# Patient Record
Sex: FEMALE | Race: BLACK OR AFRICAN AMERICAN | Employment: OTHER | URBAN - METROPOLITAN AREA
[De-identification: names, ages, dates, MRNs, and addresses within clinical notes are randomized per-mention and may not be internally consistent; named-entity substitution may affect disease eponyms.]

---

## 2018-06-06 ENCOUNTER — OP HISTORICAL/CONVERTED ENCOUNTER (OUTPATIENT)
Dept: OTHER | Age: 68
End: 2018-06-06

## 2018-07-13 ENCOUNTER — IP HISTORICAL/CONVERTED ENCOUNTER (OUTPATIENT)
Dept: OTHER | Age: 68
End: 2018-07-13

## 2019-01-09 ENCOUNTER — OP HISTORICAL/CONVERTED ENCOUNTER (OUTPATIENT)
Dept: OTHER | Age: 69
End: 2019-01-09

## 2019-01-24 ENCOUNTER — ED HISTORICAL/CONVERTED ENCOUNTER (OUTPATIENT)
Dept: OTHER | Age: 69
End: 2019-01-24

## 2019-06-14 ENCOUNTER — IP HISTORICAL/CONVERTED ENCOUNTER (OUTPATIENT)
Dept: OTHER | Age: 69
End: 2019-06-14

## 2019-09-25 ENCOUNTER — ED HISTORICAL/CONVERTED ENCOUNTER (OUTPATIENT)
Dept: OTHER | Age: 69
End: 2019-09-25

## 2019-10-04 ENCOUNTER — ED HISTORICAL/CONVERTED ENCOUNTER (OUTPATIENT)
Dept: OTHER | Age: 69
End: 2019-10-04

## 2020-05-06 ENCOUNTER — IP HISTORICAL/CONVERTED ENCOUNTER (OUTPATIENT)
Dept: OTHER | Age: 70
End: 2020-05-06

## 2020-10-20 ENCOUNTER — TRANSCRIBE ORDER (OUTPATIENT)
Dept: SCHEDULING | Age: 70
End: 2020-10-20

## 2020-10-20 DIAGNOSIS — Z12.31 OTHER SCREENING MAMMOGRAM: Primary | ICD-10-CM

## 2020-11-02 ENCOUNTER — HOSPITAL ENCOUNTER (INPATIENT)
Age: 70
LOS: 3 days | Discharge: HOME HEALTH | DRG: 190 | End: 2020-11-05
Attending: FAMILY MEDICINE | Admitting: HOSPITALIST
Payer: MEDICARE

## 2020-11-02 ENCOUNTER — APPOINTMENT (OUTPATIENT)
Dept: GENERAL RADIOLOGY | Age: 70
DRG: 190 | End: 2020-11-02
Attending: FAMILY MEDICINE
Payer: MEDICARE

## 2020-11-02 ENCOUNTER — APPOINTMENT (OUTPATIENT)
Dept: CT IMAGING | Age: 70
DRG: 190 | End: 2020-11-02
Attending: FAMILY MEDICINE
Payer: MEDICARE

## 2020-11-02 DIAGNOSIS — J44.1 COPD EXACERBATION (HCC): Primary | ICD-10-CM

## 2020-11-02 DIAGNOSIS — R00.0 SINUS TACHYCARDIA: ICD-10-CM

## 2020-11-02 PROBLEM — J44.9 COPD (CHRONIC OBSTRUCTIVE PULMONARY DISEASE) (HCC): Status: ACTIVE | Noted: 2020-11-02

## 2020-11-02 LAB
ALBUMIN SERPL-MCNC: 3.1 G/DL (ref 3.5–5)
ALBUMIN/GLOB SERPL: 0.7 {RATIO} (ref 1.1–2.2)
ALP SERPL-CCNC: 135 U/L (ref 45–117)
ALT SERPL-CCNC: 16 U/L (ref 12–78)
ANION GAP SERPL CALC-SCNC: 3 MMOL/L (ref 5–15)
APTT PPP: 28.5 SEC (ref 23–35.7)
AST SERPL W P-5'-P-CCNC: 17 U/L (ref 15–37)
BASOPHILS # BLD: 0 K/UL (ref 0–0.1)
BASOPHILS NFR BLD: 0 % (ref 0–1)
BILIRUB SERPL-MCNC: 0.4 MG/DL (ref 0.2–1)
BNP SERPL-MCNC: 1437 PG/ML
BUN SERPL-MCNC: 17 MG/DL (ref 6–20)
BUN/CREAT SERPL: 13 (ref 12–20)
CA-I BLD-MCNC: 8.7 MG/DL (ref 8.5–10.1)
CHLORIDE SERPL-SCNC: 107 MMOL/L (ref 97–108)
CO2 SERPL-SCNC: 32 MMOL/L (ref 21–32)
CREAT SERPL-MCNC: 1.36 MG/DL (ref 0.55–1.02)
DIFFERENTIAL METHOD BLD: ABNORMAL
EOSINOPHIL # BLD: 0.1 K/UL (ref 0–0.4)
EOSINOPHIL NFR BLD: 1 % (ref 0–7)
ERYTHROCYTE [DISTWIDTH] IN BLOOD BY AUTOMATED COUNT: 15.3 % (ref 11.5–14.5)
ETHANOL SERPL-MCNC: <4 MG/DL
FIBRINOGEN PPP-MCNC: 540 MG/DL (ref 220–535)
GLOBULIN SER CALC-MCNC: 4.4 G/DL (ref 2–4)
GLUCOSE SERPL-MCNC: 181 MG/DL (ref 65–100)
HCT VFR BLD AUTO: 42.4 % (ref 35–47)
HGB BLD-MCNC: 12.6 G/DL (ref 11.5–16)
IMM GRANULOCYTES # BLD AUTO: 0.1 K/UL (ref 0–0.04)
IMM GRANULOCYTES NFR BLD AUTO: 1 % (ref 0–0.5)
INR PPP: 1.1 (ref 0.9–1.1)
LACTATE SERPL-SCNC: 1.9 MMOL/L (ref 0.4–2)
LDH SERPL L TO P-CCNC: 301 U/L (ref 81–246)
LYMPHOCYTES # BLD: 2 K/UL (ref 0.8–3.5)
LYMPHOCYTES NFR BLD: 21 % (ref 12–49)
MCH RBC QN AUTO: 27.3 PG (ref 26–34)
MCHC RBC AUTO-ENTMCNC: 29.7 G/DL (ref 30–36.5)
MCV RBC AUTO: 92 FL (ref 80–99)
MONOCYTES # BLD: 0.7 K/UL (ref 0–1)
MONOCYTES NFR BLD: 7 % (ref 5–13)
NEUTS SEG # BLD: 6.9 K/UL (ref 1.8–8)
NEUTS SEG NFR BLD: 70 % (ref 32–75)
PLATELET # BLD AUTO: 201 K/UL (ref 150–400)
PMV BLD AUTO: 13.2 FL (ref 8.9–12.9)
POTASSIUM SERPL-SCNC: 4.1 MMOL/L (ref 3.5–5.1)
PROCALCITONIN SERPL-MCNC: <0.05 NG/ML
PROT SERPL-MCNC: 7.5 G/DL (ref 6.4–8.2)
PROTHROMBIN TIME: 14.3 SEC (ref 11.9–14.7)
RBC # BLD AUTO: 4.61 M/UL (ref 3.8–5.2)
SODIUM SERPL-SCNC: 142 MMOL/L (ref 136–145)
THERAPEUTIC RANGE,PTTT: NORMAL SEC (ref 68–109)
TROPONIN I SERPL-MCNC: 0.1 NG/ML
WBC # BLD AUTO: 9.7 K/UL (ref 3.6–11)

## 2020-11-02 PROCEDURE — 93005 ELECTROCARDIOGRAM TRACING: CPT

## 2020-11-02 PROCEDURE — 80053 COMPREHEN METABOLIC PANEL: CPT

## 2020-11-02 PROCEDURE — 74011000636 HC RX REV CODE- 636: Performed by: FAMILY MEDICINE

## 2020-11-02 PROCEDURE — 65270000029 HC RM PRIVATE

## 2020-11-02 PROCEDURE — 74011000250 HC RX REV CODE- 250: Performed by: HOSPITALIST

## 2020-11-02 PROCEDURE — 82728 ASSAY OF FERRITIN: CPT

## 2020-11-02 PROCEDURE — 83880 ASSAY OF NATRIURETIC PEPTIDE: CPT

## 2020-11-02 PROCEDURE — 85730 THROMBOPLASTIN TIME PARTIAL: CPT

## 2020-11-02 PROCEDURE — 36415 COLL VENOUS BLD VENIPUNCTURE: CPT

## 2020-11-02 PROCEDURE — 71275 CT ANGIOGRAPHY CHEST: CPT

## 2020-11-02 PROCEDURE — 71045 X-RAY EXAM CHEST 1 VIEW: CPT

## 2020-11-02 PROCEDURE — 85025 COMPLETE CBC W/AUTO DIFF WBC: CPT

## 2020-11-02 PROCEDURE — 84484 ASSAY OF TROPONIN QUANT: CPT

## 2020-11-02 PROCEDURE — 74011250636 HC RX REV CODE- 250/636: Performed by: FAMILY MEDICINE

## 2020-11-02 PROCEDURE — 83615 LACTATE (LD) (LDH) ENZYME: CPT

## 2020-11-02 PROCEDURE — 96375 TX/PRO/DX INJ NEW DRUG ADDON: CPT

## 2020-11-02 PROCEDURE — 83605 ASSAY OF LACTIC ACID: CPT

## 2020-11-02 PROCEDURE — 74011000250 HC RX REV CODE- 250: Performed by: FAMILY MEDICINE

## 2020-11-02 PROCEDURE — 85384 FIBRINOGEN ACTIVITY: CPT

## 2020-11-02 PROCEDURE — 84145 PROCALCITONIN (PCT): CPT

## 2020-11-02 PROCEDURE — 99285 EMERGENCY DEPT VISIT HI MDM: CPT

## 2020-11-02 PROCEDURE — 96376 TX/PRO/DX INJ SAME DRUG ADON: CPT

## 2020-11-02 PROCEDURE — 96374 THER/PROPH/DIAG INJ IV PUSH: CPT

## 2020-11-02 PROCEDURE — 80307 DRUG TEST PRSMV CHEM ANLYZR: CPT

## 2020-11-02 PROCEDURE — 85610 PROTHROMBIN TIME: CPT

## 2020-11-02 RX ORDER — DOCUSATE SODIUM 100 MG/1
100 CAPSULE, LIQUID FILLED ORAL 2 TIMES DAILY
Status: DISCONTINUED | OUTPATIENT
Start: 2020-11-03 | End: 2020-11-05 | Stop reason: HOSPADM

## 2020-11-02 RX ORDER — BUPROPION HYDROCHLORIDE 150 MG/1
150 TABLET ORAL
COMMUNITY
End: 2021-03-14

## 2020-11-02 RX ORDER — DILTIAZEM HYDROCHLORIDE 5 MG/ML
15 INJECTION INTRAVENOUS ONCE
Status: COMPLETED | OUTPATIENT
Start: 2020-11-02 | End: 2020-11-02

## 2020-11-02 RX ORDER — FUROSEMIDE 10 MG/ML
40 INJECTION INTRAMUSCULAR; INTRAVENOUS
Status: COMPLETED | OUTPATIENT
Start: 2020-11-02 | End: 2020-11-02

## 2020-11-02 RX ORDER — BUDESONIDE 0.5 MG/2ML
500 INHALANT ORAL
Status: DISCONTINUED | OUTPATIENT
Start: 2020-11-03 | End: 2020-11-05 | Stop reason: HOSPADM

## 2020-11-02 RX ORDER — ALBUTEROL SULFATE 2.5 MG/.5ML
2.5 SOLUTION RESPIRATORY (INHALATION)
Status: DISCONTINUED | OUTPATIENT
Start: 2020-11-02 | End: 2020-11-05 | Stop reason: HOSPADM

## 2020-11-02 RX ORDER — SODIUM CHLORIDE 0.9 % (FLUSH) 0.9 %
5-40 SYRINGE (ML) INJECTION EVERY 8 HOURS
Status: DISCONTINUED | OUTPATIENT
Start: 2020-11-03 | End: 2020-11-05 | Stop reason: HOSPADM

## 2020-11-02 RX ORDER — IPRATROPIUM BROMIDE AND ALBUTEROL SULFATE 2.5; .5 MG/3ML; MG/3ML
3 SOLUTION RESPIRATORY (INHALATION)
Status: ACTIVE | OUTPATIENT
Start: 2020-11-02 | End: 2020-11-03

## 2020-11-02 RX ORDER — IPRATROPIUM BROMIDE 0.5 MG/2.5ML
0.5 SOLUTION RESPIRATORY (INHALATION)
Status: DISCONTINUED | OUTPATIENT
Start: 2020-11-03 | End: 2020-11-03

## 2020-11-02 RX ORDER — CLOPIDOGREL BISULFATE 75 MG/1
75 TABLET ORAL DAILY
Status: ON HOLD | COMMUNITY
End: 2020-11-03 | Stop reason: SDUPTHER

## 2020-11-02 RX ORDER — SODIUM CHLORIDE 0.9 % (FLUSH) 0.9 %
5-40 SYRINGE (ML) INJECTION AS NEEDED
Status: DISCONTINUED | OUTPATIENT
Start: 2020-11-02 | End: 2020-11-05 | Stop reason: HOSPADM

## 2020-11-02 RX ORDER — ONDANSETRON 4 MG/1
4 TABLET, ORALLY DISINTEGRATING ORAL
Status: DISCONTINUED | OUTPATIENT
Start: 2020-11-02 | End: 2020-11-05 | Stop reason: HOSPADM

## 2020-11-02 RX ORDER — DILTIAZEM HCL/D5W 125 MG/125
5 PLASTIC BAG, INJECTION (ML) INTRAVENOUS
Status: DISCONTINUED | OUTPATIENT
Start: 2020-11-02 | End: 2020-11-03

## 2020-11-02 RX ORDER — DILTIAZEM HYDROCHLORIDE 5 MG/ML
15 INJECTION INTRAVENOUS ONCE
Status: DISCONTINUED | OUTPATIENT
Start: 2020-11-02 | End: 2020-11-02

## 2020-11-02 RX ORDER — ENOXAPARIN SODIUM 100 MG/ML
40 INJECTION SUBCUTANEOUS EVERY 24 HOURS
Status: DISCONTINUED | OUTPATIENT
Start: 2020-11-03 | End: 2020-11-03

## 2020-11-02 RX ORDER — LORAZEPAM 2 MG/ML
1 INJECTION INTRAMUSCULAR
Status: COMPLETED | OUTPATIENT
Start: 2020-11-02 | End: 2020-11-02

## 2020-11-02 RX ORDER — DILTIAZEM HYDROCHLORIDE 5 MG/ML
5 INJECTION INTRAVENOUS ONCE
Status: COMPLETED | OUTPATIENT
Start: 2020-11-02 | End: 2020-11-02

## 2020-11-02 RX ADMIN — IOPAMIDOL 100 ML: 755 INJECTION, SOLUTION INTRAVENOUS at 22:42

## 2020-11-02 RX ADMIN — LORAZEPAM 1 MG: 2 INJECTION, SOLUTION INTRAMUSCULAR; INTRAVENOUS at 21:28

## 2020-11-02 RX ADMIN — FUROSEMIDE 40 MG: 10 INJECTION, SOLUTION INTRAMUSCULAR; INTRAVENOUS at 21:03

## 2020-11-02 RX ADMIN — Medication 5 MG/HR: at 23:51

## 2020-11-02 RX ADMIN — METHYLPREDNISOLONE SODIUM SUCCINATE 125 MG: 125 INJECTION, POWDER, FOR SOLUTION INTRAMUSCULAR; INTRAVENOUS at 17:57

## 2020-11-02 RX ADMIN — DILTIAZEM HYDROCHLORIDE 15 MG: 5 INJECTION INTRAVENOUS at 23:31

## 2020-11-02 RX ADMIN — DILTIAZEM HYDROCHLORIDE 5 MG: 5 INJECTION INTRAVENOUS at 21:28

## 2020-11-02 NOTE — ED TRIAGE NOTES
Pt complains of sob, was in acute respiratory distress, tightness, 90% 3L home o2 per ems, gave 1 duo neb with relief, clear lung sounds upon ems transport, stabilized on 3L, hx of COPD, open heart surgery

## 2020-11-03 ENCOUNTER — APPOINTMENT (OUTPATIENT)
Dept: NON INVASIVE DIAGNOSTICS | Age: 70
DRG: 190 | End: 2020-11-03
Attending: INTERNAL MEDICINE
Payer: MEDICARE

## 2020-11-03 ENCOUNTER — APPOINTMENT (OUTPATIENT)
Dept: NON INVASIVE DIAGNOSTICS | Age: 70
DRG: 190 | End: 2020-11-03
Attending: HOSPITALIST
Payer: MEDICARE

## 2020-11-03 LAB
ANION GAP SERPL CALC-SCNC: 10 MMOL/L (ref 5–15)
APTT PPP: 38 SEC (ref 23–35.7)
ATRIAL RATE: 278 BPM
BUN SERPL-MCNC: 20 MG/DL (ref 6–20)
BUN/CREAT SERPL: 13 (ref 12–20)
CA-I BLD-MCNC: 8.7 MG/DL (ref 8.5–10.1)
CALCULATED P AXIS, ECG09: -83 DEGREES
CALCULATED R AXIS, ECG10: 48 DEGREES
CALCULATED T AXIS, ECG11: 135 DEGREES
CHLORIDE SERPL-SCNC: 102 MMOL/L (ref 97–108)
CO2 SERPL-SCNC: 29 MMOL/L (ref 21–32)
CREAT SERPL-MCNC: 1.57 MG/DL (ref 0.55–1.02)
DIAGNOSIS, 93000: NORMAL
ECHO AO ROOT DIAM: 2.7 CM
ECHO AV PEAK GRADIENT: 10 MMHG
ECHO LA TO AORTIC ROOT RATIO: 1.81
ECHO LV EDV A4C: 119 CM3
ECHO LV EJECTION FRACTION A2C: 26 %
ECHO LV EJECTION FRACTION BIPLANE: 51.1 % (ref 55–100)
ECHO LV ESV A2C: 45.4 CM3
ECHO LV ESV A4C: 53 CM3
ECHO LV INTERNAL DIMENSION DIASTOLIC MMODE: 4.51 CM
ECHO LV INTERNAL DIMENSION SYSTOLIC: 3.34 CM
ECHO LV IVSD: 1.89 CM (ref 0.6–0.9)
ECHO LV POSTERIOR WALL DIASTOLIC: 1.65 CM (ref 0.6–0.9)
ECHO LVOT PEAK GRADIENT: 4 MMHG
ECHO LVOT SV: 54.4 CM3
ECHO PV PEAK INSTANTANEOUS GRADIENT SYSTOLIC: 12 MMHG
ECHO PV PEAK INSTANTANEOUS GRADIENT SYSTOLIC: 4 MMHG
ECHO PV REGURGITANT MAX VELOCITY: 104 CM/S
ECHO PV REGURGITANT MAX VELOCITY: 160 CM/S
ECHO PV REGURGITANT MAX VELOCITY: 173 CM/S
ECHO PV REGURGITANT MAX VELOCITY: 94.7 CM/S
ECHO RV INTERNAL DIMENSION: 3.13 CM
ECHO TV MAX VELOCITY: 229 CM/S
ECHO TV REGURGITANT PEAK GRADIENT: 21 MMHG
ERYTHROCYTE [DISTWIDTH] IN BLOOD BY AUTOMATED COUNT: 15.2 % (ref 11.5–14.5)
FERRITIN SERPL-MCNC: 69 NG/ML (ref 26–388)
FIBRINOGEN PPP-MCNC: 575 MG/DL (ref 220–535)
GLUCOSE BLD STRIP.AUTO-MCNC: 210 MG/DL (ref 65–100)
GLUCOSE BLD STRIP.AUTO-MCNC: 229 MG/DL (ref 65–100)
GLUCOSE BLD STRIP.AUTO-MCNC: 347 MG/DL (ref 65–100)
GLUCOSE SERPL-MCNC: 307 MG/DL (ref 65–100)
HCT VFR BLD AUTO: 43.4 % (ref 35–47)
HGB BLD-MCNC: 13.1 G/DL (ref 11.5–16)
INR PPP: 1.1 (ref 0.9–1.1)
MCH RBC QN AUTO: 27.8 PG (ref 26–34)
MCHC RBC AUTO-ENTMCNC: 30.2 G/DL (ref 30–36.5)
MCV RBC AUTO: 91.9 FL (ref 80–99)
PERFORMED BY, TECHID: ABNORMAL
PLATELET # BLD AUTO: 207 K/UL (ref 150–400)
PMV BLD AUTO: 13.3 FL (ref 8.9–12.9)
POTASSIUM SERPL-SCNC: 4.3 MMOL/L (ref 3.5–5.1)
PROTHROMBIN TIME: 14.5 SEC (ref 11.9–14.7)
Q-T INTERVAL, ECG07: 372 MS
QRS DURATION, ECG06: 98 MS
QTC CALCULATION (BEZET), ECG08: 426 MS
RBC # BLD AUTO: 4.72 M/UL (ref 3.8–5.2)
SARS-COV-2, COV2: NORMAL
SODIUM SERPL-SCNC: 141 MMOL/L (ref 136–145)
THERAPEUTIC RANGE,PTTT: ABNORMAL SEC (ref 68–109)
TROPONIN I SERPL-MCNC: 0.06 NG/ML
VENTRICULAR RATE, ECG03: 79 BPM
WBC # BLD AUTO: 13.5 K/UL (ref 3.6–11)

## 2020-11-03 PROCEDURE — 80048 BASIC METABOLIC PNL TOTAL CA: CPT

## 2020-11-03 PROCEDURE — 74011250636 HC RX REV CODE- 250/636: Performed by: HOSPITALIST

## 2020-11-03 PROCEDURE — 85730 THROMBOPLASTIN TIME PARTIAL: CPT

## 2020-11-03 PROCEDURE — 74011250637 HC RX REV CODE- 250/637: Performed by: HOSPITALIST

## 2020-11-03 PROCEDURE — 93005 ELECTROCARDIOGRAM TRACING: CPT

## 2020-11-03 PROCEDURE — 82962 GLUCOSE BLOOD TEST: CPT

## 2020-11-03 PROCEDURE — C8929 TTE W OR WO FOL WCON,DOPPLER: HCPCS

## 2020-11-03 PROCEDURE — 85384 FIBRINOGEN ACTIVITY: CPT

## 2020-11-03 PROCEDURE — 84484 ASSAY OF TROPONIN QUANT: CPT

## 2020-11-03 PROCEDURE — 94760 N-INVAS EAR/PLS OXIMETRY 1: CPT

## 2020-11-03 PROCEDURE — 74011636637 HC RX REV CODE- 636/637: Performed by: HOSPITALIST

## 2020-11-03 PROCEDURE — 85610 PROTHROMBIN TIME: CPT

## 2020-11-03 PROCEDURE — 74011000250 HC RX REV CODE- 250: Performed by: HOSPITALIST

## 2020-11-03 PROCEDURE — 85027 COMPLETE CBC AUTOMATED: CPT

## 2020-11-03 PROCEDURE — 36415 COLL VENOUS BLD VENIPUNCTURE: CPT

## 2020-11-03 PROCEDURE — 87635 SARS-COV-2 COVID-19 AMP PRB: CPT

## 2020-11-03 PROCEDURE — 65270000029 HC RM PRIVATE

## 2020-11-03 PROCEDURE — 74011250637 HC RX REV CODE- 250/637: Performed by: INTERNAL MEDICINE

## 2020-11-03 PROCEDURE — 93971 EXTREMITY STUDY: CPT

## 2020-11-03 RX ORDER — LOSARTAN POTASSIUM 25 MG/1
25 TABLET ORAL DAILY
COMMUNITY
End: 2021-03-10 | Stop reason: ALTCHOICE

## 2020-11-03 RX ORDER — FUROSEMIDE 40 MG/1
40 TABLET ORAL EVERY EVENING
COMMUNITY
End: 2021-03-10 | Stop reason: ALTCHOICE

## 2020-11-03 RX ORDER — AMLODIPINE BESYLATE 10 MG/1
10 TABLET ORAL DAILY
COMMUNITY
End: 2021-03-10 | Stop reason: ALTCHOICE

## 2020-11-03 RX ORDER — PRAVASTATIN SODIUM 40 MG/1
40 TABLET ORAL
COMMUNITY
End: 2021-06-21

## 2020-11-03 RX ORDER — IPRATROPIUM BROMIDE AND ALBUTEROL SULFATE 2.5; .5 MG/3ML; MG/3ML
3 SOLUTION RESPIRATORY (INHALATION)
Status: DISCONTINUED | OUTPATIENT
Start: 2020-11-03 | End: 2020-11-05 | Stop reason: HOSPADM

## 2020-11-03 RX ORDER — CARVEDILOL 12.5 MG/1
25 TABLET ORAL 2 TIMES DAILY
COMMUNITY
End: 2020-11-05

## 2020-11-03 RX ORDER — ISOSORBIDE DINITRATE 10 MG/1
10 TABLET ORAL 2 TIMES DAILY
COMMUNITY
End: 2021-03-10 | Stop reason: ALTCHOICE

## 2020-11-03 RX ORDER — METOLAZONE 5 MG/1
5 TABLET ORAL DAILY
COMMUNITY
End: 2021-03-14

## 2020-11-03 RX ORDER — GABAPENTIN 600 MG/1
600 TABLET ORAL 3 TIMES DAILY
COMMUNITY
End: 2021-03-14

## 2020-11-03 RX ORDER — MAGNESIUM SULFATE 100 %
4 CRYSTALS MISCELLANEOUS AS NEEDED
Status: DISCONTINUED | OUTPATIENT
Start: 2020-11-03 | End: 2020-11-05 | Stop reason: HOSPADM

## 2020-11-03 RX ORDER — DILTIAZEM HCL/D5W 125 MG/125
5 PLASTIC BAG, INJECTION (ML) INTRAVENOUS CONTINUOUS
Status: DISCONTINUED | OUTPATIENT
Start: 2020-11-03 | End: 2020-11-04

## 2020-11-03 RX ORDER — THEOPHYLLINE 300 MG/1
300 TABLET, EXTENDED RELEASE ORAL DAILY
COMMUNITY
End: 2021-03-10 | Stop reason: ALTCHOICE

## 2020-11-03 RX ORDER — NITROGLYCERIN 0.4 MG/1
0.4 TABLET SUBLINGUAL
COMMUNITY
End: 2021-03-10 | Stop reason: ALTCHOICE

## 2020-11-03 RX ORDER — CARVEDILOL 12.5 MG/1
25 TABLET ORAL 2 TIMES DAILY WITH MEALS
Status: DISCONTINUED | OUTPATIENT
Start: 2020-11-03 | End: 2020-11-05 | Stop reason: HOSPADM

## 2020-11-03 RX ORDER — GUAIFENESIN 100 MG/5ML
81 LIQUID (ML) ORAL DAILY
COMMUNITY
End: 2020-11-05

## 2020-11-03 RX ORDER — DEXTROSE 50 % IN WATER (D50W) INTRAVENOUS SYRINGE
25-50 AS NEEDED
Status: DISCONTINUED | OUTPATIENT
Start: 2020-11-03 | End: 2020-11-05 | Stop reason: HOSPADM

## 2020-11-03 RX ORDER — ALBUTEROL SULFATE 90 UG/1
2 AEROSOL, METERED RESPIRATORY (INHALATION)
COMMUNITY

## 2020-11-03 RX ORDER — METOPROLOL TARTRATE 100 MG/1
100 TABLET ORAL 2 TIMES DAILY
COMMUNITY
End: 2020-11-05

## 2020-11-03 RX ORDER — BUPROPION HYDROCHLORIDE 150 MG/1
150 TABLET ORAL
Status: DISCONTINUED | OUTPATIENT
Start: 2020-11-04 | End: 2020-11-04

## 2020-11-03 RX ORDER — INSULIN LISPRO 100 [IU]/ML
INJECTION, SOLUTION INTRAVENOUS; SUBCUTANEOUS
Status: DISCONTINUED | OUTPATIENT
Start: 2020-11-03 | End: 2020-11-05 | Stop reason: HOSPADM

## 2020-11-03 RX ORDER — SOLIFENACIN SUCCINATE 5 MG/1
5 TABLET, FILM COATED ORAL DAILY
COMMUNITY
End: 2021-02-22

## 2020-11-03 RX ORDER — OXYBUTYNIN CHLORIDE 5 MG/1
5 TABLET ORAL 3 TIMES DAILY
COMMUNITY
End: 2021-03-14

## 2020-11-03 RX ORDER — CLOPIDOGREL BISULFATE 75 MG/1
75 TABLET ORAL DAILY
Status: CANCELLED | OUTPATIENT
Start: 2020-11-03

## 2020-11-03 RX ADMIN — Medication 10 ML: at 05:37

## 2020-11-03 RX ADMIN — INSULIN LISPRO 7 UNITS: 100 INJECTION, SOLUTION INTRAVENOUS; SUBCUTANEOUS at 12:14

## 2020-11-03 RX ADMIN — APIXABAN 5 MG: 5 TABLET, FILM COATED ORAL at 12:15

## 2020-11-03 RX ADMIN — INSULIN LISPRO 3 UNITS: 100 INJECTION, SOLUTION INTRAVENOUS; SUBCUTANEOUS at 17:18

## 2020-11-03 RX ADMIN — CARVEDILOL 25 MG: 12.5 TABLET, FILM COATED ORAL at 17:18

## 2020-11-03 RX ADMIN — PERFLUTREN 2 ML: 6.52 INJECTION, SUSPENSION INTRAVENOUS at 11:30

## 2020-11-03 RX ADMIN — Medication 10 ML: at 02:35

## 2020-11-03 RX ADMIN — ENOXAPARIN SODIUM 40 MG: 40 INJECTION SUBCUTANEOUS at 02:30

## 2020-11-03 RX ADMIN — Medication 10 MG/HR: at 13:54

## 2020-11-03 RX ADMIN — DOCUSATE SODIUM 100 MG: 100 CAPSULE, LIQUID FILLED ORAL at 22:28

## 2020-11-03 RX ADMIN — DOCUSATE SODIUM 100 MG: 100 CAPSULE, LIQUID FILLED ORAL at 02:30

## 2020-11-03 RX ADMIN — INSULIN LISPRO 2 UNITS: 100 INJECTION, SOLUTION INTRAVENOUS; SUBCUTANEOUS at 22:29

## 2020-11-03 RX ADMIN — Medication 10 ML: at 22:31

## 2020-11-03 RX ADMIN — APIXABAN 5 MG: 5 TABLET, FILM COATED ORAL at 22:29

## 2020-11-03 NOTE — PROGRESS NOTES
2 nurse skin assessment performed by A NICOLA WOMACK RN and myself. PT skin is free of breakdown, tears and abrasions. Pt does present with moisture to the buttocks. Absorbent pad changed and external catheter replaced.

## 2020-11-03 NOTE — H&P
History and Physical              Subjective :   Chief Complaint : Shortness of breath    Source of information : Patient, emergency room provider    History of present illness:   72 y.o. female with history of COPD, CHF and diabetes presents to the emergency room complaining of shortness of breath that started morning. She is treated in the emergency room with nebulizer treatments with improvement. She also started having tightness in the chest but denied any palpitations, fever, body aches. She continued to have tachycardia with no improvement in heart rate, it was between 1 30-1 40. Was given bolus IV diltiazem with no improvement so started on infusion. On CTA chest she has no pulmonary embolism. Also did not show any evidence of pneumonia. Admitted for further management. Past Medical History:   Diagnosis Date    [facility] ([facility] [address] [facility]     Coronary artery disease     Diabetes ([facility] [address]     Hyperlipidemia     Hypertension     IFTIKHAR (obstructive sleep apnea)      Past Surgical History:   Procedure Laterality Date    HX CHOLECYSTECTOMY      HX CORONARY ARTERY BYPASS GRAFT      HX HERNIA REPAIR      HX HYSTERECTOMY       Family History   Problem Relation Age of Onset    Heart Disease Mother     Kidney Disease Mother       Social History     Tobacco Use    Smoking status: Former Smoker    Smokeless tobacco: Never Used   Substance Use Topics    Alcohol use: Not Currently     Frequency: Never       Prior to Admission medications    Medication Sig Start Date End Date Taking? Authorizing Provider   clopidogreL (Plavix) 75 mg tab Take 75 mg by mouth daily. Yes Provider, Historical   buPROPion XL (WELLBUTRIN XL) 150 mg tablet Take 150 mg by mouth every morning. Yes Provider, Historical     Allergies   Allergen Reactions    Tylox [Oxycodone-Acetaminophen] Hives             Review of Systems:  Constitutional: Appetite is fair, denies weight loss, no fever, no chills, no night sweats.   Eye: No recent visual disturbances, no discharge, no double vision. Ear/nose/mouth/throat : No hearing disturbance, no ear pain, no nasal congestion, no sore throat, no trouble swallowing. Respiratory : ++ trouble breathing, ++ nonproductive cough, ++ shortness of breath, no hemoptysis, ++ wheezing. Cardiovascular : ++ chest discomfort, no palpitation, no racing of heart, no orthopnea, no paroxysmal nocturnal dyspnea, no peripheral edema. Gastrointestinal : No nausea, no vomiting, no diarrhea, + constipation, No heartburn, No abdominal pain. Genitourinary : No dysuria, no hematuria, no increased frequency, . Lymphatics : No swollen glands -Neck, axillary, inguinal.  Endocrine : No excessive thirst, No polyuria No cold intolerance, No heat intolerance. Immunologic : No hives, urticaria, No seasonal allergies. Musculoskeletal : No joint swelling, No pain, No effusion. Integumentary : No rash, No pruritus, No ecchymosis. Hematology : No petechiae, No easy bruising,  No tendency to bleed easy. Neurology : Denies change in mental status, No abnormal balance, No headache, No confusion, No numbness or tingling. Psychiatric : No mood swings, No anxiety, No depression. Vitals:     Patient Vitals for the past 12 hrs:   Temp Pulse Resp BP SpO2   11/02/20 2331 -- -- -- (!) 155/89 --   11/02/20 2327 -- (!) 139 -- -- --   11/02/20 2155 -- -- -- -- 98 %   11/02/20 2103 -- (!) 134 -- (!) 158/117 --   11/02/20 1756 -- (!) 125 -- (!) 141/79 92 %   11/02/20 1744 98.3 °F (36.8 °C) (!) 136 16 (!) 188/105 92 %       Physical Exam:   General : Looks tired mild respiratory distress. Alesha Fajardo HEMICHEL : PERRLA, normal oral mucosa, atraumatic normocephalic, Normal ear and nose. Neck : Supple, no JVD, no masses noted, no carotid bruit. Lungs : Breath sounds with moderate air entry bilaterally, occasional expiratory wheezes, decreased breath sounds at bases. Mild accessory muscle use noted.   CVS : Rhythm rate regular, S1+, S2+, tachycardia noted.  Monitor showing irregular rhythm with a heart rate 130-140. Brenda Staggers Abdomen : Soft, nontender, obese,, bowel sounds active. Extremities : No edema noted,  pedal pulses not palpable. Musculoskeletal : Fair range of motion, no joint swelling or effusion, muscle tone and power appears normal.   Skin : Moist, warm, skin turgor, no pathological rash. Lymphatic : No cervical lymphadenopathy. Neurological : Awake, alert, oriented to time place person. Brenda Staggers Psychiatric : Mood and affect appears appropriate to the situation. Data Review:   Recent Results (from the past 24 hour(s))   CBC WITH AUTOMATED DIFF    Collection Time: 11/02/20  6:00 PM   Result Value Ref Range    WBC 9.7 3.6 - 11.0 K/uL    RBC 4.61 3.80 - 5.20 M/uL    HGB 12.6 11.5 - 16.0 g/dL    HCT 42.4 35.0 - 47.0 %    MCV 92.0 80.0 - 99.0 FL    MCH 27.3 26.0 - 34.0 PG    MCHC 29.7 (L) 30.0 - 36.5 g/dL    RDW 15.3 (H) 11.5 - 14.5 %    PLATELET 704 466 - 605 K/uL    MPV 13.2 (H) 8.9 - 12.9 FL    NEUTROPHILS 70 32 - 75 %    LYMPHOCYTES 21 12 - 49 %    MONOCYTES 7 5 - 13 %    EOSINOPHILS 1 0 - 7 %    BASOPHILS 0 0 - 1 %    IMMATURE GRANULOCYTES 1 (H) 0.0 - 0.5 %    ABS. NEUTROPHILS 6.9 1.8 - 8.0 K/UL    ABS. LYMPHOCYTES 2.0 0.8 - 3.5 K/UL    ABS. MONOCYTES 0.7 0.0 - 1.0 K/UL    ABS. EOSINOPHILS 0.1 0.0 - 0.4 K/UL    ABS. BASOPHILS 0.0 0.0 - 0.1 K/UL    ABS. IMM.  GRANS. 0.1 (H) 0.00 - 0.04 K/UL    DF AUTOMATED     METABOLIC PANEL, COMPREHENSIVE    Collection Time: 11/02/20  6:00 PM   Result Value Ref Range    Sodium 142 136 - 145 mmol/L    Potassium 4.1 3.5 - 5.1 mmol/L    Chloride 107 97 - 108 mmol/L    CO2 32 21 - 32 mmol/L    Anion gap 3 (L) 5 - 15 mmol/L    Glucose 181 (H) 65 - 100 mg/dL    BUN 17 6 - 20 mg/dL    Creatinine 1.36 (H) 0.55 - 1.02 mg/dL    BUN/Creatinine ratio 13 12 - 20      GFR est AA 47 (L) >60 ml/min/1.73m2    GFR est non-AA 39 (L) >60 ml/min/1.73m2    Calcium 8.7 8.5 - 10.1 mg/dL    Bilirubin, total 0.4 0.2 - 1.0 mg/dL    AST (SGOT) 17 15 - 37 U/L    ALT (SGPT) 16 12 - 78 U/L    Alk. phosphatase 135 (H) 45 - 117 U/L    Protein, total 7.5 6.4 - 8.2 g/dL    Albumin 3.1 (L) 3.5 - 5.0 g/dL    Globulin 4.4 (H) 2.0 - 4.0 g/dL    A-G Ratio 0.7 (L) 1.1 - 2.2     PROCALCITONIN    Collection Time: 11/02/20  8:50 PM   Result Value Ref Range    Procalcitonin <0.05 (H) 0 ng/mL   LD    Collection Time: 11/02/20  8:50 PM   Result Value Ref Range     (H) 81 - 246 U/L   TROPONIN I    Collection Time: 11/02/20  8:50 PM   Result Value Ref Range    Troponin-I, Qt. 0.10 (H) <0.05 ng/mL   LACTIC ACID    Collection Time: 11/02/20  8:50 PM   Result Value Ref Range    Lactic acid 1.9 0.4 - 2.0 mmol/L   FIBRINOGEN    Collection Time: 11/02/20  8:50 PM   Result Value Ref Range    Fibrinogen 540 (H) 220 - 535 mg/dL   PTT    Collection Time: 11/02/20  8:50 PM   Result Value Ref Range    aPTT 28.5 23.0 - 35.7 sec    aPTT, therapeutic range   68 - 109 sec   PROTHROMBIN TIME + INR    Collection Time: 11/02/20  8:50 PM   Result Value Ref Range    Prothrombin time 14.3 11.9 - 14.7 sec    INR 1.1 0.9 - 1.1     ETHYL ALCOHOL    Collection Time: 11/02/20  8:50 PM   Result Value Ref Range    ALCOHOL(ETHYL),SERUM <4 <10 mg/dL   BNP    Collection Time: 11/02/20  8:56 PM   Result Value Ref Range    NT pro-BNP 1,437 (H) <125 pg/mL       Radiologic Studies :   CT Results  (Last 48 hours)               11/02/20 2239  CTA CHEST W OR W WO CONT Final result    Impression:  Impression:   1. No evidence of central pulmonary embolism. Limited evaluation of the distal   subsegmental branch vessels due to respiratory motion artifact. 2.  No acute intrathoracic abnormality identified. 3.  Severe atherosclerosis. Status post CABG. Cardiomegaly. 4.  See full report for detailed findings. Narrative:  Study: CTA Chest.       History: Rule out PE. Comparison: Chest CT 5/6/2020. Chest x-ray 11 2020.        Technique: CT volume acquisition of the chest was performed during the pulmonary   arterial phase following the administration of 100 mL of Isovue-370 IV contrast.   Axial, sagittal, coronal, and MIP reconstructions were reviewed. Dose reduction:   All CT scans at this facility are performed using dose reduction optimization   techniques as appropriate to a performed exam including the following: Automated   exposure control, adjustments of the mA and/or kV according to patient size, or   use of iterative reconstruction technique. Findings:       Vessels: No evidence of pulmonary was within limitations. The distal   subsegmental branches in the lower lobes are not well evaluated due to   respiratory motion artifact. Lungs/Pleura: Mild dependent bibasilar atelectasis. No consolidative airspace   disease, pleural effusion or pneumothorax. Central airways are clear of debris. No discrete lung nodule. Mediastinum/Cardiac: Heart is enlarged. Severe coronary atherosclerosis. Status   post CABG. Moderate aortic atherosclerosis. Normal caliber main pulmonary artery   and thoracic aorta. No pericardial effusion. Under distended thoracic esophagus. Thyroid: Visualized thyroid is grossly unremarkable. Lymph nodes: No intrathoracic lymphadenopathy by CT size criteria. Upper Abdomen: Visualized upper abdominal visceral structures are grossly   unremarkable. Bones/Chest wall soft tissues: Mild degenerative changes of the visualized   spine. No acute osseous abnormality identified. CXR Results  (Last 48 hours)               11/02/20 1901  XR CHEST PORT Final result    Impression:  Impression:    No acute cardiopulmonary process. Narrative:  AP portable chest, 1859 hours. Comparison: 5/11/2020. Findings: Cardiac monitoring leads overlie the chest. The patient is status post   median sternotomy. There is moderate cardiomegaly. There is moderate calcified   atherosclerotic disease within the thoracic aorta.  The sameer and pulmonary   vasculature are unremarkable. The lungs are well expanded without focal   consolidation, pleural effusion or pneumothorax. There are senescent changes   within the spine. Assessment and Plan :   COPD with exacerbation: Treated in the emergency room with much improvement, will not give any more steroids, continue nebulizer treatments. On steroid inhaler which we will continue    Sinus tachycardia: Etiology unclear, not controlled. Started on IV diltiazem, she already received boluses. We will continue the IV diltiazem and monitor closely. Diabetes mellitus type 2: We will keep her on Accu-Cheks with sliding scale insulin, unable to get information what she is taking at home. Benign essential hypertension: Again do not know the medications what she is taking, need to verify and restart medications in the morning    History of coronary artery disease with stent placement    Admitted to cardiac telemetry, full CODE STATUS, home medications unable to verify, needed to be verified in the morning. Has no advance medical directives. CC : Muna Chaney MD  Signed By: Clement Sinclair MD     November 2, 2020      This dictation was done by dragon, computer voice recognition software. Often unanticipated grammatical, syntax, [place] phones and other interpretive errors are inadvertently transcribed. Please excuse errors that have escaped final proofreading.

## 2020-11-03 NOTE — CONSULTS
Cardiology Consult    NAME: Mitch Ibarra   :  1950   MRN:  [id number]     Date/Time:  11/3/2020 10:49 AM    Patient PCP: Esau Cardenas MD  ________________________________________________________________________     Assessment:   Atrial flutter with rapid rate  RLE edema, Unilateral  COPD  CAD s/p CABG  Sleep apnea  CHF secondary to diastolic dysfunction      Plan:   Echoardiogram to assess EF and filling pressures  Duplex of RLE for suspected acute DVT  Coreg for heart rate control  Systemic anticoagulation due to high stroke risk; Eliquis  Discontinue Plavix and remain on aspirin      []        High complexity decision making was performed        Subjective:   CHIEF COMPLAINT: SOB      REASON FOR CONSULT: Atrial Flutter      HISTORY OF PRESENT ILLNESS:     Mitch Ibarra is a 72 y.o. BLACK OR  female who has a history of CAD, hypertension, status post CABG x2 in . She had saphenous vein graft to the RCA and to the OM1. She is status post PCI of the LCx and LAD . She had a cardiac cath by Dr. Elston Nissen in  that showed patent vein graft to the RCA and LCx. The LAD was widely patent and normal EF. She is a former smoker (quit 3 years ago) and she has had multiple admissions for COPD exacerbation. Also has sleep apnea and uses CPAP at home. She follows up with me in the office intermittently and was last seen about 2 years ago. Was recently admitted May 2020 with a + Covid-19 pneumonitis/SOB. She returns now with SOB that started on the day of admission. Patient states she had intermittent chest pain, palpitations, shortness of breath, and right  leg swelling;tightness. CTA of the chest was negative for pulmonary embolism. Admission EKG shows atrial flutter with rapid ventricular response; heart rate 140-180 bpm.  Patient is currently on IV Cardizem drip with good rate control in the 80's. Denies any chest pain or current palpitations.   She feels better and shortness of breath when she moves around. She complains of discomfort in her right leg and tightness in disc swelling. Past Medical History:   Diagnosis Date    [facility] [address]     Coronary artery disease     Diabetes ([facility]     Hyperlipidemia     Hypertension     IFTIKHAR (obstructive sleep apnea)       Past Surgical History:   Procedure Laterality Date    HX CHOLECYSTECTOMY      HX CORONARY ARTERY BYPASS GRAFT      HX HERNIA REPAIR      HX HYSTERECTOMY       Allergies   Allergen Reactions    Tylox [Oxycodone-Acetaminophen] Hives      Meds:  See below  Social History     Tobacco Use    Smoking status: Former Smoker    Smokeless tobacco: Never Used   Substance Use Topics    Alcohol use: Not Currently     Frequency: Never      Family History   Problem Relation Age of Onset    Heart Disease Mother     Kidney Disease Mother        REVIEW OF SYSTEMS:     []         Unable to obtain  ROS due to ---   [x]         Total of 12 systems reviewed as follows:    Constitutional: negative fever, negative chills, negative weight loss  Eyes:   negative visual changes  ENT:   negative sore throat, tongue or lip swelling  Respiratory:  negative cough, negative dyspnea  Cards:  + for chest pain, palpitations  GI:   negative for nausea, vomiting, diarrhea, and abdominal pain  Genitourinary: negative for frequency, dysuria  Integument:  negative for rash   Hematologic:  negative for easy bruising and gum/nose bleeding  Musculoskel: negative for myalgias,  back pain  Neurological:  negative for headaches, vertigo, weakness  Behavl/Psych: negative for feelings of anxiety, depression     Pertinent Positives include :    Objective:      Physical Exam:    Last 24hrs VS reviewed since prior progress note.  Most recent are:    Visit Vitals  BP (!) 139/98 (BP 1 Location: Left arm, BP Patient Position: At rest)   Pulse 97   Temp 98.7 °F (37.1 °C)   Resp 22   Wt 158.8 kg (350 lb)   SpO2 94%     No intake or output data in the 24 hours ending 11/03/20 1049     General Appearance: Alert, no acute distress. Ears/Nose/Mouth/Throat: Pupils equal and round, Hearing grossly normal.  Neck: Supple. JVP within normal limits. Carotids good upstrokes, with no bruit. Chest: Lungs clear to auscultation bilaterally. Cardiovascular: Regular rate and rhythm, S1S2 normal, no murmur, rubs, gallops. Abdomen: Soft, non-tender, bowel sounds are active. No organomegaly. Extremities: The right leg is swollen all the way up to the thigh; the left leg is not swollen. Femoral pulses +2, Distal Pulses +1. Skin: Warm and dry. Neuro: Alert and oriented x3, normal speech; follows simple commands  Psychiatric: Cooperative, normal affect and judgment    []         Post-cath site without hematoma, bruit, tenderness, or thrill. Distal pulses intact. Data:      Telemetry: Atrial flutter with heart rate in the 80s    EKG: Atrial flutter with LVH by voltage criteria  []  No new EKG for review. Prior to Admission medications    Medication Sig Start Date End Date Taking? Authorizing Provider   albuterol (Ventolin HFA) 90 mcg/actuation inhaler Take 2 Puffs by inhalation two (2) times daily as needed for Wheezing. Yes Provider, Historical   aspirin 81 mg chewable tablet Take 81 mg by mouth daily. Yes Provider, Historical   carvediloL (Coreg) 12.5 mg tablet Take 25 mg by mouth two (2) times a day. Yes Provider, Historical   furosemide (LASIX) 40 mg tablet Take 40 mg by mouth daily. Yes Provider, Historical   isosorbide dinitrate (ISORDIL) 10 mg tablet Take 10 mg by mouth two (2) times a day. Yes Provider, Historical   losartan (COZAAR) 25 mg tablet Take 25 mg by mouth daily. Yes Provider, Historical   metOLazone (ZAROXOLYN) 5 mg tablet Take 5 mg by mouth daily. Yes Provider, Historical   pravastatin (PRAVACHOL) 40 mg tablet Take 40 mg by mouth nightly.    Yes Provider, Historical   solifenacin (VESICARE) 5 mg tablet Take 5 mg by mouth daily. Yes Provider, Historical   theophylline ER,12 hour, (THEOCHRON) 300 mg tablet Take 300 mg by mouth daily. Yes Provider, Historical   clopidogreL (Plavix) 75 mg tab Take 75 mg by mouth daily. Yes Provider, Historical   gabapentin (NEURONTIN) 600 mg tablet Take 600 mg by mouth three (3) times daily. Provider, Historical   nitroglycerin (NITROSTAT) 0.4 mg SL tablet 0.4 mg by SubLINGual route every five (5) minutes as needed for Chest Pain. Up to 3 doses. Provider, Historical   buPROPion XL (WELLBUTRIN XL) 150 mg tablet Take 150 mg by mouth every morning. Provider, Historical       Recent Results (from the past 24 hour(s))   CBC WITH AUTOMATED DIFF    Collection Time: 11/02/20  6:00 PM   Result Value Ref Range    WBC 9.7 3.6 - 11.0 K/uL    RBC 4.61 3.80 - 5.20 M/uL    HGB 12.6 11.5 - 16.0 g/dL    HCT 42.4 35.0 - 47.0 %    MCV 92.0 80.0 - 99.0 FL    MCH 27.3 26.0 - 34.0 PG    MCHC 29.7 (L) 30.0 - 36.5 g/dL    RDW 15.3 (H) 11.5 - 14.5 %    PLATELET 479 192 - 351 K/uL    MPV 13.2 (H) 8.9 - 12.9 FL    NEUTROPHILS 70 32 - 75 %    LYMPHOCYTES 21 12 - 49 %    MONOCYTES 7 5 - 13 %    EOSINOPHILS 1 0 - 7 %    BASOPHILS 0 0 - 1 %    IMMATURE GRANULOCYTES 1 (H) 0.0 - 0.5 %    ABS. NEUTROPHILS 6.9 1.8 - 8.0 K/UL    ABS. LYMPHOCYTES 2.0 0.8 - 3.5 K/UL    ABS. MONOCYTES 0.7 0.0 - 1.0 K/UL    ABS. EOSINOPHILS 0.1 0.0 - 0.4 K/UL    ABS. BASOPHILS 0.0 0.0 - 0.1 K/UL    ABS. IMM.  GRANS. 0.1 (H) 0.00 - 0.04 K/UL    DF AUTOMATED     METABOLIC PANEL, COMPREHENSIVE    Collection Time: 11/02/20  6:00 PM   Result Value Ref Range    Sodium 142 136 - 145 mmol/L    Potassium 4.1 3.5 - 5.1 mmol/L    Chloride 107 97 - 108 mmol/L    CO2 32 21 - 32 mmol/L    Anion gap 3 (L) 5 - 15 mmol/L    Glucose 181 (H) 65 - 100 mg/dL    BUN 17 6 - 20 mg/dL    Creatinine 1.36 (H) 0.55 - 1.02 mg/dL    BUN/Creatinine ratio 13 12 - 20      GFR est AA 47 (L) >60 ml/min/1.73m2    GFR est non-AA 39 (L) >60 ml/min/1.73m2    Calcium 8.7 8.5 - 10.1 mg/dL    Bilirubin, total 0.4 0.2 - 1.0 mg/dL    AST (SGOT) 17 15 - 37 U/L    ALT (SGPT) 16 12 - 78 U/L    Alk.  phosphatase 135 (H) 45 - 117 U/L    Protein, total 7.5 6.4 - 8.2 g/dL    Albumin 3.1 (L) 3.5 - 5.0 g/dL    Globulin 4.4 (H) 2.0 - 4.0 g/dL    A-G Ratio 0.7 (L) 1.1 - 2.2     PROCALCITONIN    Collection Time: 11/02/20  8:50 PM   Result Value Ref Range    Procalcitonin <0.05 (H) 0 ng/mL   LD    Collection Time: 11/02/20  8:50 PM   Result Value Ref Range     (H) 81 - 246 U/L   TROPONIN I    Collection Time: 11/02/20  8:50 PM   Result Value Ref Range    Troponin-I, Qt. 0.10 (H) <0.05 ng/mL   LACTIC ACID    Collection Time: 11/02/20  8:50 PM   Result Value Ref Range    Lactic acid 1.9 0.4 - 2.0 mmol/L   FIBRINOGEN    Collection Time: 11/02/20  8:50 PM   Result Value Ref Range    Fibrinogen 540 (H) 220 - 535 mg/dL   PTT    Collection Time: 11/02/20  8:50 PM   Result Value Ref Range    aPTT 28.5 23.0 - 35.7 sec    aPTT, therapeutic range   68 - 109 sec   PROTHROMBIN TIME + INR    Collection Time: 11/02/20  8:50 PM   Result Value Ref Range    Prothrombin time 14.3 11.9 - 14.7 sec    INR 1.1 0.9 - 1.1     ETHYL ALCOHOL    Collection Time: 11/02/20  8:50 PM   Result Value Ref Range    ALCOHOL(ETHYL),SERUM <4 <10 mg/dL   BNP    Collection Time: 11/02/20  8:56 PM   Result Value Ref Range    NT pro-BNP 1,437 (H) <125 pg/mL   SARS-COV-2    Collection Time: 11/03/20  2:00 AM   Result Value Ref Range    SARS-CoV-2 Please find results under separate order     CBC W/O DIFF    Collection Time: 11/03/20  8:50 AM   Result Value Ref Range    WBC 13.5 (H) 3.6 - 11.0 K/uL    RBC 4.72 3.80 - 5.20 M/uL    HGB 13.1 11.5 - 16.0 g/dL    HCT 43.4 35.0 - 47.0 %    MCV 91.9 80.0 - 99.0 FL    MCH 27.8 26.0 - 34.0 PG    MCHC 30.2 30.0 - 36.5 g/dL    RDW 15.2 (H) 11.5 - 14.5 %    PLATELET 720 139 - 658 K/uL    MPV 13.3 (H) 8.9 - 12.9 FL        Hawa Pascual MD

## 2020-11-03 NOTE — ROUTINE PROCESS
TRANSFER - OUT REPORT:    Verbal report given to Brendon(name) on Lashawn Rolle  being transferred to (unit) for routine progression of care       Report consisted of patients Situation, Background, Assessment and   Recommendations(SBAR). Information from the following report(s) SBAR and ED Summary was reviewed with the receiving nurse. Lines:   Peripheral IV 11/02/20 Anterior;Proximal;Right Forearm (Active)   Site Assessment Clean, dry, & intact 11/02/20 2130   Phlebitis Assessment 0 11/02/20 2130   Infiltration Assessment 0 11/02/20 2130   Dressing Status Clean, dry, & intact 11/02/20 2130   Dressing Type Transparent 11/02/20 2130   Hub Color/Line Status Blue 11/02/20 2130        Opportunity for questions and clarification was provided.       Patient transported with:   Monitor  O2 @ 3 liters  Registered Nurse

## 2020-11-03 NOTE — PROGRESS NOTES
CM attempted to meet with pt to complete discharge assessment, however pt was asleep. Cm will f/up at a later time.

## 2020-11-03 NOTE — ED PROVIDER NOTES
EMERGENCY DEPARTMENT HISTORY AND PHYSICAL EXAM      Date: 11/2/2020  Patient Name: Katelynn Gibbons      History of Presenting Illness     Chief Complaint   Patient presents with    Shortness of Breath       History Provided By: Patient    HPI: Katelynn Gibbons, 72 y.o. female with a past medical history significant COPD and CHF presents to the ED with cc of shortness of breath. It began this morning. She took her routine home meds including albuterol inhaler the symptoms are not resolved. She shortly started experiencing chest tightness but denies palpitation, fever, body aches, chills, nausea, vomiting, and all other symptoms are negative. She noticed increased swelling in the lower extremities that began a week ago but no tenderness or pain. She was hospitalized for Covid 5 months ago. There are no other complaints, changes, or physical findings at this time. PCP: Giovanny Norman MD    Current Facility-Administered Medications   Medication Dose Route Frequency Provider Last Rate Last Dose    albuterol-ipratropium (DUO-NEB) 2.5 MG-0.5 MG/3 ML  3 mL Nebulization NOW Hilda Iglesias, DO        iopamidoL (ISOVUE-370) 76 % injection 100 mL  100 mL IntraVENous RAD Hilda Perez, DO        dilTIAZem (CARDIZEM) injection 15 mg  15 mg IntraVENous Dustin Sargent DO           Past History     Past Medical History:  No past medical history on file. Past Surgical History:  No past surgical history on file. Family History:  No family history on file. Social History:  Social History     Tobacco Use    Smoking status: Not on file   Substance Use Topics    Alcohol use: Not on file    Drug use: Not on file       Allergies: Allergies   Allergen Reactions    Tylox [Oxycodone-Acetaminophen] Hives         Review of Systems     Review of Systems   Constitutional: Negative for diaphoresis, fatigue and fever. HENT: Negative for rhinorrhea and sore throat.     Respiratory: Positive for shortness of breath and wheezing. Negative for cough. Cardiovascular: Positive for chest pain (tightness). Negative for palpitations. Gastrointestinal: Negative for abdominal pain, diarrhea, nausea and vomiting. Genitourinary: Negative for dysuria, frequency and urgency. Skin: Negative for rash and wound. Neurological: Negative for light-headedness and headaches. All other systems reviewed and are negative. Physical Exam     Physical Exam  Vitals signs and nursing note reviewed. Constitutional:       Appearance: She is well-developed. She is obese. HENT:      Head: Normocephalic and atraumatic. Mouth/Throat:      Mouth: Mucous membranes are moist.      Pharynx: No pharyngeal swelling or oropharyngeal exudate. Eyes:      Pupils: Pupils are equal, round, and reactive to light. Neck:      Musculoskeletal: Normal range of motion and neck supple. Cardiovascular:      Rate and Rhythm: Tachycardia present. Pulmonary:      Effort: Tachypnea and accessory muscle usage present. Breath sounds: Examination of the right-middle field reveals decreased breath sounds. Examination of the left-middle field reveals decreased breath sounds. Examination of the right-lower field reveals decreased breath sounds. Examination of the left-lower field reveals decreased breath sounds. Decreased breath sounds present. No wheezing, rhonchi or rales. Chest:      Chest wall: No mass, tenderness or crepitus. Abdominal:      General: Bowel sounds are normal.      Palpations: Abdomen is soft. Musculoskeletal: Normal range of motion. Right lower leg: She exhibits no tenderness. Edema present. Left lower leg: She exhibits no tenderness. Edema present. Skin:     General: Skin is warm. Capillary Refill: Capillary refill takes less than 2 seconds. Neurological:      General: No focal deficit present. Mental Status: She is alert and oriented to person, place, and time.    Psychiatric:         Mood and Affect: Mood normal.         Behavior: Behavior normal.         Diagnostic Study Results     Labs -     Recent Results (from the past 12 hour(s))   CBC WITH AUTOMATED DIFF    Collection Time: 11/02/20  6:00 PM   Result Value Ref Range    WBC 9.7 3.6 - 11.0 K/uL    RBC 4.61 3.80 - 5.20 M/uL    HGB 12.6 11.5 - 16.0 g/dL    HCT 42.4 35.0 - 47.0 %    MCV 92.0 80.0 - 99.0 FL    MCH 27.3 26.0 - 34.0 PG    MCHC 29.7 (L) 30.0 - 36.5 g/dL    RDW 15.3 (H) 11.5 - 14.5 %    PLATELET 192 359 - 871 K/uL    MPV 13.2 (H) 8.9 - 12.9 FL    NEUTROPHILS 70 32 - 75 %    LYMPHOCYTES 21 12 - 49 %    MONOCYTES 7 5 - 13 %    EOSINOPHILS 1 0 - 7 %    BASOPHILS 0 0 - 1 %    IMMATURE GRANULOCYTES 1 (H) 0.0 - 0.5 %    ABS. NEUTROPHILS 6.9 1.8 - 8.0 K/UL    ABS. LYMPHOCYTES 2.0 0.8 - 3.5 K/UL    ABS. MONOCYTES 0.7 0.0 - 1.0 K/UL    ABS. EOSINOPHILS 0.1 0.0 - 0.4 K/UL    ABS. BASOPHILS 0.0 0.0 - 0.1 K/UL    ABS. IMM. GRANS. 0.1 (H) 0.00 - 0.04 K/UL    DF AUTOMATED     METABOLIC PANEL, COMPREHENSIVE    Collection Time: 11/02/20  6:00 PM   Result Value Ref Range    Sodium 142 136 - 145 mmol/L    Potassium 4.1 3.5 - 5.1 mmol/L    Chloride 107 97 - 108 mmol/L    CO2 32 21 - 32 mmol/L    Anion gap 3 (L) 5 - 15 mmol/L    Glucose 181 (H) 65 - 100 mg/dL    BUN 17 6 - 20 mg/dL    Creatinine 1.36 (H) 0.55 - 1.02 mg/dL    BUN/Creatinine ratio 13 12 - 20      GFR est AA 47 (L) >60 ml/min/1.73m2    GFR est non-AA 39 (L) >60 ml/min/1.73m2    Calcium 8.7 8.5 - 10.1 mg/dL    Bilirubin, total 0.4 0.2 - 1.0 mg/dL    AST (SGOT) 17 15 - 37 U/L    ALT (SGPT) 16 12 - 78 U/L    Alk.  phosphatase 135 (H) 45 - 117 U/L    Protein, total 7.5 6.4 - 8.2 g/dL    Albumin 3.1 (L) 3.5 - 5.0 g/dL    Globulin 4.4 (H) 2.0 - 4.0 g/dL    A-G Ratio 0.7 (L) 1.1 - 2.2     PROCALCITONIN    Collection Time: 11/02/20  8:50 PM   Result Value Ref Range    Procalcitonin <0.05 (H) 0 ng/mL   LD    Collection Time: 11/02/20  8:50 PM   Result Value Ref Range     (H) 81 - 246 U/L TROPONIN I    Collection Time: 11/02/20  8:50 PM   Result Value Ref Range    Troponin-I, Qt. 0.10 (H) <0.05 ng/mL   LACTIC ACID    Collection Time: 11/02/20  8:50 PM   Result Value Ref Range    Lactic acid 1.9 0.4 - 2.0 mmol/L   FIBRINOGEN    Collection Time: 11/02/20  8:50 PM   Result Value Ref Range    Fibrinogen 540 (H) 220 - 535 mg/dL   PTT    Collection Time: 11/02/20  8:50 PM   Result Value Ref Range    aPTT 28.5 23.0 - 35.7 sec    aPTT, therapeutic range   68 - 109 sec   PROTHROMBIN TIME + INR    Collection Time: 11/02/20  8:50 PM   Result Value Ref Range    Prothrombin time 14.3 11.9 - 14.7 sec    INR 1.1 0.9 - 1.1     ETHYL ALCOHOL    Collection Time: 11/02/20  8:50 PM   Result Value Ref Range    ALCOHOL(ETHYL),SERUM <4 <10 mg/dL       Radiologic Studies -   [unfilled]  CT Results  (Last 48 hours)               11/02/20 2239  CTA CHEST W OR W WO CONT Final result    Impression:  Impression:   1. No evidence of central pulmonary embolism. Limited evaluation of the distal   subsegmental branch vessels due to respiratory motion artifact. 2.  No acute intrathoracic abnormality identified. 3.  Severe atherosclerosis. Status post CABG. Cardiomegaly. 4.  See full report for detailed findings. Narrative:  Study: CTA Chest.       History: Rule out PE. Comparison: Chest CT 5/6/2020. Chest x-ray 11 2020. Technique: CT volume acquisition of the chest was performed during the pulmonary   arterial phase following the administration of 100 mL of Isovue-370 IV contrast.   Axial, sagittal, coronal, and MIP reconstructions were reviewed. Dose reduction:   All CT scans at this facility are performed using dose reduction optimization   techniques as appropriate to a performed exam including the following: Automated   exposure control, adjustments of the mA and/or kV according to patient size, or   use of iterative reconstruction technique.        Findings:       Vessels: No evidence of pulmonary was within limitations. The distal   subsegmental branches in the lower lobes are not well evaluated due to   respiratory motion artifact. Lungs/Pleura: Mild dependent bibasilar atelectasis. No consolidative airspace   disease, pleural effusion or pneumothorax. Central airways are clear of debris. No discrete lung nodule. Mediastinum/Cardiac: Heart is enlarged. Severe coronary atherosclerosis. Status   post CABG. Moderate aortic atherosclerosis. Normal caliber main pulmonary artery   and thoracic aorta. No pericardial effusion. Under distended thoracic esophagus. Thyroid: Visualized thyroid is grossly unremarkable. Lymph nodes: No intrathoracic lymphadenopathy by CT size criteria. Upper Abdomen: Visualized upper abdominal visceral structures are grossly   unremarkable. Bones/Chest wall soft tissues: Mild degenerative changes of the visualized   spine. No acute osseous abnormality identified. CXR Results  (Last 48 hours)               11/02/20 1901  XR CHEST PORT Final result    Impression:  Impression:    No acute cardiopulmonary process. Narrative:  AP portable chest, 1859 hours. Comparison: 5/11/2020. Findings: Cardiac monitoring leads overlie the chest. The patient is status post   median sternotomy. There is moderate cardiomegaly. There is moderate calcified   atherosclerotic disease within the thoracic aorta. The sameer and pulmonary   vasculature are unremarkable. The lungs are well expanded without focal   consolidation, pleural effusion or pneumothorax. There are senescent changes   within the spine. Medical Decision Making and ED Course     Vital Signs-Reviewed the patient's vital signs.   Patient Vitals for the past 12 hrs:   Temp Pulse Resp BP SpO2   11/02/20 2155 -- -- -- -- 98 %   11/02/20 2103 -- (!) 134 -- (!) 158/117 --   11/02/20 1756 -- (!) 125 -- (!) 141/79 92 %   11/02/20 1744 98.3 °F (36.8 °C) (!) 136 16 (!) 188/105 92 %       EKG interpretation: (Preliminary)  Rhythm: sinus tachycardia; and regular . Rate (approx.): 125; Axis: normal; UT interval: normal; QRS interval: normal ; ST/T wave: normal; Other findings: abnormal ekg. Records Reviewed: Nursing Notes and Old Medical Records    The patient presents with Dyspnea with a differential diagnosis of COPD exacerbation versus CHF exacerbation versus PE versus Covid infection      Provider Notes (Medical Decision Making):     MDM  Number of Diagnoses or Management Options  COPD exacerbation [facility]):   Sinus tachycardia:   Diagnosis management comments: She was given 5 mg of diltiazem but it did not change the HR. She still remained asymptomatic. PE was ruled out by CTA chest.  She is giving 15 mg of diltiazem. The patient is currently stable with no marked toxicity or distress. She states she is no longer feeling the chest tightness after receiving Lasix 40 mg and a second dose of albuterol nebulizer and 125 mg of Solu-Medrol. Therefore this is likely COPD exacerbation but etiology of sinus tachycardia is unknown. She reports she is on several beta-blockers but do not know the name of the meds and the daughter is not available to give us a list of those meds. 11:12 PM  Per H&P, the patient will be admitted to medicine for further evaluation and care. Pt is being admitted by Kofi Milton. The patient verbalizes agreement with the diagnosis and plan of care. The results of their tests and reason(s) for their admission have been discussed with pt and/or available family. All questions were answered and there are no apparent barriers to comprehension or communication. ED Course:     - I am the first and primary provider for this patient. - I reviewed the vital signs, available nursing notes, past medical history, past surgical history, family history and social history. Initial assessment performed.  The patients presenting problems have been discussed, and they are in agreement with the care plan formulated and outlined with them. I have encouraged them to ask questions as they arise throughout their visit. Disposition     Disposition: Condition stable  Admitted to Floor Medical Floor the case was discussed with the admitting physician Hans Villa. Diagnosis     Clinical Impression:   1. COPD exacerbation ([address]    2. Sinus tachycardia        Attestations:    Ceasar Sherman, DO    Please note that this dictation was completed with [employer], the computer voice recognition software. Quite often unanticipated grammatical, syntax, homophones, and other interpretive errors are inadvertently transcribed by the computer software. Please disregard these errors. Please excuse any errors that have escaped final proofreading. Thank you.

## 2020-11-03 NOTE — PROGRESS NOTES
Hospitalist Progress Note         MARIBELL Vila, FNP-C    Daily Progress Note: 11/3/2020      Subjective:   Subjective   Patient seen in follow-up laying in bed  Patient reports continued shortness of breath and weakness  Patient denies chest pain, nausea, vomiting  No acute distress noted at this time    Review of Systems:   Review of Systems   Constitutional: Negative. HENT: Negative. Eyes: Negative. Respiratory: Positive for shortness of breath. Cardiovascular: Negative. Gastrointestinal: Negative. Genitourinary: Negative. Musculoskeletal: Positive for myalgias. Weakness   Skin: Negative. Neurological: Negative. Endo/Heme/Allergies: Negative. Psychiatric/Behavioral: Negative. Objective:   Objective      Vitals:  Patient Vitals for the past 12 hrs:   Temp Pulse Resp BP SpO2   11/03/20 0400 98.7 °F (37.1 °C) 97 22 (!) 139/98 94 %   11/03/20 0159 98.5 °F (36.9 °C) (!) 102 20 (!) 134/99 99 %   11/03/20 0051 -- (!) 125 19 (!) 145/85 --   11/03/20 0000 -- (!) 102 -- -- --   11/02/20 2331 -- -- -- (!) 155/89 --   11/02/20 2327 -- (!) 139 -- -- --        Physical Exam:  Physical Exam  Vitals signs and nursing note reviewed. Constitutional:       Appearance: She is obese. HENT:      Head: Normocephalic. Nose: Nose normal.      Mouth/Throat:      Mouth: Mucous membranes are moist.   Eyes:      Extraocular Movements: Extraocular movements intact. Pupils: Pupils are equal, round, and reactive to light. Neck:      Musculoskeletal: Normal range of motion. Cardiovascular:      Rate and Rhythm: Regular rhythm. Tachycardia present. Pulses: Normal pulses. Pulmonary:      Comments: Diminished bases, 2L nasal cannula  Abdominal:      General: Bowel sounds are normal.      Palpations: Abdomen is soft. Musculoskeletal:      Right lower leg: Edema present. Left lower leg: Edema present.       Comments: Limited range of motion bilateral lower extremities. Skin:     General: Skin is warm and dry. Capillary Refill: Capillary refill takes less than 2 seconds. Neurological:      Mental Status: She is alert and oriented to person, place, and time. Psychiatric:         Mood and Affect: Mood normal.          Lab Results:  Recent Results (from the past 24 hour(s))   CBC WITH AUTOMATED DIFF    Collection Time: 11/02/20  6:00 PM   Result Value Ref Range    WBC 9.7 3.6 - 11.0 K/uL    RBC 4.61 3.80 - 5.20 M/uL    HGB 12.6 11.5 - 16.0 g/dL    HCT 42.4 35.0 - 47.0 %    MCV 92.0 80.0 - 99.0 FL    MCH 27.3 26.0 - 34.0 PG    MCHC 29.7 (L) 30.0 - 36.5 g/dL    RDW 15.3 (H) 11.5 - 14.5 %    PLATELET 454 276 - 754 K/uL    MPV 13.2 (H) 8.9 - 12.9 FL    NEUTROPHILS 70 32 - 75 %    LYMPHOCYTES 21 12 - 49 %    MONOCYTES 7 5 - 13 %    EOSINOPHILS 1 0 - 7 %    BASOPHILS 0 0 - 1 %    IMMATURE GRANULOCYTES 1 (H) 0.0 - 0.5 %    ABS. NEUTROPHILS 6.9 1.8 - 8.0 K/UL    ABS. LYMPHOCYTES 2.0 0.8 - 3.5 K/UL    ABS. MONOCYTES 0.7 0.0 - 1.0 K/UL    ABS. EOSINOPHILS 0.1 0.0 - 0.4 K/UL    ABS. BASOPHILS 0.0 0.0 - 0.1 K/UL    ABS. IMM. GRANS. 0.1 (H) 0.00 - 0.04 K/UL    DF AUTOMATED     METABOLIC PANEL, COMPREHENSIVE    Collection Time: 11/02/20  6:00 PM   Result Value Ref Range    Sodium 142 136 - 145 mmol/L    Potassium 4.1 3.5 - 5.1 mmol/L    Chloride 107 97 - 108 mmol/L    CO2 32 21 - 32 mmol/L    Anion gap 3 (L) 5 - 15 mmol/L    Glucose 181 (H) 65 - 100 mg/dL    BUN 17 6 - 20 mg/dL    Creatinine 1.36 (H) 0.55 - 1.02 mg/dL    BUN/Creatinine ratio 13 12 - 20      GFR est AA 47 (L) >60 ml/min/1.73m2    GFR est non-AA 39 (L) >60 ml/min/1.73m2    Calcium 8.7 8.5 - 10.1 mg/dL    Bilirubin, total 0.4 0.2 - 1.0 mg/dL    AST (SGOT) 17 15 - 37 U/L    ALT (SGPT) 16 12 - 78 U/L    Alk.  phosphatase 135 (H) 45 - 117 U/L    Protein, total 7.5 6.4 - 8.2 g/dL    Albumin 3.1 (L) 3.5 - 5.0 g/dL    Globulin 4.4 (H) 2.0 - 4.0 g/dL    A-G Ratio 0.7 (L) 1.1 - 2.2     PROCALCITONIN    Collection Time: 11/02/20  8:50 PM   Result Value Ref Range    Procalcitonin <0.05 (H) 0 ng/mL   LD    Collection Time: 11/02/20  8:50 PM   Result Value Ref Range     (H) 81 - 246 U/L   TROPONIN I    Collection Time: 11/02/20  8:50 PM   Result Value Ref Range    Troponin-I, Qt. 0.10 (H) <0.05 ng/mL   LACTIC ACID    Collection Time: 11/02/20  8:50 PM   Result Value Ref Range    Lactic acid 1.9 0.4 - 2.0 mmol/L   FIBRINOGEN    Collection Time: 11/02/20  8:50 PM   Result Value Ref Range    Fibrinogen 540 (H) 220 - 535 mg/dL   PTT    Collection Time: 11/02/20  8:50 PM   Result Value Ref Range    aPTT 28.5 23.0 - 35.7 sec    aPTT, therapeutic range   68 - 109 sec   PROTHROMBIN TIME + INR    Collection Time: 11/02/20  8:50 PM   Result Value Ref Range    Prothrombin time 14.3 11.9 - 14.7 sec    INR 1.1 0.9 - 1.1     ETHYL ALCOHOL    Collection Time: 11/02/20  8:50 PM   Result Value Ref Range    ALCOHOL(ETHYL),SERUM <4 <10 mg/dL   BNP    Collection Time: 11/02/20  8:56 PM   Result Value Ref Range    NT pro-BNP 1,437 (H) <125 pg/mL   SARS-COV-2    Collection Time: 11/03/20  2:00 AM   Result Value Ref Range    SARS-CoV-2 Please find results under separate order            Diagnostic Images:  CT Results  (Last 48 hours)               11/02/20 2239  CTA CHEST W OR W WO CONT Final result    Impression:  Impression:   1. No evidence of central pulmonary embolism. Limited evaluation of the distal   subsegmental branch vessels due to respiratory motion artifact. 2.  No acute intrathoracic abnormality identified. 3.  Severe atherosclerosis. Status post CABG. Cardiomegaly. 4.  See full report for detailed findings. Narrative:  Study: CTA Chest.       History: Rule out PE. Comparison: Chest CT 5/6/2020. Chest x-ray 11 2020.        Technique: CT volume acquisition of the chest was performed during the pulmonary   arterial phase following the administration of 100 mL of Isovue-370 IV contrast.   Axial, sagittal, coronal, and MIP reconstructions were reviewed. Dose reduction:   All CT scans at this facility are performed using dose reduction optimization   techniques as appropriate to a performed exam including the following: Automated   exposure control, adjustments of the mA and/or kV according to patient size, or   use of iterative reconstruction technique. Findings:       Vessels: No evidence of pulmonary was within limitations. The distal   subsegmental branches in the lower lobes are not well evaluated due to   respiratory motion artifact. Lungs/Pleura: Mild dependent bibasilar atelectasis. No consolidative airspace   disease, pleural effusion or pneumothorax. Central airways are clear of debris. No discrete lung nodule. Mediastinum/Cardiac: Heart is enlarged. Severe coronary atherosclerosis. Status   post CABG. Moderate aortic atherosclerosis. Normal caliber main pulmonary artery   and thoracic aorta. No pericardial effusion. Under distended thoracic esophagus. Thyroid: Visualized thyroid is grossly unremarkable. Lymph nodes: No intrathoracic lymphadenopathy by CT size criteria. Upper Abdomen: Visualized upper abdominal visceral structures are grossly   unremarkable. Bones/Chest wall soft tissues: Mild degenerative changes of the visualized   spine. No acute osseous abnormality identified.                      Current Medications:    Current Facility-Administered Medications:     dilTIAZem (CARDIZEM) 125 mg/125 mL (1 mg/mL) dextrose 5% infusion, 10 mg/hr, IntraVENous, CONTINUOUS, Pedro Rosales MD, Last Rate: 10 mL/hr at 11/03/20 0052, 10 mg/hr at 11/03/20 0052    albuterol-ipratropium (DUO-NEB) 2.5 MG-0.5 MG/3 ML, 3 mL, Nebulization, Q6H RT, Apurva Lan MD, Stopped at 11/03/20 0800    insulin lispro (HUMALOG) injection, , SubCUTAneous, AC&HS, Apurva Lan MD    glucose chewable tablet 16 g, 4 Tab, Oral, PRN, Apurva Lan MD    dextrose (D50W) injection syrg 12.5-25 g, 25-50 mL, IntraVENous, PRN, Aditi Romero MD    glucagon (GLUCAGEN) injection 1 mg, 1 mg, IntraMUSCular, PRN, Aditi Romero MD    perflutren lipid microspheres (DEFINITY) 1.1 mg/mL contrast injection, , , ,     sodium chloride (NS) flush 5-40 mL, 5-40 mL, IntraVENous, Q8H, Aditi Romero MD, 10 mL at 11/03/20 0537    sodium chloride (NS) flush 5-40 mL, 5-40 mL, IntraVENous, PRN, Aditi Romero MD    albuterol CONCENTRATE 2.5mg/0.5 mL neb soln, 2.5 mg, Nebulization, Q4H PRN, Aditi Romero MD    budesonide (PULMICORT) 500 mcg/2 ml nebulizer suspension, 500 mcg, Nebulization, BID RT, Aditi Romero MD, Stopped at 11/03/20 0000    ondansetron (ZOFRAN ODT) tablet 4 mg, 4 mg, Oral, Q4H PRN, Aditi Romero MD    docusate sodium (COLACE) capsule 100 mg, 100 mg, Oral, BID, Aditi Romero MD, 100 mg at 11/03/20 0230    enoxaparin (LOVENOX) injection 40 mg, 40 mg, SubCUTAneous, Q24H, Aditi Romero MD, 40 mg at 11/03/20 0230       ASSESSMENT:    A. fib/a flutter: She shows heart rate range . Heart rate currently 81 bpm on telemetry. Continue diltiazem drip, obtain cardiology eval.  Obtain echocardiogram.    COPD exacerbation: Continue DuoNeb, Pulmicort, as needed supplemental O2. CTA chest negative for PE and pneumonia. Benign essential hypertension: Patient overall is a poor historian of current medications she is taking. Currently awaiting for patient to obtain medication reconciliation from family or pharmacy.      Type 2 diabetes mellitus: Continue AC/HS Accu-Cheks, medium dose SSI    History of coronary artery disease status post CABG w/ stent placement:  Continue cardioprotective medications      PLAN:  Continue Cardizem drip  Obtain cardiology consult, obtain echocardiogram  Continue AC/hs Accu-Cheks, medium dose SSI  Complete home medication reconciliation once med list is available  CM for disposition planning to involve home w/ PT/OT    Full Code  Lovenox Dvt Prophylaxis  GI Prophylaxis  Home medications reviewed and reconciled      Above treatment plan reviewed and discussed with patient in detail at bedside, all questions answered. Care Plan discussed with: Interdisciplinary team      This dictation was done by dragon, computer voice recognition software. Often unanticipated grammatical, syntax, [place] phones and other interpretive errors are inadvertently transcribed. Please excuse errors that have escaped final proofreading.     Keyon Feliciano NP

## 2020-11-03 NOTE — PROGRESS NOTES
Physician Progress Note      PATIENT:               Brisa Perez  CSN #:                  [id number]  :                       1950  ADMIT DATE:       2020 6:03 PM  [address] [place] DATE:  RESPONDING  PROVIDER #:        Radha Yang MD          QUERY Marce Rivero, NP,    Pt admitted with SOB, CP, COPD exacerbation. Pt noted to have Dyspnea, Tachypnea, and assessory muscle usage. If possible, please document in the progress notes and discharge summary if you are evaluating and/or treating any of the following: The medical record reflects the following:  Risk Factors: COPD, Home O2, CHF, CAD, DM, HTN, IFTIKHAR, Obesity, Randa/Flutter  Clinical Indicators: PN - Lungs : Breath sounds with moderate air entry bilaterally, occasional expiratory wheezes, decreased breath sounds at bases. PN ED - Pulmonary: Effort: Tachypnea and accessory muscle usage present. O2 sats 92% on 2L NC. - H&P-Respiratory : ++ trouble breathing, ++ nonproductive cough, ++ shortness of breath, ++ wheezing. Looks tired. BNP- 1,437. Treatment: CXR, CTA, Lasix 40mg IV, duo nebs, albuterol/atrovent nebs, solu-Medrol IV, Oxygen 2L-3L, Pulmocort neb, Cardizem IV, Lab monitoring, Pulse Ox monitoring. Thank you,  EVANS GuardadoN, RN, CDI Specialist  [email or web address] or [phone]  Options provided:  -- Acute respiratory failure with hypoxia  -- Chronic respiratory failure with hypoxia  -- Acute on chronic respiratory failure with hypoxia  -- Other - I will add my own diagnosis  -- Disagree - Not applicable / Not valid  -- Disagree - Clinically unable to determine / Unknown  -- Refer to Clinical Documentation Reviewer    PROVIDER RESPONSE TEXT:    This patient is in acute respiratory failure with hypoxia. Query created by: Edouard Montgomery on 11/3/2020 11:48 AM      QUERY TEXT:    Joan Herrera, NP,    Pt admitted with SOB, CP, COPD exacerbation and has CHF documented.  If possible, please document in progress notes and discharge summary further specificity regarding the type and acuity of CHF:      The medical record reflects the following:  Risk Factors: CHF d/t diastolic dysfunction, CAD s/p CABG, Home O2, Home CPap, Aflutter w/Rapid Rate, IFTIKHAR  Clinical Indicators: BNP 1,437, +SOB, Diminished lung sounds, Use of accessory muscles, Tachypnea  Treatment: CXR, CTA, Lab monitoring, Lasix IV, Solu-Medrol IV, multiple nebs, O2 at 2-3L/min NC, Cardiac Monitoring, Cardizem IV. Thank you,  EVANS OrdoñezN, RN, CDI Specialist  [email or web address] or [phone]  Options provided:  -- Acute on Chronic Diastolic CHF/HFpEF  -- Acute Diastolic CHF/HFpEF  -- Chronic Diastolic CHF/HFpEF  -- Other - I will add my own diagnosis  -- Disagree - Not applicable / Not valid  -- Disagree - Clinically unable to determine / Unknown  -- Refer to Clinical Documentation Reviewer    PROVIDER RESPONSE TEXT:    This patient is in acute on chronic diastolic CHF/HFpEF.     Query created by: Janet Rangel on 11/3/2020 12:08 PM      Electronically signed by:  Ambar Fowler MD 11/3/2020 5:23 PM

## 2020-11-04 LAB
APTT PPP: 38 SEC (ref 23–35.7)
ATRIAL RATE: 283 BPM
CALCULATED R AXIS, ECG10: 47 DEGREES
CALCULATED T AXIS, ECG11: 79 DEGREES
DIAGNOSIS, 93000: NORMAL
FIBRINOGEN PPP-MCNC: 460 MG/DL (ref 220–535)
GLUCOSE BLD STRIP.AUTO-MCNC: 186 MG/DL (ref 65–100)
GLUCOSE BLD STRIP.AUTO-MCNC: 188 MG/DL (ref 65–100)
GLUCOSE BLD STRIP.AUTO-MCNC: 236 MG/DL (ref 65–100)
GLUCOSE BLD STRIP.AUTO-MCNC: 254 MG/DL (ref 65–100)
INR PPP: 1.3 (ref 0.9–1.1)
PERFORMED BY, TECHID: ABNORMAL
PROTHROMBIN TIME: 16.4 SEC (ref 11.9–14.7)
Q-T INTERVAL, ECG07: 388 MS
QRS DURATION, ECG06: 102 MS
QTC CALCULATION (BEZET), ECG08: 561 MS
THERAPEUTIC RANGE,PTTT: ABNORMAL SEC (ref 68–109)
VENTRICULAR RATE, ECG03: 126 BPM

## 2020-11-04 PROCEDURE — 85730 THROMBOPLASTIN TIME PARTIAL: CPT

## 2020-11-04 PROCEDURE — 85610 PROTHROMBIN TIME: CPT

## 2020-11-04 PROCEDURE — 77010033678 HC OXYGEN DAILY

## 2020-11-04 PROCEDURE — 74011636637 HC RX REV CODE- 636/637: Performed by: HOSPITALIST

## 2020-11-04 PROCEDURE — 94762 N-INVAS EAR/PLS OXIMTRY CONT: CPT

## 2020-11-04 PROCEDURE — 74011250637 HC RX REV CODE- 250/637: Performed by: INTERNAL MEDICINE

## 2020-11-04 PROCEDURE — 74011250636 HC RX REV CODE- 250/636: Performed by: NURSE PRACTITIONER

## 2020-11-04 PROCEDURE — 85384 FIBRINOGEN ACTIVITY: CPT

## 2020-11-04 PROCEDURE — 36415 COLL VENOUS BLD VENIPUNCTURE: CPT

## 2020-11-04 PROCEDURE — 65270000029 HC RM PRIVATE

## 2020-11-04 PROCEDURE — 82962 GLUCOSE BLOOD TEST: CPT

## 2020-11-04 PROCEDURE — 74011000250 HC RX REV CODE- 250: Performed by: HOSPITALIST

## 2020-11-04 PROCEDURE — 74011250637 HC RX REV CODE- 250/637: Performed by: HOSPITALIST

## 2020-11-04 PROCEDURE — 74011250637 HC RX REV CODE- 250/637: Performed by: NURSE PRACTITIONER

## 2020-11-04 RX ORDER — AMLODIPINE BESYLATE 5 MG/1
10 TABLET ORAL DAILY
Status: DISCONTINUED | OUTPATIENT
Start: 2020-11-05 | End: 2020-11-05 | Stop reason: HOSPADM

## 2020-11-04 RX ORDER — ALBUTEROL SULFATE 90 UG/1
2 AEROSOL, METERED RESPIRATORY (INHALATION)
Status: DISCONTINUED | OUTPATIENT
Start: 2020-11-04 | End: 2020-11-05 | Stop reason: HOSPADM

## 2020-11-04 RX ORDER — FUROSEMIDE 10 MG/ML
20 INJECTION INTRAMUSCULAR; INTRAVENOUS ONCE
Status: COMPLETED | OUTPATIENT
Start: 2020-11-04 | End: 2020-11-04

## 2020-11-04 RX ORDER — PRAVASTATIN SODIUM 40 MG/1
40 TABLET ORAL
Status: DISCONTINUED | OUTPATIENT
Start: 2020-11-04 | End: 2020-11-05 | Stop reason: HOSPADM

## 2020-11-04 RX ORDER — FUROSEMIDE 40 MG/1
40 TABLET ORAL DAILY
Status: DISCONTINUED | OUTPATIENT
Start: 2020-11-05 | End: 2020-11-05 | Stop reason: HOSPADM

## 2020-11-04 RX ORDER — GUAIFENESIN 100 MG/5ML
81 LIQUID (ML) ORAL DAILY
Status: DISCONTINUED | OUTPATIENT
Start: 2020-11-05 | End: 2020-11-05 | Stop reason: HOSPADM

## 2020-11-04 RX ORDER — ISOSORBIDE DINITRATE 10 MG/1
10 TABLET ORAL 2 TIMES DAILY
Status: DISCONTINUED | OUTPATIENT
Start: 2020-11-04 | End: 2020-11-05 | Stop reason: HOSPADM

## 2020-11-04 RX ORDER — LOSARTAN POTASSIUM 50 MG/1
25 TABLET ORAL DAILY
Status: DISCONTINUED | OUTPATIENT
Start: 2020-11-05 | End: 2020-11-05 | Stop reason: HOSPADM

## 2020-11-04 RX ORDER — GABAPENTIN 300 MG/1
600 CAPSULE ORAL 3 TIMES DAILY
Status: DISCONTINUED | OUTPATIENT
Start: 2020-11-04 | End: 2020-11-04

## 2020-11-04 RX ORDER — METOLAZONE 5 MG/1
5 TABLET ORAL DAILY
Status: DISCONTINUED | OUTPATIENT
Start: 2020-11-05 | End: 2020-11-05 | Stop reason: HOSPADM

## 2020-11-04 RX ADMIN — INSULIN LISPRO 5 UNITS: 100 INJECTION, SOLUTION INTRAVENOUS; SUBCUTANEOUS at 13:26

## 2020-11-04 RX ADMIN — INSULIN LISPRO 2 UNITS: 100 INJECTION, SOLUTION INTRAVENOUS; SUBCUTANEOUS at 09:54

## 2020-11-04 RX ADMIN — Medication 10 ML: at 23:21

## 2020-11-04 RX ADMIN — ISOSORBIDE DINITRATE 10 MG: 10 TABLET ORAL at 23:20

## 2020-11-04 RX ADMIN — CARVEDILOL 25 MG: 12.5 TABLET, FILM COATED ORAL at 09:54

## 2020-11-04 RX ADMIN — PRAVASTATIN SODIUM 40 MG: 40 TABLET ORAL at 23:20

## 2020-11-04 RX ADMIN — INSULIN LISPRO 3 UNITS: 100 INJECTION, SOLUTION INTRAVENOUS; SUBCUTANEOUS at 16:30

## 2020-11-04 RX ADMIN — CARVEDILOL 25 MG: 12.5 TABLET, FILM COATED ORAL at 18:36

## 2020-11-04 RX ADMIN — APIXABAN 5 MG: 5 TABLET, FILM COATED ORAL at 09:54

## 2020-11-04 RX ADMIN — Medication 10 MG/HR: at 04:51

## 2020-11-04 RX ADMIN — APIXABAN 5 MG: 5 TABLET, FILM COATED ORAL at 23:20

## 2020-11-04 RX ADMIN — Medication 10 ML: at 05:19

## 2020-11-04 RX ADMIN — FUROSEMIDE 20 MG: 10 INJECTION, SOLUTION INTRAMUSCULAR; INTRAVENOUS at 18:36

## 2020-11-04 RX ADMIN — DOCUSATE SODIUM 100 MG: 100 CAPSULE, LIQUID FILLED ORAL at 23:20

## 2020-11-04 NOTE — PROGRESS NOTES
Progress Note      11/4/2020 11:30 AM  NAME: Dav Lara   MRN:  [id number]   Admit Diagnosis: COPD (chronic obstructive pulmonary disease) ([facility] [J44.9]      Problem List:   Atrial flutter with rapid rate  RLE edema, Unilateral  COPD  CAD s/p CABG  Sleep apnea  CHF secondary to diastolic dysfunction     Assessment/Plan:   Duplex scan is negative for acute DVT of the right lower extremity  Echo shows ejection fraction about 50%,  unchanged from her baseline  Continue on systemic anticoagulation with Eliquis  Discontinue IV Cardizem  Continue on Coreg  Add Cardizem CD if heart rate remains elevated above 100bpm off IV drip         []       High complexity decision making was performed in this patient at high risk for decompensation with multiple organ involvement. Subjective:     Dav Lara denies chest pain. Still has exertional SOB but better today. Discussed with RN events overnight. Review of Systems:    Symptom Y/N Comments  Symptom Y/N Comments   Fever/Chills N   Chest Pain N    Poor Appetite N   Edema N    Cough N   Abdominal Pain N    Sputum N   Joint Pain N    SOB/BUTCHER N   Pruritis/Rash N    Nausea/vomit N   Tolerating PT/OT Y    Diarrhea N   Tolerating Diet Y    Constipation N   Other       Could NOT obtain due to:      Objective:      Physical Exam:    Last 24hrs VS reviewed since prior progress note. Most recent are:    Visit Vitals  /73   Pulse 70   Temp 98.3 °F (36.8 °C)   Resp 18   Ht 5' 2.99\" (1.6 m)   Wt (!) 158.8 kg (350 lb 1.5 oz)   SpO2 95%   BMI 62.03 kg/m²       Intake/Output Summary (Last 24 hours) at 11/4/2020 1130  Last data filed at 11/4/2020 0600  Gross per 24 hour   Intake 862.33 ml   Output 725 ml   Net 137.33 ml        General Appearance: Well developed, alert; no acute distress. Ears/Nose/Mouth/Throat: Hearing grossly normal; moist mucous membranes  Neck: Supple. Chest: Lungs clear to auscultation bilaterally.   Cardiovascular: Irregularly irregular, S1S2 normal, no murmur. Abdomen: Soft, non-tender, bowel sounds are active. Extremities: No edema bilaterally. Skin: Warm and dry. []         Post-cath site without hematoma, bruit, tenderness, or thrill. Distal pulses intact. PMH/SH reviewed - no change compared to H&P    · Complete echocardiogram done 11/3/2020 and personally reviewed:  · Contrast used: DEFINITY. · LV: Calculated LVEF is 50%. Normal cavity size. Mild concentric hypertrophy. Globally reduced systolic function. · LA: Mildly dilated left atrium. · RV: Mildly dilated right ventricle. · RA: Mildly dilated right atrium. · MV: Mitral valve thickening. Mild mitral annular calcification. Mild to moderate mitral valve regurgitation is present. .    Telemetry: Atrial flutter with controlled heart rate at 70    EKG: Atrial flutter with LVH  []  No new EKG for review    Lab Data Personally Reviewed:    Recent Labs     11/03/20  0850 11/02/20  1800   WBC 13.5* 9.7   HGB 13.1 12.6   HCT 43.4 42.4    201     Recent Labs     11/03/20  0950 11/02/20 2050   INR 1.1 1.1   PTP 14.5 14.3   APTT 38.0* 28.5      Recent Labs     11/03/20  0850 11/02/20  1800    142   K 4.3 4.1    107   CO2 29 32   BUN 20 17   CREA 1.57* 1.36*   * 181*   CA 8.7 8.7     Recent Labs     11/03/20  1315 11/02/20 2050   TROIQ 0.06* 0.10*     No results found for: CHOL, CHOLX, CHLST, CHOLV, HDL, HDLP, LDL, LDLC, DLDLP, TGLX, TRIGL, TRIGP, CHHD, CHHDX    Recent Labs     11/02/20  1800   *   TP 7.5   ALB 3.1*   GLOB 4.4*     No results for input(s): PH, PCO2, PO2 in the last 72 hours.     Medications Personally Reviewed:    Current Facility-Administered Medications   Medication Dose Route Frequency    dilTIAZem (CARDIZEM) 125 mg/125 mL (1 mg/mL) dextrose 5% infusion  10 mg/hr IntraVENous CONTINUOUS    albuterol-ipratropium (DUO-NEB) 2.5 MG-0.5 MG/3 ML  3 mL Nebulization Q6H RT    buPROPion XL (WELLBUTRIN XL) tablet 150 mg  150 mg Oral 7am    insulin lispro (HUMALOG) injection   SubCUTAneous AC&HS    glucose chewable tablet 16 g  4 Tab Oral PRN    dextrose (D50W) injection syrg 12.5-25 g  25-50 mL IntraVENous PRN    glucagon (GLUCAGEN) injection 1 mg  1 mg IntraMUSCular PRN    carvediloL (COREG) tablet 25 mg  25 mg Oral BID WITH MEALS    apixaban (ELIQUIS) tablet 5 mg  5 mg Oral BID    sodium chloride (NS) flush 5-40 mL  5-40 mL IntraVENous Q8H    sodium chloride (NS) flush 5-40 mL  5-40 mL IntraVENous PRN    albuterol CONCENTRATE 2.5mg/0.5 mL neb soln  2.5 mg Nebulization Q4H PRN    budesonide (PULMICORT) 500 mcg/2 ml nebulizer suspension  500 mcg Nebulization BID RT    ondansetron (ZOFRAN ODT) tablet 4 mg  4 mg Oral Q4H PRN    docusate sodium (COLACE) capsule 100 mg  100 mg Oral BID         Omar Bennett MD

## 2020-11-04 NOTE — PROGRESS NOTES
Problem: Falls - Risk of  Goal: *Absence of Falls  Description: Document Charlie Mohan Fall Risk and appropriate interventions in the flowsheet. Outcome: Progressing Towards Goal  Note: Fall Risk Interventions:   Bed is in the lowest position and wheels are locked, call bell is within reach, bathroom light is on during evening hours, gripper socks are on and patient has been instructed to call out for assistance if needed. As of now, patient is free from falls and will continue to be monitored. Problem: Pain  Goal: *Control of Pain  Outcome: Progressing Towards Goal  Note: Patient reports her pain is being controlled. Pain will continue to be assessed and treated       Bedside shift change report given to Kira Birmingham RN (oncoming nurse) by Susan Lowery RN (offgoing nurse). Report included the following information SBAR, Kardex, Intake/Output, MAR, Accordion and Cardiac Rhythm A-Flutter.

## 2020-11-04 NOTE — PROGRESS NOTES
Hospitalist Progress Note         MARIBELL Mendenhall FNP-C    Daily Progress Note: 11/4/2020      Subjective:   Subjective   Patient seen in follow-up sleeping, easily arousable  Patient reports continued shortness of breath w/ ambulation  No acute distress noted at this time    Review of Systems:   Review of Systems   Constitutional: Negative. HENT: Negative. Eyes: Negative. Respiratory: Positive for shortness of breath. Cardiovascular: Negative. Gastrointestinal: Negative. Genitourinary: Negative. Musculoskeletal: Positive for myalgias. Weakness   Skin: Negative. Neurological: Negative. Endo/Heme/Allergies: Negative. Psychiatric/Behavioral: Negative. Objective:   Objective      Vitals:  Patient Vitals for the past 12 hrs:   Temp Pulse Resp BP SpO2   11/04/20 0903 98.3 °F (36.8 °C) 70 18 131/73 95 %   11/04/20 0428 98.4 °F (36.9 °C) 70 16 132/74 96 %        Physical Exam:  Physical Exam  Vitals signs and nursing note reviewed. Constitutional:       Appearance: She is obese. HENT:      Head: Normocephalic. Nose: Nose normal.      Mouth/Throat:      Mouth: Mucous membranes are moist.   Eyes:      Extraocular Movements: Extraocular movements intact. Pupils: Pupils are equal, round, and reactive to light. Neck:      Musculoskeletal: Normal range of motion. Cardiovascular:      Rate and Rhythm: Normal rate. Rhythm irregular. Pulses: Normal pulses. Pulmonary:      Comments: Diminished bases, 2L nasal cannula  Abdominal:      General: Bowel sounds are normal.      Palpations: Abdomen is soft. Musculoskeletal:      Right lower leg: Edema present. Left lower leg: Edema present. Comments: Limited range of motion bilateral lower extremities. Skin:     General: Skin is warm and dry. Capillary Refill: Capillary refill takes less than 2 seconds. Neurological:      Mental Status: She is alert and oriented to person, place, and time. Psychiatric:         Mood and Affect: Mood normal.          Lab Results:  Recent Results (from the past 24 hour(s))   TROPONIN I    Collection Time: 11/03/20  1:15 PM   Result Value Ref Range    Troponin-I, Qt. 0.06 (H) <0.05 ng/mL   GLUCOSE, POC    Collection Time: 11/03/20  4:56 PM   Result Value Ref Range    Glucose (POC) 229 (H) 65 - 100 mg/dL    Performed by Enrrique Mancera    GLUCOSE, POC    Collection Time: 11/03/20  8:56 PM   Result Value Ref Range    Glucose (POC) 210 (H) 65 - 100 mg/dL    Performed by Enrrique Mancera    GLUCOSE, POC    Collection Time: 11/04/20  9:10 AM   Result Value Ref Range    Glucose (POC) 188 (H) 65 - 100 mg/dL    Performed by Zulma Matute           Diagnostic Images:  CT Results  (Last 48 hours)               11/02/20 2239  CTA CHEST W OR W WO CONT Final result    Impression:  Impression:   1. No evidence of central pulmonary embolism. Limited evaluation of the distal   subsegmental branch vessels due to respiratory motion artifact. 2.  No acute intrathoracic abnormality identified. 3.  Severe atherosclerosis. Status post CABG. Cardiomegaly. 4.  See full report for detailed findings. Narrative:  Study: CTA Chest.       History: Rule out PE. Comparison: Chest CT 5/6/2020. Chest x-ray 11 2020. Technique: CT volume acquisition of the chest was performed during the pulmonary   arterial phase following the administration of 100 mL of Isovue-370 IV contrast.   Axial, sagittal, coronal, and MIP reconstructions were reviewed. Dose reduction:   All CT scans at this facility are performed using dose reduction optimization   techniques as appropriate to a performed exam including the following: Automated   exposure control, adjustments of the mA and/or kV according to patient size, or   use of iterative reconstruction technique. Findings:       Vessels: No evidence of pulmonary was within limitations.  The distal   subsegmental branches in the lower lobes are not well evaluated due to   respiratory motion artifact. Lungs/Pleura: Mild dependent bibasilar atelectasis. No consolidative airspace   disease, pleural effusion or pneumothorax. Central airways are clear of debris. No discrete lung nodule. Mediastinum/Cardiac: Heart is enlarged. Severe coronary atherosclerosis. Status   post CABG. Moderate aortic atherosclerosis. Normal caliber main pulmonary artery   and thoracic aorta. No pericardial effusion. Under distended thoracic esophagus. Thyroid: Visualized thyroid is grossly unremarkable. Lymph nodes: No intrathoracic lymphadenopathy by CT size criteria. Upper Abdomen: Visualized upper abdominal visceral structures are grossly   unremarkable. Bones/Chest wall soft tissues: Mild degenerative changes of the visualized   spine. No acute osseous abnormality identified.                      Current Medications:    Current Facility-Administered Medications:     albuterol-ipratropium (DUO-NEB) 2.5 MG-0.5 MG/3 ML, 3 mL, Nebulization, Q6H RT, Bobby Sheffield MD, Stopped at 11/03/20 0800    buPROPion XL (WELLBUTRIN XL) tablet 150 mg, 150 mg, Oral, 7am, Bobby Sheffield MD, Stopped at 11/04/20 0700    insulin lispro (HUMALOG) injection, , SubCUTAneous, AC&HS, Bobby Sheffield MD, 2 Units at 11/04/20 0954    glucose chewable tablet 16 g, 4 Tab, Oral, PRN, Bobby Sheffield MD    dextrose (D50W) injection syrg 12.5-25 g, 25-50 mL, IntraVENous, PRN, Bobby Sheffield MD    glucagon (GLUCAGEN) injection 1 mg, 1 mg, IntraMUSCular, PRN, Bobby Sheffield MD    carvediloL (COREG) tablet 25 mg, 25 mg, Oral, BID WITH MEALS, Eden Hernadez MD, 25 mg at 11/04/20 0954    apixaban (ELIQUIS) tablet 5 mg, 5 mg, Oral, BID, Eden Hernadez MD, 5 mg at 11/04/20 0954    sodium chloride (NS) flush 5-40 mL, 5-40 mL, IntraVENous, Q8H, Bobby Sheffield MD, Stopped at 11/04/20 1400    sodium chloride (NS) flush 5-40 mL, 5-40 mL, IntraVENous, PRN, Mike Pierce MD    albuterol CONCENTRATE 2.5mg/0.5 mL neb soln, 2.5 mg, Nebulization, Q4H PRN, Mike Pierce MD    budesonide (PULMICORT) 500 mcg/2 ml nebulizer suspension, 500 mcg, Nebulization, BID RT, Mike Pierce MD, Stopped at 11/03/20 0000    ondansetron (ZOFRAN ODT) tablet 4 mg, 4 mg, Oral, Q4H PRN, Mike Pierce MD    docusate sodium (COLACE) capsule 100 mg, 100 mg, Oral, BID, Mike Pierce MD, 100 mg at 11/03/20 2228       ASSESSMENT:    A. fib/a flutter: She shows heart rate range . Heart rate currently 69 bpm irregular on telemetry. Discontinue diltiazem drip, continue coreg and eliquis. Cardiology following. Echocardiogram shows LVEF is 50%. COPD exacerbation: Continue DuoNeb, Pulmicort, as needed supplemental O2. CTA chest negative for PE and pneumonia. Venous duplex neg, obtain walk study. Benign essential hypertension: Patient overall is a poor historian of current medications she is taking. Currently awaiting for patient to obtain medication reconciliation from family or pharmacy. Type 2 diabetes mellitus: Continue AC/HS Accu-Cheks, medium dose SSI    History of coronary artery disease status post CABG w/ stent placement:  Continue cardioprotective medications    IFTIKHAR: consult respiratory for CPAP at night      PLAN:  Discontinue Cardizem drip  Echocardiogram results reviewed  Venous duplex results reviewed  Obtain walk study for home O2 eval  Continue AC/hs Accu-Cheks, medium dose SSI  Home medication reconciliation completed  CM for disposition planning to involve home w/ PT/OT    Full Code  Eliquis Dvt Prophylaxis  GI Prophylaxis  Home medications reviewed and reconciled      Above treatment plan reviewed and discussed with patient in detail at bedside, all questions answered. Care Plan discussed with:  Interdisciplinary team      This dictation was done by [employer] voice recognition software. Often unanticipated grammatical, syntax, [employer] phones and other interpretive errors are inadvertently transcribed. Please excuse errors that have escaped final proofreading.     Griselda Adas, NP

## 2020-11-05 VITALS
BODY MASS INDEX: 51.91 KG/M2 | OXYGEN SATURATION: 96 % | DIASTOLIC BLOOD PRESSURE: 84 MMHG | RESPIRATION RATE: 20 BRPM | HEART RATE: 98 BPM | SYSTOLIC BLOOD PRESSURE: 123 MMHG | HEIGHT: 63 IN | WEIGHT: 293 LBS | TEMPERATURE: 98 F

## 2020-11-05 LAB
APTT PPP: 35.8 SEC (ref 23–35.7)
FIBRINOGEN PPP-MCNC: 492 MG/DL (ref 220–535)
GLUCOSE BLD STRIP.AUTO-MCNC: 176 MG/DL (ref 65–100)
GLUCOSE BLD STRIP.AUTO-MCNC: 214 MG/DL (ref 65–100)
GLUCOSE BLD STRIP.AUTO-MCNC: 255 MG/DL (ref 65–100)
INR PPP: 1.2 (ref 0.9–1.1)
PERFORMED BY, TECHID: ABNORMAL
PROTHROMBIN TIME: 15.6 SEC (ref 11.9–14.7)
SARS-COV-2, COV2NT: NOT DETECTED
THERAPEUTIC RANGE,PTTT: ABNORMAL SEC (ref 68–109)

## 2020-11-05 PROCEDURE — 74011000250 HC RX REV CODE- 250: Performed by: HOSPITALIST

## 2020-11-05 PROCEDURE — 74011250637 HC RX REV CODE- 250/637: Performed by: NURSE PRACTITIONER

## 2020-11-05 PROCEDURE — 85730 THROMBOPLASTIN TIME PARTIAL: CPT

## 2020-11-05 PROCEDURE — 94640 AIRWAY INHALATION TREATMENT: CPT

## 2020-11-05 PROCEDURE — 36415 COLL VENOUS BLD VENIPUNCTURE: CPT

## 2020-11-05 PROCEDURE — 74011250637 HC RX REV CODE- 250/637: Performed by: HOSPITALIST

## 2020-11-05 PROCEDURE — 74011636637 HC RX REV CODE- 636/637: Performed by: HOSPITALIST

## 2020-11-05 PROCEDURE — 94762 N-INVAS EAR/PLS OXIMTRY CONT: CPT

## 2020-11-05 PROCEDURE — 82962 GLUCOSE BLOOD TEST: CPT

## 2020-11-05 PROCEDURE — 85610 PROTHROMBIN TIME: CPT

## 2020-11-05 PROCEDURE — 77010033678 HC OXYGEN DAILY

## 2020-11-05 PROCEDURE — 74011250637 HC RX REV CODE- 250/637: Performed by: INTERNAL MEDICINE

## 2020-11-05 PROCEDURE — 85384 FIBRINOGEN ACTIVITY: CPT

## 2020-11-05 RX ORDER — CARVEDILOL 25 MG/1
25 TABLET ORAL 2 TIMES DAILY WITH MEALS
Qty: 30 TAB | Refills: 0 | Status: SHIPPED | OUTPATIENT
Start: 2020-11-05 | End: 2021-03-10 | Stop reason: ALTCHOICE

## 2020-11-05 RX ADMIN — INSULIN LISPRO 2 UNITS: 100 INJECTION, SOLUTION INTRAVENOUS; SUBCUTANEOUS at 09:53

## 2020-11-05 RX ADMIN — INSULIN LISPRO 5 UNITS: 100 INJECTION, SOLUTION INTRAVENOUS; SUBCUTANEOUS at 12:35

## 2020-11-05 RX ADMIN — THEOPHYLLINE ANHYDROUS 300 MG: 300 CAPSULE, EXTENDED RELEASE ORAL at 10:33

## 2020-11-05 RX ADMIN — BUDESONIDE 500 MCG: 0.5 SUSPENSION RESPIRATORY (INHALATION) at 11:01

## 2020-11-05 RX ADMIN — Medication 10 ML: at 06:17

## 2020-11-05 RX ADMIN — IPRATROPIUM BROMIDE AND ALBUTEROL SULFATE 3 ML: .5; 3 SOLUTION RESPIRATORY (INHALATION) at 14:52

## 2020-11-05 RX ADMIN — LOSARTAN POTASSIUM 25 MG: 50 TABLET, FILM COATED ORAL at 09:45

## 2020-11-05 RX ADMIN — CARVEDILOL 25 MG: 12.5 TABLET, FILM COATED ORAL at 10:33

## 2020-11-05 RX ADMIN — AMLODIPINE BESYLATE 10 MG: 5 TABLET ORAL at 09:45

## 2020-11-05 RX ADMIN — ASPIRIN 81 MG 81 MG: 81 TABLET ORAL at 09:46

## 2020-11-05 RX ADMIN — IPRATROPIUM BROMIDE AND ALBUTEROL SULFATE 3 ML: .5; 3 SOLUTION RESPIRATORY (INHALATION) at 11:01

## 2020-11-05 RX ADMIN — METOLAZONE 5 MG: 5 TABLET ORAL at 09:47

## 2020-11-05 RX ADMIN — ISOSORBIDE DINITRATE 10 MG: 10 TABLET ORAL at 09:47

## 2020-11-05 RX ADMIN — FUROSEMIDE 40 MG: 40 TABLET ORAL at 09:46

## 2020-11-05 RX ADMIN — APIXABAN 5 MG: 5 TABLET, FILM COATED ORAL at 09:47

## 2020-11-05 NOTE — ROUTINE PROCESS
Bedside and Verbal shift change report given to Fabian Garcia RN(oncoming nurse) by Inga Boxer (offgoing nurse). Report included the following information SBAR, Intake/Output and Cardiac Rhythm A Flutter.

## 2020-11-05 NOTE — PROGRESS NOTES
CM met with patient at bedside. Reason for Admission:   SOB, COPD exacerbation                  RUR Score:     13%             PCP: First and Last name:  Dr. Sheryl Maynard     Name of Practice: Unknown     Are you a current patient: Yes/No: Yes     Approximate date of last visit: September  . Can you participate in a virtual visit if needed: Yes    Do you (patient/family) have any concerns for transition/discharge? Patient states that she cannot bend her knee very good and she has steps to climb at home to get into the house. There are 4 steps. Plan for utilizing home health:   Patient will need HH at discharge for PT/OT. She has no preference. Choice letter signed. Current Advanced Directive/Advance Care Plan:              Transition of Care Plan:        Patient states that she has a walker and oxygen at home for 3L. She states that she has a CPAP machine but her mask is broken. Cell phone number given to VENECIA, [phone], so I can get dahiana to call the patient. Patient lives with her oldest daughter Linda. Her other daughter Ana Lay. She states that her daughters work full time. She has continuous oxygen at home and she is able to get around her house with the cords. She states that she had taken her oxygen off to go to the bathroom and then couldn't catch her breath. So her grandson called EMS. Patient was SOB and oxygen levels 84-88% on 3L. Patient was on oxygen the whole time speaking with VENECIA. Oxygen level farzaneh to 92% when encouraged to take deep breaths. Oxygen level dropped once again, when the patient started speaking. Patient also complained about her knee. She can't bend it to walk up the steps at home. Venecia spoke with MARIA ESTHER Flores. She states that the patient is medically stable for discharge, but that she will come and see her again. We can arrange transportation home. Will continue to follow for discharge needs.

## 2020-11-05 NOTE — PROGRESS NOTES
Notified provider patient continuing to complain of right leg swelling and heavyness and did not receive any diuretic today.      Provider ordered 20 IV lasix

## 2020-11-05 NOTE — PROGRESS NOTES
CM met with patient at bedside. Patient signed the IMM. Patient states that her daughter will be back to pick her up later. [facility] Insurance is the barrier at this time.

## 2020-11-05 NOTE — PROGRESS NOTES
Progress Note      11/5/2020 11:30 AM  NAME: Leonor Quiroz   MRN:  [id number]   Admit Diagnosis: COPD [facility]chronic obstructive pulmonary disease) ([facility] [J44.9]      Problem List:   Atrial flutter with controlled rate  COPD exacerbation  CAD s/p CABG  Sleep apnea  CHF secondary to diastolic dysfunction     Assessment/Plan:   Duplex scan is negative for acute DVT  Echo shows EF 50%,  unchanged from her baseline  Continue on systemic anticoagulation with Eliquis  Continue on Coreg   Further cardiac studies are planned, outpatient follow-up with me in 4 to 6 weeks         []       High complexity decision making was performed in this patient at high risk for decompensation with multiple organ involvement. Subjective:     Leonor Quiroz denies chest pain. Still has exertional SOB but better today. Complains that her bed has been wet. Discussed with RN and NP. Fidel Riojas Review of Systems:    Symptom Y/N Comments  Symptom Y/N Comments   Fever/Chills N   Chest Pain N    Poor Appetite N   Edema N    Cough N   Abdominal Pain N    Sputum N   Joint Pain N    SOB/BUTCHER N   Pruritis/Rash N    Nausea/vomit N   Tolerating PT/OT Y    Diarrhea N   Tolerating Diet Y    Constipation N   Other       Could NOT obtain due to:      Objective:      Physical Exam:    Last 24hrs VS reviewed since prior progress note. Most recent are:    Visit Vitals  BP (!) 141/75   Pulse 70   Temp 97.6 °F (36.4 °C)   Resp 21   Ht 5' 2.99\" (1.6 m)   Wt 158.7 kg (349 lb 13.9 oz)   SpO2 95%   BMI 61.99 kg/m²       Intake/Output Summary (Last 24 hours) at 11/5/2020 1123  Last data filed at 11/4/2020 2300  Gross per 24 hour   Intake 1200 ml   Output 600 ml   Net 600 ml        General Appearance: Well developed, alert; no acute distress. Ears/Nose/Mouth/Throat: Hearing grossly normal; moist mucous membranes  Neck: Supple. Chest: Lungs clear to auscultation bilaterally. Cardiovascular: Irregularly irregular, S1S2 normal, no murmur.   Abdomen: Soft, non-tender, bowel sounds are active. Extremities: No edema bilaterally. Skin: Warm and dry. []         Post-cath site without hematoma, bruit, tenderness, or thrill. Distal pulses intact. PMH/SH reviewed - no change compared to H&P    · Complete echocardiogram done 11/3/2020 and personally reviewed:  · Contrast used: DEFINITY. · LV: Calculated LVEF is 50%. Normal cavity size. Mild concentric hypertrophy. Globally reduced systolic function. · LA: Mildly dilated left atrium. · RV: Mildly dilated right ventricle. · RA: Mildly dilated right atrium. · MV: Mitral valve thickening. Mild mitral annular calcification. Mild to moderate mitral valve regurgitation is present. .    Telemetry: Atrial flutter with controlled heart rate at 70    EKG: Atrial flutter with LVH  []  No new EKG for review    Lab Data Personally Reviewed:    Recent Labs     11/03/20  0850 11/02/20  1800   WBC 13.5* 9.7   HGB 13.1 12.6   HCT 43.4 42.4    201     Recent Labs     11/04/20  1055 11/03/20  0950 11/02/20 2050   INR 1.3* 1.1 1.1   PTP 16.4* 14.5 14.3   APTT 38.0* 38.0* 28.5      Recent Labs     11/03/20  0850 11/02/20  1800    142   K 4.3 4.1    107   CO2 29 32   BUN 20 17   CREA 1.57* 1.36*   * 181*   CA 8.7 8.7     Recent Labs     11/03/20  1315 11/02/20 2050   TROIQ 0.06* 0.10*     No results found for: CHOL, CHOLX, CHLST, CHOLV, HDL, HDLP, LDL, LDLC, DLDLP, TGLX, TRIGL, TRIGP, CHHD, CHHDX    Recent Labs     11/02/20  1800   *   TP 7.5   ALB 3.1*   GLOB 4.4*     No results for input(s): PH, PCO2, PO2 in the last 72 hours.     Medications Personally Reviewed:    Current Facility-Administered Medications   Medication Dose Route Frequency    albuterol (PROVENTIL HFA, VENTOLIN HFA, PROAIR HFA) inhaler 2 Puff  2 Puff Inhalation BID PRN    aspirin chewable tablet 81 mg  81 mg Oral DAILY    furosemide (LASIX) tablet 40 mg  40 mg Oral DAILY    isosorbide dinitrate (ISORDIL) tablet 10 mg  10 mg Oral BID    losartan (COZAAR) tablet 25 mg  25 mg Oral DAILY    metOLazone (ZAROXOLYN) tablet 5 mg  5 mg Oral DAILY    pravastatin (PRAVACHOL) tablet 40 mg  40 mg Oral QHS    theophylline ER (BAM-24) capsule 300 mg  300 mg Oral DAILY    amLODIPine (NORVASC) tablet 10 mg  10 mg Oral DAILY    albuterol-ipratropium (DUO-NEB) 2.5 MG-0.5 MG/3 ML  3 mL Nebulization Q6H RT    insulin lispro (HUMALOG) injection   SubCUTAneous AC&HS    glucose chewable tablet 16 g  4 Tab Oral PRN    dextrose (D50W) injection syrg 12.5-25 g  25-50 mL IntraVENous PRN    glucagon (GLUCAGEN) injection 1 mg  1 mg IntraMUSCular PRN    carvediloL (COREG) tablet 25 mg  25 mg Oral BID WITH MEALS    apixaban (ELIQUIS) tablet 5 mg  5 mg Oral BID    sodium chloride (NS) flush 5-40 mL  5-40 mL IntraVENous Q8H    sodium chloride (NS) flush 5-40 mL  5-40 mL IntraVENous PRN    albuterol CONCENTRATE 2.5mg/0.5 mL neb soln  2.5 mg Nebulization Q4H PRN    budesonide (PULMICORT) 500 mcg/2 ml nebulizer suspension  500 mcg Nebulization BID RT    ondansetron (ZOFRAN ODT) tablet 4 mg  4 mg Oral Q4H PRN    docusate sodium (COLACE) capsule 100 mg  100 mg Oral BID         Dimple Baez MD

## 2020-11-05 NOTE — PROGRESS NOTES
[facility]. NP Sandra aware. Patient also aware. She will need to get with her primary care doctor and arrange outpatient PT/OT. She states that she is aware of that. She is laying in bed waiting for her daughter to arrive. Will continue to follow for discharge needs.

## 2020-11-05 NOTE — DISCHARGE SUMMARY
Admit date: 11/2/2020   Admitting Provider: Clement Sinclair MD    Discharge date: 11/5/2020  Discharging Provider: Robb Severin, NP      * Admission Diagnoses: COPD (chronic obstructive pulmonary disease) [facility]) [J44.9]    * Discharge Diagnoses:    Hospital Problems as of 11/5/2020 Date Reviewed: 11/2/2020          Codes Class Noted - Resolved POA    * (Principal) COPD exacerbation ([facility] ICD-10-CM: J44.1  ICD-9-CM: 491.21  11/2/2020 - Present Yes        COPD (chronic obstructive pulmonary disease) ([facility] ICD-10-CM: J44.9  ICD-9-CM: 442  11/2/2020 - Present Unknown              * Hospital Course: 72 y.o. female with history of COPD, CHF and diabetes presents to the emergency room complaining of shortness of breath that started morning. She is treated in the emergency room with nebulizer treatments with improvement. She also started having tightness in the chest but denied any palpitations, fever, body aches. She continued to have tachycardia with no improvement in heart rate, it was between 1 30-1 40. Was given bolus IV diltiazem with no improvement so started on infusion. On CTA chest she has no pulmonary embolism. Also did not show any evidence of pneumonia. Admitted for further management. A. fib/a flutter irregular on telemetry, s/p diltiazem drip. continue coreg, lasix and eliquis. Cardiology following. Echocardiogram shows LVEF is 50%. COPD exacerbation continue DuoNeb, Pulmicort, as needed supplemental O2. CTA chest negative for PE and pneumonia. Venous duplex neg.   Benign essential hypertension patient overall is a poor historian of current medications she is taking. Currently awaiting for patient to obtain medication reconciliation from family or pharmacy. Type 2 diabetes mellitus continue AC/HS Accu-Cheks, medium dose SSI. History of coronary artery disease status post CABG w/ stent placement continue cardioprotective medications.  Echo shows ejection fraction about 50%,  unchanged from her baseline    * Procedures:     Consults: Cardiology    Significant Diagnostic Studies:   Echo shows ejection fraction about 50%,  unchanged from her baseline    Discharge Exam:  Physical Exam  Vitals signs and nursing note reviewed. Constitutional:       Appearance: She is obese. HENT:      Head: Normocephalic. Nose: Nose normal.      Mouth/Throat:      Mouth: Mucous membranes are moist.   Eyes:      Extraocular Movements: Extraocular movements intact. Pupils: Pupils are equal, round, and reactive to light. Neck:      Musculoskeletal: Normal range of motion. Cardiovascular:      Rate and Rhythm: Normal rate. Rhythm irregular. Pulses: Normal pulses. Heart sounds: Normal heart sounds. Pulmonary:      Effort: Pulmonary effort is normal.      Breath sounds: Normal breath sounds. Comments: 3L NC  Abdominal:      General: Bowel sounds are normal.      Palpations: Abdomen is soft. Musculoskeletal: Normal range of motion. Skin:     General: Skin is warm and dry. Capillary Refill: Capillary refill takes less than 2 seconds. Neurological:      Mental Status: She is alert and oriented to person, place, and time. Motor: Weakness present. Psychiatric:         Mood and Affect: Mood normal.         Behavior: Behavior normal.         Thought Content: Thought content normal.         Judgment: Judgment normal.         * Discharge Condition: stable  * Disposition: home w/hh/pt/ot    Discharge Medications:  Current Discharge Medication List      START taking these medications    Details   apixaban (ELIQUIS) 5 mg tablet Take 1 Tab by mouth two (2) times a day for 30 days. Indications: treatment to prevent blood clots in chronic atrial fibrillation  Qty: 60 Tab, Refills: 0      Bedside Commode XX R53.1  Qty: 1 Each, Refills: 0         CONTINUE these medications which have CHANGED    Details   carvediloL (COREG) 25 mg tablet Take 1 Tab by mouth two (2) times daily (with meals).   Qty: 30 Tab, Refills: 0         CONTINUE these medications which have NOT CHANGED    Details   albuterol (Ventolin HFA) 90 mcg/actuation inhaler Take 2 Puffs by inhalation two (2) times daily as needed for Wheezing. furosemide (LASIX) 40 mg tablet Take 40 mg by mouth daily. isosorbide dinitrate (ISORDIL) 10 mg tablet Take 10 mg by mouth two (2) times a day. losartan (COZAAR) 25 mg tablet Take 25 mg by mouth daily. metOLazone (ZAROXOLYN) 5 mg tablet Take 5 mg by mouth daily. pravastatin (PRAVACHOL) 40 mg tablet Take 40 mg by mouth nightly. solifenacin (VESICARE) 5 mg tablet Take 5 mg by mouth daily. theophylline ER,12 hour, (THEOCHRON) 300 mg tablet Take 300 mg by mouth daily. amLODIPine (NORVASC) 10 mg tablet Take 10 mg by mouth daily. oxybutynin (DITROPAN) 5 mg tablet Take 5 mg by mouth three (3) times daily. gabapentin (NEURONTIN) 600 mg tablet Take 600 mg by mouth three (3) times daily. nitroglycerin (NITROSTAT) 0.4 mg SL tablet 0.4 mg by SubLINGual route every five (5) minutes as needed for Chest Pain. Up to 3 doses. buPROPion XL (WELLBUTRIN XL) 150 mg tablet Take 150 mg by mouth every morning. STOP taking these medications       aspirin 81 mg chewable tablet Comments:   Reason for Stopping:         metoprolol tartrate (LOPRESSOR) 100 mg IR tablet Comments:   Reason for Stopping:         clopidogreL (Plavix) 75 mg tab Comments:   Reason for Stopping:               * Follow-up Care/Patient Instructions:   Activity: as tolerated  Diet: ADA, heart healthy    Patient to continue all medications as prescribed  Patient to f/u with PCP, Cardiology, Pulmonary as prescribed  Patient counseled on dietary/lifestyle modifications  Patient reports having home O2 already at home  Patient medically stable for discharge    Follow-up Information     Follow up With Specialties Details Why Kendra Dean MD Internal Medicine In 3 days  [address] [id number]  Malia Fernando MD Cardiology In 1 month  [facility] [id number]  [phone]      Pulmonary  In 1 week            Signed:  Elizabeth Dozier NP  11/5/2020  11:22 AM

## 2020-11-05 NOTE — DISCHARGE INSTRUCTIONS
Patient Education        Using a Metered-Dose Inhaler: Care Instructions  Your Care Instructions     A metered-dose inhaler lets you breathe medicine into your lungs quickly. Inhaled medicine works faster than the same medicine in a pill. An inhaler allows you to take less medicine than you would need if you took it as a pill. \"Metered-dose\" means that the inhaler gives a measured amount of medicine each time you use it. A metered-dose inhaler gives medicine in the form of a liquid mist.  Your doctor may want you to use a spacer with your inhaler. A spacer is a chamber that you attach to the inhaler. The chamber holds the medicine before you inhale it. That way, you can inhale the medicine in as many breaths as you need. Doctors recommend using a spacer with most metered-dose inhalers, especially those with corticosteroid medicines. Follow-up care is a key part of your treatment and safety. Be sure to make and go to all appointments, and call your doctor if you are having problems. It's also a good idea to know your test results and keep a list of the medicines you take. How can you care for yourself at home? To get started  · Talk with your health care provider to be sure you are using your inhaler the right way. It might help if you practice using it in front of a mirror. Use the inhaler exactly as prescribed. · Check that you have the correct medicine. If you use more than one inhaler, put a label on each one. This will let you know which one to use at the right time. · Keep track of how much medicine is in the inhaler. Check the label to see how many doses are in the container. If you know how many puffs you can take, you can replace the inhaler before you run out. Ask your health care provider how you can keep track of how much medicine is left. · Talk to your health care provider about using a spacer with your inhaler. Spacers make it easier to get the medicine into your lungs.  You may need a spacer if you are using corticosteroid medicines. A spacer can also help if you have problems pressing the inhaler and breathing in at the same time. · If you are using a corticosteroid inhaler, gargle and rinse out your mouth with water after use. Do not swallow the water. Swallowing the water will increase the chance that the medicine will get into your bloodstream. This may make it more likely that you will have side effects. To use a spacer with an inhaler  1. Shake the inhaler. Remove the inhaler cap, and place the mouthpiece of the inhaler into the spacer. Check the inhaler instructions to see if you need to prime your inhaler before you use it. If it needs priming, follow the instructions on how to prime your inhaler. 2. Remove the cap from the spacer. 3. Hold the inhaler upright with the mouthpiece at the bottom. 4. Tilt your head back a little, and breathe out slowly and completely. 5. Place the spacer's mouthpiece in your mouth. 6. Press down on the inhaler to spray one puff of medicine into the spacer, and then start breathing in slowly. Wait to inhale until after you have pressed down on the inhaler. Some spacers have a whistle. If you hear it, you should breathe in more slowly. 7. Hold your breath for 10 seconds. This will let the medicine settle in your lungs. 8. If you need to take a second dose, wait 30 to 60 seconds to allow the inhaler valve to refill. To use an inhaler without a spacer  1. Shake the inhaler as directed. Remove the cap. Check the instructions to see if you need to prime your inhaler before you use it. If it needs priming, follow the instructions on how to prime your inhaler. 2. Hold the inhaler upright with the mouthpiece at the bottom. 3. Tilt your head back a little, and breathe out slowly and completely. 4. Position the inhaler in one of two ways:  ? You can place the inhaler in your mouth. This is easier for most people.  And it lowers the risk that any of the medicine will get into your eyes. ? Or you can place the inhaler 1 to 2 inches in front of your open mouth, without closing your lips over it. Try to open your mouth as wide as you can. Placing the inhaler in front of your open mouth may be better for getting the medicine into your lungs. But some people may find this too hard to do. 5. Start taking slow, even breaths through your mouth. Press down on the inhaler once, then inhale fully. 6. Hold your breath for 10 seconds. This will let the medicine settle in your lungs. 7. If you need to take a second dose, wait 30 to 60 seconds to allow the inhaler valve to refill. Where can you learn more? Go to http://www.brito.com/  Enter K111 in the search box to learn more about \"Using a Metered-Dose Inhaler: Care Instructions. \"  Current as of: February 24, 2020               Content Version: 12.6  © 6199-9130 [employer]. Care instructions adapted under license by [employer] (which disclaims liability or warranty for this information). If you have questions about a medical condition or this instruction, always ask your healthcare professional. [place] [address] any warranty or liability for your use of this information. Patient Education        Chronic Obstructive Pulmonary Disease (COPD) Flare-Ups: Care Instructions  Your Care Instructions     Chronic obstructive pulmonary disease (COPD) is a lung disease that makes it hard to breathe. It is caused by damage to the lungs over many years, usually from smoking. COPD is often a mix of two diseases:  · Chronic bronchitis: The airways that carry air to the lungs (bronchial tubes) get inflamed and make a lot of mucus. This can narrow or block the airways. · Emphysema: In a healthy person, the tiny air sacs in the lungs are like balloons. As you breathe in and out, they get bigger and smaller to move air through your lungs.  But with emphysema, these air sacs are damaged and lose their stretch. Less air gets in and out of the lungs. Many people with COPD have attacks called flare-ups or exacerbations. This is when your usual symptoms quickly get worse and stay worse. The doctor has checked you carefully. But problems can develop later. If you notice any problems or new symptoms, get medical treatment right away. Follow-up care is a key part of your treatment and safety. Be sure to make and go to all appointments, and call your doctor if you are having problems. It's also a good idea to know your test results and keep a list of the medicines you take. How can you care for yourself at home? · Be safe with medicines. Take your medicines exactly as prescribed. Call your doctor if you think you are having a problem with your medicine. You may be taking medicines such as:  ? Bronchodilators. These help open your airways and make breathing easier. ? Corticosteroids. These reduce airway inflammation. They may be given as pills, in a vein, or in an inhaled form. You may go home with pills in addition to an inhaler that you already use. · A spacer may help you get more inhaled medicine to your lungs. Ask your doctor or pharmacist if a spacer is right for you. If it is, ask how to use it properly. · If your doctor prescribed antibiotics, take them as directed. Do not stop taking them just because you feel better. You need to take the full course of antibiotics. · If your doctor prescribed oxygen, use the flow rate your doctor has recommended. Do not change it without talking to your doctor first.  · Do not smoke. Smoking makes COPD worse. If you need help quitting, talk to your doctor about stop-smoking programs and medicines. These can increase your chances of quitting for good. When should you call for help? [id number] anytime you think you may need emergency care. For example, call if:  · You have severe trouble breathing.   Call your doctor now or seek immediate medical care if:  · You have new or worse trouble breathing. · Your coughing or wheezing gets worse. · You cough up dark brown or bloody mucus (sputum). · You have a new or higher fever. Watch closely for changes in your health, and be sure to contact your doctor if:  · You notice more mucus or a change in the color of your mucus. · You need to use your antibiotic or steroid pills. · You do not get better as expected. Where can you learn more? Go to http://www.gray.com/  Enter D989 in the search box to learn more about \"Chronic Obstructive Pulmonary Disease (COPD) Flare-Ups: Care Instructions. \"  Current as of: February 24, 2020               Content Version: 12.5  © 2006-2020 [employer] Care instructions adapted under license by [employer] (which disclaims liability or warranty for this information). If you have questions about a medical condition or this instruction, always ask your healthcare professional. [place] [address] any warranty or liability for your use of this information. Patient Education        Learning About Rhythm-Control Medicines  Introduction    Rhythm-control medicines return your heart to a normal rhythm. And they keep it at a normal rhythm. They are also called antiarrhythmics. Doctors may use these medicines for many reasons. These reasons include:  · You have symptoms from a heart rhythm problem. · You had electric cardioversion. Or you will have it. · You had catheter ablation. · You tried medicine to control your heart rate. But it did not help. · You are at risk of having a dangerous heart rhythm. These medicines can have serious side effects. Examples  · Amiodarone (Pacerone)  · Dofetilide (Tikosyn)  · Propafenone (Rythmol)  · Sotalol (Betapace AF)  Possible side effects  Side effects depend on which medicine you take. One serious one is a very irregular heart rate.   Read the information that comes with your medicine. What to know about taking this medicine  · You may be in the hospital when you start this medicine. Your doctor will watch what it does to your heart. · Be sure to ask your doctor any questions. You may want to know more about the pros and cons of the medicine. · Your doctor will check you often. He or she will make sure you are not having problems. Be sure to go to all of your visits. · You may need regular blood tests. These make sure you are taking the right amount of medicine. · Take your medicines exactly as prescribed. Call your doctor if you think you are having a problem with your medicine. · Check with your doctor or pharmacist before you use any other medicines. This includes over-the-counter medicines. Make sure your doctor knows all of the medicines, vitamins, herbal products, and supplements you take. Taking some medicines together can cause problems. Where can you learn more? Go to http://www.gray.com/  Enter K157 in the search box to learn more about \"Learning About Rhythm-Control Medicines. \"  Current as of: December 16, 2019               Content Version: 12.6  © 2548-9157 [employer]. Care instructions adapted under license by [employer] (which disclaims liability or warranty for this information). If you have questions about a medical condition or this instruction, always ask your healthcare professional. [address] any warranty or liability for your use of this information.

## 2020-11-06 NOTE — PROGRESS NOTES
CM received a call from [employer]. Patient was accepted by [facility] for [place]. CM spoke with representative from [facility]. They do not usually accept blue cross insurance unless they know that its a Care more plan. She states that she will change to acceptance once she gets auth from the Care More. Cm attempted to call patient. No answer. Unable to leave a voicemail due to mailbox being full. CM attempted a second time and patient answered. She is aware of the [address] [place] [address] [place].

## 2020-11-06 NOTE — ROUTINE PROCESS
Pt stated she was not going home;  C/o her legs still being swollen;  Explained to her that home health had been ordered with her care once she was dc and that a BSC would be ordered;  Stated she did not need a bsc and requested that mD be notified and to come amd see her;  Jerlene Bence NP did come  to see patient and answered her questions but pt still was not satisfied  With her answers.

## 2020-11-13 ENCOUNTER — HOSPITAL ENCOUNTER (OUTPATIENT)
Dept: MAMMOGRAPHY | Age: 70
Discharge: HOME | End: 2020-11-13
Attending: INTERNAL MEDICINE
Payer: MEDICARE

## 2020-11-13 DIAGNOSIS — Z12.31 OTHER SCREENING MAMMOGRAM: ICD-10-CM

## 2020-11-13 PROCEDURE — 77063 BREAST TOMOSYNTHESIS BI: CPT

## 2021-02-22 ENCOUNTER — HOSPITAL ENCOUNTER (INPATIENT)
Age: 71
LOS: 4 days | Discharge: HOME HEALTH | DRG: 280 | End: 2021-02-26
Attending: EMERGENCY MEDICINE | Admitting: FAMILY MEDICINE
Payer: MEDICARE

## 2021-02-22 ENCOUNTER — APPOINTMENT (OUTPATIENT)
Dept: CT IMAGING | Age: 71
DRG: 280 | End: 2021-02-22
Attending: EMERGENCY MEDICINE
Payer: MEDICARE

## 2021-02-22 ENCOUNTER — APPOINTMENT (OUTPATIENT)
Dept: GENERAL RADIOLOGY | Age: 71
DRG: 280 | End: 2021-02-22
Attending: EMERGENCY MEDICINE
Payer: MEDICARE

## 2021-02-22 DIAGNOSIS — I48.92 ATRIAL FLUTTER, UNSPECIFIED TYPE (HCC): ICD-10-CM

## 2021-02-22 DIAGNOSIS — R77.8 ELEVATED TROPONIN: Primary | ICD-10-CM

## 2021-02-22 DIAGNOSIS — I21.4 ACUTE NON Q WAVE MI (MYOCARDIAL INFARCTION), INITIAL EPISODE OF CARE (HCC): ICD-10-CM

## 2021-02-22 PROBLEM — I50.9 CHF (CONGESTIVE HEART FAILURE) (HCC): Status: ACTIVE | Noted: 2021-02-22

## 2021-02-22 LAB
ALBUMIN SERPL-MCNC: 3.3 G/DL (ref 3.5–5)
ALBUMIN/GLOB SERPL: 0.8 {RATIO} (ref 1.1–2.2)
ALP SERPL-CCNC: 154 U/L (ref 45–117)
ALT SERPL-CCNC: 24 U/L (ref 12–78)
ANION GAP SERPL CALC-SCNC: 4 MMOL/L (ref 5–15)
AST SERPL W P-5'-P-CCNC: 17 U/L (ref 15–37)
ATRIAL RATE: 250 BPM
ATRIAL RATE: 256 BPM
ATRIAL RATE: 256 BPM
BASOPHILS # BLD: 0 K/UL (ref 0–0.1)
BASOPHILS NFR BLD: 0 % (ref 0–1)
BILIRUB SERPL-MCNC: 0.8 MG/DL (ref 0.2–1)
BNP SERPL-MCNC: 1622 PG/ML
BUN SERPL-MCNC: 20 MG/DL (ref 6–20)
BUN/CREAT SERPL: 15 (ref 12–20)
CA-I BLD-MCNC: 9.1 MG/DL (ref 8.5–10.1)
CALCULATED P AXIS, ECG09: 44 DEGREES
CALCULATED R AXIS, ECG10: 64 DEGREES
CALCULATED R AXIS, ECG10: 66 DEGREES
CALCULATED R AXIS, ECG10: 87 DEGREES
CALCULATED T AXIS, ECG11: 104 DEGREES
CALCULATED T AXIS, ECG11: 85 DEGREES
CALCULATED T AXIS, ECG11: 94 DEGREES
CHLORIDE SERPL-SCNC: 107 MMOL/L (ref 97–108)
CO2 SERPL-SCNC: 32 MMOL/L (ref 21–32)
CREAT SERPL-MCNC: 1.33 MG/DL (ref 0.55–1.02)
DIAGNOSIS, 93000: NORMAL
DIFFERENTIAL METHOD BLD: ABNORMAL
EOSINOPHIL # BLD: 0.1 K/UL (ref 0–0.4)
EOSINOPHIL NFR BLD: 2 % (ref 0–7)
ERYTHROCYTE [DISTWIDTH] IN BLOOD BY AUTOMATED COUNT: 16 % (ref 11.5–14.5)
GLOBULIN SER CALC-MCNC: 4 G/DL (ref 2–4)
GLUCOSE SERPL-MCNC: 170 MG/DL (ref 65–100)
HCT VFR BLD AUTO: 43 % (ref 35–47)
HGB BLD-MCNC: 12.6 G/DL (ref 11.5–16)
IMM GRANULOCYTES # BLD AUTO: 0 K/UL (ref 0–0.04)
IMM GRANULOCYTES NFR BLD AUTO: 0 % (ref 0–0.5)
LYMPHOCYTES # BLD: 1.9 K/UL (ref 0.8–3.5)
LYMPHOCYTES NFR BLD: 21 % (ref 12–49)
MCH RBC QN AUTO: 27.2 PG (ref 26–34)
MCHC RBC AUTO-ENTMCNC: 29.3 G/DL (ref 30–36.5)
MCV RBC AUTO: 92.7 FL (ref 80–99)
MONOCYTES # BLD: 0.7 K/UL (ref 0–1)
MONOCYTES NFR BLD: 8 % (ref 5–13)
NEUTS SEG # BLD: 6 K/UL (ref 1.8–8)
NEUTS SEG NFR BLD: 69 % (ref 32–75)
NRBC # BLD: 0 K/UL (ref 0–0.01)
NRBC BLD-RTO: 0 PER 100 WBC
PLATELET # BLD AUTO: 192 K/UL (ref 150–400)
PMV BLD AUTO: 13 FL (ref 8.9–12.9)
POTASSIUM SERPL-SCNC: 4 MMOL/L (ref 3.5–5.1)
PROT SERPL-MCNC: 7.3 G/DL (ref 6.4–8.2)
Q-T INTERVAL, ECG07: 184 MS
Q-T INTERVAL, ECG07: 346 MS
Q-T INTERVAL, ECG07: 410 MS
QRS DURATION, ECG06: 106 MS
QRS DURATION, ECG06: 94 MS
QRS DURATION, ECG06: 98 MS
QTC CALCULATION (BEZET), ECG08: 234 MS
QTC CALCULATION (BEZET), ECG08: 489 MS
QTC CALCULATION (BEZET), ECG08: 534 MS
RBC # BLD AUTO: 4.64 M/UL (ref 3.8–5.2)
SODIUM SERPL-SCNC: 143 MMOL/L (ref 136–145)
TROPONIN I SERPL-MCNC: 0.12 NG/ML
TROPONIN I SERPL-MCNC: 0.14 NG/ML
TROPONIN I SERPL-MCNC: 0.14 NG/ML
VENTRICULAR RATE, ECG03: 102 BPM
VENTRICULAR RATE, ECG03: 120 BPM
VENTRICULAR RATE, ECG03: 98 BPM
WBC # BLD AUTO: 8.7 K/UL (ref 3.6–11)

## 2021-02-22 PROCEDURE — 74011250637 HC RX REV CODE- 250/637: Performed by: EMERGENCY MEDICINE

## 2021-02-22 PROCEDURE — 74011000250 HC RX REV CODE- 250: Performed by: EMERGENCY MEDICINE

## 2021-02-22 PROCEDURE — 85025 COMPLETE CBC W/AUTO DIFF WBC: CPT

## 2021-02-22 PROCEDURE — 36415 COLL VENOUS BLD VENIPUNCTURE: CPT

## 2021-02-22 PROCEDURE — 74011250637 HC RX REV CODE- 250/637

## 2021-02-22 PROCEDURE — 93005 ELECTROCARDIOGRAM TRACING: CPT

## 2021-02-22 PROCEDURE — 74011000250 HC RX REV CODE- 250: Performed by: FAMILY MEDICINE

## 2021-02-22 PROCEDURE — 74011250637 HC RX REV CODE- 250/637: Performed by: FAMILY MEDICINE

## 2021-02-22 PROCEDURE — 80053 COMPREHEN METABOLIC PANEL: CPT

## 2021-02-22 PROCEDURE — 74011250636 HC RX REV CODE- 250/636: Performed by: FAMILY MEDICINE

## 2021-02-22 PROCEDURE — 96375 TX/PRO/DX INJ NEW DRUG ADDON: CPT

## 2021-02-22 PROCEDURE — 99285 EMERGENCY DEPT VISIT HI MDM: CPT

## 2021-02-22 PROCEDURE — 80198 ASSAY OF THEOPHYLLINE: CPT

## 2021-02-22 PROCEDURE — 84484 ASSAY OF TROPONIN QUANT: CPT

## 2021-02-22 PROCEDURE — 83880 ASSAY OF NATRIURETIC PEPTIDE: CPT

## 2021-02-22 PROCEDURE — 74011250636 HC RX REV CODE- 250/636

## 2021-02-22 PROCEDURE — 74011000250 HC RX REV CODE- 250

## 2021-02-22 PROCEDURE — 71045 X-RAY EXAM CHEST 1 VIEW: CPT

## 2021-02-22 PROCEDURE — 65270000029 HC RM PRIVATE

## 2021-02-22 PROCEDURE — 74011250636 HC RX REV CODE- 250/636: Performed by: EMERGENCY MEDICINE

## 2021-02-22 PROCEDURE — 96374 THER/PROPH/DIAG INJ IV PUSH: CPT

## 2021-02-22 RX ORDER — MORPHINE SULFATE 4 MG/ML
4 INJECTION INTRAVENOUS ONCE
Status: COMPLETED | OUTPATIENT
Start: 2021-02-22 | End: 2021-02-22

## 2021-02-22 RX ORDER — GABAPENTIN 300 MG/1
CAPSULE ORAL 3 TIMES DAILY
Status: DISCONTINUED | OUTPATIENT
Start: 2021-02-22 | End: 2021-02-26 | Stop reason: HOSPADM

## 2021-02-22 RX ORDER — PRAVASTATIN SODIUM 40 MG/1
40 TABLET ORAL
Status: DISCONTINUED | OUTPATIENT
Start: 2021-02-22 | End: 2021-02-26 | Stop reason: HOSPADM

## 2021-02-22 RX ORDER — DILTIAZEM HCL/D5W 125 MG/125
0-15 PLASTIC BAG, INJECTION (ML) INTRAVENOUS CONTINUOUS
Status: DISCONTINUED | OUTPATIENT
Start: 2021-02-22 | End: 2021-02-23

## 2021-02-22 RX ORDER — CARVEDILOL 12.5 MG/1
25 TABLET ORAL 2 TIMES DAILY WITH MEALS
Status: DISCONTINUED | OUTPATIENT
Start: 2021-02-22 | End: 2021-02-26 | Stop reason: HOSPADM

## 2021-02-22 RX ORDER — DILTIAZEM HYDROCHLORIDE 5 MG/ML
INJECTION INTRAVENOUS
Status: COMPLETED
Start: 2021-02-22 | End: 2021-02-22

## 2021-02-22 RX ORDER — ONDANSETRON 2 MG/ML
4 INJECTION INTRAMUSCULAR; INTRAVENOUS
Status: COMPLETED | OUTPATIENT
Start: 2021-02-22 | End: 2021-02-22

## 2021-02-22 RX ORDER — FUROSEMIDE 10 MG/ML
INJECTION INTRAMUSCULAR; INTRAVENOUS
Status: COMPLETED
Start: 2021-02-22 | End: 2021-02-22

## 2021-02-22 RX ORDER — POLYETHYLENE GLYCOL 3350 17 G/17G
17 POWDER, FOR SOLUTION ORAL DAILY PRN
Status: DISCONTINUED | OUTPATIENT
Start: 2021-02-22 | End: 2021-02-26 | Stop reason: HOSPADM

## 2021-02-22 RX ORDER — ASPIRIN 325 MG
325 TABLET ORAL
Status: COMPLETED | OUTPATIENT
Start: 2021-02-22 | End: 2021-02-22

## 2021-02-22 RX ORDER — FUROSEMIDE 10 MG/ML
40 INJECTION INTRAMUSCULAR; INTRAVENOUS ONCE
Status: DISPENSED | OUTPATIENT
Start: 2021-02-22 | End: 2021-02-22

## 2021-02-22 RX ORDER — DILTIAZEM HYDROCHLORIDE 5 MG/ML
10 INJECTION INTRAVENOUS
Status: COMPLETED | OUTPATIENT
Start: 2021-02-22 | End: 2021-02-22

## 2021-02-22 RX ORDER — CLOPIDOGREL BISULFATE 75 MG/1
75 TABLET ORAL DAILY
Status: DISCONTINUED | OUTPATIENT
Start: 2021-02-22 | End: 2021-02-26 | Stop reason: HOSPADM

## 2021-02-22 RX ORDER — FUROSEMIDE 10 MG/ML
40 INJECTION INTRAMUSCULAR; INTRAVENOUS 2 TIMES DAILY
Status: DISCONTINUED | OUTPATIENT
Start: 2021-02-22 | End: 2021-02-23

## 2021-02-22 RX ORDER — ONDANSETRON 2 MG/ML
4 INJECTION INTRAMUSCULAR; INTRAVENOUS
Status: DISCONTINUED | OUTPATIENT
Start: 2021-02-22 | End: 2021-02-26 | Stop reason: HOSPADM

## 2021-02-22 RX ORDER — ASPIRIN 325 MG
TABLET ORAL
Status: COMPLETED
Start: 2021-02-22 | End: 2021-02-22

## 2021-02-22 RX ORDER — OXYBUTYNIN CHLORIDE 5 MG/1
5 TABLET ORAL 3 TIMES DAILY
Status: DISCONTINUED | OUTPATIENT
Start: 2021-02-22 | End: 2021-02-26 | Stop reason: HOSPADM

## 2021-02-22 RX ORDER — ONDANSETRON 4 MG/1
4 TABLET, ORALLY DISINTEGRATING ORAL
Status: DISCONTINUED | OUTPATIENT
Start: 2021-02-22 | End: 2021-02-26 | Stop reason: HOSPADM

## 2021-02-22 RX ORDER — ISOSORBIDE DINITRATE 10 MG/1
10 TABLET ORAL 3 TIMES DAILY
Status: DISCONTINUED | OUTPATIENT
Start: 2021-02-22 | End: 2021-02-23

## 2021-02-22 RX ORDER — MORPHINE SULFATE 4 MG/ML
4 INJECTION INTRAVENOUS ONCE
Status: DISPENSED | OUTPATIENT
Start: 2021-02-22 | End: 2021-02-23

## 2021-02-22 RX ORDER — BUPROPION HYDROCHLORIDE 150 MG/1
150 TABLET ORAL
Status: DISCONTINUED | OUTPATIENT
Start: 2021-02-23 | End: 2021-02-26 | Stop reason: HOSPADM

## 2021-02-22 RX ADMIN — METHYLPREDNISOLONE SODIUM SUCCINATE 40 MG: 40 INJECTION, POWDER, FOR SOLUTION INTRAMUSCULAR; INTRAVENOUS at 17:07

## 2021-02-22 RX ADMIN — FUROSEMIDE 40 MG: 10 INJECTION, SOLUTION INTRAMUSCULAR; INTRAVENOUS at 09:37

## 2021-02-22 RX ADMIN — ONDANSETRON 4 MG: 2 INJECTION INTRAMUSCULAR; INTRAVENOUS at 12:45

## 2021-02-22 RX ADMIN — PRAVASTATIN SODIUM 40 MG: 40 TABLET ORAL at 21:50

## 2021-02-22 RX ADMIN — NITROGLYCERIN 1 INCH: 20 OINTMENT TOPICAL at 11:32

## 2021-02-22 RX ADMIN — GABAPENTIN 600 MG: 300 CAPSULE ORAL at 21:50

## 2021-02-22 RX ADMIN — MORPHINE SULFATE 4 MG: 4 INJECTION INTRAVENOUS at 12:45

## 2021-02-22 RX ADMIN — TICAGRELOR 180 MG: 90 TABLET ORAL at 09:29

## 2021-02-22 RX ADMIN — ASPIRIN 325 MG ORAL TABLET 325 MG: 325 PILL ORAL at 09:29

## 2021-02-22 RX ADMIN — Medication 5 MG/HR: at 23:08

## 2021-02-22 RX ADMIN — METHYLPREDNISOLONE SODIUM SUCCINATE 40 MG: 40 INJECTION, POWDER, FOR SOLUTION INTRAMUSCULAR; INTRAVENOUS at 23:06

## 2021-02-22 RX ADMIN — DILTIAZEM HYDROCHLORIDE 10 MG: 5 INJECTION INTRAVENOUS at 09:29

## 2021-02-22 RX ADMIN — DILTIAZEM HYDROCHLORIDE 10 MG: 5 INJECTION, SOLUTION INTRAVENOUS at 09:29

## 2021-02-22 RX ADMIN — Medication 325 MG: at 09:29

## 2021-02-22 RX ADMIN — OXYBUTYNIN CHLORIDE 5 MG: 5 TABLET ORAL at 21:50

## 2021-02-22 RX ADMIN — CARVEDILOL 25 MG: 12.5 TABLET, FILM COATED ORAL at 17:06

## 2021-02-22 RX ADMIN — Medication 5 MG/HR: at 10:36

## 2021-02-22 RX ADMIN — ISOSORBIDE DINITRATE 10 MG: 10 TABLET ORAL at 15:59

## 2021-02-22 RX ADMIN — OXYBUTYNIN CHLORIDE 5 MG: 5 TABLET ORAL at 17:06

## 2021-02-22 RX ADMIN — FUROSEMIDE 40 MG: 10 INJECTION, SOLUTION INTRAMUSCULAR; INTRAVENOUS at 21:51

## 2021-02-22 RX ADMIN — ISOSORBIDE DINITRATE 10 MG: 10 TABLET ORAL at 21:50

## 2021-02-22 RX ADMIN — APIXABAN 5 MG: 5 TABLET, FILM COATED ORAL at 21:49

## 2021-02-22 NOTE — PROGRESS NOTES
Reason for Admission:    AFIB/CHF                   RUR Score: N/A                     Plan for utilizing home health:   Requesting home health. PCP: First and Last name:  Chantelle Venegas   Name of Practice:    Are you a current patient: Yes/No: Yes   Approximate date of last visit: Had phone visit last month. Can you participate in a virtual visit with your PCP: Yes , call. Current Advanced Directive/Advance Care Plan: Yes ACP. Healthcare Decision Maker:   Jorge Cortez @ [phone])                          Transition of Care Plan:                    Discharge plan is home  with daughter & home health. Daughter to transport upon discharge. Ambulatory with walker . Choice Letter signed for home health. Referral sent via [email or web address] [employer]

## 2021-02-22 NOTE — Clinical Note
TRANSFER - OUT REPORT:     Verbal report given to: alena. Report consisted of patient's Situation, Background, Assessment and   Recommendations(SBAR). Opportunity for questions and clarification was provided. Patient transported with a Registered Nurse. Patient transported to: [facility].

## 2021-02-22 NOTE — CONSULTS
Consult    NAME: Christine Maynard   :  1950   MRN:  [id number]     Date/Time:  2021 10:23 AM    Patient PCP: Soila Pacheco MD  ________________________________________________________________________    This is an inpatient cardiology consultation requested by Dr. Evan Ramirez  for Dyspnea and Aflutter with RVR. Assessment: This is a 56-year-old -American female with a history of CABG 2001, status post PCI of LAD, 2011, atrial flutter, congestive heart failure due to diastolic dysfunction, baseline LV ejection fraction 50% and COPD who now is admitted with COPD exacerbation and atrial flutter with rapid ventricle response. Patient's EKG and symptoms are not suggestive of acute coronary syndrome. Plan:     1. Agree with MAYA Cardizem for rate control of atrial flutter. According to Dr. Isaura Nicole last note, patient is supposed to be on Eliquis chronically for her atrial flutter. 2. Continue combination of Plavix, beta-blockers and nitrates for empiric antianginal coverage. 3. For patient's congestive heart failure due to diastolic dysfunction, I will continue patient's home regimen of diuretics which includes Lasix and Zaroxolyn with watch on electrolyte. 4. Management of COPD exacerbation as per medical team/attending physician. 5. Patient at home takes theophylline, I will check patient's theophylline level to rule out theophylline toxicity as cause of patient's atrial flutter with RVR. 6. Patient's case discussed with ER physician Dr. Evan Ramirez.       []        High complexity decision making was performed        Subjective:   CHIEF COMPLAINT:     HISTORY OF PRESENT ILLNESS:     This is a 56-year-old -American female with a history of coronary artery disease, status post two-vessel CABG in  (SVG to RCA/OM1), status post PTCA/DESIRE of LAD, 2011, atrial flutter, congestive heart failure secondary to diastolic dysfunction with baseline LV ejection fraction of 50% as per echocardiogram of , obesity with obstructive sleep apnea and COPD who presented to emergency room with complaints of increasing shortness of breath and heart racing of 1 day duration. Patient's EKG on presentation showed atrial flutter with ventricular response rate of 130+ range which was also suspicious for STEMI. However later STEMI  was called off and patient was started on IV Cardizem with better rate control of atrial flutter. Patient at the time of my evaluation was anxious and short of breath but denied any chest pain. She reports mild bilateral leg edema but denies any cough or any significant change in her 2 pillow orthopnea.     Past Medical History:   Diagnosis Date    [facility] [address] [facility]     Coronary artery disease     Diabetes ([facility] [address]     Hyperlipidemia     Hypertension     IFTIKHAR (obstructive sleep apnea)       Past Surgical History:   Procedure Laterality Date    HX BREAST BIOPSY      HX CHOLECYSTECTOMY      HX CORONARY ARTERY BYPASS GRAFT      HX HERNIA REPAIR      HX HYSTERECTOMY       Allergies   Allergen Reactions    Tylox [Oxycodone-Acetaminophen] Hives      Meds:  See below  Social History     Tobacco Use    Smoking status: Former Smoker    Smokeless tobacco: Never Used   Substance Use Topics    Alcohol use: Not Currently     Frequency: Never      Family History   Problem Relation Age of Onset    Heart Disease Mother     Kidney Disease Mother     Cancer Maternal Grandmother        REVIEW OF SYSTEMS:     []         Unable to obtain  ROS due to ---   [x]         Total of 12 systems reviewed as follows:    Constitutional: negative fever, negative chills, negative weight loss  Eyes:   negative visual changes  ENT:   negative sore throat, tongue or lip swelling  Respiratory:  As per history of present illness  Cards:  negative for chest pain, palpitations, lower extremity edema  GI:   negative for nausea, vomiting, diarrhea, and abdominal pain  Genitourinary: negative for frequency, dysuria  Integument:  negative for rash   Hematologic:  negative for easy bruising and gum/nose bleeding  Musculoskel: negative for myalgias,  back pain  Neurological:  negative for headaches, dizziness, vertigo, weakness  Behavl/Psych: negative for feelings of anxiety, depression     Pertinent Positives include :    Objective:      Physical Exam:    Last 24hrs VS reviewed since prior progress note. Most recent are:    Visit Vitals  /67 (BP 1 Location: Right upper arm, BP Patient Position: At rest)   Pulse (!) 114   Temp 97.7 °F (36.5 °C)   Resp 16   Ht 5' 3\" (1.6 m)   Wt 106.1 kg (234 lb)   SpO2 95%   BMI 41.45 kg/m²     No intake or output data in the 24 hours ending 02/22/21 1023     General Appearance: An obese, well developed, well nourished, alert & oriented x 3, patient was short of breath. Ears/Nose/Mouth/Throat: Pupils equal and round, Hearing grossly normal.  Neck: Supple. JVP within normal limits. Carotids good upstrokes, with no bruit. Chest: Lungs clear to auscultation bilaterally. Cardiovascular: Irregular rhythm, S1S2 normal, no murmur, rubs, gallops. Abdomen: Soft, non-tender, bowel sounds are active. No organomegaly. Extremities: Mild edema bilaterally. Femoral pulses +2, Distal Pulses +1. Skin: Warm and dry. Neuro: CN II-XII grossly intact, Strength and sensation grossly intact. []         Post-cath site without hematoma, bruit, tenderness, or thrill. Distal pulses intact. Data:      Telemetry:    EKG:  []  No new EKG for review. XR CHEST PORT   Final Result   FINDINGS: IMPRESSION: Frontal single view chest.      Median sternotomy status post CABG. Calcified aorta. Unchanged cardiomegaly   and/or pericardial effusion. The lungs are similarly inflated. Unchanged mild elevation of the right   hemidiaphragm. Minimal hydrostatic edema. No consolidation, effusion, or   pneumothorax. No free air under the diaphragm.       No acute bony findings. Prior to Admission medications    Medication Sig Start Date End Date Taking? Authorizing Provider   carvediloL (COREG) 25 mg tablet Take 1 Tab by mouth two (2) times daily (with meals). 11/5/20   Babatunde Khan NP   Bedside Commode XX R53.1 11/5/20   Babatunde Khan NP   albuterol (Ventolin HFA) 90 mcg/actuation inhaler Take 2 Puffs by inhalation two (2) times daily as needed for Wheezing. Provider, Historical   furosemide (LASIX) 40 mg tablet Take 40 mg by mouth daily. Provider, Historical   gabapentin (NEURONTIN) 600 mg tablet Take 600 mg by mouth three (3) times daily. Provider, Historical   isosorbide dinitrate (ISORDIL) 10 mg tablet Take 10 mg by mouth two (2) times a day. Provider, Historical   losartan (COZAAR) 25 mg tablet Take 25 mg by mouth daily. Provider, Historical   metOLazone (ZAROXOLYN) 5 mg tablet Take 5 mg by mouth daily. Provider, Historical   nitroglycerin (NITROSTAT) 0.4 mg SL tablet 0.4 mg by SubLINGual route every five (5) minutes as needed for Chest Pain. Up to 3 doses. Provider, Historical   pravastatin (PRAVACHOL) 40 mg tablet Take 40 mg by mouth nightly. Provider, Historical   solifenacin (VESICARE) 5 mg tablet Take 5 mg by mouth daily. Provider, Historical   theophylline ER,12 hour, (THEOCHRON) 300 mg tablet Take 300 mg by mouth daily. Provider, Historical   amLODIPine (NORVASC) 10 mg tablet Take 10 mg by mouth daily. Provider, Historical   oxybutynin (DITROPAN) 5 mg tablet Take 5 mg by mouth three (3) times daily. Provider, Historical   buPROPion XL (WELLBUTRIN XL) 150 mg tablet Take 150 mg by mouth every morning. Provider, Historical       No results found for this or any previous visit (from the past 24 hour(s)). Echo Results  (Last 48 hours)    None          Patient's  EKG and laboratory data were individually reviewed by me. Please send a copy of this dictation to above referring physician.     Critical care time spent 50 minutes. Lacretia Denver, MD    This dictation was done by Dragon computer voice recognition software. Occasionally grammatical, syntax and other interpretive errors escape final proof reading. Please feel free to call me for any clarification.

## 2021-02-22 NOTE — ACP (ADVANCE CARE PLANNING)
Advance Care Planning   Healthcare Decision Maker:   Daughter Lonnie Cash @ [phone]) is pts primary HCDM.

## 2021-02-22 NOTE — H&P
History and Physical    NAME: Whit Castaneda   :  1950   MRN:  [id number]     Date/Time:  2021 3:03 PM    Patient PCP: Tana Bryan MD  ______________________________________________________________________             Subjective:     CHIEF COMPLAINT:   Shortness of breath and rapid heart rate.      HISTORY OF PRESENT ILLNESS:       Patient is a 70 y.o. year old female with PMH significant for COPD, CAD, CABG x 2, atrial flutter, IFTIKHAR, and HFpEF, admitted for acute exacerbation of COPD and acute on chronic HFpEF. She reports developing progressively worsening shortness of breath unresponsive to her home nebulizer treatments, with a rapid heart rate over the last two days. She presented to the ED via EMS initially as a STEMI alert which was subsequently cancelled shortly after arrival. She was found to be in atrial flutter with -120's which responded to IV Cardizem.     ED labs revealed an elevated pBNP of 1,622 and an initial troponin of 0.14. Her other labs were unremarkable. CXR significant for unchanged cardiomegaly and mild hydrostatic edema. She was unable to tolerate or complete the CTA ordered in the ED. Of note, she did have a CTA 2020 which was negative for PE.    Cardiology was consulted in the ED and has seen the patient. She  is being admitted to telemetry.           Past Medical History:   Diagnosis Date   • Congestive heart disease (HCC)    • Coronary artery disease    • Diabetes (HCC)    • Hyperlipidemia    • Hypertension    • IFTIKHAR (obstructive sleep apnea)         Past Surgical History:   Procedure Laterality Date   • HX BREAST BIOPSY     • HX CHOLECYSTECTOMY     • HX CORONARY ARTERY BYPASS GRAFT     • HX HERNIA REPAIR     • HX HYSTERECTOMY         Social History     Tobacco Use   • Smoking status: Former Smoker   • Smokeless tobacco: Never Used   Substance Use Topics   • Alcohol use: Not Currently     Frequency: Never        Family History   Problem Relation Age of  Onset    Heart Disease Mother     Kidney Disease Mother     Cancer Maternal Grandmother        Allergies   Allergen Reactions    Tylox [Oxycodone-Acetaminophen] Hives        Prior to Admission medications    Medication Sig Start Date End Date Taking? Authorizing Provider   carvediloL (COREG) 25 mg tablet Take 1 Tab by mouth two (2) times daily (with meals). 11/5/20  Yes Lila Cousins, NP   albuterol (Ventolin HFA) 90 mcg/actuation inhaler Take 2 Puffs by inhalation two (2) times daily as needed for Wheezing. Yes Provider, Historical   furosemide (LASIX) 40 mg tablet Take 40 mg by mouth every evening. Yes Provider, Historical   gabapentin (NEURONTIN) 600 mg tablet Take 600 mg by mouth three (3) times daily. Yes Provider, Historical   isosorbide dinitrate (ISORDIL) 10 mg tablet Take 10 mg by mouth two (2) times a day. Yes Provider, Historical   losartan (COZAAR) 25 mg tablet Take 25 mg by mouth daily. Yes Provider, Historical   metOLazone (ZAROXOLYN) 5 mg tablet Take 5 mg by mouth daily. Yes Provider, Historical   nitroglycerin (NITROSTAT) 0.4 mg SL tablet 0.4 mg by SubLINGual route every five (5) minutes as needed for Chest Pain. Up to 3 doses. Yes Provider, Historical   pravastatin (PRAVACHOL) 40 mg tablet Take 40 mg by mouth nightly. Yes Provider, Historical   theophylline ER,12 hour, (THEOCHRON) 300 mg tablet Take 300 mg by mouth daily. Yes Provider, Historical   amLODIPine (NORVASC) 10 mg tablet Take 10 mg by mouth daily. Yes Provider, Historical   oxybutynin (DITROPAN) 5 mg tablet Take 5 mg by mouth three (3) times daily. Yes Provider, Historical   buPROPion XL (WELLBUTRIN XL) 150 mg tablet Take 150 mg by mouth every morning.    Yes Provider, Historical         Current Facility-Administered Medications:     furosemide (LASIX) injection 40 mg, 40 mg, IntraVENous, ONCE, Gopal Alvarado MD, Stopped at 02/22/21 0937    dilTIAZem (CARDIZEM) 125 mg/125 mL (1 mg/mL) dextrose 5% infusion, 0-15 mg/hr, IntraVENous, CONTINUOUS, Gopal Alvarado MD, Last Rate: 10 mL/hr at 02/22/21 1100, 10 mg/hr at 02/22/21 1100    carvediloL (COREG) tablet 25 mg, 25 mg, Oral, BID WITH MEALS, Sanna Alvarado MD    isosorbide dinitrate (ISORDIL) tablet 10 mg, 10 mg, Oral, TID, Gopal Alvarado MD, 10 mg at 02/22/21 1559    clopidogreL (PLAVIX) tablet 75 mg, 75 mg, Oral, DAILY, Sanna Alvarado MD, Stopped at 02/22/21 1142    apixaban (ELIQUIS) tablet 5 mg, 5 mg, Oral, Q12H, Gopal Alvarado MD, Stopped at 02/22/21 1044    morphine injection 4 mg, 4 mg, IntraVENous, ONCE, Gopal Alvraado MD, Stopped at 02/22/21 1241    [START ON 2/23/2021] buPROPion XL (WELLBUTRIN XL) tablet 150 mg, 150 mg, Oral, 7am, Bailey Arciniega MD  [facility]  . PHARMACY TO SUBSTITUTE PER PROTOCOL (Reordered from: gabapentin (NEURONTIN) 600 mg tablet), , , Per Protocol, Sanna Alvarado MD    oxybutynin [facility]) tablet 5 mg, 5 mg, Oral, TID, Sanna Alvarado MD    pravastatin (PRAVACHOL) tablet 40 mg, 40 mg, Oral, QHS, Sanna Alvarado MD    [START ON 2/23/2021] theophylline ER(12 hour) (THEOCHRON) tablet 300 mg, 300 mg, Oral, DAILY, Sanna Alvarado MD    polyethylene glycol (MIRALAX) packet 17 g, 17 g, Oral, DAILY PRN, Sanna Alvarado MD    ondansetron (ZOFRAN ODT) tablet 4 mg, 4 mg, Oral, Q8H PRN **OR** ondansetron (ZOFRAN) injection 4 mg, 4 mg, IntraVENous, Q6H PRN, Gopal Alvarado MD    furosemide (LASIX) injection 40 mg, 40 mg, IntraVENous, BID, Sanna Alvarado MD    methylPREDNISolone (PF) (SOLU-MEDROL) injection 40 mg, 40 mg, IntraVENous, Q6H, Gopal Alvarado MD    Current Outpatient Medications:     carvediloL (COREG) 25 mg tablet, Take 1 Tab by mouth two (2) times daily (with meals). , Disp: 30 Tab, Rfl: 0    albuterol (Ventolin HFA) 90 mcg/actuation inhaler, Take 2 Puffs by inhalation two (2) times daily as needed for Wheezing., Disp: , Rfl:     furosemide (LASIX) 40 mg tablet, Take 40 mg by mouth every evening., Disp: , Rfl:     gabapentin (NEURONTIN) 600 mg tablet, Take 600 mg by mouth three (3) times daily. , Disp: , Rfl:     isosorbide dinitrate (ISORDIL) 10 mg tablet, Take 10 mg by mouth two (2) times a day., Disp: , Rfl:     losartan (COZAAR) 25 mg tablet, Take 25 mg by mouth daily. , Disp: , Rfl:     metOLazone (ZAROXOLYN) 5 mg tablet, Take 5 mg by mouth daily. , Disp: , Rfl:     nitroglycerin (NITROSTAT) 0.4 mg SL tablet, 0.4 mg by SubLINGual route every five (5) minutes as needed for Chest Pain. Up to 3 doses. , Disp: , Rfl:     pravastatin (PRAVACHOL) 40 mg tablet, Take 40 mg by mouth nightly., Disp: , Rfl:     theophylline ER,12 hour, (THEOCHRON) 300 mg tablet, Take 300 mg by mouth daily. , Disp: , Rfl:     amLODIPine (NORVASC) 10 mg tablet, Take 10 mg by mouth daily. , Disp: , Rfl:     oxybutynin (DITROPAN) 5 mg tablet, Take 5 mg by mouth three (3) times daily. , Disp: , Rfl:     buPROPion XL (WELLBUTRIN XL) 150 mg tablet, Take 150 mg by mouth every morning., Disp: , Rfl:     LAB DATA REVIEWED:    Recent Results (from the past 24 hour(s))   EKG, 12 LEAD, INITIAL    Collection Time: 02/22/21  9:23 AM   Result Value Ref Range    Ventricular Rate 120 BPM    Atrial Rate 250 BPM    QRS Duration 106 ms    Q-T Interval 346 ms    QTC Calculation (Bezet) 489 ms    Calculated R Axis 87 degrees    Calculated T Axis 85 degrees    Diagnosis       Atrial flutter with variable A-V block  Confirmed by CELENA Gonzalez ([id number]) on 2/22/2021 10:43:07 AM     CBC WITH AUTOMATED DIFF    Collection Time: 02/22/21  9:30 AM   Result Value Ref Range    WBC 8.7 3.6 - 11.0 K/uL    RBC 4.64 3.80 - 5.20 M/uL    HGB 12.6 11.5 - 16.0 g/dL    HCT 43.0 35.0 - 47.0 %    MCV 92.7 80.0 - 99.0 FL    MCH 27.2 26.0 - 34.0 PG    MCHC 29.3 (L) 30.0 - 36.5 g/dL    RDW 16.0 (H) 11.5 - 14.5 %    PLATELET 627 104 - 628 K/uL    MPV 13.0 (H) 8.9 - 12.9 FL    NRBC 0.0 0  WBC    ABSOLUTE NRBC 0.00 0.00 - 0.01 K/uL    NEUTROPHILS 69 32 - 75 % LYMPHOCYTES 21 12 - 49 %    MONOCYTES 8 5 - 13 %    EOSINOPHILS 2 0 - 7 %    BASOPHILS 0 0 - 1 %    IMMATURE GRANULOCYTES 0 0.0 - 0.5 %    ABS. NEUTROPHILS 6.0 1.8 - 8.0 K/UL    ABS. LYMPHOCYTES 1.9 0.8 - 3.5 K/UL    ABS. MONOCYTES 0.7 0.0 - 1.0 K/UL    ABS. EOSINOPHILS 0.1 0.0 - 0.4 K/UL    ABS. BASOPHILS 0.0 0.0 - 0.1 K/UL    ABS. IMM. GRANS. 0.0 0.00 - 0.04 K/UL    DF AUTOMATED     METABOLIC PANEL, COMPREHENSIVE    Collection Time: 02/22/21  9:30 AM   Result Value Ref Range    Sodium 143 136 - 145 mmol/L    Potassium 4.0 3.5 - 5.1 mmol/L    Chloride 107 97 - 108 mmol/L    CO2 32 21 - 32 mmol/L    Anion gap 4 (L) 5 - 15 mmol/L    Glucose 170 (H) 65 - 100 mg/dL    BUN 20 6 - 20 mg/dL    Creatinine 1.33 (H) 0.55 - 1.02 mg/dL    BUN/Creatinine ratio 15 12 - 20      GFR est AA 48 (L) >60 ml/min/1.73m2    GFR est non-AA 40 (L) >60 ml/min/1.73m2    Calcium 9.1 8.5 - 10.1 mg/dL    Bilirubin, total 0.8 0.2 - 1.0 mg/dL    AST (SGOT) 17 15 - 37 U/L    ALT (SGPT) 24 12 - 78 U/L    Alk.  phosphatase 154 (H) 45 - 117 U/L    Protein, total 7.3 6.4 - 8.2 g/dL    Albumin 3.3 (L) 3.5 - 5.0 g/dL    Globulin 4.0 2.0 - 4.0 g/dL    A-G Ratio 0.8 (L) 1.1 - 2.2     TROPONIN I    Collection Time: 02/22/21  9:30 AM   Result Value Ref Range    Troponin-I, Qt. 0.14 (H) <0.05 ng/mL   EKG, 12 LEAD, SUBSEQUENT    Collection Time: 02/22/21  9:41 AM   Result Value Ref Range    Ventricular Rate 98 BPM    Atrial Rate 256 BPM    QRS Duration 94 ms    Q-T Interval 184 ms    QTC Calculation (Bezet) 234 ms    Calculated P Axis 44 degrees    Calculated R Axis 64 degrees    Calculated T Axis 104 degrees    Diagnosis       Atrial flutter with variable A-V block  Confirmed by nAn Britton ([id number]) on 2/22/2021 10:43:15 AM     BNP    Collection Time: 02/22/21 10:30 AM   Result Value Ref Range    NT pro-BNP 1,622 (H) <125 pg/mL       XR Results (most recent):  Results from Hospital Encounter encounter on 02/22/21   XR CHEST PORT    Narrative STEMI alert.    Comparison chest x-ray 11/2/2020. Impression FINDINGS: IMPRESSION: Frontal single view chest.    Median sternotomy status post CABG. Calcified aorta. Unchanged cardiomegaly  and/or pericardial effusion. The lungs are similarly inflated. Unchanged mild elevation of the right  hemidiaphragm. Minimal hydrostatic edema. No consolidation, effusion, or  pneumothorax. No free air under the diaphragm. No acute bony findings. XR CHEST PORT   Final Result   FINDINGS: IMPRESSION: Frontal single view chest.      Median sternotomy status post CABG. Calcified aorta. Unchanged cardiomegaly   and/or pericardial effusion. The lungs are similarly inflated. Unchanged mild elevation of the right   hemidiaphragm. Minimal hydrostatic edema. No consolidation, effusion, or   pneumothorax. No free air under the diaphragm. No acute bony findings. CTA CHEST W OR W WO CONT    (Results Pending)        Review of Systems:  Constitutional: Negative for chills and fever. HENT: Negative for nasal congestion. Negative for sore throat. Eyes: Negative. Respiratory: SHORTNESS OF BREATH, NONPRODUCTIVE COUGH, ORTHOPNEA (Chronic and unchanged)  Cardiovascular: RAPID HEART RATE, BILATERAL LOWER LEG EDEMA, negative for chest pain  Gastrointestinal: Negative for abdominal pain and nausea. Negative for constipation. Genitourinary: OVERACTIVE BLADDER  Skin: Negative. Neurological: Negative. Objective:   VITALS:    Visit Vitals  /88   Pulse (!) 104   Temp 97.7 °F (36.5 °C)   Resp 18   Ht 5' 3\" (1.6 m)   Wt 106.1 kg (234 lb)   SpO2 97%   BMI 41.45 kg/m²       Physical Exam:   Constitutional: Alert. Oriented to person, place, and time. Obese. HENT: Bilateral patent nares without congestion. Head: Normocephalic and atraumatic. Eyes: Pupils are equal, round, and reactive to light. EOM are normal. No scleral icterus. Conjunctiva without redness or drainage.   Cardiovascular: IRREGULAR RATE, Normal S1, S2. No extra heart sounds, BILATERAL 2+ PITTING EDEMA BELOW THE KNEE BILATERALLY. No murmur. Pulmonary/Chest: SHORTNESS OF BREATH WHILE TALKING. No use of accessory muscles. SCANT WHEEZES IN UPPER FIELDS ANTERIORLY. No cyanosis. Nontender to palpation  Abdominal: Soft. Obese. Bowel sounds are normoactive x 4 quadrants. No tenderness to palpation. No masses. Musculoskeletal: Normal range of motion. Skin: Well-healed old sternotomy scar to chest. No rashes. No discoloration. Warm and dry. Neurological: Alert and oriented x 4. Normal strength in all extremities. No cranial nerve deficit or sensory deficit. ASSESSMENT & PLAN:    Acute Exacerbation COPD     No evidence of hypoxic respiratory failure - 98% on 3L O2 NC     On Theophylline and nebulizers at home  Start on nebulizer treatment and IV Solu-Medrol and pulmonary consult  Acute on chronic HFpEF      Last Echo November 2020 EF 50%, mild concentric hypertrophy        Global reduced systolic function, mild to moderate MV regurg. BNP 1,622 this admission - previously 1,437 November 2020     Received one dose IV Lasix in ED, normally on PO Lasix and         Metolazone at home. Atrial flutter with RVR     Responded to IV Cardizem in ED     Cardiology following     Theophylline level suggested per cardiology as potential cause of          Atrial flutter     On Eliquis at home  History of CAD      CABG      S/P PCI of LAD May 2011      Initial Troponin 0.14 in ED.  Will need repeat      Home medications: Coreg, Plavix, Eliquis, Isordil, Cozaar,            Norvasc  IFTIKHAR - chronic      Uses CPAP at home      Uses 2 pillows  Overactive bladder - chronic      On Ditropan,   Hypercholesterolemia - chronic      On Statin therapy - Pravachol  Morbid Obesity      BMI 41.45  History of TIA t        PLAN  Admit to telemetry  Check Theophylline level prior to restarting home med  Continue Cardizem and start home cardiac and diuretic medications per cardiology recommendation  Nebulizer treatments  Repeat troponin  Repeat CXR prn  Respiratory for IFTIKHAR management with CPAP  PT/OT consultation - patient has ambulation aids at home  Patient is on Eliquis and Plavix no lovenox or heparin for DVT prophylaxis needed. SCD's prn    PT is full code this admission     Daughter is primary decision maker - Nonah Cam      Current Facility-Administered Medications:     furosemide (LASIX) injection 40 mg, 40 mg, IntraVENous, ONCE, Gopal Alvaraod MD, Stopped at 02/22/21 1870    dilTIAZem (CARDIZEM) 125 mg/125 mL (1 mg/mL) dextrose 5% infusion, 0-15 mg/hr, IntraVENous, CONTINUOUS, Gopal Alvarado MD, Last Rate: 10 mL/hr at 02/22/21 1100, 10 mg/hr at 02/22/21 1100    carvediloL (COREG) tablet 25 mg, 25 mg, Oral, BID WITH MEALS, José Alvarado MD    isosorbide dinitrate (ISORDIL) tablet 10 mg, 10 mg, Oral, TID, Gopal Alvarado MD, 10 mg at 02/22/21 1559    clopidogreL (PLAVIX) tablet 75 mg, 75 mg, Oral, DAILY, José Alvarado MD, Stopped at 02/22/21 1142    apixaban (ELIQUIS) tablet 5 mg, 5 mg, Oral, Q12H, Gopal Alvarado MD, Stopped at 02/22/21 1044    morphine injection 4 mg, 4 mg, IntraVENous, ONCE, Gopal Alvarado MD, Stopped at 02/22/21 1241    [START ON 2/23/2021] buPROPion XL (WELLBUTRIN XL) tablet 150 mg, 150 mg, Oral, 7am, José Alvarado MD  [facility]  . PHARMACY TO SUBSTITUTE PER PROTOCOL (Reordered from: gabapentin (NEURONTIN) 600 mg tablet), , , Per Protocol, José Alvarado MD    oxybutynin [facility]) tablet 5 mg, 5 mg, Oral, TID, José Alvarado MD    pravastatin (PRAVACHOL) tablet 40 mg, 40 mg, Oral, QHS, Gopal Alvarado MD    [START ON 2/23/2021] theophylline ER(12 hour) (THEOCHRON) tablet 300 mg, 300 mg, Oral, DAILY, José Alvarado MD    polyethylene glycol (MIRALAX) packet 17 g, 17 g, Oral, DAILY PRN, Gopal Alvarado MD    ondansetron (ZOFRAN ODT) tablet 4 mg, 4 mg, Oral, Q8H PRN **OR** ondansetron (ZOFRAN) injection 4 mg, 4 mg, IntraVENous, Q6H PRN, Afua Alvarado MD    furosemide (LASIX) injection 40 mg, 40 mg, IntraVENous, BID, Afua Alvarado MD    methylPREDNISolone (PF) (SOLU-MEDROL) injection 40 mg, 40 mg, IntraVENous, Q6H, Gopal Alvarado MD    Current Outpatient Medications:     carvediloL (COREG) 25 mg tablet, Take 1 Tab by mouth two (2) times daily (with meals). , Disp: 30 Tab, Rfl: 0    albuterol (Ventolin HFA) 90 mcg/actuation inhaler, Take 2 Puffs by inhalation two (2) times daily as needed for Wheezing., Disp: , Rfl:     furosemide (LASIX) 40 mg tablet, Take 40 mg by mouth every evening., Disp: , Rfl:     gabapentin (NEURONTIN) 600 mg tablet, Take 600 mg by mouth three (3) times daily. , Disp: , Rfl:     isosorbide dinitrate (ISORDIL) 10 mg tablet, Take 10 mg by mouth two (2) times a day., Disp: , Rfl:     losartan (COZAAR) 25 mg tablet, Take 25 mg by mouth daily. , Disp: , Rfl:     metOLazone (ZAROXOLYN) 5 mg tablet, Take 5 mg by mouth daily. , Disp: , Rfl:     nitroglycerin (NITROSTAT) 0.4 mg SL tablet, 0.4 mg by SubLINGual route every five (5) minutes as needed for Chest Pain. Up to 3 doses. , Disp: , Rfl:     pravastatin (PRAVACHOL) 40 mg tablet, Take 40 mg by mouth nightly., Disp: , Rfl:     theophylline ER,12 hour, (THEOCHRON) 300 mg tablet, Take 300 mg by mouth daily. , Disp: , Rfl:     amLODIPine (NORVASC) 10 mg tablet, Take 10 mg by mouth daily. , Disp: , Rfl:     oxybutynin (DITROPAN) 5 mg tablet, Take 5 mg by mouth three (3) times daily. , Disp: , Rfl:     buPROPion XL (WELLBUTRIN XL) 150 mg tablet, Take 150 mg by mouth every morning., Disp: , Rfl:     ________________________________________________________________________    Signed: Ginny Catalan, MD

## 2021-02-22 NOTE — H&P
History and Physical    NAME: Ayla Slaughter   :  1950   MRN:  [id number]     Date/Time:  2021 3:03 PM    Patient PCP: Christiana Pack MD  ______________________________________________________________________             Subjective:     CHIEF COMPLAINT:   Shortness of breath and rapid heart rate. HISTORY OF PRESENT ILLNESS:       Patient is a 72y.o. year old female with PMH significant for COPD, CAD, CABG x 2, atrial flutter, IFTIKHAR, and HFpEF, admitted for acute exacerbation of COPD and acute on chronic HFpEF. She reports developing progressively worsening shortness of breath unresponsive to her home nebulizer treatments, with a rapid heart rate over the last two days. She presented to the ED via EMS initially as a STEMI alert which was subsequently cancelled shortly after arrival. She was found to be in atrial flutter with -120's which responded to IV Cardizem. ED labs revealed an elevated pBNP of 1,622 and an initial troponin of 0.14. Her other labs were unremarkable. CXR significant for unchanged cardiomegaly and mild hydrostatic edema. She was unable to tolerate or complete the CTA ordered in the ED. Of note, she did have a CTA 2020 which was negative for PE. Cardiology was consulted in the ED and has seen the patient. She  is being admitted to telemetry.            Past Medical History:   Diagnosis Date    Congestive heart disease ([facility] [address]     Coronary artery disease     Diabetes ([facility] [address]     Hyperlipidemia     Hypertension     IFTIKHAR (obstructive sleep apnea)         Past Surgical History:   Procedure Laterality Date    HX BREAST BIOPSY      HX CHOLECYSTECTOMY      HX CORONARY ARTERY BYPASS GRAFT      HX HERNIA REPAIR      HX HYSTERECTOMY         Social History     Tobacco Use    Smoking status: Former Smoker    Smokeless tobacco: Never Used   Substance Use Topics    Alcohol use: Not Currently     Frequency: Never        Family History   Problem Relation Age of Onset    Heart Disease Mother     Kidney Disease Mother     Cancer Maternal Grandmother        Allergies   Allergen Reactions    Tylox [Oxycodone-Acetaminophen] Hives        Prior to Admission medications    Medication Sig Start Date End Date Taking? Authorizing Provider   carvediloL (COREG) 25 mg tablet Take 1 Tab by mouth two (2) times daily (with meals). 11/5/20  Yes Katina Eddy NP   albuterol (Ventolin HFA) 90 mcg/actuation inhaler Take 2 Puffs by inhalation two (2) times daily as needed for Wheezing. Yes Provider, Historical   furosemide (LASIX) 40 mg tablet Take 40 mg by mouth every evening. Yes Provider, Historical   gabapentin (NEURONTIN) 600 mg tablet Take 600 mg by mouth three (3) times daily. Yes Provider, Historical   isosorbide dinitrate (ISORDIL) 10 mg tablet Take 10 mg by mouth two (2) times a day. Yes Provider, Historical   losartan (COZAAR) 25 mg tablet Take 25 mg by mouth daily. Yes Provider, Historical   metOLazone (ZAROXOLYN) 5 mg tablet Take 5 mg by mouth daily. Yes Provider, Historical   nitroglycerin (NITROSTAT) 0.4 mg SL tablet 0.4 mg by SubLINGual route every five (5) minutes as needed for Chest Pain. Up to 3 doses. Yes Provider, Historical   pravastatin (PRAVACHOL) 40 mg tablet Take 40 mg by mouth nightly. Yes Provider, Historical   theophylline ER,12 hour, (THEOCHRON) 300 mg tablet Take 300 mg by mouth daily. Yes Provider, Historical   amLODIPine (NORVASC) 10 mg tablet Take 10 mg by mouth daily. Yes Provider, Historical   oxybutynin (DITROPAN) 5 mg tablet Take 5 mg by mouth three (3) times daily. Yes Provider, Historical   buPROPion XL (WELLBUTRIN XL) 150 mg tablet Take 150 mg by mouth every morning.    Yes Provider, Historical         Current Facility-Administered Medications:     furosemide (LASIX) injection 40 mg, 40 mg, IntraVENous, ONCE, Rito Montes MD, Stopped at 02/22/21 0937    dilTIAZem (CARDIZEM) 125 mg/125 mL (1 mg/mL) dextrose 5% infusion, 0-15 mg/hr, IntraVENous, CONTINUOUS, Palomo Montes MD, Last Rate: 10 mL/hr at 02/22/21 1100, 10 mg/hr at 02/22/21 1100    carvediloL (COREG) tablet 25 mg, 25 mg, Oral, BID WITH MEALS, Vipin Aguilera MD    isosorbide dinitrate (ISORDIL) tablet 10 mg, 10 mg, Oral, TID, Vipin Aguilera MD    clopidogreL (PLAVIX) tablet 75 mg, 75 mg, Oral, DAILY, Vipin Aguilera MD, Stopped at 02/22/21 1142    apixaban (ELIQUIS) tablet 5 mg, 5 mg, Oral, Q12H, Vipin Aguilera MD, Stopped at 02/22/21 1044    morphine injection 4 mg, 4 mg, IntraVENous, ONCE, Palomo Montes MD    Current Outpatient Medications:     carvediloL (COREG) 25 mg tablet, Take 1 Tab by mouth two (2) times daily (with meals). , Disp: 30 Tab, Rfl: 0    albuterol (Ventolin HFA) 90 mcg/actuation inhaler, Take 2 Puffs by inhalation two (2) times daily as needed for Wheezing., Disp: , Rfl:     furosemide (LASIX) 40 mg tablet, Take 40 mg by mouth every evening., Disp: , Rfl:     gabapentin (NEURONTIN) 600 mg tablet, Take 600 mg by mouth three (3) times daily. , Disp: , Rfl:     isosorbide dinitrate (ISORDIL) 10 mg tablet, Take 10 mg by mouth two (2) times a day., Disp: , Rfl:     losartan (COZAAR) 25 mg tablet, Take 25 mg by mouth daily. , Disp: , Rfl:     metOLazone (ZAROXOLYN) 5 mg tablet, Take 5 mg by mouth daily. , Disp: , Rfl:     nitroglycerin (NITROSTAT) 0.4 mg SL tablet, 0.4 mg by SubLINGual route every five (5) minutes as needed for Chest Pain. Up to 3 doses. , Disp: , Rfl:     pravastatin (PRAVACHOL) 40 mg tablet, Take 40 mg by mouth nightly., Disp: , Rfl:     theophylline ER,12 hour, (THEOCHRON) 300 mg tablet, Take 300 mg by mouth daily. , Disp: , Rfl:     amLODIPine (NORVASC) 10 mg tablet, Take 10 mg by mouth daily. , Disp: , Rfl:     oxybutynin (DITROPAN) 5 mg tablet, Take 5 mg by mouth three (3) times daily. , Disp: , Rfl:     buPROPion XL (WELLBUTRIN XL) 150 mg tablet, Take 150 mg by mouth every morning., Disp: , Rfl:     LAB DATA REVIEWED:    Recent Results (from the past 24 hour(s))   EKG, 12 LEAD, INITIAL    Collection Time: 02/22/21  9:23 AM   Result Value Ref Range    Ventricular Rate 120 BPM    Atrial Rate 250 BPM    QRS Duration 106 ms    Q-T Interval 346 ms    QTC Calculation (Bezet) 489 ms    Calculated R Axis 87 degrees    Calculated T Axis 85 degrees    Diagnosis       Atrial flutter with variable A-V block  Confirmed by Javed Barnes ([id number]) on 2/22/2021 10:43:07 AM     CBC WITH AUTOMATED DIFF    Collection Time: 02/22/21  9:30 AM   Result Value Ref Range    WBC 8.7 3.6 - 11.0 K/uL    RBC 4.64 3.80 - 5.20 M/uL    HGB 12.6 11.5 - 16.0 g/dL    HCT 43.0 35.0 - 47.0 %    MCV 92.7 80.0 - 99.0 FL    MCH 27.2 26.0 - 34.0 PG    MCHC 29.3 (L) 30.0 - 36.5 g/dL    RDW 16.0 (H) 11.5 - 14.5 %    PLATELET 427 794 - 955 K/uL    MPV 13.0 (H) 8.9 - 12.9 FL    NRBC 0.0 0  WBC    ABSOLUTE NRBC 0.00 0.00 - 0.01 K/uL    NEUTROPHILS 69 32 - 75 %    LYMPHOCYTES 21 12 - 49 %    MONOCYTES 8 5 - 13 %    EOSINOPHILS 2 0 - 7 %    BASOPHILS 0 0 - 1 %    IMMATURE GRANULOCYTES 0 0.0 - 0.5 %    ABS. NEUTROPHILS 6.0 1.8 - 8.0 K/UL    ABS. LYMPHOCYTES 1.9 0.8 - 3.5 K/UL    ABS. MONOCYTES 0.7 0.0 - 1.0 K/UL    ABS. EOSINOPHILS 0.1 0.0 - 0.4 K/UL    ABS. BASOPHILS 0.0 0.0 - 0.1 K/UL    ABS. IMM. GRANS. 0.0 0.00 - 0.04 K/UL    DF AUTOMATED     METABOLIC PANEL, COMPREHENSIVE    Collection Time: 02/22/21  9:30 AM   Result Value Ref Range    Sodium 143 136 - 145 mmol/L    Potassium 4.0 3.5 - 5.1 mmol/L    Chloride 107 97 - 108 mmol/L    CO2 32 21 - 32 mmol/L    Anion gap 4 (L) 5 - 15 mmol/L    Glucose 170 (H) 65 - 100 mg/dL    BUN 20 6 - 20 mg/dL    Creatinine 1.33 (H) 0.55 - 1.02 mg/dL    BUN/Creatinine ratio 15 12 - 20      GFR est AA 48 (L) >60 ml/min/1.73m2    GFR est non-AA 40 (L) >60 ml/min/1.73m2    Calcium 9.1 8.5 - 10.1 mg/dL    Bilirubin, total 0.8 0.2 - 1.0 mg/dL    AST (SGOT) 17 15 - 37 U/L    ALT (SGPT) 24 12 - 78 U/L    Alk.  phosphatase 154 (H) 45 - 117 U/L    Protein, total 7.3 6.4 - 8.2 g/dL    Albumin 3.3 (L) 3.5 - 5.0 g/dL    Globulin 4.0 2.0 - 4.0 g/dL    A-G Ratio 0.8 (L) 1.1 - 2.2     TROPONIN I    Collection Time: 02/22/21  9:30 AM   Result Value Ref Range    Troponin-I, Qt. 0.14 (H) <0.05 ng/mL   EKG, 12 LEAD, SUBSEQUENT    Collection Time: 02/22/21  9:41 AM   Result Value Ref Range    Ventricular Rate 98 BPM    Atrial Rate 256 BPM    QRS Duration 94 ms    Q-T Interval 184 ms    QTC Calculation (Bezet) 234 ms    Calculated P Axis 44 degrees    Calculated R Axis 64 degrees    Calculated T Axis 104 degrees    Diagnosis       Atrial flutter with variable A-V block  Confirmed by Pinky Alexander [id number]) on 2/22/2021 10:43:15 AM     BNP    Collection Time: 02/22/21 10:30 AM   Result Value Ref Range    NT pro-BNP 1,622 (H) <125 pg/mL       XR Results (most recent):  Results from Hospital Encounter encounter on 02/22/21   XR CHEST PORT    Narrative STEMI alert. Comparison chest x-ray 11/2/2020. Impression FINDINGS: IMPRESSION: Frontal single view chest.    Median sternotomy status post CABG. Calcified aorta. Unchanged cardiomegaly  and/or pericardial effusion. The lungs are similarly inflated. Unchanged mild elevation of the right  hemidiaphragm. Minimal hydrostatic edema. No consolidation, effusion, or  pneumothorax. No free air under the diaphragm. No acute bony findings. XR CHEST PORT   Final Result   FINDINGS: IMPRESSION: Frontal single view chest.      Median sternotomy status post CABG. Calcified aorta. Unchanged cardiomegaly   and/or pericardial effusion. The lungs are similarly inflated. Unchanged mild elevation of the right   hemidiaphragm. Minimal hydrostatic edema. No consolidation, effusion, or   pneumothorax. No free air under the diaphragm. No acute bony findings. CTA CHEST W OR W WO CONT    (Results Pending)        Review of Systems:  Constitutional: Negative for chills and fever.    HENT: Negative for nasal congestion. Negative for sore throat. Eyes: Negative. Respiratory: SHORTNESS OF BREATH, NONPRODUCTIVE COUGH, ORTHOPNEA (Chronic and unchanged)  Cardiovascular: RAPID HEART RATE, BILATERAL LOWER LEG EDEMA, negative for chest pain  Gastrointestinal: Negative for abdominal pain and nausea. Negative for constipation. Genitourinary: OVERACTIVE BLADDER  Skin: Negative. Neurological: Negative. Objective:   VITALS:    Visit Vitals  BP (!) 159/91   Pulse 99   Temp 97.7 °F (36.5 °C)   Resp 17   Ht 5' 3\" (1.6 m)   Wt 106.1 kg (234 lb)   SpO2 98%   BMI 41.45 kg/m²       Physical Exam:   Constitutional: Alert. Oriented to person, place, and time. Obese. HENT: Bilateral patent nares without congestion. Head: Normocephalic and atraumatic. Eyes: Pupils are equal, round, and reactive to light. EOM are normal. No scleral icterus. Conjunctiva without redness or drainage. Cardiovascular: IRREGULAR RATE, Normal S1, S2. No extra heart sounds, BILATERAL 2+ PITTING EDEMA BELOW THE KNEE BILATERALLY. No murmur. Pulmonary/Chest: SHORTNESS OF BREATH WHILE TALKING. No use of accessory muscles. SCANT WHEEZES IN UPPER FIELDS ANTERIORLY. No cyanosis. Nontender to palpation  Abdominal: Soft. Obese. Bowel sounds are normoactive x 4 quadrants. No tenderness to palpation. No masses. Musculoskeletal: Normal range of motion. Skin: Well-healed old sternotomy scar to chest. No rashes. No discoloration. Warm and dry. Neurological: Alert and oriented x 4. Normal strength in all extremities. No cranial nerve deficit or sensory deficit. ASSESSMENT & PLAN:    Acute Exacerbation COPD     No evidence of hypoxic respiratory failure - 98% on 3L O2 NC     On Theophylline and nebulizers at home  Start on nebulizer treatment and IV Solu-Medrol and pulmonary consult  Acute on chronic HFpEF      Last Echo November 2020 EF 50%, mild concentric hypertrophy        Global reduced systolic function, mild to moderate MV regurg.      BNP 1,622 this admission - previously 1,437 November 2020     Received one dose IV Lasix in ED, normally on PO Lasix and         Metolazone at home. Atrial flutter with RVR     Responded to IV Cardizem in ED     Cardiology following     Theophylline level suggested per cardiology as potential cause of          Atrial flutter     On Eliquis at home  History of CAD      CABG      S/P PCI of LAD May 2011      Initial Troponin 0.14 in ED. Will need repeat      Home medications: Coreg, Plavix, Eliquis, Isordil, Cozaar,            Norvasc  IFTIKHAR - chronic      Uses CPAP at home      Uses 2 pillows  Overactive bladder - chronic      On Ditropan,   Hypercholesterolemia - chronic      On Statin therapy - Pravachol  Morbid Obesity      BMI 41.45  History of TIA t        PLAN  Admit to telemetry  Check Theophylline level prior to restarting home med  Continue Cardizem and start home cardiac and diuretic medications per cardiology recommendation  Nebulizer treatments  Repeat troponin  Repeat CXR prn  Respiratory for IFTIKHAR management with CPAP  PT/OT consultation - patient has ambulation aids at home  Patient is on Eliquis and Plavix no lovenox or heparin for DVT prophylaxis needed.  SCD's prn    PT is full code this admission     Daughter is primary decision maker - Kami Parsons      Current Facility-Administered Medications:     furosemide (LASIX) injection 40 mg, 40 mg, IntraVENous, ONCE, Gopal Alvarado MD, Stopped at 02/22/21 9692    dilTIAZem (CARDIZEM) 125 mg/125 mL (1 mg/mL) dextrose 5% infusion, 0-15 mg/hr, IntraVENous, CONTINUOUS, Gopal Alvarado MD, Last Rate: 10 mL/hr at 02/22/21 1100, 10 mg/hr at 02/22/21 1100    carvediloL (COREG) tablet 25 mg, 25 mg, Oral, BID WITH MEALS, Soleadd Alvarado MD    isosorbide dinitrate (ISORDIL) tablet 10 mg, 10 mg, Oral, TID, Gopal Alvarado MD, 10 mg at 02/22/21 1558    clopidogreL (PLAVIX) tablet 75 mg, 75 mg, Oral, DAILY, Soledad Alvarado MD, Stopped at 02/22/21 1142   apixaban (ELIQUIS) tablet 5 mg, 5 mg, Oral, Q12H, Gopal Alvarado MD, Stopped at 02/22/21 1044    morphine injection 4 mg, 4 mg, IntraVENous, ONCE, Gopal Alvarado MD, Stopped at 02/22/21 1241    [START ON 2/23/2021] buPROPion XL (WELLBUTRIN XL) tablet 150 mg, 150 mg, Oral, 7am, Abraham Alvarado MD  [facility]  . PHARMACY TO SUBSTITUTE PER PROTOCOL (Reordered from: gabapentin (NEURONTIN) 600 mg tablet), , , Per Protocol, Abraham Alvarado MD    oxybutynin [facility]) tablet 5 mg, 5 mg, Oral, TID, Abraham Alvarado MD    pravastatin (PRAVACHOL) tablet 40 mg, 40 mg, Oral, QHS, Abraham Alvarado MD    [START ON 2/23/2021] theophylline ER(12 hour) (THEOCHRON) tablet 300 mg, 300 mg, Oral, DAILY, Abraham Alvarado MD    polyethylene glycol (MIRALAX) packet 17 g, 17 g, Oral, DAILY PRN, Abraham Alvarado MD    ondansetron (ZOFRAN ODT) tablet 4 mg, 4 mg, Oral, Q8H PRN **OR** ondansetron (ZOFRAN) injection 4 mg, 4 mg, IntraVENous, Q6H PRN, Gopal Alvarado MD    furosemide (LASIX) injection 40 mg, 40 mg, IntraVENous, BID, Gopal Alvarado MD    Current Outpatient Medications:     carvediloL (COREG) 25 mg tablet, Take 1 Tab by mouth two (2) times daily (with meals). , Disp: 30 Tab, Rfl: 0    albuterol (Ventolin HFA) 90 mcg/actuation inhaler, Take 2 Puffs by inhalation two (2) times daily as needed for Wheezing., Disp: , Rfl:     furosemide (LASIX) 40 mg tablet, Take 40 mg by mouth every evening., Disp: , Rfl:     gabapentin (NEURONTIN) 600 mg tablet, Take 600 mg by mouth three (3) times daily. , Disp: , Rfl:     isosorbide dinitrate (ISORDIL) 10 mg tablet, Take 10 mg by mouth two (2) times a day., Disp: , Rfl:     losartan (COZAAR) 25 mg tablet, Take 25 mg by mouth daily. , Disp: , Rfl:     metOLazone (ZAROXOLYN) 5 mg tablet, Take 5 mg by mouth daily. , Disp: , Rfl:     nitroglycerin (NITROSTAT) 0.4 mg SL tablet, 0.4 mg by SubLINGual route every five (5) minutes as needed for Chest Pain. Up to 3 doses. , Disp: , Rfl:   pravastatin (PRAVACHOL) 40 mg tablet, Take 40 mg by mouth nightly., Disp: , Rfl:     theophylline ER,12 hour, (THEOCHRON) 300 mg tablet, Take 300 mg by mouth daily. , Disp: , Rfl:     amLODIPine (NORVASC) 10 mg tablet, Take 10 mg by mouth daily. , Disp: , Rfl:     oxybutynin (DITROPAN) 5 mg tablet, Take 5 mg by mouth three (3) times daily. , Disp: , Rfl:     buPROPion XL (WELLBUTRIN XL) 150 mg tablet, Take 150 mg by mouth every morning., Disp: , Rfl:     ________________________________________________________________________    Signed: Marina Cheatham NP

## 2021-02-22 NOTE — ED TRIAGE NOTES
Pt reports SOB and chest pain that started yesterday, pt has hx of COPD and is on 3L )2 at home at all times, pt took a neb without relief

## 2021-02-22 NOTE — ED PROVIDER NOTES
EMERGENCY DEPARTMENT HISTORY AND PHYSICAL EXAM      Date: 2/22/2021  Patient Name: Boogie Aguilar    History of Presenting Illness     Chief Complaint   Patient presents with    Shortness of Breath       History Provided By: Patient    HPI: Boogie Aguilar, 72 y.o. female with a past medical history significant hypertension presents to the ED with chief complaint of Shortness of Breath  . 52-year-old female complaint of chest pain shortness of breath difficulty breathing. Unclear if she has a history of flutter. Does follow with cardiology. Actively having worsening discomfort shortness of breath feels like she cannot catch her breath. No coughing. No fevers. No leg pain or swelling. There are no other complaints, changes, or physical findings at this time. PCP: Hugh Sales MD    Current Facility-Administered Medications   Medication Dose Route Frequency Provider Last Rate Last Admin    furosemide (LASIX) injection 40 mg  40 mg IntraVENous Melony Gonzalez MD   Stopped at 02/22/21 7902    dilTIAZem (CARDIZEM) 125 mg/125 mL (1 mg/mL) dextrose 5% infusion  0-15 mg/hr IntraVENous CONTINUOUS Raul Steinberg MD 5 mL/hr at 02/22/21 1036 5 mg/hr at 02/22/21 1036    carvediloL (COREG) tablet 25 mg  25 mg Oral BID WITH MEALS Kermit Coburn MD        isosorbide dinitrate (ISORDIL) tablet 10 mg  10 mg Oral TID Kermit Coburn MD        clopidogreL (PLAVIX) tablet 75 mg  75 mg Oral DAILY Kermit Coburn MD        apixaban [place] Sprain) tablet 5 mg  5 mg Oral Q12H Kermit Coburn MD         Current Outpatient Medications   Medication Sig Dispense Refill    carvediloL (COREG) 25 mg tablet Take 1 Tab by mouth two (2) times daily (with meals). 30 Tab 0    Bedside Commode XX R53.1 1 Each 0    albuterol (Ventolin HFA) 90 mcg/actuation inhaler Take 2 Puffs by inhalation two (2) times daily as needed for Wheezing.  furosemide (LASIX) 40 mg tablet Take 40 mg by mouth daily.       gabapentin (NEURONTIN) 600 mg tablet Take 600 mg by mouth three (3) times daily.  isosorbide dinitrate (ISORDIL) 10 mg tablet Take 10 mg by mouth two (2) times a day.  losartan (COZAAR) 25 mg tablet Take 25 mg by mouth daily.  metOLazone (ZAROXOLYN) 5 mg tablet Take 5 mg by mouth daily.  nitroglycerin (NITROSTAT) 0.4 mg SL tablet 0.4 mg by SubLINGual route every five (5) minutes as needed for Chest Pain. Up to 3 doses.  pravastatin (PRAVACHOL) 40 mg tablet Take 40 mg by mouth nightly.  solifenacin (VESICARE) 5 mg tablet Take 5 mg by mouth daily.  theophylline ER,12 hour, (THEOCHRON) 300 mg tablet Take 300 mg by mouth daily.  amLODIPine (NORVASC) 10 mg tablet Take 10 mg by mouth daily.  oxybutynin (DITROPAN) 5 mg tablet Take 5 mg by mouth three (3) times daily.  buPROPion XL (WELLBUTRIN XL) 150 mg tablet Take 150 mg by mouth every morning. Past History     Past Medical History:  Past Medical History:   Diagnosis Date    Congest[facility] heart disease ([facility] [address]     Coronary artery disease     Diabetes ([facility] [address]     Hyperlipidemia     Hypertension     IFTIKHAR (obstructive sleep apnea)        Past Surgical History:  Past Surgical History:   Procedure Laterality Date    HX BREAST BIOPSY      HX CHOLECYSTECTOMY      HX CORONARY ARTERY BYPASS GRAFT      HX HERNIA REPAIR      HX HYSTERECTOMY         Family History:  Family History   Problem Relation Age of Onset    Heart Disease Mother     Kidney Disease Mother     Cancer Maternal Grandmother        Social History:  Social History     Tobacco Use    Smoking status: Former Smoker    Smokeless tobacco: Never Used   Substance Use Topics    Alcohol use: Not Currently     Frequency: Never    Drug use: Never       Allergies: Allergies   Allergen Reactions    Tylox [Oxycodone-Acetaminophen] Hives         Review of Systems   Review of Systems   Constitutional: Negative. Negative for chills, fatigue and fever. HENT: Negative. Negative for congestion, nosebleeds and sore throat. Eyes: Negative. Negative for pain, discharge and visual disturbance. Respiratory: Positive for chest tightness and shortness of breath. Negative for cough. Cardiovascular: Negative for chest pain, palpitations and leg swelling. Gastrointestinal: Negative for abdominal pain, blood in stool, constipation, diarrhea, nausea and vomiting. Endocrine: Negative. Genitourinary: Negative. Negative for difficulty urinating, dysuria, pelvic pain and vaginal bleeding. Musculoskeletal: Negative. Negative for arthralgias, back pain and myalgias. Skin: Negative. Negative for rash and wound. Allergic/Immunologic: Negative. Neurological: Negative. Negative for dizziness, syncope, weakness, numbness and headaches. Hematological: Negative. Psychiatric/Behavioral: Negative. Negative for agitation, confusion and suicidal ideas. All other systems reviewed and are negative. Physical Exam   Physical Exam  Vitals signs and nursing note reviewed. Exam conducted with a chaperone present. Constitutional:       Appearance: Normal appearance. She is normal weight. HENT:      Head: Normocephalic and atraumatic. Nose: Nose normal.      Mouth/Throat:      Mouth: Mucous membranes are moist.      Pharynx: Oropharynx is clear. Eyes:      Extraocular Movements: Extraocular movements intact. Conjunctiva/sclera: Conjunctivae normal.      Pupils: Pupils are equal, round, and reactive to light. Neck:      Musculoskeletal: Normal range of motion and neck supple. Cardiovascular:      Rate and Rhythm: Tachycardia present. Rhythm irregular. Pulses: Normal pulses. Heart sounds: Normal heart sounds. Pulmonary:      Effort: Pulmonary effort is normal. Tachypnea present. No respiratory distress. Breath sounds: Normal breath sounds. Abdominal:      General: Abdomen is flat. Bowel sounds are normal. There is no distension. Palpations: Abdomen is soft. Tenderness: There is no abdominal tenderness. There is no guarding. Musculoskeletal: Normal range of motion. General: No swelling, tenderness, deformity or signs of injury. Right lower leg: No edema. Left lower leg: No edema. Skin:     General: Skin is warm and dry. Capillary Refill: Capillary refill takes less than 2 seconds. Findings: No lesion or rash. Neurological:      General: No focal deficit present. Mental Status: She is alert and oriented to person, place, and time. Mental status is at baseline. Cranial Nerves: No cranial nerve deficit. Psychiatric:         Mood and Affect: Mood normal.         Behavior: Behavior normal.         Thought Content: Thought content normal.         Judgment: Judgment normal.         Diagnostic Study Results     Labs -     Recent Results (from the past 12 hour(s))   EKG, 12 LEAD, INITIAL    Collection Time: 02/22/21  9:23 AM   Result Value Ref Range    Ventricular Rate 120 BPM    Atrial Rate 250 BPM    QRS Duration 106 ms    Q-T Interval 346 ms    QTC Calculation (Bezet) 489 ms    Calculated R Axis 87 degrees    Calculated T Axis 85 degrees    Diagnosis       Atrial flutter with variable A-V block  Confirmed by CELENA Day ([id number]) on 2/22/2021 10:43:07 AM     CBC WITH AUTOMATED DIFF    Collection Time: 02/22/21  9:30 AM   Result Value Ref Range    WBC 8.7 3.6 - 11.0 K/uL    RBC 4.64 3.80 - 5.20 M/uL    HGB 12.6 11.5 - 16.0 g/dL    HCT 43.0 35.0 - 47.0 %    MCV 92.7 80.0 - 99.0 FL    MCH 27.2 26.0 - 34.0 PG    MCHC 29.3 (L) 30.0 - 36.5 g/dL    RDW 16.0 (H) 11.5 - 14.5 %    PLATELET 194 293 - 957 K/uL    MPV 13.0 (H) 8.9 - 12.9 FL    NRBC 0.0 0  WBC    ABSOLUTE NRBC 0.00 0.00 - 0.01 K/uL    NEUTROPHILS 69 32 - 75 %    LYMPHOCYTES 21 12 - 49 %    MONOCYTES 8 5 - 13 %    EOSINOPHILS 2 0 - 7 %    BASOPHILS 0 0 - 1 %    IMMATURE GRANULOCYTES 0 0.0 - 0.5 %    ABS.  NEUTROPHILS 6.0 1.8 - 8.0 K/UL ABS. LYMPHOCYTES 1.9 0.8 - 3.5 K/UL    ABS. MONOCYTES 0.7 0.0 - 1.0 K/UL    ABS. EOSINOPHILS 0.1 0.0 - 0.4 K/UL    ABS. BASOPHILS 0.0 0.0 - 0.1 K/UL    ABS. IMM. GRANS. 0.0 0.00 - 0.04 K/UL    DF AUTOMATED     METABOLIC PANEL, COMPREHENSIVE    Collection Time: 02/22/21  9:30 AM   Result Value Ref Range    Sodium 143 136 - 145 mmol/L    Potassium 4.0 3.5 - 5.1 mmol/L    Chloride 107 97 - 108 mmol/L    CO2 32 21 - 32 mmol/L    Anion gap 4 (L) 5 - 15 mmol/L    Glucose 170 (H) 65 - 100 mg/dL    BUN 20 6 - 20 mg/dL    Creatinine 1.33 (H) 0.55 - 1.02 mg/dL    BUN/Creatinine ratio 15 12 - 20      GFR est AA 48 (L) >60 ml/min/1.73m2    GFR est non-AA 40 (L) >60 ml/min/1.73m2    Calcium 9.1 8.5 - 10.1 mg/dL    Bilirubin, total 0.8 0.2 - 1.0 mg/dL    AST (SGOT) 17 15 - 37 U/L    ALT (SGPT) 24 12 - 78 U/L    Alk. phosphatase 154 (H) 45 - 117 U/L    Protein, total 7.3 6.4 - 8.2 g/dL    Albumin 3.3 (L) 3.5 - 5.0 g/dL    Globulin 4.0 2.0 - 4.0 g/dL    A-G Ratio 0.8 (L) 1.1 - 2.2     TROPONIN I    Collection Time: 02/22/21  9:30 AM   Result Value Ref Range    Troponin-I, Qt. 0.14 (H) <0.05 ng/mL   EKG, 12 LEAD, SUBSEQUENT    Collection Time: 02/22/21  9:41 AM   Result Value Ref Range    Ventricular Rate 98 BPM    Atrial Rate 256 BPM    QRS Duration 94 ms    Q-T Interval 184 ms    QTC Calculation (Bezet) 234 ms    Calculated P Axis 44 degrees    Calculated R Axis 64 degrees    Calculated T Axis 104 degrees    Diagnosis       Atrial flutter with variable A-V block  Confirmed by Naveen Montanez ([id number]) on 2/22/2021 10:43:15 AM         Radiologic Studies -   XR CHEST PORT   Final Result   FINDINGS: IMPRESSION: Frontal single view chest.      Median sternotomy status post CABG. Calcified aorta. Unchanged cardiomegaly   and/or pericardial effusion. The lungs are similarly inflated. Unchanged mild elevation of the right   hemidiaphragm. Minimal hydrostatic edema. No consolidation, effusion, or   pneumothorax.       No free air under the diaphragm. No acute bony findings. CT Results  (Last 48 hours)    None        CXR Results  (Last 48 hours)               02/22/21 1003  XR CHEST PORT Final result    Impression:  FINDINGS: IMPRESSION: Frontal single view chest.       Median sternotomy status post CABG. Calcified aorta. Unchanged cardiomegaly   and/or pericardial effusion. The lungs are similarly inflated. Unchanged mild elevation of the right   hemidiaphragm. Minimal hydrostatic edema. No consolidation, effusion, or   pneumothorax. No free air under the diaphragm. No acute bony findings. Narrative:  STEMI alert. Comparison chest x-ray 11/2/2020. Medical Decision Making and ED Course   I am the first provider for this patient. I reviewed the vital signs, available nursing notes, past medical history, past surgical history, family history and social history. Vital Signs-Reviewed the patient's vital signs. Patient Vitals for the past 12 hrs:   Temp Pulse Resp BP SpO2   02/22/21 1021 -- (!) 114 16 123/67 95 %   02/22/21 0909 97.7 °F (36.5 °C) (!) 117 17 (!) 149/86 96 %       EKG interpretation:   EKG at 923. Atrial flutter with variable block. Rate of 120. ST elevation 2 3 aVF. No reciprocal depression. No T wave noted. Reason rule out dysrhythmia. Interpreted by ER physician. EKG at 941 status post 10 mg diltiazem. Atrial flutter with variable block. Rate of 98. ST elevation 2 3 aVF with no reciprocal depression. Reason rule out dysrhythmia. Interpreted by ER physician. Records Reviewed: Previous Hospital chart. EMS run report      ED Course:   Initial assessment performed. The patients presenting problems have been discussed, and they are in agreement with the care plan formulated and outlined with them. I have encouraged them to ask questions as they arise throughout their visit.     Orders Placed This Encounter    XR CHEST PORT     Standing Status:   Standing Number of Occurrences:   1     Order Specific Question:   Reason for Exam     Answer:   stemi    CBC WITH AUTOMATED DIFF     Standing Status:   Standing     Number of Occurrences:   1    METABOLIC PANEL, COMPREHENSIVE     Standing Status:   Standing     Number of Occurrences:   1    TROPONIN I     Standing Status:   Standing     Number of Occurrences:   1    BNP     Standing Status:   Standing     Number of Occurrences:   1    THEOPHYLLINE     Standing Status:   Standing     Number of Occurrences:   1    EKG, 12 LEAD, INITIAL     Standing Status:   Standing     Number of Occurrences:   1     Order Specific Question:   Reason for Exam:     Answer:   cbc    EKG, 12 LEAD, SUBSEQUENT     Standing Status:   Standing     Number of Occurrences:   1     Order Specific Question:   Reason for Exam:     Answer:   stemi    ticagrelor (BRILINTA) 90 mg tablet     [place]: cabinet override    aspirin 325 mg tablet     [place]: cabinet override    dilTIAZem (CARDIZEM) 5 mg/mL injection     [place]: cabinet override    ticagrelor (BRILINTA) tablet 180 mg    aspirin tablet 325 mg    dilTIAZem (CARDIZEM) injection 10 mg    furosemide (LASIX) injection 40 mg    furosemide (LASIX) 10 mg/mL injection     Molly Sheldon: cabinet override    dilTIAZem (CARDIZEM) 125 mg/125 mL (1 mg/mL) dextrose 5% infusion     Order Specific Question:   Titrate Infusion? Answer:   Yes     Order Specific Question:   Initial Infusion Rate: Answer:   2.5 mg/hr     Order Specific Question:   Titrate Every 30 Minutes in Increments of     Answer:   5 mg/hr     Order Specific Question:   Goal of Therapy is:      Answer:   HR less than 100 bpm     Order Specific Question:   Contact Physician for     Answer:   SBP less than 90 mmHg     Order Specific Question:   HOLD for     Answer:   HR less than 60 bpm    carvediloL (COREG) tablet 25 mg    isosorbide dinitrate (ISORDIL) tablet 10 mg    clopidogreL (PLAVIX) tablet 75 mg    apixaban (ELIQUIS) tablet 5 mg              CONSULTANTS:  Consults  Dr Cruzito Smith admit    Provider Notes (Medical Decision Making):   71-year-old female initially a STEMI alert canceled by cardiology at the bedside concern for flutter. Plan to slow the rate alone. Chemical cardioversion. Admission. Patient remains improved yet still symptomatic. Unable to tolerate a CTA scan. Hospitalist admitting doctor is aware and will defer. Procedures                 CRITICAL CARE NOTE :  10:54 AM  Amount of Critical Care Time: 35 minutes    IMPENDING DETERIORATION -Airway, Respiratory and Cardiovascular  ASSOCIATED RISK FACTORS - Hypotension  MANAGEMENT- Bedside Assessment  INTERPRETATION -  Xrays  INTERVENTIONS - hemodynamic mngmt  CASE REVIEW - Hospitalist/Intensivist  TREATMENT RESPONSE -Improved  PERFORMED BY - Self    NOTES   :  I have spent critical care time involved in lab review, consultations with specialist, family decision- making, bedside attention and documentation. This time excludes time spent in any separate billed procedures. During this entire length of time I was immediately available to the patient . Dontrell Otto MD          Disposition       Emergency Department Disposition:  Admitted      Diagnosis     Clinical Impression:   1. Elevated troponin    2. Atrial flutter, unspecified type [facility])        Attestations:    Dontrell Otto MD    Please note that this dictation was completed with [employer], the computer voice recognition software. Quite often unanticipated grammatical, syntax, homophones, and other interpretive errors are inadvertently transcribed by the computer software. Please disregard these errors. Please excuse any errors that have escaped final proofreading. Thank you.

## 2021-02-22 NOTE — PROGRESS NOTES
2/22/21. CM spoke with pt. PCP is Dr. Aury Whitfield. Pt lives @ home with daughter Julia Dietrich @ [phone]). Daughter will transport home upon discharge. Ambulatory with walker. Choice Letter signed for home health. Referral sent via [employer].

## 2021-02-22 NOTE — Clinical Note
Status[de-identified] INPATIENT [101]   Type of Bed: Telemetry [19]   Inpatient Hospitalization Certified Necessary for the Following Reasons: 3. Patient receiving treatment that can only be provided in an inpatient setting (further clarification in H&P documentation)   Admitting Diagnosis: Atrial flutter ([facility] [address] [427.32. ICD-9-CM]   Admitting Diagnosis: CHF (congestive heart failure) [facility]) [055407]   Admitting Physician: Meli Sullivan [[id number]]   Attending Physician: Meli Sullivan [[id number]]   Estimated Length of Stay: 3-4 Midnights   Discharge Plan[de-identified] Home with Office Follow-up

## 2021-02-22 NOTE — PROGRESS NOTES
Visited patient in ED unit for code STEMI.  Staff were providing care for the patient. I provided silents support and prayer along with staff support.  No family members were present during visit.  Chaplains will follow up if further referrals are requested.    Chaplain Eris Diop M.Div.   can be reached by calling the  at [facility]  [phone]

## 2021-02-22 NOTE — CONSULTS
PULMONARY CONSULT  [facility]    Name: Boogie Aguilar MRN: [id number]   : 1950 Hospital: [address] [place] [address]   Date: 2021  Admission date: 2021 Hospital Day: 1       HPI:     Hospital Problems  Date Reviewed: 2020          Codes Class Noted POA    Atrial flutter ([facility] ICD-10-CM: H74.03  ICD-9-CM: 427.32  2021 Unknown        CHF (congestive heart failure) ([facility] ICD-10-CM: I50.9  ICD-9-CM: 428.0  2021 Unknown                   [x] High complexity decision making was performed  [x] See my orders for details      Subjective/Initial History:     I was asked by Elise Dumont MD to see Boogie Aguilar  a 72 y.o.  female in consultation     Excerpts from admission 2021 or consult notes as follows:   71-year-old lady came in because of shortness of breath and dyspnea and heart rate was around 110 220.   Significant past medical history of COPD obstructive sleep apnea coronary disease CABG x2 atrial flutter HF PEF she continues to have shortness of breath for the past couple of days and then she was having palpitation with heart rate was around 120 came to emergency room as an STEMI alert which was canceled her BNP is elevated so right now she is on oxygen nasal cannula she wears oxygen at home and CPAP at night so admitted and pulmonary consult was called for further evaluation      Allergies   Allergen Reactions    Tylox [Oxycodone-Acetaminophen] Hives        MAR reviewed and pertinent medications noted or modified as needed     Current Facility-Administered Medications   Medication    furosemide (LASIX) injection 40 mg    dilTIAZem (CARDIZEM) 125 mg/125 mL (1 mg/mL) dextrose 5% infusion    carvediloL (COREG) tablet 25 mg    isosorbide dinitrate (ISORDIL) tablet 10 mg    clopidogreL (PLAVIX) tablet 75 mg    apixaban (ELIQUIS) tablet 5 mg    morphine injection 4 mg    [START ON 2021] buPROPion XL (WELLBUTRIN XL) tablet 150 mg    [facility] TO SUBSTITUTE PER PROTOCOL (Reordered from: gabapentin (NEURONTIN) 600 mg tablet)    oxybutynin (DITROPAN) tablet 5 mg    pravastatin (PRAVACHOL) tablet 40 mg    [START ON 2/23/2021] theophylline ER(12 hour) (THEOCHRON) tablet 300 mg    polyethylene glycol (MIRALAX) packet 17 g    ondansetron (ZOFRAN ODT) tablet 4 mg    Or    ondansetron (ZOFRAN) injection 4 mg    furosemide (LASIX) injection 40 mg    methylPREDNISolone (PF) (SOLU-MEDROL) injection 40 mg     Current Outpatient Medications   Medication Sig    carvediloL (COREG) 25 mg tablet Take 1 Tab by mouth two (2) times daily (with meals).  albuterol (Ventolin HFA) 90 mcg/actuation inhaler Take 2 Puffs by inhalation two (2) times daily as needed for Wheezing.  furosemide (LASIX) 40 mg tablet Take 40 mg by mouth every evening.  gabapentin (NEURONTIN) 600 mg tablet Take 600 mg by mouth three (3) times daily.  isosorbide dinitrate (ISORDIL) 10 mg tablet Take 10 mg by mouth two (2) times a day.  losartan (COZAAR) 25 mg tablet Take 25 mg by mouth daily.  metOLazone (ZAROXOLYN) 5 mg tablet Take 5 mg by mouth daily.  nitroglycerin (NITROSTAT) 0.4 mg SL tablet 0.4 mg by SubLINGual route every five (5) minutes as needed for Chest Pain. Up to 3 doses.  pravastatin (PRAVACHOL) 40 mg tablet Take 40 mg by mouth nightly.  theophylline ER,12 hour, (THEOCHRON) 300 mg tablet Take 300 mg by mouth daily.  amLODIPine (NORVASC) 10 mg tablet Take 10 mg by mouth daily.  oxybutynin (DITROPAN) 5 mg tablet Take 5 mg by mouth three (3) times daily.  buPROPion XL (WELLBUTRIN XL) 150 mg tablet Take 150 mg by mouth every morning. Patient PCP: Perez Atkinson MD  PMH:  has a past medical history of Congestive heart disease ([address] Coronary artery disease, Diabetes ([address] Hyperlipidemia, Hypertension, and IFTIKHAR (obstructive sleep apnea).   PSH:   has a past surgical history that includes hx cholecystectomy; hx hysterectomy; hx hernia repair; hx coronary artery bypass graft; and hx breast biopsy. FHX: family history includes Cancer in her maternal grandmother; Heart Disease in her mother; Kidney Disease in her mother. SHX:  reports that she has quit smoking. She has never used smokeless tobacco. She reports previous alcohol use. She reports that she does not use drugs. ROS:    Review of Systems   Constitutional: Positive for malaise/fatigue. HENT: Negative. Eyes: Negative. Respiratory: Positive for shortness of breath. Cardiovascular: Positive for palpitations. Gastrointestinal: Negative. Genitourinary: Negative. Musculoskeletal: Negative. Skin: Negative. Endo/Heme/Allergies: Negative. Psychiatric/Behavioral: Negative. Objective:     Vital Signs: Telemetry:    AFIB Intake/Output:   Visit Vitals  /71   Pulse (!) 118   Temp 97.7 °F (36.5 °C)   Resp 18   Ht 5' 3\" (1.6 m)   Wt 106.1 kg (234 lb)   SpO2 97%   BMI 41.45 kg/m²       Temp (24hrs), Av.7 °F (36.5 °C), Min:97.7 °F (36.5 °C), Max:97.7 °F (36.5 °C)        O2 Device: Nasal cannula O2 Flow Rate (L/min): 3 l/min       Wt Readings from Last 4 Encounters:   21 106.1 kg (234 lb)   20 158.7 kg (349 lb 13.9 oz)          Intake/Output Summary (Last 24 hours) at 2021 1713  Last data filed at 2021 1448  Gross per 24 hour   Intake --   Output 2200 ml   Net -2200 ml       Last shift:       0701 -  1900  In: -   Out: 2200 [Urine:2200]  Last 3 shifts: No intake/output data recorded. Physical Exam:     Physical Exam   Constitutional: She appears distressed. HENT:   Head: Normocephalic and atraumatic. Eyes: Pupils are equal, round, and reactive to light. Conjunctivae and EOM are normal.   Neck: Normal range of motion. Neck supple. Cardiovascular:   Irregular heart rate   Pulmonary/Chest: She is in respiratory distress. She has rales. Abdominal: Soft.  Bowel sounds are normal.   Musculoskeletal: Normal range of motion. Neurological: She is alert. Skin: Skin is warm. Psychiatric: She has a normal mood and affect. Labs:    Recent Labs     02/22/21  0930   WBC 8.7   HGB 12.6        Recent Labs     02/22/21  0930      K 4.0      CO2 32   *   BUN 20   CREA 1.33*   CA 9.1   ALB 3.3*   ALT 24     No results for input(s): PH, PCO2, PO2, HCO3, FIO2 in the last 72 hours. Recent Labs     02/22/21  1245 02/22/21  0930   TROIQ 0.14* 0.14*     No results found for: BNPP, BNP   No results found for: CULTNo results found for: TSH, TSHEXT    Imaging:    CXR Results  (Last 48 hours)               02/22/21 1003  XR CHEST PORT Final result    Impression:  FINDINGS: IMPRESSION: Frontal single view chest.       Median sternotomy status post CABG. Calcified aorta. Unchanged cardiomegaly   and/or pericardial effusion. The lungs are similarly inflated. Unchanged mild elevation of the right   hemidiaphragm. Minimal hydrostatic edema. No consolidation, effusion, or   pneumothorax. No free air under the diaphragm. No acute bony findings. Narrative:  STEMI alert. Comparison chest x-ray 11/2/2020. Results from [facility] encounter on 02/22/21   XR CHEST PORT    Narrative STEMI alert. Comparison chest x-ray 11/2/2020. Impression FINDINGS: IMPRESSION: Frontal single view chest.    Median sternotomy status post CABG. Calcified aorta. Unchanged cardiomegaly  and/or pericardial effusion. The lungs are similarly inflated. Unchanged mild elevation of the right  hemidiaphragm. Minimal hydrostatic edema. No consolidation, effusion, or  pneumothorax. No free air under the diaphragm. No acute bony findings. Results from [facility] encounter on 11/02/20   XR CHEST PORT    Narrative AP portable chest, 1859 hours. Comparison: 5/11/2020. Findings: Cardiac monitoring leads overlie the chest. The patient is status post  median sternotomy.  There is moderate cardiomegaly. There is moderate calcified  atherosclerotic disease within the thoracic aorta. The sameer and pulmonary  vasculature are unremarkable. The lungs are well expanded without focal  consolidation, pleural effusion or pneumothorax. There are senescent changes  within the spine. Impression Impression:   No acute cardiopulmonary process. Results from [facility] encounter on 11/02/20   CTA CHEST W OR W WO CONT    Narrative Study: CTA Chest.    History: Rule out PE. Comparison: Chest CT 5/6/2020. Chest x-ray 11 2020. Technique: CT volume acquisition of the chest was performed during the pulmonary  arterial phase following the administration of 100 mL of Isovue-370 IV contrast.  Axial, sagittal, coronal, and MIP reconstructions were reviewed. Dose reduction:  All CT scans at this facility are performed using dose reduction optimization  techniques as appropriate to a performed exam including the following: Automated  exposure control, adjustments of the mA and/or kV according to patient size, or  use of iterative reconstruction technique. Findings:    Vessels: No evidence of pulmonary was within limitations. The distal  subsegmental branches in the lower lobes are not well evaluated due to  respiratory motion artifact. Lungs/Pleura: Mild dependent bibasilar atelectasis. No consolidative airspace  disease, pleural effusion or pneumothorax. Central airways are clear of debris. No discrete lung nodule. Mediastinum/Cardiac: Heart is enlarged. Severe coronary atherosclerosis. Status  post CABG. Moderate aortic atherosclerosis. Normal caliber main pulmonary artery  and thoracic aorta. No pericardial effusion. Under distended thoracic esophagus. Thyroid: Visualized thyroid is grossly unremarkable. Lymph nodes: No intrathoracic lymphadenopathy by CT size criteria. Upper Abdomen: Visualized upper abdominal visceral structures are grossly  unremarkable.     Bones/Chest wall soft tissues: Mild degenerative changes of the visualized  spine. No acute osseous abnormality identified. Impression Impression:  1. No evidence of central pulmonary embolism. Limited evaluation of the distal  subsegmental branch vessels due to respiratory motion artifact. 2.  No acute intrathoracic abnormality identified. 3.  Severe atherosclerosis. Status post CABG. Cardiomegaly. 4.  See full report for detailed findings. IMPRESSION:   1. Acute on chronic hypoxic respiratory failure  2. Chronic Obstructive Pulmonary Disease with Severe Acute Exacerbation requiring inpatient hospitalization and management; has very poor airway clearance. Increased work of breathing  3. Body mass index is 41.45 kg/m². 4. Acute on chronic HFpEF  5. Atrial fibrillation flutter RVR  6. Struct of sleep apnea syndrome  7. History of coronary artery disease history of CABG in the past  8. Pt is requiring Drug therapy requiring intensive monitoring for toxicity  9. Pt is unstable, unpredictable needing inpatient monitoring; is acutely ill and at high risk of sudden decline and decompensation with severe consequenses and continued end organ dysfunction and failure  10. Prognosis guarded       RECOMMENDATIONS/PLAN:     1. She is on nasal Cannula oxygen as salvage oxygen delivery device to provide high concentration of oxygen to overcome refractory hypoxia;  2. Start patient on IV Solu-Medrol nebulizer treatment she is already on Eliquis  3. She is on CPAP at home at night and during the daytime she is on nasal cannula 2 L  4. Her ejection fraction is about 50% patient was started on IV Cardizem for atrial flutter continue with beta-blockers  5. Intubate and place on vent if NIV fails  6. Agree with Empiric IV antibiotics pending culture results   7. Follow culture results  8. Supplemental O2 to keep sats > 93%  9. Aspiration precautions  10.  Labs to follow electrolytes, renal function and and blood counts  11. Glucose monitoring and SSI  12. Bronchial hygiene with respiratory therapy techniques, bronchodilators  13. DVT, SUP prophylaxis       This care involved high complexity medical decision making: I personally:  · Reviewed the flowsheet and previous days notes  · Reviewed and summarized records or history from previous days note or discussions with staff, family  · High Risk Drug therapy requiring intensive monitoring for toxicity: eg steroids, pressors, antibiotics  · Reviewed and/or ordered Clinical lab tests  · Reviewed images and/or ordered Radiology tests  · Reviewed the patients ECG / Telemetry  · Reviewed and/or adjusted NiPPV settings  · Called and arranged for Radiologic procedures or interventions  · performed or ordered Diagnostic endoscopies with identified risk factors.   · discussed my assessment/management with : Nursing, Hospitalist and Family for coordination of care          Joan Angela MD

## 2021-02-23 ENCOUNTER — APPOINTMENT (OUTPATIENT)
Dept: ULTRASOUND IMAGING | Age: 71
DRG: 280 | End: 2021-02-23
Attending: INTERNAL MEDICINE
Payer: MEDICARE

## 2021-02-23 ENCOUNTER — APPOINTMENT (OUTPATIENT)
Dept: GENERAL RADIOLOGY | Age: 71
DRG: 280 | End: 2021-02-23
Attending: INTERNAL MEDICINE
Payer: MEDICARE

## 2021-02-23 LAB
ALBUMIN SERPL-MCNC: 3.2 G/DL (ref 3.5–5)
ALBUMIN/GLOB SERPL: 0.8 {RATIO} (ref 1.1–2.2)
ALP SERPL-CCNC: 151 U/L (ref 45–117)
ALT SERPL-CCNC: 22 U/L (ref 12–78)
AMYLASE SERPL-CCNC: 34 U/L (ref 25–115)
ANION GAP SERPL CALC-SCNC: 6 MMOL/L (ref 5–15)
AST SERPL W P-5'-P-CCNC: 14 U/L (ref 15–37)
BASOPHILS # BLD: 0 K/UL (ref 0–0.1)
BASOPHILS NFR BLD: 0 % (ref 0–1)
BILIRUB SERPL-MCNC: 1.2 MG/DL (ref 0.2–1)
BNP SERPL-MCNC: 1264 PG/ML
BUN SERPL-MCNC: 25 MG/DL (ref 6–20)
BUN/CREAT SERPL: 15 (ref 12–20)
CA-I BLD-MCNC: 9.4 MG/DL (ref 8.5–10.1)
CHLORIDE SERPL-SCNC: 102 MMOL/L (ref 97–108)
CO2 SERPL-SCNC: 31 MMOL/L (ref 21–32)
CREAT SERPL-MCNC: 1.64 MG/DL (ref 0.55–1.02)
DATE LAST DOSE: ABNORMAL
DIFFERENTIAL METHOD BLD: ABNORMAL
EOSINOPHIL # BLD: 0 K/UL (ref 0–0.4)
EOSINOPHIL NFR BLD: 0 % (ref 0–7)
ERYTHROCYTE [DISTWIDTH] IN BLOOD BY AUTOMATED COUNT: 16 % (ref 11.5–14.5)
GLOBULIN SER CALC-MCNC: 4.1 G/DL (ref 2–4)
GLUCOSE SERPL-MCNC: 237 MG/DL (ref 65–100)
HCT VFR BLD AUTO: 41.9 % (ref 35–47)
HGB BLD-MCNC: 12.6 G/DL (ref 11.5–16)
IMM GRANULOCYTES # BLD AUTO: 0 K/UL (ref 0–0.04)
IMM GRANULOCYTES NFR BLD AUTO: 0 % (ref 0–0.5)
LIPASE SERPL-CCNC: 84 U/L (ref 73–393)
LYMPHOCYTES # BLD: 0.6 K/UL (ref 0.8–3.5)
LYMPHOCYTES NFR BLD: 5 % (ref 12–49)
MCH RBC QN AUTO: 27.5 PG (ref 26–34)
MCHC RBC AUTO-ENTMCNC: 30.1 G/DL (ref 30–36.5)
MCV RBC AUTO: 91.3 FL (ref 80–99)
MONOCYTES # BLD: 0 K/UL (ref 0–1)
MONOCYTES NFR BLD: 0 % (ref 5–13)
NEUTS SEG # BLD: 10.7 K/UL (ref 1.8–8)
NEUTS SEG NFR BLD: 95 % (ref 32–75)
PLATELET # BLD AUTO: 230 K/UL (ref 150–400)
PMV BLD AUTO: 13.3 FL (ref 8.9–12.9)
POTASSIUM SERPL-SCNC: 4.5 MMOL/L (ref 3.5–5.1)
PROT SERPL-MCNC: 7.3 G/DL (ref 6.4–8.2)
RBC # BLD AUTO: 4.59 M/UL (ref 3.8–5.2)
REPORTED DOSE,DOSE: ABNORMAL UNITS
SODIUM SERPL-SCNC: 139 MMOL/L (ref 136–145)
THEOPHYLLINE SERPL-MCNC: 8.6 UG/ML (ref 10–20)
WBC # BLD AUTO: 11.4 K/UL (ref 3.6–11)

## 2021-02-23 PROCEDURE — 82150 ASSAY OF AMYLASE: CPT

## 2021-02-23 PROCEDURE — 74011250636 HC RX REV CODE- 250/636: Performed by: INTERNAL MEDICINE

## 2021-02-23 PROCEDURE — 36415 COLL VENOUS BLD VENIPUNCTURE: CPT

## 2021-02-23 PROCEDURE — 76700 US EXAM ABDOM COMPLETE: CPT

## 2021-02-23 PROCEDURE — 74011250637 HC RX REV CODE- 250/637: Performed by: FAMILY MEDICINE

## 2021-02-23 PROCEDURE — 83880 ASSAY OF NATRIURETIC PEPTIDE: CPT

## 2021-02-23 PROCEDURE — 85025 COMPLETE CBC W/AUTO DIFF WBC: CPT

## 2021-02-23 PROCEDURE — 97530 THERAPEUTIC ACTIVITIES: CPT

## 2021-02-23 PROCEDURE — 65270000029 HC RM PRIVATE

## 2021-02-23 PROCEDURE — 97165 OT EVAL LOW COMPLEX 30 MIN: CPT

## 2021-02-23 PROCEDURE — 83690 ASSAY OF LIPASE: CPT

## 2021-02-23 PROCEDURE — 74011250636 HC RX REV CODE- 250/636: Performed by: FAMILY MEDICINE

## 2021-02-23 PROCEDURE — 74018 RADEX ABDOMEN 1 VIEW: CPT

## 2021-02-23 PROCEDURE — 97161 PT EVAL LOW COMPLEX 20 MIN: CPT

## 2021-02-23 PROCEDURE — 80053 COMPREHEN METABOLIC PANEL: CPT

## 2021-02-23 RX ORDER — SORBITOL SOLUTION 70 %
30 SOLUTION, ORAL MISCELLANEOUS
Status: COMPLETED | OUTPATIENT
Start: 2021-02-23 | End: 2021-02-23

## 2021-02-23 RX ORDER — FENTANYL CITRATE 50 UG/ML
25 INJECTION, SOLUTION INTRAMUSCULAR; INTRAVENOUS ONCE
Status: COMPLETED | OUTPATIENT
Start: 2021-02-23 | End: 2021-02-23

## 2021-02-23 RX ORDER — TRAMADOL HYDROCHLORIDE 50 MG/1
50 TABLET ORAL ONCE
Status: COMPLETED | OUTPATIENT
Start: 2021-02-23 | End: 2021-02-24

## 2021-02-23 RX ORDER — DILTIAZEM HYDROCHLORIDE 120 MG/1
120 CAPSULE, COATED, EXTENDED RELEASE ORAL DAILY
Status: DISCONTINUED | OUTPATIENT
Start: 2021-02-24 | End: 2021-02-25

## 2021-02-23 RX ORDER — SORBITOL SOLUTION 70 %
30 SOLUTION, ORAL MISCELLANEOUS DAILY PRN
Status: DISCONTINUED | OUTPATIENT
Start: 2021-02-23 | End: 2021-02-26 | Stop reason: HOSPADM

## 2021-02-23 RX ADMIN — ISOSORBIDE DINITRATE 10 MG: 10 TABLET ORAL at 09:47

## 2021-02-23 RX ADMIN — CLOPIDOGREL BISULFATE 75 MG: 75 TABLET ORAL at 09:47

## 2021-02-23 RX ADMIN — METHYLPREDNISOLONE SODIUM SUCCINATE 40 MG: 40 INJECTION, POWDER, FOR SOLUTION INTRAMUSCULAR; INTRAVENOUS at 17:44

## 2021-02-23 RX ADMIN — FUROSEMIDE 40 MG: 10 INJECTION, SOLUTION INTRAMUSCULAR; INTRAVENOUS at 09:47

## 2021-02-23 RX ADMIN — CARVEDILOL 25 MG: 12.5 TABLET, FILM COATED ORAL at 17:44

## 2021-02-23 RX ADMIN — GABAPENTIN 600 MG: 300 CAPSULE ORAL at 20:43

## 2021-02-23 RX ADMIN — PRAVASTATIN SODIUM 40 MG: 40 TABLET ORAL at 20:43

## 2021-02-23 RX ADMIN — GABAPENTIN 600 MG: 300 CAPSULE ORAL at 09:47

## 2021-02-23 RX ADMIN — THEOPHYLLINE ANHYDROUS 600 MG: 400 CAPSULE, EXTENDED RELEASE ORAL at 09:47

## 2021-02-23 RX ADMIN — OXYBUTYNIN CHLORIDE 5 MG: 5 TABLET ORAL at 20:43

## 2021-02-23 RX ADMIN — METHYLPREDNISOLONE SODIUM SUCCINATE 40 MG: 40 INJECTION, POWDER, FOR SOLUTION INTRAMUSCULAR; INTRAVENOUS at 12:49

## 2021-02-23 RX ADMIN — FENTANYL CITRATE 25 MCG: 50 INJECTION INTRAMUSCULAR; INTRAVENOUS at 12:49

## 2021-02-23 RX ADMIN — OXYBUTYNIN CHLORIDE 5 MG: 5 TABLET ORAL at 17:44

## 2021-02-23 RX ADMIN — BUPROPION HYDROCHLORIDE 150 MG: 150 TABLET, FILM COATED, EXTENDED RELEASE ORAL at 06:24

## 2021-02-23 RX ADMIN — CARVEDILOL 25 MG: 12.5 TABLET, FILM COATED ORAL at 09:47

## 2021-02-23 RX ADMIN — GABAPENTIN 600 MG: 300 CAPSULE ORAL at 17:44

## 2021-02-23 RX ADMIN — APIXABAN 5 MG: 5 TABLET, FILM COATED ORAL at 09:47

## 2021-02-23 RX ADMIN — METHYLPREDNISOLONE SODIUM SUCCINATE 40 MG: 40 INJECTION, POWDER, FOR SOLUTION INTRAMUSCULAR; INTRAVENOUS at 05:37

## 2021-02-23 RX ADMIN — OXYBUTYNIN CHLORIDE 5 MG: 5 TABLET ORAL at 09:47

## 2021-02-23 RX ADMIN — SORBITOL SOLUTION (BULK) 30 ML: 70 SOLUTION at 10:53

## 2021-02-23 NOTE — PROGRESS NOTES
Dr. Cheyanne Gustafson called, nursing reported patient's Lab results and present condition. Ordered U/S of abdomen, will see patient tomorrow.

## 2021-02-23 NOTE — PROGRESS NOTES
Problem: Mobility Impaired (Adult and Pediatric)  Goal: *Acute Goals and Plan of Care (Insert Text)  Description: I with LE HEP x7 days  Supine to sit EOB with CGAx1 x7 days  Sit to stand with CGAx1 x7 days  Amb 15-25ft with RW and min Ax1 x7 days  Outcome: Not Met    PHYSICAL THERAPY EVALUATION  Patient: Viktoriya Emanuel (79 y.o. female)  Date: 2/23/2021  Primary Diagnosis: Atrial flutter ([facility] [I48.92]  CHF (congestive heart failure) ([facility] [address] [I50.9]        Precautions:       ASSESSMENT  70yo F admitted to hospital with chest pain, acute COPD exac presents to PT with decreased bed mob, transfers, LE strength, activity tolerance, gt, and overall functional mobility. PMH listed below. Pt lives with dtr in 1 story home with 4-5 steps to enter home with rails. PTA pt req A with ADLs and pt amb with RW. Pt currently, min/mod A with supine to sit EOB, mod A with sit to stand x 2 trials. Pt's LEs trembling upon standing during both trials, pt able to take a few steps for pivot transfer but declined to sit in chair and wanted to get back in bed. Pt able to take 3 side steps up to [facility] before needing to sit back down. Pt req mod A to return to supine in bed. Pt on 3 L O2 and slightly SOB during tx today, pt states she has been having abdominal pain. PT notified nursing. Pt may benefit from skilled PT to address her functional deficits. Recommend HHPT at this time as pt states dtr is able to help her some, but dtr also works so is not home all of the time. PLAN :  Recommendations and Planned Interventions: bed mobility training, transfer training, gait training, therapeutic exercises, patient and family training/education, and therapeutic activities      Frequency/Duration: Patient will be followed by physical therapy:  4 times a week to address goals.     Recommendation for discharge: (in order for the patient to meet his/her long term goals)  HHPT             SUBJECTIVE:   Patient supine in bed upon PT arrival, agreeable to work with PT.      OBJECTIVE DATA SUMMARY:   HISTORY:    Past Medical History:   Diagnosis Date    Congestive heart disease ([address]     Coronary artery disease     Diabetes (HCC)     Hyperlipidemia     Hypertension     IFTIKHAR (obstructive sleep apnea)      Past Surgical History:   Procedure Laterality Date    HX BREAST BIOPSY      HX CHOLECYSTECTOMY      HX CORONARY ARTERY BYPASS GRAFT      HX HERNIA REPAIR      HX HYSTERECTOMY         Personal factors and/or comorbidities impacting plan of care:     Home Situation  Home Environment: Private residence  # Steps to Enter: 4  Rails to Enter: Yes  Hand Rails : Bilateral  One/Two Story Residence: One story  Living Alone: No  Patient Expects to be Discharged to[de-identified] Private residence  Current DME Used/Available at Home: Walker, rolling        EXAMINATION/PRESENTATION/DECISION MAKING:   Critical Behavior:  Neurologic State: Alert  Orientation Level: Oriented X4  Cognition: Appropriate decision making       Range Of Motion:  AROM: Within functional limits B LE       Strength:    Strength: Generally decreased, functional  B LE grossly 3+/5    Tone & Sensation:     Sensation: Intact         Functional Mobility:  Bed Mobility:     Supine to Sit: Minimum assistance;Assist x1  Sit to Supine: Moderate assistance;Assist x1     Transfers:  Sit to Stand: Moderate assistance;Assist x1  Stand to Sit: Minimum assistance;Assist x1  Stand Pivot Transfers: Moderate assistance;Assist x1                Ambulation/Gait Training:     Pt able to take 3 side steps up to [facility], LEs trembling at bedside during both standing trials, deferred amb away from bed at this time, may do better with chair follow from nsg/PCT next visit if pt is able to ambulate further         Functional Measure:    [facility] Form  How much difficulty does the patient currently have. .. Unable A Lot A Little None   1.   Turning over in bed (including adjusting bedclothes, sheets and blankets)? [] 1   [] 2   [x] 3   [] 4   2. Sitting down on and standing up from a chair with arms ( e.g., wheelchair, bedside commode, etc.)   [] 1   [] 2   [x] 3   [] 4   3. Moving from lying on back to sitting on the side of the bed? [] 1   [] 2   [x] 3   [] 4          How much help from another person does the patient currently need. .. Total A Lot A Little None   4. Moving to and from a bed to a chair (including a wheelchair)? [] 1   [] 2   [x] 3   [] 4   5. Need to walk in hospital room? [] 1   [] 2   [x] 3   [] 4   6. Climbing 3-5 steps with a railing? [] 1   [x] 2   [] 3   [] 4   © 2007, Trustees of [address] [place] [address], under license to [employer]. All rights reserved     Score:  Initial: 17 Most Recent: X (Date: -- )   Interpretation of Tool:  Represents activities that are increasingly more difficult (i.e. Bed mobility, Transfers, Gait). Score 24 23 22-20 19-15 14-10 9-7 6   Modifier CH CI CJ CK CL CM CN          Physical Therapy Evaluation Charge Determination   History Examination Presentation Decision-Making   MEDIUM  Complexity : 1-2 comorbidities / personal factors will impact the outcome/ POC  MEDIUM Complexity : 3 Standardized tests and measures addressing body structure, function, activity limitation and / or participation in recreation  LOW Complexity : Stable, uncomplicated  Other Functional Measure [facility] 6 MED      Based on the above components, the patient evaluation is determined to be of the following complexity level: LOW     Pain Rating:  Pt c/o abdominal pain, stated she has tried having BM but was unsuccessful    Activity Tolerance:   Fair  Please refer to the flowsheet for vital signs taken during this treatment. After treatment patient left in no apparent distress:   Supine in bed and Call bell within reach    COMMUNICATION/EDUCATION:   The patients plan of care was discussed with: Registered nurse.      Patient/family have participated as able in goal setting and plan of care.     Thank you for this referral.  Olivia Kimbrough   Time Calculation: 18 mins

## 2021-02-23 NOTE — PROGRESS NOTES
Progress Note      2/23/2021 12:24 PM  NAME: Jodie Garrison   MRN:  [id number]   Admit Diagnosis: Atrial flutter ([facility] [I48.92]  CHF (congestive heart failure) [facility] [I50.9]      Problem List:   Abdominal pain  Acute non-Q wave MI  Atrial flutter with controlled heart rate  Shortness of breath related to severe COPD exacerbation  CHF related to diastolic dysfunction  CAD status post CABG  COPD     Assessment/Plan:   Wean off IV Cardizem drip  Start on p.o. Cardizem  Hold IV lasix due to azotemia  Hold Eliquis in preparation for cardiac catheterization Friday  GI evaluation for abdominal pain         []       High complexity decision making was performed in this patient at high risk for decompensation with multiple organ involvement. Subjective:     Jodie Garrison denies chest pain, mild dyspnea. Reports right lower quadrant abdominal discomfort constipation. Discussed with RN events overnight. Review of Systems:   Negative except for as noted above. Objective:      Physical Exam:    Last 24hrs VS reviewed since prior progress note. Most recent are:    Visit Vitals  BP (!) 171/126   Pulse 90   Temp 98.2 °F (36.8 °C)   Resp 18   Ht 5' 3\" (1.6 m)   Wt 109 kg (240 lb 4.8 oz)   SpO2 91%   BMI 42.57 kg/m²       Intake/Output Summary (Last 24 hours) at 2/23/2021 1224  Last data filed at 2/23/2021 0900  Gross per 24 hour   Intake 603.09 ml   Output 3700 ml   Net -3096.91 ml        General Appearance: Alert; no acute distress. Ears/Nose/Mouth/Throat: moist mucous membranes  Neck: Supple. Chest: Lungs clear to auscultation bilaterally. Cardiovascular: Regular rate and rhythm, S1S2 normal, no murmur. Abdomen: Soft, + RLQ tenderness, bowel sounds are active. Extremities: No edema bilaterally. Skin: Warm and dry. []         Post-cath site without hematoma, bruit, tenderness, or thrill. Distal pulses intact.     PMH/SH reviewed - no change compared to H&P        Telemetry: Atrial flutter    EKG: Atrial flutter with heart rate in the 80s and 90s    []  No new EKG for review    Lab Data Personally Reviewed:    Recent Labs     02/23/21  0600 02/22/21  0930   WBC 11.4* 8.7   HGB 12.6 12.6   HCT 41.9 43.0    192     No results for input(s): INR, PTP, APTT, INREXT in the last 72 hours. Recent Labs     02/23/21  0600 02/22/21  0930    143   K 4.5 4.0    107   CO2 31 32   BUN 25* 20   CREA 1.64* 1.33*   * 170*   CA 9.4 9.1     Recent Labs     02/22/21  1700 02/22/21  1245 02/22/21  0930   TROIQ 0.12* 0.14* 0.14*     No results found for: CHOL, CHOLX, CHLST, CHOLV, HDL, HDLP, LDL, LDLC, DLDLP, TGLX, TRIGL, TRIGP, CHHD, CHHDX    Recent Labs     02/23/21  0600 02/22/21  0930   * 154*   TP 7.3 7.3   ALB 3.2* 3.3*   GLOB 4.1* 4.0     No results for input(s): PH, PCO2, PO2 in the last 72 hours.     Medications Personally Reviewed:    Current Facility-Administered Medications   Medication Dose Route Frequency    theophylline ER (BAM-24) capsule 600 mg  600 mg Oral DAILY    dilTIAZem (CARDIZEM) 125 mg/125 mL (1 mg/mL) dextrose 5% infusion  0-15 mg/hr IntraVENous CONTINUOUS    carvediloL (COREG) tablet 25 mg  25 mg Oral BID WITH MEALS    isosorbide dinitrate (ISORDIL) tablet 10 mg  10 mg Oral TID    clopidogreL (PLAVIX) tablet 75 mg  75 mg Oral DAILY    apixaban (ELIQUIS) tablet 5 mg  5 mg Oral Q12H    buPROPion XL (WELLBUTRIN XL) tablet 150 mg  150 mg Oral 7am    gabapentin (NEURONTIN) capsule   Oral TID    oxybutynin (DITROPAN) tablet 5 mg  5 mg Oral TID    pravastatin (PRAVACHOL) tablet 40 mg  40 mg Oral QHS    polyethylene glycol (MIRALAX) packet 17 g  17 g Oral DAILY PRN    ondansetron (ZOFRAN ODT) tablet 4 mg  4 mg Oral Q8H PRN    Or    ondansetron (ZOFRAN) injection 4 mg  4 mg IntraVENous Q6H PRN    furosemide (LASIX) injection 40 mg  40 mg IntraVENous BID    methylPREDNISolone (PF) (SOLU-MEDROL) injection 40 mg  40 mg IntraVENous Regan Armas MD

## 2021-02-23 NOTE — PROGRESS NOTES
Pt c/c \"my stomach is killing me!\", patient reported that pain feels like she wants to go to the bathroom, offered commode but had no success. Attending MD notified, sorbitol  given (pls see MAR).

## 2021-02-23 NOTE — ED NOTES
.. TRANSFER - OUT REPORT:    Verbal report given to Tamir Rubio RN(name) on [facility] Jared  being transferred to [facility](unit) for routine progression of care       Report consisted of patients Situation, Background, Assessment and   Recommendations(SBAR). Information from the following report(s) SBAR was reviewed with the receiving nurse. Lines:   Peripheral IV 02/22/21 Posterior;Right Forearm (Active)        Opportunity for questions and clarification was provided.       Patient transported with:   O2 at 3 L   RN

## 2021-02-23 NOTE — PROGRESS NOTES
Monitor tech reported patient had 8 beat Wide complex. Dr. Aury Verma (Cardiologist) perfect served. Will monitor.

## 2021-02-23 NOTE — CONSULTS
PULMONARY NOTE  [facility]    Name: Rafael Rock MRN: [id number]   : 1950 Hospital: [facility]   Date: 2021  Admission date: 2021 Hospital Day: 2       HPI:     Hospital Problems  Date Reviewed: 2020          Codes Class Noted POA    Atrial flutter ([facility] ICD-10-CM: L70.36  ICD-9-CM: 427.32  2021 Unknown        CHF (congestive heart failure) ([facility] ICD-10-CM: I50.9  ICD-9-CM: 428.0  2021 Unknown                   [x] High complexity decision making was performed  [x] See my orders for details      Subjective/Initial History:     I was asked by Jaleesa Jane MD to see Rafael Rock  a 72 y.o.  female in consultation     Excerpts from admission 2021 or consult notes as follows:   71-year-old lady came in because of shortness of breath and dyspnea and heart rate was around 110 220.   Significant past medical history of COPD obstructive sleep apnea coronary disease CABG x2 atrial flutter HF PEF she continues to have shortness of breath for the past couple of days and then she was having palpitation with heart rate was around 120 came to emergency room as an STEMI alert which was canceled her BNP is elevated so right now she is on oxygen nasal cannula she wears oxygen at home and CPAP at night so admitted and pulmonary consult was called for further evaluation      Allergies   Allergen Reactions    Tylox [Oxycodone-Acetaminophen] Hives        MAR reviewed and pertinent medications noted or modified as needed     Current Facility-Administered Medications   Medication    theophylline ER (BAM-24) capsule 600 mg    dilTIAZem (CARDIZEM) 125 mg/125 mL (1 mg/mL) dextrose 5% infusion    carvediloL (COREG) tablet 25 mg    isosorbide dinitrate (ISORDIL) tablet 10 mg    clopidogreL (PLAVIX) tablet 75 mg    apixaban (ELIQUIS) tablet 5 mg    buPROPion XL (WELLBUTRIN XL) tablet 150 mg    gabapentin (NEURONTIN) capsule    oxybutynin (DITROPAN) tablet 5 mg    pravastatin (PRAVACHOL) tablet 40 mg    polyethylene glycol (MIRALAX) packet 17 g    ondansetron (ZOFRAN ODT) tablet 4 mg    Or    ondansetron (ZOFRAN) injection 4 mg    furosemide (LASIX) injection 40 mg    methylPREDNISolone (PF) (SOLU-MEDROL) injection 40 mg      Patient PCP: Walker Fothergill, MD  PMH:  has a past medical history of Congestive heart disease ([facility] Coronary artery disease, Diabetes ([address] Hyperlipidemia, Hypertension, and IFTIKHAR (obstructive sleep apnea). PSH:   has a past surgical history that includes hx cholecystectomy; hx hysterectomy; hx hernia repair; hx coronary artery bypass graft; and hx breast biopsy. FHX: family history includes Cancer in her maternal grandmother; Heart Disease in her mother; Kidney Disease in her mother. SHX:  reports that she has quit smoking. She has never used smokeless tobacco. She reports previous alcohol use. She reports that she does not use drugs. ROS:    Review of Systems   Constitutional: Positive for malaise/fatigue. HENT: Negative. Eyes: Negative. Respiratory: Positive for shortness of breath. Cardiovascular: Positive for palpitations. Gastrointestinal: Negative. Genitourinary: Negative. Musculoskeletal: Negative. Skin: Negative. Endo/Heme/Allergies: Negative. Psychiatric/Behavioral: Negative.          Objective:     Vital Signs: Telemetry:    AFIB Intake/Output:   Visit Vitals  BP (!) 171/126   Pulse 90   Temp 98.2 °F (36.8 °C)   Resp 18   Ht 5' 3\" (1.6 m)   Wt 109 kg (240 lb 4.8 oz)   SpO2 91%   BMI 42.57 kg/m²       Temp (24hrs), Av.9 °F (36.6 °C), Min:97.3 °F (36.3 °C), Max:98.2 °F (36.8 °C)        O2 Device: Nasal cannula O2 Flow Rate (L/min): 4 l/min       Wt Readings from Last 4 Encounters:   21 109 kg (240 lb 4.8 oz)   20 158.7 kg (349 lb 13.9 oz)          Intake/Output Summary (Last 24 hours) at 2021 1105  Last data filed at 2021 0538  Gross per 24 hour Intake 363.09 ml   Output 3700 ml   Net -3336.91 ml       Last shift:      No intake/output data recorded. Last 3 shifts: 02/21 1901 - 02/23 0700  In: 363.1 [P.O.:240; I.V.:123.1]  Out: 3700 [Urine:3700]       Physical Exam:     Physical Exam   Constitutional: She appears distressed. HENT:   Head: Normocephalic and atraumatic. Eyes: Pupils are equal, round, and reactive to light. Conjunctivae and EOM are normal.   Neck: Normal range of motion. Neck supple. Cardiovascular:   Irregular heart rate   Pulmonary/Chest: She is in respiratory distress. She has rales. Abdominal: Soft. Bowel sounds are normal.   Musculoskeletal: Normal range of motion. Neurological: She is alert. Skin: Skin is warm. Psychiatric: She has a normal mood and affect. Labs:    Recent Labs     02/23/21  0600 02/22/21  0930   WBC 11.4* 8.7   HGB 12.6 12.6    192     Recent Labs     02/23/21  0600 02/22/21  0930    143   K 4.5 4.0    107   CO2 31 32   * 170*   BUN 25* 20   CREA 1.64* 1.33*   CA 9.4 9.1   ALB 3.2* 3.3*   ALT 22 24     No results for input(s): PH, PCO2, PO2, HCO3, FIO2 in the last 72 hours. Recent Labs     02/22/21  1700 02/22/21  1245 02/22/21  0930   TROIQ 0.12* 0.14* 0.14*     No results found for: BNPP, BNP   No results found for: CULTNo results found for: TSH, TSHEXT, TSHEXT    Imaging:    CXR Results  (Last 48 hours)               02/22/21 1003  XR CHEST PORT Final result    Impression:  FINDINGS: IMPRESSION: Frontal single view chest.       Median sternotomy status post CABG. Calcified aorta. Unchanged cardiomegaly   and/or pericardial effusion. The lungs are similarly inflated. Unchanged mild elevation of the right   hemidiaphragm. Minimal hydrostatic edema. No consolidation, effusion, or   pneumothorax. No free air under the diaphragm. No acute bony findings. Narrative:  STEMI alert. Comparison chest x-ray 11/2/2020.                Results from Hospital Encounter encounter on 02/22/21   XR CHEST PORT    Narrative STEMI alert. Comparison chest x-ray 11/2/2020. Impression FINDINGS: IMPRESSION: Frontal single view chest.    Median sternotomy status post CABG. Calcified aorta. Unchanged cardiomegaly  and/or pericardial effusion. The lungs are similarly inflated. Unchanged mild elevation of the right  hemidiaphragm. Minimal hydrostatic edema. No consolidation, effusion, or  pneumothorax. No free air under the diaphragm. No acute bony findings. Results from [place] encounter on 11/02/20   XR CHEST PORT    Narrative AP portable chest, 1859 hours. Comparison: 5/11/2020. Findings: Cardiac monitoring leads overlie the chest. The patient is status post  median sternotomy. There is moderate cardiomegaly. There is moderate calcified  atherosclerotic disease within the thoracic aorta. The sameer and pulmonary  vasculature are unremarkable. The lungs are well expanded without focal  consolidation, pleural effusion or pneumothorax. There are senescent changes  within the spine. Impression Impression:   No acute cardiopulmonary process. Results from [place] encounter on 11/02/20   CTA CHEST W OR W WO CONT    Narrative Study: CTA Chest.    History: Rule out PE. Comparison: Chest CT 5/6/2020. Chest x-ray 11 2020. Technique: CT volume acquisition of the chest was performed during the pulmonary  arterial phase following the administration of 100 mL of Isovue-370 IV contrast.  Axial, sagittal, coronal, and MIP reconstructions were reviewed. Dose reduction:  All CT scans at this facility are performed using dose reduction optimization  techniques as appropriate to a performed exam including the following: Automated  exposure control, adjustments of the mA and/or kV according to patient size, or  use of iterative reconstruction technique. Findings:    Vessels: No evidence of pulmonary was within limitations.  The distal  subsegmental branches in the lower lobes are not well evaluated due to  respiratory motion artifact. Lungs/Pleura: Mild dependent bibasilar atelectasis. No consolidative airspace  disease, pleural effusion or pneumothorax. Central airways are clear of debris. No discrete lung nodule. Mediastinum/Cardiac: Heart is enlarged. Severe coronary atherosclerosis. Status  post CABG. Moderate aortic atherosclerosis. Normal caliber main pulmonary artery  and thoracic aorta. No pericardial effusion. Under distended thoracic esophagus. Thyroid: Visualized thyroid is grossly unremarkable. Lymph nodes: No intrathoracic lymphadenopathy by CT size criteria. Upper Abdomen: Visualized upper abdominal visceral structures are grossly  unremarkable. Bones/Chest wall soft tissues: Mild degenerative changes of the visualized  spine. No acute osseous abnormality identified. Impression Impression:  1. No evidence of central pulmonary embolism. Limited evaluation of the distal  subsegmental branch vessels due to respiratory motion artifact. 2.  No acute intrathoracic abnormality identified. 3.  Severe atherosclerosis. Status post CABG. Cardiomegaly. 4.  See full report for detailed findings. IMPRESSION:   1. Acute on chronic hypoxic respiratory failure  Chronic Obstructive Pulmonary Disease   Body mass index is 42.57 kg/m². 2. Acute on chronic HFpEF  3. Atrial fibrillation flutter RVR  4. Struct of sleep apnea syndrome  5. History of coronary artery disease history of CABG in the past  6. Pt is requiring Drug therapy requiring intensive monitoring for toxicity  7. Pt is unstable, unpredictable needing inpatient monitoring; is acutely ill and at high risk of sudden decline and decompensation with severe consequenses and continued end organ dysfunction and failure  8. Prognosis guarded       RECOMMENDATIONS/PLAN:     1.  She is on nasal Cannula oxygen as salvage oxygen delivery device to provide high concentration of oxygen to overcome refractory hypoxia; now she is on 4 L nasal cannula  2. Start patient on IV Solu-Medrol nebulizer treatment she is already on Eliquis  3. She is on CPAP at home at night and during the daytime she is on nasal cannula 4 L  4. Her ejection fraction is about 50% patient was started on IV Cardizem for atrial flutter continue with beta-blockers  5. Intubate and place on vent if NIV fails  6. Agree with Empiric IV antibiotics pending culture results   7. Follow culture results  8. Supplemental O2 to keep sats > 93%  9. Aspiration precautions  10. Labs to follow electrolytes, renal function and and blood counts  11. Glucose monitoring and SSI  12. Bronchial hygiene with respiratory therapy techniques, bronchodilators  13.  DVT, SUP prophylaxis             Perez Gutierrez MD

## 2021-02-23 NOTE — PROGRESS NOTES
TRANSFER - IN REPORT:    Verbal report received from Nereyda(name) on Miguel A Isaac  being received from ED(unit) for routine progression of care      Report consisted of patients Situation, Background, Assessment and   Recommendations(SBAR). Information from the following report(s) SBAR, ED Summary, [facility] and Cardiac Rhythm a flutter was reviewed with the receiving nurse. Opportunity for questions and clarification was provided. Assessment completed upon patients arrival to unit and care assumed. Bedside and Verbal shift change report given to Inez Garza (oncoming nurse) by Marleen Castrejon (offgoing nurse). Report included the following information SBAR, ED Summary, Intake/Output, MAR, Recent Results and Cardiac Rhythm a flutter.      Primary Nurse Dagoberto Osuna RN and Inez Garza RN performed a dual skin assessment on this patient No impairment noted

## 2021-02-23 NOTE — PROGRESS NOTES
OCCUPATIONAL THERAPY EVALUATION  Patient: Stanislav Mars (10 y.o. female)  Date: 2/23/2021  Primary Diagnosis: Atrial flutter (HCC) [I48.92]  [facility]) [facility] [I50.9]        Precautions: Fall risk       ASSESSMENT  Pt is a 73 y/o F with PMH significant for COPD, CAD, CABG x 2, atrial flutter, IFTIKHAR, and HFpEF, admitted for acute exacerbation of COPD and acute on chronic HFpEF admitted to [facility] 2/22/21 with chest pain, acute COPD exacerbation, being treated for atrial flutter, CHF. Pt received semi-supine in bed upon arrival, on 3.5 L O2 via NC, AXO x4, and agreeable to OT evaluation at this time. Per pt, she lives with her daughter (per pt works during the day) in a one-story home with 4-5 FABIAN and B HR, required assist from daughter for ADLs, was sponge bathing, and was ambulating without AD at [facility]. Pt states she wears 3L O2 via NC at home and ambulated with her oxygen tank. Also owns a shower chair and a RW. Based on current observations, pt presents with deficits in generalized strength/AROM, dynamic sitting balance, static/dynamic standing balance, functional activity tolerance, sensation, coordination, and reported stomach pain impacting overall performance of ADLs and functional transfers/mobility. Vitals taken at start of session (BP: 111/60, HR 80's, O2: 97%). Pt currently requires min A supine>sit EOB and simulated total A to don/doff socks to feet. Pt completes basic grooming (washing face) s/p setup EOB, however noted difficulty using L UE 2' to shakiness, decreased GMC and  to L hand. Pt transfers sit><stand to/from EOB with mod A with RW, able to side step x2 to [facility] with continued mod A for balance/safety and guide back onto bedside with min A and cues for hand placement. O2 sats remained 93%> throughout evaluation, -116 with EOB ADLs.  Pt would benefit from continued skilled OT services during hospital stay and at next level of care to address current impairments and improve overall IND and safety with self cares and functional mobility upon d/c. OT currently recommending HHOT with family assist vs. SNF at d/c once medically appropriate as pt states her daughter oftentimes works during the day and she is home alone. Other factors to consider for discharge: Family support, DME, time since onset, severity of deficits     Patient will benefit from skilled therapy intervention to address the above noted impairments. PLAN :  Recommendations and Planned Interventions: self care training, functional mobility training, therapeutic exercise, balance training, therapeutic activities, endurance activities, neuromuscular re-education, patient education, home safety training, and family training/education    Frequency/Duration: Patient will be followed by occupational therapy 4 times a week to address goals. Recommendation for discharge: (in order for the patient to meet his/her long term goals)  HHOT with family assist vs SNF    This discharge recommendation:  Has been made in collaboration with the attending provider and/or case management    IF patient discharges home will need the following DME: TBD, may benefit from [facility],        SUBJECTIVE:   Patient stated I don't have a lot of feeling in my left hand.  My daughter usually has to help manage my money because I can't always feel it in my hand    OBJECTIVE DATA SUMMARY:   HISTORY:   Past Medical History:   Diagnosis Date    Congestive heart disease ([facility] [address]     Coronary artery disease     Diabetes ([facility]     Hyperlipidemia     Hypertension     IFTIKHAR (obstructive sleep apnea)      Past Surgical History:   Procedure Laterality Date    HX BREAST BIOPSY      HX CHOLECYSTECTOMY      HX CORONARY ARTERY BYPASS GRAFT      HX HERNIA REPAIR      HX HYSTERECTOMY         Expanded or extensive additional review of patient history:     Home Situation  Home Environment: Private residence  # Steps to Enter: 4  Rails to Enter: Yes  [employer] : Bilateral  One/Two Story Residence: One story  Living Alone: No  Patient Expects to be Discharged to[de-identified] Private residence  Current DME Used/Available at Home: hayes Guerrero, Shower chair    Hand dominance: Right    EXAMINATION OF PERFORMANCE DEFICITS:  Cognitive/Behavioral Status:  Neurologic State: Alert  Orientation Level: Oriented X4  Cognition: Follows commands    Hearing: Auditory  Auditory Impairment: None    Vision/Perceptual:     WFL      Range of Motion:  AROM: Generally decreased, functional    Strength:  Strength: Generally decreased, functional    Coordination:     Fine Motor Skills-Upper: Left Impaired;Right Intact    Gross Motor Skills-Upper: Left Impaired;Right Intact    Tone & Sensation:  Sensation: Impaired(Per pt, L hand/fingers chronic numbess)    Balance:  Sitting: Intact; With support  Standing: Impaired; With support  Standing - Static: Fair;Constant support  Standing - Dynamic : Fair;Constant support    Functional Mobility and Transfers for ADLs:  Bed Mobility:  Supine to Sit: Minimum assistance;Assist x1  Sit to Supine: Moderate assistance;Assist x1  Scooting: Contact guard assistance    Transfers:  Sit to Stand: Moderate assistance;Assist x1  Stand to Sit: Minimum assistance;Assist x1    ADL Intervention and task modifications:  Grooming  Grooming Assistance: Set-up; Stand-by assistance  Position Performed: Seated edge of bed  Washing Face: Set-up; Stand-by assistance    Lower Body Dressing Assistance  Socks: Total assistance (dependent)(Simulated)  Position Performed: Seated edge of bed    Therapeutic Exercise:  Pt would benefit from UE HEP to improve overall UE AROM/strength and can be further educated in next treatment session. Functional Measure:    [facility] AM-PACTM \"6 Clicks\"                                                       Daily Activity Inpatient Short Form  How much help from another person does the patient currently need. .. Total; A Lot A Little None   1.   Putting on and taking off regular lower body clothing? []  1 [x]  2 []  3 []  4   2. Bathing (including washing, rinsing, drying)? []  1 [x]  2 []  3 []  4   3. Toileting, which includes using toilet, bedpan or urinal? [] 1 [x]  2 []  3 []  4   4. Putting on and taking off regular upper body clothing? []  1 []  2 [x]  3 []  4   5. Taking care of personal grooming such as brushing teeth? []  1 []  2 [x]  3 []  4   6. Eating meals? []  1 []  2 [x]  3 []  4   © , Trustees of [address] [place] [address] [address] [address], under license to [employer]. All rights reserved     Score: 15/24     Interpretation of Tool:  Represents clinically-significant functional categories (i.e. Activities of daily living). Percentage of Impairment CH    0%   CI    1-19% CJ    20-39% CK    40-59% CL    60-79% CM    80-99% CN     100%   [facility]  Score 6-24 24 23 20-22 15-19 10-14 7-9 6        Occupational Therapy Evaluation Charge Determination   History Examination Decision-Making   LOW Complexity : Brief history review  LOW Complexity : 1-3 performance deficits relating to physical, cognitive , or psychosocial skils that result in activity limitations and / or participation restrictions  MEDIUM Complexity : Patient may present with comorbidities that affect occupational performnce. Miniml to moderate modification of tasks or assistance (eg, physical or verbal ) with assesment(s) is necessary to enable patient to complete evaluation       Based on the above components, the patient evaluation is determined to be of the following complexity level: LOW   Pain Ratin/10 stomach pain    Activity Tolerance:   Good  Please refer to the flowsheet for vital signs taken during this treatment. After treatment patient left in no apparent distress:    Supine in bed, Call bell within reach, and Side rails x 3    COMMUNICATION/EDUCATION:   The patients plan of care was discussed with: Occupational therapy assistant and Registered nurse.      Patient/family have participated as able in goal setting and plan of care. and Patient/family agree to work toward stated goals and plan of care. This patients plan of care is appropriate for delegation to [facility].     Thank you for this referral.  Divine Martinez  Time Calculation: 39 mins   Problem: Self Care Deficits Care Plan (Adult)  Goal: *Acute Goals and Plan of Care (Insert Text)  Description: Pt will be SBA sup <> sit in prep for EOB ADLs  Pt will be Mod I grooming sitting EOB  Pt will be min A LE dressing sitting EOB/long sit  Pt will be Mod I sit <>  prep for toileting LRAD  Pt will be Mod I toileting/toilet transfer/cloth mgmt LRAD  Pt will be IND following UE HEP in prep for self care tasks  Outcome: Not Met

## 2021-02-23 NOTE — PROGRESS NOTES
General Daily Progress Note          Patient Name:   Mike Horton       YOB: 1950       Age:  72 y.o. Admit Date: 2/22/2021      Subjective:     Patient was admitted yesterday for acute exacerbation of COPD and acute on chronic HFpEF. She is sitting up alert and oriented. States her breathing is better than yesterday. She has no shortness of breath at rest but still experiencing dyspnea on exertion. She also complains of abdominal pain and constipation. GFR is 38 and pro-BNP is improved today at 1,264  Chest xray showed calcified aorta and unchanged cardiomegaly. Minimal hydrostatic edema, no pleural effusion.          Objective:     Visit Vitals  BP (!) 171/126   Pulse 90   Temp 98.2 °F (36.8 °C)   Resp 18   Ht 5' 3\" (1.6 m)   Wt 109 kg (240 lb 4.8 oz)   SpO2 91%   BMI 42.57 kg/m²        Recent Results (from the past 24 hour(s))   BNP    Collection Time: 02/22/21 10:30 AM   Result Value Ref Range    NT pro-BNP 1,622 (H) <125 pg/mL   EKG, 12 LEAD, SUBSEQUENT    Collection Time: 02/22/21 12:29 PM   Result Value Ref Range    Ventricular Rate 102 BPM    Atrial Rate 256 BPM    QRS Duration 98 ms    Q-T Interval 410 ms    QTC Calculation (Bezet) 534 ms    Calculated R Axis 66 degrees    Calculated T Axis 94 degrees    Diagnosis       Atrial flutter with variable A-V block  Abnormal ECG    [id number] on 2/22/2021 4:30:19 PM     TROPONIN I    Collection Time: 02/22/21 12:45 PM   Result Value Ref Range    Troponin-I, Qt. 0.14 (H) <0.05 ng/mL   TROPONIN I    Collection Time: 02/22/21  5:00 PM   Result Value Ref Range    Troponin-I, Qt. 0.12 (H) <5.42 ng/mL   METABOLIC PANEL, COMPREHENSIVE    Collection Time: 02/23/21  6:00 AM   Result Value Ref Range    Sodium 139 136 - 145 mmol/L    Potassium 4.5 3.5 - 5.1 mmol/L    Chloride 102 97 - 108 mmol/L    CO2 31 21 - 32 mmol/L    Anion gap 6 5 - 15 mmol/L    Glucose 237 (H) 65 - 100 mg/dL    BUN 25 (H) 6 - 20 mg/dL    Creatinine 1.64 (H) 0.55 - 1.02 mg/dL    BUN/Creatinine ratio 15 12 - 20      GFR est AA 38 (L) >60 ml/min/1.73m2    GFR est non-AA 31 (L) >60 ml/min/1.73m2    Calcium 9.4 8.5 - 10.1 mg/dL    Bilirubin, total 1.2 (H) 0.2 - 1.0 mg/dL    AST (SGOT) 14 (L) 15 - 37 U/L    ALT (SGPT) 22 12 - 78 U/L    Alk. phosphatase 151 (H) 45 - 117 U/L    Protein, total 7.3 6.4 - 8.2 g/dL    Albumin 3.2 (L) 3.5 - 5.0 g/dL    Globulin 4.1 (H) 2.0 - 4.0 g/dL    A-G Ratio 0.8 (L) 1.1 - 2.2     CBC WITH AUTOMATED DIFF    Collection Time: 02/23/21  6:00 AM   Result Value Ref Range    WBC 11.4 (H) 3.6 - 11.0 K/uL    RBC 4.59 3.80 - 5.20 M/uL    HGB 12.6 11.5 - 16.0 g/dL    HCT 41.9 35.0 - 47.0 %    MCV 91.3 80.0 - 99.0 FL    MCH 27.5 26.0 - 34.0 PG    MCHC 30.1 30.0 - 36.5 g/dL    RDW 16.0 (H) 11.5 - 14.5 %    PLATELET 915 387 - 174 K/uL    MPV 13.3 (H) 8.9 - 12.9 FL    NEUTROPHILS 95 (H) 32 - 75 %    LYMPHOCYTES 5 (L) 12 - 49 %    MONOCYTES 0 (L) 5 - 13 %    EOSINOPHILS 0 0 - 7 %    BASOPHILS 0 0 - 1 %    IMMATURE GRANULOCYTES 0 0.0 - 0.5 %    ABS. NEUTROPHILS 10.7 (H) 1.8 - 8.0 K/UL    ABS. LYMPHOCYTES 0.6 (L) 0.8 - 3.5 K/UL    ABS. MONOCYTES 0.0 0.0 - 1.0 K/UL    ABS. EOSINOPHILS 0.0 0.0 - 0.4 K/UL    ABS. BASOPHILS 0.0 0.0 - 0.1 K/UL    ABS. IMM. GRANS. 0.0 0.00 - 0.04 K/UL    DF AUTOMATED     BNP    Collection Time: 02/23/21  6:00 AM   Result Value Ref Range    NT pro-BNP 1,264 (H) <125 pg/mL     [unfilled]      Review of Systems    Constitutional: Negative for chills and fever. HENT: Negative. Eyes: Negative. Respiratory: Negative. Cardiovascular: Negative. Gastrointestinal: Negative for abdominal pain and nausea. Skin: Negative. Neurological: Negative. Physical Exam:      Constitutional: pt is oriented to person, place, and time. HENT:   Head: Normocephalic and atraumatic. Eyes: Pupils are equal, round, and reactive to light. EOM are normal.   Cardiovascular: Normal rate, regular rhythm and normal heart sounds. Pulmonary/Chest: Breath sounds normal. No wheezes. No rales. Exhibits no tenderness. Abdominal: Soft. Bowel sounds are normal. There is no abdominal tenderness. There is no rebound and no guarding. Musculoskeletal: Normal range of motion. Neurological: pt is alert and oriented to person, place, and time. XR CHEST PORT   Final Result   FINDINGS: IMPRESSION: Frontal single view chest.      Median sternotomy status post CABG. Calcified aorta. Unchanged cardiomegaly   and/or pericardial effusion. The lungs are similarly inflated. Unchanged mild elevation of the right   hemidiaphragm. Minimal hydrostatic edema. No consolidation, effusion, or   pneumothorax. No free air under the diaphragm. No acute bony findings.            Recent Results (from the past 24 hour(s))   BNP    Collection Time: 02/22/21 10:30 AM   Result Value Ref Range    NT pro-BNP 1,622 (H) <125 pg/mL   EKG, 12 LEAD, SUBSEQUENT    Collection Time: 02/22/21 12:29 PM   Result Value Ref Range    Ventricular Rate 102 BPM    Atrial Rate 256 BPM    QRS Duration 98 ms    Q-T Interval 410 ms    QTC Calculation (Bezet) 534 ms    Calculated R Axis 66 degrees    Calculated T Axis 94 degrees    Diagnosis       Atrial flutter with variable A-V block  Abnormal ECG    [id number][facility] on 2/22/2021 4:30:19 PM     TROPONIN I    Collection Time: 02/22/21 12:45 PM   Result Value Ref Range    Troponin-I, Qt. 0.14 (H) <0.05 ng/mL   TROPONIN I    Collection Time: 02/22/21  5:00 PM   Result Value Ref Range    Troponin-I, Qt. 0.12 (H) <4.88 ng/mL   METABOLIC PANEL, COMPREHENSIVE    Collection Time: 02/23/21  6:00 AM   Result Value Ref Range    Sodium 139 136 - 145 mmol/L    Potassium 4.5 3.5 - 5.1 mmol/L    Chloride 102 97 - 108 mmol/L    CO2 31 21 - 32 mmol/L    Anion gap 6 5 - 15 mmol/L    Glucose 237 (H) 65 - 100 mg/dL    BUN 25 (H) 6 - 20 mg/dL    Creatinine 1.64 (H) 0.55 - 1.02 mg/dL    BUN/Creatinine ratio 15 12 - 20      GFR est AA 38 (L) >60 ml/min/1.73m2    GFR est non-AA 31 (L) >60 ml/min/1.73m2    Calcium 9.4 8.5 - 10.1 mg/dL    Bilirubin, total 1.2 (H) 0.2 - 1.0 mg/dL    AST (SGOT) 14 (L) 15 - 37 U/L    ALT (SGPT) 22 12 - 78 U/L    Alk. phosphatase 151 (H) 45 - 117 U/L    Protein, total 7.3 6.4 - 8.2 g/dL    Albumin 3.2 (L) 3.5 - 5.0 g/dL    Globulin 4.1 (H) 2.0 - 4.0 g/dL    A-G Ratio 0.8 (L) 1.1 - 2.2     CBC WITH AUTOMATED DIFF    Collection Time: 02/23/21  6:00 AM   Result Value Ref Range    WBC 11.4 (H) 3.6 - 11.0 K/uL    RBC 4.59 3.80 - 5.20 M/uL    HGB 12.6 11.5 - 16.0 g/dL    HCT 41.9 35.0 - 47.0 %    MCV 91.3 80.0 - 99.0 FL    MCH 27.5 26.0 - 34.0 PG    MCHC 30.1 30.0 - 36.5 g/dL    RDW 16.0 (H) 11.5 - 14.5 %    PLATELET 076 747 - 952 K/uL    MPV 13.3 (H) 8.9 - 12.9 FL    NEUTROPHILS 95 (H) 32 - 75 %    LYMPHOCYTES 5 (L) 12 - 49 %    MONOCYTES 0 (L) 5 - 13 %    EOSINOPHILS 0 0 - 7 %    BASOPHILS 0 0 - 1 %    IMMATURE GRANULOCYTES 0 0.0 - 0.5 %    ABS. NEUTROPHILS 10.7 (H) 1.8 - 8.0 K/UL    ABS. LYMPHOCYTES 0.6 (L) 0.8 - 3.5 K/UL    ABS. MONOCYTES 0.0 0.0 - 1.0 K/UL    ABS. EOSINOPHILS 0.0 0.0 - 0.4 K/UL    ABS. BASOPHILS 0.0 0.0 - 0.1 K/UL    ABS. IMM. GRANS. 0.0 0.00 - 0.04 K/UL    DF AUTOMATED     BNP    Collection Time: 02/23/21  6:00 AM   Result Value Ref Range    NT pro-BNP 1,264 (H) <125 pg/mL       Results     ** No results found for the last 336 hours. **           Labs:     Recent Labs     02/23/21  0600 02/22/21  0930   WBC 11.4* 8.7   HGB 12.6 12.6   HCT 41.9 43.0    192     Recent Labs     02/23/21  0600 02/22/21  0930    143   K 4.5 4.0    107   CO2 31 32   BUN 25* 20   CREA 1.64* 1.33*   * 170*   CA 9.4 9.1     Recent Labs     02/23/21  0600 02/22/21  0930   ALT 22 24   * 154*   TBILI 1.2* 0.8   TP 7.3 7.3   ALB 3.2* 3.3*   GLOB 4.1* 4.0     No results for input(s): INR, PTP, APTT, INREXT in the last 72 hours.    No results for input(s): FE, TIBC, PSAT, FERR in the last 72 hours. No results found for: FOL, RBCF   No results for input(s): PH, PCO2, PO2 in the last 72 hours. Recent Labs     02/22/21  1700 02/22/21  1245 02/22/21  0930   TROIQ 0.12* 0.14* 0.14*     No results found for: CHOL, CHOLX, CHLST, CHOLV, HDL, HDLP, LDL, LDLC, DLDLP, TGLX, TRIGL, TRIGP, CHHD, CHHDX  Lab Results   Component Value Date/Time    Glucose (POC) 214 (H) 11/05/2020 03:53 PM    Glucose (POC) 255 (H) 11/05/2020 11:30 AM    Glucose (POC) 176 (H) 11/05/2020 07:42 AM    Glucose (POC) 186 (H) 11/04/2020 10:57 PM    Glucose (POC) 236 (H) 11/04/2020 04:06 PM     No results found for: COLOR, APPRN, SPGRU, REFSG, NERIS, PROTU, GLUCU, KETU, BILU, UROU, ELOISA, LEUKU, GLUKE, EPSU, BACTU, WBCU, RBCU, CASTS, UCRY      Assessment:     Acute exacerbation of COPD  Acute on chronic hypoxic respiratory failure  Acute on chronic HFpEF  Atrial flutter with RVR  History of CAD and CABG  IFTIKHAR  Overactive bladder  Hypercholesterolemia  Morbid obesity  History of TIA      Plan:     Patient on Eliquis, wellbutrin, coreg, plavix, IV lasix, neurontin, isordil, ditropan, pravastatin, and theophylline  Seen by pulmonology.  Started patient on IV Solu-Medrol nebulizer treatment  Patient on 2L O2 by nasal cannula          Current Facility-Administered Medications:     theophylline ER (BAM-24) capsule 600 mg, 600 mg, Oral, DAILY, oGpal Alvarado MD, 600 mg at 02/23/21 0947    dilTIAZem (CARDIZEM) 125 mg/125 mL (1 mg/mL) dextrose 5% infusion, 0-15 mg/hr, IntraVENous, CONTINUOUS, Gopal Alvarado MD, Stopped at 02/23/21 0105    carvediloL (COREG) tablet 25 mg, 25 mg, Oral, BID WITH MEALS, Gopal Alvarado MD, 25 mg at 02/23/21 4612    isosorbide dinitrate (ISORDIL) tablet 10 mg, 10 mg, Oral, TID, Gopal Alvarado MD, 10 mg at 02/23/21 9953    clopidogreL (PLAVIX) tablet 75 mg, 75 mg, Oral, DAILY, Gopal Alvarado MD, 75 mg at 02/23/21 0947    apixaban (ELIQUIS) tablet 5 mg, 5 mg, Oral, Q12H, Gopal Alvarado MD, 5 mg at 02/23/21 0947    buPROPion XL (WELLBUTRIN XL) tablet 150 mg, 150 mg, Oral, 7am, Gopal Alvarado MD, 150 mg at 02/23/21 9284    gabapentin (NEURONTIN) capsule, , Oral, TID, Mohiuddin, Gretel Lundborg, MD, 600 mg at 02/23/21 0947    oxybutynin (DITROPAN) tablet 5 mg, 5 mg, Oral, TID, Mohiuddin, Gretel Lundborg, MD, 5 mg at 02/23/21 2718    pravastatin (PRAVACHOL) tablet 40 mg, 40 mg, Oral, QHS, Gopal Alvarado MD, 40 mg at 02/22/21 2150    polyethylene glycol (MIRALAX) packet 17 g, 17 g, Oral, DAILY PRN, Mohiuddin, Gretel Lundborg, MD    ondansetron (ZOFRAN ODT) tablet 4 mg, 4 mg, Oral, Q8H PRN **OR** ondansetron (ZOFRAN) injection 4 mg, 4 mg, IntraVENous, Q6H PRN, Gopal Alvarado MD    furosemide (LASIX) injection 40 mg, 40 mg, IntraVENous, BID, Gopal Alvarado MD, 40 mg at 02/23/21 0947    methylPREDNISolone (PF) (SOLU-MEDROL) injection 40 mg, 40 mg, IntraVENous, Q6H, Gopal Alvarado MD, 40 mg at 02/23/21 5226

## 2021-02-23 NOTE — PROGRESS NOTES
General Daily Progress Note          Patient Name:   Roxanna Brasher       YOB: 1950       Age:  72 y.o. Admit Date: 2/22/2021      Subjective:     Patient was admitted yesterday for acute exacerbation of COPD and acute on chronic HFpEF. She is sitting up alert and oriented. States her breathing is better than yesterday. She has no shortness of breath at rest but still experiencing dyspnea on exertion. She also complains of abdominal pain and constipation. GFR is 38 and pro-BNP is improved today at 1,264  Chest xray showed calcified aorta and unchanged cardiomegaly. Minimal hydrostatic edema, no pleural effusion. Objective:     Visit Vitals  BP (!) 171/126   Pulse 90   Temp 98.2 °F (36.8 °C)   Resp 18   Ht 5' 3\" (1.6 m)   Wt 109 kg (240 lb 4.8 oz)   SpO2 91%   BMI 42.57 kg/m²        Recent Results (from the past 24 hour(s))   TROPONIN I    Collection Time: 02/22/21  5:00 PM   Result Value Ref Range    Troponin-I, Qt. 0.12 (H) <2.44 ng/mL   METABOLIC PANEL, COMPREHENSIVE    Collection Time: 02/23/21  6:00 AM   Result Value Ref Range    Sodium 139 136 - 145 mmol/L    Potassium 4.5 3.5 - 5.1 mmol/L    Chloride 102 97 - 108 mmol/L    CO2 31 21 - 32 mmol/L    Anion gap 6 5 - 15 mmol/L    Glucose 237 (H) 65 - 100 mg/dL    BUN 25 (H) 6 - 20 mg/dL    Creatinine 1.64 (H) 0.55 - 1.02 mg/dL    BUN/Creatinine ratio 15 12 - 20      GFR est AA 38 (L) >60 ml/min/1.73m2    GFR est non-AA 31 (L) >60 ml/min/1.73m2    Calcium 9.4 8.5 - 10.1 mg/dL    Bilirubin, total 1.2 (H) 0.2 - 1.0 mg/dL    AST (SGOT) 14 (L) 15 - 37 U/L    ALT (SGPT) 22 12 - 78 U/L    Alk.  phosphatase 151 (H) 45 - 117 U/L    Protein, total 7.3 6.4 - 8.2 g/dL    Albumin 3.2 (L) 3.5 - 5.0 g/dL    Globulin 4.1 (H) 2.0 - 4.0 g/dL    A-G Ratio 0.8 (L) 1.1 - 2.2     CBC WITH AUTOMATED DIFF    Collection Time: 02/23/21  6:00 AM   Result Value Ref Range    WBC 11.4 (H) 3.6 - 11.0 K/uL    RBC 4.59 3.80 - 5.20 M/uL    HGB 12.6 11.5 - 16.0 g/dL    HCT 41.9 35.0 - 47.0 %    MCV 91.3 80.0 - 99.0 FL    MCH 27.5 26.0 - 34.0 PG    MCHC 30.1 30.0 - 36.5 g/dL    RDW 16.0 (H) 11.5 - 14.5 %    PLATELET 899 737 - 267 K/uL    MPV 13.3 (H) 8.9 - 12.9 FL    NEUTROPHILS 95 (H) 32 - 75 %    LYMPHOCYTES 5 (L) 12 - 49 %    MONOCYTES 0 (L) 5 - 13 %    EOSINOPHILS 0 0 - 7 %    BASOPHILS 0 0 - 1 %    IMMATURE GRANULOCYTES 0 0.0 - 0.5 %    ABS. NEUTROPHILS 10.7 (H) 1.8 - 8.0 K/UL    ABS. LYMPHOCYTES 0.6 (L) 0.8 - 3.5 K/UL    ABS. MONOCYTES 0.0 0.0 - 1.0 K/UL    ABS. EOSINOPHILS 0.0 0.0 - 0.4 K/UL    ABS. BASOPHILS 0.0 0.0 - 0.1 K/UL    ABS. IMM. GRANS. 0.0 0.00 - 0.04 K/UL    DF AUTOMATED     BNP    Collection Time: 02/23/21  6:00 AM   Result Value Ref Range    NT pro-BNP 1,264 (H) <125 pg/mL   AMYLASE    Collection Time: 02/23/21  6:00 AM   Result Value Ref Range    Amylase 34 25 - 115 U/L   LIPASE    Collection Time: 02/23/21  6:00 AM   Result Value Ref Range    Lipase 84 73 - 393 U/L     [unfilled]      Review of Systems    Constitutional: Negative for chills and fever. HENT: Negative. Eyes: Negative. Respiratory: Negative. Cardiovascular: Negative. Gastrointestinal: Negative for abdominal pain and nausea. Skin: Negative. Neurological: Negative. Physical Exam:      Constitutional: pt is oriented to person, place, and time. HENT:   Head: Normocephalic and atraumatic. Eyes: Pupils are equal, round, and reactive to light. EOM are normal.   Cardiovascular: Normal rate, regular rhythm and normal heart sounds. Pulmonary/Chest: Breath sounds normal. No wheezes. No rales. Exhibits no tenderness. Abdominal: Soft. Bowel sounds are normal. There is no abdominal tenderness. There is no rebound and no guarding. Musculoskeletal: Normal range of motion. Neurological: pt is alert and oriented to person, place, and time.      XR ABD (KUB)   Final Result      XR CHEST PORT   Final Result   FINDINGS: IMPRESSION: Frontal single view chest. Median sternotomy status post CABG. Calcified aorta. Unchanged cardiomegaly   and/or pericardial effusion. The lungs are similarly inflated. Unchanged mild elevation of the right   hemidiaphragm. Minimal hydrostatic edema. No consolidation, effusion, or   pneumothorax. No free air under the diaphragm. No acute bony findings. Recent Results (from the past 24 hour(s))   TROPONIN I    Collection Time: 02/22/21  5:00 PM   Result Value Ref Range    Troponin-I, Qt. 0.12 (H) <1.71 ng/mL   METABOLIC PANEL, COMPREHENSIVE    Collection Time: 02/23/21  6:00 AM   Result Value Ref Range    Sodium 139 136 - 145 mmol/L    Potassium 4.5 3.5 - 5.1 mmol/L    Chloride 102 97 - 108 mmol/L    CO2 31 21 - 32 mmol/L    Anion gap 6 5 - 15 mmol/L    Glucose 237 (H) 65 - 100 mg/dL    BUN 25 (H) 6 - 20 mg/dL    Creatinine 1.64 (H) 0.55 - 1.02 mg/dL    BUN/Creatinine ratio 15 12 - 20      GFR est AA 38 (L) >60 ml/min/1.73m2    GFR est non-AA 31 (L) >60 ml/min/1.73m2    Calcium 9.4 8.5 - 10.1 mg/dL    Bilirubin, total 1.2 (H) 0.2 - 1.0 mg/dL    AST (SGOT) 14 (L) 15 - 37 U/L    ALT (SGPT) 22 12 - 78 U/L    Alk. phosphatase 151 (H) 45 - 117 U/L    Protein, total 7.3 6.4 - 8.2 g/dL    Albumin 3.2 (L) 3.5 - 5.0 g/dL    Globulin 4.1 (H) 2.0 - 4.0 g/dL    A-G Ratio 0.8 (L) 1.1 - 2.2     CBC WITH AUTOMATED DIFF    Collection Time: 02/23/21  6:00 AM   Result Value Ref Range    WBC 11.4 (H) 3.6 - 11.0 K/uL    RBC 4.59 3.80 - 5.20 M/uL    HGB 12.6 11.5 - 16.0 g/dL    HCT 41.9 35.0 - 47.0 %    MCV 91.3 80.0 - 99.0 FL    MCH 27.5 26.0 - 34.0 PG    MCHC 30.1 30.0 - 36.5 g/dL    RDW 16.0 (H) 11.5 - 14.5 %    PLATELET 566 064 - 134 K/uL    MPV 13.3 (H) 8.9 - 12.9 FL    NEUTROPHILS 95 (H) 32 - 75 %    LYMPHOCYTES 5 (L) 12 - 49 %    MONOCYTES 0 (L) 5 - 13 %    EOSINOPHILS 0 0 - 7 %    BASOPHILS 0 0 - 1 %    IMMATURE GRANULOCYTES 0 0.0 - 0.5 %    ABS. NEUTROPHILS 10.7 (H) 1.8 - 8.0 K/UL    ABS.  LYMPHOCYTES 0.6 (L) 0.8 - 3.5 K/UL ABS. MONOCYTES 0.0 0.0 - 1.0 K/UL    ABS. EOSINOPHILS 0.0 0.0 - 0.4 K/UL    ABS. BASOPHILS 0.0 0.0 - 0.1 K/UL    ABS. IMM. GRANS. 0.0 0.00 - 0.04 K/UL    DF AUTOMATED     BNP    Collection Time: 02/23/21  6:00 AM   Result Value Ref Range    NT pro-BNP 1,264 (H) <125 pg/mL   AMYLASE    Collection Time: 02/23/21  6:00 AM   Result Value Ref Range    Amylase 34 25 - 115 U/L   LIPASE    Collection Time: 02/23/21  6:00 AM   Result Value Ref Range    Lipase 84 73 - 393 U/L       Results     ** No results found for the last 336 hours. **           Labs:     Recent Labs     02/23/21  0600 02/22/21  0930   WBC 11.4* 8.7   HGB 12.6 12.6   HCT 41.9 43.0    192     Recent Labs     02/23/21  0600 02/22/21  0930    143   K 4.5 4.0    107   CO2 31 32   BUN 25* 20   CREA 1.64* 1.33*   * 170*   CA 9.4 9.1     Recent Labs     02/23/21  0600 02/22/21  0930   ALT 22 24   * 154*   TBILI 1.2* 0.8   TP 7.3 7.3   ALB 3.2* 3.3*   GLOB 4.1* 4.0   AML 34  --    LPSE 84  --      No results for input(s): INR, PTP, APTT, INREXT, INREXT in the last 72 hours.   No results for input(s): FE, TIBC, PSAT, FERR in the last 72 hours.   No results found for: FOL, RBCF   No results for input(s): PH, PCO2, PO2 in the last 72 hours.  Recent Labs     02/22/21  1700 02/22/21  1245 02/22/21  0930   TROIQ 0.12* 0.14* 0.14*     No results found for: CHOL, CHOLX, CHLST, CHOLV, HDL, HDLP, LDL, LDLC, DLDLP, TGLX, TRIGL, TRIGP, CHHD, CHHDX  Lab Results   Component Value Date/Time    Glucose (POC) 214 (H) 11/05/2020 03:53 PM    Glucose (POC) 255 (H) 11/05/2020 11:30 AM    Glucose (POC) 176 (H) 11/05/2020 07:42 AM    Glucose (POC) 186 (H) 11/04/2020 10:57 PM    Glucose (POC) 236 (H) 11/04/2020 04:06 PM     No results found for: COLOR, APPRN, SPGRU, REFSG, NERIS, PROTU, GLUCU, KETU, BILU, UROU, ELOISA, LEUKU, GLUKE, EPSU, BACTU, WBCU, RBCU, CASTS, UCRY      Assessment:     Acute exacerbation of COPD  Acute on chronic hypoxic respiratory  failure  Acute on chronic HFpEF  Atrial flutter with RVR  History of CAD and CABG  IFTIKHAR  Overactive bladder  Hypercholesterolemia  Morbid obesity  History of TIA  Constipation      Plan:     Patient on Eliquis, wellbutrin, coreg, plavix, IV lasix, neurontin, isordil, ditropan, pravastatin, and theophylline  Seen by pulmonology.  Started patient on IV Solu-Medrol nebulizer treatment  Patient on 2L O2 by nasal cannula  Sorbitol 30 cc  PT OT consult            Current Facility-Administered Medications:     theophylline ER (BAM-24) capsule 600 mg, 600 mg, Oral, DAILY, Gopal Alvarado MD, 600 mg at 02/23/21 0947    [START ON 2/24/2021] dilTIAZem ER (CARDIZEM CD) capsule 120 mg, 120 mg, Oral, DAILY, Nory Pollock MD    carvediloL (COREG) tablet 25 mg, 25 mg, Oral, BID WITH MEALS, Gopal Alvarado MD, 25 mg at 02/23/21 8294    clopidogreL (PLAVIX) tablet 75 mg, 75 mg, Oral, DAILY, Gopal Alvarado MD, 75 mg at 02/23/21 0947    [Held by provider] apixaban (ELIQUIS) tablet 5 mg, 5 mg, Oral, Q12H, Gopal Alvarado MD, 5 mg at 02/23/21 0947    buPROPion XL (WELLBUTRIN XL) tablet 150 mg, 150 mg, Oral, 7am, Gopal Alvarado MD, 150 mg at 02/23/21 9729    gabapentin (NEURONTIN) capsule, , Oral, TID, Deloris Alvarado MD, 600 mg at 02/23/21 0947    oxybutynin (DITROPAN) tablet 5 mg, 5 mg, Oral, TID, Deloris Alvarado MD, 5 mg at 02/23/21 0046    pravastatin (PRAVACHOL) tablet 40 mg, 40 mg, Oral, QHS, Gopal Alvarado MD, 40 mg at 02/22/21 2150    polyethylene glycol (MIRALAX) packet 17 g, 17 g, Oral, DAILY PRN, Deloris Alvarado MD    ondansetron (ZOFRAN ODT) tablet 4 mg, 4 mg, Oral, Q8H PRN **OR** ondansetron (ZOFRAN) injection 4 mg, 4 mg, IntraVENous, Q6H PRN, Gopal Alvarado MD    methylPREDNISolone (PF) (SOLU-MEDROL) injection 40 mg, 40 mg, IntraVENous, Q6H, Gopal Alvarado MD, 40 mg at 02/23/21 6952

## 2021-02-24 ENCOUNTER — APPOINTMENT (OUTPATIENT)
Dept: GENERAL RADIOLOGY | Age: 71
DRG: 280 | End: 2021-02-24
Attending: INTERNAL MEDICINE
Payer: MEDICARE

## 2021-02-24 LAB
ALBUMIN SERPL-MCNC: 3.3 G/DL (ref 3.5–5)
ALBUMIN/GLOB SERPL: 0.8 {RATIO} (ref 1.1–2.2)
ALP SERPL-CCNC: 147 U/L (ref 45–117)
ALT SERPL-CCNC: 20 U/L (ref 12–78)
ANION GAP SERPL CALC-SCNC: 8 MMOL/L (ref 5–15)
AST SERPL W P-5'-P-CCNC: 4 U/L (ref 15–37)
ATRIAL RATE: 255 BPM
BILIRUB SERPL-MCNC: 0.6 MG/DL (ref 0.2–1)
BUN SERPL-MCNC: 47 MG/DL (ref 6–20)
BUN/CREAT SERPL: 22 (ref 12–20)
CA-I BLD-MCNC: 9 MG/DL (ref 8.5–10.1)
CALCULATED R AXIS, ECG10: 50 DEGREES
CALCULATED T AXIS, ECG11: 103 DEGREES
CHLORIDE SERPL-SCNC: 100 MMOL/L (ref 97–108)
CO2 SERPL-SCNC: 30 MMOL/L (ref 21–32)
CREAT SERPL-MCNC: 2.12 MG/DL (ref 0.55–1.02)
DIAGNOSIS, 93000: NORMAL
ERYTHROCYTE [DISTWIDTH] IN BLOOD BY AUTOMATED COUNT: 15.9 % (ref 11.5–14.5)
GLOBULIN SER CALC-MCNC: 4.2 G/DL (ref 2–4)
GLUCOSE SERPL-MCNC: 221 MG/DL (ref 65–100)
HCT VFR BLD AUTO: 43.4 % (ref 35–47)
HGB BLD-MCNC: 12.7 G/DL (ref 11.5–16)
MCH RBC QN AUTO: 26.8 PG (ref 26–34)
MCHC RBC AUTO-ENTMCNC: 29.3 G/DL (ref 30–36.5)
MCV RBC AUTO: 91.6 FL (ref 80–99)
PLATELET # BLD AUTO: 240 K/UL (ref 150–400)
PMV BLD AUTO: 13.2 FL (ref 8.9–12.9)
POTASSIUM SERPL-SCNC: 4.5 MMOL/L (ref 3.5–5.1)
PROT SERPL-MCNC: 7.5 G/DL (ref 6.4–8.2)
Q-T INTERVAL, ECG07: 408 MS
QRS DURATION, ECG06: 100 MS
QTC CALCULATION (BEZET), ECG08: 485 MS
RBC # BLD AUTO: 4.74 M/UL (ref 3.8–5.2)
SODIUM SERPL-SCNC: 138 MMOL/L (ref 136–145)
TROPONIN I SERPL-MCNC: 0.12 NG/ML
VENTRICULAR RATE, ECG03: 85 BPM
WBC # BLD AUTO: 20.8 K/UL (ref 3.6–11)

## 2021-02-24 PROCEDURE — 77030004558 HC CATH ANGI DX SUPR TORQ CARD -A: Performed by: INTERNAL MEDICINE

## 2021-02-24 PROCEDURE — 85027 COMPLETE CBC AUTOMATED: CPT

## 2021-02-24 PROCEDURE — 74011000250 HC RX REV CODE- 250: Performed by: INTERNAL MEDICINE

## 2021-02-24 PROCEDURE — 76210000006 HC OR PH I REC 0.5 TO 1 HR: Performed by: INTERNAL MEDICINE

## 2021-02-24 PROCEDURE — 93459 L HRT ART/GRFT ANGIO: CPT | Performed by: INTERNAL MEDICINE

## 2021-02-24 PROCEDURE — C1894 INTRO/SHEATH, NON-LASER: HCPCS | Performed by: INTERNAL MEDICINE

## 2021-02-24 PROCEDURE — 36415 COLL VENOUS BLD VENIPUNCTURE: CPT

## 2021-02-24 PROCEDURE — 74011250636 HC RX REV CODE- 250/636

## 2021-02-24 PROCEDURE — 71045 X-RAY EXAM CHEST 1 VIEW: CPT

## 2021-02-24 PROCEDURE — 74011000636 HC RX REV CODE- 636: Performed by: INTERNAL MEDICINE

## 2021-02-24 PROCEDURE — 74011250636 HC RX REV CODE- 250/636: Performed by: FAMILY MEDICINE

## 2021-02-24 PROCEDURE — 93005 ELECTROCARDIOGRAM TRACING: CPT

## 2021-02-24 PROCEDURE — C1769 GUIDE WIRE: HCPCS | Performed by: INTERNAL MEDICINE

## 2021-02-24 PROCEDURE — B2131ZZ FLUOROSCOPY OF MULTIPLE CORONARY ARTERY BYPASS GRAFTS USING LOW OSMOLAR CONTRAST: ICD-10-PCS | Performed by: INTERNAL MEDICINE

## 2021-02-24 PROCEDURE — 80053 COMPREHEN METABOLIC PANEL: CPT

## 2021-02-24 PROCEDURE — 84484 ASSAY OF TROPONIN QUANT: CPT

## 2021-02-24 PROCEDURE — 74011250637 HC RX REV CODE- 250/637: Performed by: FAMILY MEDICINE

## 2021-02-24 PROCEDURE — 74011250637 HC RX REV CODE- 250/637: Performed by: INTERNAL MEDICINE

## 2021-02-24 PROCEDURE — 77030029065 HC DRSG HEMO QCLOT ZMED -B: Performed by: INTERNAL MEDICINE

## 2021-02-24 PROCEDURE — 65270000029 HC RM PRIVATE

## 2021-02-24 PROCEDURE — 99153 MOD SED SAME PHYS/QHP EA: CPT | Performed by: INTERNAL MEDICINE

## 2021-02-24 PROCEDURE — 97530 THERAPEUTIC ACTIVITIES: CPT

## 2021-02-24 PROCEDURE — 74011250636 HC RX REV CODE- 250/636: Performed by: INTERNAL MEDICINE

## 2021-02-24 PROCEDURE — B2111ZZ FLUOROSCOPY OF MULTIPLE CORONARY ARTERIES USING LOW OSMOLAR CONTRAST: ICD-10-PCS | Performed by: INTERNAL MEDICINE

## 2021-02-24 PROCEDURE — 99152 MOD SED SAME PHYS/QHP 5/>YRS: CPT | Performed by: INTERNAL MEDICINE

## 2021-02-24 PROCEDURE — B2151ZZ FLUOROSCOPY OF LEFT HEART USING LOW OSMOLAR CONTRAST: ICD-10-PCS | Performed by: INTERNAL MEDICINE

## 2021-02-24 PROCEDURE — 4A023N7 MEASUREMENT OF CARDIAC SAMPLING AND PRESSURE, LEFT HEART, PERCUTANEOUS APPROACH: ICD-10-PCS | Performed by: INTERNAL MEDICINE

## 2021-02-24 RX ORDER — HEPARIN SODIUM 200 [USP'U]/100ML
INJECTION, SOLUTION INTRAVENOUS
Status: COMPLETED | OUTPATIENT
Start: 2021-02-24 | End: 2021-02-24

## 2021-02-24 RX ORDER — PREDNISONE 20 MG/1
20 TABLET ORAL
Status: DISCONTINUED | OUTPATIENT
Start: 2021-02-25 | End: 2021-02-26 | Stop reason: HOSPADM

## 2021-02-24 RX ORDER — SODIUM CHLORIDE 450 MG/100ML
50 INJECTION, SOLUTION INTRAVENOUS CONTINUOUS
Status: CANCELLED | OUTPATIENT
Start: 2021-02-24 | End: 2021-02-25

## 2021-02-24 RX ORDER — LIDOCAINE HYDROCHLORIDE 10 MG/ML
INJECTION INFILTRATION; PERINEURAL AS NEEDED
Status: DISCONTINUED | OUTPATIENT
Start: 2021-02-24 | End: 2021-02-24 | Stop reason: HOSPADM

## 2021-02-24 RX ORDER — SODIUM CHLORIDE 0.9 % (FLUSH) 0.9 %
5-40 SYRINGE (ML) INJECTION EVERY 8 HOURS
Status: DISCONTINUED | OUTPATIENT
Start: 2021-02-24 | End: 2021-02-26 | Stop reason: HOSPADM

## 2021-02-24 RX ORDER — MIDAZOLAM HYDROCHLORIDE 1 MG/ML
INJECTION, SOLUTION INTRAMUSCULAR; INTRAVENOUS AS NEEDED
Status: DISCONTINUED | OUTPATIENT
Start: 2021-02-24 | End: 2021-02-24 | Stop reason: HOSPADM

## 2021-02-24 RX ORDER — SODIUM CHLORIDE 0.9 % (FLUSH) 0.9 %
5-40 SYRINGE (ML) INJECTION AS NEEDED
Status: DISCONTINUED | OUTPATIENT
Start: 2021-02-24 | End: 2021-02-26 | Stop reason: HOSPADM

## 2021-02-24 RX ORDER — ACETAMINOPHEN 325 MG/1
650 TABLET ORAL
Status: DISCONTINUED | OUTPATIENT
Start: 2021-02-24 | End: 2021-02-26 | Stop reason: HOSPADM

## 2021-02-24 RX ORDER — SODIUM CHLORIDE 9 MG/ML
INJECTION, SOLUTION INTRAVENOUS
Status: COMPLETED | OUTPATIENT
Start: 2021-02-24 | End: 2021-02-24

## 2021-02-24 RX ORDER — ASPIRIN 81 MG/1
81 TABLET ORAL DAILY
Status: DISCONTINUED | OUTPATIENT
Start: 2021-02-24 | End: 2021-02-25

## 2021-02-24 RX ORDER — FENTANYL CITRATE 50 UG/ML
INJECTION, SOLUTION INTRAMUSCULAR; INTRAVENOUS AS NEEDED
Status: DISCONTINUED | OUTPATIENT
Start: 2021-02-24 | End: 2021-02-24 | Stop reason: HOSPADM

## 2021-02-24 RX ADMIN — METHYLPREDNISOLONE SODIUM SUCCINATE 40 MG: 40 INJECTION, POWDER, FOR SOLUTION INTRAMUSCULAR; INTRAVENOUS at 08:47

## 2021-02-24 RX ADMIN — GABAPENTIN 300 MG: 300 CAPSULE ORAL at 08:47

## 2021-02-24 RX ADMIN — METHYLPREDNISOLONE SODIUM SUCCINATE 40 MG: 40 INJECTION, POWDER, FOR SOLUTION INTRAMUSCULAR; INTRAVENOUS at 00:39

## 2021-02-24 RX ADMIN — BUPROPION HYDROCHLORIDE 150 MG: 150 TABLET, FILM COATED, EXTENDED RELEASE ORAL at 08:47

## 2021-02-24 RX ADMIN — POLYETHYLENE GLYCOL 3350 17 G: 17 POWDER, FOR SOLUTION ORAL at 00:40

## 2021-02-24 RX ADMIN — CARVEDILOL 25 MG: 12.5 TABLET, FILM COATED ORAL at 08:47

## 2021-02-24 RX ADMIN — ONDANSETRON 4 MG: 2 INJECTION INTRAMUSCULAR; INTRAVENOUS at 08:47

## 2021-02-24 RX ADMIN — Medication 10 ML: at 22:16

## 2021-02-24 RX ADMIN — OXYBUTYNIN CHLORIDE 5 MG: 5 TABLET ORAL at 08:47

## 2021-02-24 RX ADMIN — PRAVASTATIN SODIUM 40 MG: 40 TABLET ORAL at 22:15

## 2021-02-24 RX ADMIN — SORBITOL SOLUTION (BULK) 30 ML: 70 SOLUTION at 08:48

## 2021-02-24 RX ADMIN — CLOPIDOGREL BISULFATE 75 MG: 75 TABLET ORAL at 08:47

## 2021-02-24 RX ADMIN — THEOPHYLLINE ANHYDROUS 600 MG: 400 CAPSULE, EXTENDED RELEASE ORAL at 08:47

## 2021-02-24 RX ADMIN — TRAMADOL HYDROCHLORIDE 50 MG: 50 TABLET, FILM COATED ORAL at 00:39

## 2021-02-24 RX ADMIN — OXYBUTYNIN CHLORIDE 5 MG: 5 TABLET ORAL at 22:15

## 2021-02-24 RX ADMIN — NITROGLYCERIN 0.5 INCH: 20 OINTMENT TOPICAL at 10:54

## 2021-02-24 RX ADMIN — GABAPENTIN 300 MG: 300 CAPSULE ORAL at 22:15

## 2021-02-24 RX ADMIN — DILTIAZEM HYDROCHLORIDE 120 MG: 120 CAPSULE, COATED, EXTENDED RELEASE ORAL at 08:48

## 2021-02-24 NOTE — CONSULTS
Gastroenterology Consult     Referring Physician: Marilynn Riojas MD     Consult Date: 2/24/2021     Subjective:     Chief Complaint: chest pain and shortness of breath    History of Present Illness: Scott Farrar is a 72 y.o. female who is seen in consultation for RUQ pain. Patient was admitted to the hospital with complains of chest pain and shortness of breath. Per H&P note, patient reported progressively worsening shortness of breath with rapid heart rate over 2 days. She was found to have Aflutter in the ED With -120's. Cardizem was given and she responded well. She has a history of CHF, CAD, DM, HLD, HTN, and IFTIKHAR. She had a CTA in Nov with negative PE. Patient seen in room, family at bedside. She's complaining of right sided abdominal pain that started yesterday. She also complained of sharp pain from the right side of her neck. Staff called a STEMI alert this morning. She is to get heart cath this afternoon. .   2/25 - Abd xray showed stool through colon. No bowel obstruction, no free air.  2/25 - US of abdomen with no acute findings. ALT normal at 20, AST low at 4, alkaphos elevated at 147. Normal lipase and amylase yesterday.      Past Medical History:   Diagnosis Date    Congestive heart disease ([address]     Coronary artery disease     Diabetes ([address]     Hyperlipidemia     Hypertension     IFTIKHAR (obstructive sleep apnea)      Past Surgical History:   Procedure Laterality Date    HX BREAST BIOPSY      HX CHOLECYSTECTOMY      HX CORONARY ARTERY BYPASS GRAFT      HX HERNIA REPAIR      HX HYSTERECTOMY        Family History   Problem Relation Age of Onset    Heart Disease Mother     Kidney Disease Mother     Cancer Maternal Grandmother      Social History     Tobacco Use    Smoking status: Former Smoker    Smokeless tobacco: Never Used   Substance Use Topics    Alcohol use: Not Currently     Frequency: Never      Allergies   Allergen Reactions    Tylox [Oxycodone-Acetaminophen] Hives     Current Facility-Administered Medications   Medication Dose Route Frequency    aspirin delayed-release tablet 81 mg  81 mg Oral DAILY    [START ON 2/25/2021] predniSONE (DELTASONE) tablet 20 mg  20 mg Oral DAILY WITH BREAKFAST    heparinized saline 2 units/mL infusion    CONTINUOUS    heparinized saline 2 units/mL infusion    CONTINUOUS    heparinized saline 2 units/mL infusion    CONTINUOUS    fentaNYL citrate (PF) injection    PRN    midazolam (PF) (VERSED) injection    PRN    lidocaine (XYLOCAINE) 10 mg/mL (1 %) injection    PRN    0.9% sodium chloride infusion    CONTINUOUS    iopamidoL (ISOVUE-370) 76 % injection    PRN    theophylline ER (BAM-24) capsule 600 mg  600 mg Oral DAILY    dilTIAZem ER (CARDIZEM CD) capsule 120 mg  120 mg Oral DAILY    sorbitoL 70 % solution 30 mL  30 mL Oral DAILY PRN    carvediloL (COREG) tablet 25 mg  25 mg Oral BID WITH MEALS    clopidogreL (PLAVIX) tablet 75 mg  75 mg Oral DAILY    [Held by provider] apixaban (ELIQUIS) tablet 5 mg  5 mg Oral Q12H    buPROPion XL (WELLBUTRIN XL) tablet 150 mg  150 mg Oral 7am    gabapentin (NEURONTIN) capsule   Oral TID    oxybutynin (DITROPAN) tablet 5 mg  5 mg Oral TID    pravastatin (PRAVACHOL) tablet 40 mg  40 mg Oral QHS    polyethylene glycol (MIRALAX) packet 17 g  17 g Oral DAILY PRN    ondansetron (ZOFRAN ODT) tablet 4 mg  4 mg Oral Q8H PRN    Or    ondansetron (ZOFRAN) injection 4 mg  4 mg IntraVENous Q6H PRN        Review of Systems:  A detailed 10 organ review of systems is obtained with pertinent positives as listed in the History of Present Illness and Past Medical History. All others are negative. Objective:     Physical Exam:  Visit Vitals  /60   Pulse (!) 45   Temp 98 °F (36.7 °C)   Resp 18   Ht 5' 3\" (1.6 m)   Wt 109 kg (240 lb 4.8 oz)   SpO2 94%   BMI 42.57 kg/m²        Skin:  Extremities and face reveal no rashes. No del valle erythema. No telangiectasias on the chest wall.   HEENT: Sclerae anicteric. Extra-occular muscles are intact. No oral ulcers.  No abnormal pigmentation of the lips. The neck is supple.  Cardiovascular: Regular rate and rhythm. No murmurs, gallops, or rubs. PMI nondisplaced. Carotids without bruits.  Respiratory:  Comfortable breathing with no accessory muscle use. Clear breath sounds with no wheezes, rales, or rhonchi.  GI:  Abdomen nondistended, soft, and nontender.  Normal active bowel sounds. No enlargement of the liver or spleen. No masses palpable.  Rectal:  Deferred  Musculoskeletal:  No pitting edema of the lower legs. Extremities have good range of motion.  No costovertebral tenderness.  Neurological:  Gross memory appears intact.  Patient is alert and oriented.  Psychiatric:  Mood appears appropriate with judgement intact.  Lymphatic:  No cervical or supraclavicular adenopathy.    Lab/Data Review:  Recent Results (from the past 24 hour(s))   CBC W/O DIFF    Collection Time: 02/24/21  9:56 AM   Result Value Ref Range    WBC 20.8 (H) 3.6 - 11.0 K/uL    RBC 4.74 3.80 - 5.20 M/uL    HGB 12.7 11.5 - 16.0 g/dL    HCT 43.4 35.0 - 47.0 %    MCV 91.6 80.0 - 99.0 FL    MCH 26.8 26.0 - 34.0 PG    MCHC 29.3 (L) 30.0 - 36.5 g/dL    RDW 15.9 (H) 11.5 - 14.5 %    PLATELET 240 150 - 400 K/uL    MPV 13.2 (H) 8.9 - 12.9 FL   METABOLIC PANEL, COMPREHENSIVE    Collection Time: 02/24/21  9:56 AM   Result Value Ref Range    Sodium 138 136 - 145 mmol/L    Potassium 4.5 3.5 - 5.1 mmol/L    Chloride 100 97 - 108 mmol/L    CO2 30 21 - 32 mmol/L    Anion gap 8 5 - 15 mmol/L    Glucose 221 (H) 65 - 100 mg/dL    BUN 47 (H) 6 - 20 mg/dL    Creatinine 2.12 (H) 0.55 - 1.02 mg/dL    BUN/Creatinine ratio 22 (H) 12 - 20      GFR est AA 28 (L) >60 ml/min/1.73m2    GFR est non-AA 23 (L) >60 ml/min/1.73m2    Calcium 9.0 8.5 - 10.1 mg/dL    Bilirubin, total 0.6 0.2 - 1.0 mg/dL    AST (SGOT) 4 (L) 15 - 37 U/L    ALT (SGPT) 20 12 - 78 U/L    Alk. phosphatase 147 (H) 45 - 117 U/L    Protein, total 7.5  6.4 - 8.2 g/dL    Albumin 3.3 (L) 3.5 - 5.0 g/dL    Globulin 4.2 (H) 2.0 - 4.0 g/dL    A-G Ratio 0.8 (L) 1.1 - 2.2     EKG, 12 LEAD, SUBSEQUENT    Collection Time: 02/24/21 10:44 AM   Result Value Ref Range    Ventricular Rate 85 BPM    Atrial Rate 255 BPM    QRS Duration 100 ms    Q-T Interval 408 ms    QTC Calculation (Bezet) 485 ms    Calculated R Axis 50 degrees    Calculated T Axis 103 degrees    Diagnosis       Atrial flutter with variable A-V block  Abnormal ECG  When compared with ECG of 22-FEB-2021 12:29,  No significant change was found  Confirmed by Bennie Cabrera MD, Sterling French [id number]) on 2/24/2021 1:53:00 PM     TROPONIN I    Collection Time: 02/24/21 11:02 AM   Result Value Ref Range    Troponin-I, Qt. 0.12 (H) <0.05 ng/mL        XR CHEST PORT   Final Result   Possible hydrostatic edema. No significant interval change. US ABD COMP   Final Result   Limited. 1. No acute findings. XR ABD (KUB)   Final Result      XR CHEST PORT   Final Result   FINDINGS: IMPRESSION: Frontal single view chest.      Median sternotomy status post CABG. Calcified aorta. Unchanged cardiomegaly   and/or pericardial effusion. The lungs are similarly inflated. Unchanged mild elevation of the right   hemidiaphragm. Minimal hydrostatic edema. No consolidation, effusion, or   pneumothorax. No free air under the diaphragm. No acute bony findings. Assessment/Plan:   Right sided abdominal pain, possibly a non-chest symptom of MI. US of abdomen shows no acute finding. Abd xray with stools in the colon, no bowel obstruction.  -Repeat CMP in the morning to monitor liver enzymes. We will follow along.        Active Problems:    Atrial flutter ([address] (2/22/2021)      CHF (congestive heart failure) ([address] (2/22/2021)         IP CONSULT TO CARDIOLOGY  IP CONSULT TO CARDIOLOGY  IP CONSULT TO PULMONOLOGY  IP CONSULT TO GASTROENTEROLOGY  IP CONSULT TO NEPHROLOGY      Patient discussed with Dr Chanell Winter and agrees to above impression and plan.   Thank you for allowing me to participate in this patients care    Seretha Staff FNP-C  Cc Referring Physician   Murali Andrade MD

## 2021-02-24 NOTE — PROGRESS NOTES
General Daily Progress Note          Patient Name:   Darren Padilla       YOB: 1950       Age:  72 y.o. Admit Date: 2/22/2021      Subjective:     Patient was admitted for acute exacerbation of COPD and acute on chronic HFpEF. She is sitting up alert and oriented. States her breathing is still labored on exertion. She also complains of recurrent abdominal pain. She has been getting sorbitol but states she has not had a bowel movement. Sent for transabdominal US last night due to abdominal pain that showed no acute findings. KUB showed stool through the colon  Blood pressure this morning 111/55  O2 sat 95% on 3 L O2  Pending labs today      Objective:     Visit Vitals  /60   Pulse (!) 45   Temp 98 °F (36.7 °C)   Resp 18   Ht 5' 3\" (1.6 m)   Wt 109 kg (240 lb 4.8 oz)   SpO2 96%   BMI 42.57 kg/m²        Recent Results (from the past 24 hour(s))   CBC W/O DIFF    Collection Time: 02/24/21  9:56 AM   Result Value Ref Range    WBC 20.8 (H) 3.6 - 11.0 K/uL    RBC 4.74 3.80 - 5.20 M/uL    HGB 12.7 11.5 - 16.0 g/dL    HCT 43.4 35.0 - 47.0 %    MCV 91.6 80.0 - 99.0 FL    MCH 26.8 26.0 - 34.0 PG    MCHC 29.3 (L) 30.0 - 36.5 g/dL    RDW 15.9 (H) 11.5 - 14.5 %    PLATELET 860 880 - 935 K/uL    MPV 13.2 (H) 8.9 - 40.2 FL   METABOLIC PANEL, COMPREHENSIVE    Collection Time: 02/24/21  9:56 AM   Result Value Ref Range    Sodium 138 136 - 145 mmol/L    Potassium 4.5 3.5 - 5.1 mmol/L    Chloride 100 97 - 108 mmol/L    CO2 30 21 - 32 mmol/L    Anion gap 8 5 - 15 mmol/L    Glucose 221 (H) 65 - 100 mg/dL    BUN 47 (H) 6 - 20 mg/dL    Creatinine 2.12 (H) 0.55 - 1.02 mg/dL    BUN/Creatinine ratio 22 (H) 12 - 20      GFR est AA 28 (L) >60 ml/min/1.73m2    GFR est non-AA 23 (L) >60 ml/min/1.73m2    Calcium 9.0 8.5 - 10.1 mg/dL    Bilirubin, total 0.6 0.2 - 1.0 mg/dL    AST (SGOT) 4 (L) 15 - 37 U/L    ALT (SGPT) 20 12 - 78 U/L    Alk.  phosphatase 147 (H) 45 - 117 U/L    Protein, total 7.5 6.4 - 8.2 g/dL Albumin 3.3 (L) 3.5 - 5.0 g/dL    Globulin 4.2 (H) 2.0 - 4.0 g/dL    A-G Ratio 0.8 (L) 1.1 - 2.2     TROPONIN I    Collection Time: 02/24/21 11:02 AM   Result Value Ref Range    Troponin-I, Qt. 0.12 (H) <0.05 ng/mL     [unfilled]      Review of Systems    Constitutional: Negative for chills and fever. HENT: Negative. Eyes: Negative. Respiratory: Positive for dyspnea on exertion    Cardiovascular: Negative. Gastrointestinal: Positive for abdominal pain  Skin: Negative. Neurological: Weakness/ heaviness of right leg        Physical Exam:      Constitutional: pt is oriented to person, place, and time. HENT:   Head: Normocephalic and atraumatic. Eyes: Pupils are equal, round, and reactive to light. EOM are normal.   Cardiovascular: Normal rate, regular rhythm and normal heart sounds. Pulmonary/Chest: Breath sounds normal. No wheezes. No rales. Exhibits no tenderness. Abdominal: Soft. Bowel sounds are normal. Abdominal tenderness in R and L UQ. There is no rebound and no guarding. Musculoskeletal: Normal range of motion. Neurological: pt is alert and oriented to person, place, and time. XR CHEST PORT   Final Result   Possible hydrostatic edema. No significant interval change. US ABD COMP   Final Result   Limited. 1. No acute findings. XR ABD (KUB)   Final Result      XR CHEST PORT   Final Result   FINDINGS: IMPRESSION: Frontal single view chest.      Median sternotomy status post CABG. Calcified aorta. Unchanged cardiomegaly   and/or pericardial effusion. The lungs are similarly inflated. Unchanged mild elevation of the right   hemidiaphragm. Minimal hydrostatic edema. No consolidation, effusion, or   pneumothorax. No free air under the diaphragm. No acute bony findings.            Recent Results (from the past 24 hour(s))   CBC W/O DIFF    Collection Time: 02/24/21  9:56 AM   Result Value Ref Range    WBC 20.8 (H) 3.6 - 11.0 K/uL    RBC 4.74 3.80 - 5.20 M/uL HGB 12.7 11.5 - 16.0 g/dL    HCT 43.4 35.0 - 47.0 %    MCV 91.6 80.0 - 99.0 FL    MCH 26.8 26.0 - 34.0 PG    MCHC 29.3 (L) 30.0 - 36.5 g/dL    RDW 15.9 (H) 11.5 - 14.5 %    PLATELET 212 427 - 731 K/uL    MPV 13.2 (H) 8.9 - 73.9 FL   METABOLIC PANEL, COMPREHENSIVE    Collection Time: 02/24/21  9:56 AM   Result Value Ref Range    Sodium 138 136 - 145 mmol/L    Potassium 4.5 3.5 - 5.1 mmol/L    Chloride 100 97 - 108 mmol/L    CO2 30 21 - 32 mmol/L    Anion gap 8 5 - 15 mmol/L    Glucose 221 (H) 65 - 100 mg/dL    BUN 47 (H) 6 - 20 mg/dL    Creatinine 2.12 (H) 0.55 - 1.02 mg/dL    BUN/Creatinine ratio 22 (H) 12 - 20      GFR est AA 28 (L) >60 ml/min/1.73m2    GFR est non-AA 23 (L) >60 ml/min/1.73m2    Calcium 9.0 8.5 - 10.1 mg/dL    Bilirubin, total 0.6 0.2 - 1.0 mg/dL    AST (SGOT) 4 (L) 15 - 37 U/L    ALT (SGPT) 20 12 - 78 U/L    Alk. phosphatase 147 (H) 45 - 117 U/L    Protein, total 7.5 6.4 - 8.2 g/dL    Albumin 3.3 (L) 3.5 - 5.0 g/dL    Globulin 4.2 (H) 2.0 - 4.0 g/dL    A-G Ratio 0.8 (L) 1.1 - 2.2     TROPONIN I    Collection Time: 02/24/21 11:02 AM   Result Value Ref Range    Troponin-I, Qt. 0.12 (H) <0.05 ng/mL       Results     ** No results found for the last 336 hours. **           Labs:     Recent Labs     02/24/21  0956 02/23/21  0600   WBC 20.8* 11.4*   HGB 12.7 12.6   HCT 43.4 41.9    230     Recent Labs     02/24/21  0956 02/23/21  0600 02/22/21  0930    139 143   K 4.5 4.5 4.0    102 107   CO2 30 31 32   BUN 47* 25* 20   CREA 2.12* 1.64* 1.33*   * 237* 170*   CA 9.0 9.4 9.1     Recent Labs     02/24/21  0956 02/23/21  0600 02/22/21  0930   ALT 20 22 24   * 151* 154*   TBILI 0.6 1.2* 0.8   TP 7.5 7.3 7.3   ALB 3.3* 3.2* 3.3*   GLOB 4.2* 4.1* 4.0   AML  --  34  --    LPSE  --  84  --      No results for input(s): INR, PTP, APTT, INREXT, INREXT in the last 72 hours. No results for input(s): FE, TIBC, PSAT, FERR in the last 72 hours.    No results found for: FOL, RBCF   No results for input(s): PH, PCO2, PO2 in the last 72 hours.   Recent Labs     02/24/21  1102 02/22/21  1700 02/22/21  1245   TROIQ 0.12* 0.12* 0.14*     No results found for: CHOL, CHOLX, CHLST, CHOLV, HDL, HDLP, LDL, LDLC, DLDLP, TGLX, TRIGL, TRIGP, CHHD, CHHDX  Lab Results   Component Value Date/Time    Glucose (POC) 214 (H) 11/05/2020 03:53 PM    Glucose (POC) 255 (H) 11/05/2020 11:30 AM    Glucose (POC) 176 (H) 11/05/2020 07:42 AM    Glucose (POC) 186 (H) 11/04/2020 10:57 PM    Glucose (POC) 236 (H) 11/04/2020 04:06 PM     No results found for: COLOR, APPRN, SPGRU, REFSG, NERIS, PROTU, GLUCU, KETU, BILU, UROU, ELOISA, LEUKU, GLUKE, EPSU, BACTU, WBCU, RBCU, CASTS, UCRY      Assessment:   Acute non-Q wave MI  Acute exacerbation of COPD  Acute on chronic hypoxic respiratory failure  Acute on chronic HFpEF  Atrial flutter with RVR  History of CAD and CABG  Acute on chronic kidney disease  IFTIKHAR  Overactive bladder  Hypercholesterolemia  Morbid obesity  History of TIA  Constipation      Plan:     Eliquis held for cardiac catheterization on Friday  Patient on wellbutrin, coreg, plavix, neurontin, ditropan, pravastatin, and theophylline  Wean off Cardizem drip to start on PO Cardiazem  Lasix held due to azotemia  Started on IV Solu-Medrol  Diet NPO  Nephrology consult    Seen by OT  GI will follow  Pending labs today        Current Facility-Administered Medications:     aspirin delayed-release tablet 81 mg, 81 mg, Oral, DAILY, Spencer, Nicholos Crumble, MD [facility]] predniSONE (DELTASONE) tablet 20 mg, 20 mg, Oral, DAILY WITH BREAKFAST, Damon Savage MD    theophylline ER (BAM-24) capsule 600 mg, 600 mg, Oral, DAILY, Gopal Alvarado MD, 600 mg at 02/24/21 0847    dilTIAZem ER (CARDIZEM CD) capsule 120 mg, 120 mg, Oral, DAILY, Nury Momin MD, 120 mg at 02/24/21 0848    sorbitoL 70 % solution 30 mL, 30 mL, Oral, DAILY PRN, Gopal Alvarado MD, 30 mL at 02/24/21 0848    carvediloL (COREG) tablet 25 mg, 25 mg, Oral, BID WITH MEALS, Gopal Alvarado MD, 25 mg at 02/24/21 0847    clopidogreL (PLAVIX) tablet 75 mg, 75 mg, Oral, DAILY, Gopal Alvarado MD, 75 mg at 02/24/21 0847    [Held by provider] apixaban (ELIQUIS) tablet 5 mg, 5 mg, Oral, Q12H, Gopal Alvarado MD, 5 mg at 02/23/21 0947    buPROPion XL (WELLBUTRIN XL) tablet 150 mg, 150 mg, Oral, 7am, Gopal Alvarado MD, 150 mg at 02/24/21 8206    gabapentin (NEURONTIN) capsule, , Oral, TID, Mohiuddin, Gretel Lundborg, MD, 300 mg at 02/24/21 0847    oxybutynin (DITROPAN) tablet 5 mg, 5 mg, Oral, TID, Mohiuddin, Gretel Lundborg, MD, 5 mg at 02/24/21 0847    pravastatin (PRAVACHOL) tablet 40 mg, 40 mg, Oral, QHS, Gopal Alvarado MD, Stopped at 02/23/21 2200    polyethylene glycol (MIRALAX) packet 17 g, 17 g, Oral, DAILY PRN, Gopal Alvarado MD, 17 g at 02/24/21 0040    ondansetron (ZOFRAN ODT) tablet 4 mg, 4 mg, Oral, Q8H PRN **OR** ondansetron (ZOFRAN) injection 4 mg, 4 mg, IntraVENous, Q6H PRN, Gopal Alvarado MD, 4 mg at 02/24/21 2360

## 2021-02-24 NOTE — CONSULTS
PULMONARY NOTE  [facility]    Name: Pam Whitaker MRN: [id number]   : 1950 Hospital: [address] [place] [address]   Date: 2021  Admission date: 2021 Hospital Day: 3       HPI:     Hospital Problems  Date Reviewed: 2020          Codes Class Noted POA    Atrial flutter ([facility] ICD-10-CM: B91.31  ICD-9-CM: 427.32  2021 Unknown        CHF (congestive heart failure) ([facility] ICD-10-CM: I50.9  ICD-9-CM: 428.0  2021 Unknown                   [x] High complexity decision making was performed  [x] See my orders for details      Subjective/Initial History:     I was asked by Sal Oakley MD to see Pam Whitaker  a 72 y.o.  female in consultation     Excerpts from admission 2021 or consult notes as follows:   23-year-old lady came in because of shortness of breath and dyspnea and heart rate was around 110 220.   Significant past medical history of COPD obstructive sleep apnea coronary disease CABG x2 atrial flutter HF PEF she continues to have shortness of breath for the past couple of days and then she was having palpitation with heart rate was around 120 came to emergency room as an STEMI alert which was canceled her BNP is elevated so right now she is on oxygen nasal cannula she wears oxygen at home and CPAP at night so admitted and pulmonary consult was called for further evaluation      Allergies   Allergen Reactions    Tylox [Oxycodone-Acetaminophen] Hives        MAR reviewed and pertinent medications noted or modified as needed     Current Facility-Administered Medications   Medication    aspirin delayed-release tablet 81 mg    theophylline ER (BAM-24) capsule 600 mg    dilTIAZem ER (CARDIZEM CD) capsule 120 mg    sorbitoL 70 % solution 30 mL    carvediloL (COREG) tablet 25 mg    clopidogreL (PLAVIX) tablet 75 mg    [Held by provider] apixaban (ELIQUIS) tablet 5 mg    buPROPion XL (WELLBUTRIN XL) tablet 150 mg    gabapentin (NEURONTIN) capsule    oxybutynin (DITROPAN) tablet 5 mg    pravastatin (PRAVACHOL) tablet 40 mg    polyethylene glycol (MIRALAX) packet 17 g    ondansetron (ZOFRAN ODT) tablet 4 mg    Or    ondansetron (ZOFRAN) injection 4 mg    methylPREDNISolone (PF) (SOLU-MEDROL) injection 40 mg      Patient PCP: Radha Robert MD  PMH:  has a past medical history of Congestive heart disease ([facility] Coronary artery disease, Diabetes ([facility] Hyperlipidemia, Hypertension, and IFTIKHAR (obstructive sleep apnea). PSH:   has a past surgical history that includes hx cholecystectomy; hx hysterectomy; hx hernia repair; hx coronary artery bypass graft; and hx breast biopsy. FHX: family history includes Cancer in her maternal grandmother; Heart Disease in her mother; Kidney Disease in her mother. SHX:  reports that she has quit smoking. She has never used smokeless tobacco. She reports previous alcohol use. She reports that she does not use drugs. ROS:    Review of Systems   Constitutional: Positive for malaise/fatigue. HENT: Negative. Eyes: Negative. Respiratory: Positive for shortness of breath. Cardiovascular: Positive for palpitations. Gastrointestinal: Negative. Genitourinary: Negative. Musculoskeletal: Negative. Skin: Negative. Endo/Heme/Allergies: Negative. Psychiatric/Behavioral: Negative.          Objective:     Vital Signs: Telemetry:    AFIB Intake/Output:   Visit Vitals  BP (!) 111/55   Pulse 95   Temp 98.5 °F (36.9 °C)   Resp 18   Ht 5' 3\" (1.6 m)   Wt 109 kg (240 lb 4.8 oz)   SpO2 96%   BMI 42.57 kg/m²       Temp (24hrs), Av.4 °F (36.9 °C), Min:98.3 °F (36.8 °C), Max:98.5 °F (36.9 °C)        O2 Device: Nasal cannula O2 Flow Rate (L/min): 2 l/min(weaned to 2 lpm nc)       Wt Readings from Last 4 Encounters:   21 109 kg (240 lb 4.8 oz)   20 158.7 kg (349 lb 13.9 oz)          Intake/Output Summary (Last 24 hours) at 2021 1057  Last data filed at 2021 1809  Gross per 24 hour Intake --   Output 600 ml   Net -600 ml       Last shift:      No intake/output data recorded. Last 3 shifts: 02/22 1901 - 02/24 0700  In: 603.1 [P.O.:480; I.V.:123.1]  Out: 2300 [Urine:2300]       Physical Exam:     Physical Exam   Constitutional: She appears distressed. HENT:   Head: Normocephalic and atraumatic. Eyes: Pupils are equal, round, and reactive to light. Conjunctivae and EOM are normal.   Neck: Normal range of motion. Neck supple. Cardiovascular:   Irregular heart rate   Pulmonary/Chest: She is in respiratory distress. She has rales. Abdominal: Soft. Bowel sounds are normal.   Musculoskeletal: Normal range of motion. Neurological: She is alert. Skin: Skin is warm. Psychiatric: She has a normal mood and affect. Labs:    Recent Labs     02/23/21  0600 02/22/21  0930   WBC 11.4* 8.7   HGB 12.6 12.6    192     Recent Labs     02/23/21  0600 02/22/21  0930    143   K 4.5 4.0    107   CO2 31 32   * 170*   BUN 25* 20   CREA 1.64* 1.33*   CA 9.4 9.1   ALB 3.2* 3.3*   ALT 22 24   AML 34  --    LPSE 84  --      No results for input(s): PH, PCO2, PO2, HCO3, FIO2 in the last 72 hours. Recent Labs     02/22/21  1700 02/22/21  1245 02/22/21  0930   TROIQ 0.12* 0.14* 0.14*     No results found for: BNPP, BNP   No results found for: CULTNo results found for: TSH, TSHEXT, TSHEXT    Imaging:    CXR Results  (Last 48 hours)    None        Results from Hospital Encounter encounter on 02/22/21   XR ABD (KUB)    Narrative Abdomen, 2 views    Stool through colon. No plain film evidence for bowel obstruction. On submitted  supine images, no free air is evident. XR CHEST PORT    Narrative STEMI alert. Comparison chest x-ray 11/2/2020. Impression FINDINGS: IMPRESSION: Frontal single view chest.    Median sternotomy status post CABG. Calcified aorta. Unchanged cardiomegaly  and/or pericardial effusion. The lungs are similarly inflated.  Unchanged mild elevation of the right  hemidiaphragm. Minimal hydrostatic edema. No consolidation, effusion, or  pneumothorax. No free air under the diaphragm. No acute bony findings. Results from [place] encounter on 11/02/20   XR CHEST PORT    Narrative AP portable chest, 1859 hours. Comparison: 5/11/2020. Findings: Cardiac monitoring leads overlie the chest. The patient is status post  median sternotomy. There is moderate cardiomegaly. There is moderate calcified  atherosclerotic disease within the thoracic aorta. The sameer and pulmonary  vasculature are unremarkable. The lungs are well expanded without focal  consolidation, pleural effusion or pneumothorax. There are senescent changes  within the spine. Impression Impression:   No acute cardiopulmonary process. Results from [place] encounter on 11/02/20   CTA CHEST W OR W WO CONT    Narrative Study: CTA Chest.    History: Rule out PE. Comparison: Chest CT 5/6/2020. Chest x-ray 11 2020. Technique: CT volume acquisition of the chest was performed during the pulmonary  arterial phase following the administration of 100 mL of Isovue-370 IV contrast.  Axial, sagittal, coronal, and MIP reconstructions were reviewed. Dose reduction:  All CT scans at this facility are performed using dose reduction optimization  techniques as appropriate to a performed exam including the following: Automated  exposure control, adjustments of the mA and/or kV according to patient size, or  use of iterative reconstruction technique. Findings:    Vessels: No evidence of pulmonary was within limitations. The distal  subsegmental branches in the lower lobes are not well evaluated due to  respiratory motion artifact. Lungs/Pleura: Mild dependent bibasilar atelectasis. No consolidative airspace  disease, pleural effusion or pneumothorax. Central airways are clear of debris. No discrete lung nodule. Mediastinum/Cardiac: Heart is enlarged.  Severe coronary atherosclerosis. Status  post CABG. Moderate aortic atherosclerosis. Normal caliber main pulmonary artery  and thoracic aorta. No pericardial effusion. Under distended thoracic esophagus. Thyroid: Visualized thyroid is grossly unremarkable. Lymph nodes: No intrathoracic lymphadenopathy by CT size criteria. Upper Abdomen: Visualized upper abdominal visceral structures are grossly  unremarkable. Bones/Chest wall soft tissues: Mild degenerative changes of the visualized  spine. No acute osseous abnormality identified. Impression Impression:  1. No evidence of central pulmonary embolism. Limited evaluation of the distal  subsegmental branch vessels due to respiratory motion artifact. 2.  No acute intrathoracic abnormality identified. 3.  Severe atherosclerosis. Status post CABG. Cardiomegaly. 4.  See full report for detailed findings. IMPRESSION:   1. Acute on chronic hypoxic respiratory failure  Chronic Obstructive Pulmonary Disease   Body mass index is 42.57 kg/m². 2. Acute on chronic HFpEF  3. Atrial fibrillation flutter RVR  4. NSTEMI  5. Obstructive sleep apnea sleep apnea syndrome  6. History of coronary artery disease history of CABG in the past  7. Pt is requiring Drug therapy requiring intensive monitoring for toxicity  8. Pt is unstable, unpredictable needing inpatient monitoring; is acutely ill and at high risk of sudden decline and decompensation with severe consequenses and continued end organ dysfunction and failure  9. Prognosis guarded       RECOMMENDATIONS/PLAN:     1. She is chronically on home oxygen 3 L nasal cannula day and night  2. Patient on IV Solu-Medrol nebulizer treatment she is already on Eliquis will discontinue Solu-Medrol start patient on prednisone  3. She is on CPAP at home at night and during the daytime she is on nasal cannula 4 L  4.  Her ejection fraction is about 50% patient was started on IV Cardizem for atrial flutter continue with beta-blockers CT chest negative for pulmonary embolism  5. For cardiac catheterization   6. Supplemental O2 to keep sats > 93%  7. Aspiration precautions  8. Labs to follow electrolytes, renal function and and blood counts  9. Glucose monitoring and SSI  10. Bronchial hygiene with respiratory therapy techniques, bronchodilators  11.  DVT, SUP prophylaxis             Marine Saavedra MD

## 2021-02-24 NOTE — PROGRESS NOTES
OCCUPATIONAL THERAPY TREATMENT  Patient: Stanislav Mars (63 y.o. female)  Date: 2/24/2021  Diagnosis: Atrial flutter ([facility] [I48.92]  CHF (congestive heart failure) ([facility] [I50.9] <principal problem not specified>       Precautions: Fall risk   Chart, occupational therapy assessment, plan of care, and goals were reviewed. ASSESSMENT  Patient continues with skilled OT services and is progressing towards goals. Pt continues to present with overall deficits in generalized strength, functional activity tolerance, dynamic sitting/standing balance and pain impacting ADLs/functional transfers/mobility. Pt received semi-supine in bed upon arrival, on 3L O2 via NC. Pt agreeable to working with OT at this time requesting assistance to get to [facility]. Pt transfers supine>sit with CGA to EOB and sit><stand with min A using RW and gait belt. Pt denies any dizziness or SOB, cues for safe hand placement provided following descent onto commode. Pt requires min A for toileting task/bowel hygiene, able to complete bladder hygiene s/p setup with CGA in standing. Pt returns to chair at bedside with CGA. Pt reports intermittent sharp pain in neck once seated in chair, decreasing with time, however RN immediately made aware at end of session. Overall, pt tolerates session well today. She would benefit from continue skilled OT services to address impairments and progress towards goals. Pt states she will also have assist from her granddaughter and grandson at home. OT recommending d/c home with [facility] and family assist once medically appropriate. Other factors to consider for discharge: Family support, DME, time since onset, severity of deficits         PLAN :  Patient continues to benefit from skilled intervention to address the above impairments. Continue treatment per established plan of care. to address goals.     Recommend with staff: Up to chair for meals    Recommend next OT session: LB dressing    Recommendation for discharge: (in order for the patient to meet his/her long term goals)  HHOT with family assist    This discharge recommendation:  Has been made in collaboration with the attending provider and/or case management    IF patient discharges home will need the following DME: TBD       SUBJECTIVE:   Patient stated I would really like to try to use the bathroom.     OBJECTIVE DATA SUMMARY:   Cognitive/Behavioral Status:  Neurologic State: Alert     Cognition: Follows commands     Functional Mobility and Transfers for ADLs:  Bed Mobility:  Rolling: Contact guard assistance  Supine to Sit: Contact guard assistance  Scooting: Contact guard assistance    Transfers:  Sit to Stand: Minimum assistance  Functional Transfers  Bathroom Mobility: Contact guard assistance  Toilet Transfer : Contact guard assistance  Bed to Chair: Contact guard assistance    Balance:  Sitting: Intact  Standing: Impaired; With support  Standing - Static: Good  Standing - Dynamic : Fair;Occasional    ADL Intervention:  Feeding  Drink to Mouth: Independent    Toileting  Toileting Assistance: Minimum assistance  Bladder Hygiene: Contact guard assistance  Bowel Hygiene: Minimum assistance  Adaptive Equipment: Walker    Pain:  8-9/10 abdomen    Activity Tolerance:   Good  Please refer to the flowsheet for vital signs taken during this treatment. After treatment patient left in no apparent distress:   Sitting in chair and Call bell within reach, RN updated    COMMUNICATION/COLLABORATION:   The patients plan of care was discussed with: Registered nurse and Certified nursing assistant/patient care technician.      Theresa Lopez  Time Calculation: 26 mins   Problem: Self Care Deficits Care Plan (Adult)  Goal: *Acute Goals and Plan of Care (Insert Text)  Description: Pt will be SBA sup <> sit in prep for EOB ADLs  Pt will be Mod I grooming sitting EOB  Pt will be min A LE dressing sitting EOB/long sit  Pt will be Mod I sit <>  prep for toileting LRAD  Pt will be Mod I toileting/toilet transfer/cloth mgmt LRAD  Pt will be IND following UE HEP in prep for self care tasks  Outcome: Progressing Towards Goal

## 2021-02-24 NOTE — PROGRESS NOTES
Bedside and Verbal shift change report given to Sherry Velásquez RN (oncoming nurse) by Sue Hernandez RN (offgoing nurse).  Report included the following information SBAR, Kardex, Intake/Output, MAR and Recent Results.

## 2021-02-24 NOTE — PROGRESS NOTES
General Daily Progress Note          Patient Name:   Salvatore Mcneil       YOB: 1950       Age:  72 y.o. Admit Date: 2/22/2021      Subjective:     Patient was admitted for acute exacerbation of COPD and acute on chronic HFpEF. She is sitting up alert and oriented. States her breathing is still labored on exertion. She also complains of recurrent abdominal pain. She has been getting sorbitol but states she has not had a bowel movement. Sent for transabdominal US last night due to abdominal pain that showed no acute findings. KUB showed stool through the colon  Blood pressure this morning 111/55  O2 sat 95% on 3 L O2  Pending labs today      Objective:     Visit Vitals  BP (!) 111/55   Pulse 95   Temp 98.5 °F (36.9 °C)   Resp 18   Ht 5' 3\" (1.6 m)   Wt 109 kg (240 lb 4.8 oz)   SpO2 95%   BMI 42.57 kg/m²        No results found for this or any previous visit (from the past 24 hour(s)). [unfilled]      Review of Systems    Constitutional: Negative for chills and fever. HENT: Negative. Eyes: Negative. Respiratory: Positive for dyspnea on exertion    Cardiovascular: Negative. Gastrointestinal: Positive for abdominal pain  Skin: Negative. Neurological: Weakness/ heaviness of right leg        Physical Exam:      Constitutional: pt is oriented to person, place, and time. HENT:   Head: Normocephalic and atraumatic. Eyes: Pupils are equal, round, and reactive to light. EOM are normal.   Cardiovascular: Normal rate, regular rhythm and normal heart sounds. Pulmonary/Chest: Breath sounds normal. No wheezes. No rales. Exhibits no tenderness. Abdominal: Soft. Bowel sounds are normal. Abdominal tenderness in R and L UQ. There is no rebound and no guarding. Musculoskeletal: Normal range of motion. Neurological: pt is alert and oriented to person, place, and time. US ABD COMP   Final Result   Limited. 1. No acute findings.       XR ABD (KUB)   Final Result      XR CHEST PORT   Final Result   FINDINGS: IMPRESSION: Frontal single view chest.      Median sternotomy status post CABG. Calcified aorta. Unchanged cardiomegaly   and/or pericardial effusion.      The lungs are similarly inflated. Unchanged mild elevation of the right   hemidiaphragm. Minimal hydrostatic edema. No consolidation, effusion, or   pneumothorax.      No free air under the diaphragm.      No acute bony findings.           No results found for this or any previous visit (from the past 24 hour(s)).    Results     ** No results found for the last 336 hours. **           Labs:     Recent Labs     02/23/21  0600 02/22/21  0930   WBC 11.4* 8.7   HGB 12.6 12.6   HCT 41.9 43.0    192     Recent Labs     02/23/21  0600 02/22/21  0930    143   K 4.5 4.0    107   CO2 31 32   BUN 25* 20   CREA 1.64* 1.33*   * 170*   CA 9.4 9.1     Recent Labs     02/23/21  0600 02/22/21  0930   ALT 22 24   * 154*   TBILI 1.2* 0.8   TP 7.3 7.3   ALB 3.2* 3.3*   GLOB 4.1* 4.0   AML 34  --    LPSE 84  --      No results for input(s): INR, PTP, APTT, INREXT, INREXT in the last 72 hours.   No results for input(s): FE, TIBC, PSAT, FERR in the last 72 hours.   No results found for: FOL, RBCF   No results for input(s): PH, PCO2, PO2 in the last 72 hours.  Recent Labs     02/22/21  1700 02/22/21  1245 02/22/21  0930   TROIQ 0.12* 0.14* 0.14*     No results found for: CHOL, CHOLX, CHLST, CHOLV, HDL, HDLP, LDL, LDLC, DLDLP, TGLX, TRIGL, TRIGP, CHHD, CHHDX  Lab Results   Component Value Date/Time    Glucose (POC) 214 (H) 11/05/2020 03:53 PM    Glucose (POC) 255 (H) 11/05/2020 11:30 AM    Glucose (POC) 176 (H) 11/05/2020 07:42 AM    Glucose (POC) 186 (H) 11/04/2020 10:57 PM    Glucose (POC) 236 (H) 11/04/2020 04:06 PM     No results found for: COLOR, APPRN, SPGRU, REFSG, NERIS, PROTU, GLUCU, KETU, BILU, UROU, ELOISA, LEUKU, GLUKE, EPSU, BACTU, WBCU, RBCU, CASTS, UCRY      Assessment:     Acute exacerbation of COPD  Acute on  chronic hypoxic respiratory failure  Acute on chronic HFpEF  Atrial flutter with RVR  History of CAD and CABG  IFTIKHAR  Overactive bladder  Hypercholesterolemia  Morbid obesity  History of TIA  Constipation      Plan:     Eliquis held for cardiac catheterization on Friday  Patient on wellbutrin, coreg, plavix, neurontin, ditropan, pravastatin, and theophylline  Wean off Cardizem drip to start on PO Cardiazem  Lasix held due to azotemia  Started on IV Solu-Medrol  Diet NPO    Seen by OT  GI will follow  Pending labs today        Current Facility-Administered Medications:     theophylline ER (BAM-24) capsule 600 mg, 600 mg, Oral, DAILY, Gopal Alvarado MD, 600 mg at 02/24/21 0847    dilTIAZem ER (CARDIZEM CD) capsule 120 mg, 120 mg, Oral, DAILY, Pranav Sanders MD, 120 mg at 02/24/21 0848    sorbitoL 70 % solution 30 mL, 30 mL, Oral, DAILY PRN, Gopal Alvarado MD, 30 mL at 02/24/21 0848    carvediloL (COREG) tablet 25 mg, 25 mg, Oral, BID WITH MEALS, Zakia Álvarez MD, 25 mg at 02/24/21 0847    clopidogreL (PLAVIX) tablet 75 mg, 75 mg, Oral, DAILY, Zakia Álvarez MD, 75 mg at 02/24/21 0847    [Held by provider] apixaban (ELIQUIS) tablet 5 mg, 5 mg, Oral, Q12H, Gopal Alvarado MD, 5 mg at 02/23/21 0947    buPROPion XL (WELLBUTRIN XL) tablet 150 mg, 150 mg, Oral, 7am, Zakia Álvarez MD, 150 mg at 02/24/21 7775    gabapentin (NEURONTIN) capsule, , Oral, TID, Zakia Álvarez MD, 300 mg at 02/24/21 0847    oxybutynin (DITROPAN) tablet 5 mg, 5 mg, Oral, TID, Zakia Álvarez MD, 5 mg at 02/24/21 0847    pravastatin (PRAVACHOL) tablet 40 mg, 40 mg, Oral, QHS, Gopal Alvarado MD, Stopped at 02/23/21 2200    polyethylene glycol (MIRALAX) packet 17 g, 17 g, Oral, DAILY PRN, Zakia Álvarez MD, 17 g at 02/24/21 0040    ondansetron (ZOFRAN ODT) tablet 4 mg, 4 mg, Oral, Q8H PRN **OR** ondansetron (ZOFRAN) injection 4 mg, 4 mg, IntraVENous, Q6H PRN, Zakia Álvarez MD, 4 mg at 02/24/21 0847   methylPREDNISolone (PF) (SOLU-MEDROL) injection 40 mg, 40 mg, IntraVENous, Q6H, Gopal Alvarado MD, 40 mg at 02/24/21 1870

## 2021-02-24 NOTE — PROGRESS NOTES
Progress Note      2/24/2021 12:24 PM  NAME: Kirit Cotto   MRN:  [id number]   Admit Diagnosis: Atrial flutter ([facility] [address] [I48.92]  CHF (congestive heart failure) ([facility] [address] [I50.9]      Problem List:   Acute non-Q wave MI  Atrial flutter with controlled HR  COPD exacerbation  CHF related to diastolic dysfunction  CAD status post CABG  COPD     Assessment/Plan:   Cardiac catheterization is planned for Friday morning 0800  Hold Eliquis in preparation for cardiac catheterization  Hold IV lasix due to azotemia  Continue on Plavix and ASA  Spoke with daughter and reviewed the plan of care/current diagnoses; explained that cardiac catheterization has been delayed because of Eliquis         [x]       High complexity decision making was performed in this patient at high risk for decompensation with multiple organ involvement. Subjective:     Kirit Cotto reports intermittent throat pain, mild dyspnea. Mild right lower quadrant abdominal discomfort and constipation. Discussed with RN events overnight. Review of Systems:   Negative except for as noted above. Objective:      Physical Exam:    Last 24hrs VS reviewed since prior progress note. Most recent are:    Visit Vitals  BP (!) 111/55   Pulse 95   Temp 98.5 °F (36.9 °C)   Resp 18   Ht 5' 3\" (1.6 m)   Wt 109 kg (240 lb 4.8 oz)   SpO2 95%   BMI 42.57 kg/m²       Intake/Output Summary (Last 24 hours) at 2/24/2021 1010  Last data filed at 2/23/2021 1809  Gross per 24 hour   Intake --   Output 600 ml   Net -600 ml        General Appearance: Alert; no acute distress. Ears/Nose/Mouth/Throat: moist mucous membranes  Neck: Supple. Chest: Lungs clear to auscultation bilaterally. Cardiovascular: Regular rate and rhythm, S1S2 normal, no murmur. Abdomen: Soft, + RLQ tenderness, bowel sounds are active. Extremities: No edema bilaterally. Skin: Warm and dry. []         Post-cath site without hematoma, bruit, tenderness, or thrill. Distal pulses intact.     PMH/SH reviewed - no change compared to H&P    Abdominal Ultrasound 2/23/2021:  FINDINGS: Transabdominal ultrasound.     Proximal IVC normal grayscale. Proximal and mid abdominal aorta normal caliber;  distal aorta obscured by bowel gas.     Visualized portions of pancreas unremarkable but the pancreas is mostly  obscured.     The liver is not well seen, shadowed by bowel gas. Main portal vein normal flow  directionality.     Gallbladder absent. Common bile duct nondilated 0.5 cm.     Right kidney 9.3 cm long axis. Left kidney 10.3 cm long axis. No hydronephrosis,  evident renal stone or solid mass.     The spleen is not well seen, no evident splenomegaly.     No ascites.     IMPRESSION  Limited. 1. No acute findings. Telemetry: Atrial flutter    EKG: Atrial flutter with heart rate in the 80s     []  No new EKG for review    Lab Data Personally Reviewed:    Recent Labs     02/23/21  0600 02/22/21  0930   WBC 11.4* 8.7   HGB 12.6 12.6   HCT 41.9 43.0    192     No results for input(s): INR, PTP, APTT, INREXT, INREXT in the last 72 hours. Recent Labs     02/23/21  0600 02/22/21  0930    143   K 4.5 4.0    107   CO2 31 32   BUN 25* 20   CREA 1.64* 1.33*   * 170*   CA 9.4 9.1     Recent Labs     02/22/21  1700 02/22/21  1245 02/22/21  0930   TROIQ 0.12* 0.14* 0.14*     No results found for: CHOL, CHOLX, CHLST, CHOLV, HDL, HDLP, LDL, LDLC, DLDLP, TGLX, TRIGL, TRIGP, CHHD, CHHDX    Recent Labs     02/23/21  0600 02/22/21  0930   * 154*   TP 7.3 7.3   ALB 3.2* 3.3*   GLOB 4.1* 4.0   AML 34  --    LPSE 84  --      No results for input(s): PH, PCO2, PO2 in the last 72 hours.     Medications Personally Reviewed:    Current Facility-Administered Medications   Medication Dose Route Frequency    theophylline ER (BAM-24) capsule 600 mg  600 mg Oral DAILY    dilTIAZem ER (CARDIZEM CD) capsule 120 mg  120 mg Oral DAILY    sorbitoL 70 % solution 30 mL  30 mL Oral DAILY PRN    carvediloL (COREG) tablet 25 mg  25 mg Oral BID WITH MEALS    clopidogreL (PLAVIX) tablet 75 mg  75 mg Oral DAILY    [Held by provider] apixaban (ELIQUIS) tablet 5 mg  5 mg Oral Q12H    buPROPion XL (WELLBUTRIN XL) tablet 150 mg  150 mg Oral 7am    gabapentin (NEURONTIN) capsule   Oral TID    oxybutynin (DITROPAN) tablet 5 mg  5 mg Oral TID    pravastatin (PRAVACHOL) tablet 40 mg  40 mg Oral QHS    polyethylene glycol (MIRALAX) packet 17 g  17 g Oral DAILY PRN    ondansetron (ZOFRAN ODT) tablet 4 mg  4 mg Oral Q8H PRN    Or    ondansetron (ZOFRAN) injection 4 mg  4 mg IntraVENous Q6H PRN    methylPREDNISolone (PF) (SOLU-MEDROL) injection 40 mg  40 mg IntraVENous Kelsi Romero MD

## 2021-02-24 NOTE — PROGRESS NOTES
Spiritual Care Assessment/Progress Note  [facility]      NAME: Salvatore Mcneil      MRN: [id number]  AGE: 72 y.o. SEX: female  Catholic Affiliation: Buddhism   Language: English     2/24/2021     Total Time (in minutes): 7     Spiritual Assessment begun in [address] through conversation with:         []Patient        [] Family    [] Friend(s)        Reason for Consult: Crisis, Stemi     Spiritual beliefs: (Please include comment if needed)     [] Identifies with a luis tradition:         [] Supported by a luis community:            [] Claims no spiritual orientation:           [] Seeking spiritual identity:                [] Adheres to an individual form of spirituality:           [] Not able to assess:                           Identified resources for coping:      [] Prayer                               [] Music                  [] Guided Imagery     [] Family/friends                 [] Pet visits     [] Devotional reading                         [] Unknown     [] Other:                                              Interventions offered during this visit: (See comments for more details)    Patient Interventions: Other (comment)(Attempted)           Plan of Care:     [] Support spiritual and/or cultural needs    [] Support AMD and/or advance care planning process      [] Support grieving process   [] Coordinate Rites and/or Rituals    [] Coordination with community clergy   [] No spiritual needs identified at this time   [] Detailed Plan of Care below (See Comments)  [] Make referral to Music Therapy  [] Make referral to Pet Therapy     [] Make referral to Addiction services  [] Make referral to [facility]  [] Make referral to Spiritual Care Partner  [] No future visits requested        [] Follow up upon further referrals     Comments:  responded to [facility] on [address] was being attended to be staff, no family present.  Stermi was cancelled whiles  was still on the unit. Spiritual care is available for support upon further staff referrals. Visited by: Neil Sherwood.    can be reached by calling the  at [facility]  [phone]

## 2021-02-24 NOTE — PROGRESS NOTES
Stemi alert called at approximately 10:54. VSS. Patient complaining of sharp pain in the neck. Nitro paste, EKG, and troponin levels ordered shortly before alert per Dr. Zahra Pappas. Stat chest x-ray ordered during alert. Patient states that neck pain is intermittent and decreasing in strength. Cardiologist Dr. Lynda Summers will speak to cardiologist on call Dr. Dianne Coronel.

## 2021-02-24 NOTE — PROGRESS NOTES
Progress Note      2/24/2021 1:59 PM  NAME: Yaneli Moore   MRN:  [id number]   Admit Diagnosis: Atrial flutter ([facility] [address] [I48.92]  CHF (congestive heart failure) ([facility] [I50.9]      Problem List:     1. Acute STEMI call     Assessment/Plan:     1. Responded to acute STEMI call. . Patient developed severe neck pain with the shortness of breath. EKG shows atrial flutter without significant ST elevation. EKG is electronically interpreted as STEMI but I canceled the STEMI as there was no EKG evidence of acute STEMI. Patient's neck pain gradually eased off but reappeared after 10 minutes. Discussed with the Dr. Myrna Fiore who is the primary cardiologist.  She wanted to get the cardiac catheterization early as patient continued to have atypical neck pain. Physical examination was unremarkable except for mild tenderness in the abdomen. Oxygen saturation is 95%. White count is elevated probably secondary to prednisone. Creatinine has increased to 2.1 probably secondary to steroids. Patient is a previous coronary angiogram is reviewed. Patient had a saphenous vein graft to right coronary artery and saphenous vein graft to first marginal.  No LIMA is noted LAD is patent and is a large vessel. Left ventricular function is depressed. Echocardiogram showed ejection fraction 51% few months ago. Patient understand the risk and benefits of the procedure and agreed. Last dose of Eliquis was more than 24 hours ago. We will proceed with a cardiac catheterization this afternoon. Critical care 35 minutes         []       High complexity decision making was performed in this patient at high risk for decompensation with multiple organ involvement. Subjective:     Yaneli Moore has severe neck pain lasting more than 30 minutes. Also has associated shortness of breath. Discussed with RN events overnight.      Review of Systems:    Symptom Y/N Comments  Symptom Y/N Comments   Fever/Chills N   Chest Pain y    Poor Appetite N   Edema N    Cough N   Abdominal Pain N    Sputum N   Joint Pain N    SOB/BUTCHER N   Pruritis/Rash N    Nausea/vomit N   Tolerating PT/OT Y    Diarrhea N   Tolerating Diet Y    Constipation N   Other       Could NOT obtain due to:      Objective:      Physical Exam:    Last 24hrs VS reviewed since prior progress note. Most recent are:    Visit Vitals  /60   Pulse (!) 45   Temp 98 °F (36.7 °C)   Resp 18   Ht 5' 3\" (1.6 m)   Wt 109 kg (240 lb 4.8 oz)   SpO2 94%   BMI 42.57 kg/m²       Intake/Output Summary (Last 24 hours) at 2/24/2021 1359  Last data filed at 2/23/2021 1809  Gross per 24 hour   Intake --   Output 600 ml   Net -600 ml        General Appearance: Well developed, well nourished, alert & oriented x 3,    no acute distress. Ears/Nose/Mouth/Throat: Hearing grossly normal.  Neck: Supple. Chest: Lungs clear to auscultation bilaterally. Cardiovascular: Regular rate and rhythm, S1S2 normal, no murmur. Abdomen: Soft, mild diffuse tenderness. No rebound tenderness noted. , bowel sounds are active. Extremities: No edema bilaterally. Skin: Warm and dry. []         Post-cath site without hematoma, bruit, tenderness, or thrill. Distal pulses intact. PMH/SH reviewed - no change compared to H&P    Data Review    Telemetry: normal sinus rhythm     EKG:   []  No new EKG for review    Lab Data Personally Reviewed:    Recent Labs     02/24/21  0956 02/23/21  0600   WBC 20.8* 11.4*   HGB 12.7 12.6   HCT 43.4 41.9    230     No results for input(s): INR, PTP, APTT, INREXT in the last 72 hours.    Recent Labs     02/24/21  0956 02/23/21  0600 02/22/21  0930    139 143   K 4.5 4.5 4.0    102 107   CO2 30 31 32   BUN 47* 25* 20   CREA 2.12* 1.64* 1.33*   * 237* 170*   CA 9.0 9.4 9.1     Recent Labs     02/24/21  1102 02/22/21  1700 02/22/21  1245   TROIQ 0.12* 0.12* 0.14*     No results found for: CHOL, CHOLX, CHLST, CHOLV, HDL, HDLP, LDL, LDLC, DLDLP, TGLX, TRIGL, TRIGP, CHHD, [address]  [facility]    Recent Labs     02/24/21  0956 02/23/21  0600 02/22/21  0930   * 151* 154*   TP 7.5 7.3 7.3   ALB 3.3* 3.2* 3.3*   GLOB 4.2* 4.1* 4.0   AML  --  34  --    LPSE  --  84  --      No results for input(s): PH, PCO2, PO2 in the last 72 hours.     Medications Personally Reviewed:    Current Facility-Administered Medications   Medication Dose Route Frequency    aspirin delayed-release tablet 81 mg  81 mg Oral DAILY    [START ON 2/25/2021] predniSONE (DELTASONE) tablet 20 mg  20 mg Oral DAILY WITH BREAKFAST    theophylline ER (BAM-24) capsule 600 mg  600 mg Oral DAILY    dilTIAZem ER (CARDIZEM CD) capsule 120 mg  120 mg Oral DAILY    sorbitoL 70 % solution 30 mL  30 mL Oral DAILY PRN    carvediloL (COREG) tablet 25 mg  25 mg Oral BID WITH MEALS    clopidogreL (PLAVIX) tablet 75 mg  75 mg Oral DAILY    [Held by provider] apixaban (ELIQUIS) tablet 5 mg  5 mg Oral Q12H    buPROPion XL (WELLBUTRIN XL) tablet 150 mg  150 mg Oral 7am    gabapentin (NEURONTIN) capsule   Oral TID    oxybutynin (DITROPAN) tablet 5 mg  5 mg Oral TID    pravastatin (PRAVACHOL) tablet 40 mg  40 mg Oral QHS    polyethylene glycol (MIRALAX) packet 17 g  17 g Oral DAILY PRN    ondansetron (ZOFRAN ODT) tablet 4 mg  4 mg Oral Q8H PRN    Or    ondansetron (ZOFRAN) injection 4 mg  4 mg IntraVENous Q6H PRN       Discussed with Dr. Melissa Armstrong, also with the nursing staff and also with STEMI team and  family  Carey Hays MD

## 2021-02-24 NOTE — PROGRESS NOTES
Patient was sitting in the recliner and complaining of pain that she describes like \"indigestion\" in her left neck/upper left anterior chest area. Vital signs obtained and then the patient was moved to the bed with minimal effort, only needing aid to initially get up out of the chair and then to swing her legs around in the bed. Cardiology came bedside and reviewed the EKG. Nitropaste had been applied. Oxygen was already in place at [facility]. Primary nurse updated cardiology and responding team. STEMI alert was cancelled after EKG was reviewed by cardiology at bedside. Pending cardiac enzymes.  Cardiology to speak with primary cardiologist.

## 2021-02-25 LAB
ALBUMIN SERPL-MCNC: 3.1 G/DL (ref 3.5–5)
ALBUMIN/GLOB SERPL: 0.8 {RATIO} (ref 1.1–2.2)
ALP SERPL-CCNC: 194 U/L (ref 45–117)
ALT SERPL-CCNC: 17 U/L (ref 12–78)
ANION GAP SERPL CALC-SCNC: 5 MMOL/L (ref 5–15)
AST SERPL W P-5'-P-CCNC: 3 U/L (ref 15–37)
BASOPHILS # BLD: 0 K/UL (ref 0–0.1)
BASOPHILS NFR BLD: 0 % (ref 0–1)
BILIRUB SERPL-MCNC: 0.4 MG/DL (ref 0.2–1)
BUN SERPL-MCNC: 49 MG/DL (ref 6–20)
BUN/CREAT SERPL: 27 (ref 12–20)
CA-I BLD-MCNC: 8.6 MG/DL (ref 8.5–10.1)
CHLORIDE SERPL-SCNC: 102 MMOL/L (ref 97–108)
CO2 SERPL-SCNC: 31 MMOL/L (ref 21–32)
CREAT SERPL-MCNC: 1.82 MG/DL (ref 0.55–1.02)
DIFFERENTIAL METHOD BLD: ABNORMAL
EOSINOPHIL # BLD: 0 K/UL (ref 0–0.4)
EOSINOPHIL NFR BLD: 0 % (ref 0–7)
ERYTHROCYTE [DISTWIDTH] IN BLOOD BY AUTOMATED COUNT: 16 % (ref 11.5–14.5)
GLOBULIN SER CALC-MCNC: 3.7 G/DL (ref 2–4)
GLUCOSE SERPL-MCNC: 276 MG/DL (ref 65–100)
HCT VFR BLD AUTO: 41.2 % (ref 35–47)
HGB BLD-MCNC: 12.3 G/DL (ref 11.5–16)
IMM GRANULOCYTES # BLD AUTO: 0 K/UL (ref 0–0.04)
IMM GRANULOCYTES NFR BLD AUTO: 0 % (ref 0–0.5)
LYMPHOCYTES # BLD: 0.7 K/UL (ref 0.8–3.5)
LYMPHOCYTES NFR BLD: 4 % (ref 12–49)
MCH RBC QN AUTO: 27.3 PG (ref 26–34)
MCHC RBC AUTO-ENTMCNC: 29.9 G/DL (ref 30–36.5)
MCV RBC AUTO: 91.6 FL (ref 80–99)
MONOCYTES # BLD: 1 K/UL (ref 0–1)
MONOCYTES NFR BLD: 6 % (ref 5–13)
NEUTS SEG # BLD: 14.9 K/UL (ref 1.8–8)
NEUTS SEG NFR BLD: 90 % (ref 32–75)
PLATELET # BLD AUTO: 215 K/UL (ref 150–400)
PMV BLD AUTO: 12.9 FL (ref 8.9–12.9)
POTASSIUM SERPL-SCNC: 4.3 MMOL/L (ref 3.5–5.1)
PROT SERPL-MCNC: 6.8 G/DL (ref 6.4–8.2)
RBC # BLD AUTO: 4.5 M/UL (ref 3.8–5.2)
SODIUM SERPL-SCNC: 138 MMOL/L (ref 136–145)
WBC # BLD AUTO: 16.6 K/UL (ref 3.6–11)

## 2021-02-25 PROCEDURE — 97116 GAIT TRAINING THERAPY: CPT

## 2021-02-25 PROCEDURE — 74011250637 HC RX REV CODE- 250/637: Performed by: INTERNAL MEDICINE

## 2021-02-25 PROCEDURE — 74011250637 HC RX REV CODE- 250/637: Performed by: FAMILY MEDICINE

## 2021-02-25 PROCEDURE — 74011636637 HC RX REV CODE- 636/637: Performed by: INTERNAL MEDICINE

## 2021-02-25 PROCEDURE — 65270000029 HC RM PRIVATE

## 2021-02-25 PROCEDURE — 36415 COLL VENOUS BLD VENIPUNCTURE: CPT

## 2021-02-25 PROCEDURE — 80053 COMPREHEN METABOLIC PANEL: CPT

## 2021-02-25 PROCEDURE — 85025 COMPLETE CBC W/AUTO DIFF WBC: CPT

## 2021-02-25 RX ORDER — DILTIAZEM HYDROCHLORIDE 120 MG/1
120 CAPSULE, COATED, EXTENDED RELEASE ORAL DAILY
Status: DISCONTINUED | OUTPATIENT
Start: 2021-02-26 | End: 2021-02-26 | Stop reason: HOSPADM

## 2021-02-25 RX ORDER — BENZONATATE 100 MG/1
100 CAPSULE ORAL
Status: DISCONTINUED | OUTPATIENT
Start: 2021-02-25 | End: 2021-02-26 | Stop reason: HOSPADM

## 2021-02-25 RX ORDER — DILTIAZEM HYDROCHLORIDE 120 MG/1
180 CAPSULE, COATED, EXTENDED RELEASE ORAL DAILY
Status: DISCONTINUED | OUTPATIENT
Start: 2021-02-26 | End: 2021-02-25

## 2021-02-25 RX ADMIN — THEOPHYLLINE ANHYDROUS 600 MG: 400 CAPSULE, EXTENDED RELEASE ORAL at 09:13

## 2021-02-25 RX ADMIN — ASPIRIN 81 MG: 81 TABLET, COATED ORAL at 09:12

## 2021-02-25 RX ADMIN — OXYBUTYNIN CHLORIDE 5 MG: 5 TABLET ORAL at 16:15

## 2021-02-25 RX ADMIN — DILTIAZEM HYDROCHLORIDE 120 MG: 120 CAPSULE, COATED, EXTENDED RELEASE ORAL at 09:12

## 2021-02-25 RX ADMIN — PRAVASTATIN SODIUM 40 MG: 40 TABLET ORAL at 22:50

## 2021-02-25 RX ADMIN — ACETAMINOPHEN 650 MG: 325 TABLET ORAL at 09:12

## 2021-02-25 RX ADMIN — GABAPENTIN 300 MG: 300 CAPSULE ORAL at 09:13

## 2021-02-25 RX ADMIN — BENZONATATE 100 MG: 100 CAPSULE ORAL at 23:58

## 2021-02-25 RX ADMIN — Medication 10 ML: at 04:34

## 2021-02-25 RX ADMIN — OXYBUTYNIN CHLORIDE 5 MG: 5 TABLET ORAL at 22:50

## 2021-02-25 RX ADMIN — GABAPENTIN 300 MG: 300 CAPSULE ORAL at 16:15

## 2021-02-25 RX ADMIN — APIXABAN 5 MG: 5 TABLET, FILM COATED ORAL at 22:50

## 2021-02-25 RX ADMIN — CARVEDILOL 25 MG: 12.5 TABLET, FILM COATED ORAL at 16:15

## 2021-02-25 RX ADMIN — Medication 10 ML: at 22:52

## 2021-02-25 RX ADMIN — PREDNISONE 20 MG: 20 TABLET ORAL at 09:22

## 2021-02-25 RX ADMIN — Medication 10 ML: at 14:00

## 2021-02-25 RX ADMIN — CLOPIDOGREL BISULFATE 75 MG: 75 TABLET ORAL at 09:13

## 2021-02-25 RX ADMIN — CARVEDILOL 25 MG: 12.5 TABLET, FILM COATED ORAL at 09:21

## 2021-02-25 RX ADMIN — BUPROPION HYDROCHLORIDE 150 MG: 150 TABLET, FILM COATED, EXTENDED RELEASE ORAL at 09:21

## 2021-02-25 RX ADMIN — GABAPENTIN 600 MG: 300 CAPSULE ORAL at 22:50

## 2021-02-25 RX ADMIN — OXYBUTYNIN CHLORIDE 5 MG: 5 TABLET ORAL at 09:13

## 2021-02-25 NOTE — PROGRESS NOTES
Progress Note      2/25/2021 12:24 PM  NAME: Boogie Aguilar   MRN:  [id number]   Admit Diagnosis: Atrial flutter ([facility] [address] [I48.92]  CHF (congestive heart failure) ([facility] [I50.9]      Problem List:   Acute non-Q wave MI   Non-critical diffuse CAD s/p [facility] 2/24/2021  Atrial flutter with controlled HR  COPD exacerbation  CHF related to diastolic dysfunction  CAD status post CABG  COPD     Assessment/Plan:   Cardiac catheterization yesterday shows no progression of CAD  Resume Eliquis  Hold diuretics  Continue on Plavix; stop ASA  May discharge home from CV standpoint         [x]       High complexity decision making was performed in this patient at high risk for decompensation with multiple organ involvement. Subjective:     Boogie Aguilar reports mild dyspnea. Had a BM and feeling much better today. Discussed with RN and Dr. Faiza Nieto events overnight. Review of Systems:   Negative except for as noted above. Objective:      Physical Exam:    Last 24hrs VS reviewed since prior progress note. Most recent are:    Visit Vitals  BP (!) 108/54 (BP Patient Position: At rest)   Pulse 61   Temp 97.6 °F (36.4 °C)   Resp 20   Ht 5' 3\" (1.6 m)   Wt 109 kg (240 lb 4.8 oz)   SpO2 95% Comment: 3L/min   BMI 42.57 kg/m²       Intake/Output Summary (Last 24 hours) at 2/25/2021 1130  Last data filed at 2/25/2021 0912  Gross per 24 hour   Intake --   Output 325 ml   Net -325 ml        General Appearance: Alert; no acute distress. Ears/Nose/Mouth/Throat: moist mucous membranes  Neck: Supple. Chest: Lungs clear to auscultation bilaterally. Cardiovascular: Regular rate and rhythm, S1S2 normal, no murmur. Abdomen: Soft, + RLQ tenderness, bowel sounds are active. Extremities: No edema bilaterally. Skin: Warm and dry. []         Post-cath site without hematoma, bruit, tenderness, or thrill. Distal pulses intact.     PMH/SH reviewed - no change compared to H&P    LHC done 2/24/21:   Indication troponin leak, atrial flutter, coronary artery disease, recurrent bouts of chest pain. Hemodynamics with a left ventricular end-diastolic pressure of 12. No gradient across the aortic valve. Ao 107/83.     Left main with proximal irregularities, patent stent in mid LAD. Nondominant circumflex with ostial total occlusion  Dominant right coronary artery with total proximal occlusion after the right ventricular branch. Patent saphenous vein graft to the distal right coronary artery. Patent saphenous vein graft to the second marginal branch of the circumflex.     Patent left femoral artery with diffuse disease and plan manual hemostasis.     Continue medical management and rate control for atrial flutter and anticoagulation, hydration post-cath for kidney disease. Telemetry: Atrial flutter    EKG: Atrial flutter with heart rate in the 80s     []  No new EKG for review    Lab Data Personally Reviewed:    Recent Labs     02/25/21  0550 02/24/21  0956   WBC 16.6* 20.8*   HGB 12.3 12.7   HCT 41.2 43.4    240     No results for input(s): INR, PTP, APTT, INREXT, INREXT in the last 72 hours. Recent Labs     02/25/21  0550 02/24/21  0956 02/23/21  0600    138 139   K 4.3 4.5 4.5    100 102   CO2 31 30 31   BUN 49* 47* 25*   CREA 1.82* 2.12* 1.64*   * 221* 237*   CA 8.6 9.0 9.4     Recent Labs     02/24/21  1102 02/22/21  1700 02/22/21  1245   TROIQ 0.12* 0.12* 0.14*     No results found for: CHOL, CHOLX, CHLST, CHOLV, HDL, HDLP, LDL, LDLC, DLDLP, TGLX, TRIGL, TRIGP, CHHD, CHHDX    Recent Labs     02/25/21  0550 02/24/21  0956 02/23/21  0600   * 147* 151*   TP 6.8 7.5 7.3   ALB 3.1* 3.3* 3.2*   GLOB 3.7 4.2* 4.1*   AML  --   --  34   LPSE  --   --  84     No results for input(s): PH, PCO2, PO2 in the last 72 hours.     Medications Personally Reviewed:    Current Facility-Administered Medications   Medication Dose Route Frequency    aspirin delayed-release tablet 81 mg  81 mg Oral DAILY    predniSONE (DELTASONE) tablet 20 mg  20 mg Oral DAILY WITH BREAKFAST    sodium chloride (NS) flush 5-40 mL  5-40 mL IntraVENous Q8H    sodium chloride (NS) flush 5-40 mL  5-40 mL IntraVENous PRN    acetaminophen (TYLENOL) tablet 650 mg  650 mg Oral Q4H PRN    theophylline ER (BAM-24) capsule 600 mg  600 mg Oral DAILY    dilTIAZem ER (CARDIZEM CD) capsule 120 mg  120 mg Oral DAILY    sorbitoL 70 % solution 30 mL  30 mL Oral DAILY PRN    carvediloL (COREG) tablet 25 mg  25 mg Oral BID WITH MEALS    clopidogreL (PLAVIX) tablet 75 mg  75 mg Oral DAILY    [Held by provider] apixaban (ELIQUIS) tablet 5 mg  5 mg Oral Q12H    buPROPion XL (WELLBUTRIN XL) tablet 150 mg  150 mg Oral 7am    gabapentin (NEURONTIN) capsule   Oral TID    oxybutynin (DITROPAN) tablet 5 mg  5 mg Oral TID    pravastatin (PRAVACHOL) tablet 40 mg  40 mg Oral QHS    polyethylene glycol (MIRALAX) packet 17 g  17 g Oral DAILY PRN    ondansetron (ZOFRAN ODT) tablet 4 mg  4 mg Oral Q8H PRN    Or    ondansetron (ZOFRAN) injection 4 mg  4 mg IntraVENous Q6H PRN         Bay Coles MD

## 2021-02-25 NOTE — PROGRESS NOTES
PHYSICAL THERAPY TREATMENT  Patient: Yaneli Moore (77 y.o. female)  Date: 2/25/2021  Diagnosis: Atrial flutter (HCC) [I48.92]  CHF (congestive heart failure) (HCC) [I50.9] <principal problem not specified>  Procedure(s) (LRB):  Left Heart Cath / Coronary Angiography W Grafts (N/A) 1 Day Post-Op  Precautions:    Chart, physical therapy assessment, plan of care and goals were reviewed. ASSESSMENT  Patient continues with skilled PT services and is progressing towards goals. Pt semi supine in bed upon arrival and agreeable to session. Performed bed mobility with CGA. STS required CGA. Patient ambulated with RW and CGAx1 with therapist assisting with O2. Pt ambulated ~175' with slow gait speed but not LOB or knee buckling noted. Patient declining participation with therex 2/2 fatigue. Other factors to consider for discharge: decreased activity tolerance, PLOF, time since onset. PLAN :  Patient continues to benefit from skilled intervention to address the above impairments. Continue treatment per established plan of care. to address goals. Recommendation for discharge: (in order for the patient to meet his/her long term goals)  Physical therapy at least 2 days/week in the home     This discharge recommendation:  Has been made in collaboration with the attending provider and/or case management    IF patient discharges home will need the following DME: to be determined (TBD)       SUBJECTIVE:   Patient stated I can walk today.     OBJECTIVE DATA SUMMARY:   Critical Behavior:  Neurologic State: Alert  Orientation Level: Oriented X4  Cognition: Follows commands     Functional Mobility Training:  Bed Mobility:  Rolling: Contact guard assistance  Supine to Sit: Contact guard assistance  Sit to Supine: Contact guard assistance  Scooting: Contact guard assistance    Transfers:  Sit to Stand: Contact guard assistance  Stand to Sit: Contact guard assistance  Bed to Chair: Contact guard assistance    Balance:  Sitting: Intact  Standing: Impaired; With support  Standing - Static: Constant support;Good  Standing - Dynamic : Constant support;Good    Ambulation/Gait Training:  Distance (ft): 175 Feet (ft)  Assistive Device: Gait belt;Walker, rolling  Ambulation - Level of Assistance: Contact guard assistance  Speed/Ella: Slow  Step Length: Left shortened;Right shortened      Therapeutic Exercises:   Declined participation 2/2 fatigue      Pain Rating:  Reports having minor pain at incision site. Activity Tolerance:   Fair and requires rest breaks  Please refer to the flowsheet for vital signs taken during this treatment. After treatment patient left in no apparent distress:   Supine in bed, Call bell within reach, Bed / chair alarm activated, and Side rails x 3    COMMUNICATION/COLLABORATION:   The patients plan of care was discussed with: Registered nurse.        Problem: Mobility Impaired (Adult and Pediatric)  Goal: *Acute Goals and Plan of Care (Insert Text)  Description: I with LE HEP x7 days  Supine to sit EOB with CGAx1 x7 days  Sit to stand with CGAx1 x7 days  Amb 15-25ft with RW and min Ax1 x7 days  Outcome: Progressing Towards Goal       Payton Hinson PTA   Time Calculation: 24 mins

## 2021-02-25 NOTE — CONSULTS
[facility] Consult Note      NAME:  Lovely Ordoñez   :   1950   MRN:  [id number]     Requesting Physician Jamari Alfredo MD   Reason for Consult:  ENEIDA     PCP:  Jv Rock MD     Date/Time:  2021 10:59 PM          Subjective:     CHIEF COMPLAINT: dyspnea     HISTORY OF PRESENT ILLNESS:     Ms. Cindi Arnold is a 72 y.o.  female who is admitted to the [place] [address] with dyspnea. We are asked to evaluate for acute increase in serum creatinine. .      Patient presented to the hospital on  with dyspnea. She also had atrial flutter with a rapid ventricular rate. She was started on Cardizem drip. Initially thought to have an acute MI. Serum creatinine on admission was 1.64. Review of records shows that in 2020 creatinine was 1.6. She  had significant dyspnea related to exacerbation of COPD. Her chest pain has persisted and she underwent cardiac catheterization this afternoon. Per her daughter, no significant lesions were found. She is status post CABG x2 in  and later a PCI to the LAD with deployment of a drug-eluting stent in May of 2011. Over the last 24 hours her serum creatinine has increased to 2. 12. We are asked to see her. She is nonoliguric. Her major complaint now is persistent right neck pain. She also has right lower quadrant abdominal discomfort. She complains of constipation. She status post cholecystectomy. She currently denies chest pain. She has mild normal discomfort in the right lower quadrant. She has persistent lower kidney edema. She complains of constipation but no blood corrected. No nausea, vomiting, hematemesis or hemoptysis. Jocelyn Dye No change in mental status.     Past Medical History:   Diagnosis Date    Congestive heart disease ([address]     Coronary artery disease     [facility] [address]     Hyperlipidemia     Hypertension     IFTIKHAR (obstructive sleep apnea)         Past Surgical History:   Procedure Laterality Date    HX BREAST BIOPSY      HX CHOLECYSTECTOMY      HX CORONARY ARTERY BYPASS GRAFT      HX HERNIA REPAIR      HX HYSTERECTOMY         Social History     Tobacco Use    Smoking status: Former Smoker    Smokeless tobacco: Never Used   Substance Use Topics    Alcohol use: Not Currently     Frequency: Never        Family History   Problem Relation Age of Onset    Heart Disease Mother     Kidney Disease Mother     Cancer Maternal Grandmother         Allergies   Allergen Reactions    Tylox [Oxycodone-Acetaminophen] Hives        Prior to Admission medications    Medication Sig Start Date End Date Taking? Authorizing Provider   carvediloL (COREG) 25 mg tablet Take 1 Tab by mouth two (2) times daily (with meals). 11/5/20  Yes Lincoln Collins NP   albuterol (Ventolin HFA) 90 mcg/actuation inhaler Take 2 Puffs by inhalation two (2) times daily as needed for Wheezing. Yes Provider, Historical   furosemide (LASIX) 40 mg tablet Take 40 mg by mouth every evening. Yes Provider, Historical   gabapentin (NEURONTIN) 600 mg tablet Take 600 mg by mouth three (3) times daily. Yes Provider, Historical   isosorbide dinitrate (ISORDIL) 10 mg tablet Take 10 mg by mouth two (2) times a day. Yes Provider, Historical   losartan (COZAAR) 25 mg tablet Take 25 mg by mouth daily. Yes Provider, Historical   metOLazone (ZAROXOLYN) 5 mg tablet Take 5 mg by mouth daily. Yes Provider, Historical   nitroglycerin (NITROSTAT) 0.4 mg SL tablet 0.4 mg by SubLINGual route every five (5) minutes as needed for Chest Pain. Up to 3 doses. Yes Provider, Historical   pravastatin (PRAVACHOL) 40 mg tablet Take 40 mg by mouth nightly. Yes Provider, Historical   theophylline ER,12 hour, (THEOCHRON) 300 mg tablet Take 300 mg by mouth daily. Yes Provider, Historical   amLODIPine (NORVASC) 10 mg tablet Take 10 mg by mouth daily. Yes Provider, Historical   oxybutynin (DITROPAN) 5 mg tablet Take 5 mg by mouth three (3) times daily.    Yes Provider, Historical   buPROPion XL (WELLBUTRIN XL) 150 mg tablet Take 150 mg by mouth every morning.    Yes Provider, Historical         Current Facility-Administered Medications:     aspirin delayed-release tablet 81 mg, 81 mg, Oral, DAILY, Jameson Burkitt, Polly Shaver, MD  Curt Stains ON 2/25/2021] predniSONE (DELTASONE) tablet 20 mg, 20 mg, Oral, DAILY WITH BREAKFAST, Damon Savage MD    sodium chloride (NS) flush 5-40 mL, 5-40 mL, IntraVENous, Q8H, Zayra Yates MD, 10 mL at 02/24/21 2216    sodium chloride (NS) flush 5-40 mL, 5-40 mL, IntraVENous, PRN, Zayra Yates MD    acetaminophen (TYLENOL) tablet 650 mg, 650 mg, Oral, Q4H PRN, Zayra Yates MD    theophylline ER (BAM-24) capsule 600 mg, 600 mg, Oral, DAILY, Gopal Alvarado MD, 600 mg at 02/24/21 0847    dilTIAZem ER (CARDIZEM CD) capsule 120 mg, 120 mg, Oral, DAILY, Ethel Ferrera MD, 120 mg at 02/24/21 0848    sorbitoL 70 % solution 30 mL, 30 mL, Oral, DAILY PRN, Gopal Alvarado MD, 30 mL at 02/24/21 0848    carvediloL (COREG) tablet 25 mg, 25 mg, Oral, BID WITH MEALS, Gopal Alvarado MD, 25 mg at 02/24/21 0847    clopidogreL (PLAVIX) tablet 75 mg, 75 mg, Oral, DAILY, Gopal Alvarado MD, 75 mg at 02/24/21 0847    [Held by provider] apixaban (ELIQUIS) tablet 5 mg, 5 mg, Oral, Q12H, Gopal Alvarado MD, 5 mg at 02/23/21 0947    buPROPion XL (WELLBUTRIN XL) tablet 150 mg, 150 mg, Oral, 7am, Gopal Alvarado MD, 150 mg at 02/24/21 8486    gabapentin (NEURONTIN) capsule, , Oral, TID, Gopal Alvarado MD, 300 mg at 02/24/21 2215    oxybutynin (DITROPAN) tablet 5 mg, 5 mg, Oral, TID, Gopal Alvarado MD, 5 mg at 02/24/21 2215    pravastatin (PRAVACHOL) tablet 40 mg, 40 mg, Oral, QHS, Gopal Alvarado MD, 40 mg at 02/24/21 2215    polyethylene glycol (MIRALAX) packet 17 g, 17 g, Oral, DAILY PRN, Gopal Alvarado MD, 17 g at 02/24/21 0040    ondansetron (ZOFRAN ODT) tablet 4 mg, 4 mg, Oral, Q8H PRN **OR** ondansetron (ZOFRAN) injection 4 mg, 4 mg, IntraVENous, Q6H PRN, Gopal Alvarado MD, 4 mg at 02/24/21 8620      Review of Systems:  A comprehensive review of systems was negative except for that written in the HPI. Objective:      VITALS:    Vital signs reviewed; most recent are:    Visit Vitals  /68   Pulse 72   Temp 97.6 °F (36.4 °C)   Resp 16   Ht 5' 3\" (1.6 m)   Wt 109 kg (240 lb 4.8 oz)   SpO2 97%   BMI 42.57 kg/m²     SpO2 Readings from Last 6 Encounters:   02/24/21 97%   11/05/20 96%    O2 Flow Rate (L/min): 3 l/min   No intake or output data in the 24 hours ending 02/24/21 1508         Exam:   Physical Exam:General appearance: alert, cooperative, no distress, appears stated age  Head: Normocephalic, without obvious abnormality, atraumatic  Eyes: negative findings: anicteric sclera, conjunctivae and sclerae normal and corneas clear  Neck: supple, symmetrical, trachea midline, no adenopathy, thyroid: not enlarged, symmetric, no tenderness/mass/nodules, no carotid bruit and no JVD  Lungs: clear to auscultation bilaterally  Heart: regular rate and rhythm, no S3 or S4, no rub  Abdomen: soft, no distention, no guarding even on palpation right lower quadrant, bowel sounds positive  Extremities: edema trace pretibial bilateral  Neurologic: Grossly normal      LABS:  Recent Labs     02/24/21  0956 02/23/21  0600 02/22/21  0930    139 143   K 4.5 4.5 4.0    102 107   CO2 30 31 32   BUN 47* 25* 20   CREA 2.12* 1.64* 1.33*   CA 9.0 9.4 9.1   ALB 3.3* 3.2* 3.3*     Recent Labs     02/24/21  0956 02/23/21  0600 02/22/21  0930   WBC 20.8* 11.4* 8.7   HGB 12.7 12.6 12.6   HCT 43.4 41.9 43.0    230 192     Lab Results   Component Value Date/Time    Glucose (POC) 214 (H) 11/05/2020 03:53 PM    Glucose (POC) 255 (H) 11/05/2020 11:30 AM     No results for input(s): PH, PCO2, PO2, HCO3, FIO2 in the last 72 hours. No results for input(s): INR, INREXT in the last 72 hours.   No results for input(s): NURA, KU, Karla Esparza in the last 72 hours.     No lab exists for component: PROU    Assessment:   Renal Specific Problems  Acute kidney injury precatheterization, now has had contrast exposure  Hypertension  CKD 3 a        Plan:     Obtain/ Order: labs/cultures/radiology/procedures:  urine lytes and urine creatinine and spot urine protein and creatinine ratio and renal panel and CBC      Renal ultrasound for size and echo pattern  Therapeutic:    Continue medications per cardiology  No indication for IV fluids close cardiac catheter, she did receive saline pre-dye exposure  Avoid nonsteroidal anti-inflammatory agents  No ACE inhibitor or ARB during the acute phase of renal insufficiency however, once her azotemia resolved she should be on ACE inhibitor or ARB    Risk of deterioration: low                       Discussed:  patient and her daughter was present at the bedside           ___________________________________________________    Attending Physician: Ankur Lozano MD

## 2021-02-25 NOTE — PROGRESS NOTES
Chart reviewed, DCP remains for patient to return home with [place], accepted with [facility]. CM continues to follow.

## 2021-02-25 NOTE — PROGRESS NOTES
Spoke with patient about phase 2 outpatient cardiac rehab. Patient was given a choice of facilities to be enrolled and chose [facility]. Patient was scheduled for and made aware of, verbally and in writing, an initial evaluation appointment to begin cardiac rehab on Tuesday, March 16 at 8:00. Patient was made aware of this verbally and in writing. Patients information was forwarded to this facility. Patient verbalized understanding and was given printed teaching materials, my contact information in case she needs further assistance, and the address and phone number for the cardiac rehab facility to which she has been referred.

## 2021-02-25 NOTE — PROGRESS NOTES
Progress Note    Patient: Sai Perez MRN: [id number]  SSN: [id number]    YOB: 1950  Age: 72 y.o. Sex: female      Admit Date: 2/22/2021    LOS: 3 days     Subjective:     Patient seen in  room, not in distress. She stated feeling better today. Denies chest pain, or abdominal pain. -GI is following in consultation for RUQ pain, now resolved. 2/24 cardiac cath shows no progression of CAD. Objective:     Vitals:    02/25/21 1119 02/25/21 1138 02/25/21 1459 02/25/21 1536   BP: (!) 108/54 115/68  116/61   Pulse: 61 63  78   Resp: 20 18  20   Temp: 97.6 °F (36.4 °C) 97.4 °F (36.3 °C)  97.7 °F (36.5 °C)   SpO2:  98% 97% 98%   Weight:       Height:            Intake and Output:  Current Shift: 02/25 0701 - 02/25 1900  In: -   Out: 575 [Urine:575]  Last three shifts: No intake/output data recorded. Physical Exam:   Skin:  Extremities and face reveal no rashes. No del valle erythema. HEENT: Sclerae anicteric. Extra-occular muscles are intact. No abnormal pigmentation of the lips. The neck is supple. Cardiovascular: Regular rate and rhythm. Respiratory:  Comfortable breathing with no accessory muscle use. GI:  Abdomen nondistended, soft, and nontender. No enlargement of the liver or spleen. No masses palpable. Rectal:  Deferred  Musculoskeletal: Extremities have good range of motion. Neurological:  Gross memory appears intact. Patient is alert and oriented. Psychiatric:  Mood appears appropriate with judgement intact.   Lymphatic:  No visible adenopathy      Lab/Data Review:  Recent Results (from the past 24 hour(s))   CBC WITH AUTOMATED DIFF    Collection Time: 02/25/21  5:50 AM   Result Value Ref Range    WBC 16.6 (H) 3.6 - 11.0 K/uL    RBC 4.50 3.80 - 5.20 M/uL    HGB 12.3 11.5 - 16.0 g/dL    HCT 41.2 35.0 - 47.0 %    MCV 91.6 80.0 - 99.0 FL    MCH 27.3 26.0 - 34.0 PG    MCHC 29.9 (L) 30.0 - 36.5 g/dL    RDW 16.0 (H) 11.5 - 14.5 %    PLATELET 158 945 - 988 K/uL    MPV 12.9 8.9 - 12.9 FL    NEUTROPHILS 90 (H) 32 - 75 %    LYMPHOCYTES 4 (L) 12 - 49 %    MONOCYTES 6 5 - 13 %    EOSINOPHILS 0 0 - 7 %    BASOPHILS 0 0 - 1 %    IMMATURE GRANULOCYTES 0 0.0 - 0.5 %    ABS. NEUTROPHILS 14.9 (H) 1.8 - 8.0 K/UL    ABS. LYMPHOCYTES 0.7 (L) 0.8 - 3.5 K/UL    ABS. MONOCYTES 1.0 0.0 - 1.0 K/UL    ABS. EOSINOPHILS 0.0 0.0 - 0.4 K/UL    ABS. BASOPHILS 0.0 0.0 - 0.1 K/UL    ABS. IMM. GRANS. 0.0 0.00 - 0.04 K/UL    DF AUTOMATED     METABOLIC PANEL, COMPREHENSIVE    Collection Time: 02/25/21  5:50 AM   Result Value Ref Range    Sodium 138 136 - 145 mmol/L    Potassium 4.3 3.5 - 5.1 mmol/L    Chloride 102 97 - 108 mmol/L    CO2 31 21 - 32 mmol/L    Anion gap 5 5 - 15 mmol/L    Glucose 276 (H) 65 - 100 mg/dL    BUN 49 (H) 6 - 20 mg/dL    Creatinine 1.82 (H) 0.55 - 1.02 mg/dL    BUN/Creatinine ratio 27 (H) 12 - 20      GFR est AA 33 (L) >60 ml/min/1.73m2    GFR est non-AA 28 (L) >60 ml/min/1.73m2    Calcium 8.6 8.5 - 10.1 mg/dL    Bilirubin, total 0.4 0.2 - 1.0 mg/dL    AST (SGOT) 3 (L) 15 - 37 U/L    ALT (SGPT) 17 12 - 78 U/L    Alk. phosphatase 194 (H) 45 - 117 U/L    Protein, total 6.8 6.4 - 8.2 g/dL    Albumin 3.1 (L) 3.5 - 5.0 g/dL    Globulin 3.7 2.0 - 4.0 g/dL    A-G Ratio 0.8 (L) 1.1 - 2.2                XR CHEST PORT   Final Result   Possible hydrostatic edema. No significant interval change. US ABD COMP   Final Result   Limited. 1. No acute findings. XR ABD (KUB)   Final Result      XR CHEST PORT   Final Result   FINDINGS: IMPRESSION: Frontal single view chest.      Median sternotomy status post CABG. Calcified aorta. Unchanged cardiomegaly   and/or pericardial effusion. The lungs are similarly inflated. Unchanged mild elevation of the right   hemidiaphragm. Minimal hydrostatic edema. No consolidation, effusion, or   pneumothorax. No free air under the diaphragm. No acute bony findings.           Assessment:     Active Problems:    Atrial flutter ([address] (2/22/2021)      CHF (congestive heart failure) ([facility] [address] (2/22/2021)        Plan:     Denies abdominal pain. Follow up as outpatient. GI will now sign off. Please re-consult as needed. Patient discussed with Dr Feli Tovar and agrees to above impression and plan. Thank you for allowing me to participate in this patients care    Signed By: Adrien Pride.  MARIA ESTHER Jones     February 25, 2021

## 2021-02-25 NOTE — PROGRESS NOTES
General Daily Progress Note          Patient Name:   Yaneli Moore       YOB: 1950       Age:  72 y.o. Admit Date: 2/22/2021      Subjective:     Patient was admitted for acute exacerbation of COPD and acute on chronic HFpEF. She is sitting up alert and oriented. States her breathing is still labored on exertion. Yesterday patient had an acute STEMI call after experiencing pain in her chest and neck. Proceeded with a cardiac catheterization in the afternoon. Found diseased left femoral artery,Dominant right coronary artery with total proximal occlusion after the right ventricular branch. ECG showed atrial flutter with variable AV block. WBC elevated but trending down  Kidney function only slightly improved    Chest X ray shows possible hydrostatic edema    On 3L O2 day and night. 98% this morning. Objective:     Visit Vitals  BP (!) 108/54 (BP Patient Position: At rest)   Pulse 61   Temp 97.6 °F (36.4 °C)   Resp 20   Ht 5' 3\" (1.6 m)   Wt 109 kg (240 lb 4.8 oz)   SpO2 95%   BMI 42.57 kg/m²        Recent Results (from the past 24 hour(s))   CBC WITH AUTOMATED DIFF    Collection Time: 02/25/21  5:50 AM   Result Value Ref Range    WBC 16.6 (H) 3.6 - 11.0 K/uL    RBC 4.50 3.80 - 5.20 M/uL    HGB 12.3 11.5 - 16.0 g/dL    HCT 41.2 35.0 - 47.0 %    MCV 91.6 80.0 - 99.0 FL    MCH 27.3 26.0 - 34.0 PG    MCHC 29.9 (L) 30.0 - 36.5 g/dL    RDW 16.0 (H) 11.5 - 14.5 %    PLATELET 345 710 - 427 K/uL    MPV 12.9 8.9 - 12.9 FL    NEUTROPHILS 90 (H) 32 - 75 %    LYMPHOCYTES 4 (L) 12 - 49 %    MONOCYTES 6 5 - 13 %    EOSINOPHILS 0 0 - 7 %    BASOPHILS 0 0 - 1 %    IMMATURE GRANULOCYTES 0 0.0 - 0.5 %    ABS. NEUTROPHILS 14.9 (H) 1.8 - 8.0 K/UL    ABS. LYMPHOCYTES 0.7 (L) 0.8 - 3.5 K/UL    ABS. MONOCYTES 1.0 0.0 - 1.0 K/UL    ABS. EOSINOPHILS 0.0 0.0 - 0.4 K/UL    ABS. BASOPHILS 0.0 0.0 - 0.1 K/UL    ABS. IMM.  GRANS. 0.0 0.00 - 0.04 K/UL    DF AUTOMATED     METABOLIC PANEL, COMPREHENSIVE Collection Time: 02/25/21  5:50 AM   Result Value Ref Range    Sodium 138 136 - 145 mmol/L    Potassium 4.3 3.5 - 5.1 mmol/L    Chloride 102 97 - 108 mmol/L    CO2 31 21 - 32 mmol/L    Anion gap 5 5 - 15 mmol/L    Glucose 276 (H) 65 - 100 mg/dL    BUN 49 (H) 6 - 20 mg/dL    Creatinine 1.82 (H) 0.55 - 1.02 mg/dL    BUN/Creatinine ratio 27 (H) 12 - 20      GFR est AA 33 (L) >60 ml/min/1.73m2    GFR est non-AA 28 (L) >60 ml/min/1.73m2    Calcium 8.6 8.5 - 10.1 mg/dL    Bilirubin, total 0.4 0.2 - 1.0 mg/dL    AST (SGOT) 3 (L) 15 - 37 U/L    ALT (SGPT) 17 12 - 78 U/L    Alk. phosphatase 194 (H) 45 - 117 U/L    Protein, total 6.8 6.4 - 8.2 g/dL    Albumin 3.1 (L) 3.5 - 5.0 g/dL    Globulin 3.7 2.0 - 4.0 g/dL    A-G Ratio 0.8 (L) 1.1 - 2.2       [unfilled]      Review of Systems    Constitutional: Negative for chills and fever. HENT: Negative. Eyes: Negative. Respiratory: Positive for dyspnea on exertion    Cardiovascular: Negative. Gastrointestinal: Positive for abdominal pain  Skin: Negative. Neurological: Weakness/ heaviness of right leg        Physical Exam:      Constitutional: pt is oriented to person, place, and time. HENT:   Head: Normocephalic and atraumatic. Eyes: Pupils are equal, round, and reactive to light. EOM are normal.   Cardiovascular: Normal rate, regular rhythm and normal heart sounds. Pulmonary/Chest: Breath sounds normal. No wheezes. No rales. Exhibits no tenderness. Abdominal: Soft. Bowel sounds are normal. Abdominal tenderness in R and L UQ. There is no rebound and no guarding. Musculoskeletal: Normal range of motion. Neurological: pt is alert and oriented to person, place, and time. XR CHEST PORT   Final Result   Possible hydrostatic edema. No significant interval change. US ABD COMP   Final Result   Limited. 1. No acute findings.       XR ABD (KUB)   Final Result      XR CHEST PORT   Final Result   FINDINGS: IMPRESSION: Frontal single view chest. Median sternotomy status post CABG. Calcified aorta. Unchanged cardiomegaly   and/or pericardial effusion. The lungs are similarly inflated. Unchanged mild elevation of the right   hemidiaphragm. Minimal hydrostatic edema. No consolidation, effusion, or   pneumothorax. No free air under the diaphragm. No acute bony findings. Recent Results (from the past 24 hour(s))   CBC WITH AUTOMATED DIFF    Collection Time: 02/25/21  5:50 AM   Result Value Ref Range    WBC 16.6 (H) 3.6 - 11.0 K/uL    RBC 4.50 3.80 - 5.20 M/uL    HGB 12.3 11.5 - 16.0 g/dL    HCT 41.2 35.0 - 47.0 %    MCV 91.6 80.0 - 99.0 FL    MCH 27.3 26.0 - 34.0 PG    MCHC 29.9 (L) 30.0 - 36.5 g/dL    RDW 16.0 (H) 11.5 - 14.5 %    PLATELET 841 944 - 781 K/uL    MPV 12.9 8.9 - 12.9 FL    NEUTROPHILS 90 (H) 32 - 75 %    LYMPHOCYTES 4 (L) 12 - 49 %    MONOCYTES 6 5 - 13 %    EOSINOPHILS 0 0 - 7 %    BASOPHILS 0 0 - 1 %    IMMATURE GRANULOCYTES 0 0.0 - 0.5 %    ABS. NEUTROPHILS 14.9 (H) 1.8 - 8.0 K/UL    ABS. LYMPHOCYTES 0.7 (L) 0.8 - 3.5 K/UL    ABS. MONOCYTES 1.0 0.0 - 1.0 K/UL    ABS. EOSINOPHILS 0.0 0.0 - 0.4 K/UL    ABS. BASOPHILS 0.0 0.0 - 0.1 K/UL    ABS. IMM. GRANS. 0.0 0.00 - 0.04 K/UL    DF AUTOMATED     METABOLIC PANEL, COMPREHENSIVE    Collection Time: 02/25/21  5:50 AM   Result Value Ref Range    Sodium 138 136 - 145 mmol/L    Potassium 4.3 3.5 - 5.1 mmol/L    Chloride 102 97 - 108 mmol/L    CO2 31 21 - 32 mmol/L    Anion gap 5 5 - 15 mmol/L    Glucose 276 (H) 65 - 100 mg/dL    BUN 49 (H) 6 - 20 mg/dL    Creatinine 1.82 (H) 0.55 - 1.02 mg/dL    BUN/Creatinine ratio 27 (H) 12 - 20      GFR est AA 33 (L) >60 ml/min/1.73m2    GFR est non-AA 28 (L) >60 ml/min/1.73m2    Calcium 8.6 8.5 - 10.1 mg/dL    Bilirubin, total 0.4 0.2 - 1.0 mg/dL    AST (SGOT) 3 (L) 15 - 37 U/L    ALT (SGPT) 17 12 - 78 U/L    Alk.  phosphatase 194 (H) 45 - 117 U/L    Protein, total 6.8 6.4 - 8.2 g/dL    Albumin 3.1 (L) 3.5 - 5.0 g/dL    Globulin 3.7 2.0 - 4.0 g/dL    A-G Ratio 0.8 (L) 1.1 - 2.2         Results     ** No results found for the last 336 hours. **           Labs:     Recent Labs     02/25/21  0550 02/24/21  0956   WBC 16.6* 20.8*   HGB 12.3 12.7   HCT 41.2 43.4    240     Recent Labs     02/25/21  0550 02/24/21  0956 02/23/21  0600    138 139   K 4.3 4.5 4.5    100 102   CO2 31 30 31   BUN 49* 47* 25*   CREA 1.82* 2.12* 1.64*   * 221* 237*   CA 8.6 9.0 9.4     Recent Labs     02/25/21  0550 02/24/21  0956 02/23/21  0600   ALT 17 20 22   * 147* 151*   TBILI 0.4 0.6 1.2*   TP 6.8 7.5 7.3   ALB 3.1* 3.3* 3.2*   GLOB 3.7 4.2* 4.1*   AML  --   --  34   LPSE  --   --  84     No results for input(s): INR, PTP, APTT, INREXT, INREXT in the last 72 hours. No results for input(s): FE, TIBC, PSAT, FERR in the last 72 hours. No results found for: FOL, RBCF   No results for input(s): PH, PCO2, PO2 in the last 72 hours.   Recent Labs     02/24/21  1102 02/22/21  1700 02/22/21  1245   TROIQ 0.12* 0.12* 0.14*     No results found for: CHOL, CHOLX, CHLST, CHOLV, HDL, HDLP, LDL, LDLC, DLDLP, TGLX, TRIGL, TRIGP, CHHD, CHHDX  Lab Results   Component Value Date/Time    Glucose (POC) 214 (H) 11/05/2020 03:53 PM    Glucose (POC) 255 (H) 11/05/2020 11:30 AM    Glucose (POC) 176 (H) 11/05/2020 07:42 AM    Glucose (POC) 186 (H) 11/04/2020 10:57 PM    Glucose (POC) 236 (H) 11/04/2020 04:06 PM     No results found for: COLOR, APPRN, SPGRU, REFSG, NERIS, PROTU, GLUCU, KETU, BILU, UROU, ELOISA, LEUKU, GLUKE, EPSU, BACTU, WBCU, RBCU, CASTS, UCRY      Assessment:     Acute non-Q wave MI  Acute exacerbation of COPD  Acute on chronic hypoxic respiratory failure  Acute on chronic HFpEF  Atrial flutter with RVR  History of CAD and CABG  Acute on chronic kidney disease  IFTIKHAR  Overactive bladder  Hypercholesterolemia  Morbid obesity  History of TIA  Constipation      Plan:     Patient back on Eliquis   Wellbutrin, coreg, plavix, neurontin, ditropan, pravastatin, and theophylline  Cardizem 120 mg PO  Lasix held due to azotemia  D/c Solu-medrol and start on prednisone per pulmonology    Seen by nephrology. Recommend avoid NSAIDs, ACE/ARB until azotemia resolved. Then should be on ACE or ARB. Seen by pulmonology this morning. Plan to d/c solu-medrol and start on prednisone    Seen by cardiology. Plan to resume Eliquis, and plavix. Will hold diuretics and ASA. Ok to discharge home from cardiac standpoint.         Current Facility-Administered Medications:     [START ON 2/26/2021] dilTIAZem ER (CARDIZEM CD) capsule 120 mg, 120 mg, Oral, DAILY, Dion Garnica MD    predniSONE (DELTASONE) tablet 20 mg, 20 mg, Oral, DAILY WITH BREAKFAST, Damon Savage MD, 20 mg at 02/25/21 4341    sodium chloride (NS) flush 5-40 mL, 5-40 mL, IntraVENous, Q8H, Jorge Henry MD, 10 mL at 02/25/21 0434    sodium chloride (NS) flush 5-40 mL, 5-40 mL, IntraVENous, PRN, Jorge Henry MD    acetaminophen (TYLENOL) tablet 650 mg, 650 mg, Oral, Q4H PRN, Jorge Henry MD, 650 mg at 02/25/21 0912    theophylline ER (BAM-24) capsule 600 mg, 600 mg, Oral, DAILY, Gopal Alvarado MD, 600 mg at 02/25/21 0913    sorbitoL 70 % solution 30 mL, 30 mL, Oral, DAILY PRN, Gopal Alvarado MD, 30 mL at 02/24/21 0848    carvediloL (COREG) tablet 25 mg, 25 mg, Oral, BID WITH MEALS, Gopal Alvarado MD, 25 mg at 02/25/21 8028    clopidogreL (PLAVIX) tablet 75 mg, 75 mg, Oral, DAILY, Gopal Alvarado MD, 75 mg at 02/25/21 0913    apixaban (ELIQUIS) tablet 5 mg, 5 mg, Oral, Q12H, Gopal Alvarado MD, Stopped at 02/25/21 0900    buPROPion XL (WELLBUTRIN XL) tablet 150 mg, 150 mg, Oral, 7am, Gopal Alvarado MD, 150 mg at 02/25/21 4045    gabapentin (NEURONTIN) capsule, , Oral, TID, Gopal Alvarado MD, 300 mg at 02/25/21 0913    oxybutynin (DITROPAN) tablet 5 mg, 5 mg, Oral, TID, Gopal Alvarado MD, 5 mg at 02/25/21 0913    pravastatin (PRAVACHOL) tablet 40 mg, 40 mg, Oral, QHS, Lia Mccormick MD, 40 mg at 02/24/21 4930    polyethylene glycol (MIRALAX) packet 17 g, 17 g, Oral, DAILY PRN, Gopal Alvarado MD, 17 g at 02/24/21 0040    ondansetron (ZOFRAN ODT) tablet 4 mg, 4 mg, Oral, Q8H PRN **OR** ondansetron (ZOFRAN) injection 4 mg, 4 mg, IntraVENous, Q6H PRN, Gopal Alvarado MD, 4 mg at 02/24/21 5337

## 2021-02-25 NOTE — PROGRESS NOTES
Verbal bedside shift change report given to Paulette Cortés RN (oncoming nurse) by Ofelia Valenzuela RN (offgoing nurse). Report included the following information SBAR.

## 2021-02-25 NOTE — PROGRESS NOTES
This RN answered call bell for pt's room. This RN asked pt if this RN could do anything for pt. Pt angrily shouted at this RN the following: \"Absolutely not! I don't want nothing from yall I want out of this damn place. I know I'm not the only patient but I've been waiting for something to drink now for 15 minutes! This is ridiculous! I aint worried about it though you'll be hearing from me! \"

## 2021-02-25 NOTE — PROGRESS NOTES
[facility]     Renal Daily Progress Note:     Admission Date: 2021     Subjective: feels better, has some BUTCHER but not SOB at rest. No chest pain. No further abdominal pain, no N/V but she relates leg discomfort. Cardiac cath revealed no progression of her underlying CAD. Serum creatinine down 1 day post IV contrast exposure. Has mireles. BP stable  Daughter thinks her mother may benefit from inpatient rehab    Review of Systems  Pertinent items are noted in HPI.     Objective:     Visit Vitals  /68   Pulse 63   Temp 97.4 °F (36.3 °C)   Resp 18   Ht 5' 3\" (1.6 m)   Wt 109 kg (240 lb 4.8 oz)   SpO2 97%   BMI 42.57 kg/m²     Temp (24hrs), Av.7 °F (36.5 °C), Min:97.4 °F (36.3 °C), Max:98 °F (36.7 °C)        Intake/Output Summary (Last 24 hours) at 2021 1536  Last data filed at 2021 1440  Gross per 24 hour   Intake --   Output 575 ml   Net -575 ml     Current Facility-Administered Medications   Medication Dose Route Frequency    [START ON 2021] dilTIAZem ER (CARDIZEM CD) capsule 120 mg  120 mg Oral DAILY    predniSONE (DELTASONE) tablet 20 mg  20 mg Oral DAILY WITH BREAKFAST    sodium chloride (NS) flush 5-40 mL  5-40 mL IntraVENous Q8H    sodium chloride (NS) flush 5-40 mL  5-40 mL IntraVENous PRN    acetaminophen (TYLENOL) tablet 650 mg  650 mg Oral Q4H PRN    theophylline ER (BAM-24) capsule 600 mg  600 mg Oral DAILY    sorbitoL 70 % solution 30 mL  30 mL Oral DAILY PRN    carvediloL (COREG) tablet 25 mg  25 mg Oral BID WITH MEALS    clopidogreL (PLAVIX) tablet 75 mg  75 mg Oral DAILY    apixaban (ELIQUIS) tablet 5 mg  5 mg Oral Q12H    buPROPion XL (WELLBUTRIN XL) tablet 150 mg  150 mg Oral 7am    gabapentin (NEURONTIN) capsule   Oral TID    oxybutynin (DITROPAN) tablet 5 mg  5 mg Oral TID    pravastatin (PRAVACHOL) tablet 40 mg  40 mg Oral QHS    polyethylene glycol (MIRALAX) packet 17 g  17 g Oral DAILY PRN    ondansetron (ZOFRAN ODT) tablet 4 mg  4 mg Oral Q8H PRN    Or    ondansetron (ZOFRAN) injection 4 mg  4 mg IntraVENous Q6H PRN       Physical Exam:General appearance: alert, cooperative, no distress, appears stated age  Eyes: negative findings: anicteric sclera and conjunctivae and sclerae normal  Neck: supple, symmetrical, trachea midline, no adenopathy, no JVD and  Thick neck  Lungs: clear to auscultation bilaterally  Heart: irregularly irregular rhythm, no S3 or S4  Abdomen: soft, non-tender. Bowel sounds normal. No masses,  no organomegaly, obese  Extremities: edema trace bilateral lower ext  Neurologic: Grossly normal    Data Review:     LABS:  Recent Labs     02/25/21  0550 02/24/21  0956 02/23/21  0600    138 139   K 4.3 4.5 4.5    100 102   CO2 31 30 31   BUN 49* 47* 25*   CREA 1.82* 2.12* 1.64*   CA 8.6 9.0 9.4   ALB 3.1* 3.3* 3.2*     Recent Labs     02/25/21  0550 02/24/21  0956 02/23/21  0600   WBC 16.6* 20.8* 11.4*   HGB 12.3 12.7 12.6   HCT 41.2 43.4 41.9    240 230     No results for input(s): NURA, KU, CLU, CREAU in the last 72 hours. No lab exists for component: PROU    Abd US 2-23-21  Comparison abdomen ultrasound 8/28/2014.     FINDINGS: Transabdominal ultrasound.     Proximal IVC normal grayscale. Proximal and mid abdominal aorta normal caliber;  distal aorta obscured by bowel gas.     Visualized portions of pancreas unremarkable but the pancreas is mostly  obscured.     The liver is not well seen, shadowed by bowel gas. Main portal vein normal flow  directionality.     Gallbladder absent. Common bile duct nondilated 0.5 cm.     Right kidney 9.3 cm long axis. Left kidney 10.3 cm long axis. No hydronephrosis,  evident renal stone or solid mass.     The spleen is not well seen, no evident splenomegaly.   No ascites.     IMPRESSION  Limited. 1. No acute findings.     Assessment:   Renal Specific Problems  CKD 3 A at baseline   ENEIDA resolved   CAD advanced but stable  Obesity  Hypertension with renal failure        Plan: Obtain/ Order: labs/cultures/radiology/procedures:  Urine lytes, protein/ creatinine ordered but not done. We can get these labs as outpatient.         Therapeutic:    Can restart losartan in AM if serum creatinine stable or decreased        Jake Del Valle MD    [phone]

## 2021-02-25 NOTE — CONSULTS
PULMONARY NOTE  [facility]    Name: Pam Whitaker MRN: [id number]   : 1950 Hospital: [address] [place] [address]   Date: 2021  Admission date: 2021 Hospital Day: 4       HPI:     Hospital Problems  Date Reviewed: 2020          Codes Class Noted POA    Atrial flutter ([facility] ICD-10-CM: Z42.11  ICD-9-CM: 427.32  2021 Unknown        CHF (congestive heart failure) ([facility] ICD-10-CM: I50.9  ICD-9-CM: 428.0  2021 Unknown                   [x] High complexity decision making was performed  [x] See my orders for details      Subjective/Initial History:     I was asked by Sal Oakley MD to see Pam Whitaker  a 72 y.o.  female in consultation     Excerpts from admission 2021 or consult notes as follows:   60-year-old lady came in because of shortness of breath and dyspnea and heart rate was around 110 220.   Significant past medical history of COPD obstructive sleep apnea coronary disease CABG x2 atrial flutter HF PEF she continues to have shortness of breath for the past couple of days and then she was having palpitation with heart rate was around 120 came to emergency room as an STEMI alert which was canceled her BNP is elevated so right now she is on oxygen nasal cannula she wears oxygen at home and CPAP at night so admitted and pulmonary consult was called for further evaluation      Allergies   Allergen Reactions    Tylox [Oxycodone-Acetaminophen] Hives        MAR reviewed and pertinent medications noted or modified as needed     Current Facility-Administered Medications   Medication    aspirin delayed-release tablet 81 mg    predniSONE (DELTASONE) tablet 20 mg    sodium chloride (NS) flush 5-40 mL    sodium chloride (NS) flush 5-40 mL    acetaminophen (TYLENOL) tablet 650 mg    theophylline ER (BAM-24) capsule 600 mg    dilTIAZem ER (CARDIZEM CD) capsule 120 mg    sorbitoL 70 % solution 30 mL    carvediloL (COREG) tablet 25 mg    clopidogreL (PLAVIX) tablet 75 mg    [Held by provider] apixaban (ELIQUIS) tablet 5 mg    buPROPion XL (WELLBUTRIN XL) tablet 150 mg    gabapentin (NEURONTIN) capsule    oxybutynin (DITROPAN) tablet 5 mg    pravastatin (PRAVACHOL) tablet 40 mg    polyethylene glycol (MIRALAX) packet 17 g    ondansetron (ZOFRAN ODT) tablet 4 mg    Or    ondansetron (ZOFRAN) injection 4 mg      Patient PCP: Hellen Bates MD  PMH:  has a past medical history of Congestive heart disease ([facility] Coronary artery disease, Diabetes ([facility] Hyperlipidemia, Hypertension, and IFTIKHAR (obstructive sleep apnea). PSH:   has a past surgical history that includes hx cholecystectomy; hx hysterectomy; hx hernia repair; hx coronary artery bypass graft; and hx breast biopsy. FHX: family history includes Cancer in her maternal grandmother; Heart Disease in her mother; Kidney Disease in her mother. SHX:  reports that she has quit smoking. She has never used smokeless tobacco. She reports previous alcohol use. She reports that she does not use drugs. ROS:    Review of Systems   Constitutional: Positive for malaise/fatigue. HENT: Negative. Eyes: Negative. Respiratory: Positive for shortness of breath. Cardiovascular: Positive for palpitations. Gastrointestinal: Negative. Genitourinary: Negative. Musculoskeletal: Negative. Skin: Negative. Endo/Heme/Allergies: Negative. Psychiatric/Behavioral: Negative.          Objective:     Vital Signs: Telemetry:    AFIB Intake/Output:   Visit Vitals  /61 (BP 1 Location: Left upper arm, BP Patient Position: At rest)   Pulse 74 Comment: Per Telemetry    Temp 98 °F (36.7 °C)   Resp 20   Ht 5' 3\" (1.6 m)   Wt 109 kg (240 lb 4.8 oz)   SpO2 95% Comment: 3L/min   BMI 42.57 kg/m²       Temp (24hrs), Av °F (36.7 °C), Min:97.6 °F (36.4 °C), Max:98.3 °F (36.8 °C)        O2 Device: Nasal cannula O2 Flow Rate (L/min): 3 l/min       Wt Readings from Last 4 Encounters:   21 109 kg (240 lb 4.8 oz)   11/05/20 158.7 kg (349 lb 13.9 oz)          Intake/Output Summary (Last 24 hours) at 2/25/2021 1050  Last data filed at 2/25/2021 0912  Gross per 24 hour   Intake --   Output 325 ml   Net -325 ml       Last shift:      02/25 0701 - 02/25 1900  In: -   Out: 325 [Urine:325]  Last 3 shifts: No intake/output data recorded. Physical Exam:     Physical Exam   Constitutional: She appears distressed. HENT:   Head: Normocephalic and atraumatic. Eyes: Pupils are equal, round, and reactive to light. Conjunctivae and EOM are normal.   Neck: Normal range of motion. Neck supple. Cardiovascular:   Irregular heart rate   Pulmonary/Chest: She is in respiratory distress. She has rales. Abdominal: Soft. Bowel sounds are normal.   Musculoskeletal: Normal range of motion. Neurological: She is alert. Skin: Skin is warm. Psychiatric: She has a normal mood and affect. Labs:    Recent Labs     02/25/21  0550 02/24/21  0956 02/23/21  0600   WBC 16.6* 20.8* 11.4*   HGB 12.3 12.7 12.6    240 230     Recent Labs     02/25/21  0550 02/24/21  0956 02/23/21  0600    138 139   K 4.3 4.5 4.5    100 102   CO2 31 30 31   * 221* 237*   BUN 49* 47* 25*   CREA 1.82* 2.12* 1.64*   CA 8.6 9.0 9.4   ALB 3.1* 3.3* 3.2*   ALT 17 20 22   AML  --   --  34   LPSE  --   --  84     No results for input(s): PH, PCO2, PO2, HCO3, FIO2 in the last 72 hours. Recent Labs     02/24/21  1102 02/22/21  1700 02/22/21  1245   TROIQ 0.12* 0.12* 0.14*     No results found for: BNPP, BNP   No results found for: CULTNo results found for: TSH, TSHEXT, TSHEXT    Imaging:    CXR Results  (Last 48 hours)               02/24/21 1120  XR CHEST PORT Final result    Impression:  Possible hydrostatic edema. No significant interval change. Narrative:  Chest, frontal view, 2/24/2021       History: Sharp pain in throat. Comparison: Including chest 2/22/2021.        Findings: The patient is status post median sternotomy. The cardiac silhouette   remains enlarged. The lungs are adequately expanded. There is presence of the   pulmonary vasculature and mild central hydrostatic edema is possible. No focal   consolidation, pleural effusion or pneumothorax is identified. The osseous   structures are stable. Results from [place] encounter on 02/22/21   XR CHEST PORT    Narrative Chest, frontal view, 2/24/2021    History: Sharp pain in throat. Comparison: Including chest 2/22/2021. Findings: The patient is status post median sternotomy. The cardiac silhouette  remains enlarged. The lungs are adequately expanded. There is presence of the  pulmonary vasculature and mild central hydrostatic edema is possible. No focal  consolidation, pleural effusion or pneumothorax is identified. The osseous  structures are stable. Impression Possible hydrostatic edema. No significant interval change. XR ABD (KUB)    Narrative Abdomen, 2 views    Stool through colon. No plain film evidence for bowel obstruction. On submitted  supine images, no free air is evident. XR CHEST PORT    Narrative STEMI alert. Comparison chest x-ray 11/2/2020. Impression FINDINGS: IMPRESSION: Frontal single view chest.    Median sternotomy status post CABG. Calcified aorta. Unchanged cardiomegaly  and/or pericardial effusion. The lungs are similarly inflated. Unchanged mild elevation of the right  hemidiaphragm. Minimal hydrostatic edema. No consolidation, effusion, or  pneumothorax. No free air under the diaphragm. No acute bony findings. Results from [place] encounter on 11/02/20   CTA CHEST W OR W WO CONT    Narrative Study: CTA Chest.    History: Rule out PE. Comparison: Chest CT 5/6/2020. Chest x-ray 11 2020.     Technique: CT volume acquisition of the chest was performed during the pulmonary  arterial phase following the administration of 100 mL of Isovue-370 IV contrast.  Axial, sagittal, coronal, and MIP reconstructions were reviewed. Dose reduction:  All CT scans at this facility are performed using dose reduction optimization  techniques as appropriate to a performed exam including the following: Automated  exposure control, adjustments of the mA and/or kV according to patient size, or  use of iterative reconstruction technique. Findings:    Vessels: No evidence of pulmonary was within limitations. The distal  subsegmental branches in the lower lobes are not well evaluated due to  respiratory motion artifact. Lungs/Pleura: Mild dependent bibasilar atelectasis. No consolidative airspace  disease, pleural effusion or pneumothorax. Central airways are clear of debris. No discrete lung nodule. Mediastinum/Cardiac: Heart is enlarged. Severe coronary atherosclerosis. Status  post CABG. Moderate aortic atherosclerosis. Normal caliber main pulmonary artery  and thoracic aorta. No pericardial effusion. Under distended thoracic esophagus. Thyroid: Visualized thyroid is grossly unremarkable. Lymph nodes: No intrathoracic lymphadenopathy by CT size criteria. Upper Abdomen: Visualized upper abdominal visceral structures are grossly  unremarkable. Bones/Chest wall soft tissues: Mild degenerative changes of the visualized  spine. No acute osseous abnormality identified. Impression Impression:  1. No evidence of central pulmonary embolism. Limited evaluation of the distal  subsegmental branch vessels due to respiratory motion artifact. 2.  No acute intrathoracic abnormality identified. 3.  Severe atherosclerosis. Status post CABG. Cardiomegaly. 4.  See full report for detailed findings. IMPRESSION:   1. Acute on chronic hypoxic respiratory failure  Chronic Obstructive Pulmonary Disease   Body mass index is 42.57 kg/m². 2. Acute on chronic HFpEF  3. Atrial fibrillation flutter RVR  4. NSTEMI  5.  Obstructive sleep apnea sleep apnea syndrome  6. History of coronary artery disease history of CABG in the past  7. Pt is requiring Drug therapy requiring intensive monitoring for toxicity  8. Pt is unstable, unpredictable needing inpatient monitoring; is acutely ill and at high risk of sudden decline and decompensation with severe consequenses and continued end organ dysfunction and failure  9. Prognosis guarded       RECOMMENDATIONS/PLAN:     1. She is chronically on home oxygen 3 L nasal cannula day and night  2. Patient on IV Solu-Medrol nebulizer treatment she is already on Eliquis will discontinue Solu-Medrol start patient on prednisone  3. She is on CPAP at home at night and during the daytime she is on nasal cannula 4 L  4. Her ejection fraction is about 50% patient was started on IV Cardizem for atrial flutter continue with beta-blockers CT chest negative for pulmonary embolism  5. For cardiac catheterization   6. Supplemental O2 to keep sats > 93%  7. Aspiration precautions  8. Labs to follow electrolytes, renal function and and blood counts  9. Glucose monitoring and SSI  10. Bronchial hygiene with respiratory therapy techniques, bronchodilators  11.  DVT, SUP prophylaxis             Evan Lugo MD

## 2021-02-25 NOTE — PROGRESS NOTES
General Daily Progress Note          Patient Name:   Roxanna Brasher       YOB: 1950       Age:  72 y.o. Admit Date: 2/22/2021      Subjective:     Patient was admitted for acute exacerbation of COPD and acute on chronic HFpEF. She is sitting up alert and oriented. States her breathing is still labored on exertion. Yesterday patient had an acute STEMI call after experiencing pain in her chest and neck. Proceeded with a cardiac catheterization in the afternoon. Found diseased left femoral artery,Dominant right coronary artery with total proximal occlusion after the right ventricular branch. ECG showed atrial flutter with variable AV block. WBC elevated but trending down  Kidney function only slightly improved    Chest X ray shows possible hydrostatic edema    On 3L O2 day and night. 98% this morning. Objective:     Visit Vitals  /68   Pulse 63   Temp 97.4 °F (36.3 °C)   Resp 18   Ht 5' 3\" (1.6 m)   Wt 109 kg (240 lb 4.8 oz)   SpO2 98%   BMI 42.57 kg/m²        Recent Results (from the past 24 hour(s))   CBC WITH AUTOMATED DIFF    Collection Time: 02/25/21  5:50 AM   Result Value Ref Range    WBC 16.6 (H) 3.6 - 11.0 K/uL    RBC 4.50 3.80 - 5.20 M/uL    HGB 12.3 11.5 - 16.0 g/dL    HCT 41.2 35.0 - 47.0 %    MCV 91.6 80.0 - 99.0 FL    MCH 27.3 26.0 - 34.0 PG    MCHC 29.9 (L) 30.0 - 36.5 g/dL    RDW 16.0 (H) 11.5 - 14.5 %    PLATELET 540 911 - 974 K/uL    MPV 12.9 8.9 - 12.9 FL    NEUTROPHILS 90 (H) 32 - 75 %    LYMPHOCYTES 4 (L) 12 - 49 %    MONOCYTES 6 5 - 13 %    EOSINOPHILS 0 0 - 7 %    BASOPHILS 0 0 - 1 %    IMMATURE GRANULOCYTES 0 0.0 - 0.5 %    ABS. NEUTROPHILS 14.9 (H) 1.8 - 8.0 K/UL    ABS. LYMPHOCYTES 0.7 (L) 0.8 - 3.5 K/UL    ABS. MONOCYTES 1.0 0.0 - 1.0 K/UL    ABS. EOSINOPHILS 0.0 0.0 - 0.4 K/UL    ABS. BASOPHILS 0.0 0.0 - 0.1 K/UL    ABS. IMM.  GRANS. 0.0 0.00 - 0.04 K/UL    DF AUTOMATED     METABOLIC PANEL, COMPREHENSIVE    Collection Time: 02/25/21  5:50 AM Result Value Ref Range    Sodium 138 136 - 145 mmol/L    Potassium 4.3 3.5 - 5.1 mmol/L    Chloride 102 97 - 108 mmol/L    CO2 31 21 - 32 mmol/L    Anion gap 5 5 - 15 mmol/L    Glucose 276 (H) 65 - 100 mg/dL    BUN 49 (H) 6 - 20 mg/dL    Creatinine 1.82 (H) 0.55 - 1.02 mg/dL    BUN/Creatinine ratio 27 (H) 12 - 20      GFR est AA 33 (L) >60 ml/min/1.73m2    GFR est non-AA 28 (L) >60 ml/min/1.73m2    Calcium 8.6 8.5 - 10.1 mg/dL    Bilirubin, total 0.4 0.2 - 1.0 mg/dL    AST (SGOT) 3 (L) 15 - 37 U/L    ALT (SGPT) 17 12 - 78 U/L    Alk. phosphatase 194 (H) 45 - 117 U/L    Protein, total 6.8 6.4 - 8.2 g/dL    Albumin 3.1 (L) 3.5 - 5.0 g/dL    Globulin 3.7 2.0 - 4.0 g/dL    A-G Ratio 0.8 (L) 1.1 - 2.2       [unfilled]      Review of Systems    Constitutional: Negative for chills and fever. HENT: Negative. Eyes: Negative. Respiratory: Positive for dyspnea on exertion    Cardiovascular: Negative. Gastrointestinal: Positive for abdominal pain  Skin: Negative. Neurological: Weakness/ heaviness of right leg        Physical Exam:      Constitutional: pt is oriented to person, place, and time. HENT:   Head: Normocephalic and atraumatic. Eyes: Pupils are equal, round, and reactive to light. EOM are normal.   Cardiovascular: Normal rate, regular rhythm and normal heart sounds. Pulmonary/Chest: Breath sounds normal. No wheezes. No rales. Exhibits no tenderness. Abdominal: Soft. Bowel sounds are normal. Abdominal tenderness in R and L UQ. There is no rebound and no guarding. Musculoskeletal: Normal range of motion. Neurological: pt is alert and oriented to person, place, and time. XR CHEST PORT   Final Result   Possible hydrostatic edema. No significant interval change. US ABD COMP   Final Result   Limited. 1. No acute findings. XR ABD (KUB)   Final Result      XR CHEST PORT   Final Result   FINDINGS: IMPRESSION: Frontal single view chest.      Median sternotomy status post CABG. Calcified aorta. Unchanged cardiomegaly   and/or pericardial effusion. The lungs are similarly inflated. Unchanged mild elevation of the right   hemidiaphragm. Minimal hydrostatic edema. No consolidation, effusion, or   pneumothorax. No free air under the diaphragm. No acute bony findings. Recent Results (from the past 24 hour(s))   CBC WITH AUTOMATED DIFF    Collection Time: 02/25/21  5:50 AM   Result Value Ref Range    WBC 16.6 (H) 3.6 - 11.0 K/uL    RBC 4.50 3.80 - 5.20 M/uL    HGB 12.3 11.5 - 16.0 g/dL    HCT 41.2 35.0 - 47.0 %    MCV 91.6 80.0 - 99.0 FL    MCH 27.3 26.0 - 34.0 PG    MCHC 29.9 (L) 30.0 - 36.5 g/dL    RDW 16.0 (H) 11.5 - 14.5 %    PLATELET 482 305 - 883 K/uL    MPV 12.9 8.9 - 12.9 FL    NEUTROPHILS 90 (H) 32 - 75 %    LYMPHOCYTES 4 (L) 12 - 49 %    MONOCYTES 6 5 - 13 %    EOSINOPHILS 0 0 - 7 %    BASOPHILS 0 0 - 1 %    IMMATURE GRANULOCYTES 0 0.0 - 0.5 %    ABS. NEUTROPHILS 14.9 (H) 1.8 - 8.0 K/UL    ABS. LYMPHOCYTES 0.7 (L) 0.8 - 3.5 K/UL    ABS. MONOCYTES 1.0 0.0 - 1.0 K/UL    ABS. EOSINOPHILS 0.0 0.0 - 0.4 K/UL    ABS. BASOPHILS 0.0 0.0 - 0.1 K/UL    ABS. IMM. GRANS. 0.0 0.00 - 0.04 K/UL    DF AUTOMATED     METABOLIC PANEL, COMPREHENSIVE    Collection Time: 02/25/21  5:50 AM   Result Value Ref Range    Sodium 138 136 - 145 mmol/L    Potassium 4.3 3.5 - 5.1 mmol/L    Chloride 102 97 - 108 mmol/L    CO2 31 21 - 32 mmol/L    Anion gap 5 5 - 15 mmol/L    Glucose 276 (H) 65 - 100 mg/dL    BUN 49 (H) 6 - 20 mg/dL    Creatinine 1.82 (H) 0.55 - 1.02 mg/dL    BUN/Creatinine ratio 27 (H) 12 - 20      GFR est AA 33 (L) >60 ml/min/1.73m2    GFR est non-AA 28 (L) >60 ml/min/1.73m2    Calcium 8.6 8.5 - 10.1 mg/dL    Bilirubin, total 0.4 0.2 - 1.0 mg/dL    AST (SGOT) 3 (L) 15 - 37 U/L    ALT (SGPT) 17 12 - 78 U/L    Alk.  phosphatase 194 (H) 45 - 117 U/L    Protein, total 6.8 6.4 - 8.2 g/dL    Albumin 3.1 (L) 3.5 - 5.0 g/dL    Globulin 3.7 2.0 - 4.0 g/dL    A-G Ratio 0.8 (L) 1.1 - 2.2 Results     ** No results found for the last 336 hours. **           Labs:     Recent Labs     02/25/21  0550 02/24/21  0956   WBC 16.6* 20.8*   HGB 12.3 12.7   HCT 41.2 43.4    240     Recent Labs     02/25/21  0550 02/24/21  0956 02/23/21  0600    138 139   K 4.3 4.5 4.5    100 102   CO2 31 30 31   BUN 49* 47* 25*   CREA 1.82* 2.12* 1.64*   * 221* 237*   CA 8.6 9.0 9.4     Recent Labs     02/25/21  0550 02/24/21  0956 02/23/21  0600   ALT 17 20 22   * 147* 151*   TBILI 0.4 0.6 1.2*   TP 6.8 7.5 7.3   ALB 3.1* 3.3* 3.2*   GLOB 3.7 4.2* 4.1*   AML  --   --  34   LPSE  --   --  84     No results for input(s): INR, PTP, APTT, INREXT, INREXT in the last 72 hours. No results for input(s): FE, TIBC, PSAT, FERR in the last 72 hours. No results found for: FOL, RBCF   No results for input(s): PH, PCO2, PO2 in the last 72 hours.   Recent Labs     02/24/21  1102 02/22/21  1700   TROIQ 0.12* 0.12*     No results found for: CHOL, CHOLX, CHLST, CHOLV, HDL, HDLP, LDL, LDLC, DLDLP, TGLX, TRIGL, TRIGP, CHHD, CHHDX  Lab Results   Component Value Date/Time    Glucose (POC) 214 (H) 11/05/2020 03:53 PM    Glucose (POC) 255 (H) 11/05/2020 11:30 AM    Glucose (POC) 176 (H) 11/05/2020 07:42 AM    Glucose (POC) 186 (H) 11/04/2020 10:57 PM    Glucose (POC) 236 (H) 11/04/2020 04:06 PM     No results found for: COLOR, APPRN, SPGRU, REFSG, NERIS, PROTU, GLUCU, KETU, BILU, UROU, ELOISA, LEUKU, GLUKE, EPSU, BACTU, WBCU, RBCU, CASTS, UCRY      Assessment:     Acute non-Q wave MI  Acute exacerbation of COPD  Acute on chronic hypoxic respiratory failure  Acute on chronic HFpEF  Atrial flutter with RVR  History of CAD and CABG  Acute on chronic kidney disease  IFTIKHAR  Overactive bladder  Hypercholesterolemia  Morbid obesity  History of TIA  Constipation      Plan:     Patient back on Eliquis   Wellbutrin, coreg, plavix, neurontin, ditropan, pravastatin, and theophylline  Cardizem 120 mg PO  Lasix held due to azotemia  D/c Solu-medrol and start on prednisone per pulmonology    Seen by nephrology. Recommend avoid NSAIDs, ACE/ARB until azotemia resolved. Then should be on ACE or ARB. Seen by pulmonology this morning. Plan to d/c solu-medrol and start on prednisone    Seen by cardiology. Plan to resume Eliquis, and plavix. Will hold diuretics and ASA. Ok to discharge home from cardiac standpoint.     Need PT OT and possible discharge in the morning        Current Facility-Administered Medications:     [START ON 2/26/2021] dilTIAZem ER (CARDIZEM CD) capsule 120 mg, 120 mg, Oral, DAILY, Nadia Garnica MD    predniSONE (DELTASONE) tablet 20 mg, 20 mg, Oral, DAILY WITH BREAKFAST, Damon Savage MD, 20 mg at 02/25/21 7693    sodium chloride (NS) flush 5-40 mL, 5-40 mL, IntraVENous, Q8H, Janett Damian MD, 10 mL at 02/25/21 0434    sodium chloride (NS) flush 5-40 mL, 5-40 mL, IntraVENous, PRN, Janett Damian MD    acetaminophen (TYLENOL) tablet 650 mg, 650 mg, Oral, Q4H PRN, Janett Damian MD, 650 mg at 02/25/21 0912    theophylline ER (BAM-24) capsule 600 mg, 600 mg, Oral, DAILY, Sandra Mancera MD, 600 mg at 02/25/21 0913    sorbitoL 70 % solution 30 mL, 30 mL, Oral, DAILY PRN, Gopal Alvarado MD, 30 mL at 02/24/21 0848    carvediloL (COREG) tablet 25 mg, 25 mg, Oral, BID WITH MEALS, Gopal Alvarado MD, 25 mg at 02/25/21 1512    clopidogreL (PLAVIX) tablet 75 mg, 75 mg, Oral, DAILY, Gopal Alvarado MD, 75 mg at 02/25/21 0913    apixaban (ELIQUIS) tablet 5 mg, 5 mg, Oral, Q12H, Gopal Alvarado MD, Stopped at 02/25/21 0900    buPROPion XL (WELLBUTRIN XL) tablet 150 mg, 150 mg, Oral, 7am, Gopal Alvarado MD, 150 mg at 02/25/21 4520    gabapentin (NEURONTIN) capsule, , Oral, TID, Gopal Alvarado MD, 300 mg at 02/25/21 0913    oxybutynin (DITROPAN) tablet 5 mg, 5 mg, Oral, TID, Gopal Alvarado MD, 5 mg at 02/25/21 0913    pravastatin (PRAVACHOL) tablet 40 mg, 40 mg, Oral, QHS, Christiano, Afua Callejas MD, 40 mg at 02/24/21 4319    polyethylene glycol (MIRALAX) packet 17 g, 17 g, Oral, DAILY PRN, Gopal Alvarado MD, 17 g at 02/24/21 0040    ondansetron (ZOFRAN ODT) tablet 4 mg, 4 mg, Oral, Q8H PRN **OR** ondansetron (ZOFRAN) injection 4 mg, 4 mg, IntraVENous, Q6H PRN, Gopal Alvarado MD, 4 mg at 02/24/21 0881

## 2021-02-26 VITALS
WEIGHT: 245.37 LBS | HEART RATE: 67 BPM | DIASTOLIC BLOOD PRESSURE: 67 MMHG | BODY MASS INDEX: 43.48 KG/M2 | HEIGHT: 63 IN | SYSTOLIC BLOOD PRESSURE: 132 MMHG | TEMPERATURE: 98.1 F | OXYGEN SATURATION: 96 % | RESPIRATION RATE: 20 BRPM

## 2021-02-26 LAB
ALBUMIN SERPL-MCNC: 3 G/DL (ref 3.5–5)
ALBUMIN/GLOB SERPL: 0.8 {RATIO} (ref 1.1–2.2)
ALP SERPL-CCNC: 137 U/L (ref 45–117)
ALT SERPL-CCNC: 16 U/L (ref 12–78)
ANION GAP SERPL CALC-SCNC: 2 MMOL/L (ref 5–15)
AST SERPL W P-5'-P-CCNC: 3 U/L (ref 15–37)
BASOPHILS # BLD: 0 K/UL (ref 0–0.1)
BASOPHILS NFR BLD: 0 % (ref 0–1)
BILIRUB SERPL-MCNC: 0.4 MG/DL (ref 0.2–1)
BUN SERPL-MCNC: 42 MG/DL (ref 6–20)
BUN/CREAT SERPL: 24 (ref 12–20)
CA-I BLD-MCNC: 8.8 MG/DL (ref 8.5–10.1)
CHLORIDE SERPL-SCNC: 103 MMOL/L (ref 97–108)
CO2 SERPL-SCNC: 35 MMOL/L (ref 21–32)
CREAT SERPL-MCNC: 1.74 MG/DL (ref 0.55–1.02)
DIFFERENTIAL METHOD BLD: ABNORMAL
EOSINOPHIL # BLD: 0 K/UL (ref 0–0.4)
EOSINOPHIL NFR BLD: 0 % (ref 0–7)
ERYTHROCYTE [DISTWIDTH] IN BLOOD BY AUTOMATED COUNT: 15.9 % (ref 11.5–14.5)
GLOBULIN SER CALC-MCNC: 3.6 G/DL (ref 2–4)
GLUCOSE SERPL-MCNC: 168 MG/DL (ref 65–100)
HCT VFR BLD AUTO: 40.7 % (ref 35–47)
HGB BLD-MCNC: 12 G/DL (ref 11.5–16)
IMM GRANULOCYTES # BLD AUTO: 0 K/UL (ref 0–0.04)
IMM GRANULOCYTES NFR BLD AUTO: 0 % (ref 0–0.5)
LYMPHOCYTES # BLD: 2.2 K/UL (ref 0.8–3.5)
LYMPHOCYTES NFR BLD: 18 % (ref 12–49)
MCH RBC QN AUTO: 27 PG (ref 26–34)
MCHC RBC AUTO-ENTMCNC: 29.5 G/DL (ref 30–36.5)
MCV RBC AUTO: 91.7 FL (ref 80–99)
MONOCYTES # BLD: 1 K/UL (ref 0–1)
MONOCYTES NFR BLD: 8 % (ref 5–13)
NEUTS SEG # BLD: 9 K/UL (ref 1.8–8)
NEUTS SEG NFR BLD: 74 % (ref 32–75)
PLATELET # BLD AUTO: 207 K/UL (ref 150–400)
PMV BLD AUTO: 13 FL (ref 8.9–12.9)
POTASSIUM SERPL-SCNC: 4.1 MMOL/L (ref 3.5–5.1)
PROT SERPL-MCNC: 6.6 G/DL (ref 6.4–8.2)
RBC # BLD AUTO: 4.44 M/UL (ref 3.8–5.2)
SODIUM SERPL-SCNC: 140 MMOL/L (ref 136–145)
WBC # BLD AUTO: 12.2 K/UL (ref 3.6–11)

## 2021-02-26 PROCEDURE — 85025 COMPLETE CBC W/AUTO DIFF WBC: CPT

## 2021-02-26 PROCEDURE — 74011250637 HC RX REV CODE- 250/637: Performed by: FAMILY MEDICINE

## 2021-02-26 PROCEDURE — 36415 COLL VENOUS BLD VENIPUNCTURE: CPT

## 2021-02-26 PROCEDURE — 97530 THERAPEUTIC ACTIVITIES: CPT

## 2021-02-26 PROCEDURE — 74011636637 HC RX REV CODE- 636/637: Performed by: INTERNAL MEDICINE

## 2021-02-26 PROCEDURE — 80053 COMPREHEN METABOLIC PANEL: CPT

## 2021-02-26 PROCEDURE — 74011250637 HC RX REV CODE- 250/637: Performed by: INTERNAL MEDICINE

## 2021-02-26 RX ORDER — BENZONATATE 100 MG/1
100 CAPSULE ORAL
Qty: 30 CAP | Refills: 0 | Status: SHIPPED | OUTPATIENT
Start: 2021-02-26 | End: 2021-03-05

## 2021-02-26 RX ORDER — PREDNISONE 20 MG/1
20 TABLET ORAL
Qty: 10 TAB | Refills: 0 | Status: SHIPPED | OUTPATIENT
Start: 2021-02-27 | End: 2021-03-14

## 2021-02-26 RX ORDER — CLOPIDOGREL BISULFATE 75 MG/1
75 TABLET ORAL DAILY
Qty: 30 TAB | Refills: 0 | Status: SHIPPED | OUTPATIENT
Start: 2021-02-27 | End: 2021-11-21

## 2021-02-26 RX ORDER — ONDANSETRON 4 MG/1
4 TABLET, ORALLY DISINTEGRATING ORAL
Qty: 30 TAB | Refills: 0 | Status: SHIPPED | OUTPATIENT
Start: 2021-02-26 | End: 2021-03-10 | Stop reason: ALTCHOICE

## 2021-02-26 RX ORDER — DILTIAZEM HYDROCHLORIDE 120 MG/1
120 CAPSULE, COATED, EXTENDED RELEASE ORAL DAILY
Qty: 30 CAP | Refills: 0 | Status: SHIPPED | OUTPATIENT
Start: 2021-02-27 | End: 2021-03-14

## 2021-02-26 RX ADMIN — APIXABAN 5 MG: 5 TABLET, FILM COATED ORAL at 08:54

## 2021-02-26 RX ADMIN — GABAPENTIN 300 MG: 300 CAPSULE ORAL at 08:51

## 2021-02-26 RX ADMIN — BUPROPION HYDROCHLORIDE 150 MG: 150 TABLET, FILM COATED, EXTENDED RELEASE ORAL at 07:53

## 2021-02-26 RX ADMIN — Medication 10 ML: at 06:06

## 2021-02-26 RX ADMIN — PREDNISONE 20 MG: 20 TABLET ORAL at 08:54

## 2021-02-26 RX ADMIN — CLOPIDOGREL BISULFATE 75 MG: 75 TABLET ORAL at 08:52

## 2021-02-26 RX ADMIN — OXYBUTYNIN CHLORIDE 5 MG: 5 TABLET ORAL at 08:53

## 2021-02-26 RX ADMIN — CARVEDILOL 25 MG: 12.5 TABLET, FILM COATED ORAL at 08:52

## 2021-02-26 RX ADMIN — DILTIAZEM HYDROCHLORIDE 120 MG: 120 CAPSULE, COATED, EXTENDED RELEASE ORAL at 08:54

## 2021-02-26 RX ADMIN — THEOPHYLLINE ANHYDROUS 600 MG: 400 CAPSULE, EXTENDED RELEASE ORAL at 08:55

## 2021-02-26 NOTE — PROGRESS NOTES
CM spoke with patient, patient reports that her walker was broken by her grandchild and will need a replacement in order to return home. Patient had no preference for DME company. Referral sent to [facility], attending notified of need for DME order. Current DCP is for patient to d/c to home with [facility], accepted with [facility].

## 2021-02-26 NOTE — PROGRESS NOTES
General Daily Progress Note          Patient Name:   Renita Mcelroy       YOB: 1950       Age:  72 y.o. Admit Date: 2/22/2021      Subjective:     Patient was admitted for acute exacerbation of COPD and acute on chronic HFpEF. She is sitting up alert and oriented. States her breathing is still labored on exertion but improved today. Also complains of right leg heaviness and weakness. Denies chest pain, abdominal pain, nausea, vomiting    WBC elevated but trending down again today. Elevated due to prednisone. Kidney function improving    On 2L O2 day and night. 97% this morning      Objective:     Visit Vitals  /69   Pulse 91   Temp 98 °F (36.7 °C)   Resp 18   Ht 5' 3\" (1.6 m)   Wt 111.3 kg (245 lb 6 oz)   SpO2 97%   BMI 43.47 kg/m²        Recent Results (from the past 24 hour(s))   CBC WITH AUTOMATED DIFF    Collection Time: 02/26/21  6:41 AM   Result Value Ref Range    WBC 12.2 (H) 3.6 - 11.0 K/uL    RBC 4.44 3.80 - 5.20 M/uL    HGB 12.0 11.5 - 16.0 g/dL    HCT 40.7 35.0 - 47.0 %    MCV 91.7 80.0 - 99.0 FL    MCH 27.0 26.0 - 34.0 PG    MCHC 29.5 (L) 30.0 - 36.5 g/dL    RDW 15.9 (H) 11.5 - 14.5 %    PLATELET 694 426 - 474 K/uL    MPV 13.0 (H) 8.9 - 12.9 FL    NEUTROPHILS 74 32 - 75 %    LYMPHOCYTES 18 12 - 49 %    MONOCYTES 8 5 - 13 %    EOSINOPHILS 0 0 - 7 %    BASOPHILS 0 0 - 1 %    IMMATURE GRANULOCYTES 0 0.0 - 0.5 %    ABS. NEUTROPHILS 9.0 (H) 1.8 - 8.0 K/UL    ABS. LYMPHOCYTES 2.2 0.8 - 3.5 K/UL    ABS. MONOCYTES 1.0 0.0 - 1.0 K/UL    ABS. EOSINOPHILS 0.0 0.0 - 0.4 K/UL    ABS. BASOPHILS 0.0 0.0 - 0.1 K/UL    ABS. IMM.  GRANS. 0.0 0.00 - 0.04 K/UL    DF AUTOMATED     METABOLIC PANEL, COMPREHENSIVE    Collection Time: 02/26/21  6:41 AM   Result Value Ref Range    Sodium 140 136 - 145 mmol/L    Potassium 4.1 3.5 - 5.1 mmol/L    Chloride 103 97 - 108 mmol/L    CO2 35 (H) 21 - 32 mmol/L    Anion gap 2 (L) 5 - 15 mmol/L    Glucose 168 (H) 65 - 100 mg/dL    BUN 42 (H) 6 - 20 mg/dL Creatinine 1.74 (H) 0.55 - 1.02 mg/dL    BUN/Creatinine ratio 24 (H) 12 - 20      GFR est AA 35 (L) >60 ml/min/1.73m2    GFR est non-AA 29 (L) >60 ml/min/1.73m2    Calcium 8.8 8.5 - 10.1 mg/dL    Bilirubin, total 0.4 0.2 - 1.0 mg/dL    AST (SGOT) 3 (L) 15 - 37 U/L    ALT (SGPT) 16 12 - 78 U/L    Alk. phosphatase 137 (H) 45 - 117 U/L    Protein, total 6.6 6.4 - 8.2 g/dL    Albumin 3.0 (L) 3.5 - 5.0 g/dL    Globulin 3.6 2.0 - 4.0 g/dL    A-G Ratio 0.8 (L) 1.1 - 2.2       [unfilled]      Review of Systems    Constitutional: Negative for chills and fever. HENT: Negative. Eyes: Negative. Respiratory: Positive for dyspnea on exertion    Cardiovascular: Negative. Gastrointestinal: Positive for abdominal pain  Skin: Negative. Neurological: Weakness/ heaviness of right leg        Physical Exam:      Constitutional: pt is oriented to person, place, and time. HENT:   Head: Normocephalic and atraumatic. Eyes: Pupils are equal, round, and reactive to light. EOM are normal.   Cardiovascular: Normal rate, regular rhythm and normal heart sounds. Pulmonary/Chest: Breath sounds normal. No wheezes. No rales. Exhibits no tenderness. Abdominal: Soft. Bowel sounds are normal. Abdominal tenderness in R and L UQ. There is no rebound and no guarding. Musculoskeletal: Edema in bilateral lower extremities  Neurological: pt is alert and oriented to person, place, and time. XR CHEST PORT   Final Result   Possible hydrostatic edema. No significant interval change. US ABD COMP   Final Result   Limited. 1. No acute findings. XR ABD (KUB)   Final Result      XR CHEST PORT   Final Result   FINDINGS: IMPRESSION: Frontal single view chest.      Median sternotomy status post CABG. Calcified aorta. Unchanged cardiomegaly   and/or pericardial effusion. The lungs are similarly inflated. Unchanged mild elevation of the right   hemidiaphragm. Minimal hydrostatic edema.  No consolidation, effusion, or pneumothorax. No free air under the diaphragm. No acute bony findings. Recent Results (from the past 24 hour(s))   CBC WITH AUTOMATED DIFF    Collection Time: 02/26/21  6:41 AM   Result Value Ref Range    WBC 12.2 (H) 3.6 - 11.0 K/uL    RBC 4.44 3.80 - 5.20 M/uL    HGB 12.0 11.5 - 16.0 g/dL    HCT 40.7 35.0 - 47.0 %    MCV 91.7 80.0 - 99.0 FL    MCH 27.0 26.0 - 34.0 PG    MCHC 29.5 (L) 30.0 - 36.5 g/dL    RDW 15.9 (H) 11.5 - 14.5 %    PLATELET 890 299 - 569 K/uL    MPV 13.0 (H) 8.9 - 12.9 FL    NEUTROPHILS 74 32 - 75 %    LYMPHOCYTES 18 12 - 49 %    MONOCYTES 8 5 - 13 %    EOSINOPHILS 0 0 - 7 %    BASOPHILS 0 0 - 1 %    IMMATURE GRANULOCYTES 0 0.0 - 0.5 %    ABS. NEUTROPHILS 9.0 (H) 1.8 - 8.0 K/UL    ABS. LYMPHOCYTES 2.2 0.8 - 3.5 K/UL    ABS. MONOCYTES 1.0 0.0 - 1.0 K/UL    ABS. EOSINOPHILS 0.0 0.0 - 0.4 K/UL    ABS. BASOPHILS 0.0 0.0 - 0.1 K/UL    ABS. IMM. GRANS. 0.0 0.00 - 0.04 K/UL    DF AUTOMATED     METABOLIC PANEL, COMPREHENSIVE    Collection Time: 02/26/21  6:41 AM   Result Value Ref Range    Sodium 140 136 - 145 mmol/L    Potassium 4.1 3.5 - 5.1 mmol/L    Chloride 103 97 - 108 mmol/L    CO2 35 (H) 21 - 32 mmol/L    Anion gap 2 (L) 5 - 15 mmol/L    Glucose 168 (H) 65 - 100 mg/dL    BUN 42 (H) 6 - 20 mg/dL    Creatinine 1.74 (H) 0.55 - 1.02 mg/dL    BUN/Creatinine ratio 24 (H) 12 - 20      GFR est AA 35 (L) >60 ml/min/1.73m2    GFR est non-AA 29 (L) >60 ml/min/1.73m2    Calcium 8.8 8.5 - 10.1 mg/dL    Bilirubin, total 0.4 0.2 - 1.0 mg/dL    AST (SGOT) 3 (L) 15 - 37 U/L    ALT (SGPT) 16 12 - 78 U/L    Alk. phosphatase 137 (H) 45 - 117 U/L    Protein, total 6.6 6.4 - 8.2 g/dL    Albumin 3.0 (L) 3.5 - 5.0 g/dL    Globulin 3.6 2.0 - 4.0 g/dL    A-G Ratio 0.8 (L) 1.1 - 2.2         Results     ** No results found for the last 336 hours.  **           Labs:     Recent Labs     02/26/21  0641 02/25/21  0550   WBC 12.2* 16.6*   HGB 12.0 12.3   HCT 40.7 41.2    215     Recent Labs 02/26/21  0641 02/25/21  0550 02/24/21  0956    138 138   K 4.1 4.3 4.5    102 100   CO2 35* 31 30   BUN 42* 49* 47*   CREA 1.74* 1.82* 2.12*   * 276* 221*   CA 8.8 8.6 9.0     Recent Labs     02/26/21  0641 02/25/21  0550 02/24/21  0956   ALT 16 17 20   * 194* 147*   TBILI 0.4 0.4 0.6   TP 6.6 6.8 7.5   ALB 3.0* 3.1* 3.3*   GLOB 3.6 3.7 4.2*     No results for input(s): INR, PTP, APTT, INREXT, INREXT in the last 72 hours. No results for input(s): FE, TIBC, PSAT, FERR in the last 72 hours. No results found for: FOL, RBCF   No results for input(s): PH, PCO2, PO2 in the last 72 hours. Recent Labs     02/24/21  1102   TROIQ 0.12*     No results found for: CHOL, CHOLX, CHLST, CHOLV, HDL, HDLP, LDL, LDLC, DLDLP, TGLX, TRIGL, TRIGP, CHHD, CHHDX  Lab Results   Component Value Date/Time    Glucose (POC) 214 (H) 11/05/2020 03:53 PM    Glucose (POC) 255 (H) 11/05/2020 11:30 AM    Glucose (POC) 176 (H) 11/05/2020 07:42 AM    Glucose (POC) 186 (H) 11/04/2020 10:57 PM    Glucose (POC) 236 (H) 11/04/2020 04:06 PM     No results found for: COLOR, APPRN, SPGRU, REFSG, NERIS, PROTU, GLUCU, KETU, BILU, UROU, ELOISA, LEUKU, GLUKE, EPSU, BACTU, WBCU, RBCU, CASTS, UCRY      Assessment:     Acute non-Q wave MI  Acute exacerbation of COPD  Acute on chronic hypoxic respiratory failure  Acute on chronic HFpEF  Atrial flutter with RVR  History of CAD and CABG  Acute on chronic kidney disease  IFTIKHAR  Overactive bladder  Hypercholesterolemia  Morbid obesity  History of TIA  Constipation      Plan:     Patient on Eliquis 5 mg  Cardizem 120 mg PO  Wellbutrin, coreg, plavix, neurontin, ditropan, pravastatin, and theophylline  Lasix held due to azotemia  D/c Solu-medrol and start on prednisone per pulmonology    Seen by nephrology. State labs can be ordered outpatient. Restart Losartan today if serum creatinine stable or decreased.      Discharged from cardiology  Discharged from GI    Need PT OT and possible discharge today        Current Facility-Administered Medications:     dilTIAZem ER (CARDIZEM CD) capsule 120 mg, 120 mg, Oral, DAILY, Ramsey Blackwell MD, 120 mg at 02/26/21 7316    benzonatate (TESSALON) capsule 100 mg, 100 mg, Oral, TID PRN, Gopal Alvarado MD, 100 mg at 02/25/21 2358    predniSONE (DELTASONE) tablet 20 mg, 20 mg, Oral, DAILY WITH BREAKFAST, Damon Savage MD, 20 mg at 02/26/21 0854    sodium chloride (NS) flush 5-40 mL, 5-40 mL, IntraVENous, Q8H, Jordon Bro MD, 10 mL at 02/26/21 0606    sodium chloride (NS) flush 5-40 mL, 5-40 mL, IntraVENous, PRN, Jordon Bro MD    acetaminophen (TYLENOL) tablet 650 mg, 650 mg, Oral, Q4H PRN, Jordon Bro MD, 650 mg at 02/25/21 0912    theophylline ER (BAM-24) capsule 600 mg, 600 mg, Oral, DAILY, Dilcia Dumont MD, 600 mg at 02/26/21 0855    sorbitoL 70 % solution 30 mL, 30 mL, Oral, DAILY PRN, Gopal Alvarado MD, 30 mL at 02/24/21 0848    carvediloL (COREG) tablet 25 mg, 25 mg, Oral, BID WITH MEALS, Gopal Alvarado MD, 25 mg at 02/26/21 5620    clopidogreL (PLAVIX) tablet 75 mg, 75 mg, Oral, DAILY, Gopal Alvarado MD, 75 mg at 02/26/21 0519    apixaban (ELIQUIS) tablet 5 mg, 5 mg, Oral, Q12H, Gopal Alvarado MD, 5 mg at 02/26/21 0854    buPROPion XL (WELLBUTRIN XL) tablet 150 mg, 150 mg, Oral, 7am, Dilcia Dumont MD, 150 mg at 02/26/21 4821    gabapentin (NEURONTIN) capsule, , Oral, TID, Neville Alvarado MD, 300 mg at 02/26/21 0851    oxybutynin (DITROPAN) tablet 5 mg, 5 mg, Oral, TID, Neville Alvarado MD, 5 mg at 02/26/21 0853    pravastatin (PRAVACHOL) tablet 40 mg, 40 mg, Oral, QHS, Gopal Alvarado MD, 40 mg at 02/25/21 2250    polyethylene glycol (MIRALAX) packet 17 g, 17 g, Oral, DAILY PRN, Gopal Alvarado MD, 17 g at 02/24/21 0040    ondansetron (ZOFRAN ODT) tablet 4 mg, 4 mg, Oral, Q8H PRN **OR** ondansetron (ZOFRAN) injection 4 mg, 4 mg, IntraVENous, Q6H PRN, Gopal Alvarado MD, 4 mg at 02/24/21 7131

## 2021-02-26 NOTE — CONSULTS
PULMONARY NOTE  [facility]    Name: Millie Purvis MRN: [id number]   : 1950 Hospital: [address] [place] [address]   Date: 2021  Admission date: 2021 Hospital Day: 5       HPI:     Hospital Problems  Date Reviewed: 2020          Codes Class Noted POA    Atrial flutter ([facility] ICD-10-CM: I49.93  ICD-9-CM: 427.32  2021 Unknown        CHF (congestive heart failure) ([facility] ICD-10-CM: I50.9  ICD-9-CM: 428.0  2021 Unknown                   [x] High complexity decision making was performed  [x] See my orders for details      Subjective/Initial History:     I was asked by Delmi Cardona MD to see Millie Purvis  a 72 y.o.  female in consultation     Excerpts from admission 2021 or consult notes as follows:   20-year-old lady came in because of shortness of breath and dyspnea and heart rate was around 110 220.   Significant past medical history of COPD obstructive sleep apnea coronary disease CABG x2 atrial flutter HF PEF she continues to have shortness of breath for the past couple of days and then she was having palpitation with heart rate was around 120 came to emergency room as an STEMI alert which was canceled her BNP is elevated so right now she is on oxygen nasal cannula she wears oxygen at home and CPAP at night so admitted and pulmonary consult was called for further evaluation      Allergies   Allergen Reactions    Tylox [Oxycodone-Acetaminophen] Hives        MAR reviewed and pertinent medications noted or modified as needed     Current Facility-Administered Medications   Medication    dilTIAZem ER (CARDIZEM CD) capsule 120 mg    benzonatate (TESSALON) capsule 100 mg    predniSONE (DELTASONE) tablet 20 mg    sodium chloride (NS) flush 5-40 mL    sodium chloride (NS) flush 5-40 mL    acetaminophen (TYLENOL) tablet 650 mg    theophylline ER (BAM-24) capsule 600 mg    sorbitoL 70 % solution 30 mL    carvediloL (COREG) tablet 25 mg    clopidogreL (PLAVIX) tablet 75 mg    apixaban (ELIQUIS) tablet 5 mg    buPROPion XL (WELLBUTRIN XL) tablet 150 mg    gabapentin (NEURONTIN) capsule    oxybutynin (DITROPAN) tablet 5 mg    pravastatin (PRAVACHOL) tablet 40 mg    polyethylene glycol (MIRALAX) packet 17 g    ondansetron (ZOFRAN ODT) tablet 4 mg    Or    ondansetron (ZOFRAN) injection 4 mg      Patient PCP: Linsey Paris MD  PMH:  has a past medical history of Congestive heart disease ([facility] Coronary artery disease, Diabetes ([facility] Hyperlipidemia, Hypertension, and IFTIKHAR (obstructive sleep apnea). PSH:   has a past surgical history that includes hx cholecystectomy; hx hysterectomy; hx hernia repair; hx coronary artery bypass graft; and hx breast biopsy. FHX: family history includes Cancer in her maternal grandmother; Heart Disease in her mother; Kidney Disease in her mother. SHX:  reports that she has quit smoking. She has never used smokeless tobacco. She reports previous alcohol use. She reports that she does not use drugs. ROS:    Review of Systems   Constitutional: Positive for malaise/fatigue. HENT: Negative. Eyes: Negative. Respiratory: Positive for shortness of breath. Cardiovascular: Positive for palpitations. Gastrointestinal: Negative. Genitourinary: Negative. Musculoskeletal: Negative. Skin: Negative. Endo/Heme/Allergies: Negative. Psychiatric/Behavioral: Negative.          Objective:     Vital Signs: Telemetry:    AFIB Intake/Output:   Visit Vitals  /63 (BP 1 Location: Left upper arm, BP Patient Position: At rest)   Pulse 61   Temp 98 °F (36.7 °C)   Resp 18   Ht 5' 3\" (1.6 m)   Wt 111.3 kg (245 lb 6 oz)   SpO2 97%   BMI 43.47 kg/m²       Temp (24hrs), Av.1 °F (36.7 °C), Min:97.4 °F (36.3 °C), Max:98.9 °F (37.2 °C)        O2 Device: Nasal cannula O2 Flow Rate (L/min): 2 l/min       Wt Readings from Last 4 Encounters:   21 111.3 kg (245 lb 6 oz)   20 158.7 kg (349 lb 13.9 oz) Intake/Output Summary (Last 24 hours) at 2/26/2021 1120  Last data filed at 2/26/2021 1052  Gross per 24 hour   Intake 920 ml   Output 1450 ml   Net -530 ml       Last shift:      02/26 0701 - 02/26 1900  In: 920 [P.O.:920]  Out: 1200 [Urine:1200]  Last 3 shifts: 02/24 1901 - 02/26 0700  In: -   Out: 857 [Urine:575]       Physical Exam:     Physical Exam   Constitutional: She appears distressed. HENT:   Head: Normocephalic and atraumatic. Eyes: Pupils are equal, round, and reactive to light. Conjunctivae and EOM are normal.   Neck: Normal range of motion. Neck supple. Cardiovascular:   Irregular heart rate   Pulmonary/Chest: She is in respiratory distress. She has rales. Abdominal: Soft. Bowel sounds are normal.   Musculoskeletal: Normal range of motion. Neurological: She is alert. Skin: Skin is warm. Psychiatric: She has a normal mood and affect. Labs:    Recent Labs     02/26/21  0641 02/25/21  0550 02/24/21  0956   WBC 12.2* 16.6* 20.8*   HGB 12.0 12.3 12.7    215 240     Recent Labs     02/26/21  0641 02/25/21  0550 02/24/21  0956    138 138   K 4.1 4.3 4.5    102 100   CO2 35* 31 30   * 276* 221*   BUN 42* 49* 47*   CREA 1.74* 1.82* 2.12*   CA 8.8 8.6 9.0   ALB 3.0* 3.1* 3.3*   ALT 16 17 20     No results for input(s): PH, PCO2, PO2, HCO3, FIO2 in the last 72 hours. Recent Labs     02/24/21  1102   TROIQ 0.12*     No results found for: BNPP, BNP   No results found for: CULTNo results found for: TSH, TSHEXT, TSHEXT    Imaging:    CXR Results  (Last 48 hours)    None        Results from Hospital Encounter encounter on 02/22/21   XR CHEST PORT    Narrative Chest, frontal view, 2/24/2021    History: Sharp pain in throat. Comparison: Including chest 2/22/2021. Findings: The patient is status post median sternotomy. The cardiac silhouette  remains enlarged. The lungs are adequately expanded.   There is presence of the  pulmonary vasculature and mild central hydrostatic edema is possible. No focal  consolidation, pleural effusion or pneumothorax is identified. The osseous  structures are stable. Impression Possible hydrostatic edema. No significant interval change. XR ABD (KUB)    Narrative Abdomen, 2 views    Stool through colon. No plain film evidence for bowel obstruction. On submitted  supine images, no free air is evident. XR CHEST PORT    Narrative STEMI alert. Comparison chest x-ray 11/2/2020. Impression FINDINGS: IMPRESSION: Frontal single view chest.    Median sternotomy status post CABG. Calcified aorta. Unchanged cardiomegaly  and/or pericardial effusion. The lungs are similarly inflated. Unchanged mild elevation of the right  hemidiaphragm. Minimal hydrostatic edema. No consolidation, effusion, or  pneumothorax. No free air under the diaphragm. No acute bony findings. Results from [place] encounter on 11/02/20   CTA CHEST W OR W WO CONT    Narrative Study: CTA Chest.    History: Rule out PE. Comparison: Chest CT 5/6/2020. Chest x-ray 11 2020. Technique: CT volume acquisition of the chest was performed during the pulmonary  arterial phase following the administration of 100 mL of Isovue-370 IV contrast.  Axial, sagittal, coronal, and MIP reconstructions were reviewed. Dose reduction:  All CT scans at this facility are performed using dose reduction optimization  techniques as appropriate to a performed exam including the following: Automated  exposure control, adjustments of the mA and/or kV according to patient size, or  use of iterative reconstruction technique. Findings:    Vessels: No evidence of pulmonary was within limitations. The distal  subsegmental branches in the lower lobes are not well evaluated due to  respiratory motion artifact. Lungs/Pleura: Mild dependent bibasilar atelectasis. No consolidative airspace  disease, pleural effusion or pneumothorax.  Central airways are clear of debris. No discrete lung nodule. Mediastinum/Cardiac: Heart is enlarged. Severe coronary atherosclerosis. Status  post CABG. Moderate aortic atherosclerosis. Normal caliber main pulmonary artery  and thoracic aorta. No pericardial effusion. Under distended thoracic esophagus. Thyroid: Visualized thyroid is grossly unremarkable. Lymph nodes: No intrathoracic lymphadenopathy by CT size criteria. Upper Abdomen: Visualized upper abdominal visceral structures are grossly  unremarkable. Bones/Chest wall soft tissues: Mild degenerative changes of the visualized  spine. No acute osseous abnormality identified. Impression Impression:  1. No evidence of central pulmonary embolism. Limited evaluation of the distal  subsegmental branch vessels due to respiratory motion artifact. 2.  No acute intrathoracic abnormality identified. 3.  Severe atherosclerosis. Status post CABG. Cardiomegaly. 4.  See full report for detailed findings. IMPRESSION:   1. Acute on chronic hypoxic respiratory failure  2. Congestive heart failure non-Q wave MI coronary artery disease  Chronic Obstructive Pulmonary Disease   Body mass index is 43.47 kg/m². 3. Acute on chronic HFpEF  4. Atrial fibrillation flutter   5. Obstructive sleep apnea sleep apnea syndrome  6. History of coronary artery disease history of CABG in the past  7. Pt is requiring Drug therapy requiring intensive monitoring for toxicity  8. Prognosis guarded       RECOMMENDATIONS/PLAN:     1. She is chronically on home oxygen 3 L nasal cannula day and night  2. Patient on prednisone  3. She is on CPAP at home at night and during the daytime she is on nasal cannula 3 L  4. Congestive heart failure  5. Status post cardiac catheterization  6. Supplemental O2 to keep sats > 93%  7.  Aspiration precautions             Valente Mayorga MD

## 2021-02-26 NOTE — DISCHARGE SUMMARY
General Daily Progress Note          Patient Name:   Linnea Sierra       YOB: 1950       Age:  72 y.o. Admit Date: 2/22/2021      Subjective:     Patient was admitted for acute exacerbation of COPD and acute on chronic HFpEF. She is sitting up alert and oriented. States her breathing is still labored on exertion but improved today. Also complains of right leg heaviness and weakness. Denies chest pain, abdominal pain, nausea, vomiting    WBC elevated but trending down again today. Elevated due to prednisone. Kidney function improving    On 2L O2 day and night. 97% this morning      Objective:     Visit Vitals  /63 (BP 1 Location: Left upper arm, BP Patient Position: At rest)   Pulse 61   Temp 98 °F (36.7 °C)   Resp 18   Ht 5' 3\" (1.6 m)   Wt 111.3 kg (245 lb 6 oz)   SpO2 97%   BMI 43.47 kg/m²        Recent Results (from the past 24 hour(s))   CBC WITH AUTOMATED DIFF    Collection Time: 02/26/21  6:41 AM   Result Value Ref Range    WBC 12.2 (H) 3.6 - 11.0 K/uL    RBC 4.44 3.80 - 5.20 M/uL    HGB 12.0 11.5 - 16.0 g/dL    HCT 40.7 35.0 - 47.0 %    MCV 91.7 80.0 - 99.0 FL    MCH 27.0 26.0 - 34.0 PG    MCHC 29.5 (L) 30.0 - 36.5 g/dL    RDW 15.9 (H) 11.5 - 14.5 %    PLATELET 028 560 - 798 K/uL    MPV 13.0 (H) 8.9 - 12.9 FL    NEUTROPHILS 74 32 - 75 %    LYMPHOCYTES 18 12 - 49 %    MONOCYTES 8 5 - 13 %    EOSINOPHILS 0 0 - 7 %    BASOPHILS 0 0 - 1 %    IMMATURE GRANULOCYTES 0 0.0 - 0.5 %    ABS. NEUTROPHILS 9.0 (H) 1.8 - 8.0 K/UL    ABS. LYMPHOCYTES 2.2 0.8 - 3.5 K/UL    ABS. MONOCYTES 1.0 0.0 - 1.0 K/UL    ABS. EOSINOPHILS 0.0 0.0 - 0.4 K/UL    ABS. BASOPHILS 0.0 0.0 - 0.1 K/UL    ABS. IMM.  GRANS. 0.0 0.00 - 0.04 K/UL    DF AUTOMATED     METABOLIC PANEL, COMPREHENSIVE    Collection Time: 02/26/21  6:41 AM   Result Value Ref Range    Sodium 140 136 - 145 mmol/L    Potassium 4.1 3.5 - 5.1 mmol/L    Chloride 103 97 - 108 mmol/L    CO2 35 (H) 21 - 32 mmol/L    Anion gap 2 (L) 5 - 15 mmol/L Glucose 168 (H) 65 - 100 mg/dL    BUN 42 (H) 6 - 20 mg/dL    Creatinine 1.74 (H) 0.55 - 1.02 mg/dL    BUN/Creatinine ratio 24 (H) 12 - 20      GFR est AA 35 (L) >60 ml/min/1.73m2    GFR est non-AA 29 (L) >60 ml/min/1.73m2    Calcium 8.8 8.5 - 10.1 mg/dL    Bilirubin, total 0.4 0.2 - 1.0 mg/dL    AST (SGOT) 3 (L) 15 - 37 U/L    ALT (SGPT) 16 12 - 78 U/L    Alk. phosphatase 137 (H) 45 - 117 U/L    Protein, total 6.6 6.4 - 8.2 g/dL    Albumin 3.0 (L) 3.5 - 5.0 g/dL    Globulin 3.6 2.0 - 4.0 g/dL    A-G Ratio 0.8 (L) 1.1 - 2.2       [unfilled]      Review of Systems    Constitutional: Negative for chills and fever. HENT: Negative. Eyes: Negative. Respiratory: Positive for dyspnea on exertion    Cardiovascular: Negative. Gastrointestinal: Positive for abdominal pain  Skin: Negative. Neurological: Weakness/ heaviness of right leg        Physical Exam:      Constitutional: pt is oriented to person, place, and time. HENT:   Head: Normocephalic and atraumatic. Eyes: Pupils are equal, round, and reactive to light. EOM are normal.   Cardiovascular: Normal rate, regular rhythm and normal heart sounds. Pulmonary/Chest: Breath sounds normal. No wheezes. No rales. Exhibits no tenderness. Abdominal: Soft. Bowel sounds are normal. Abdominal tenderness in R and L UQ. There is no rebound and no guarding. Musculoskeletal: Edema in bilateral lower extremities  Neurological: pt is alert and oriented to person, place, and time. XR CHEST PORT   Final Result   Possible hydrostatic edema. No significant interval change. US ABD COMP   Final Result   Limited. 1. No acute findings. XR ABD (KUB)   Final Result      XR CHEST PORT   Final Result   FINDINGS: IMPRESSION: Frontal single view chest.      Median sternotomy status post CABG. Calcified aorta. Unchanged cardiomegaly   and/or pericardial effusion. The lungs are similarly inflated. Unchanged mild elevation of the right   hemidiaphragm. Minimal hydrostatic edema. No consolidation, effusion, or   pneumothorax. No free air under the diaphragm. No acute bony findings. Recent Results (from the past 24 hour(s))   CBC WITH AUTOMATED DIFF    Collection Time: 02/26/21  6:41 AM   Result Value Ref Range    WBC 12.2 (H) 3.6 - 11.0 K/uL    RBC 4.44 3.80 - 5.20 M/uL    HGB 12.0 11.5 - 16.0 g/dL    HCT 40.7 35.0 - 47.0 %    MCV 91.7 80.0 - 99.0 FL    MCH 27.0 26.0 - 34.0 PG    MCHC 29.5 (L) 30.0 - 36.5 g/dL    RDW 15.9 (H) 11.5 - 14.5 %    PLATELET 987 928 - 270 K/uL    MPV 13.0 (H) 8.9 - 12.9 FL    NEUTROPHILS 74 32 - 75 %    LYMPHOCYTES 18 12 - 49 %    MONOCYTES 8 5 - 13 %    EOSINOPHILS 0 0 - 7 %    BASOPHILS 0 0 - 1 %    IMMATURE GRANULOCYTES 0 0.0 - 0.5 %    ABS. NEUTROPHILS 9.0 (H) 1.8 - 8.0 K/UL    ABS. LYMPHOCYTES 2.2 0.8 - 3.5 K/UL    ABS. MONOCYTES 1.0 0.0 - 1.0 K/UL    ABS. EOSINOPHILS 0.0 0.0 - 0.4 K/UL    ABS. BASOPHILS 0.0 0.0 - 0.1 K/UL    ABS. IMM. GRANS. 0.0 0.00 - 0.04 K/UL    DF AUTOMATED     METABOLIC PANEL, COMPREHENSIVE    Collection Time: 02/26/21  6:41 AM   Result Value Ref Range    Sodium 140 136 - 145 mmol/L    Potassium 4.1 3.5 - 5.1 mmol/L    Chloride 103 97 - 108 mmol/L    CO2 35 (H) 21 - 32 mmol/L    Anion gap 2 (L) 5 - 15 mmol/L    Glucose 168 (H) 65 - 100 mg/dL    BUN 42 (H) 6 - 20 mg/dL    Creatinine 1.74 (H) 0.55 - 1.02 mg/dL    BUN/Creatinine ratio 24 (H) 12 - 20      GFR est AA 35 (L) >60 ml/min/1.73m2    GFR est non-AA 29 (L) >60 ml/min/1.73m2    Calcium 8.8 8.5 - 10.1 mg/dL    Bilirubin, total 0.4 0.2 - 1.0 mg/dL    AST (SGOT) 3 (L) 15 - 37 U/L    ALT (SGPT) 16 12 - 78 U/L    Alk. phosphatase 137 (H) 45 - 117 U/L    Protein, total 6.6 6.4 - 8.2 g/dL    Albumin 3.0 (L) 3.5 - 5.0 g/dL    Globulin 3.6 2.0 - 4.0 g/dL    A-G Ratio 0.8 (L) 1.1 - 2.2         Results     ** No results found for the last 336 hours.  **           Labs:     Recent Labs     02/26/21  0641 02/25/21  0550   WBC 12.2* 16.6*   HGB 12.0 12.3   HCT 40.7 41.2    215     Recent Labs     02/26/21  0641 02/25/21  0550 02/24/21  0956    138 138   K 4.1 4.3 4.5    102 100   CO2 35* 31 30   BUN 42* 49* 47*   CREA 1.74* 1.82* 2.12*   * 276* 221*   CA 8.8 8.6 9.0     Recent Labs     02/26/21  0641 02/25/21  0550 02/24/21  0956   ALT 16 17 20   * 194* 147*   TBILI 0.4 0.4 0.6   TP 6.6 6.8 7.5   ALB 3.0* 3.1* 3.3*   GLOB 3.6 3.7 4.2*     No results for input(s): INR, PTP, APTT, INREXT, INREXT in the last 72 hours. No results for input(s): FE, TIBC, PSAT, FERR in the last 72 hours. No results found for: FOL, RBCF   No results for input(s): PH, PCO2, PO2 in the last 72 hours. Recent Labs     02/24/21  1102   TROIQ 0.12*     No results found for: CHOL, CHOLX, CHLST, CHOLV, HDL, HDLP, LDL, LDLC, DLDLP, TGLX, TRIGL, TRIGP, CHHD, CHHDX  Lab Results   Component Value Date/Time    Glucose (POC) 214 (H) 11/05/2020 03:53 PM    Glucose (POC) 255 (H) 11/05/2020 11:30 AM    Glucose (POC) 176 (H) 11/05/2020 07:42 AM    Glucose (POC) 186 (H) 11/04/2020 10:57 PM    Glucose (POC) 236 (H) 11/04/2020 04:06 PM     No results found for: COLOR, APPRN, SPGRU, REFSG, NERIS, PROTU, GLUCU, KETU, BILU, UROU, ELOISA, LEUKU, GLUKE, EPSU, BACTU, WBCU, RBCU, CASTS, UCRY      Assessment:     Acute non-Q wave MI  Acute exacerbation of COPD  Acute on chronic hypoxic respiratory failure  Acute on chronic HFpEF  Atrial flutter with RVR  History of CAD and CABG  Acute on chronic kidney disease  IFTIKHAR  Overactive bladder  Hypercholesterolemia  Morbid obesity  History of TIA  Constipation      Plan:     Patient on Eliquis 5 mg  Cardizem 120 mg PO  Wellbutrin, coreg, plavix, neurontin, ditropan, pravastatin, and theophylline  Lasix held due to azotemia  D/c Solu-medrol and start on prednisone per pulmonology    Seen by nephrology. State labs can be ordered outpatient. Restart Losartan today if serum creatinine stable or decreased.      Discharged from cardiology  Discharged from GI    Need PT OT and possible discharge today with rolling walker        Current Facility-Administered Medications:     dilTIAZem ER (CARDIZEM CD) capsule 120 mg, 120 mg, Oral, DAILY, Josi Pablo MD, 120 mg at 02/26/21 4858    benzonatate (TESSALON) capsule 100 mg, 100 mg, Oral, TID PRN, Gopal Alvarado MD, 100 mg at 02/25/21 2358    predniSONE (DELTASONE) tablet 20 mg, 20 mg, Oral, DAILY WITH BREAKFAST, Hermelinda Savage MD, 20 mg at 02/26/21 0854    sodium chloride (NS) flush 5-40 mL, 5-40 mL, IntraVENous, Q8H, Lissette Gee MD, 10 mL at 02/26/21 0606    sodium chloride (NS) flush 5-40 mL, 5-40 mL, IntraVENous, PRN, Lissette Gee MD    acetaminophen (TYLENOL) tablet 650 mg, 650 mg, Oral, Q4H PRN, Lissette Gee MD, 650 mg at 02/25/21 0912    theophylline ER (BAM-24) capsule 600 mg, 600 mg, Oral, DAILY, Harrison Moura MD, 600 mg at 02/26/21 0855    sorbitoL 70 % solution 30 mL, 30 mL, Oral, DAILY PRN, Gopal Alvarado MD, 30 mL at 02/24/21 0848    carvediloL (COREG) tablet 25 mg, 25 mg, Oral, BID WITH MEALS, Harrison Moura MD, 25 mg at 02/26/21 9693    clopidogreL (PLAVIX) tablet 75 mg, 75 mg, Oral, DAILY, Gopal Alvarado MD, 75 mg at 02/26/21 0667    apixaban (ELIQUIS) tablet 5 mg, 5 mg, Oral, Q12H, Gopal Alvarado MD, 5 mg at 02/26/21 0854    buPROPion XL (WELLBUTRIN XL) tablet 150 mg, 150 mg, Oral, 7am, Harrison Moura MD, 150 mg at 02/26/21 8165    gabapentin (NEURONTIN) capsule, , Oral, TID, Gopal Alvarado MD, 300 mg at 02/26/21 0851    oxybutynin (DITROPAN) tablet 5 mg, 5 mg, Oral, TID, Gopal Alvarado MD, 5 mg at 02/26/21 0853    pravastatin (PRAVACHOL) tablet 40 mg, 40 mg, Oral, QHS, Gopal Alvarado MD, 40 mg at 02/25/21 2250    polyethylene glycol (MIRALAX) packet 17 g, 17 g, Oral, DAILY PRN, Gopal Alvarado MD, 17 g at 02/24/21 0040    ondansetron (ZOFRAN ODT) tablet 4 mg, 4 mg, Oral, Q8H PRN **OR** ondansetron (ZOFRAN) injection 4 mg, 4 mg, IntraVENous, Q6H PRN, Gopal Alvarado MD, 4 mg at 02/24/21 2403

## 2021-02-26 NOTE — PROGRESS NOTES
PHYSICAL THERAPY TREATMENT  Patient: Linnea Sierra (24 y.o. female)  Date: 2/26/2021  Diagnosis: Atrial flutter ([facility]) [I48.92]  CHF (congestive heart failure) ([facility]) [I50.9] <principal problem not specified>  Procedure(s) (LRB):  Left Heart Cath / Coronary Angiography W [place] (N/A) 2 Days Post-Op  Precautions:    Chart, physical therapy assessment, plan of care and goals were reviewed. ASSESSMENT  Patient continues with skilled PT services and is progressing towards goals. Pt semi supine in bed upon arrival and agreeable to session. Initiated treatment with bed mobility which patient performed with CGA. Patient performed toileting independently. Patient ambulated ~200' this session with RW and CGA and therapist assist with O2 tank; patient on 2L via NC. Noted some SOB post ambulation however patient able to recover in semi supine position. Patient left resting comfortably with call bell in reach and needs met. .         Other factors to consider for discharge: decreased activity tolerance. Time since onset. PLOF, family support. PLAN :  Patient continues to benefit from skilled intervention to address the above impairments. Continue treatment per established plan of care. to address goals.     Recommendation for discharge: (in order for the patient to meet his/her long term goals)  Physical therapy at least 2 days/week in the home     This discharge recommendation:  Has been made in collaboration with the attending provider and/or case management    IF patient discharges home will need the following DME: to be determined (TBD)       SUBJECTIVE:   Patient stated i need to use the restroom first.    OBJECTIVE DATA SUMMARY:   Critical Behavior:  Neurologic State: Alert  Orientation Level: Oriented X4  Cognition: Appropriate decision making     Functional Mobility Training:  Bed Mobility:  Rolling: Contact guard assistance  Supine to Sit: Contact guard assistance  Sit to Supine: Contact guard assistance  Scooting: Contact guard assistance        Transfers:  Sit to Stand: Contact guard assistance  Stand to Sit: Contact guard assistance        Bed to Chair: Contact guard assistance                    Balance:  Sitting: Intact  Standing: Impaired  Standing - Static: Constant support;Good  Standing - Dynamic : Constant support;Good  Ambulation/Gait Training:  Distance (ft): 200 Feet (ft)  Assistive Device: Gait belt;Walker, rolling  Ambulation - Level of Assistance: Contact guard assistance                       Speed/Ella: Slow  Step Length: Left shortened;Right shortened      Therapeutic Exercises:   Educated to perform throughout day, pt verbalized understanding. Pain Ratin/10    Activity Tolerance:   Poor  Please refer to the flowsheet for vital signs taken during this treatment.     After treatment patient left in no apparent distress:   Supine in bed and Call bell within reach        Problem: Mobility Impaired (Adult and Pediatric)  Goal: *Acute Goals and Plan of Care (Insert Text)  Description: I with LE HEP x7 days  Supine to sit EOB with CGAx1 x7 days  Sit to stand with CGAx1 x7 days  Amb 15-25ft with RW and min Ax1 x7 days  Outcome: Progressing Towards Goal       Oswald Rios PTA   Time Calculation: 30 mins

## 2021-02-26 NOTE — PROGRESS NOTES
Patient IV, telemetry, and mireles catheter discontinued.  Patient discharged to home.     -Edgar BROWN

## 2021-02-26 NOTE — PROGRESS NOTES
Patient unable to receive a walker due to billing issues with the DME agency and needing authorization from her insurance plan prior to d/c.  CM notified patient. Patient is clear to d/c to home with [facility], accepted with [facility]. Medicare pt has received, reviewed, and signed 2nd IM letter informing them of their right to appeal the discharge. Signed copied has been placed on pt bedside chart. Discharge checklist completed with nurse, Warden Litten.

## 2021-02-26 NOTE — PROGRESS NOTES
General Daily Progress Note          Patient Name:   Scott Farrar       YOB: 1950       Age:  72 y.o. Admit Date: 2/22/2021      Subjective:     Patient was admitted for acute exacerbation of COPD and acute on chronic HFpEF. She is sitting up alert and oriented. States her breathing is still labored on exertion but improved today. Also complains of right leg heaviness and weakness. Denies chest pain, abdominal pain, nausea, vomiting    WBC elevated but trending down again today. Elevated due to prednisone. Kidney function improving    On 2L O2 day and night. 97% this morning      Objective:     Visit Vitals  /63 (BP 1 Location: Left upper arm, BP Patient Position: At rest)   Pulse 61   Temp 98 °F (36.7 °C)   Resp 18   Ht 5' 3\" (1.6 m)   Wt 111.3 kg (245 lb 6 oz)   SpO2 97%   BMI 43.47 kg/m²        Recent Results (from the past 24 hour(s))   CBC WITH AUTOMATED DIFF    Collection Time: 02/26/21  6:41 AM   Result Value Ref Range    WBC 12.2 (H) 3.6 - 11.0 K/uL    RBC 4.44 3.80 - 5.20 M/uL    HGB 12.0 11.5 - 16.0 g/dL    HCT 40.7 35.0 - 47.0 %    MCV 91.7 80.0 - 99.0 FL    MCH 27.0 26.0 - 34.0 PG    MCHC 29.5 (L) 30.0 - 36.5 g/dL    RDW 15.9 (H) 11.5 - 14.5 %    PLATELET 713 835 - 011 K/uL    MPV 13.0 (H) 8.9 - 12.9 FL    NEUTROPHILS 74 32 - 75 %    LYMPHOCYTES 18 12 - 49 %    MONOCYTES 8 5 - 13 %    EOSINOPHILS 0 0 - 7 %    BASOPHILS 0 0 - 1 %    IMMATURE GRANULOCYTES 0 0.0 - 0.5 %    ABS. NEUTROPHILS 9.0 (H) 1.8 - 8.0 K/UL    ABS. LYMPHOCYTES 2.2 0.8 - 3.5 K/UL    ABS. MONOCYTES 1.0 0.0 - 1.0 K/UL    ABS. EOSINOPHILS 0.0 0.0 - 0.4 K/UL    ABS. BASOPHILS 0.0 0.0 - 0.1 K/UL    ABS. IMM.  GRANS. 0.0 0.00 - 0.04 K/UL    DF AUTOMATED     METABOLIC PANEL, COMPREHENSIVE    Collection Time: 02/26/21  6:41 AM   Result Value Ref Range    Sodium 140 136 - 145 mmol/L    Potassium 4.1 3.5 - 5.1 mmol/L    Chloride 103 97 - 108 mmol/L    CO2 35 (H) 21 - 32 mmol/L    Anion gap 2 (L) 5 - 15 mmol/L Glucose 168 (H) 65 - 100 mg/dL    BUN 42 (H) 6 - 20 mg/dL    Creatinine 1.74 (H) 0.55 - 1.02 mg/dL    BUN/Creatinine ratio 24 (H) 12 - 20      GFR est AA 35 (L) >60 ml/min/1.73m2    GFR est non-AA 29 (L) >60 ml/min/1.73m2    Calcium 8.8 8.5 - 10.1 mg/dL    Bilirubin, total 0.4 0.2 - 1.0 mg/dL    AST (SGOT) 3 (L) 15 - 37 U/L    ALT (SGPT) 16 12 - 78 U/L    Alk. phosphatase 137 (H) 45 - 117 U/L    Protein, total 6.6 6.4 - 8.2 g/dL    Albumin 3.0 (L) 3.5 - 5.0 g/dL    Globulin 3.6 2.0 - 4.0 g/dL    A-G Ratio 0.8 (L) 1.1 - 2.2       [unfilled]      Review of Systems    Constitutional: Negative for chills and fever. HENT: Negative. Eyes: Negative. Respiratory: Positive for dyspnea on exertion    Cardiovascular: Negative. Gastrointestinal: Positive for abdominal pain  Skin: Negative. Neurological: Weakness/ heaviness of right leg        Physical Exam:      Constitutional: pt is oriented to person, place, and time. HENT:   Head: Normocephalic and atraumatic. Eyes: Pupils are equal, round, and reactive to light. EOM are normal.   Cardiovascular: Normal rate, regular rhythm and normal heart sounds. Pulmonary/Chest: Breath sounds normal. No wheezes. No rales. Exhibits no tenderness. Abdominal: Soft. Bowel sounds are normal. Abdominal tenderness in R and L UQ. There is no rebound and no guarding. Musculoskeletal: Edema in bilateral lower extremities  Neurological: pt is alert and oriented to person, place, and time. XR CHEST PORT   Final Result   Possible hydrostatic edema. No significant interval change. US ABD COMP   Final Result   Limited. 1. No acute findings. XR ABD (KUB)   Final Result      XR CHEST PORT   Final Result   FINDINGS: IMPRESSION: Frontal single view chest.      Median sternotomy status post CABG. Calcified aorta. Unchanged cardiomegaly   and/or pericardial effusion. The lungs are similarly inflated. Unchanged mild elevation of the right   hemidiaphragm. Minimal hydrostatic edema. No consolidation, effusion, or   pneumothorax. No free air under the diaphragm. No acute bony findings. Recent Results (from the past 24 hour(s))   CBC WITH AUTOMATED DIFF    Collection Time: 02/26/21  6:41 AM   Result Value Ref Range    WBC 12.2 (H) 3.6 - 11.0 K/uL    RBC 4.44 3.80 - 5.20 M/uL    HGB 12.0 11.5 - 16.0 g/dL    HCT 40.7 35.0 - 47.0 %    MCV 91.7 80.0 - 99.0 FL    MCH 27.0 26.0 - 34.0 PG    MCHC 29.5 (L) 30.0 - 36.5 g/dL    RDW 15.9 (H) 11.5 - 14.5 %    PLATELET 884 810 - 460 K/uL    MPV 13.0 (H) 8.9 - 12.9 FL    NEUTROPHILS 74 32 - 75 %    LYMPHOCYTES 18 12 - 49 %    MONOCYTES 8 5 - 13 %    EOSINOPHILS 0 0 - 7 %    BASOPHILS 0 0 - 1 %    IMMATURE GRANULOCYTES 0 0.0 - 0.5 %    ABS. NEUTROPHILS 9.0 (H) 1.8 - 8.0 K/UL    ABS. LYMPHOCYTES 2.2 0.8 - 3.5 K/UL    ABS. MONOCYTES 1.0 0.0 - 1.0 K/UL    ABS. EOSINOPHILS 0.0 0.0 - 0.4 K/UL    ABS. BASOPHILS 0.0 0.0 - 0.1 K/UL    ABS. IMM. GRANS. 0.0 0.00 - 0.04 K/UL    DF AUTOMATED     METABOLIC PANEL, COMPREHENSIVE    Collection Time: 02/26/21  6:41 AM   Result Value Ref Range    Sodium 140 136 - 145 mmol/L    Potassium 4.1 3.5 - 5.1 mmol/L    Chloride 103 97 - 108 mmol/L    CO2 35 (H) 21 - 32 mmol/L    Anion gap 2 (L) 5 - 15 mmol/L    Glucose 168 (H) 65 - 100 mg/dL    BUN 42 (H) 6 - 20 mg/dL    Creatinine 1.74 (H) 0.55 - 1.02 mg/dL    BUN/Creatinine ratio 24 (H) 12 - 20      GFR est AA 35 (L) >60 ml/min/1.73m2    GFR est non-AA 29 (L) >60 ml/min/1.73m2    Calcium 8.8 8.5 - 10.1 mg/dL    Bilirubin, total 0.4 0.2 - 1.0 mg/dL    AST (SGOT) 3 (L) 15 - 37 U/L    ALT (SGPT) 16 12 - 78 U/L    Alk. phosphatase 137 (H) 45 - 117 U/L    Protein, total 6.6 6.4 - 8.2 g/dL    Albumin 3.0 (L) 3.5 - 5.0 g/dL    Globulin 3.6 2.0 - 4.0 g/dL    A-G Ratio 0.8 (L) 1.1 - 2.2         Results     ** No results found for the last 336 hours.  **           Labs:     Recent Labs     02/26/21  0641 02/25/21  0550   WBC 12.2* 16.6*   HGB 12.0 12.3   HCT 40.7 41.2    215     Recent Labs     02/26/21  0641 02/25/21  0550 02/24/21  0956    138 138   K 4.1 4.3 4.5    102 100   CO2 35* 31 30   BUN 42* 49* 47*   CREA 1.74* 1.82* 2.12*   * 276* 221*   CA 8.8 8.6 9.0     Recent Labs     02/26/21  0641 02/25/21  0550 02/24/21  0956   ALT 16 17 20   * 194* 147*   TBILI 0.4 0.4 0.6   TP 6.6 6.8 7.5   ALB 3.0* 3.1* 3.3*   GLOB 3.6 3.7 4.2*     No results for input(s): INR, PTP, APTT, INREXT, INREXT in the last 72 hours. No results for input(s): FE, TIBC, PSAT, FERR in the last 72 hours. No results found for: FOL, RBCF   No results for input(s): PH, PCO2, PO2 in the last 72 hours. Recent Labs     02/24/21  1102   TROIQ 0.12*     No results found for: CHOL, CHOLX, CHLST, CHOLV, HDL, HDLP, LDL, LDLC, DLDLP, TGLX, TRIGL, TRIGP, CHHD, CHHDX  Lab Results   Component Value Date/Time    Glucose (POC) 214 (H) 11/05/2020 03:53 PM    Glucose (POC) 255 (H) 11/05/2020 11:30 AM    Glucose (POC) 176 (H) 11/05/2020 07:42 AM    Glucose (POC) 186 (H) 11/04/2020 10:57 PM    Glucose (POC) 236 (H) 11/04/2020 04:06 PM     No results found for: COLOR, APPRN, SPGRU, REFSG, NERIS, PROTU, GLUCU, KETU, BILU, UROU, ELOISA, LEUKU, GLUKE, EPSU, BACTU, WBCU, RBCU, CASTS, UCRY      Assessment:     Acute non-Q wave MI  Acute exacerbation of COPD  Acute on chronic hypoxic respiratory failure  Acute on chronic HFpEF  Atrial flutter with RVR  History of CAD and CABG  Acute on chronic kidney disease  IFTIKHAR  Overactive bladder  Hypercholesterolemia  Morbid obesity  History of TIA  Constipation      Plan:     Patient on Eliquis 5 mg  Cardizem 120 mg PO  Wellbutrin, coreg, plavix, neurontin, ditropan, pravastatin, and theophylline  Lasix held due to azotemia  D/c Solu-medrol and start on prednisone per pulmonology    Seen by nephrology. State labs can be ordered outpatient. Restart Losartan today if serum creatinine stable or decreased.      Discharged from cardiology  Discharged from GI    Need PT OT and possible discharge today with rolling walker        Current Facility-Administered Medications:     dilTIAZem ER (CARDIZEM CD) capsule 120 mg, 120 mg, Oral, DAILY, Pricilla Lee MD, 120 mg at 02/26/21 3966    benzonatate (TESSALON) capsule 100 mg, 100 mg, Oral, TID PRN, Gopal Alvarado MD, 100 mg at 02/25/21 2358    predniSONE (DELTASONE) tablet 20 mg, 20 mg, Oral, DAILY WITH BREAKFAST, Rikki Savage MD, 20 mg at 02/26/21 0854    sodium chloride (NS) flush 5-40 mL, 5-40 mL, IntraVENous, Q8H, Shauna Crane MD, 10 mL at 02/26/21 0606    sodium chloride (NS) flush 5-40 mL, 5-40 mL, IntraVENous, PRN, Shauna Crane MD    acetaminophen (TYLENOL) tablet 650 mg, 650 mg, Oral, Q4H PRN, Shauna Crane MD, 650 mg at 02/25/21 0912    theophylline ER (BAM-24) capsule 600 mg, 600 mg, Oral, DAILY, Winston Hutchinson MD, 600 mg at 02/26/21 0855    sorbitoL 70 % solution 30 mL, 30 mL, Oral, DAILY PRN, Gopal Alvarado MD, 30 mL at 02/24/21 0848    carvediloL (COREG) tablet 25 mg, 25 mg, Oral, BID WITH MEALS, Winston Hutchinson MD, 25 mg at 02/26/21 9955    clopidogreL (PLAVIX) tablet 75 mg, 75 mg, Oral, DAILY, Gopal Alvarado MD, 75 mg at 02/26/21 5525    apixaban (ELIQUIS) tablet 5 mg, 5 mg, Oral, Q12H, Gopal Alvarado MD, 5 mg at 02/26/21 0854    buPROPion XL (WELLBUTRIN XL) tablet 150 mg, 150 mg, Oral, 7am, Winston Hutchinson MD, 150 mg at 02/26/21 7695    gabapentin (NEURONTIN) capsule, , Oral, TID, Gopal Alvarado MD, 300 mg at 02/26/21 0851    oxybutynin (DITROPAN) tablet 5 mg, 5 mg, Oral, TID, Gopal Alvarado MD, 5 mg at 02/26/21 0853    pravastatin (PRAVACHOL) tablet 40 mg, 40 mg, Oral, QHS, Gopal Alvarado MD, 40 mg at 02/25/21 2250    polyethylene glycol (MIRALAX) packet 17 g, 17 g, Oral, DAILY PRN, Gopal Alvarado MD, 17 g at 02/24/21 0040    ondansetron (ZOFRAN ODT) tablet 4 mg, 4 mg, Oral, Q8H PRN **OR** ondansetron (ZOFRAN) injection 4 mg, 4 mg, IntraVENous, Q6H PRN, Gopal Alvarado MD, 4 mg at 02/24/21 2049

## 2021-03-09 ENCOUNTER — APPOINTMENT (OUTPATIENT)
Dept: GENERAL RADIOLOGY | Age: 71
DRG: 308 | End: 2021-03-09
Attending: EMERGENCY MEDICINE
Payer: MEDICARE

## 2021-03-09 ENCOUNTER — HOSPITAL ENCOUNTER (INPATIENT)
Age: 71
LOS: 5 days | Discharge: HOME HEALTH | DRG: 308 | End: 2021-03-14
Attending: EMERGENCY MEDICINE | Admitting: INTERNAL MEDICINE
Payer: MEDICARE

## 2021-03-09 DIAGNOSIS — I50.9 CONGESTIVE HEART FAILURE, UNSPECIFIED HF CHRONICITY, UNSPECIFIED HEART FAILURE TYPE (HCC): ICD-10-CM

## 2021-03-09 DIAGNOSIS — G62.9 NEUROPATHY: ICD-10-CM

## 2021-03-09 DIAGNOSIS — I48.92 ATRIAL FLUTTER WITH RAPID VENTRICULAR RESPONSE (HCC): Primary | ICD-10-CM

## 2021-03-09 DIAGNOSIS — I21.3 ST ELEVATION MYOCARDIAL INFARCTION (STEMI), UNSPECIFIED ARTERY (HCC): ICD-10-CM

## 2021-03-09 LAB
ALBUMIN SERPL-MCNC: 3.2 G/DL (ref 3.5–5)
ALBUMIN/GLOB SERPL: 0.8 {RATIO} (ref 1.1–2.2)
ALP SERPL-CCNC: 127 U/L (ref 45–117)
ALT SERPL-CCNC: 24 U/L (ref 12–78)
ANION GAP SERPL CALC-SCNC: 6 MMOL/L (ref 5–15)
APTT PPP: 33.1 SEC (ref 23–35.7)
AST SERPL W P-5'-P-CCNC: 11 U/L (ref 15–37)
BASOPHILS # BLD: 0 K/UL (ref 0–0.1)
BASOPHILS NFR BLD: 0 % (ref 0–1)
BILIRUB SERPL-MCNC: 1 MG/DL (ref 0.2–1)
BNP SERPL-MCNC: 1693 PG/ML
BUN SERPL-MCNC: 17 MG/DL (ref 6–20)
BUN/CREAT SERPL: 10 (ref 12–20)
CA-I BLD-MCNC: 9.2 MG/DL (ref 8.5–10.1)
CHLORIDE SERPL-SCNC: 104 MMOL/L (ref 97–108)
CO2 SERPL-SCNC: 34 MMOL/L (ref 21–32)
CREAT SERPL-MCNC: 1.72 MG/DL (ref 0.55–1.02)
DIFFERENTIAL METHOD BLD: ABNORMAL
EOSINOPHIL # BLD: 0 K/UL (ref 0–0.4)
EOSINOPHIL NFR BLD: 0 % (ref 0–7)
ERYTHROCYTE [DISTWIDTH] IN BLOOD BY AUTOMATED COUNT: 16.3 % (ref 11.5–14.5)
GLOBULIN SER CALC-MCNC: 3.9 G/DL (ref 2–4)
GLUCOSE SERPL-MCNC: 191 MG/DL (ref 65–100)
HCT VFR BLD AUTO: 43.7 % (ref 35–47)
HGB BLD-MCNC: 13.1 G/DL (ref 11.5–16)
IMM GRANULOCYTES # BLD AUTO: 0.1 K/UL (ref 0–0.04)
IMM GRANULOCYTES NFR BLD AUTO: 0 % (ref 0–0.5)
INR PPP: 1.2 (ref 0.9–1.1)
LYMPHOCYTES # BLD: 0.9 K/UL (ref 0.8–3.5)
LYMPHOCYTES NFR BLD: 5 % (ref 12–49)
MCH RBC QN AUTO: 27.3 PG (ref 26–34)
MCHC RBC AUTO-ENTMCNC: 30 G/DL (ref 30–36.5)
MCV RBC AUTO: 91.2 FL (ref 80–99)
MONOCYTES # BLD: 0.2 K/UL (ref 0–1)
MONOCYTES NFR BLD: 1 % (ref 5–13)
NEUTS SEG # BLD: 14.6 K/UL (ref 1.8–8)
NEUTS SEG NFR BLD: 94 % (ref 32–75)
PLATELET # BLD AUTO: 200 K/UL (ref 150–400)
PMV BLD AUTO: 12.8 FL (ref 8.9–12.9)
POTASSIUM SERPL-SCNC: 4.4 MMOL/L (ref 3.5–5.1)
PROT SERPL-MCNC: 7.1 G/DL (ref 6.4–8.2)
PROTHROMBIN TIME: 15.4 SEC (ref 11.9–14.7)
RBC # BLD AUTO: 4.79 M/UL (ref 3.8–5.2)
SODIUM SERPL-SCNC: 144 MMOL/L (ref 136–145)
THERAPEUTIC RANGE,PTTT: NORMAL SEC (ref 68–109)
TROPONIN I SERPL-MCNC: 0.09 NG/ML
WBC # BLD AUTO: 15.7 K/UL (ref 3.6–11)

## 2021-03-09 PROCEDURE — 93005 ELECTROCARDIOGRAM TRACING: CPT

## 2021-03-09 PROCEDURE — 96374 THER/PROPH/DIAG INJ IV PUSH: CPT

## 2021-03-09 PROCEDURE — 83880 ASSAY OF NATRIURETIC PEPTIDE: CPT

## 2021-03-09 PROCEDURE — 85025 COMPLETE CBC W/AUTO DIFF WBC: CPT

## 2021-03-09 PROCEDURE — 99285 EMERGENCY DEPT VISIT HI MDM: CPT

## 2021-03-09 PROCEDURE — 36415 COLL VENOUS BLD VENIPUNCTURE: CPT

## 2021-03-09 PROCEDURE — 85610 PROTHROMBIN TIME: CPT

## 2021-03-09 PROCEDURE — 80053 COMPREHEN METABOLIC PANEL: CPT

## 2021-03-09 PROCEDURE — 74011250636 HC RX REV CODE- 250/636: Performed by: EMERGENCY MEDICINE

## 2021-03-09 PROCEDURE — 96375 TX/PRO/DX INJ NEW DRUG ADDON: CPT

## 2021-03-09 PROCEDURE — 74011250637 HC RX REV CODE- 250/637: Performed by: EMERGENCY MEDICINE

## 2021-03-09 PROCEDURE — 80198 ASSAY OF THEOPHYLLINE: CPT

## 2021-03-09 PROCEDURE — 74011000250 HC RX REV CODE- 250: Performed by: INTERNAL MEDICINE

## 2021-03-09 PROCEDURE — 71045 X-RAY EXAM CHEST 1 VIEW: CPT

## 2021-03-09 PROCEDURE — 84484 ASSAY OF TROPONIN QUANT: CPT

## 2021-03-09 PROCEDURE — 65270000029 HC RM PRIVATE

## 2021-03-09 PROCEDURE — 85730 THROMBOPLASTIN TIME PARTIAL: CPT

## 2021-03-09 RX ORDER — FUROSEMIDE 10 MG/ML
80 INJECTION INTRAMUSCULAR; INTRAVENOUS ONCE
Status: COMPLETED | OUTPATIENT
Start: 2021-03-09 | End: 2021-03-09

## 2021-03-09 RX ORDER — HEPARIN SODIUM 10000 [USP'U]/100ML
12-25 INJECTION, SOLUTION INTRAVENOUS
Status: DISCONTINUED | OUTPATIENT
Start: 2021-03-09 | End: 2021-03-13

## 2021-03-09 RX ORDER — GUAIFENESIN 100 MG/5ML
162 LIQUID (ML) ORAL
Status: COMPLETED | OUTPATIENT
Start: 2021-03-09 | End: 2021-03-09

## 2021-03-09 RX ORDER — HEPARIN SODIUM 1000 [USP'U]/ML
4000 INJECTION, SOLUTION INTRAVENOUS; SUBCUTANEOUS AS NEEDED
Status: DISCONTINUED | OUTPATIENT
Start: 2021-03-09 | End: 2021-03-13

## 2021-03-09 RX ORDER — DILTIAZEM HYDROCHLORIDE 5 MG/ML
20 INJECTION INTRAVENOUS ONCE
Status: COMPLETED | OUTPATIENT
Start: 2021-03-09 | End: 2021-03-09

## 2021-03-09 RX ORDER — DILTIAZEM HCL/D5W 125 MG/125
0-15 PLASTIC BAG, INJECTION (ML) INTRAVENOUS
Status: DISCONTINUED | OUTPATIENT
Start: 2021-03-09 | End: 2021-03-10

## 2021-03-09 RX ORDER — HEPARIN SODIUM 1000 [USP'U]/ML
2000 INJECTION, SOLUTION INTRAVENOUS; SUBCUTANEOUS AS NEEDED
Status: DISCONTINUED | OUTPATIENT
Start: 2021-03-09 | End: 2021-03-13

## 2021-03-09 RX ORDER — GUAIFENESIN 100 MG/5ML
162 LIQUID (ML) ORAL DAILY
Status: DISCONTINUED | OUTPATIENT
Start: 2021-03-10 | End: 2021-03-09

## 2021-03-09 RX ADMIN — DILTIAZEM HYDROCHLORIDE 20 MG: 5 INJECTION INTRAVENOUS at 16:48

## 2021-03-09 RX ADMIN — ASPIRIN 162 MG: 81 TABLET, CHEWABLE ORAL at 16:36

## 2021-03-09 RX ADMIN — FUROSEMIDE 80 MG: 10 INJECTION, SOLUTION INTRAMUSCULAR; INTRAVENOUS at 17:07

## 2021-03-09 RX ADMIN — HEPARIN SODIUM AND DEXTROSE 12 UNITS/KG/HR: 10000; 5 INJECTION INTRAVENOUS at 17:09

## 2021-03-09 RX ADMIN — Medication 5 MG/HR: at 16:49

## 2021-03-09 NOTE — ED PROVIDER NOTES
EMERGENCY DEPARTMENT HISTORY AND PHYSICAL EXAM      Date: 3/9/2021  Patient Name: Renita Mcelroy    History of Presenting Illness     Chief Complaint   Patient presents with    Shortness of Breath       History Provided By: Patient    HPI: Renita Mcelroy, 72 y.o. female with PMHx as noted below presents the emergency department with complaints of dyspnea and palpitations. Patient states that she had sudden onset of feeling short of breath earlier today. Notes symptoms been constant and worse with exertion. Patient otherwise complaining of no chest pain, fevers, chills, cough. Does note a history of recent diagnosed Covid. Was called a STEMI alert by EMS prehospital.  Patient notes she has been compliant with her home antiplatelet and anticoagulation last dose this morning. PCP: Keily Marcelino MD    Current Facility-Administered Medications   Medication Dose Route Frequency Provider Last Rate Last Admin    dilTIAZem (CARDIZEM) 125 mg/125 mL (1 mg/mL) dextrose 5% infusion  0-15 mg/hr IntraVENous TITRATE Kathy Williamson MD 5 mL/hr at 03/09/21 1649 5 mg/hr at 03/09/21 1649    heparin 25,000 units in D5W 250 ml infusion  12-25 Units/kg/hr (Adjusted) IntraVENous TITRATE Mary Fischer MD 8.9 mL/hr at 03/09/21 1709 12 Units/kg/hr at 03/09/21 1709    heparin (porcine) 1,000 unit/mL injection 4,000 Units  4,000 Units IntraVENous PRN Mary Fischer MD        Or    heparin (porcine) 1,000 unit/mL injection 2,000 Units  2,000 Units IntraVENous PRN Mary Fischer MD         Current Outpatient Medications   Medication Sig Dispense Refill    apixaban (ELIQUIS) 5 mg tablet Take 1 Tab by mouth every twelve (12) hours. 30 Tab 0    clopidogreL (PLAVIX) 75 mg tab Take 1 Tab by mouth daily. 30 Tab 0    dilTIAZem ER (CARDIZEM CD) 120 mg capsule Take 1 Cap by mouth daily.  30 Cap 0    ondansetron (ZOFRAN ODT) 4 mg disintegrating tablet Take 1 Tab by mouth every eight (8) hours as needed for Nausea or Vomiting. 30 Tab 0    predniSONE (DELTASONE) 20 mg tablet Take 20 mg by mouth daily (with breakfast). 10 Tab 0    carvediloL (COREG) 25 mg tablet Take 1 Tab by mouth two (2) times daily (with meals). 30 Tab 0    albuterol (Ventolin HFA) 90 mcg/actuation inhaler Take 2 Puffs by inhalation two (2) times daily as needed for Wheezing.  furosemide (LASIX) 40 mg tablet Take 40 mg by mouth every evening.  gabapentin (NEURONTIN) 600 mg tablet Take 600 mg by mouth three (3) times daily.  isosorbide dinitrate (ISORDIL) 10 mg tablet Take 10 mg by mouth two (2) times a day.  losartan (COZAAR) 25 mg tablet Take 25 mg by mouth daily.  metOLazone (ZAROXOLYN) 5 mg tablet Take 5 mg by mouth daily.  nitroglycerin (NITROSTAT) 0.4 mg SL tablet 0.4 mg by SubLINGual route every five (5) minutes as needed for Chest Pain. Up to 3 doses.  pravastatin (PRAVACHOL) 40 mg tablet Take 40 mg by mouth nightly.  theophylline ER,12 hour, (THEOCHRON) 300 mg tablet Take 300 mg by mouth daily.  amLODIPine (NORVASC) 10 mg tablet Take 10 mg by mouth daily.  oxybutynin (DITROPAN) 5 mg tablet Take 5 mg by mouth three (3) times daily.  buPROPion XL (WELLBUTRIN XL) 150 mg tablet Take 150 mg by mouth every morning.          Past History     Past Medical History:  Past Medical History:   Diagnosis Date    [facility] [address]     Coronary artery disease     Diabetes ([facility] [address]     Hyperlipidemia     Hypertension     IFTIKHAR (obstructive sleep apnea)        Past Surgical History:  Past Surgical History:   Procedure Laterality Date    HX BREAST BIOPSY      HX CHOLECYSTECTOMY      HX CORONARY ARTERY BYPASS GRAFT      HX HERNIA REPAIR      HX HYSTERECTOMY         Family History:  Family History   Problem Relation Age of Onset    Heart Disease Mother     Kidney Disease Mother     Cancer Maternal Grandmother        Social History:  Social History     Tobacco Use    Smoking status: Former Smoker  Smokeless tobacco: Never Used   Substance Use Topics    Alcohol use: Not Currently     Frequency: Never    Drug use: Never       Allergies: Allergies   Allergen Reactions    Tylox [Oxycodone-Acetaminophen] Hives         Review of Systems   Review of Systems  Constitutional: Negative for fever, chills, and fatigue. HENT: Negative for congestion, sore throat, rhinorrhea, sneezing and neck stiffness   Eyes: Negative for discharge and redness. Respiratory: Positive for  shortness of breath, wheezing   Cardiovascular: Negative for chest pain. Positive palpitations   Gastrointestinal: Negative for nausea, vomiting, abdominal pain, constipation, diarrhea and blood in stool. Genitourinary: Negative for dysuria, urgency, frequency, hematuria, flank pain, decreased urine volume, discharge,   Musculoskeletal: Negative for myalgias or joint pain . Skin: Negative for rash or lesions . Neurological: Negative weakness, light-headedness, numbness and headaches. Physical Exam   Physical Exam    GENERAL: alert and oriented, no acute distress  EYES: PEERL, No injection, discharge or icterus. ENT: Mucous membranes pink and moist.  NECK: Supple  LUNGS: Airway patent. Non-labored respirations. Breath sounds clear with good air entry bilaterally. HEART: Regular rate and rhythm. No peripheral edema  ABDOMEN: Non-distended and non-tender, without guarding or rebound.   SKIN:  warm, dry  EXTREMITIES: Without swelling, tenderness or deformity, symmetric with normal ROM  NEUROLOGICAL: Alert, oriented      Diagnostic Study Results     Labs -     Recent Results (from the past 12 hour(s))   METABOLIC PANEL, COMPREHENSIVE    Collection Time: 03/09/21  4:30 PM   Result Value Ref Range    Sodium 144 136 - 145 mmol/L    Potassium 4.4 3.5 - 5.1 mmol/L    Chloride 104 97 - 108 mmol/L    CO2 34 (H) 21 - 32 mmol/L    Anion gap 6 5 - 15 mmol/L    Glucose 191 (H) 65 - 100 mg/dL    BUN 17 6 - 20 mg/dL    Creatinine 1.72 (H) 0.55 - 1.02 mg/dL    BUN/Creatinine ratio 10 (L) 12 - 20      GFR est AA 36 (L) >60 ml/min/1.73m2    GFR est non-AA 29 (L) >60 ml/min/1.73m2    Calcium 9.2 8.5 - 10.1 mg/dL    Bilirubin, total 1.0 0.2 - 1.0 mg/dL    AST (SGOT) 11 (L) 15 - 37 U/L    ALT (SGPT) 24 12 - 78 U/L    Alk. phosphatase 127 (H) 45 - 117 U/L    Protein, total 7.1 6.4 - 8.2 g/dL    Albumin 3.2 (L) 3.5 - 5.0 g/dL    Globulin 3.9 2.0 - 4.0 g/dL    A-G Ratio 0.8 (L) 1.1 - 2.2     CBC WITH AUTOMATED DIFF    Collection Time: 03/09/21  4:30 PM   Result Value Ref Range    WBC 15.7 (H) 3.6 - 11.0 K/uL    RBC 4.79 3.80 - 5.20 M/uL    HGB 13.1 11.5 - 16.0 g/dL    HCT 43.7 35.0 - 47.0 %    MCV 91.2 80.0 - 99.0 FL    MCH 27.3 26.0 - 34.0 PG    MCHC 30.0 30.0 - 36.5 g/dL    RDW 16.3 (H) 11.5 - 14.5 %    PLATELET 710 695 - 767 K/uL    MPV 12.8 8.9 - 12.9 FL    NEUTROPHILS 94 (H) 32 - 75 %    LYMPHOCYTES 5 (L) 12 - 49 %    MONOCYTES 1 (L) 5 - 13 %    EOSINOPHILS 0 0 - 7 %    BASOPHILS 0 0 - 1 %    IMMATURE GRANULOCYTES 0 0.0 - 0.5 %    ABS. NEUTROPHILS 14.6 (H) 1.8 - 8.0 K/UL    ABS. LYMPHOCYTES 0.9 0.8 - 3.5 K/UL    ABS. MONOCYTES 0.2 0.0 - 1.0 K/UL    ABS. EOSINOPHILS 0.0 0.0 - 0.4 K/UL    ABS. BASOPHILS 0.0 0.0 - 0.1 K/UL    ABS. IMM. GRANS. 0.1 (H) 0.00 - 0.04 K/UL    DF AUTOMATED     TROPONIN I    Collection Time: 03/09/21  4:30 PM   Result Value Ref Range    Troponin-I, Qt. 0.09 (H) <0.05 ng/mL   BNP    Collection Time: 03/09/21  4:30 PM   Result Value Ref Range    NT pro-BNP 1,693 (H) <125 pg/mL   PROTHROMBIN TIME + INR    Collection Time: 03/09/21  4:45 PM   Result Value Ref Range    Prothrombin time 15.4 (H) 11.9 - 14.7 sec    INR 1.2 (H) 0.9 - 1.1     PTT    Collection Time: 03/09/21  4:45 PM   Result Value Ref Range    aPTT 33.1 23.0 - 35.7 sec    aPTT, therapeutic range   68 - 109 sec       Radiologic Studies -   XR CHEST PORT   Final Result   There is an enlargement of the cardiac silhouette.  There also is vascular   distention and a mild indistinctness of the pulmonary vessels suspect for mild   interstitial edema. CT Results  (Last 48 hours)    None        CXR Results  (Last 48 hours)               03/09/21 1649  XR CHEST PORT Final result    Impression:  There is an enlargement of the cardiac silhouette. There also is vascular   distention and a mild indistinctness of the pulmonary vessels suspect for mild   interstitial edema. Narrative: This study is a portable radiograph of the chest dated 10/9/2021. HISTORY: Shortness of breath. COMPARISON: 2/24/2021. FINDINGS: There is a moderate enlargement of the cardiac silhouette. There is   distention of the pulmonary vessels and a mild indistinctness along the   pulmonary vascular margins similar to that demonstrated on prior imaging. These   findings are suspect with mild hydrostatic edema. This examination is negative for airspace consolidation. This examination is   negative for larger pleural effusions or a pneumothorax. The patient is status post a previous median sternotomy for a remote coronary   artery bypass procedure. Medical Decision Making     I, Susana Fong MD am the first provider for this patient and am the attending of record for this patient encounter. I reviewed the vital signs, available nursing notes, past medical history, past surgical history, family history and social history. Vital Signs-Reviewed the patient's vital signs. Patient Vitals for the past 12 hrs:   Temp Pulse Resp BP SpO2   03/09/21 1803 -- 89 -- 136/76 99 %   03/09/21 1721 -- 83 -- 119/74 99 %   03/09/21 1707 -- 91 -- (!) 153/103 99 %   03/09/21 1629 98.1 °F (36.7 °C) (!) 125 16 (!) 148/100 99 %         EKG interpretation: (Preliminary)  Rhythm aflutter with RVR . Rate (approx.): 133; Axis: normal;  QRS interval: normal ; ST/T wave: Nonspecific changes;  was interpreted by Ulisses Cantu MD,ED Provider.       Records Reviewed: Nursing Notes and Old Medical Records    Provider Notes (Medical Decision Making):   Six 6year-old female presenting as a STEMI alert by EMS with onset of shortness of breath palpitations. On EKG review appears to be more consistent with atrial flutter with rapid ventricular response. Cardiology, Dr. Mindy Lynch also evaluated and agreed no STEMI alert. Patient with history of atrial flutter. Per Dr. Mindy Lynch will recommend admission on Cardizem drip for likely ablation. We will also start patient on heparin at this time. Remainder of work-up here notable for some vascular congestion noted on x-ray so was given Lasix. Otherwise noted to have a leukocytosis but no other infectious symptoms at this time. ED Course:   Initial assessment performed. The patients presenting problems have been discussed, and they are in agreement with the care plan formulated and outlined with them. I have encouraged them to ask questions as they arise throughout their visit. PROGRESS:  The patient has been re-evaluated and sx have improved. Reviewed available results with patient and have counseled them on diagnosis and care plan. They have expressed understanding, and all their questions have been answered. They agree with plan for admission. CONSULT:  Daly Winter MD spoke with Dr. Dwayne Mendez. Discussed HPI and PE, available diagnostic tests and clinical findings. sHe is in agreement with care plans as outlined    Admit Note  Patient is being admitted to Dr. Lopez Found  The results of their tests and reasons for their admission have been discussed with them and/or available family. They convey agreement and understanding for the need to be admitted and for their admission diagnosis. Consultation has been made with the inpatient physician specialist for hospitalization.     Disposition:  admission    Critical Care Note    IMPENDING DETERIORATION -Cardiovascular  ASSOCIATED RISK FACTORS - Dysrhythmia  MANAGEMENT- Bedside Assessment and Supervision of Care  INTERPRETATION -  Xrays, ECG and Blood Pressure  INTERVENTIONS -Cardizem infusion, heparin drip  CASE REVIEW - Hospitalist/Intensivist and Medical Sub-Specialist  TREATMENT RESPONSE -Improved  PERFORMED BY - Self    NOTES   :  I have provided a total of 37 minutes of critical time not including time spent on separately documented procedures. The reason for providing this level of medical care for this critically ill patient was due to a critical illness that impaired one or more vital organ systems such that there was a high probability of imminent or life threatening deterioration in the patients condition. This care involved high complexity decision making to assess, manipulate, and support vital system functions,  lab review, consultations with specialist, family decision- making, bedside attention and documentation. During this entire length of time I was immediately available to the patient    PLAN:  1. Admit     Diagnosis     Clinical Impression:   1. Atrial flutter with rapid ventricular response ([address]    2. ST elevation myocardial infarction (STEMI), unspecified artery ([address]    3. Congestive heart failure, unspecified HF chronicity, unspecified heart failure type [facility])        Please note that this dictation was completed with Dragon, computer voice recognition software. Quite often unanticipated grammatical, syntax, homophones, and other interpretive errors are inadvertently transcribed by the computer software. Please disregard these errors. Additionally, please excuse any errors that have escaped final proofreading.

## 2021-03-09 NOTE — CONSULTS
Cardiology Consult    NAME: Roxanna Brasher   :  1950   MRN:  [id number]     Date/Time:  3/9/2021 5:01 PM    Patient PCP: Andra Jauregui MD      Subjective:   CHIEF COMPLAINT: SOB      REASON FOR CONSULT: Aflutter      HISTORY OF PRESENT ILLNESS:     Roxanna Brasher is a 72 y.o. BLACK female who CAD status post two-vessel CABG in  (SVG to RCA/OM1), status post PTCA/DESIRE of LAD, 2011, atrial flutter, congestive heart failure secondary to diastolic dysfunction with baseline LV ejection fraction of 50% as per echocardiogram of , obesity with obstructive sleep apnea and COPD who presented to emergency room with complaints of increasing shortness of breath and heart racing since yesterday; worse today. Patient's EKG on presentation showed atrial flutter with ventricular response rate of 120-130 which was interpreted a STEMI and then cancelled. .  She was started on IV Cardizem with better rate control of atrial flutter. She reports mild bilateral leg edema but denies any cough or any significant change in her 2 pillow orthopnea. Was admitted 21 with same presentation and had 5 [facility] 21 that showed no progression of her CAD; no intervention was done. No chest pain, fever, cough.         Past Medical History:   Diagnosis Date    Congestive heart disease ([facility]     Coronary artery disease     Diabetes ([facility] [facility]     Hyperlipidemia     Hypertension     IFTIKHAR (obstructive sleep apnea)       Past Surgical History:   Procedure Laterality Date    HX BREAST BIOPSY      HX CHOLECYSTECTOMY      HX CORONARY ARTERY BYPASS GRAFT      HX HERNIA REPAIR      HX HYSTERECTOMY       Allergies   Allergen Reactions    Tylox [Oxycodone-Acetaminophen] Hives      Meds:  See below  Social History     Tobacco Use    Smoking status: Former Smoker    Smokeless tobacco: Never Used   Substance Use Topics    Alcohol use: Not Currently     Frequency: Never      Family History   Problem Relation Age of Onset  Heart Disease Mother     Kidney Disease Mother     Cancer Maternal Grandmother        REVIEW OF SYSTEMS:     []         Unable to obtain  ROS due to ---   [x]         Total of 12 systems reviewed as follows:    Constitutional: negative fever, negative chills,  Eyes:   negative visual changes  ENT:   negative sore throat, tongue or lip swelling  Respiratory:  negative cough, negative dyspnea  Cards:  +, palpitations  GI:   negative for nausea, vomiting  Genitourinary: negative for frequency, dysuria  Integument:  negative for rash   Hematologic:  negative for easy bruising and gum/nose bleeding  Musculoskel: negative for myalgias, back pain  Neurological:  negative for headaches, dizziness, weakness      Objective:      Physical Exam:    Last 24hrs VS reviewed since prior progress note. Most recent are:    Visit Vitals  BP (!) 148/100   Pulse (!) 125   Temp 98.1 °F (36.7 °C)   Resp 16   Ht 5' 3\" (1.6 m)   Wt 106.1 kg (234 lb)   SpO2 99%   BMI 41.45 kg/m²     No intake or output data in the 24 hours ending 03/09/21 1701     General Appearance: Aert, mild respiratory distress. Ears/Nose/Mouth/Throat: Pupils equal and round, anicteric  Neck: Supple. No JVP. Good carotids upstrokes, no bruit. Chest: Lungs clear with dullness at bases  Cardiovascular: irreg irreg, S1S2 normal, soft systolic murmur  Abdomen: Soft, non-tender, bowel sounds are active. Extremities:2+ edema bilaterally. Femoral pulses +2, Distal Pulses +1. Skin: Warm and dry. Neuro: Alert and oriented, normal speech; follows simple commands  Psychiatric: Cooperative, normal affect and judgment      Data:      LHC done 2/24/21:  · Patient but diffusely diseased left femoral artery, puncture into side branch, planned manual hemostasis     Indication troponin leak, atrial flutter, coronary artery disease, recurrent bouts of chest pain.     Hemodynamics with a left ventricular end-diastolic pressure of 12. No gradient across the aortic valve.   Ao 107/83.     Left main with proximal irregularities, patent stent in mid LAD. Nondominant circumflex with ostial total occlusion  Dominant right coronary artery with total proximal occlusion after the right ventricular branch. Patent saphenous vein graft to the distal right coronary artery. Patent saphenous vein graft to the second marginal branch of the circumflex.     Patent left femoral artery with diffuse disease and plan manual hemostasis.     Continue medical management and rate control for atrial flutter and anticoagulation, hydration post-cath for kidney disease. EKG: Aflutter with 's    []  No new EKG for review. Prior to Admission medications    Medication Sig Start Date End Date Taking? Authorizing Provider   apixaban (ELIQUIS) 5 mg tablet Take 1 Tab by mouth every twelve (12) hours. 2/26/21   Urban Alvarado MD   clopidogreL (PLAVIX) 75 mg tab Take 1 Tab by mouth daily. 2/27/21   Urban Alvarado MD   dilTIAZem ER (CARDIZEM CD) 120 mg capsule Take 1 Cap by mouth daily. 2/27/21   Urban Alvarado MD   ondansetron (ZOFRAN ODT) 4 mg disintegrating tablet Take 1 Tab by mouth every eight (8) hours as needed for Nausea or Vomiting. 2/26/21   Urban Alvarado MD   predniSONE (DELTASONE) 20 mg tablet Take 20 mg by mouth daily (with breakfast). 2/27/21   Urban Alvarado MD   carvediloL (COREG) 25 mg tablet Take 1 Tab by mouth two (2) times daily (with meals). 11/5/20   Molly Martinez NP   albuterol (Ventolin HFA) 90 mcg/actuation inhaler Take 2 Puffs by inhalation two (2) times daily as needed for Wheezing. Provider, Historical   furosemide (LASIX) 40 mg tablet Take 40 mg by mouth every evening. Provider, Historical   gabapentin (NEURONTIN) 600 mg tablet Take 600 mg by mouth three (3) times daily. Provider, Historical   isosorbide dinitrate (ISORDIL) 10 mg tablet Take 10 mg by mouth two (2) times a day.     Provider, Historical   losartan (COZAAR) 25 mg tablet Take 25 mg by mouth daily.    Provider, Historical   metOLazone (ZAROXOLYN) 5 mg tablet Take 5 mg by mouth daily. Provider, Historical   nitroglycerin (NITROSTAT) 0.4 mg SL tablet 0.4 mg by SubLINGual route every five (5) minutes as needed for Chest Pain. Up to 3 doses. Provider, Historical   pravastatin (PRAVACHOL) 40 mg tablet Take 40 mg by mouth nightly. Provider, Historical   theophylline ER,12 hour, (THEOCHRON) 300 mg tablet Take 300 mg by mouth daily. Provider, Historical   amLODIPine (NORVASC) 10 mg tablet Take 10 mg by mouth daily. Provider, Historical   oxybutynin (DITROPAN) 5 mg tablet Take 5 mg by mouth three (3) times daily. Provider, Historical   buPROPion XL (WELLBUTRIN XL) 150 mg tablet Take 150 mg by mouth every morning. Provider, Historical       No results found for this or any previous visit (from the past 24 hour(s)). _______________________________________________________________________     Assessment:   Aflutter with recurrent admissions due to Tachycardia  Stable CAD with recent [facility]  COPD  CHF due to mixed sytolic and diastolic HF  IFTIKHAR  Edema      Plan:      1. Agree with IV Cardizem for rate control of atrial flutter. 2. Hold Eliquis and use IV Heparin in anticipation of aflutter ablation. 3. For patient's congestive heart failure; give IV diuretics  4. Management of COPD exacerbation as per medical team/attending physician. 5. Patient at home takes theophylline, check level  6.  Patient's case discussed with EP physician (Letha Tariq) at [facility], who will arrange for ablation        []        High complexity decision making was performed    Sabrina Coronado MD

## 2021-03-10 LAB
ANION GAP SERPL CALC-SCNC: 5 MMOL/L (ref 5–15)
APPEARANCE UR: CLEAR
APTT PPP: 41.1 SEC (ref 23–35.7)
APTT PPP: 66.5 SEC (ref 23–35.7)
APTT PPP: 88.9 SEC (ref 23–35.7)
ATRIAL RATE: 129 BPM
BACTERIA URNS QL MICRO: NEGATIVE /HPF
BILIRUB UR QL: NEGATIVE
BUN SERPL-MCNC: 22 MG/DL (ref 6–20)
BUN/CREAT SERPL: 14 (ref 12–20)
CA-I BLD-MCNC: 9 MG/DL (ref 8.5–10.1)
CALCULATED R AXIS, ECG10: 42 DEGREES
CALCULATED T AXIS, ECG11: 117 DEGREES
CHLORIDE SERPL-SCNC: 99 MMOL/L (ref 97–108)
CO2 SERPL-SCNC: 35 MMOL/L (ref 21–32)
COLOR UR: ABNORMAL
COVID-19 RAPID TEST, COVR: NOT DETECTED
CREAT SERPL-MCNC: 1.58 MG/DL (ref 0.55–1.02)
DIAGNOSIS, 93000: NORMAL
GLUCOSE BLD STRIP.AUTO-MCNC: 148 MG/DL (ref 65–100)
GLUCOSE BLD STRIP.AUTO-MCNC: 160 MG/DL (ref 65–100)
GLUCOSE BLD STRIP.AUTO-MCNC: 186 MG/DL (ref 65–100)
GLUCOSE BLD STRIP.AUTO-MCNC: 234 MG/DL (ref 65–100)
GLUCOSE SERPL-MCNC: 223 MG/DL (ref 65–100)
GLUCOSE UR STRIP.AUTO-MCNC: NEGATIVE MG/DL
HGB UR QL STRIP: ABNORMAL
KETONES UR QL STRIP.AUTO: NEGATIVE MG/DL
LEUKOCYTE ESTERASE UR QL STRIP.AUTO: NEGATIVE
MAGNESIUM SERPL-MCNC: 1.7 MG/DL (ref 1.6–2.4)
MRSA DNA SPEC QL NAA+PROBE: NOT DETECTED
NITRITE UR QL STRIP.AUTO: NEGATIVE
PERFORMED BY, TECHID: ABNORMAL
PH UR STRIP: 6 [PH] (ref 5–8)
POTASSIUM SERPL-SCNC: 4.2 MMOL/L (ref 3.5–5.1)
PROT UR STRIP-MCNC: NEGATIVE MG/DL
Q-T INTERVAL, ECG07: 374 MS
QRS DURATION, ECG06: 114 MS
QTC CALCULATION (BEZET), ECG08: 556 MS
RBC #/AREA URNS HPF: ABNORMAL /HPF (ref 0–5)
SARS-COV-2, COV2: NORMAL
SODIUM SERPL-SCNC: 139 MMOL/L (ref 136–145)
SP GR UR REFRACTOMETRY: 1.01 (ref 1–1.03)
SPECIMEN SOURCE: NORMAL
THERAPEUTIC RANGE,PTTT: ABNORMAL SEC (ref 68–109)
UA: UC IF INDICATED,UAUC: ABNORMAL
UROBILINOGEN UR QL STRIP.AUTO: 0.1 EU/DL (ref 0.1–1)
VENTRICULAR RATE, ECG03: 133 BPM
WBC URNS QL MICRO: ABNORMAL /HPF (ref 0–4)

## 2021-03-10 PROCEDURE — 74011250636 HC RX REV CODE- 250/636: Performed by: INTERNAL MEDICINE

## 2021-03-10 PROCEDURE — 85730 THROMBOPLASTIN TIME PARTIAL: CPT

## 2021-03-10 PROCEDURE — 65610000006 HC RM INTENSIVE CARE

## 2021-03-10 PROCEDURE — 87186 SC STD MICRODIL/AGAR DIL: CPT

## 2021-03-10 PROCEDURE — 80048 BASIC METABOLIC PNL TOTAL CA: CPT

## 2021-03-10 PROCEDURE — 74011250637 HC RX REV CODE- 250/637: Performed by: INTERNAL MEDICINE

## 2021-03-10 PROCEDURE — 74011250636 HC RX REV CODE- 250/636

## 2021-03-10 PROCEDURE — 82962 GLUCOSE BLOOD TEST: CPT

## 2021-03-10 PROCEDURE — 74011250636 HC RX REV CODE- 250/636: Performed by: EMERGENCY MEDICINE

## 2021-03-10 PROCEDURE — 74011636637 HC RX REV CODE- 636/637: Performed by: INTERNAL MEDICINE

## 2021-03-10 PROCEDURE — 87077 CULTURE AEROBIC IDENTIFY: CPT

## 2021-03-10 PROCEDURE — 74011000250 HC RX REV CODE- 250: Performed by: INTERNAL MEDICINE

## 2021-03-10 PROCEDURE — 87086 URINE CULTURE/COLONY COUNT: CPT

## 2021-03-10 PROCEDURE — 93005 ELECTROCARDIOGRAM TRACING: CPT

## 2021-03-10 PROCEDURE — 83735 ASSAY OF MAGNESIUM: CPT

## 2021-03-10 PROCEDURE — 81001 URINALYSIS AUTO W/SCOPE: CPT

## 2021-03-10 PROCEDURE — 87635 SARS-COV-2 COVID-19 AMP PRB: CPT

## 2021-03-10 PROCEDURE — 87641 MR-STAPH DNA AMP PROBE: CPT

## 2021-03-10 RX ORDER — PRAVASTATIN SODIUM 40 MG/1
40 TABLET ORAL
Status: DISCONTINUED | OUTPATIENT
Start: 2021-03-10 | End: 2021-03-10

## 2021-03-10 RX ORDER — MOMETASONE FUROATE AND FORMOTEROL FUMARATE DIHYDRATE 200; 5 UG/1; UG/1
2 AEROSOL RESPIRATORY (INHALATION) 2 TIMES DAILY
COMMUNITY
End: 2022-03-25

## 2021-03-10 RX ORDER — DEXTROSE 50 % IN WATER (D50W) INTRAVENOUS SYRINGE
25-50 AS NEEDED
Status: DISCONTINUED | OUTPATIENT
Start: 2021-03-10 | End: 2021-03-14 | Stop reason: HOSPADM

## 2021-03-10 RX ORDER — LOSARTAN POTASSIUM 25 MG/1
25 TABLET ORAL DAILY
COMMUNITY
End: 2021-03-14

## 2021-03-10 RX ORDER — QUETIAPINE FUMARATE 25 MG/1
25 TABLET, FILM COATED ORAL
COMMUNITY
End: 2022-03-25

## 2021-03-10 RX ORDER — DIGOXIN 0.25 MG/ML
250 INJECTION INTRAMUSCULAR; INTRAVENOUS
Status: COMPLETED | OUTPATIENT
Start: 2021-03-10 | End: 2021-03-10

## 2021-03-10 RX ORDER — OXYBUTYNIN CHLORIDE 5 MG/1
5 TABLET ORAL 3 TIMES DAILY
Status: DISCONTINUED | OUTPATIENT
Start: 2021-03-10 | End: 2021-03-14 | Stop reason: HOSPADM

## 2021-03-10 RX ORDER — IPRATROPIUM BROMIDE AND ALBUTEROL SULFATE 2.5; .5 MG/3ML; MG/3ML
3 SOLUTION RESPIRATORY (INHALATION)
COMMUNITY
End: 2022-03-25

## 2021-03-10 RX ORDER — QUETIAPINE FUMARATE 25 MG/1
25 TABLET, FILM COATED ORAL
Status: DISCONTINUED | OUTPATIENT
Start: 2021-03-10 | End: 2021-03-14 | Stop reason: HOSPADM

## 2021-03-10 RX ORDER — LOSARTAN POTASSIUM 50 MG/1
25 TABLET ORAL DAILY
Status: DISCONTINUED | OUTPATIENT
Start: 2021-03-10 | End: 2021-03-10

## 2021-03-10 RX ORDER — DIGOXIN 0.25 MG/ML
INJECTION INTRAMUSCULAR; INTRAVENOUS
Status: COMPLETED
Start: 2021-03-10 | End: 2021-03-10

## 2021-03-10 RX ORDER — AMLODIPINE BESYLATE 10 MG/1
10 TABLET ORAL DAILY
COMMUNITY
End: 2021-03-14

## 2021-03-10 RX ORDER — CARVEDILOL 25 MG/1
25 TABLET ORAL 2 TIMES DAILY WITH MEALS
COMMUNITY
End: 2021-03-14

## 2021-03-10 RX ORDER — BUPROPION HYDROCHLORIDE 150 MG/1
150 TABLET ORAL
Status: DISCONTINUED | OUTPATIENT
Start: 2021-03-10 | End: 2021-03-14 | Stop reason: HOSPADM

## 2021-03-10 RX ORDER — LOSARTAN POTASSIUM 50 MG/1
100 TABLET ORAL DAILY
Status: DISCONTINUED | OUTPATIENT
Start: 2021-03-11 | End: 2021-03-14 | Stop reason: HOSPADM

## 2021-03-10 RX ORDER — DILTIAZEM HYDROCHLORIDE 120 MG/1
120 CAPSULE, COATED, EXTENDED RELEASE ORAL DAILY
Status: DISCONTINUED | OUTPATIENT
Start: 2021-03-11 | End: 2021-03-10

## 2021-03-10 RX ORDER — ALBUTEROL SULFATE 90 UG/1
2 AEROSOL, METERED RESPIRATORY (INHALATION)
Status: DISCONTINUED | OUTPATIENT
Start: 2021-03-10 | End: 2021-03-14 | Stop reason: HOSPADM

## 2021-03-10 RX ORDER — GABAPENTIN 300 MG/1
300 CAPSULE ORAL 3 TIMES DAILY
Status: DISCONTINUED | OUTPATIENT
Start: 2021-03-10 | End: 2021-03-14 | Stop reason: HOSPADM

## 2021-03-10 RX ORDER — HEPARIN SODIUM 5000 [USP'U]/ML
INJECTION, SOLUTION INTRAVENOUS; SUBCUTANEOUS
Status: DISPENSED
Start: 2021-03-10 | End: 2021-03-10

## 2021-03-10 RX ORDER — ATORVASTATIN CALCIUM 10 MG/1
10 TABLET, FILM COATED ORAL DAILY
Status: DISCONTINUED | OUTPATIENT
Start: 2021-03-10 | End: 2021-03-14 | Stop reason: HOSPADM

## 2021-03-10 RX ORDER — INSULIN LISPRO 100 [IU]/ML
INJECTION, SOLUTION INTRAVENOUS; SUBCUTANEOUS
Status: DISCONTINUED | OUTPATIENT
Start: 2021-03-10 | End: 2021-03-14 | Stop reason: HOSPADM

## 2021-03-10 RX ORDER — MAGNESIUM SULFATE 100 %
4 CRYSTALS MISCELLANEOUS AS NEEDED
Status: DISCONTINUED | OUTPATIENT
Start: 2021-03-10 | End: 2021-03-14 | Stop reason: HOSPADM

## 2021-03-10 RX ORDER — METOLAZONE 5 MG/1
5 TABLET ORAL DAILY
Status: DISCONTINUED | OUTPATIENT
Start: 2021-03-10 | End: 2021-03-14 | Stop reason: HOSPADM

## 2021-03-10 RX ORDER — DILTIAZEM HYDROCHLORIDE 120 MG/1
120 CAPSULE, COATED, EXTENDED RELEASE ORAL DAILY
Status: DISCONTINUED | OUTPATIENT
Start: 2021-03-10 | End: 2021-03-11

## 2021-03-10 RX ORDER — FUROSEMIDE 80 MG/1
80 TABLET ORAL DAILY
COMMUNITY
End: 2021-03-14

## 2021-03-10 RX ORDER — FUROSEMIDE 10 MG/ML
60 INJECTION INTRAMUSCULAR; INTRAVENOUS 2 TIMES DAILY
Status: DISCONTINUED | OUTPATIENT
Start: 2021-03-10 | End: 2021-03-13

## 2021-03-10 RX ORDER — INSULIN GLARGINE 100 [IU]/ML
10 INJECTION, SOLUTION SUBCUTANEOUS
Status: DISCONTINUED | OUTPATIENT
Start: 2021-03-10 | End: 2021-03-14 | Stop reason: HOSPADM

## 2021-03-10 RX ORDER — FUROSEMIDE 10 MG/ML
40 INJECTION INTRAMUSCULAR; INTRAVENOUS DAILY
Status: DISCONTINUED | OUTPATIENT
Start: 2021-03-10 | End: 2021-03-10

## 2021-03-10 RX ORDER — CLOPIDOGREL BISULFATE 75 MG/1
75 TABLET ORAL DAILY
Status: DISCONTINUED | OUTPATIENT
Start: 2021-03-10 | End: 2021-03-12

## 2021-03-10 RX ORDER — POTASSIUM CHLORIDE 750 MG/1
10 TABLET, FILM COATED, EXTENDED RELEASE ORAL 2 TIMES DAILY
COMMUNITY
End: 2021-11-19 | Stop reason: DRUGHIGH

## 2021-03-10 RX ORDER — PREDNISONE 20 MG/1
20 TABLET ORAL
Status: DISCONTINUED | OUTPATIENT
Start: 2021-03-10 | End: 2021-03-14 | Stop reason: HOSPADM

## 2021-03-10 RX ADMIN — HEPARIN SODIUM 2000 UNITS: 1000 INJECTION, SOLUTION INTRAVENOUS; SUBCUTANEOUS at 13:39

## 2021-03-10 RX ADMIN — DIGOXIN 250 MCG: 250 INJECTION, SOLUTION INTRAMUSCULAR; INTRAVENOUS; PARENTERAL at 08:36

## 2021-03-10 RX ADMIN — OXYBUTYNIN CHLORIDE 5 MG: 5 TABLET ORAL at 08:48

## 2021-03-10 RX ADMIN — GABAPENTIN 300 MG: 300 CAPSULE ORAL at 08:47

## 2021-03-10 RX ADMIN — Medication 15 MG/HR: at 06:43

## 2021-03-10 RX ADMIN — HEPARIN SODIUM AND DEXTROSE 16 UNITS/KG/HR: 10000; 5 INJECTION INTRAVENOUS at 19:28

## 2021-03-10 RX ADMIN — METOLAZONE 5 MG: 5 TABLET ORAL at 08:47

## 2021-03-10 RX ADMIN — GABAPENTIN 300 MG: 300 CAPSULE ORAL at 17:41

## 2021-03-10 RX ADMIN — ATORVASTATIN CALCIUM 10 MG: 10 TABLET, FILM COATED ORAL at 08:47

## 2021-03-10 RX ADMIN — QUETIAPINE FUMARATE 25 MG: 25 TABLET ORAL at 21:00

## 2021-03-10 RX ADMIN — CLOPIDOGREL BISULFATE 75 MG: 75 TABLET ORAL at 08:47

## 2021-03-10 RX ADMIN — PREDNISONE 20 MG: 20 TABLET ORAL at 08:47

## 2021-03-10 RX ADMIN — GABAPENTIN 300 MG: 300 CAPSULE ORAL at 22:00

## 2021-03-10 RX ADMIN — OXYBUTYNIN CHLORIDE 5 MG: 5 TABLET ORAL at 22:00

## 2021-03-10 RX ADMIN — DILTIAZEM HYDROCHLORIDE 120 MG: 120 CAPSULE, COATED, EXTENDED RELEASE ORAL at 19:20

## 2021-03-10 RX ADMIN — FUROSEMIDE 60 MG: 10 INJECTION, SOLUTION INTRAMUSCULAR; INTRAVENOUS at 21:00

## 2021-03-10 RX ADMIN — FUROSEMIDE 40 MG: 10 INJECTION, SOLUTION INTRAMUSCULAR; INTRAVENOUS at 08:47

## 2021-03-10 RX ADMIN — LOSARTAN POTASSIUM 25 MG: 50 TABLET, FILM COATED ORAL at 08:47

## 2021-03-10 RX ADMIN — BUPROPION HYDROCHLORIDE 150 MG: 150 TABLET, EXTENDED RELEASE ORAL at 09:26

## 2021-03-10 RX ADMIN — HEPARIN SODIUM AND DEXTROSE 16 UNITS/KG/HR: 10000; 5 INJECTION INTRAVENOUS at 13:10

## 2021-03-10 RX ADMIN — DIGOXIN 250 MCG: 0.25 INJECTION INTRAMUSCULAR; INTRAVENOUS at 08:36

## 2021-03-10 NOTE — PROGRESS NOTES
Spiritual Care Assessment/Progress Note  [address]      NAME: Madi Angel      MRN: [id number]  AGE: 72 y.o.  SEX: female  Roman Catholic Affiliation: Church   Language: English     3/10/2021     Total Time (in minutes): 8     Spiritual Assessment begun in [address] [facility] through conversation with:         [x]Patient        [x] Family    [] Friend(s)        Reason for Consult: Advance medical directive consult     Spiritual beliefs: (Please include comment if needed)     [] Identifies with a luis tradition:         [] Supported by a luis community:            [] Claims no spiritual orientation:           [] Seeking spiritual identity:                [] Adheres to an individual form of spirituality:           [x] Not able to assess:                           Identified resources for coping:      [] Prayer                               [] Music                  [] Guided Imagery     [] Family/friends                 [] Pet visits     [] Devotional reading                         [x] Unknown     [] Other:                                              Interventions offered during this visit: (See comments for more details)    Patient Interventions: Advance medical directive consult, Initial visit     Family/Friend(s): Initial Assessment     Plan of Care:     [] Support spiritual and/or cultural needs    [] Support AMD and/or advance care planning process      [] Support grieving process   [] Coordinate Rites and/or Rituals    [] Coordination with community clergy   [] No spiritual needs identified at this time   [] Detailed Plan of Care below (See Comments)  [] Make referral to Music Therapy  [] Make referral to Pet Therapy     [] Make referral to Addiction services  [] Make referral to [facility]  [] Make referral to Spiritual Care Partner  [] No future visits requested        [x] Follow up upon further referrals     Comments:  responded to in basket request for Advance Medical Directive (AMD) consult in the ICU. Pt, Miss Lillian Augustin was about to have her lunch, her daughter was present, fixing her lunch.  left a copy of AMD document and advised them to call  through nurse when ready for AMD consult. Pt and daughter thanked  for the visit. Visited by: Blake Rosas.    can be reached by calling the  at [facility]  [phone]

## 2021-03-10 NOTE — PROGRESS NOTES
Progress Note      3/10/2021 3:57 PM  NAME: Miguel A Isaac   MRN:  [id number]   Admit Diagnosis: Atrial flutter with rapid ventricular response ([facility] [address] [I48.92]          Assessment/Plan:   Atrial flutter, rapid ventricular response, now controlled, off Cardizem, usually anticoagulated with apixaban, currently on heparin. Shortness of breath, edema, fluid overload, picture of heart failure with mild systolic dysfunction, with significant edema, continue vigorous diuresis, will increase dose of furosemide    Hypertension, elevated blood pressure, continue antihypertensives. Coronary artery disease, status post CABG, recent catheterization with patent stent in LAD and without progression of coronary artery disease. []       High complexity decision making was performed in this patient at high risk for decompensation with multiple organ involvement. Subjective:     Miguel A Isaac denies chest pain, dyspnea. Discussed with RN events overnight. Review of Systems:    Symptom Y/N Comments  Symptom Y/N Comments   Fever/Chills N   Chest Pain N    Poor Appetite N   Edema Y    Cough N   Abdominal Pain N    Sputum N   Joint Pain N    SOB/BUTCHER Y   Pruritis/Rash N    Nausea/vomit N   Tolerating PT/OT Y    Diarrhea N   Tolerating Diet Y    Constipation N   Other       Could NOT obtain due to:      Objective:      Physical Exam:    Last 24hrs VS reviewed since prior progress note.  Most recent are:    Visit Vitals  /63 (BP 1 Location: Left lower arm, BP Patient Position: At rest)   Pulse (!) 52 Comment: pt alert asymptomatic   Temp 98.2 °F (36.8 °C)   Resp 26   Ht 5' 3\" (1.6 m)   Wt 106.6 kg (235 lb 0.2 oz)   SpO2 97%   Breastfeeding No   BMI 41.63 kg/m²       Intake/Output Summary (Last 24 hours) at 3/10/2021 1557  Last data filed at 3/10/2021 1526  Gross per 24 hour   Intake 650 ml   Output 1550 ml   Net -900 ml        General Appearance: Well developed, well nourished, alert; no acute distress. Ears/Nose/Mouth/Throat: Hearing grossly normal; moist mucous membranes  Neck: Supple. Chest: Lungs clear to auscultation bilaterally. Cardiovascular: [id number] rate and rhythm, S1S2 normal, no murmur. Abdomen: Soft, non-tender, bowel sounds are active. Extremities: 2-3+ edema bilaterally. Skin: Warm and dry. []         Post-cath site without hematoma, bruit, tenderness, or thrill. Distal pulses intact. PMH/ reviewed - no change compared to H&P    Data Review    Telemetry:     EKG:   []  No new EKG for review    Lab Data Personally Reviewed:    Recent Labs     03/09/21  1630   WBC 15.7*   HGB 13.1   HCT 43.7        Recent Labs     03/10/21  1103 03/10/21  0000 03/09/21  1645   INR  --   --  1.2*   PTP  --   --  15.4*   APTT 66.5* 41.1* 33.1      Recent Labs     03/10/21  1505 03/09/21  1630    144   K 4.2 4.4   CL 99 104   CO2 35* 34*   BUN 22* 17   CREA 1.58* 1.72*   * 191*   CA 9.0 9.2   MG 1.7  --      Recent Labs     03/09/21  1630   TROIQ 0.09*     No results found for: CHOL, CHOLX, CHLST, CHOLV, HDL, HDLP, LDL, LDLC, DLDLP, TGLX, TRIGL, TRIGP, CHHD, CHHDX    Recent Labs     03/09/21  1630   *   TP 7.1   ALB 3.2*   GLOB 3.9     No results for input(s): PH, PCO2, PO2 in the last 72 hours.     Medications Personally Reviewed:    Current Facility-Administered Medications   Medication Dose Route Frequency    albuterol (PROVENTIL HFA, VENTOLIN HFA, PROAIR HFA) inhaler 2 Puff  2 Puff Inhalation BID PRN    buPROPion XL (WELLBUTRIN XL) tablet 150 mg  150 mg Oral 7am    clopidogreL (PLAVIX) tablet 75 mg  75 mg Oral DAILY    gabapentin (NEURONTIN) capsule 300 mg  300 mg Oral TID    metOLazone (ZAROXOLYN) tablet 5 mg  5 mg Oral DAILY    oxybutynin (DITROPAN) tablet 5 mg  5 mg Oral TID    predniSONE (DELTASONE) tablet 20 mg  20 mg Oral DAILY WITH BREAKFAST    atorvastatin (LIPITOR) tablet 10 mg  10 mg Oral DAILY    furosemide (LASIX) injection 40 mg  40 mg IntraVENous DAILY    losartan (COZAAR) tablet 25 mg  25 mg Oral DAILY    heparin (porcine) 5,000 unit/mL injection        dilTIAZem (CARDIZEM) 125 mg/125 mL (1 mg/mL) dextrose 5% infusion  0-15 mg/hr IntraVENous TITRATE    heparin 25,000 units in D5W 250 ml infusion  12-25 Units/kg/hr (Adjusted) IntraVENous TITRATE    heparin (porcine) 1,000 unit/mL injection 4,000 Units  4,000 Units IntraVENous PRN    Or    heparin (porcine) 1,000 unit/mL injection 2,000 Units  2,000 Units IntraVENous PRN         Ga Hyde MD

## 2021-03-10 NOTE — PROGRESS NOTES
Reason for Readmission:     AFlutter         RUR Score/Risk Level:     17%    PCP: First and Last name:  Marciano Ellsworth -[phone]   Name of Practice:    Are you a current patient: Yes/No:    Approximate date of last visit:    Can you participate in a virtual visit with your PCP:     Is a Care Conference indicated:   No indication. Did you attend your follow up appointment (s): If not, why not:  Yes       Resources/supports as identified by patient/family:     Patient lives with daughter, has home health, requesting personal care aide. Top Challenges facing patient (as identified by patient/family and CM): Finances/Medication cost?     None  Transportation      None  Support system or lack thereof? None  Living arrangements? None  Self-care/ADLs/Cognition? Requesting more help at home. Current Advanced Directive/Advance Care Plan:    Requesting to speak with  to fill out AMD.    Advance Care Planning   Healthcare Decision Maker:       Primary Decision Maker: Jorge Saavedra - Daughter - [phone]             Plan for utilizing home health:   Patient is open with [facility]. Transition of Care Plan:    Based on readmission, the patient's previous Plan of Care has been evaluated and/or modified. The current Transition of Care Plan is:        Patient was discharged from [facility] 2/26 with home health. Writer met with patient and her daughter Nanette An at the bedside for DCP assessment. Patient lives with her daughter in a 1 floor home. Address on face sheet verified. Patient uses a walker at home, has home oxygen 3Liters baseline. Patient is open with [facility] health. Requesting personal care aide. List of personal care aides given to daughter.  consulted for AMD. CM will continue to follow. Discharge plan: home with home health.

## 2021-03-10 NOTE — PROGRESS NOTES
PT eval order received and acknowledged. PT eval attempted at 0845 however per nsg pt sustained -140s at rest. Will continue to follow patient and attempt PT eval at a later time. Thank you.

## 2021-03-10 NOTE — H&P
History and Physical    Patient: Edwar Acosta MRN: [id number]  SSN: [id number]    YOB: 1950  Age: 72 y.o. Sex: female      Subjective:      Edwar Acosta is a 72 y.o. female who has a past medical history of congestive heart failure, coronary artery disease, diabetes, hyperlipidemia, hypertension obstructive sleep apnea presented with a complaint of pain with exertion. Patient was recently diagnosed with Covid recently found to be in atrial fibrillation rapid ventricular rate and admitted to the hospital for control and treatment    Past Medical History:   Diagnosis Date    Congestive heart disease ([facility] [address] [facility]     Coronary artery disease     Diabetes ([facility] [address] [facility]     Hyperlipidemia     Hypertension     IFTIKHAR (obstructive sleep apnea)      Past Surgical History:   Procedure Laterality Date    HX BREAST BIOPSY      HX CHOLECYSTECTOMY      HX CORONARY ARTERY BYPASS GRAFT      HX HERNIA REPAIR      HX HYSTERECTOMY        Family History   Problem Relation Age of Onset    Heart Disease Mother     Kidney Disease Mother     Cancer Maternal Grandmother      Social History     Tobacco Use    Smoking status: Former Smoker    Smokeless tobacco: Never Used   Substance Use Topics    Alcohol use: Not Currently     Frequency: Never      Prior to Admission medications    Medication Sig Start Date End Date Taking? Authorizing Provider   clopidogreL (PLAVIX) 75 mg tab Take 1 Tab by mouth daily. 2/27/21   Sanna Alvarado MD   dilTIAZem ER (CARDIZEM CD) 120 mg capsule Take 1 Cap by mouth daily. 2/27/21   Sanna Alvarado MD   predniSONE (DELTASONE) 20 mg tablet Take 20 mg by mouth daily (with breakfast). 2/27/21   Sanna Alvarado MD   albuterol (Ventolin HFA) 90 mcg/actuation inhaler Take 2 Puffs by inhalation two (2) times daily as needed for Wheezing. Provider, Historical   gabapentin (NEURONTIN) 600 mg tablet Take 600 mg by mouth three (3) times daily.     Provider, Historical   metOLazone (ZAROXOLYN) 5 mg tablet Take 5 mg by mouth daily. Provider, Historical   pravastatin (PRAVACHOL) 40 mg tablet Take 40 mg by mouth nightly. Provider, Historical   oxybutynin (DITROPAN) 5 mg tablet Take 5 mg by mouth three (3) times daily. Provider, Historical   buPROPion XL (WELLBUTRIN XL) 150 mg tablet Take 150 mg by mouth every morning. Provider, Historical        Allergies   Allergen Reactions    Tylox [Oxycodone-Acetaminophen] Hives       Review of Systems:  A comprehensive review of systems was negative except for that written in the History of Present Illness. Objective:     Vitals:    03/10/21 1500 03/10/21 1600 03/10/21 1700 03/10/21 1800   BP: 138/63 (!) 147/76 107/87 132/83   Pulse: (!) 52 (!) 110 (!) 107 (!) 109   Resp: 26 22 22 28   Temp: 98.2 °F (36.8 °C)      SpO2: 97% 97% 98% 96%   Weight:       Height:            Physical Exam:  General:  Alert, cooperative, no distress, appears stated age. Eyes:  Conjunctivae/corneas clear. PERRL, EOMs intact. Fundi benign   Ears:  Normal TMs and external ear canals both ears. Nose: Nares normal. Septum midline. Mucosa normal. No drainage or sinus tenderness. Mouth/Throat: Lips, mucosa, and tongue normal. Teeth and gums normal.   Neck: Supple, symmetrical, trachea midline, no adenopathy, thyroid: no enlargment/tenderness/nodules, no carotid bruit and no JVD. Back:   Symmetric, no curvature. ROM normal. No CVA tenderness. Lungs:   Clear to auscultation bilaterally. Heart:  Regular rate and rhythm, S1, S2 normal, no murmur, click, rub or gallop. Abdomen:   Soft, non-tender. Bowel sounds normal. No masses,  No organomegaly. Extremities: Extremities normal, atraumatic, no cyanosis or edema. Pulses: 2+ and symmetric all extremities. Skin: Skin color, texture, turgor normal. No rashes or lesions   Lymph nodes: Cervical, supraclavicular, and axillary nodes normal.   Neurologic: CNII-XII intact.  Normal strength, sensation and reflexes throughout. Assessment:     Hospital Problems  Date Reviewed: 11/2/2020          Codes Class Noted POA    Atrial flutter with rapid ventricular response [facility]) ICD-10-CM: P06.50  ICD-9-CM: 427.32  3/9/2021 Unknown          Acute congestive heart failure continue with home medications  Acute hypoxic respiratory failure continue with oxygen supplementation      Plan:     Continue with treatment for A. fib with IV Cardizem and transition to p.o. Cardizem. Cardiology also evaluated the patient.   This was discussed with the patient extensively    Signed By: Odilon Pandya MD     March 10, 2021

## 2021-03-10 NOTE — PROGRESS NOTES
OT eval order received and acknowledged. OT eval attempted at 8:50 am however pt with HR in 130s-140s at rest. Nursing advised to hold therapy evaluations until pt medically stable. Will continue to follow patient and attempt OT eval at a later time. Thank you.

## 2021-03-11 LAB
APTT PPP: 106.2 SEC (ref 23–35.7)
ATRIAL RATE: 249 BPM
CALCULATED P AXIS, ECG09: -108 DEGREES
CALCULATED R AXIS, ECG10: 39 DEGREES
CALCULATED T AXIS, ECG11: 114 DEGREES
DATE LAST DOSE: NORMAL
DIAGNOSIS, 93000: NORMAL
GLUCOSE BLD STRIP.AUTO-MCNC: 145 MG/DL (ref 65–100)
GLUCOSE BLD STRIP.AUTO-MCNC: 160 MG/DL (ref 65–100)
GLUCOSE BLD STRIP.AUTO-MCNC: 198 MG/DL (ref 65–100)
GLUCOSE BLD STRIP.AUTO-MCNC: 202 MG/DL (ref 65–100)
PERFORMED BY, TECHID: ABNORMAL
Q-T INTERVAL, ECG07: 432 MS
QRS DURATION, ECG06: 106 MS
QTC CALCULATION (BEZET), ECG08: 492 MS
REPORTED DOSE,DOSE: NORMAL UNITS
THEOPHYLLINE SERPL-MCNC: 13.3 UG/ML (ref 10–20)
THERAPEUTIC RANGE,PTTT: ABNORMAL SEC (ref 68–109)
VENTRICULAR RATE, ECG03: 78 BPM

## 2021-03-11 PROCEDURE — 97530 THERAPEUTIC ACTIVITIES: CPT

## 2021-03-11 PROCEDURE — 93005 ELECTROCARDIOGRAM TRACING: CPT

## 2021-03-11 PROCEDURE — 74011250637 HC RX REV CODE- 250/637: Performed by: INTERNAL MEDICINE

## 2021-03-11 PROCEDURE — 74011000258 HC RX REV CODE- 258: Performed by: INTERNAL MEDICINE

## 2021-03-11 PROCEDURE — 74011250636 HC RX REV CODE- 250/636: Performed by: EMERGENCY MEDICINE

## 2021-03-11 PROCEDURE — 97161 PT EVAL LOW COMPLEX 20 MIN: CPT

## 2021-03-11 PROCEDURE — 97166 OT EVAL MOD COMPLEX 45 MIN: CPT

## 2021-03-11 PROCEDURE — 36415 COLL VENOUS BLD VENIPUNCTURE: CPT

## 2021-03-11 PROCEDURE — 65610000006 HC RM INTENSIVE CARE

## 2021-03-11 PROCEDURE — 74011636637 HC RX REV CODE- 636/637: Performed by: INTERNAL MEDICINE

## 2021-03-11 PROCEDURE — 82962 GLUCOSE BLOOD TEST: CPT

## 2021-03-11 PROCEDURE — 74011250636 HC RX REV CODE- 250/636: Performed by: INTERNAL MEDICINE

## 2021-03-11 PROCEDURE — 85730 THROMBOPLASTIN TIME PARTIAL: CPT

## 2021-03-11 RX ORDER — AMIODARONE HYDROCHLORIDE 200 MG/1
400 TABLET ORAL DAILY
Status: DISCONTINUED | OUTPATIENT
Start: 2021-03-12 | End: 2021-03-14 | Stop reason: HOSPADM

## 2021-03-11 RX ORDER — ACETAMINOPHEN 325 MG/1
650 TABLET ORAL
Status: DISCONTINUED | OUTPATIENT
Start: 2021-03-11 | End: 2021-03-14 | Stop reason: HOSPADM

## 2021-03-11 RX ORDER — CARVEDILOL 12.5 MG/1
25 TABLET ORAL 2 TIMES DAILY WITH MEALS
Status: DISCONTINUED | OUTPATIENT
Start: 2021-03-11 | End: 2021-03-11

## 2021-03-11 RX ORDER — CARVEDILOL 12.5 MG/1
25 TABLET ORAL 2 TIMES DAILY WITH MEALS
Status: DISCONTINUED | OUTPATIENT
Start: 2021-03-11 | End: 2021-03-14 | Stop reason: HOSPADM

## 2021-03-11 RX ADMIN — FUROSEMIDE 60 MG: 10 INJECTION, SOLUTION INTRAMUSCULAR; INTRAVENOUS at 08:45

## 2021-03-11 RX ADMIN — OXYBUTYNIN CHLORIDE 5 MG: 5 TABLET ORAL at 17:25

## 2021-03-11 RX ADMIN — CLOPIDOGREL BISULFATE 75 MG: 75 TABLET ORAL at 08:45

## 2021-03-11 RX ADMIN — ACETAMINOPHEN 650 MG: 325 TABLET, FILM COATED ORAL at 17:24

## 2021-03-11 RX ADMIN — FUROSEMIDE 60 MG: 10 INJECTION, SOLUTION INTRAMUSCULAR; INTRAVENOUS at 21:20

## 2021-03-11 RX ADMIN — GABAPENTIN 300 MG: 300 CAPSULE ORAL at 08:45

## 2021-03-11 RX ADMIN — INSULIN LISPRO 2 UNITS: 100 INJECTION, SOLUTION INTRAVENOUS; SUBCUTANEOUS at 16:30

## 2021-03-11 RX ADMIN — ATORVASTATIN CALCIUM 10 MG: 10 TABLET, FILM COATED ORAL at 08:45

## 2021-03-11 RX ADMIN — QUETIAPINE FUMARATE 25 MG: 25 TABLET ORAL at 21:20

## 2021-03-11 RX ADMIN — HEPARIN SODIUM AND DEXTROSE 16 UNITS/KG/HR: 10000; 5 INJECTION INTRAVENOUS at 17:21

## 2021-03-11 RX ADMIN — AMIODARONE HYDROCHLORIDE 150 MG: 50 INJECTION, SOLUTION INTRAVENOUS at 12:34

## 2021-03-11 RX ADMIN — GABAPENTIN 300 MG: 300 CAPSULE ORAL at 17:24

## 2021-03-11 RX ADMIN — DILTIAZEM HYDROCHLORIDE 120 MG: 120 CAPSULE, COATED, EXTENDED RELEASE ORAL at 11:12

## 2021-03-11 RX ADMIN — METOLAZONE 5 MG: 5 TABLET ORAL at 08:45

## 2021-03-11 RX ADMIN — INSULIN LISPRO 4 UNITS: 100 INJECTION, SOLUTION INTRAVENOUS; SUBCUTANEOUS at 21:58

## 2021-03-11 RX ADMIN — PREDNISONE 20 MG: 20 TABLET ORAL at 08:45

## 2021-03-11 RX ADMIN — BUPROPION HYDROCHLORIDE 150 MG: 150 TABLET, EXTENDED RELEASE ORAL at 07:00

## 2021-03-11 RX ADMIN — OXYBUTYNIN CHLORIDE 5 MG: 5 TABLET ORAL at 21:20

## 2021-03-11 RX ADMIN — CARVEDILOL 25 MG: 12.5 TABLET, FILM COATED ORAL at 17:24

## 2021-03-11 RX ADMIN — CARVEDILOL 25 MG: 12.5 TABLET, FILM COATED ORAL at 11:12

## 2021-03-11 RX ADMIN — LOSARTAN POTASSIUM 100 MG: 50 TABLET, FILM COATED ORAL at 08:45

## 2021-03-11 RX ADMIN — INSULIN LISPRO 2 UNITS: 100 INJECTION, SOLUTION INTRAVENOUS; SUBCUTANEOUS at 07:00

## 2021-03-11 RX ADMIN — OXYBUTYNIN CHLORIDE 5 MG: 5 TABLET ORAL at 08:45

## 2021-03-11 RX ADMIN — INSULIN GLARGINE 10 UNITS: 100 INJECTION, SOLUTION SUBCUTANEOUS at 21:21

## 2021-03-11 RX ADMIN — GABAPENTIN 300 MG: 300 CAPSULE ORAL at 21:20

## 2021-03-11 NOTE — ROUTINE PROCESS
Onset:   Location/Description: Contractions 9 minutes apart. No vaginal bleeding or leakage of fluid. Fetal movement at baseline. Located 20 minutes from [facility].     [facility] PREGNANCY - LABOR-A-       PTT was therapeutic so heparin will continue at the 16 units/kg/hr. Nurse will check a another PTT in 6 hours.

## 2021-03-11 NOTE — PROGRESS NOTES
Progress Note      3/11/2021 10:25 AM  NAME: Josemanuel Ortiz   MRN:  [id number]   Admit Diagnosis: Atrial flutter with rapid ventricular response ([facility] [address] [I48.92]      Problem List:   Atrial flutter with variable heart rate control  COPD exacerbation on chronic oxygen therapy  Chronic hypoxemia  CHF due to mixed picture of systolic and diastolic dysfunction  Sleep apnea  CAD status post CABG, no active angina     Assessment/Plan:   Continue on IV heparin and resume Eliquis tomorrow after cardioversion  Unable to arrange for atrial flutter ablation prior to hospital discharge, therefore, will plan for electrical cardioversion tomorrow since she has been on systemic anticoagulation now for at least 3 months uninterrupted. Load with IV amiodarone today and use short-term po amiodarone until ablation  Patient will need aflutter ablation in the near future; Will arrange as outpatient         []       High complexity decision making was performed in this patient at high risk for decompensation with multiple organ involvement. Subjective:     Josemanuel Ortiz denies chest pain. SOB is better. -118 bpm.  Discussed with RN events overnight. Review of Systems:   Negative except for as noted above. Objective:      Physical Exam:    Last 24hrs VS reviewed since prior progress note. Most recent are:    Visit Vitals  /77 (BP 1 Location: Left lower arm, BP Patient Position: At rest)   Pulse 98   Temp 97.6 °F (36.4 °C)   Resp 16   Ht 5' 3\" (1.6 m)   Wt 106.6 kg (235 lb 0.2 oz)   SpO2 96%   Breastfeeding No   BMI 41.63 kg/m²       Intake/Output Summary (Last 24 hours) at 3/11/2021 1025  Last data filed at 3/11/2021 0530  Gross per 24 hour   Intake 1150 ml   Output 5200 ml   Net -4050 ml        General Appearance: Alert; no acute distress. Ears/Nose/Mouth/Throat: Hearing grossly normal; moist mucous membranes  Neck: Supple. Chest: Lungs clear to auscultation bilaterally.   Cardiovascular: Irregular rhythm, S1S2 normal, soft systolic murmur  Abdomen: Soft, non-tender, bowel sounds are active. Extremities: 1-2+edema bilaterally right more than left  Skin: Warm and dry. []         Post-cath site without hematoma, bruit, tenderness, or thrill. Distal pulses intact. PMH/SH reviewed - no change compared to H&P      Telemetry: Atrial flutter with heart rate in the 110-120 range    EKG: Atrial flutter without ischemia    []  No new EKG for review    Lab Data Personally Reviewed:    Recent Labs     03/09/21  1630   WBC 15.7*   HGB 13.1   HCT 43.7        Recent Labs     03/11/21  0235 03/10/21  1930 03/10/21  1103 03/09/21  1645 03/09/21  1645   INR  --   --   --   --  1.2*   PTP  --   --   --   --  15.4*   APTT 106.2* 88.9* 66.5*   < > 33.1    < > = values in this interval not displayed. Recent Labs     03/10/21  1505 03/09/21  1630    144   K 4.2 4.4   CL 99 104   CO2 35* 34*   BUN 22* 17   CREA 1.58* 1.72*   * 191*   CA 9.0 9.2   MG 1.7  --      Recent Labs     03/09/21  1630   TROIQ 0.09*     No results found for: CHOL, CHOLX, CHLST, CHOLV, HDL, HDLP, LDL, LDLC, DLDLP, TGLX, TRIGL, TRIGP, CHHD, CHHDX    Recent Labs     03/09/21  1630   *   TP 7.1   ALB 3.2*   GLOB 3.9     No results for input(s): PH, PCO2, PO2 in the last 72 hours.     Medications Personally Reviewed:    Current Facility-Administered Medications   Medication Dose Route Frequency    carvediloL (COREG) tablet 25 mg  25 mg Oral BID WITH MEALS    albuterol (PROVENTIL HFA, VENTOLIN HFA, PROAIR HFA) inhaler 2 Puff  2 Puff Inhalation BID PRN    buPROPion XL (WELLBUTRIN XL) tablet 150 mg  150 mg Oral 7am    clopidogreL (PLAVIX) tablet 75 mg  75 mg Oral DAILY    gabapentin (NEURONTIN) capsule 300 mg  300 mg Oral TID    metOLazone (ZAROXOLYN) tablet 5 mg  5 mg Oral DAILY    oxybutynin (DITROPAN) tablet 5 mg  5 mg Oral TID    predniSONE (DELTASONE) tablet 20 mg  20 mg Oral DAILY WITH BREAKFAST    atorvastatin (LIPITOR) tablet 10 mg  10 mg Oral DAILY    furosemide (LASIX) injection 60 mg  60 mg IntraVENous BID    dilTIAZem ER (CARDIZEM CD) capsule 120 mg  120 mg Oral DAILY    insulin lispro (HUMALOG) injection   SubCUTAneous AC&HS    glucose chewable tablet 16 g  4 Tab Oral PRN    glucagon (GLUCAGEN) injection 1 mg  1 mg IntraMUSCular PRN    dextrose (D50W) injection syrg 12.5-25 g  25-50 mL IntraVENous PRN    insulin glargine (LANTUS) injection 10 Units  10 Units SubCUTAneous QHS    QUEtiapine (SEROquel) tablet 25 mg  25 mg Oral QHS    losartan (COZAAR) tablet 100 mg  100 mg Oral DAILY    heparin 25,000 units in D5W 250 ml infusion  12-25 Units/kg/hr (Adjusted) IntraVENous TITRATE    heparin (porcine) 1,000 unit/mL injection 4,000 Units  4,000 Units IntraVENous PRN    Or    heparin (porcine) 1,000 unit/mL injection 2,000 Units  2,000 Units IntraVENous PRN         Carlton Ellis MD

## 2021-03-11 NOTE — PROGRESS NOTES
OCCUPATIONAL THERAPY EVALUATION  Patient: Linnea Sierra (89 y.o. female)  Date: 3/11/2021  Primary Diagnosis: Atrial flutter with rapid ventricular response (HCC) [I48.92]  Procedure(s) (LRB):  LEFT HEART CATH / CORONARY ANGIOGRAPHY (N/A)     Precautions: Fall risk      ASSESSMENT  Pt is a 71 y/o F with PMH of congestive heart failure, coronary artery disease, diabetes, hyperlipidemia, hypertension, obstructive sleep apnea, at baseline 3L O2 presented to [facility] with cc of chest pain with exertion. Per medical chart, pt was recently diagnosed with COVID-19 recently found to be in atrial fibrillation rapid ventricular rate, admitted on 3//9/21 for atrial flutter with rapid ventricular response. Pt received semi-supine in bed upon arrival, on 3L O2 via NC, AXO x4, and agreeable to OT/PT evaluations at this time. Per pt, she lives with her daughter in a one-story home with 3 FABIAN and B HR, required assist with ADLs, was sponge bathing and supervision using rollator for mobility at [facility]. Pt states that she does not own any other DME and that her rollator is currently broken. Based on current observations, pt presents with deficits in generalized strength/AROM, static/dynamic sitting balance, static/dynamic standing balance, functional activity tolerance, coordination, and reported back pain impacting overall performance of ADLs and functional transfers/mobility. Pt currently requires min A for rolling, min A x2 for supine>sit to EOB, min A x2 for sit><stand to/from EOB with RW. Pt noted to have wet gown and was doffed/donned clean gown with mod A to manage around lines and leads. Total A required to don socks 2' to limited anterior reach. Pt able to perform basic grooming s/p setup (washing face) at EOB.  Pt denies any dizziness/SOB, remains asymptomatic throughout session however significant variation in HR noted (101-102 at rest in supine, 102-150s sitting at rest, 107-115 with functional ambulation, 162-188 after activity with rest breaks in between. RN made aware.) Pt returned to supine with min A x2 and left resting comfortably and in no apparent distress at end of evaluation. Overall, pt tolerates session fair today, she would benefit from continued skilled OT services during hospital stay and at next level of care to address current impairments and improve overall IND and safety with self cares and functional mobility upon d/c. OT currently recommending SNF vs. [facility] with family assist at d/c, pending progress and once medically appropriate. Other factors to consider for discharge: Family support, DME, time since onset, severity of deficits      Patient will benefit from skilled therapy intervention to address the above noted impairments. PLAN :  Recommendations and Planned Interventions: self care training, functional mobility training, therapeutic exercise, balance training, visual/perceptual training, therapeutic activities, endurance activities, patient education, and family training/education    Frequency/Duration: Patient will be followed by occupational therapy 5 times a week to address goals.     Recommendation for discharge: (in order for the patient to meet his/her long term goals)  SNF vs. [facility] with family assist pending progress    This discharge recommendation:  Has been made in collaboration with the attending provider and/or case management    IF patient discharges home will need the following DME: TBD; will likely need a RW, possibly BSC and shower chair pending d/c location       SUBJECTIVE:   Patient stated I feel fine    OBJECTIVE DATA SUMMARY:   HISTORY:   Past Medical History:   Diagnosis Date    Congestive heart disease ([facility] [address]     Coronary artery disease     [facility]     Hyperlipidemia     Hypertension     IFTIKHAR (obstructive sleep apnea)      Past Surgical History:   Procedure Laterality Date    HX BREAST BIOPSY      HX CHOLECYSTECTOMY      HX CORONARY ARTERY BYPASS GRAFT  HX HERNIA REPAIR      HX HYSTERECTOMY         Expanded or extensive additional review of patient history:     Home Situation  Home Environment: Private residence  # Steps to Enter: 4  Rails to Enter: Yes  Hand Rails : Bilateral  One/Two Story Residence: One story  Living Alone: No  Support Systems: Child(carrillo)(with daughter)  Patient Expects to be Discharged to[de-identified] Private residence  Current DME Used/Available at Home: Lashawn Farrell, rollator    Hand dominance: Right    EXAMINATION OF PERFORMANCE DEFICITS:  Cognitive/Behavioral Status:  Neurologic State: Alert  Orientation Level: Oriented X4  Cognition: Follows commands    Hearing: Auditory  Auditory Impairment: None    Vision/Perceptual:     WFL      Range of Motion:  AROM: Generally decreased, functional    Strength:  Strength: Generally decreased, functional(3/5 grossly)    Coordination:  Fine Motor Skills-Upper: Comment(Grossly decreased, functional)      Tone & Sensation:  Tone: Normal  Sensation: Intact    Balance:  Sitting: Intact  Standing: Impaired; With support  Standing - Static: Fair;Constant support  Standing - Dynamic : Fair;Constant support    Functional Mobility and Transfers for ADLs:  Bed Mobility:  Rolling: Minimum assistance  Supine to Sit: Minimum assistance;Assist x2  Sit to Supine: Minimum assistance;Assist x2  Scooting: Minimum assistance;Assist x2    Transfers:  Sit to Stand: Minimum assistance;Assist x2  Stand to Sit: Minimum assistance;Assist x2    ADL Intervention and task modifications:  Feeding  Feeding Assistance: Set-up  Drink to Mouth: Independent    Grooming  Grooming Assistance: Set-up; Stand-by assistance  Position Performed: Seated edge of bed  Washing Face: Set-up; Stand-by assistance    Upper Body [address]: Moderate assistance    Lower Body Dressing Assistance  Socks:  Total assistance (dependent)  Position Performed: Long sitting on bed    Therapeutic Exercise:  Pt would benefit from UE HEP to improve overall UE AROM/strength and can be further educated in next treatment session. Functional Measure:    [employer] AM-PACTM \"6 Clicks\"                                                       Daily Activity Inpatient Short Form  How much help from another person does the patient currently need. .. Total; A Lot A Little None   1. Putting on and taking off regular lower body clothing? [x]  1 []  2 []  3 []  4   2. Bathing (including washing, rinsing, drying)? []  1 [x]  2 []  3 []  4   3. Toileting, which includes using toilet, bedpan or urinal? [x] 1 []  2 []  3 []  4   4. Putting on and taking off regular upper body clothing? []  1 [x]  2 []  3 []  4   5. Taking care of personal grooming such as brushing teeth? []  1 []  2 [x]  3 []  4   6. Eating meals? []  1 []  2 [x]  3 []  4   © 2007, Trustees of [facility] [employer], under license to [employer]. All rights reserved     Score: 12/24     Interpretation of Tool:  Represents clinically-significant functional categories (i.e. Activities of daily living). Percentage of Impairment CH    0%   CI    1-19% CJ    20-39% CK    40-59% CL    60-79% CM    80-99% CN     100%   [facility]  Score 6-24 24 23 20-22 15-19 10-14 7-9 6        Occupational Therapy Evaluation Charge Determination   History Examination Decision-Making   MEDIUM Complexity : Expanded review of history including physical, cognitive and psychosocial  history  MEDIUM Complexity : 3-5 performance deficits relating to physical, cognitive , or psychosocial skils that result in activity limitations and / or participation restrictions MEDIUM Complexity : Patient may present with comorbidities that affect occupational performnce.  Miniml to moderate modification of tasks or assistance (eg, physical or verbal ) with assesment(s) is necessary to enable patient to complete evaluation       Based on the above components, the patient evaluation is determined to be of the following complexity level: MEDIUM  Pain Ratin/10 in back     Activity Tolerance:   Fair and requires rest breaks  Please refer to the flowsheet for vital signs taken during this treatment. After treatment patient left in no apparent distress:    Supine in bed, Heels elevated for pressure relief, Call bell within reach, and Side rails x 3    COMMUNICATION/EDUCATION:   The patients plan of care was discussed with: Physical therapist and Registered nurse. Patient/family have participated as able in goal setting and plan of care. and Patient/family agree to work toward stated goals and plan of care. This patients plan of care is appropriate for delegation to [facility].     OT/PT sessions occurred together for increased patient and clinician safety as pt requires A of 2 for mobility at this time    Thank you for this referral.  Neal Bejarano  Time Calculation: 38 mins   Problem: Self Care Deficits Care Plan (Adult)  Goal: *Acute Goals and Plan of Care (Insert Text)  Description: Pt will be SBA sup <> sit in prep for EOB ADLs  Pt will be SBA grooming standing sink side LRAD  Pt will be min A LE dressing sitting EOB/long sit  Pt will be SBA sit <>  prep for toileting LRAD  Pt will be SBA toileting/toilet transfer/cloth mgmt LRAD  Pt will be IND following UE HEP in prep for self care tasks  Outcome: Not Met

## 2021-03-11 NOTE — ROUTINE PROCESS
Patients blood pressure continuously staying in the 160s/90s-100s. Dr. Amando Hyde was called and notified. Order to change Losartan from 50mg to 100mg daily. No other orders at this time.

## 2021-03-11 NOTE — PROGRESS NOTES
Progress Note    Patient: Rafael Rock MRN: [id number]  SSN: [id number]    YOB: 1950  Age: 72 y.o. Sex: female      Admit Date: 3/9/2021    LOS: 2 days     Subjective:     Patient was admitted for atrial fibrillation rapid ventricular rate scheduled for electrocardioversion in a.m. I denies any chest pain no shortness of breath    Objective:     Vitals:    03/11/21 1000 03/11/21 1100 03/11/21 1200 03/11/21 1500   BP: 137/77 (!) 149/73 (!) 128/101    Pulse: 98 (!) 102 (!) 104    Resp: 16 23 18    Temp:  97.9 °F (36.6 °C)  98.8 °F (37.1 °C)   SpO2: 96% 95% 95%    Weight:       Height:            Intake and Output:  Current Shift: No intake/output data recorded. Last three shifts: 03/09 1901 - 03/11 0700  In: 1150 [P.O.:1150]  Out: 5200 [Urine:5200]    Physical Exam:   General:  Alert, cooperative, no distress, appears stated age. Eyes:  Conjunctivae/corneas clear. PERRL, EOMs intact. Fundi benign   Ears:  Normal TMs and external ear canals both ears. Nose: Nares normal. Septum midline. Mucosa normal. No drainage or sinus tenderness. Mouth/Throat: Lips, mucosa, and tongue normal. Teeth and gums normal.   Neck: Supple, symmetrical, trachea midline, no adenopathy, thyroid: no enlargment/tenderness/nodules, no carotid bruit and no JVD. Back:   Symmetric, no curvature. ROM normal. No CVA tenderness. Lungs:   Clear to auscultation bilaterally. Heart:  Regular rate and rhythm, S1, S2 normal, no murmur, click, rub or gallop. Abdomen:   Soft, non-tender. Bowel sounds normal. No masses,  No organomegaly. Extremities: Extremities normal, atraumatic, no cyanosis or edema. Pulses: 2+ and symmetric all extremities. Skin: Skin color, texture, turgor normal. No rashes or lesions   Lymph nodes: Cervical, supraclavicular, and axillary nodes normal.   Neurologic: CNII-XII intact. Normal strength, sensation and reflexes throughout. Lab/Data Review:   All lab results for the last 24 hours reviewed. Recent Results (from the past 24 hour(s))   PTT    Collection Time: 03/10/21  7:30 PM   Result Value Ref Range    aPTT 88.9 (H) 23.0 - 35.7 sec    aPTT, therapeutic range   68 - 109 sec   GLUCOSE, POC    Collection Time: 03/10/21  9:18 PM   Result Value Ref Range    Glucose (POC) 148 (H) 65 - 100 mg/dL    Performed by Maria Elena Brand    PTT    Collection Time: 03/11/21  2:35 AM   Result Value Ref Range    aPTT 106.2 (H) 23.0 - 35.7 sec    aPTT, therapeutic range   68 - 109 sec   GLUCOSE, POC    Collection Time: 03/11/21  6:02 AM   Result Value Ref Range    Glucose (POC) 198 (H) 65 - 100 mg/dL    Performed by Maria Elena Brand    GLUCOSE, POC    Collection Time: 03/11/21  7:34 AM   Result Value Ref Range    Glucose (POC) 145 (H) 65 - 100 mg/dL    Performed by Larry Donovan    EKG, 12 LEAD, SUBSEQUENT    Collection Time: 03/11/21  1:20 PM   Result Value Ref Range    Ventricular Rate 78 BPM    Atrial Rate 249 BPM    QRS Duration 106 ms    Q-T Interval 432 ms    QTC Calculation (Bezet) 492 ms    Calculated P Axis -108 degrees    Calculated R Axis 39 degrees    Calculated T Axis 114 degrees    Diagnosis       Atrial flutter with variable A-V block  Abnormal ECG  When compared with ECG of 11-MAR-2021 13:19, (Unconfirmed)  No significant change was found  Confirmed by Preet Cisneros MD, Kim Anderson ([id number]) on 3/11/2021 4:24:11 PM     GLUCOSE, POC    Collection Time: 03/11/21  3:23 PM   Result Value Ref Range    Glucose (POC) 160 (H) 65 - 100 mg/dL    Performed by Larry Donovan      Last echocardiogram showed EF of 50%    Assessment:     Active Problems:    Atrial flutter with rapid ventricular response ([address] (3/9/2021)        Plan:   Electrocardioversion in a.m.     Signed By: Shaylee Patterson MD     March 11, 2021

## 2021-03-11 NOTE — ROUTINE PROCESS
PTT was again therapeutic so heparin will remain at 16 units/kg/hr. Next ptt is due on 3/12/2021 at 0245.

## 2021-03-11 NOTE — PROGRESS NOTES
PHYSICAL THERAPY EVALUATION  Patient: Rafael Rock (40 y.o. female)  Date: 3/11/2021  Primary Diagnosis: Atrial flutter with rapid ventricular response (HCC) [I48.92]  Procedure(s) (LRB):  LEFT HEART CATH / CORONARY ANGIOGRAPHY (N/A)     Precautions: falls      ASSESSMENT  This is a 73 y/o female  with a past medical history of congestive heart failure, coronary artery disease, diabetes, hyperlipidemia, hypertension ,obstructive sleep apnea , at baseline 3L O2 came to [facility] with complaint of chest pain with exertion. Patient was recently diagnosed with Covid recently found to be in atrial fibrillation rapid ventricular rate , admitted  on 3//9/21 for atrial flutter with rapid ventricular response   . Pt. Received in semi-supine , agreeable for PT/ OT evals. Pt is A& O x 4 , as per pt. Report ,she lives with daughter in a single story home with 4 steps to enter , HR on b/l sides ,needed assist with  ADL's , SUP for functional transfers/mobility with rollator ,  prior to admission . Based on the objective data described below, the patient presents with c/o pain at back - 7/10 ,  decreased strength , 3+/5  grossly in  b/l LE ,  balance deficits , generalized weakness , decreased safety awareness , decreased activity tolerance and need for increased assist with functional mobility ( needs Abebe for rolling  to left  side  , Abebe x 2  for supine <>sit transfers , intact static sitting balance ,  Abebe x2  for sit <>stand , x 2 trials , found with wet gown and chux , changed with clean ones  ,  fair  static  standing balance , is able to take few side steps towards the HOB , bAebe x 1-2 persons   with RW  , HR at rest 101 , fluctuated between 101 - 168 with activity  ,rest breaks taken between transitional movements as needed , pt remained asymptomatic  throughout PT/OT session . RN made aware . Pt left resting comfortably and in no apparent distress at end of evaluation   ) .  Recommend d/c to SNF vs HHPT pending progress  when medically appropriate . Current Level of Function Impacting Discharge (mobility/balance): Requires Abebe  x 1 -2 persons for functional mobility . Functional Outcome Measure: The patient scored 17 on the AM PAC basic mobility  outcome measure which is indicative of medium complexity. Other factors to consider for discharge: medium complexity      Patient will benefit from skilled therapy intervention to address the above noted impairments. PLAN :  Recommendations and Planned Interventions: bed mobility training, transfer training, gait training, therapeutic exercises, neuromuscular re-education, patient and family training/education, and therapeutic activities      Frequency/Duration: Patient will be followed by physical therapy:  5 times a week to address goals. Recommendation for discharge: (in order for the patient to meet his/her long term goals)  SNF vs HHPT pending progress     This discharge recommendation:  Has been made in collaboration with the attending provider and/or case management    IF patient discharges home will need the following DME: rolling walker         SUBJECTIVE:   Patient stated My back hurts.     OBJECTIVE DATA SUMMARY:   HISTORY:    Past Medical History:   Diagnosis Date    [facility] [address] [facility]     Coronary artery disease     [facility] [address] [facility]     Hyperlipidemia     Hypertension     IFTIKHAR (obstructive sleep apnea)      Past Surgical History:   Procedure Laterality Date    HX BREAST BIOPSY      HX CHOLECYSTECTOMY      HX CORONARY ARTERY BYPASS GRAFT      HX HERNIA REPAIR      HX HYSTERECTOMY         Personal factors and/or comorbidities impacting plan of care:    Home Situation  Home Environment: Private residence  # Steps to Enter: 4  Rails to Enter: Yes  Hand Rails : Bilateral  One/Two Story Residence: One story  Living Alone: No  Support Systems: Child(carrillo)(with daughter)  Patient Expects to be Discharged to[de-identified] Private residence  Current DME Used/Available at Home: Tomi Ramirez, rollator    PLOF: Pt needed assist  for ADLS/IADLS, SUP with mobility with rollator prior to admission. EXAMINATION/PRESENTATION/DECISION MAKING:   Critical Behavior:  Neurologic State: Alert  Orientation Level: Oriented X4        Hearing: Auditory  Auditory Impairment: None  Skin:  intact where exposed     Range Of Motion:  AROM: Within functional limits    Strength:    Strength: Generally decreased, functional(3/5 grossly)    Tone & Sensation:   Tone: Normal     Sensation: Intact    Functional Mobility:  Bed Mobility:  Rolling: Minimum assistance  Supine to Sit: Minimum assistance;Assist x2  Sit to Supine: Minimum assistance;Assist x2  Scooting: Minimum assistance;Assist x2  Transfers:  Sit to Stand: Minimum assistance;Assist x2  Stand to Sit: Minimum assistance;Assist x2    Balance:   Sitting: Intact  Standing: Impaired; With support  Standing - Static: Fair;Constant support  Standing - Dynamic : Fair;Constant support  Ambulation/Gait Training:  Distance (ft): 4 Feet (ft)  Assistive Device: Walker, rolling  Ambulation - Level of Assistance: Minimal assistance(1-2 person assist)    Functional Measure:    [facility] [address] [address] AM-PAC 6 Clicks         Basic Mobility Inpatient Short Form  How much difficulty does the patient currently have. .. Unable A Lot A Little None   1. Turning over in bed (including adjusting bedclothes, sheets and blankets)? [] 1   [] 2   [x] 3   [] 4   2. Sitting down on and standing up from a chair with arms ( e.g., wheelchair, bedside commode, etc.)   [] 1   [] 2   [x] 3   [] 4   3. Moving from lying on back to sitting on the side of the bed? [] 1   [] 2   [x] 3   [] 4          How much help from another person does the patient currently need. .. Total A Lot A Little None   4. Moving to and from a bed to a chair (including a wheelchair)? [] 1   [] 2   [x] 3   [] 4   5. Need to walk in hospital room?    [] 1   [] 2   [x] 3 [] 4   6. Climbing 3-5 steps with a railing? [] 1   [x] 2   [] 3   [] 4   © , Trustees of [address] [place] [address] [address] [address], under license to [employer]. All rights reserved     Score:  Initial: 17 Most Recent: X (Date: 3/11/21-- )   Interpretation of Tool:  Represents activities that are increasingly more difficult (i.e. Bed mobility, Transfers, Gait). Score 24 23 22-20 19-15 14-10 9-7 6   Modifier CH CI CJ CK CL CM CN          Physical Therapy Evaluation Charge Determination   History Examination Presentation Decision-Making   MEDIUM  Complexity : 1-2 comorbidities / personal factors will impact the outcome/ POC  LOW Complexity : 1-2 Standardized tests and measures addressing body structure, function, activity limitation and / or participation in recreation  MEDIUM Complexity : Evolving with changing characteristics  Other Functional Measure [facility] 6 medium complexity       Based on the above components, the patient evaluation is determined to be of the following complexity level: LOW     Pain Ratin/10 , pain at back     Activity Tolerance:   Fair and requires rest breaks  Please refer to the flowsheet for vital signs taken during this treatment. After treatment patient left in no apparent distress:   Supine in bed and Call bell within reach    COMMUNICATION/EDUCATION:   The patients plan of care was discussed with: Occupational therapy assistant and Registered nurse. Fall prevention education was provided and the patient/caregiver indicated understanding. and Patient/family agree to work toward stated goals and plan of care. Problem: Mobility Impaired (Adult and Pediatric)  Goal: *Acute Goals and Plan of Care (Insert Text)  Description: Pt will be I with LE HEP in 7 days. Pt will perform bed mobility independently in 7 days. Pt will perform transfers with mod I  in 7 days. Pt will amb -100  feet with LRAD safely with mod I in 7 days.    Outcome: Not Met       Thank you for this referral.  Rosetta Bolivar Nicholas   Time Calculation: 38 mins

## 2021-03-11 NOTE — PROGRESS NOTES
Patient is accepted for resumption of care with [facility] once medically stable for discharge.

## 2021-03-12 ENCOUNTER — ANESTHESIA (OUTPATIENT)
Dept: NON INVASIVE DIAGNOSTICS | Age: 71
DRG: 308 | End: 2021-03-12
Payer: MEDICARE

## 2021-03-12 ENCOUNTER — APPOINTMENT (OUTPATIENT)
Dept: NON INVASIVE DIAGNOSTICS | Age: 71
DRG: 308 | End: 2021-03-12
Attending: INTERNAL MEDICINE
Payer: MEDICARE

## 2021-03-12 ENCOUNTER — ANESTHESIA EVENT (OUTPATIENT)
Dept: NON INVASIVE DIAGNOSTICS | Age: 71
DRG: 308 | End: 2021-03-12
Payer: MEDICARE

## 2021-03-12 LAB
APTT PPP: 120.1 SEC (ref 23–35.7)
APTT PPP: 69.5 SEC (ref 23–35.7)
ATRIAL RATE: 256 BPM
ATRIAL RATE: 258 BPM
ATRIAL RATE: 70 BPM
CALCULATED P AXIS, ECG09: -89 DEGREES
CALCULATED P AXIS, ECG09: 70 DEGREES
CALCULATED R AXIS, ECG10: 122 DEGREES
CALCULATED R AXIS, ECG10: 30 DEGREES
CALCULATED R AXIS, ECG10: 31 DEGREES
CALCULATED T AXIS, ECG11: 0 DEGREES
CALCULATED T AXIS, ECG11: 109 DEGREES
CALCULATED T AXIS, ECG11: 113 DEGREES
DIAGNOSIS, 93000: NORMAL
GLUCOSE BLD STRIP.AUTO-MCNC: 137 MG/DL (ref 65–100)
GLUCOSE BLD STRIP.AUTO-MCNC: 171 MG/DL (ref 65–100)
GLUCOSE BLD STRIP.AUTO-MCNC: 188 MG/DL (ref 65–100)
GLUCOSE BLD STRIP.AUTO-MCNC: 225 MG/DL (ref 65–100)
P-R INTERVAL, ECG05: 206 MS
PERFORMED BY, TECHID: ABNORMAL
Q-T INTERVAL, ECG07: 188 MS
Q-T INTERVAL, ECG07: 452 MS
Q-T INTERVAL, ECG07: 454 MS
QRS DURATION, ECG06: 104 MS
QRS DURATION, ECG06: 108 MS
QRS DURATION, ECG06: 114 MS
QTC CALCULATION (BEZET), ECG08: 224 MS
QTC CALCULATION (BEZET), ECG08: 468 MS
QTC CALCULATION (BEZET), ECG08: 488 MS
THERAPEUTIC RANGE,PTTT: ABNORMAL SEC (ref 68–109)
THERAPEUTIC RANGE,PTTT: ABNORMAL SEC (ref 68–109)
VENTRICULAR RATE, ECG03: 64 BPM
VENTRICULAR RATE, ECG03: 70 BPM
VENTRICULAR RATE, ECG03: 86 BPM

## 2021-03-12 PROCEDURE — 74011250637 HC RX REV CODE- 250/637: Performed by: INTERNAL MEDICINE

## 2021-03-12 PROCEDURE — 74011250636 HC RX REV CODE- 250/636: Performed by: EMERGENCY MEDICINE

## 2021-03-12 PROCEDURE — 5A2204Z RESTORATION OF CARDIAC RHYTHM, SINGLE: ICD-10-PCS | Performed by: INTERNAL MEDICINE

## 2021-03-12 PROCEDURE — 74011250636 HC RX REV CODE- 250/636: Performed by: NURSE ANESTHETIST, CERTIFIED REGISTERED

## 2021-03-12 PROCEDURE — 82962 GLUCOSE BLOOD TEST: CPT

## 2021-03-12 PROCEDURE — 65270000029 HC RM PRIVATE

## 2021-03-12 PROCEDURE — 85730 THROMBOPLASTIN TIME PARTIAL: CPT

## 2021-03-12 PROCEDURE — 36415 COLL VENOUS BLD VENIPUNCTURE: CPT

## 2021-03-12 PROCEDURE — 74011636637 HC RX REV CODE- 636/637: Performed by: INTERNAL MEDICINE

## 2021-03-12 PROCEDURE — 93005 ELECTROCARDIOGRAM TRACING: CPT

## 2021-03-12 PROCEDURE — 92960 CARDIOVERSION ELECTRIC EXT: CPT

## 2021-03-12 RX ORDER — SODIUM CHLORIDE 9 MG/ML
INJECTION, SOLUTION INTRAVENOUS
Status: DISCONTINUED | OUTPATIENT
Start: 2021-03-12 | End: 2021-03-12 | Stop reason: HOSPADM

## 2021-03-12 RX ORDER — PROPOFOL 10 MG/ML
INJECTION, EMULSION INTRAVENOUS AS NEEDED
Status: DISCONTINUED | OUTPATIENT
Start: 2021-03-12 | End: 2021-03-12 | Stop reason: HOSPADM

## 2021-03-12 RX ORDER — PROPOFOL 10 MG/ML
INJECTION, EMULSION INTRAVENOUS
Status: COMPLETED
Start: 2021-03-12 | End: 2021-03-12

## 2021-03-12 RX ADMIN — ATORVASTATIN CALCIUM 10 MG: 10 TABLET, FILM COATED ORAL at 10:33

## 2021-03-12 RX ADMIN — CLOPIDOGREL BISULFATE 75 MG: 75 TABLET ORAL at 10:33

## 2021-03-12 RX ADMIN — PROPOFOL 30 MG: 10 INJECTION, EMULSION INTRAVENOUS at 12:43

## 2021-03-12 RX ADMIN — INSULIN LISPRO 4 UNITS: 100 INJECTION, SOLUTION INTRAVENOUS; SUBCUTANEOUS at 20:29

## 2021-03-12 RX ADMIN — OXYBUTYNIN CHLORIDE 5 MG: 5 TABLET ORAL at 10:33

## 2021-03-12 RX ADMIN — PREDNISONE 20 MG: 20 TABLET ORAL at 10:33

## 2021-03-12 RX ADMIN — HEPARIN SODIUM AND DEXTROSE 18 UNITS/KG/HR: 10000; 5 INJECTION INTRAVENOUS at 16:46

## 2021-03-12 RX ADMIN — SODIUM CHLORIDE: 9 INJECTION, SOLUTION INTRAVENOUS at 12:30

## 2021-03-12 RX ADMIN — GABAPENTIN 300 MG: 300 CAPSULE ORAL at 10:33

## 2021-03-12 RX ADMIN — PROPOFOL 50 MG: 10 INJECTION, EMULSION INTRAVENOUS at 12:42

## 2021-03-12 RX ADMIN — HEPARIN SODIUM 2000 UNITS: 1000 INJECTION, SOLUTION INTRAVENOUS; SUBCUTANEOUS at 06:31

## 2021-03-12 RX ADMIN — INSULIN LISPRO 2 UNITS: 100 INJECTION, SOLUTION INTRAVENOUS; SUBCUTANEOUS at 16:46

## 2021-03-12 RX ADMIN — OXYBUTYNIN CHLORIDE 5 MG: 5 TABLET ORAL at 20:25

## 2021-03-12 RX ADMIN — BUPROPION HYDROCHLORIDE 150 MG: 150 TABLET, EXTENDED RELEASE ORAL at 10:33

## 2021-03-12 RX ADMIN — OXYBUTYNIN CHLORIDE 5 MG: 5 TABLET ORAL at 16:45

## 2021-03-12 RX ADMIN — APIXABAN 5 MG: 5 TABLET, FILM COATED ORAL at 20:25

## 2021-03-12 RX ADMIN — GABAPENTIN 300 MG: 300 CAPSULE ORAL at 16:45

## 2021-03-12 RX ADMIN — AMIODARONE HYDROCHLORIDE 400 MG: 200 TABLET ORAL at 10:33

## 2021-03-12 RX ADMIN — HEPARIN SODIUM AND DEXTROSE 18 UNITS/KG/HR: 10000; 5 INJECTION INTRAVENOUS at 06:33

## 2021-03-12 RX ADMIN — INSULIN GLARGINE 10 UNITS: 100 INJECTION, SOLUTION SUBCUTANEOUS at 20:26

## 2021-03-12 RX ADMIN — CARVEDILOL 25 MG: 12.5 TABLET, FILM COATED ORAL at 16:45

## 2021-03-12 NOTE — PROGRESS NOTES
Orders received from Dr. Leanna Voss to stop heparin gtt at 2100 after giving first dose of eliquis.

## 2021-03-12 NOTE — PROCEDURES
Patient was successfully cardioverted from atrial flutter to sinus rhythm under IV propofol sedation provided by anesthesia to sinus rhythm. Tolerated the procedure well. Awake, verbal, moving all 4 limbs after sedation wore off.

## 2021-03-12 NOTE — ANESTHESIA PREPROCEDURE EVALUATION
Relevant Problems   RESPIRATORY SYSTEM   (+) COPD (chronic obstructive pulmonary disease) ([facility])   (+) COPD exacerbation (HCC)      CARDIOVASCULAR   (+) Atrial flutter ([facility])   (+) Atrial flutter with rapid ventricular response ([facility])   (+) CHF (congestive heart failure) ([facility])       Anesthetic History   No history of anesthetic complications            Review of Systems / Medical History  Patient summary reviewed, nursing notes reviewed and pertinent labs reviewed    Pulmonary    COPD    Sleep apnea: CPAP           Neuro/Psych       CVA (2019 with residual right side weakness)       Cardiovascular    Hypertension        Dysrhythmias : atrial flutter  Past MI, CAD, cardiac stents and hyperlipidemia         GI/Hepatic/Renal         Renal disease: CRI       Endo/Other    Diabetes: type 2    Obesity     Other Findings              Physical Exam    Airway  Mallampati: II  TM Distance: 4 - 6 cm  Neck ROM: normal range of motion   Mouth opening: Normal     Cardiovascular    Rhythm: regular  Rate: normal         Dental  No notable dental hx       Pulmonary  Breath sounds clear to auscultation               Abdominal  GI exam deferred       Other Findings   Comments: Results for Monica Hernandez (MRN [id number]) as of 3/12/2021 12:36    3/10/2021 15:05  Sodium: 139  Potassium: 4.2  Chloride: 99  CO2: 35 (H)  Anion gap: 5  Glucose: 223 (H)  BUN: 22 (H)  Creatinine: 1.58 (H)  BUN/Creatinine ratio: 14  Calcium: 9.0  Magnesium: 1.7  GFR est non-AA: 32 (L)  GFR est AA: 39 (L)    Results for Monica Hernandez (MRN [id number]) as of 3/12/2021 12:36    3/9/2021 16:30  WBC: 15.7 (H)  RBC: 4.79  HGB: 13.1  HCT: 43.7  MCV: 91.2  MCH: 27.3  MCHC: 30.0  RDW: 16.3 (H)  PLATELET: 512  MPV: 72.6  NEUTROPHILS: 94 (H)  LYMPHOCYTES: 5 (L)  MONOCYTES: 1 (L)  EOSINOPHILS: 0  BASOPHILS: 0  IMMATURE GRANULOCYTES: 0  DF: AUTOMATED  ABS. NEUTROPHILS: 14.6 (H)  ABS. IMM. GRANS.: 0.1 (H)  ABS. LYMPHOCYTES: 0.9  ABS. MONOCYTES: 0.2  ABS. EOSINOPHILS: 0.0  ABS. BASOPHILS: 0.0      Results for Thad Mendosa (MRN [id number]) as of 3/12/2021 12:36    3/9/2021 16:30  Troponin-I, Qt.: 0.09 (H)  NT pro-BNP: 1,693 (H)         Anesthetic Plan    ASA: 4  Anesthesia type: total IV anesthesia, MAC and general - backup          Induction: Intravenous  Anesthetic plan and risks discussed with: Patient

## 2021-03-12 NOTE — ROUTINE PROCESS
Patient taken from [facility] back to room 489 ,s/p cardioversion. Report given to BENOIT Elias RN. Patient alert ,oriented , vital signs  WNL. Telemetry box37 in progress.

## 2021-03-12 NOTE — PROGRESS NOTES
Chart reviewed. Patient was accepted to [facility] when patient is ready for discharge.

## 2021-03-12 NOTE — PROGRESS NOTES
Progress Note    Patient: Millie Purvis MRN: [id number]  SSN: [id number]    YOB: 1950  Age: 72 y.o. Sex: female      Admit Date: 3/9/2021    LOS: 3 days     Subjective:     Patient had cardioversion today. She denies any chest pain no shortness of breath no dizziness says \"I am fine\"  Objective:     Vitals:    03/12/21 1323 03/12/21 1325 03/12/21 1330 03/12/21 1519   BP: 100/64 116/64 122/62 123/73   Pulse:  70 73 77   Resp:  19 19 20   Temp:    97.7 °F (36.5 °C)   SpO2:  96% 96% 100%   Weight: 106.6 kg (235 lb)      Height: 5' 3\" (1.6 m)           Intake and Output:  Current Shift: 03/12 0701 - 03/12 1900  In: 250 [I.V.:250]  Out: 900 [Urine:900]  Last three shifts: 03/10 1901 - 03/12 0700  In: 1066.8 [P.O.:937; I.V.:129.8]  Out: 5850 [Urine:5850]    Physical Exam:   General:  Alert, cooperative, no distress, appears stated age. Eyes:  Conjunctivae/corneas clear. PERRL, EOMs intact. Fundi benign   Ears:  Normal TMs and external ear canals both ears. Nose: Nares normal. Septum midline. Mucosa normal. No drainage or sinus tenderness. Mouth/Throat: Lips, mucosa, and tongue normal. Teeth and gums normal.   Neck: Supple, symmetrical, trachea midline, no adenopathy, thyroid: no enlargment/tenderness/nodules, no carotid bruit and no JVD. Back:   Symmetric, no curvature. ROM normal. No CVA tenderness. Lungs:   Clear to auscultation bilaterally. Heart:  Regular rate and rhythm, S1, S2 normal, no murmur, click, rub or gallop. Abdomen:   Soft, non-tender. Bowel sounds normal. No masses,  No organomegaly. Extremities: Extremities normal, atraumatic, no cyanosis or edema. Pulses: 2+ and symmetric all extremities. Skin: Skin color, texture, turgor normal. No rashes or lesions   Lymph nodes: Cervical, supraclavicular, and axillary nodes normal.   Neurologic: CNII-XII intact. Normal strength, sensation and reflexes throughout. Lab/Data Review:   All lab results for the last 24 hours reviewed.      Recent Results (from the past 24 hour(s))   GLUCOSE, POC    Collection Time: 03/11/21  8:50 PM   Result Value Ref Range    Glucose (POC) 202 (H) 65 - 100 mg/dL    Performed by [employer] Bio    PTT    Collection Time: 03/12/21  4:15 AM   Result Value Ref Range    aPTT 69.5 (H) 23.0 - 35.7 sec    aPTT, therapeutic range   68 - 109 sec   GLUCOSE, POC    Collection Time: 03/12/21  7:56 AM   Result Value Ref Range    Glucose (POC) 171 (H) 65 - 100 mg/dL    Performed by Quinten Jane    EKG, 12 LEAD, SUBSEQUENT    Collection Time: 03/12/21  8:03 AM   Result Value Ref Range    Ventricular Rate 64 BPM    Atrial Rate 256 BPM    QRS Duration 114 ms    Q-T Interval 454 ms    QTC Calculation (Bezet) 468 ms    Calculated P Axis -89 degrees    Calculated R Axis 30 degrees    Calculated T Axis 113 degrees    Diagnosis       Atrial flutter with variable A-V block  Abnormal ECG  When compared with ECG of 11-MAR-2021 13:20,  No significant change was found  Confirmed by Chrissy Andrade MD Daily ([id number]) on 3/12/2021 8:40:57 AM     GLUCOSE, POC    Collection Time: 03/12/21 12:08 PM   Result Value Ref Range    Glucose (POC) 137 (H) 65 - 100 mg/dL    Performed by Quinten Jane    EKG, 12 LEAD, SUBSEQUENT    Collection Time: 03/12/21 12:51 PM   Result Value Ref Range    Ventricular Rate 70 BPM    Atrial Rate 70 BPM    P-R Interval 206 ms    QRS Duration 104 ms    Q-T Interval 452 ms    QTC Calculation (Bezet) 488 ms    Calculated P Axis 70 degrees    Calculated R Axis 31 degrees    Calculated T Axis 109 degrees    Diagnosis       Sinus rhythm with occasional Premature ventricular complexes  Biatrial enlargement  T wave abnormality, consider lateral ischemia  Prolonged QT  Abnormal ECG  When compared with ECG of 12-MAR-2021 08:03,  Sinus rhythm has replaced Atrial flutter  ST no longer elevated in Inferior leads  Confirmed by Chrissy Andrade MD Daily ([id number]) on 3/12/2021 1:41:35 PM     GLUCOSE, POC    Collection Time: 03/12/21  3:25 PM   Result Value Ref Range    Glucose (POC) 188 (H) 65 - 100 mg/dL    Performed by Jeremy Spring      Last echocardiogram showed EF of 50%    Assessment:     Active Problems:    Atrial flutter with rapid ventricular response ([address] (3/9/2021)    Post cardioversion    Plan:   Cussed with the patient and his son obtain BMP and mag in a.m.     Signed By: Justo Diamond MD     March 12, 2021

## 2021-03-13 LAB
ANION GAP SERPL CALC-SCNC: 5 MMOL/L (ref 5–15)
APTT PPP: 32.2 SEC (ref 23–35.7)
ATRIAL RATE: 67 BPM
BACTERIA SPEC CULT: ABNORMAL
BUN SERPL-MCNC: 66 MG/DL (ref 6–20)
BUN/CREAT SERPL: 30 (ref 12–20)
CA-I BLD-MCNC: 8.8 MG/DL (ref 8.5–10.1)
CALCULATED P AXIS, ECG09: 70 DEGREES
CALCULATED R AXIS, ECG10: 36 DEGREES
CALCULATED T AXIS, ECG11: 100 DEGREES
CHLORIDE SERPL-SCNC: 97 MMOL/L (ref 97–108)
CO2 SERPL-SCNC: 37 MMOL/L (ref 21–32)
COLONY COUNT,CNT: ABNORMAL
CREAT SERPL-MCNC: 2.2 MG/DL (ref 0.55–1.02)
DIAGNOSIS, 93000: NORMAL
GLUCOSE BLD STRIP.AUTO-MCNC: 147 MG/DL (ref 65–100)
GLUCOSE BLD STRIP.AUTO-MCNC: 178 MG/DL (ref 65–100)
GLUCOSE BLD STRIP.AUTO-MCNC: 253 MG/DL (ref 65–100)
GLUCOSE BLD STRIP.AUTO-MCNC: 253 MG/DL (ref 65–100)
GLUCOSE SERPL-MCNC: 153 MG/DL (ref 65–100)
MAGNESIUM SERPL-MCNC: 1.8 MG/DL (ref 1.6–2.4)
P-R INTERVAL, ECG05: 204 MS
PERFORMED BY, TECHID: ABNORMAL
POTASSIUM SERPL-SCNC: 4.1 MMOL/L (ref 3.5–5.1)
Q-T INTERVAL, ECG07: 452 MS
QRS DURATION, ECG06: 100 MS
QTC CALCULATION (BEZET), ECG08: 477 MS
SODIUM SERPL-SCNC: 139 MMOL/L (ref 136–145)
SPECIAL REQUESTS,SREQ: ABNORMAL
THERAPEUTIC RANGE,PTTT: NORMAL SEC (ref 68–109)
VENTRICULAR RATE, ECG03: 67 BPM

## 2021-03-13 PROCEDURE — 74011250637 HC RX REV CODE- 250/637: Performed by: INTERNAL MEDICINE

## 2021-03-13 PROCEDURE — 80048 BASIC METABOLIC PNL TOTAL CA: CPT

## 2021-03-13 PROCEDURE — 85730 THROMBOPLASTIN TIME PARTIAL: CPT

## 2021-03-13 PROCEDURE — 74011250636 HC RX REV CODE- 250/636: Performed by: INTERNAL MEDICINE

## 2021-03-13 PROCEDURE — 83735 ASSAY OF MAGNESIUM: CPT

## 2021-03-13 PROCEDURE — 94760 N-INVAS EAR/PLS OXIMETRY 1: CPT

## 2021-03-13 PROCEDURE — 65270000029 HC RM PRIVATE

## 2021-03-13 PROCEDURE — 74011636637 HC RX REV CODE- 636/637: Performed by: INTERNAL MEDICINE

## 2021-03-13 PROCEDURE — 77010033678 HC OXYGEN DAILY

## 2021-03-13 PROCEDURE — 82962 GLUCOSE BLOOD TEST: CPT

## 2021-03-13 PROCEDURE — 93005 ELECTROCARDIOGRAM TRACING: CPT

## 2021-03-13 PROCEDURE — 74011250636 HC RX REV CODE- 250/636: Performed by: NURSE PRACTITIONER

## 2021-03-13 PROCEDURE — 36415 COLL VENOUS BLD VENIPUNCTURE: CPT

## 2021-03-13 PROCEDURE — 74011000250 HC RX REV CODE- 250: Performed by: FAMILY MEDICINE

## 2021-03-13 PROCEDURE — 74011250636 HC RX REV CODE- 250/636: Performed by: FAMILY MEDICINE

## 2021-03-13 RX ORDER — SODIUM CHLORIDE 9 MG/ML
50 INJECTION, SOLUTION INTRAVENOUS CONTINUOUS
Status: DISCONTINUED | OUTPATIENT
Start: 2021-03-13 | End: 2021-03-14 | Stop reason: HOSPADM

## 2021-03-13 RX ADMIN — ACETAMINOPHEN 650 MG: 325 TABLET, FILM COATED ORAL at 06:21

## 2021-03-13 RX ADMIN — GABAPENTIN 300 MG: 300 CAPSULE ORAL at 17:10

## 2021-03-13 RX ADMIN — GABAPENTIN 300 MG: 300 CAPSULE ORAL at 09:46

## 2021-03-13 RX ADMIN — METOLAZONE 5 MG: 5 TABLET ORAL at 09:47

## 2021-03-13 RX ADMIN — ATORVASTATIN CALCIUM 10 MG: 10 TABLET, FILM COATED ORAL at 09:46

## 2021-03-13 RX ADMIN — GABAPENTIN 300 MG: 300 CAPSULE ORAL at 00:46

## 2021-03-13 RX ADMIN — OXYBUTYNIN CHLORIDE 5 MG: 5 TABLET ORAL at 17:11

## 2021-03-13 RX ADMIN — GABAPENTIN 300 MG: 300 CAPSULE ORAL at 21:13

## 2021-03-13 RX ADMIN — CARVEDILOL 25 MG: 12.5 TABLET, FILM COATED ORAL at 10:15

## 2021-03-13 RX ADMIN — INSULIN LISPRO 6 UNITS: 100 INJECTION, SOLUTION INTRAVENOUS; SUBCUTANEOUS at 12:55

## 2021-03-13 RX ADMIN — APIXABAN 5 MG: 5 TABLET, FILM COATED ORAL at 21:14

## 2021-03-13 RX ADMIN — OXYBUTYNIN CHLORIDE 5 MG: 5 TABLET ORAL at 09:46

## 2021-03-13 RX ADMIN — QUETIAPINE FUMARATE 25 MG: 25 TABLET ORAL at 21:14

## 2021-03-13 RX ADMIN — SODIUM CHLORIDE 50 ML/HR: 9 INJECTION, SOLUTION INTRAVENOUS at 11:15

## 2021-03-13 RX ADMIN — BUPROPION HYDROCHLORIDE 150 MG: 150 TABLET, EXTENDED RELEASE ORAL at 10:16

## 2021-03-13 RX ADMIN — PREDNISONE 20 MG: 20 TABLET ORAL at 10:15

## 2021-03-13 RX ADMIN — OXYBUTYNIN CHLORIDE 5 MG: 5 TABLET ORAL at 21:14

## 2021-03-13 RX ADMIN — LOSARTAN POTASSIUM 100 MG: 50 TABLET, FILM COATED ORAL at 09:46

## 2021-03-13 RX ADMIN — INSULIN GLARGINE 10 UNITS: 100 INJECTION, SOLUTION SUBCUTANEOUS at 21:13

## 2021-03-13 RX ADMIN — CEFTRIAXONE 1 G: 1 INJECTION, POWDER, FOR SOLUTION INTRAMUSCULAR; INTRAVENOUS at 14:21

## 2021-03-13 RX ADMIN — INSULIN LISPRO 6 UNITS: 100 INJECTION, SOLUTION INTRAVENOUS; SUBCUTANEOUS at 17:10

## 2021-03-13 RX ADMIN — INSULIN LISPRO 2 UNITS: 100 INJECTION, SOLUTION INTRAVENOUS; SUBCUTANEOUS at 21:13

## 2021-03-13 RX ADMIN — FUROSEMIDE 60 MG: 10 INJECTION, SOLUTION INTRAMUSCULAR; INTRAVENOUS at 00:46

## 2021-03-13 RX ADMIN — FUROSEMIDE 20 MG: 10 INJECTION, SOLUTION INTRAMUSCULAR; INTRAVENOUS at 09:46

## 2021-03-13 RX ADMIN — AMIODARONE HYDROCHLORIDE 400 MG: 200 TABLET ORAL at 09:46

## 2021-03-13 RX ADMIN — APIXABAN 5 MG: 5 TABLET, FILM COATED ORAL at 09:46

## 2021-03-13 NOTE — PROGRESS NOTES
General Daily Progress Note          Patient Name:   Christine Maynard       YOB: 1950       Age:  72 y.o.       Admit Date: 3/9/2021      Subjective:         Patient resting in the bed not in distress is alert awake denies any fever no chills        Objective:     Visit Vitals  /70 (BP 1 Location: Right upper arm, BP Patient Position: At rest;Supine)   Pulse 66   Temp 97.7 °F (36.5 °C)   Resp 20   Ht 5' 3\" (1.6 m)   Wt 114.3 kg (251 lb 15.8 oz)   SpO2 94%   Breastfeeding No   BMI 44.64 kg/m²        Recent Results (from the past 24 hour(s))   GLUCOSE, POC    Collection Time: 03/12/21  3:25 PM   Result Value Ref Range    Glucose (POC) 188 (H) 65 - 100 mg/dL    Performed by Alicja Lara    PTT    Collection Time: 03/12/21  4:47 PM   Result Value Ref Range    aPTT 120.1 (HH) 23.0 - 35.7 sec    aPTT, therapeutic range   68 - 109 sec   GLUCOSE, POC    Collection Time: 03/12/21  8:11 PM   Result Value Ref Range    Glucose (POC) 225 (H) 65 - 100 mg/dL    Performed by KOLBY BUTLER    MAGNESIUM    Collection Time: 03/13/21  5:35 AM   Result Value Ref Range    Magnesium 1.8 1.6 - 2.4 mg/dL   METABOLIC PANEL, BASIC    Collection Time: 03/13/21  5:35 AM   Result Value Ref Range    Sodium 139 136 - 145 mmol/L    Potassium 4.1 3.5 - 5.1 mmol/L    Chloride 97 97 - 108 mmol/L    CO2 37 (H) 21 - 32 mmol/L    Anion gap 5 5 - 15 mmol/L    Glucose 153 (H) 65 - 100 mg/dL    BUN 66 (H) 6 - 20 mg/dL    Creatinine 2.20 (H) 0.55 - 1.02 mg/dL    BUN/Creatinine ratio 30 (H) 12 - 20      GFR est AA 27 (L) >60 ml/min/1.73m2    GFR est non-AA 22 (L) >60 ml/min/1.73m2    Calcium 8.8 8.5 - 10.1 mg/dL   GLUCOSE, POC    Collection Time: 03/13/21  7:24 AM   Result Value Ref Range    Glucose (POC) 147 (H) 65 - 100 mg/dL    Performed by Asia WILSON    EKG, 12 LEAD, SUBSEQUENT    Collection Time: 03/13/21  8:32 AM   Result Value Ref Range    Ventricular Rate 67 BPM    Atrial Rate 67 BPM    P-R Interval 204 ms    QRS Duration 100 ms    Q-T Interval 452 ms    QTC Calculation (Bezet) 477 ms    Calculated P Axis 70 degrees    Calculated R Axis 36 degrees    Calculated T Axis 100 degrees    Diagnosis       Normal sinus rhythm  Biatrial enlargement  Nonspecific T wave abnormality  Prolonged QT  Abnormal ECG  When compared with ECG of 12-MAR-2021 12:51,  Premature ventricular complexes are no longer Present  Confirmed by Ej Small MD, Raffi Walters ([id number]) on 3/13/2021 12:36:05 PM     GLUCOSE, POC    Collection Time: 03/13/21 12:22 PM   Result Value Ref Range    Glucose (POC) 253 (H) 65 - 100 mg/dL    Performed by Shawanda Marsh      [unfilled]      Review of Systems    Constitutional: Negative for chills and fever. HENT: Negative. Eyes: Negative. Respiratory: Negative. Cardiovascular: Negative. Gastrointestinal: Negative for abdominal pain and nausea. Skin: Negative. Neurological: Negative. Physical Exam:      Constitutional: pt is oriented to person, place, and time. HENT:   Head: Normocephalic and atraumatic. Eyes: Pupils are equal, round, and reactive to light. EOM are normal.   Cardiovascular: Normal rate, regular rhythm and normal heart sounds. Pulmonary/Chest: Breath sounds normal. No wheezes. No rales. Exhibits no tenderness. Abdominal: Soft. Bowel sounds are normal. There is no abdominal tenderness. There is no rebound and no guarding. Musculoskeletal: Normal range of motion. Neurological: pt is alert and oriented to person, place, and time. XR CHEST PORT   Final Result   There is an enlargement of the cardiac silhouette. There also is vascular   distention and a mild indistinctness of the pulmonary vessels suspect for mild   interstitial edema.            Recent Results (from the past 24 hour(s))   GLUCOSE, POC    Collection Time: 03/12/21  3:25 PM   Result Value Ref Range    Glucose (POC) 188 (H) 65 - 100 mg/dL    Performed by Sharif Lopez    PTT    Collection Time: 03/12/21  4:47 PM   Result Value Ref Range    aPTT 120.1 (HH) 23.0 - 35.7 sec    aPTT, therapeutic range   68 - 109 sec   GLUCOSE, POC    Collection Time: 03/12/21  8:11 PM   Result Value Ref Range    Glucose (POC) 225 (H) 65 - 100 mg/dL    Performed by KOLBY BUTLER    MAGNESIUM    Collection Time: 03/13/21  5:35 AM   Result Value Ref Range    Magnesium 1.8 1.6 - 2.4 mg/dL   METABOLIC PANEL, BASIC    Collection Time: 03/13/21  5:35 AM   Result Value Ref Range    Sodium 139 136 - 145 mmol/L    Potassium 4.1 3.5 - 5.1 mmol/L    Chloride 97 97 - 108 mmol/L    CO2 37 (H) 21 - 32 mmol/L    Anion gap 5 5 - 15 mmol/L    Glucose 153 (H) 65 - 100 mg/dL    BUN 66 (H) 6 - 20 mg/dL    Creatinine 2.20 (H) 0.55 - 1.02 mg/dL    BUN/Creatinine ratio 30 (H) 12 - 20      GFR est AA 27 (L) >60 ml/min/1.73m2    GFR est non-AA 22 (L) >60 ml/min/1.73m2    Calcium 8.8 8.5 - 10.1 mg/dL   GLUCOSE, POC    Collection Time: 03/13/21  7:24 AM   Result Value Ref Range    Glucose (POC) 147 (H) 65 - 100 mg/dL    Performed by Dannie WILSON    EKG, 12 LEAD, SUBSEQUENT    Collection Time: 03/13/21  8:32 AM   Result Value Ref Range    Ventricular Rate 67 BPM    Atrial Rate 67 BPM    P-R Interval 204 ms    QRS Duration 100 ms    Q-T Interval 452 ms    QTC Calculation (Bezet) 477 ms    Calculated P Axis 70 degrees    Calculated R Axis 36 degrees    Calculated T Axis 100 degrees    Diagnosis       Normal sinus rhythm  Biatrial enlargement  Nonspecific T wave abnormality  Prolonged QT  Abnormal ECG  When compared with ECG of 12-MAR-2021 12:51,  Premature ventricular complexes are no longer Present  Confirmed by [facility] Kim DOWNEY ([id number]) on 3/13/2021 12:36:05 PM     GLUCOSE, POC    Collection Time: 03/13/21 12:22 PM   Result Value Ref Range    Glucose (POC) 253 (H) 65 - 100 mg/dL    Performed by Chloé Weiner        Results     Procedure Component Value Units Date/Time    MRSA SCREEN - PCR (NASAL) [[id number]] Collected: 03/10/21 0715    Order Status: Completed Specimen: Swab Updated: 03/10/21 0852     MRSA by PCR, Nasal Not Detected       COVID-19 RAPID TEST [[id number]] Collected: 03/10/21 0104    Order Status: Completed Specimen: Nasopharyngeal Updated: 03/10/21 0145     Specimen source Nasopharyngeal        COVID-19 rapid test Not Detected        Comment: Rapid Abbott ID Now   Rapid NAAT:  The specimen is NEGATIVE for SARS-CoV-2, the novel coronavirus associated with COVID-19. Negative results should be treated as presumptive and, if inconsistent with clinical signs and symptoms or necessary for patient management, should be tested with an alternative molecular assay. Negative results do not preclude SARS-CoV-2 infection and should not be used as the sole basis for patient management decisions. This test has been authorized by the FDA under   an Emergency Use Authorization (EUA) for use by authorized laboratories.  Fact sheet for Healthcare Providers: ConventionUpdate.co.nz Fact sheet for Patients: ConventionUpdate.co.nz   Methodology: Isothermal Nucleic Acid Amplification         CULTURE, URINE [[id number]]  (Abnormal)  (Susceptibility) Collected: 03/10/21 0006    Order Status: Completed Specimen: Urine Updated: 03/13/21 1158     Special Requests: --        No Special Requests  Reflexed from [id number]       [place] Count --        >100,000  colonies/ml       Culture result: Klebsiella pneumoniae       Susceptibility      Klebsiella pneumoniae     SHAWNA     Amikacin ($) Susceptible     Ampicillin ($) Resistant     Ampicillin/sulbactam ($) Susceptible     Cefazolin ($) Susceptible     Cefepime ($$) Susceptible     Cefoxitin Susceptible     Ceftazidime ($) Susceptible     Ceftriaxone ($) Susceptible     Ciprofloxacin ($) Susceptible     Gentamicin ($) Susceptible     Levofloxacin ($) Susceptible     Meropenem ($$) Susceptible     Nitrofurantoin Susceptible     Piperacillin/Tazobac ($) Susceptible     Tobramycin ($) Susceptible     Trimeth/Sulfa Susceptible Labs:   No results for input(s): WBC, HGB, HCT, PLT, HGBEXT, HCTEXT, PLTEXT in the last 72 hours. Recent Labs     03/13/21  0535 03/10/21  1505    139   K 4.1 4.2   CL 97 99   CO2 37* 35*   BUN 66* 22*   CREA 2.20* 1.58*   * 223*   CA 8.8 9.0   MG 1.8 1.7     No results for input(s): ALT, AP, TBIL, TBILI, TP, ALB, GLOB, GGT, AML, LPSE in the last 72 hours. No lab exists for component: SGOT, GPT, AMYP, HLPSE  Recent Labs     03/12/21  1647 03/12/21  0415 03/11/21  0235   APTT 120.1* 69.5* 106.2*      No results for input(s): FE, TIBC, PSAT, FERR in the last 72 hours. No results found for: FOL, RBCF   No results for input(s): PH, PCO2, PO2 in the last 72 hours. No results for input(s): CPK, CKNDX, TROIQ in the last 72 hours.     No lab exists for component: CPKMB  No results found for: CHOL, CHOLX, CHLST, CHOLV, HDL, HDLP, LDL, LDLC, DLDLP, TGLX, TRIGL, TRIGP, CHHD, CHHDX  Lab Results   Component Value Date/Time    Glucose (POC) 253 (H) 03/13/2021 12:22 PM    Glucose (POC) 147 (H) 03/13/2021 07:24 AM    Glucose (POC) 225 (H) 03/12/2021 08:11 PM    Glucose (POC) 188 (H) 03/12/2021 03:25 PM    Glucose (POC) 137 (H) 03/12/2021 12:08 PM     Lab Results   Component Value Date/Time    Color Yellow/Straw 03/10/2021 12:06 AM    Appearance Clear 03/10/2021 12:06 AM    Specific gravity 1.010 03/10/2021 12:06 AM    pH (UA) 6.0 03/10/2021 12:06 AM    Protein Negative 03/10/2021 12:06 AM    Glucose Negative 03/10/2021 12:06 AM    Ketone Negative 03/10/2021 12:06 AM    Bilirubin Negative 03/10/2021 12:06 AM    Urobilinogen 0.1 03/10/2021 12:06 AM    Nitrites Negative 03/10/2021 12:06 AM    Leukocyte Esterase Negative 03/10/2021 12:06 AM    Bacteria Negative 03/10/2021 12:06 AM    WBC 0-5 03/10/2021 12:06 AM    RBC 0-5 03/10/2021 12:06 AM         Assessment:     Acute hypoxemic respiratory failure on 3 L  A flutter status post cardioversion back to normal sinus rhythm on Eliquis  Hypertension  CAD static post CABG  UTI with Klebsiella pneumonia  Depression anxiety  Type 2 diabetes      Plan:      On amiodarone 400 mg daily  Eliquis 5 mg twice a day  Lipitor 10 mg daily  Wellbutrin  mg daily  Coreg 25 mg twice a day  Neurontin 300 mg 3 times a day  Lantus 10 units subcu every bedtime  Losartan 100 mg daily  Zaroxolyn 5 mg daily  Oxybutynin 5 mg 3 times a day  Prednisone 20 mg daily  Seroquel 25 mg at bedtime    Start on Rocephin 1 g every 24 hours        Current Facility-Administered Medications:     0.9% sodium chloride infusion, 50 mL/hr, IntraVENous, CONTINUOUS, Minna Miranda NP, Last Rate: 50 mL/hr at 03/13/21 1115, 50 mL/hr at 03/13/21 1115    apixaban (ELIQUIS) tablet 5 mg, 5 mg, Oral, BID, Ruben Mar MD, 5 mg at 03/13/21 0946    carvediloL (COREG) tablet 25 mg, 25 mg, Oral, BID WITH MEALS, Jose Fox MD, 25 mg at 03/13/21 1015    amiodarone (CORDARONE) tablet 400 mg, 400 mg, Oral, DAILY, Jose Fox MD, 400 mg at 03/13/21 0946    acetaminophen (TYLENOL) tablet 650 mg, 650 mg, Oral, Q6H PRN, Kirti MOROCHO MD, 650 mg at 03/13/21 0621    albuterol (PROVENTIL HFA, VENTOLIN HFA, PROAIR HFA) inhaler 2 Puff, 2 Puff, Inhalation, BID PRN, Kirti MOROCHO MD    buPROPion XL (WELLBUTRIN XL) tablet 150 mg, 150 mg, Oral, 7am, Carlos Solano MD, 150 mg at 03/13/21 1016    gabapentin (NEURONTIN) capsule 300 mg, 300 mg, Oral, TID, Kirti MOROCHO MD, 300 mg at 03/13/21 0946    metOLazone (ZAROXOLYN) tablet 5 mg, 5 mg, Oral, DAILY, Carlos Solano MD, 5 mg at 03/13/21 0947    oxybutynin (DITROPAN) tablet 5 mg, 5 mg, Oral, TID, Carlos Toribio MD, 5 mg at 03/13/21 0946    predniSONE (DELTASONE) tablet 20 mg, 20 mg, Oral, DAILY WITH BREAKFAST, Carlos Solano MD, 20 mg at 03/13/21 1015    atorvastatin (LIPITOR) tablet 10 mg, 10 mg, Oral, DAILY, Carlos Solano MD, 10 mg at 03/13/21 0946    insulin lispro (HUMALOG) injection, , SubCUTAneous, AC&HS, Rocio Mccray MD, 6 Units at 03/13/21 1255    glucose chewable tablet 16 g, 4 Tab, Oral, PRN, Aayush MOROCHO MD    glucagon (GLUCAGEN) injection 1 mg, 1 mg, IntraMUSCular, PRN, Carlos Burnham MD    dextrose (D50W) injection syrg 12.5-25 g, 25-50 mL, IntraVENous, PRN, Carlos Solano MD    insulin glargine (LANTUS) injection 10 Units, 10 Units, SubCUTAneous, QHS, Aayush MOROCHO MD, 10 Units at 03/12/21 2026    QUEtiapine (SEROquel) tablet 25 mg, 25 mg, Oral, QHS, Carlos Solano MD, 25 mg at 03/11/21 2120    losartan (COZAAR) tablet 100 mg, 100 mg, Oral, DAILY, Carlos Solano MD, 100 mg at 03/13/21 0946    heparin 25,000 units in D5W 250 ml infusion, 12-25 Units/kg/hr (Adjusted), IntraVENous, TITRATE, Shahram Mcnamara MD, Stopped at 03/12/21 2036    heparin (porcine) 1,000 unit/mL injection 4,000 Units, 4,000 Units, IntraVENous, PRN **OR** heparin (porcine) 1,000 unit/mL injection 2,000 Units, 2,000 Units, IntraVENous, PRN, Shahram Mcnamara MD, 2,000 Units at 03/12/21 3216

## 2021-03-13 NOTE — PROGRESS NOTES
Progress Note      3/13/2021 3:57 PM  NAME: Maritza Yost   MRN:  [id number]   Admit Diagnosis: Atrial flutter with rapid ventricular response ([facility] [address] [I48.92]          Assessment/Plan:   Atrial flutter, status post cardioversion, back in sinus rhythm, anticoagulated with apixaban    Rising creatinine, will decrease diuretics, patient is being hydrated    Hypertension, well controlled    Coronary artery disease, status post CABG, recent catheterization with patent stent in LAD and without progression of coronary artery disease. []       High complexity decision making was performed in this patient at high risk for decompensation with multiple organ involvement. Subjective:     Maritza Yost denies chest pain, dyspnea. Has tremors of her hands    Review of Systems:    Symptom Y/N Comments  Symptom Y/N Comments   Fever/Chills N   Chest Pain N    Poor Appetite N   Edema Y    Cough N   Abdominal Pain N    Sputum N   Joint Pain N    SOB/BUTCHER Y   Pruritis/Rash N    Nausea/vomit N   Tolerating PT/OT Y    Diarrhea N   Tolerating Diet Y    Constipation N   Other       Could NOT obtain due to:      Objective:      Physical Exam:    Last 24hrs VS reviewed since prior progress note. Most recent are:    Visit Vitals  /63 (BP 1 Location: Right arm, BP Patient Position: At rest)   Pulse 66   Temp 97.9 °F (36.6 °C)   Resp 18   Ht 5' 3\" (1.6 m)   Wt 114.3 kg (251 lb 15.8 oz)   SpO2 94%   Breastfeeding No   BMI 44.64 kg/m²       Intake/Output Summary (Last 24 hours) at 3/13/2021 1107  Last data filed at 3/13/2021 0452  Gross per 24 hour   Intake 250 ml   Output 1700 ml   Net -1450 ml        General Appearance: Well developed, well nourished, alert; no acute distress. Ears/Nose/Mouth/Throat: Hearing grossly normal; moist mucous membranes  Neck: Supple. Chest: Lungs clear to auscultation bilaterally. Cardiovascular: Regular rhythm, S1S2 normal, no murmur.   Abdomen: Soft, non-tender, bowel sounds are active. Extremities: -/+ edema bilaterally. Skin: Warm and dry. []         Post-cath site without hematoma, bruit, tenderness, or thrill. Distal pulses intact. PMH/SH reviewed - no change compared to H&P    Data Review    Telemetry:     EKG:   []  No new EKG for review  EKG with sinus rhythm    Lab Data Personally Reviewed:    No results for input(s): WBC, HGB, HCT, PLT, HGBEXT, HCTEXT, PLTEXT, HGBEXT, HCTEXT, PLTEXT in the last 72 hours. Recent Labs     03/12/21  1647 03/12/21  0415 03/11/21  0235   APTT 120.1* 69.5* 106.2*      Recent Labs     03/13/21  0535 03/10/21  1505    139   K 4.1 4.2   CL 97 99   CO2 37* 35*   BUN 66* 22*   CREA 2.20* 1.58*   * 223*   CA 8.8 9.0   MG 1.8 1.7     No results for input(s): CPK, CKNDX, TROIQ in the last 72 hours. No lab exists for component: CPKMB  No results found for: CHOL, CHOLX, CHLST, CHOLV, HDL, HDLP, LDL, LDLC, DLDLP, TGLX, TRIGL, TRIGP, CHHD, CHHDX    No results for input(s): AP, TBIL, TP, ALB, GLOB, GGT, AML, LPSE in the last 72 hours. No lab exists for component: SGOT, GPT, AMYP, HLPSE  No results for input(s): PH, PCO2, PO2 in the last 72 hours.     Medications Personally Reviewed:    Current Facility-Administered Medications   Medication Dose Route Frequency    0.9% sodium chloride infusion  50 mL/hr IntraVENous CONTINUOUS    apixaban (ELIQUIS) tablet 5 mg  5 mg Oral BID    carvediloL (COREG) tablet 25 mg  25 mg Oral BID WITH MEALS    amiodarone (CORDARONE) tablet 400 mg  400 mg Oral DAILY    acetaminophen (TYLENOL) tablet 650 mg  650 mg Oral Q6H PRN    albuterol (PROVENTIL HFA, VENTOLIN HFA, PROAIR HFA) inhaler 2 Puff  2 Puff Inhalation BID PRN    buPROPion XL (WELLBUTRIN XL) tablet 150 mg  150 mg Oral 7am    gabapentin (NEURONTIN) capsule 300 mg  300 mg Oral TID    metOLazone (ZAROXOLYN) tablet 5 mg  5 mg Oral DAILY    oxybutynin (DITROPAN) tablet 5 mg  5 mg Oral TID    predniSONE (DELTASONE) tablet 20 mg  20 mg Oral DAILY WITH BREAKFAST    atorvastatin (LIPITOR) tablet 10 mg  10 mg Oral DAILY    furosemide (LASIX) injection 60 mg  60 mg IntraVENous BID    insulin lispro (HUMALOG) injection   SubCUTAneous AC&HS    glucose chewable tablet 16 g  4 Tab Oral PRN    glucagon (GLUCAGEN) injection 1 mg  1 mg IntraMUSCular PRN    dextrose (D50W) injection syrg 12.5-25 g  25-50 mL IntraVENous PRN    insulin glargine (LANTUS) injection 10 Units  10 Units SubCUTAneous QHS    QUEtiapine (SEROquel) tablet 25 mg  25 mg Oral QHS    losartan (COZAAR) tablet 100 mg  100 mg Oral DAILY    heparin 25,000 units in D5W 250 ml infusion  12-25 Units/kg/hr (Adjusted) IntraVENous TITRATE    heparin (porcine) 1,000 unit/mL injection 4,000 Units  4,000 Units IntraVENous PRN    Or    heparin (porcine) 1,000 unit/mL injection 2,000 Units  2,000 Units IntraVENous PRN         Fernanda De Anda MD

## 2021-03-13 NOTE — PROGRESS NOTES
Physician Progress Note      PATIENT:               Terri Kaplan  CSN #:                  [id number]  :                       1950  ADMIT DATE:       3/9/2021 4:24 PM  [address] [place] DATE:  RESPONDING  PROVIDER #:        Mario Noel MD          QUERY TEXT:    Patient admitted with atrial flutter  noted to have abnormal renal function labs . If possible, please document in progress notes and discharge summary if you are evaluating and/or treating any of the following: The medical record reflects the following:  Risk Factors: HTN/CHF  Clinical Indicators:  3/9/2021 16:30  ?? ?CKD  BUN: 17  Creatinine: 1.72 (H)  GFR est AA: 36 (L)    3/10/2021 15:05  BUN: 22 (H)  Creatinine: 1.58 (H)  GFR est AA: 39 (L)    Treatment: lab monitoring    Thank you,  Anurag Tam RN, CDI, CCDS  Certified Clinical   [phone]  Options provided:  -- CKD stage 3  -- renal labs  not clinically significant  -- Other - I will add my own diagnosis  -- Disagree - Not applicable / Not valid  -- Disagree - Clinically unable to determine / Unknown  -- Refer to Clinical Documentation Reviewer    PROVIDER RESPONSE TEXT:    renal labs are not clinically significant. Query created by: Kayleen Alexander on 3/12/2021 11:52 AM      QUERY TEXT:    Patient admitted with BMI 41. If possible, please document in progress notes and discharge summary if you are evaluating and /or treating any of the following:     The medical record reflects the following:  Risk Factors: elevated BMI  Clinical Indicators:  BMI: 41.63 kg/m 2  Ht: 5' 3\" (1.6 m)  Wt: 106.6 kg (235 lb 0.2 oz)      Treatment: cardiac diet    Thank you,  Anurag Tam RN, CDI, CCDS  Certified Clinical   [phone]  Options provided:  -- Obesity with BMI: 41.63 kg/m 2  -- Morbid obesity  with BMI: 41.63 kg/m 2  -- Overweight with BMI: 41.63 kg/m 2  -- BMI not clinically significant  -- Other - I will add my own diagnosis  -- Disagree - Not applicable / Not valid  -- Disagree - Clinically unable to determine / Unknown  -- Refer to Clinical Documentation Reviewer    PROVIDER RESPONSE TEXT:    This patient has obesity with BMI: 41.63 kg/m 2 . Query created by: Homar Stephens on 3/12/2021 11:57 AM      QUERY TEXT:    Patient admitted with A flutter   Pt noted to have documented  diabetes  with elevated blood sugars . Please document in progress notes and discharge summary further specificity regarding the control status of DM: The medical record reflects the following:  Risk Factors: DM  Clinical Indicators:  3/9/2021 16:30  Glucose: 191 (H)    3/10/2021 08:20  GLUCOSE,FAST - POC: 160 (H)    3/10/2021 11:21  GLUCOSE,FAST - POC: 186 (H)    3/10/2021 15:22  GLUCOSE,FAST - POC: 234 (H)        Treatment: POC glucose monitoring, lantus insulin, lispro sliding scale insulin      Thank you,  Praneeth Hunter RN, CDI, CCDS  Certified Clinical   [phone]  Options provided:  -- Hyperglycemia due to DM type II  -- Hyperglycemia not related to DM type II  -- Other - I will add my own diagnosis  -- Disagree - Not applicable / Not valid  -- Disagree - Clinically unable to determine / Unknown  -- Refer to Clinical Documentation Reviewer    PROVIDER RESPONSE TEXT:    This patient has Hyperglycemia due to DM type II.     Query created by: Homar Stephens on 3/12/2021 12:00 PM      Electronically signed by:  Randy Xiao MD 3/13/2021 6:01 PM

## 2021-03-13 NOTE — ROUTINE PROCESS
Bedside and Verbal shift change report given to Kartik Mandujano RN (oncoming nurse) by Carlos Moore RN (offgoing nurse). Report included the following information SBAR, MAR, Recent Results and Cardiac Rhythm NSR, 1st degree AVB.

## 2021-03-14 VITALS
DIASTOLIC BLOOD PRESSURE: 76 MMHG | TEMPERATURE: 98.2 F | HEART RATE: 71 BPM | HEIGHT: 63 IN | WEIGHT: 236.33 LBS | BODY MASS INDEX: 41.88 KG/M2 | RESPIRATION RATE: 18 BRPM | OXYGEN SATURATION: 94 % | SYSTOLIC BLOOD PRESSURE: 151 MMHG

## 2021-03-14 LAB
GLUCOSE BLD STRIP.AUTO-MCNC: 118 MG/DL (ref 65–100)
GLUCOSE BLD STRIP.AUTO-MCNC: 237 MG/DL (ref 65–100)
GLUCOSE BLD STRIP.AUTO-MCNC: 298 MG/DL (ref 65–100)
PERFORMED BY, TECHID: ABNORMAL

## 2021-03-14 PROCEDURE — 74011250636 HC RX REV CODE- 250/636: Performed by: INTERNAL MEDICINE

## 2021-03-14 PROCEDURE — 74011250637 HC RX REV CODE- 250/637: Performed by: INTERNAL MEDICINE

## 2021-03-14 PROCEDURE — 94760 N-INVAS EAR/PLS OXIMETRY 1: CPT

## 2021-03-14 PROCEDURE — 82962 GLUCOSE BLOOD TEST: CPT

## 2021-03-14 PROCEDURE — 74011636637 HC RX REV CODE- 636/637: Performed by: INTERNAL MEDICINE

## 2021-03-14 PROCEDURE — 77010033678 HC OXYGEN DAILY

## 2021-03-14 PROCEDURE — 74011250636 HC RX REV CODE- 250/636: Performed by: FAMILY MEDICINE

## 2021-03-14 PROCEDURE — 74011250636 HC RX REV CODE- 250/636: Performed by: NURSE PRACTITIONER

## 2021-03-14 PROCEDURE — 74011000250 HC RX REV CODE- 250: Performed by: FAMILY MEDICINE

## 2021-03-14 RX ORDER — GABAPENTIN 300 MG/1
300 CAPSULE ORAL 3 TIMES DAILY
Qty: 30 CAP | Refills: 0 | Status: SHIPPED | OUTPATIENT
Start: 2021-03-14 | End: 2021-06-21

## 2021-03-14 RX ORDER — CARVEDILOL 25 MG/1
25 TABLET ORAL 2 TIMES DAILY WITH MEALS
Qty: 60 TAB | Refills: 0 | Status: SHIPPED | OUTPATIENT
Start: 2021-03-14 | End: 2021-06-29

## 2021-03-14 RX ORDER — OXYBUTYNIN CHLORIDE 5 MG/1
5 TABLET ORAL 3 TIMES DAILY
Qty: 90 TAB | Refills: 0 | Status: SHIPPED | OUTPATIENT
Start: 2021-03-14

## 2021-03-14 RX ORDER — BUPROPION HYDROCHLORIDE 150 MG/1
150 TABLET ORAL
Qty: 30 TAB | Refills: 0 | Status: SHIPPED | OUTPATIENT
Start: 2021-03-15 | End: 2021-06-21

## 2021-03-14 RX ORDER — FUROSEMIDE 40 MG/1
20 TABLET ORAL
Qty: 30 TAB | Refills: 0 | Status: SHIPPED | OUTPATIENT
Start: 2021-03-14 | End: 2021-06-21

## 2021-03-14 RX ORDER — LANOLIN ALCOHOL/MO/W.PET/CERES
3 CREAM (GRAM) TOPICAL
Qty: 30 TAB | Refills: 0 | Status: SHIPPED | OUTPATIENT
Start: 2021-03-14 | End: 2021-06-21

## 2021-03-14 RX ORDER — LOSARTAN POTASSIUM 100 MG/1
100 TABLET ORAL DAILY
Qty: 30 TAB | Refills: 0 | Status: SHIPPED | OUTPATIENT
Start: 2021-03-15 | End: 2021-11-19 | Stop reason: DRUGHIGH

## 2021-03-14 RX ORDER — INSULIN GLARGINE 100 [IU]/ML
10 INJECTION, SOLUTION SUBCUTANEOUS
Qty: 1 VIAL | Refills: 0 | Status: SHIPPED | OUTPATIENT
Start: 2021-03-14

## 2021-03-14 RX ORDER — LANOLIN ALCOHOL/MO/W.PET/CERES
3 CREAM (GRAM) TOPICAL
Status: DISCONTINUED | OUTPATIENT
Start: 2021-03-14 | End: 2021-03-14 | Stop reason: HOSPADM

## 2021-03-14 RX ORDER — LORAZEPAM 2 MG/ML
1 INJECTION INTRAMUSCULAR
Status: DISCONTINUED | OUTPATIENT
Start: 2021-03-14 | End: 2021-03-14 | Stop reason: HOSPADM

## 2021-03-14 RX ORDER — PEN NEEDLE, DIABETIC 29 G X1/2"
10 NEEDLE, DISPOSABLE MISCELLANEOUS
Qty: 30 PEN NEEDLE | Refills: 0 | Status: SHIPPED | OUTPATIENT
Start: 2021-03-14 | End: 2021-06-21

## 2021-03-14 RX ORDER — AMIODARONE HYDROCHLORIDE 400 MG/1
400 TABLET ORAL DAILY
Qty: 14 TAB | Refills: 0 | Status: SHIPPED | OUTPATIENT
Start: 2021-03-15 | End: 2021-06-29

## 2021-03-14 RX ADMIN — AMIODARONE HYDROCHLORIDE 400 MG: 200 TABLET ORAL at 08:25

## 2021-03-14 RX ADMIN — GABAPENTIN 300 MG: 300 CAPSULE ORAL at 17:18

## 2021-03-14 RX ADMIN — LOSARTAN POTASSIUM 100 MG: 50 TABLET, FILM COATED ORAL at 08:25

## 2021-03-14 RX ADMIN — CARVEDILOL 25 MG: 12.5 TABLET, FILM COATED ORAL at 17:19

## 2021-03-14 RX ADMIN — METOLAZONE 5 MG: 5 TABLET ORAL at 11:27

## 2021-03-14 RX ADMIN — SODIUM CHLORIDE 50 ML/HR: 9 INJECTION, SOLUTION INTRAVENOUS at 08:27

## 2021-03-14 RX ADMIN — GABAPENTIN 300 MG: 300 CAPSULE ORAL at 08:25

## 2021-03-14 RX ADMIN — INSULIN LISPRO 4 UNITS: 100 INJECTION, SOLUTION INTRAVENOUS; SUBCUTANEOUS at 16:30

## 2021-03-14 RX ADMIN — CARVEDILOL 25 MG: 12.5 TABLET, FILM COATED ORAL at 08:25

## 2021-03-14 RX ADMIN — PREDNISONE 20 MG: 20 TABLET ORAL at 08:25

## 2021-03-14 RX ADMIN — CEFTRIAXONE 1 G: 1 INJECTION, POWDER, FOR SOLUTION INTRAMUSCULAR; INTRAVENOUS at 13:45

## 2021-03-14 RX ADMIN — OXYBUTYNIN CHLORIDE 5 MG: 5 TABLET ORAL at 17:19

## 2021-03-14 RX ADMIN — ATORVASTATIN CALCIUM 10 MG: 10 TABLET, FILM COATED ORAL at 08:25

## 2021-03-14 RX ADMIN — OXYBUTYNIN CHLORIDE 5 MG: 5 TABLET ORAL at 11:27

## 2021-03-14 RX ADMIN — BUPROPION HYDROCHLORIDE 150 MG: 150 TABLET, EXTENDED RELEASE ORAL at 11:17

## 2021-03-14 RX ADMIN — INSULIN LISPRO 6 UNITS: 100 INJECTION, SOLUTION INTRAVENOUS; SUBCUTANEOUS at 13:45

## 2021-03-14 RX ADMIN — LORAZEPAM 1 MG: 2 INJECTION, SOLUTION INTRAMUSCULAR; INTRAVENOUS at 12:06

## 2021-03-14 RX ADMIN — APIXABAN 5 MG: 5 TABLET, FILM COATED ORAL at 08:25

## 2021-03-14 NOTE — PROGRESS NOTES
Progress Note      3/14/2021 3:57 PM  NAME: Nazia Tadeo   MRN:  [id number]   Admit Diagnosis: Atrial flutter with rapid ventricular response ([facility] [address] [I48.92]          Assessment/Plan:   Atrial flutter, status post cardioversion, back in sinus rhythm, anticoagulated with apixaban. Patient will continue amiodarone. Follow-up with Dr. Mine Garcia to consider  Ablation of her atrial flutter. Rising creatinine, off diuretics, patient is being hydrated. Use furosemide as needed as outpatient if she develops edema. Hypertension, well controlled    Coronary artery disease, status post CABG, recent catheterization with patent stent in LAD and without progression of coronary artery disease. []       High complexity decision making was performed in this patient at high risk for decompensation with multiple organ involvement. Subjective:     Nazia Tadeo denies chest pain, dyspnea. Complains of her legs being sore. Patient's daughter called here  to say that her only  brother passed away. They would like to break the news to her in the hospital.  Daughter would like to 2 other siblings with her and was seeking permission to let them all in with the Covid crisis. Review of Systems:    Symptom Y/N Comments  Symptom Y/N Comments   Fever/Chills N   Chest Pain N    Poor Appetite N   Edema Y    Cough N   Abdominal Pain N    Sputum N   Joint Pain N    SOB/BUTCHER Y   Pruritis/Rash N    Nausea/vomit N   Tolerating PT/OT Y    Diarrhea N   Tolerating Diet Y    Constipation N   Other       Could NOT obtain due to:      Objective:      Physical Exam:    Last 24hrs VS reviewed since prior progress note.  Most recent are:    Visit Vitals  /83 (BP 1 Location: Left upper arm, BP Patient Position: At rest;Supine)   Pulse 68   Temp 98.2 °F (36.8 °C)   Resp 20   Ht 5' 3\" (1.6 m)   Wt 114.3 kg (251 lb 15.8 oz)   SpO2 94%   Breastfeeding No   BMI 44.64 kg/m²       Intake/Output Summary (Last 24 hours) at 3/14/2021 1030  Last data filed at 3/14/2021 0555  Gross per 24 hour   Intake --   Output 2400 ml   Net -2400 ml        General Appearance: Well developed, well nourished, alert; no acute distress. Ears/Nose/Mouth/Throat: Hearing grossly normal; moist mucous membranes  Neck: Supple. Chest: Lungs clear to auscultation bilaterally. Cardiovascular: Regular rhythm, S1S2 normal, no murmur. Abdomen: Soft, non-tender, bowel sounds are active. Extremities: -/+ edema bilaterally. Skin: Warm and dry. []         Post-cath site without hematoma, bruit, tenderness, or thrill. Distal pulses intact. PMH/ reviewed - no change compared to H&P    Data Review    Telemetry:     EKG:   []  No new EKG for review  EKG with sinus rhythm    Lab Data Personally Reviewed:    No results for input(s): WBC, HGB, HCT, PLT, HGBEXT, HCTEXT, PLTEXT, HGBEXT, HCTEXT, PLTEXT in the last 72 hours. Recent Labs     03/13/21  1618 03/12/21  1647 03/12/21  0415   APTT 32.2 120.1* 69.5*      Recent Labs     03/13/21  0535      K 4.1   CL 97   CO2 37*   BUN 66*   CREA 2.20*   *   CA 8.8   MG 1.8     No results for input(s): CPK, CKNDX, TROIQ in the last 72 hours. No lab exists for component: CPKMB  No results found for: CHOL, CHOLX, CHLST, CHOLV, HDL, HDLP, LDL, LDLC, DLDLP, TGLX, TRIGL, TRIGP, CHHD, CHHDX    No results for input(s): AP, TBIL, TP, ALB, GLOB, GGT, AML, LPSE in the last 72 hours. No lab exists for component: SGOT, GPT, AMYP, HLPSE  No results for input(s): PH, PCO2, PO2 in the last 72 hours.     Medications Personally Reviewed:    Current Facility-Administered Medications   Medication Dose Route Frequency    melatonin tablet 3 mg  3 mg Oral QHS PRN    0.9% sodium chloride infusion  50 mL/hr IntraVENous CONTINUOUS    cefTRIAXone (ROCEPHIN) 1 g in sterile water (preservative free) 10 mL IV syringe  1 g IntraVENous Q24H    apixaban (ELIQUIS) tablet 5 mg  5 mg Oral BID    carvediloL (COREG) tablet 25 mg  25 mg Oral BID WITH MEALS    amiodarone (CORDARONE) tablet 400 mg  400 mg Oral DAILY    acetaminophen (TYLENOL) tablet 650 mg  650 mg Oral Q6H PRN    albuterol (PROVENTIL HFA, VENTOLIN HFA, PROAIR HFA) inhaler 2 Puff  2 Puff Inhalation BID PRN    buPROPion XL (WELLBUTRIN XL) tablet 150 mg  150 mg Oral 7am    gabapentin (NEURONTIN) capsule 300 mg  300 mg Oral TID    metOLazone (ZAROXOLYN) tablet 5 mg  5 mg Oral DAILY    oxybutynin (DITROPAN) tablet 5 mg  5 mg Oral TID    predniSONE (DELTASONE) tablet 20 mg  20 mg Oral DAILY WITH BREAKFAST    atorvastatin (LIPITOR) tablet 10 mg  10 mg Oral DAILY    insulin lispro (HUMALOG) injection   SubCUTAneous AC&HS    glucose chewable tablet 16 g  4 Tab Oral PRN    glucagon (GLUCAGEN) injection 1 mg  1 mg IntraMUSCular PRN    dextrose (D50W) injection syrg 12.5-25 g  25-50 mL IntraVENous PRN    insulin glargine (LANTUS) injection 10 Units  10 Units SubCUTAneous QHS    QUEtiapine (SEROquel) tablet 25 mg  25 mg Oral QHS    losartan (COZAAR) tablet 100 mg  100 mg Oral DAILY         Ishmael Ha MD

## 2021-03-14 NOTE — PROGRESS NOTES
Patient has discharge orders to go home with home health. HH is arranged with [facility] and discharge orders and dc summary has been faxed via [employer].

## 2021-03-14 NOTE — DISCHARGE SUMMARY
Admit date: 3/9/2021   Admitting Provider: Kristine Mujica MD    Discharge date: 3/14/2021  Discharging Provider: Oneida Henley NP      * Admission Diagnoses: Atrial flutter with rapid ventricular response [facility]) [I48.92]    * Discharge Diagnoses:    Hospital Problems as of 3/14/2021 Date Reviewed: 3/12/2021          Codes Class Noted - Resolved POA    Atrial flutter with rapid ventricular response [facility]) ICD-10-CM: A02.04  ICD-9-CM: 427.32  3/9/2021 - Present Unknown              * Hospital Course:  67-year-old female with past medical history of CHF, CAD with stents, diabetes, hyperlipidemia, hypertension, sleep apnea presented to the ER with shortness of breath on exertion and pain. Patient had recently been diagnosed with Covid and was found to have been in atrial fib with RVR. Patient was seen by cardiology and underwent cardioversion successfully. Patient was started on amiodarone and Eliquis for atrial fib. Patient noted to have creatinine go up to 2.2 and gentle hydration was initiated. Patient remained stable and will be discharged home on amiodarone and Eliquis will need to follow-up with cardiologist within the next week or so. Patient will be discharged with home health services.   * Procedures:  Procedure(s):  LEFT HEART CATH / CORONARY ANGIOGRAPHY  Cardioversion  Cardioversion basis: elective  Indications: failure of anti-arrhythmic medications  Pre-procedure rhythm: atrial flutter  Patient position: patient was placed in a supine position  Chest area: chest area exposed  Electrodes: pads  Electrodes placed: anterior-posterior  Number of attempts: 1  Attempt 1 mode: synchronous  Attempt 1 waveform: biphasic  Attempt 1 shock (in Joules): 100  Attempt 1 outcome: conversion to normal sinus rhythm  Post-procedure rhythm: normal sinus rhythm  Complications: no complications  Patient tolerance: patient tolerated the procedure well with no immediate complications  Consults: Cardiology    Significant Diagnostic Studies: none    Discharge Exam:  Visit Vitals  /83 (BP 1 Location: Left upper arm, BP Patient Position: At rest;Supine)   Pulse 68   Temp 98.2 °F (36.8 °C)   Resp 20   Ht 5' 3\" (1.6 m)   Wt 107.2 kg (236 lb 5.3 oz)   SpO2 94%   Breastfeeding No   BMI 41.86 kg/m²     General:  Alert, cooperative, no distress, appears stated age. Head:  Normocephalic, without obvious abnormality, atraumatic. Eyes:  Conjunctivae/corneas clear. PERRL       Nose: Nares normal. Septum midline. Mucosa normal. No drainage or sinus tenderness. Throat: Lips, mucosa, and tongue normal. Teeth and gums normal.   Neck: Supple, symmetrical, trachea midline, no adenopathy, thyroid: no enlargement/tenderness/nodules, no carotid bruit and no JVD. Lungs:   Clear to auscultation bilaterally. Chest wall:  No tenderness or deformity. Heart:  Regular rate and rhythm, S1, S2 normal, no murmur, click, rub or gallop. Abdomen:   Soft, non-tender. Bowel sounds normal. No masses,  No organomegaly. Extremities: Extremities normal, atraumatic, no cyanosis or edema. Pulses: 2+ and symmetric all extremities. Skin: Skin color, texture, turgor normal. No rashes or lesions. Lymph nodes: Cervical, supraclavicular, and axillary nodes normal.   Neurologic: CNII-XII intact. Normal strength, sensation and reflexes throughout. * Discharge Condition: stable  * Disposition: Home    Discharge Medications:  Current Discharge Medication List      START taking these medications    Details   apixaban (ELIQUIS) 5 mg tablet Take 1 Tab by mouth two (2) times a day. Qty: 30 Tab, Refills: 0      gabapentin (NEURONTIN) 300 mg capsule Take 1 Cap by mouth three (3) times daily. Max Daily Amount: 900 mg. Qty: 30 Cap, Refills: 0    Associated Diagnoses: Neuropathy      amiodarone (PACERONE) 400 mg tablet Take 1 Tab by mouth daily.   Qty: 14 Tab, Refills: 0      insulin glargine (LANTUS) 100 unit/mL injection 10 Units by SubCUTAneous route nightly. May give in pen  Indications: type 2 diabetes mellitus  Qty: 1 Vial, Refills: 0      melatonin 3 mg tablet Take 1 Tab by mouth nightly as needed for Sleep. Qty: 30 Tab, Refills: 0      insulin needles, disposable, (Comfort EZ Pen Needles) 29 gauge x 1/2\" ndle 10 Units by Does Not Apply route nightly. Indications: e11.9  Qty: 30 Pen Needle, Refills: 0         CONTINUE these medications which have CHANGED    Details   furosemide (LASIX) 40 mg tablet Take 0.5 Tabs by mouth daily as needed (edema). As needed for edema  Qty: 30 Tab, Refills: 0      buPROPion XL (WELLBUTRIN XL) 150 mg tablet Take 1 Tab by mouth every morning. Qty: 30 Tab, Refills: 0      carvediloL (COREG) 25 mg tablet Take 1 Tab by mouth two (2) times daily (with meals). Qty: 60 Tab, Refills: 0      losartan (COZAAR) 100 mg tablet Take 1 Tab by mouth daily. Qty: 30 Tab, Refills: 0      oxybutynin (DITROPAN) 5 mg tablet Take 1 Tab by mouth three (3) times daily. Qty: 90 Tab, Refills: 0         CONTINUE these medications which have NOT CHANGED    Details   QUEtiapine (SEROqueL) 25 mg tablet Take 25 mg by mouth nightly. theophylline ER (BAM-24) 200 mg cp24 capsule Take 300 mg by mouth daily. albuterol-ipratropium (DUO-NEB) 2.5 mg-0.5 mg/3 ml nebu 3 mL by Nebulization route every six (6) hours as needed for Wheezing. potassium chloride SR (KLOR-CON 10) 10 mEq tablet Take 10 mEq by mouth two (2) times a day. mometasone-formoterol (Dulera) 200-5 mcg/actuation HFA inhaler Take 2 Puffs by inhalation two (2) times a day. clopidogreL (PLAVIX) 75 mg tab Take 1 Tab by mouth daily. Qty: 30 Tab, Refills: 0      albuterol (Ventolin HFA) 90 mcg/actuation inhaler Take 2 Puffs by inhalation two (2) times daily as needed for Wheezing. pravastatin (PRAVACHOL) 40 mg tablet Take 40 mg by mouth nightly.          STOP taking these medications       amLODIPine (NORVASC) 10 mg tablet Comments:   Reason for Stopping: dilTIAZem ER (CARDIZEM CD) 120 mg capsule Comments:   Reason for Stopping:         predniSONE (DELTASONE) 20 mg tablet Comments:   Reason for Stopping:         ondansetron (ZOFRAN ODT) 4 mg disintegrating tablet Comments:   Reason for Stopping:         gabapentin (NEURONTIN) 600 mg tablet Comments:   Reason for Stopping:         metOLazone (ZAROXOLYN) 5 mg tablet Comments:   Reason for Stopping:         isosorbide dinitrate (ISORDIL) 10 mg tablet Comments:   Reason for Stopping:         nitroglycerin (NITROSTAT) 0.4 mg SL tablet Comments:   Reason for Stopping:         theophylline ER,12 hour, (THEOCHRON) 300 mg tablet Comments:   Reason for Stopping:         amLODIPine (NORVASC) 10 mg tablet Comments:   Reason for Stopping:               * Follow-up Care/Patient Instructions:   Activity: Activity as tolerated  Diet: Diabetic Diet  Wound Care: None needed    Follow-up Information     Follow up With Specialties Details Why Contact Info    Valentino Sings, MD Internal Medicine   [address] [place] [id number] [phone]      Jose Fox MD Cardiology In 1 week  [facility] [id number]  [phone]            Signed:  Marylou Pardo NP  3/14/2021  12:03 PM

## 2021-03-15 ENCOUNTER — HOSPITAL ENCOUNTER (EMERGENCY)
Age: 71
Discharge: HOME | End: 2021-03-15
Attending: EMERGENCY MEDICINE
Payer: MEDICARE

## 2021-03-15 ENCOUNTER — APPOINTMENT (OUTPATIENT)
Dept: GENERAL RADIOLOGY | Age: 71
End: 2021-03-15
Attending: EMERGENCY MEDICINE
Payer: MEDICARE

## 2021-03-15 VITALS
DIASTOLIC BLOOD PRESSURE: 81 MMHG | OXYGEN SATURATION: 96 % | HEART RATE: 63 BPM | TEMPERATURE: 97.6 F | SYSTOLIC BLOOD PRESSURE: 141 MMHG | RESPIRATION RATE: 18 BRPM

## 2021-03-15 DIAGNOSIS — F43.9 SITUATIONAL STRESS: Primary | ICD-10-CM

## 2021-03-15 DIAGNOSIS — I20.8 ANGINAL EQUIVALENT (HCC): ICD-10-CM

## 2021-03-15 LAB
ALBUMIN SERPL-MCNC: 3 G/DL (ref 3.5–5)
ALBUMIN/GLOB SERPL: 0.8 {RATIO} (ref 1.1–2.2)
ALP SERPL-CCNC: 109 U/L (ref 45–117)
ALT SERPL-CCNC: 14 U/L (ref 12–78)
ANION GAP SERPL CALC-SCNC: 5 MMOL/L (ref 5–15)
AST SERPL W P-5'-P-CCNC: 7 U/L (ref 15–37)
ATRIAL RATE: 64 BPM
BASOPHILS # BLD: 0 K/UL (ref 0–0.1)
BASOPHILS NFR BLD: 0 % (ref 0–1)
BILIRUB SERPL-MCNC: 0.4 MG/DL (ref 0.2–1)
BNP SERPL-MCNC: 448 PG/ML
BUN SERPL-MCNC: 51 MG/DL (ref 6–20)
BUN/CREAT SERPL: 27 (ref 12–20)
CA-I BLD-MCNC: 9 MG/DL (ref 8.5–10.1)
CALCULATED P AXIS, ECG09: 78 DEGREES
CALCULATED R AXIS, ECG10: 29 DEGREES
CALCULATED T AXIS, ECG11: 94 DEGREES
CHLORIDE SERPL-SCNC: 100 MMOL/L (ref 97–108)
CK SERPL-CCNC: 83 NG/ML (ref 26–192)
CO2 SERPL-SCNC: 35 MMOL/L (ref 21–32)
CREAT SERPL-MCNC: 1.87 MG/DL (ref 0.55–1.02)
DIAGNOSIS, 93000: NORMAL
DIFFERENTIAL METHOD BLD: ABNORMAL
EOSINOPHIL # BLD: 0.3 K/UL (ref 0–0.4)
EOSINOPHIL NFR BLD: 3 % (ref 0–7)
ERYTHROCYTE [DISTWIDTH] IN BLOOD BY AUTOMATED COUNT: 16.1 % (ref 11.5–14.5)
GLOBULIN SER CALC-MCNC: 3.9 G/DL (ref 2–4)
GLUCOSE SERPL-MCNC: 200 MG/DL (ref 65–100)
HCT VFR BLD AUTO: 44.5 % (ref 35–47)
HGB BLD-MCNC: 13.3 G/DL (ref 11.5–16)
IMM GRANULOCYTES # BLD AUTO: 0 K/UL (ref 0–0.04)
IMM GRANULOCYTES NFR BLD AUTO: 0 % (ref 0–0.5)
LYMPHOCYTES # BLD: 1.7 K/UL (ref 0.8–3.5)
LYMPHOCYTES NFR BLD: 15 % (ref 12–49)
MCH RBC QN AUTO: 27.7 PG (ref 26–34)
MCHC RBC AUTO-ENTMCNC: 29.9 G/DL (ref 30–36.5)
MCV RBC AUTO: 92.7 FL (ref 80–99)
MONOCYTES # BLD: 1.1 K/UL (ref 0–1)
MONOCYTES NFR BLD: 10 % (ref 5–13)
NEUTS SEG # BLD: 7.8 K/UL (ref 1.8–8)
NEUTS SEG NFR BLD: 72 % (ref 32–75)
P-R INTERVAL, ECG05: 184 MS
PLATELET # BLD AUTO: 168 K/UL (ref 150–400)
PMV BLD AUTO: 12.7 FL (ref 8.9–12.9)
POTASSIUM SERPL-SCNC: 4.2 MMOL/L (ref 3.5–5.1)
PROT SERPL-MCNC: 6.9 G/DL (ref 6.4–8.2)
Q-T INTERVAL, ECG07: 460 MS
QRS DURATION, ECG06: 102 MS
QTC CALCULATION (BEZET), ECG08: 474 MS
RBC # BLD AUTO: 4.8 M/UL (ref 3.8–5.2)
SODIUM SERPL-SCNC: 140 MMOL/L (ref 136–145)
TROPONIN I SERPL-MCNC: 0.08 NG/ML
TROPONIN I SERPL-MCNC: 0.08 NG/ML
VENTRICULAR RATE, ECG03: 64 BPM
WBC # BLD AUTO: 10.9 K/UL (ref 3.6–11)

## 2021-03-15 PROCEDURE — 80053 COMPREHEN METABOLIC PANEL: CPT

## 2021-03-15 PROCEDURE — 36415 COLL VENOUS BLD VENIPUNCTURE: CPT

## 2021-03-15 PROCEDURE — 85025 COMPLETE CBC W/AUTO DIFF WBC: CPT

## 2021-03-15 PROCEDURE — 99285 EMERGENCY DEPT VISIT HI MDM: CPT

## 2021-03-15 PROCEDURE — 71045 X-RAY EXAM CHEST 1 VIEW: CPT

## 2021-03-15 PROCEDURE — 83880 ASSAY OF NATRIURETIC PEPTIDE: CPT

## 2021-03-15 PROCEDURE — 93005 ELECTROCARDIOGRAM TRACING: CPT

## 2021-03-15 PROCEDURE — 84484 ASSAY OF TROPONIN QUANT: CPT

## 2021-03-15 PROCEDURE — 82550 ASSAY OF CK (CPK): CPT

## 2021-03-15 RX ORDER — NITROGLYCERIN 0.4 MG/1
TABLET SUBLINGUAL
Qty: 1 BOTTLE | Refills: 3 | OUTPATIENT
Start: 2021-03-15

## 2021-03-15 NOTE — ED TRIAGE NOTES
Pt being seen by home health, pt developed jaw pain took ntg with improved pain. Pain improved at this time. Reported to have lost brother yesterday, was recently admitted to [facility] and had multiple med changes. Reports signif cardiac hx.

## 2021-03-15 NOTE — ED PROVIDER NOTES
EMERGENCY DEPARTMENT HISTORY AND PHYSICAL EXAM        Date: 3/15/2021  Patient Name: Jodie Garrison    History of Presenting Illness     Chief Complaint   Patient presents with    Jaw Pain       5:03 PM    History Provided By: Patient    HPI: Jodie Garrison, 72 y.o. female with with multiple medical problems including coronary artery disease with stents, congestive heart failure, hypertension, COPD, A. fib flutter, on anticoagulation presents to the ED after discharge from the hospital yesterday with acute onset anterior neck pain without radiation that responded to sublingual nitroglycerin x1. Patient also took an aspirin at time. Patient states that  discomfort developed several hours ago after receiving notification at the death of her only brother. Patient denies any diaphoresis, nausea, vomiting associated with it. He does endorse that the nitroglycerin did burn under her tongue but very faintly. Patient came in the ED as she was concerned that this is similar discomfort that she had with her previous MIs. Patient states that since her arrival home 24 hours ago she has been staying in her room resting is not had any symptoms of increasing shortness of breath, orthopnea, PND, BUTCHER. PCP: Valentino Sings, MD    Current Outpatient Medications   Medication Sig Dispense Refill    nitroglycerin (NITROSTAT) 0.4 mg SL tablet Sit/Lay down then put one tab under the tongue every 5 minutes as needed for chest pain/neck or jaw pain for 3 doses. Seek immediate medical attention should you need more than one tab for pain to resolve. 1 Bottle 3    furosemide (LASIX) 40 mg tablet Take 0.5 Tabs by mouth daily as needed (edema). As needed for edema 30 Tab 0    apixaban (ELIQUIS) 5 mg tablet Take 1 Tab by mouth two (2) times a day. 30 Tab 0    buPROPion XL (WELLBUTRIN XL) 150 mg tablet Take 1 Tab by mouth every morning.  30 Tab 0    carvediloL (COREG) 25 mg tablet Take 1 Tab by mouth two (2) times daily (with meals). 60 Tab 0    gabapentin (NEURONTIN) 300 mg capsule Take 1 Cap by mouth three (3) times daily. Max Daily Amount: 900 mg. 30 Cap 0    losartan (COZAAR) 100 mg tablet Take 1 Tab by mouth daily. 30 Tab 0    oxybutynin (DITROPAN) 5 mg tablet Take 1 Tab by mouth three (3) times daily. 90 Tab 0    amiodarone (PACERONE) 400 mg tablet Take 1 Tab by mouth daily. 14 Tab 0    insulin glargine (LANTUS) 100 unit/mL injection 10 Units by SubCUTAneous route nightly. May give in pen  Indications: type 2 diabetes mellitus 1 Vial 0    melatonin 3 mg tablet Take 1 Tab by mouth nightly as needed for Sleep. 30 Tab 0    insulin needles, disposable, (Comfort EZ Pen Needles) 29 gauge x 1/2\" ndle 10 Units by Does Not Apply route nightly. Indications: e11.9 30 Pen Needle 0    QUEtiapine (SEROqueL) 25 mg tablet Take 25 mg by mouth nightly.  theophylline ER (BAM-24) 200 mg cp24 capsule Take 300 mg by mouth daily.  albuterol-ipratropium (DUO-NEB) 2.5 mg-0.5 mg/3 ml nebu 3 mL by Nebulization route every six (6) hours as needed for Wheezing.  potassium chloride SR (KLOR-CON 10) 10 mEq tablet Take 10 mEq by mouth two (2) times a day.  mometasone-formoterol (Dulera) 200-5 mcg/actuation HFA inhaler Take 2 Puffs by inhalation two (2) times a day.  clopidogreL (PLAVIX) 75 mg tab Take 1 Tab by mouth daily. 30 Tab 0    albuterol (Ventolin HFA) 90 mcg/actuation inhaler Take 2 Puffs by inhalation two (2) times daily as needed for Wheezing.  pravastatin (PRAVACHOL) 40 mg tablet Take 40 mg by mouth nightly.          Past History     Past Medical History:  Past Medical History:   Diagnosis Date    [facility]est[facility] disease ([facility]     Coronary artery disease     Diabetes ([facility]     Hyperlipidemia     Hypertension     Myocardial infarct ([facility]     IFTIKHAR (obstructive sleep apnea)        Past Surgical History:  Past Surgical History:   Procedure Laterality Date    HX BREAST BIOPSY      HX CHOLECYSTECTOMY      HX CORONARY ARTERY BYPASS GRAFT      HX HERNIA REPAIR      HX HYSTERECTOMY         Family History:  Family History   Problem Relation Age of Onset    Heart Disease Mother     Kidney Disease Mother     Cancer Maternal Grandmother        Social History:  Social History     Tobacco Use    Smoking status: Former Smoker    Smokeless tobacco: Never Used   Substance Use Topics    Alcohol use: Not Currently     Frequency: Never    Drug use: Never       Allergies: Allergies   Allergen Reactions    Tylox [Oxycodone-Acetaminophen] Hives       Review of Systems   Review of Systems   Constitutional: Negative for chills, diaphoresis and fever. HENT: Negative for sore throat and trouble swallowing. Eyes: Negative. Respiratory: Negative for cough, choking and shortness of breath. Cardiovascular: Negative for chest pain and palpitations. Gastrointestinal: Negative for abdominal pain, diarrhea, nausea and vomiting. Endocrine: Negative for polydipsia, polyphagia and polyuria. Genitourinary: Negative for dysuria, frequency, hematuria and urgency. Musculoskeletal: Negative for gait problem, myalgias and neck stiffness. Skin: Negative for rash. Neurological: Negative for dizziness, syncope, numbness and headaches. Physical Exam   Physical Exam  Vitals signs and nursing note reviewed. Constitutional:       General: She is not in acute distress. Appearance: Normal appearance. She is well-developed. She is obese. She is not ill-appearing. Comments: Patient looks very sad and is intermittently tearful during history taking and exam.  Complains of feeling very   HENT:      Head: Normocephalic and atraumatic. Nose: Nose normal.      Mouth/Throat:      Mouth: Mucous membranes are moist.      Pharynx: No posterior oropharyngeal erythema. Eyes:      General: Vision grossly intact. Extraocular Movements: Extraocular movements intact.       Conjunctiva/sclera: Conjunctivae normal. Pupils: Pupils are equal, round, and reactive to light. Neck:      Musculoskeletal: Neck supple. Vascular: No JVD. Trachea: No tracheal deviation. Cardiovascular:      Rate and Rhythm: Normal rate and regular rhythm. Pulses: Normal pulses. Carotid pulses are 2+ on the right side and 2+ on the left side. Radial pulses are 2+ on the right side and 2+ on the left side. Femoral pulses are 2+ on the right side and 2+ on the left side. Popliteal pulses are 2+ on the right side and 2+ on the left side. Dorsalis pedis pulses are 2+ on the right side and 2+ on the left side. Posterior tibial pulses are 2+ on the right side and 2+ on the left side. Heart sounds: Normal heart sounds. Pulmonary:      Effort: Pulmonary effort is normal.      Breath sounds: Normal air entry. No wheezing or rhonchi. Comments: Breath sounds distant but no wheezing  Abdominal:      General: Bowel sounds are normal.      Palpations: Abdomen is soft. Tenderness: There is no abdominal tenderness. There is no guarding or rebound. Musculoskeletal:         General: No swelling or deformity. Right shoulder: She exhibits normal range of motion and no swelling. Right lower leg: No edema. Left lower leg: No edema. Skin:     General: Skin is warm and dry. Capillary Refill: Capillary refill takes less than 2 seconds. Findings: No signs of injury or rash. Neurological:      General: No focal deficit present. Mental Status: She is alert. Psychiatric:         Behavior: Behavior is cooperative. Diagnostic Study Results     Labs -     No results found for this or any previous visit (from the past 48 hour(s)). Radiologic Studies -   XR CHEST PORT   Final Result   No acute findings.         CT Results  (Last 48 hours)    None        CXR Results  (Last 48 hours)    None          Medical Decision Making and ED Course     I have also reviewed the vital signs, available nursing notes, past medical history, past surgical history, family history and social history. Vital Signs - Reviewed the patient's vital signs. No data found. EKG interpretation: (Preliminary): Performed at 1545  Rhythm: normal sinus rhythm; and regular . Rate (approx.): 64; Axis: normal; ther findings: left ventricular hypertrophy. Records Reviewed: Nursing Notes and Old medical records as available. Medical Decision Making/Diff Dx:  Differential diagnosis: Unstable angina, anginal equivalent, situational stressor, GERD musculoskeletal strain, COPD exacerbation    52-year-old with multiple medical problems presents to the ED 24 hours following discharge with neck discomfort concerning for cardiac event that was precipitated after finding out about the unplanned death of her brother during the night. Patient is adamant that she does not wish to be several have opted to withdraw to troponins 3 hours apart as we are currently almost 7 hours since she developed symptoms. Will obtain screening labs chest x-ray compared her labs to discharge laboratories and disposition per results    ED course/Re-evaluation/Consultations/Other      Patient remains discomfort treated well in the ED and has had 2 -3 hours apart. Anticipate that patient is experiencing some degree of anginal equivalent as a result of significant situational stress. Will DC home with prescription for nitroglycerin as her previous bottle is over a year old and encourage patient to seek early follow-up with her PCP. Procedures     N/A    Disposition     Discharged    DISCHARGE PLAN:  1. Discharge Medication List as of 3/15/2021  8:41 PM      START taking these medications    Details   nitroglycerin (NITROSTAT) 0.4 mg SL tablet Sit/Lay down then put one tab under the tongue every 5 minutes as needed for chest pain/neck or jaw pain for 3 doses.   Seek immediate medical attention should you need more than one tab for pain to resolve., Print, Disp-1 Bottle, R-3         CONTINUE these medications which have NOT CHANGED    Details   furosemide (LASIX) 40 mg tablet Take 0.5 Tabs by mouth daily as needed (edema). As needed for edema, Normal, Disp-30 Tab, R-0      apixaban (ELIQUIS) 5 mg tablet Take 1 Tab by mouth two (2) times a day., Normal, Disp-30 Tab, R-0      buPROPion XL (WELLBUTRIN XL) 150 mg tablet Take 1 Tab by mouth every morning., Normal, Disp-30 Tab, R-0      carvediloL (COREG) 25 mg tablet Take 1 Tab by mouth two (2) times daily (with meals). , Normal, Disp-60 Tab, R-0      gabapentin (NEURONTIN) 300 mg capsule Take 1 Cap by mouth three (3) times daily. Max Daily Amount: 900 mg., Normal, Disp-30 Cap, R-0      losartan (COZAAR) 100 mg tablet Take 1 Tab by mouth daily. , Normal, Disp-30 Tab, R-0      oxybutynin (DITROPAN) 5 mg tablet Take 1 Tab by mouth three (3) times daily. , Normal, Disp-90 Tab, R-0      amiodarone (PACERONE) 400 mg tablet Take 1 Tab by mouth daily. , Normal, Disp-14 Tab, R-0      insulin glargine (LANTUS) 100 unit/mL injection 10 Units by SubCUTAneous route nightly. May give in pen  Indications: type 2 diabetes mellitus, Normal, Disp-1 Vial, R-0      melatonin 3 mg tablet Take 1 Tab by mouth nightly as needed for Sleep., Normal, Disp-30 Tab, R-0      insulin needles, disposable, (Comfort EZ Pen Needles) 29 gauge x 1/2\" ndle 10 Units by Does Not Apply route nightly. Indications: e11.9, Normal, Disp-30 Pen Needle, R-0      QUEtiapine (SEROqueL) 25 mg tablet Take 25 mg by mouth nightly., Historical Med      theophylline ER (BAM-24) 200 mg cp24 capsule Take 300 mg by mouth daily. , Historical Med      albuterol-ipratropium (DUO-NEB) 2.5 mg-0.5 mg/3 ml nebu 3 mL by Nebulization route every six (6) hours as needed for Wheezing., Historical Med      potassium chloride SR (KLOR-CON 10) 10 mEq tablet Take 10 mEq by mouth two (2) times a day., Historical Med      mometasone-formoterol (Dulera) 200-5 mcg/actuation HFA inhaler Take 2 Puffs by inhalation two (2) times a day., Historical Med      clopidogreL (PLAVIX) 75 mg tab Take 1 Tab by mouth daily. , Normal, Disp-30 Tab, R-0      albuterol (Ventolin HFA) 90 mcg/actuation inhaler Take 2 Puffs by inhalation two (2) times daily as needed for Wheezing., Historical Med      pravastatin (PRAVACHOL) 40 mg tablet Take 40 mg by mouth nightly., Historical Med           2. Current Discharge Medication List      CONTINUE these medications which have NOT CHANGED    Details   furosemide (LASIX) 40 mg tablet Take 0.5 Tabs by mouth daily as needed (edema). As needed for edema  Qty: 30 Tab, Refills: 0      apixaban (ELIQUIS) 5 mg tablet Take 1 Tab by mouth two (2) times a day. Qty: 30 Tab, Refills: 0      buPROPion XL (WELLBUTRIN XL) 150 mg tablet Take 1 Tab by mouth every morning. Qty: 30 Tab, Refills: 0      carvediloL (COREG) 25 mg tablet Take 1 Tab by mouth two (2) times daily (with meals). Qty: 60 Tab, Refills: 0      gabapentin (NEURONTIN) 300 mg capsule Take 1 Cap by mouth three (3) times daily. Max Daily Amount: 900 mg. Qty: 30 Cap, Refills: 0    Associated Diagnoses: Neuropathy      losartan (COZAAR) 100 mg tablet Take 1 Tab by mouth daily. Qty: 30 Tab, Refills: 0      oxybutynin (DITROPAN) 5 mg tablet Take 1 Tab by mouth three (3) times daily. Qty: 90 Tab, Refills: 0      amiodarone (PACERONE) 400 mg tablet Take 1 Tab by mouth daily. Qty: 14 Tab, Refills: 0      insulin glargine (LANTUS) 100 unit/mL injection 10 Units by SubCUTAneous route nightly. May give in pen  Indications: type 2 diabetes mellitus  Qty: 1 Vial, Refills: 0      melatonin 3 mg tablet Take 1 Tab by mouth nightly as needed for Sleep. Qty: 30 Tab, Refills: 0      insulin needles, disposable, (Comfort EZ Pen Needles) 29 gauge x 1/2\" ndle 10 Units by Does Not Apply route nightly.  Indications: e11.9  Qty: 30 Pen Needle, Refills: 0      QUEtiapine (SEROqueL) 25 mg tablet Take 25 mg by mouth nightly. theophylline ER (BAM-24) 200 mg cp24 capsule Take 300 mg by mouth daily. albuterol-ipratropium (DUO-NEB) 2.5 mg-0.5 mg/3 ml nebu 3 mL by Nebulization route every six (6) hours as needed for Wheezing. potassium chloride SR (KLOR-CON 10) 10 mEq tablet Take 10 mEq by mouth two (2) times a day. mometasone-formoterol (Dulera) 200-5 mcg/actuation HFA inhaler Take 2 Puffs by inhalation two (2) times a day. clopidogreL (PLAVIX) 75 mg tab Take 1 Tab by mouth daily. Qty: 30 Tab, Refills: 0      albuterol (Ventolin HFA) 90 mcg/actuation inhaler Take 2 Puffs by inhalation two (2) times daily as needed for Wheezing. pravastatin (PRAVACHOL) 40 mg tablet Take 40 mg by mouth nightly. 3.   Follow-up Information     Follow up With Specialties Details Why Contact Info    Valentino Sings, MD Internal Medicine Call in 1 day  [address] [phone]          4. Return to ED if worse     Diagnosis     Clinical impression:   1. Situational stress    2.  Anginal equivalent ([address]

## 2021-03-16 NOTE — ED NOTES
Pt ANOx4. Pt taken out by wheelchair with no complications. Respirations regular. NAD noted. Pt received DC Instructions with follow up information and stated no further questions.

## 2021-03-16 NOTE — DISCHARGE INSTRUCTIONS
Please  the new prescription for nitroglycerin that has been sent to your pharmacy. It should burn under your tongue when you use it is it not too old. The ED as needed or if you become concerned. Thank you! Thank you for allowing me to care for you in the emergency department. I sincerely hope that you are satisfied with your visit today. It is my goal to provide you with excellent care. Below you will find a list of your labs and imaging from your visit today. Should you have any questions regarding these results please do not hesitate to call the emergency department. Labs -     Recent Results (from the past 12 hour(s))   EKG, 12 LEAD, INITIAL    Collection Time: 03/15/21  3:45 PM   Result Value Ref Range    Ventricular Rate 64 BPM    Atrial Rate 64 BPM    P-R Interval 184 ms    QRS Duration 102 ms    Q-T Interval 460 ms    QTC Calculation (Bezet) 474 ms    Calculated P Axis 78 degrees    Calculated R Axis 29 degrees    Calculated T Axis 94 degrees    Diagnosis       Normal sinus rhythm  Possible Left atrial enlargement  T wave abnormality, consider lateral ischemia  Prolonged QT  Abnormal ECG  When compared with ECG of 13-MAR-2021 08:32,  No significant change was found  Confirmed by Aspen Sherman MD, PEMA ([id number]) on 3/15/2021 6:42:44 PM     CBC WITH AUTOMATED DIFF    Collection Time: 03/15/21  4:16 PM   Result Value Ref Range    WBC 10.9 3.6 - 11.0 K/uL    RBC 4.80 3.80 - 5.20 M/uL    HGB 13.3 11.5 - 16.0 g/dL    HCT 44.5 35.0 - 47.0 %    MCV 92.7 80.0 - 99.0 FL    MCH 27.7 26.0 - 34.0 PG    MCHC 29.9 (L) 30.0 - 36.5 g/dL    RDW 16.1 (H) 11.5 - 14.5 %    PLATELET 712 517 - 039 K/uL    MPV 12.7 8.9 - 12.9 FL    NEUTROPHILS 72 32 - 75 %    LYMPHOCYTES 15 12 - 49 %    MONOCYTES 10 5 - 13 %    EOSINOPHILS 3 0 - 7 %    BASOPHILS 0 0 - 1 %    IMMATURE GRANULOCYTES 0 0.0 - 0.5 %    ABS. NEUTROPHILS 7.8 1.8 - 8.0 K/UL    ABS. LYMPHOCYTES 1.7 0.8 - 3.5 K/UL    ABS.  MONOCYTES 1.1 (H) 0.0 - 1.0 K/UL ABS. EOSINOPHILS 0.3 0.0 - 0.4 K/UL    ABS. BASOPHILS 0.0 0.0 - 0.1 K/UL    ABS. IMM. GRANS. 0.0 0.00 - 0.04 K/UL    DF AUTOMATED     METABOLIC PANEL, COMPREHENSIVE    Collection Time: 03/15/21  4:16 PM   Result Value Ref Range    Sodium 140 136 - 145 mmol/L    Potassium 4.2 3.5 - 5.1 mmol/L    Chloride 100 97 - 108 mmol/L    CO2 35 (H) 21 - 32 mmol/L    Anion gap 5 5 - 15 mmol/L    Glucose 200 (H) 65 - 100 mg/dL    BUN 51 (H) 6 - 20 mg/dL    Creatinine 1.87 (H) 0.55 - 1.02 mg/dL    BUN/Creatinine ratio 27 (H) 12 - 20      GFR est AA 32 (L) >60 ml/min/1.73m2    GFR est non-AA 27 (L) >60 ml/min/1.73m2    Calcium 9.0 8.5 - 10.1 mg/dL    Bilirubin, total 0.4 0.2 - 1.0 mg/dL    AST (SGOT) 7 (L) 15 - 37 U/L    ALT (SGPT) 14 12 - 78 U/L    Alk. phosphatase 109 45 - 117 U/L    Protein, total 6.9 6.4 - 8.2 g/dL    Albumin 3.0 (L) 3.5 - 5.0 g/dL    Globulin 3.9 2.0 - 4.0 g/dL    A-G Ratio 0.8 (L) 1.1 - 2.2     CK W/ REFLX CKMB    Collection Time: 03/15/21  4:16 PM   Result Value Ref Range    CK 83.0 26 - 192 ng/mL   TROPONIN I    Collection Time: 03/15/21  4:16 PM   Result Value Ref Range    Troponin-I, Qt. 0.08 (H) <0.05 ng/mL   BNP    Collection Time: 03/15/21  4:16 PM   Result Value Ref Range    NT pro- (H) <125 pg/mL   TROPONIN I    Collection Time: 03/15/21  7:00 PM   Result Value Ref Range    Troponin-I, Qt. 0.08 (H) <0.05 ng/mL       Radiologic Studies -   XR CHEST PORT   Final Result   No acute findings. CT Results  (Last 48 hours)      None          CXR Results  (Last 48 hours)                 03/15/21 1620  XR CHEST PORT Final result    Impression:  No acute findings. Narrative:  Chest pain. FINDINGS: Frontal single view chest. Median sternotomy. Cardiomegaly unchanged. No vascular congestion or pulmonary edema. Calcified aorta. The lungs are well   inflated. No infiltrate, effusion, or pneumothorax. No free air under the   diaphragm. Mild degenerative changes bony structures.               If you feel that you have not received excellent quality care or timely care, please ask to speak to the nurse manager. Please choose us in the future for your continued health care needs.   ------------------------------------------------------------------------------------------------------------  The exam and treatment you received in the Emergency Department were for an urgent problem and are not intended as complete care. It is important that you follow-up with a doctor, nurse practitioner, or physician assistant to:  (1) confirm your diagnosis,  (2) re-evaluation of changes in your illness and treatment, and  (3) for ongoing care.  If your symptoms become worse or you do not improve as expected and you are unable to reach your usual health care provider, you should return to the Emergency Department. We are available 24 hours a day.     Please take your discharge instructions with you when you go to your follow-up appointment.     If you have any problem arranging a follow-up appointment, contact the Emergency Department immediately.    If a prescription has been provided, please have it filled as soon as possible to prevent a delay in treatment. Read the entire medication instruction sheet provided to you by the pharmacy. If you have any questions or reservations about taking the medication due to side effects or interactions with other medications, please call your primary care physician or contact the ER to speak with the charge nurse.     Make an appointment with your family doctor or the physician you were referred to for follow-up of this visit as instructed on your discharge paperwork, as this is a mandatory follow-up. Return to the ER if you are unable to be seen or if you are unable to be seen in a timely manner.    If you have any problem arranging the follow-up visit, contact the Emergency Department immediately.

## 2021-04-12 ENCOUNTER — HOSPITAL ENCOUNTER (EMERGENCY)
Age: 71
Discharge: HOME | End: 2021-04-13
Attending: EMERGENCY MEDICINE
Payer: MEDICARE

## 2021-04-12 ENCOUNTER — APPOINTMENT (OUTPATIENT)
Dept: GENERAL RADIOLOGY | Age: 71
End: 2021-04-12
Attending: EMERGENCY MEDICINE
Payer: MEDICARE

## 2021-04-12 DIAGNOSIS — I50.9 CONGESTIVE HEART FAILURE, UNSPECIFIED HF CHRONICITY, UNSPECIFIED HEART FAILURE TYPE (HCC): Primary | ICD-10-CM

## 2021-04-12 LAB
ALBUMIN SERPL-MCNC: 2.5 G/DL (ref 3.5–5)
ALBUMIN/GLOB SERPL: 0.5 {RATIO} (ref 1.1–2.2)
ALP SERPL-CCNC: 148 U/L (ref 45–117)
ALT SERPL-CCNC: 15 U/L (ref 12–78)
ANION GAP SERPL CALC-SCNC: 4 MMOL/L (ref 5–15)
AST SERPL W P-5'-P-CCNC: 8 U/L (ref 15–37)
BASOPHILS # BLD: 0 K/UL (ref 0–0.1)
BASOPHILS NFR BLD: 0 % (ref 0–1)
BILIRUB SERPL-MCNC: 0.4 MG/DL (ref 0.2–1)
BNP SERPL-MCNC: 2289 PG/ML
BUN SERPL-MCNC: 21 MG/DL (ref 6–20)
BUN/CREAT SERPL: 16 (ref 12–20)
CA-I BLD-MCNC: 8.6 MG/DL (ref 8.5–10.1)
CHLORIDE SERPL-SCNC: 106 MMOL/L (ref 97–108)
CK SERPL-CCNC: 53 NG/ML (ref 26–192)
CO2 SERPL-SCNC: 32 MMOL/L (ref 21–32)
CREAT SERPL-MCNC: 1.32 MG/DL (ref 0.55–1.02)
DIFFERENTIAL METHOD BLD: ABNORMAL
EOSINOPHIL # BLD: 0.1 K/UL (ref 0–0.4)
EOSINOPHIL NFR BLD: 1 % (ref 0–7)
ERYTHROCYTE [DISTWIDTH] IN BLOOD BY AUTOMATED COUNT: 15.4 % (ref 11.5–14.5)
GLOBULIN SER CALC-MCNC: 4.6 G/DL (ref 2–4)
GLUCOSE SERPL-MCNC: 128 MG/DL (ref 65–100)
HCT VFR BLD AUTO: 37.8 % (ref 35–47)
HGB BLD-MCNC: 11.4 G/DL (ref 11.5–16)
IMM GRANULOCYTES # BLD AUTO: 0.1 K/UL (ref 0–0.04)
IMM GRANULOCYTES NFR BLD AUTO: 1 % (ref 0–0.5)
LYMPHOCYTES # BLD: 1.6 K/UL (ref 0.8–3.5)
LYMPHOCYTES NFR BLD: 15 % (ref 12–49)
MCH RBC QN AUTO: 27.3 PG (ref 26–34)
MCHC RBC AUTO-ENTMCNC: 30.2 G/DL (ref 30–36.5)
MCV RBC AUTO: 90.6 FL (ref 80–99)
MONOCYTES # BLD: 0.6 K/UL (ref 0–1)
MONOCYTES NFR BLD: 6 % (ref 5–13)
NEUTS SEG # BLD: 8.3 K/UL (ref 1.8–8)
NEUTS SEG NFR BLD: 77 % (ref 32–75)
PLATELET # BLD AUTO: 247 K/UL (ref 150–400)
PMV BLD AUTO: 12 FL (ref 8.9–12.9)
POTASSIUM SERPL-SCNC: 4.2 MMOL/L (ref 3.5–5.1)
PROT SERPL-MCNC: 7.1 G/DL (ref 6.4–8.2)
RBC # BLD AUTO: 4.17 M/UL (ref 3.8–5.2)
SODIUM SERPL-SCNC: 142 MMOL/L (ref 136–145)
TROPONIN I SERPL-MCNC: 0.06 NG/ML
WBC # BLD AUTO: 10.7 K/UL (ref 3.6–11)

## 2021-04-12 PROCEDURE — 83880 ASSAY OF NATRIURETIC PEPTIDE: CPT

## 2021-04-12 PROCEDURE — 80053 COMPREHEN METABOLIC PANEL: CPT

## 2021-04-12 PROCEDURE — 36415 COLL VENOUS BLD VENIPUNCTURE: CPT

## 2021-04-12 PROCEDURE — 82550 ASSAY OF CK (CPK): CPT

## 2021-04-12 PROCEDURE — 85025 COMPLETE CBC W/AUTO DIFF WBC: CPT

## 2021-04-12 PROCEDURE — 71045 X-RAY EXAM CHEST 1 VIEW: CPT

## 2021-04-12 PROCEDURE — 96374 THER/PROPH/DIAG INJ IV PUSH: CPT

## 2021-04-12 PROCEDURE — 84484 ASSAY OF TROPONIN QUANT: CPT

## 2021-04-12 PROCEDURE — 93005 ELECTROCARDIOGRAM TRACING: CPT

## 2021-04-12 PROCEDURE — 99285 EMERGENCY DEPT VISIT HI MDM: CPT

## 2021-04-13 VITALS
RESPIRATION RATE: 20 BRPM | SYSTOLIC BLOOD PRESSURE: 156 MMHG | TEMPERATURE: 98.3 F | OXYGEN SATURATION: 93 % | HEART RATE: 61 BPM | WEIGHT: 237 LBS | HEIGHT: 66 IN | BODY MASS INDEX: 38.09 KG/M2 | DIASTOLIC BLOOD PRESSURE: 62 MMHG

## 2021-04-13 LAB
ATRIAL RATE: 68 BPM
CALCULATED P AXIS, ECG09: 75 DEGREES
CALCULATED R AXIS, ECG10: 60 DEGREES
CALCULATED T AXIS, ECG11: 116 DEGREES
DIAGNOSIS, 93000: NORMAL
P-R INTERVAL, ECG05: 170 MS
Q-T INTERVAL, ECG07: 438 MS
QRS DURATION, ECG06: 96 MS
QTC CALCULATION (BEZET), ECG08: 465 MS
TROPONIN I SERPL-MCNC: 0.06 NG/ML
VENTRICULAR RATE, ECG03: 68 BPM

## 2021-04-13 PROCEDURE — 36415 COLL VENOUS BLD VENIPUNCTURE: CPT

## 2021-04-13 PROCEDURE — 84484 ASSAY OF TROPONIN QUANT: CPT

## 2021-04-13 PROCEDURE — 74011250636 HC RX REV CODE- 250/636: Performed by: EMERGENCY MEDICINE

## 2021-04-13 RX ORDER — FUROSEMIDE 10 MG/ML
40 INJECTION INTRAMUSCULAR; INTRAVENOUS
Status: COMPLETED | OUTPATIENT
Start: 2021-04-13 | End: 2021-04-13

## 2021-04-13 RX ADMIN — FUROSEMIDE 40 MG: 10 INJECTION, SOLUTION INTRAMUSCULAR; INTRAVENOUS at 03:26

## 2021-04-13 NOTE — ED PROVIDER NOTES
EMERGENCY DEPARTMENT HISTORY AND PHYSICAL EXAM      Date: 4/12/2021  Patient Name: Sang Zabala      History of Presenting Illness     Chief Complaint   Patient presents with    Shortness of Breath       History Provided By: Patient    HPI: Sang Zabala, 72 y.o. female with a past medical history significant With multiple cardiovascular comorbidities presents to the ED with cc of chest tightness and shortness of breath that began today. Patient states the symptoms are made worse with activity relieved more so with rest.  She says she had similar symptoms a few weeks ago. She has not treated it with anything. She denies any fever, chills, nausea, vomiting, rash, diarrhea, headache, night sweats. There are no other complaints, changes, or physical findings at this time. PCP: Kumar Layton MD    Current Facility-Administered Medications   Medication Dose Route Frequency Provider Last Rate Last Admin    furosemide (LASIX) injection 40 mg  40 mg IntraVENous NOW Daryl Mc MD         Current Outpatient Medications   Medication Sig Dispense Refill    nitroglycerin (NITROSTAT) 0.4 mg SL tablet Sit/Lay down then put one tab under the tongue every 5 minutes as needed for chest pain/neck or jaw pain for 3 doses. Seek immediate medical attention should you need more than one tab for pain to resolve. 1 Bottle 3    furosemide (LASIX) 40 mg tablet Take 0.5 Tabs by mouth daily as needed (edema). As needed for edema 30 Tab 0    apixaban (ELIQUIS) 5 mg tablet Take 1 Tab by mouth two (2) times a day. 30 Tab 0    buPROPion XL (WELLBUTRIN XL) 150 mg tablet Take 1 Tab by mouth every morning. 30 Tab 0    carvediloL (COREG) 25 mg tablet Take 1 Tab by mouth two (2) times daily (with meals). 60 Tab 0    gabapentin (NEURONTIN) 300 mg capsule Take 1 Cap by mouth three (3) times daily. Max Daily Amount: 900 mg. 30 Cap 0    losartan (COZAAR) 100 mg tablet Take 1 Tab by mouth daily.  30 Tab 0    oxybutynin (DITROPAN) 5 mg tablet Take 1 Tab by mouth three (3) times daily. 90 Tab 0    amiodarone (PACERONE) 400 mg tablet Take 1 Tab by mouth daily. 14 Tab 0    insulin glargine (LANTUS) 100 unit/mL injection 10 Units by SubCUTAneous route nightly. May give in pen  Indications: type 2 diabetes mellitus 1 Vial 0    melatonin 3 mg tablet Take 1 Tab by mouth nightly as needed for Sleep. 30 Tab 0    insulin needles, disposable, (Comfort EZ Pen Needles) 29 gauge x 1/2\" ndle 10 Units by Does Not Apply route nightly. Indications: e11.9 30 Pen Needle 0    QUEtiapine (SEROqueL) 25 mg tablet Take 25 mg by mouth nightly.  theophylline ER (BAM-24) 200 mg cp24 capsule Take 300 mg by mouth daily.  albuterol-ipratropium (DUO-NEB) 2.5 mg-0.5 mg/3 ml nebu 3 mL by Nebulization route every six (6) hours as needed for Wheezing.  potassium chloride SR (KLOR-CON 10) 10 mEq tablet Take 10 mEq by mouth two (2) times a day.  mometasone-formoterol (Dulera) 200-5 mcg/actuation HFA inhaler Take 2 Puffs by inhalation two (2) times a day.  clopidogreL (PLAVIX) 75 mg tab Take 1 Tab by mouth daily. 30 Tab 0    albuterol (Ventolin HFA) 90 mcg/actuation inhaler Take 2 Puffs by inhalation two (2) times daily as needed for Wheezing.  pravastatin (PRAVACHOL) 40 mg tablet Take 40 mg by mouth nightly.          Past History     Past Medical History:  Past Medical History:   Diagnosis Date    Congestive heart disease ([address]     Coronary artery disease     Diabetes ([address]     Hyperlipidemia     Hypertension     Myocardial infarct ([address]     IFTIKHAR (obstructive sleep apnea)        Past Surgical History:  Past Surgical History:   Procedure Laterality Date    HX BREAST BIOPSY      HX CHOLECYSTECTOMY      HX CORONARY ARTERY BYPASS GRAFT      HX HERNIA REPAIR      HX HYSTERECTOMY         Family History:  Family History   Problem Relation Age of Onset    Heart Disease Mother     Kidney Disease Mother     Cancer Maternal Grandmother        Social History:  Social History     Tobacco Use    Smoking status: Former Smoker    Smokeless tobacco: Never Used   Substance Use Topics    Alcohol use: Not Currently     Frequency: Never    Drug use: Never       Allergies: Allergies   Allergen Reactions    Penicillins Other (comments)    Tylox [Oxycodone-Acetaminophen] Hives         Review of Systems     Review of Systems   Constitutional: Negative. Negative for appetite change, chills, fatigue and fever. HENT: Negative. Negative for congestion and sinus pain. Eyes: Negative. Negative for pain and visual disturbance. Respiratory: Positive for chest tightness and shortness of breath. Cardiovascular: Positive for chest pain and leg swelling. Gastrointestinal: Negative. Negative for abdominal pain, diarrhea, nausea and vomiting. Genitourinary: Negative. Negative for difficulty urinating. No discharge   Musculoskeletal: Negative. Negative for arthralgias. Skin: Negative. Negative for rash. Neurological: Negative. Negative for weakness and headaches. Hematological: Negative. Psychiatric/Behavioral: Negative. Negative for agitation. The patient is not nervous/anxious. All other systems reviewed and are negative. Physical Exam     Physical Exam  Vitals signs and nursing note reviewed. Constitutional:       General: She is not in acute distress. Appearance: She is well-developed. HENT:      Head: Normocephalic and atraumatic. Nose: Nose normal.      Mouth/Throat:      Mouth: Mucous membranes are moist.      Pharynx: Oropharynx is clear. No oropharyngeal exudate. Eyes:      General:         Right eye: No discharge. Left eye: No discharge. Conjunctiva/sclera: Conjunctivae normal.      Pupils: Pupils are equal, round, and reactive to light. Neck:      Musculoskeletal: Normal range of motion and neck supple.    Cardiovascular:      Rate and Rhythm: Normal rate and regular rhythm. Chest Wall: PMI is not displaced. No thrill. Heart sounds: Normal heart sounds. No murmur. No friction rub. No gallop. Pulmonary:      Effort: Tachypnea, accessory muscle usage and respiratory distress present. Breath sounds: Normal breath sounds. No wheezing or rales. Chest:      Chest wall: No tenderness. Abdominal:      General: Bowel sounds are normal. There is no distension. Palpations: Abdomen is soft. There is no mass. Tenderness: There is no abdominal tenderness. There is no guarding or rebound. Musculoskeletal: Normal range of motion. Right lower leg: Edema present. Left lower leg: Edema present. Lymphadenopathy:      Cervical: No cervical adenopathy. Skin:     General: Skin is warm and dry. Capillary Refill: Capillary refill takes less than 2 seconds. Findings: No erythema or rash. Neurological:      Mental Status: She is alert and oriented to person, place, and time. Cranial Nerves: No cranial nerve deficit. Coordination: Coordination normal.   Psychiatric:         Mood and Affect: Mood normal.         Behavior: Behavior normal.         Lab and Diagnostic Study Results     Labs -     Recent Results (from the past 12 hour(s))   CBC WITH AUTOMATED DIFF    Collection Time: 04/12/21  9:49 PM   Result Value Ref Range    WBC 10.7 3.6 - 11.0 K/uL    RBC 4.17 3.80 - 5.20 M/uL    HGB 11.4 (L) 11.5 - 16.0 g/dL    HCT 37.8 35.0 - 47.0 %    MCV 90.6 80.0 - 99.0 FL    MCH 27.3 26.0 - 34.0 PG    MCHC 30.2 30.0 - 36.5 g/dL    RDW 15.4 (H) 11.5 - 14.5 %    PLATELET 616 675 - 456 K/uL    MPV 12.0 8.9 - 12.9 FL    NEUTROPHILS 77 (H) 32 - 75 %    LYMPHOCYTES 15 12 - 49 %    MONOCYTES 6 5 - 13 %    EOSINOPHILS 1 0 - 7 %    BASOPHILS 0 0 - 1 %    IMMATURE GRANULOCYTES 1 (H) 0.0 - 0.5 %    ABS. NEUTROPHILS 8.3 (H) 1.8 - 8.0 K/UL    ABS. LYMPHOCYTES 1.6 0.8 - 3.5 K/UL    ABS. MONOCYTES 0.6 0.0 - 1.0 K/UL    ABS. EOSINOPHILS 0.1 0.0 - 0.4 K/UL    ABS. BASOPHILS 0.0 0.0 - 0.1 K/UL    ABS. IMM. GRANS. 0.1 (H) 0.00 - 0.04 K/UL    DF AUTOMATED     METABOLIC PANEL, COMPREHENSIVE    Collection Time: 04/12/21  9:49 PM   Result Value Ref Range    Sodium 142 136 - 145 mmol/L    Potassium 4.2 3.5 - 5.1 mmol/L    Chloride 106 97 - 108 mmol/L    CO2 32 21 - 32 mmol/L    Anion gap 4 (L) 5 - 15 mmol/L    Glucose 128 (H) 65 - 100 mg/dL    BUN 21 (H) 6 - 20 mg/dL    Creatinine 1.32 (H) 0.55 - 1.02 mg/dL    BUN/Creatinine ratio 16 12 - 20      GFR est AA 48 (L) >60 ml/min/1.73m2    GFR est non-AA 40 (L) >60 ml/min/1.73m2    Calcium 8.6 8.5 - 10.1 mg/dL    Bilirubin, total 0.4 0.2 - 1.0 mg/dL    AST (SGOT) 8 (L) 15 - 37 U/L    ALT (SGPT) 15 12 - 78 U/L    Alk. phosphatase 148 (H) 45 - 117 U/L    Protein, total 7.1 6.4 - 8.2 g/dL    Albumin 2.5 (L) 3.5 - 5.0 g/dL    Globulin 4.6 (H) 2.0 - 4.0 g/dL    A-G Ratio 0.5 (L) 1.1 - 2.2     CK W/ REFLX CKMB    Collection Time: 04/12/21  9:49 PM   Result Value Ref Range    CK 53.0 26 - 192 ng/mL   TROPONIN I    Collection Time: 04/12/21  9:49 PM   Result Value Ref Range    Troponin-I, Qt. 0.06 (H) <0.05 ng/mL   BNP    Collection Time: 04/12/21  9:49 PM   Result Value Ref Range    NT pro-BNP 2,289 (H) <125 pg/mL   TROPONIN I    Collection Time: 04/13/21  1:42 AM   Result Value Ref Range    Troponin-I, Qt. 0.06 (H) <0.05 ng/mL       Radiologic Studies -   [unfilled]  CT Results  (Last 48 hours)    None        CXR Results  (Last 48 hours)               04/12/21 2110  XR CHEST PORT Final result    Impression:  Central pulmonary vascular engorgement with new hazy opacities of the lung   bases, nonspecific, but could represent early edema or atelectasis. Infectious   process could appear similar in the appropriate clinical setting. Narrative:  Examination: XR CHEST PORT        History: chest pain       Comparison: Chest radiograph 3/15/2021       FINDINGS:       Single frontal portable view of the chest. Lung volumes are symmetric. Prominence of the central pulmonary vasculature. The cardiac silhouette is   enlarged. Postoperative changes of the mediastinum. Atherosclerosis of the   thoracic aorta. Hazy opacities overlying the lung base which are new, could   represent mild edema or atelectasis. No pneumothorax. No acute or aggressive   osseous abnormalities are evident. Medical Decision Making and ED Course   - I am the first and primary provider for this patient AND AM THE PRIMARY PROVIDER OF RECORD. - I reviewed the vital signs, available nursing notes, past medical history, past surgical history, family history and social history. - Initial assessment performed. The patients presenting problems have been discussed, and the staff are in agreement with the care plan formulated and outlined with them. I have encouraged them to ask questions as they arise throughout their visit. Vital Signs-Reviewed the patient's vital signs. Patient Vitals for the past 12 hrs:   Temp Pulse Resp BP SpO2   04/13/21 0103 -- 76 15 (!) 198/87 94 %   04/12/21 2222 -- (!) 58 20 (!) 186/76 100 %   04/12/21 2200 -- -- -- -- 100 %   04/12/21 2053 98.3 °F (36.8 °C) 70 18 (!) 149/120 100 %       EKG interpretation: (Preliminary): Performed at 2053, and read at 2057  Ventricular rate 60 bpm,  ms, QRS duration 96 ms, QTC 4 and 65 ms. Interpretation: Normal sinus rhythm with nonspecific T wave abnormalities. Abnormal EKG. Records Reviewed: Nursing Notes and Old Medical Records    The patient presents with chest pain with a differential diagnosis of  abnormal EKG, ACS, arrhythmia, acute MI, pulmonary edema/CHF, chest wall pain, dyspnea and pnuemonia. Will assess with basic and cardiac work-up. Patient does not appear to be febrile or infectious at this point time. ED Course:       ED Course as of Apr 13 0255   Tue Apr 13, 2021   0250 Patient has some element of mild vascular engorgement with a troponin that is unchanged.   Her oxygen saturation is much improved. Patient does have a history of congestive heart failure and did miss her Lasix this afternoon. Given that the patient's oxygen saturation is improving she is nontoxic-appearing I believe that she can handle this as an outpatient. [CS]      ED Course User Index  [CS] Mary Alice Roman MD         Provider Notes (Medical Decision Making):  Patient is a pleasant 52-year-old female no past medical history significant for congestive heart failure with an ejection fraction of approximately 50% who presents after not taking her Lasix for at least 1 day. Patient was short of breath and had some bilateral vascular engorgement traces on x-ray. Patient is much improved in the emergency room is on her baseline level of oxygen. Her cardiac biomarkers are unchanged above they are chronic level of elevation and she is no longer in any acute distress whatsoever. She has adequate renal function and we are dosing her with Lasix here. MDM           Consultations:       Consultations: - NONE        Procedures and Critical Care       Performed by: Germán West MD  PROCEDURES:  Procedures       Disposition     Disposition: Condition resolved  DC- Adult Discharges: All of the diagnostic tests were reviewed and questions answered. Diagnosis, care plan and treatment options were discussed. The patient understands the instructions and will follow up as directed. The patients results have been reviewed with them. They have been counseled regarding their diagnosis. The patient verbally convey understanding and agreement of the signs, symptoms, diagnosis, treatment and prognosis and additionally agrees to follow up as recommended with their PCP in 24 - 48 hours. They also agree with the care-plan and convey that all of their questions have been answered.   I have also put together some discharge instructions for them that include: 1) educational information regarding their diagnosis, 2) how to care for their diagnosis at home, as well a 3) list of reasons why they would want to return to the ED prior to their follow-up appointment, should their condition change. DC-The patient was given verbal chest pain warning signs and instructions    Discharged    Remove if not discharged  DISCHARGE PLAN:  1. Current Discharge Medication List      CONTINUE these medications which have NOT CHANGED    Details   nitroglycerin (NITROSTAT) 0.4 mg SL tablet Sit/Lay down then put one tab under the tongue every 5 minutes as needed for chest pain/neck or jaw pain for 3 doses. Seek immediate medical attention should you need more than one tab for pain to resolve. Qty: 1 Bottle, Refills: 3      furosemide (LASIX) 40 mg tablet Take 0.5 Tabs by mouth daily as needed (edema). As needed for edema  Qty: 30 Tab, Refills: 0      apixaban (ELIQUIS) 5 mg tablet Take 1 Tab by mouth two (2) times a day. Qty: 30 Tab, Refills: 0      buPROPion XL (WELLBUTRIN XL) 150 mg tablet Take 1 Tab by mouth every morning. Qty: 30 Tab, Refills: 0      carvediloL (COREG) 25 mg tablet Take 1 Tab by mouth two (2) times daily (with meals). Qty: 60 Tab, Refills: 0      gabapentin (NEURONTIN) 300 mg capsule Take 1 Cap by mouth three (3) times daily. Max Daily Amount: 900 mg. Qty: 30 Cap, Refills: 0    Associated Diagnoses: Neuropathy      losartan (COZAAR) 100 mg tablet Take 1 Tab by mouth daily. Qty: 30 Tab, Refills: 0      oxybutynin (DITROPAN) 5 mg tablet Take 1 Tab by mouth three (3) times daily. Qty: 90 Tab, Refills: 0      amiodarone (PACERONE) 400 mg tablet Take 1 Tab by mouth daily. Qty: 14 Tab, Refills: 0      insulin glargine (LANTUS) 100 unit/mL injection 10 Units by SubCUTAneous route nightly. May give in pen  Indications: type 2 diabetes mellitus  Qty: 1 Vial, Refills: 0      melatonin 3 mg tablet Take 1 Tab by mouth nightly as needed for Sleep.   Qty: 30 Tab, Refills: 0      insulin needles, disposable, (Comfort EZ Pen Needles) 29 gauge x 1/2\" ndle 10 Units by Does Not Apply route nightly. Indications: e11.9  Qty: 30 Pen Needle, Refills: 0      QUEtiapine (SEROqueL) 25 mg tablet Take 25 mg by mouth nightly. theophylline ER (BAM-24) 200 mg cp24 capsule Take 300 mg by mouth daily. albuterol-ipratropium (DUO-NEB) 2.5 mg-0.5 mg/3 ml nebu 3 mL by Nebulization route every six (6) hours as needed for Wheezing. potassium chloride SR (KLOR-CON 10) 10 mEq tablet Take 10 mEq by mouth two (2) times a day. mometasone-formoterol (Dulera) 200-5 mcg/actuation HFA inhaler Take 2 Puffs by inhalation two (2) times a day. clopidogreL (PLAVIX) 75 mg tab Take 1 Tab by mouth daily. Qty: 30 Tab, Refills: 0      albuterol (Ventolin HFA) 90 mcg/actuation inhaler Take 2 Puffs by inhalation two (2) times daily as needed for Wheezing. pravastatin (PRAVACHOL) 40 mg tablet Take 40 mg by mouth nightly. 2.   Follow-up Information     Follow up With Specialties Details Why Contact Info    Doroteo Mckay MD Internal Medicine Call in 2 days  [address] [phone]          3. Return to ED if worse   4. Current Discharge Medication List          Diagnosis     Clinical Impression:   1. Congestive heart failure, unspecified HF chronicity, unspecified heart failure type [facility])        Attestations:    Rylee Herndon MD    Please note that this dictation was completed with [employer], the [employer] voice recognition software. Quite often unanticipated grammatical, syntax, homophones, and other interpretive errors are inadvertently transcribed by the computer software. Please disregard these errors. Please excuse any errors that have escaped final proofreading. Thank you.

## 2021-04-15 NOTE — ANESTHESIA POSTPROCEDURE EVALUATION
* No procedures listed *.    total IV anesthesia, MAC, general - backup    Anesthesia Post Evaluation      Multimodal analgesia: multimodal analgesia not used between 6 hours prior to anesthesia start to PACU discharge  Patient location during evaluation: PACU  Patient participation: complete - patient participated  Level of consciousness: awake and alert  Pain score: 0  Pain management: adequate  Airway patency: patent  Anesthetic complications: no  Cardiovascular status: acceptable, blood pressure returned to baseline and hemodynamically stable  Respiratory status: acceptable, spontaneous ventilation, unassisted, nasal cannula and nonlabored ventilation  Hydration status: acceptable  Post anesthesia nausea and vomiting:  none  Final Post Anesthesia Temperature Assessment:  Normothermia (36.0-37.5 degrees C)      INITIAL Post-op Vital signs:   Vitals Value Taken Time   /62 03/12/21 1330   Temp     Pulse 73 03/12/21 1330   Resp 19 03/12/21 1330   SpO2 96 % 03/12/21 1330

## 2021-05-27 ENCOUNTER — APPOINTMENT (OUTPATIENT)
Dept: CT IMAGING | Age: 71
End: 2021-05-27
Attending: EMERGENCY MEDICINE
Payer: MEDICARE

## 2021-05-27 ENCOUNTER — APPOINTMENT (OUTPATIENT)
Dept: GENERAL RADIOLOGY | Age: 71
End: 2021-05-27
Attending: EMERGENCY MEDICINE
Payer: MEDICARE

## 2021-05-27 ENCOUNTER — HOSPITAL ENCOUNTER (EMERGENCY)
Age: 71
Discharge: HOME | End: 2021-05-27
Attending: EMERGENCY MEDICINE
Payer: MEDICARE

## 2021-05-27 VITALS
WEIGHT: 237 LBS | TEMPERATURE: 98.5 F | RESPIRATION RATE: 15 BRPM | SYSTOLIC BLOOD PRESSURE: 183 MMHG | HEIGHT: 63 IN | OXYGEN SATURATION: 96 % | HEART RATE: 69 BPM | DIASTOLIC BLOOD PRESSURE: 72 MMHG | BODY MASS INDEX: 41.99 KG/M2

## 2021-05-27 DIAGNOSIS — R10.12 ABDOMINAL PAIN, LUQ (LEFT UPPER QUADRANT): Primary | ICD-10-CM

## 2021-05-27 LAB
ALBUMIN SERPL-MCNC: 3.1 G/DL (ref 3.5–5)
ALBUMIN/GLOB SERPL: 0.8 {RATIO} (ref 1.1–2.2)
ALP SERPL-CCNC: 128 U/L (ref 45–117)
ALT SERPL-CCNC: 29 U/L (ref 12–78)
ANION GAP SERPL CALC-SCNC: 6 MMOL/L (ref 5–15)
AST SERPL W P-5'-P-CCNC: 14 U/L (ref 15–37)
BASOPHILS # BLD: 0 K/UL (ref 0–0.1)
BASOPHILS NFR BLD: 0 % (ref 0–1)
BILIRUB SERPL-MCNC: 0.5 MG/DL (ref 0.2–1)
BUN SERPL-MCNC: 22 MG/DL (ref 6–20)
BUN/CREAT SERPL: 16 (ref 12–20)
CA-I BLD-MCNC: 8.3 MG/DL (ref 8.5–10.1)
CHLORIDE SERPL-SCNC: 104 MMOL/L (ref 97–108)
CO2 SERPL-SCNC: 33 MMOL/L (ref 21–32)
CREAT SERPL-MCNC: 1.4 MG/DL (ref 0.55–1.02)
DIFFERENTIAL METHOD BLD: ABNORMAL
EOSINOPHIL # BLD: 0.1 K/UL (ref 0–0.4)
EOSINOPHIL NFR BLD: 1 % (ref 0–7)
ERYTHROCYTE [DISTWIDTH] IN BLOOD BY AUTOMATED COUNT: 15.4 % (ref 11.5–14.5)
GLOBULIN SER CALC-MCNC: 3.9 G/DL (ref 2–4)
GLUCOSE SERPL-MCNC: 131 MG/DL (ref 65–100)
HCT VFR BLD AUTO: 39.6 % (ref 35–47)
HGB BLD-MCNC: 12 G/DL (ref 11.5–16)
IMM GRANULOCYTES # BLD AUTO: 0 K/UL (ref 0–0.04)
IMM GRANULOCYTES NFR BLD AUTO: 0 % (ref 0–0.5)
LIPASE SERPL-CCNC: 68 U/L (ref 73–393)
LYMPHOCYTES # BLD: 1.5 K/UL (ref 0.8–3.5)
LYMPHOCYTES NFR BLD: 15 % (ref 12–49)
MCH RBC QN AUTO: 27.5 PG (ref 26–34)
MCHC RBC AUTO-ENTMCNC: 30.3 G/DL (ref 30–36.5)
MCV RBC AUTO: 90.6 FL (ref 80–99)
MONOCYTES # BLD: 0.6 K/UL (ref 0–1)
MONOCYTES NFR BLD: 6 % (ref 5–13)
NEUTS SEG # BLD: 7.5 K/UL (ref 1.8–8)
NEUTS SEG NFR BLD: 78 % (ref 32–75)
PLATELET # BLD AUTO: 193 K/UL (ref 150–400)
PMV BLD AUTO: 12.2 FL (ref 8.9–12.9)
POTASSIUM SERPL-SCNC: 3.9 MMOL/L (ref 3.5–5.1)
PROT SERPL-MCNC: 7 G/DL (ref 6.4–8.2)
RBC # BLD AUTO: 4.37 M/UL (ref 3.8–5.2)
SODIUM SERPL-SCNC: 143 MMOL/L (ref 136–145)
TROPONIN I SERPL-MCNC: 0.06 NG/ML
WBC # BLD AUTO: 9.8 K/UL (ref 3.6–11)

## 2021-05-27 PROCEDURE — 84484 ASSAY OF TROPONIN QUANT: CPT

## 2021-05-27 PROCEDURE — 80053 COMPREHEN METABOLIC PANEL: CPT

## 2021-05-27 PROCEDURE — 85027 COMPLETE CBC AUTOMATED: CPT

## 2021-05-27 PROCEDURE — 74176 CT ABD & PELVIS W/O CONTRAST: CPT

## 2021-05-27 PROCEDURE — 74011250636 HC RX REV CODE- 250/636: Performed by: EMERGENCY MEDICINE

## 2021-05-27 PROCEDURE — 83690 ASSAY OF LIPASE: CPT

## 2021-05-27 PROCEDURE — 36415 COLL VENOUS BLD VENIPUNCTURE: CPT

## 2021-05-27 PROCEDURE — 96374 THER/PROPH/DIAG INJ IV PUSH: CPT

## 2021-05-27 PROCEDURE — 74019 RADEX ABDOMEN 2 VIEWS: CPT

## 2021-05-27 PROCEDURE — 99284 EMERGENCY DEPT VISIT MOD MDM: CPT

## 2021-05-27 PROCEDURE — 74011250637 HC RX REV CODE- 250/637: Performed by: EMERGENCY MEDICINE

## 2021-05-27 PROCEDURE — 93005 ELECTROCARDIOGRAM TRACING: CPT

## 2021-05-27 PROCEDURE — 74011000250 HC RX REV CODE- 250: Performed by: EMERGENCY MEDICINE

## 2021-05-27 RX ORDER — LIDOCAINE HYDROCHLORIDE 20 MG/ML
10 SOLUTION OROPHARYNGEAL
Status: COMPLETED | OUTPATIENT
Start: 2021-05-27 | End: 2021-05-27

## 2021-05-27 RX ORDER — LORAZEPAM 2 MG/ML
0.25 INJECTION INTRAMUSCULAR
Status: COMPLETED | OUTPATIENT
Start: 2021-05-27 | End: 2021-05-27

## 2021-05-27 RX ORDER — MAG HYDROX/ALUMINUM HYD/SIMETH 200-200-20
30 SUSPENSION, ORAL (FINAL DOSE FORM) ORAL
Status: COMPLETED | OUTPATIENT
Start: 2021-05-27 | End: 2021-05-27

## 2021-05-27 RX ADMIN — LIDOCAINE HYDROCHLORIDE 10 ML: 20 SOLUTION ORAL at 22:53

## 2021-05-27 RX ADMIN — LORAZEPAM 0.26 MG: 2 INJECTION INTRAMUSCULAR; INTRAVENOUS at 21:03

## 2021-05-27 RX ADMIN — ALUMINUM HYDROXIDE, MAGNESIUM HYDROXIDE, AND SIMETHICONE 30 ML: 200; 200; 20 SUSPENSION ORAL at 22:53

## 2021-05-27 NOTE — ED TRIAGE NOTES
Abdominal pain lower left quadrant with extra pressure making it harder to breathe. PT has history of CHF and COPD and is on O2 3 L NC at home.

## 2021-05-27 NOTE — ED TRIAGE NOTES
Pt also reports having to take 0.4 sublingual nitro last night for severe right arm pain. After taking the nitro the pain subsided.

## 2021-05-28 NOTE — DISCHARGE INSTRUCTIONS
Miralax take twice a day for 3-4 days. Thank you! Thank you for allowing me to care for you in the emergency department. I sincerely hope that you are satisfied with your visit today. It is my goal to provide you with excellent care. Below you will find a list of your labs and imaging from your visit today. Should you have any questions regarding these results please do not hesitate to call the emergency department. Labs -     Recent Results (from the past 12 hour(s))   METABOLIC PANEL, COMPREHENSIVE    Collection Time: 05/27/21  7:37 PM   Result Value Ref Range    Sodium 143 136 - 145 mmol/L    Potassium 3.9 3.5 - 5.1 mmol/L    Chloride 104 97 - 108 mmol/L    CO2 33 (H) 21 - 32 mmol/L    Anion gap 6 5 - 15 mmol/L    Glucose 131 (H) 65 - 100 mg/dL    BUN 22 (H) 6 - 20 mg/dL    Creatinine 1.40 (H) 0.55 - 1.02 mg/dL    BUN/Creatinine ratio 16 12 - 20      GFR est AA 45 (L) >60 ml/min/1.73m2    GFR est non-AA 37 (L) >60 ml/min/1.73m2    Calcium 8.3 (L) 8.5 - 10.1 mg/dL    Bilirubin, total 0.5 0.2 - 1.0 mg/dL    AST (SGOT) 14 (L) 15 - 37 U/L    ALT (SGPT) 29 12 - 78 U/L    Alk.  phosphatase 128 (H) 45 - 117 U/L    Protein, total 7.0 6.4 - 8.2 g/dL    Albumin 3.1 (L) 3.5 - 5.0 g/dL    Globulin 3.9 2.0 - 4.0 g/dL    A-G Ratio 0.8 (L) 1.1 - 2.2     TROPONIN I    Collection Time: 05/27/21  7:37 PM   Result Value Ref Range    Troponin-I, Qt. 0.06 (H) <0.05 ng/mL   LIPASE    Collection Time: 05/27/21  7:45 PM   Result Value Ref Range    Lipase 68 (L) 73 - 393 U/L   CBC W/O DIFF    Collection Time: 05/27/21  8:27 PM   Result Value Ref Range    WBC 9.8 3.6 - 11.0 K/uL    RBC 4.37 3.80 - 5.20 M/uL    HGB 12.0 11.5 - 16.0 g/dL    HCT 39.6 35.0 - 47.0 %    MCV 90.6 80.0 - 99.0 FL    MCH 27.5 26.0 - 34.0 PG    MCHC 30.3 30.0 - 36.5 g/dL    RDW 15.4 (H) 11.5 - 14.5 %    PLATELET 340 994 - 541 K/uL    MPV 12.2 8.9 - 12.9 FL   DIFFERENTIAL, AUTO    Collection Time: 05/27/21  8:27 PM   Result Value Ref Range    NEUTROPHILS 78 (H) 32 - 75 %    LYMPHOCYTES 15 12 - 49 %    MONOCYTES 6 5 - 13 %    EOSINOPHILS 1 0 - 7 %    BASOPHILS 0 0 - 1 %    IMMATURE GRANULOCYTES 0 0.0 - 0.5 %    ABS. NEUTROPHILS 7.5 1.8 - 8.0 K/UL    ABS. LYMPHOCYTES 1.5 0.8 - 3.5 K/UL    ABS. MONOCYTES 0.6 0.0 - 1.0 K/UL    ABS. EOSINOPHILS 0.1 0.0 - 0.4 K/UL    ABS. BASOPHILS 0.0 0.0 - 0.1 K/UL    ABS. IMM. GRANS. 0.0 0.00 - 0.04 K/UL    DF AUTOMATED         Radiologic Studies -   CT ABD PELV WO CONT   Final Result   No acute abdominopelvic abnormality. Diverticulosis without   diverticulitis. Aortoiliac arterial sclerosis. Partially visualized cardiomegaly   without harinder evidence of acute cardiac decompensation in the visualized lung   bases. XR ABD FLAT/ ERECT   Final Result   1. There is significant fecal retention within the right side of the colon   consistent with recent constipation. 2.  There is peripheral vascular disease with calcified plaque formation of the   distal abdominal aorta and involving the iliac arteries. There is an apparent   stent within the right common femoral artery and apparent stents within the left   common and external iliac arteries. CT Results  (Last 48 hours)                 05/27/21 2123  CT ABD PELV WO CONT Final result    Impression:  No acute abdominopelvic abnormality. Diverticulosis without   diverticulitis. Aortoiliac arterial sclerosis. Partially visualized cardiomegaly   without harinder evidence of acute cardiac decompensation in the visualized lung   bases. Narrative:  HISTORY:   left sided abdominal pain     Dose reduction technique: All CT scans at this facility are performed using dose reduction optimization   technique as appropriate on the exam including the following: Automated exposure   control, adjustment of the MA and/or KV according to patient size and/or use of   iterative reconstructive technique.        TECHNIQUE: CT of the abdomen and pelvis without contrast   COMPARISON: None LIMITATIONS: None       CHEST: No acute airspace process or pleural effusion seen at the lung bases. Cardiomegaly partially visualized           LIVER: Normal.        GALLBLADDER: Normal.        BILIARY TREE: Normal.           PANCREAS: Normal.            SPLEEN: Normal.           ADRENAL GLANDS: Normal.       KIDNEYS/URETERS/BLADDER: Normal.           RETROPERITONEUM/AORTA: Normal caliber with moderate areas of peripheral   aortoiliac calcification   BOWEL/MESENTERY: Diverticulosis of the distal colon without evidence of acute   diverticulitis. Bowel appears negative for acute abnormality. APPENDIX: The appendix is not identified with certainty; there are no secondary   changes of inflammation in the right lower quadrant to suggest acute   appendicitis. PERITONEAL CAVITY: Normal.          REPRODUCTIVE ORGANS: Hysterectomy   BONE/TISSUES: No acute abnormality. OTHER: None. CXR Results  (Last 48 hours)      None               If you feel that you have not received excellent quality care or timely care, please ask to speak to the nurse manager. Please choose us in the future for your continued health care needs. ------------------------------------------------------------------------------------------------------------  The exam and treatment you received in the Emergency Department were for an urgent problem and are not intended as complete care. It is important that you follow-up with a doctor, nurse practitioner, or physician assistant to:  (1) confirm your diagnosis,  (2) re-evaluation of changes in your illness and treatment, and  (3) for ongoing care. If your symptoms become worse or you do not improve as expected and you are unable to reach your usual health care provider, you should return to the Emergency Department. We are available 24 hours a day. Please take your discharge instructions with you when you go to your follow-up appointment.      If you have any problem arranging a follow-up appointment, contact the Emergency Department immediately. If a prescription has been provided, please have it filled as soon as possible to prevent a delay in treatment. Read the entire medication instruction sheet provided to you by the pharmacy. If you have any questions or reservations about taking the medication due to side effects or interactions with other medications, please call your primary care physician or contact the ER to speak with the charge nurse. Make an appointment with your family doctor or the physician you were referred to for follow-up of this visit as instructed on your discharge paperwork, as this is a mandatory follow-up. Return to the ER if you are unable to be seen or if you are unable to be seen in a timely manner. If you have any problem arranging the follow-up visit, contact the Emergency Department immediately.

## 2021-05-28 NOTE — ED PROVIDER NOTES
EMERGENCY DEPARTMENT HISTORY AND PHYSICAL EXAM      Date: 5/27/2021  Patient Name: Kaia Cobos      History of Presenting Illness     Chief Complaint   Patient presents with    Abdominal Pain    Shortness of Breath     o2 tank getting low. History Provided By: Patient and her daughter in  law    HPI: Kaia Cobos, 72 y.o. female with a past medical history significant for diverticulitis,tdiabetes, hypertension, hyperlipidemia, obesity, COPD and CHF on home oxygen 2L NC presents to the ED with cc of abdominal pain for 2 days and states that the left side of her abdomen looks swollen compared to the right side. Decreased p.o. intake today and states harder to breathe due to abdominal sx. Denies fever, vomiting, diarrhea, constipation or urinary sx. Denies chest pain. PCP: Speedy Schultz MD    Current Outpatient Medications   Medication Sig Dispense Refill    nitroglycerin (NITROSTAT) 0.4 mg SL tablet Sit/Lay down then put one tab under the tongue every 5 minutes as needed for chest pain/neck or jaw pain for 3 doses. Seek immediate medical attention should you need more than one tab for pain to resolve. 1 Bottle 3    furosemide (LASIX) 40 mg tablet Take 0.5 Tabs by mouth daily as needed (edema). As needed for edema 30 Tab 0    apixaban (ELIQUIS) 5 mg tablet Take 1 Tab by mouth two (2) times a day. 30 Tab 0    buPROPion XL (WELLBUTRIN XL) 150 mg tablet Take 1 Tab by mouth every morning. 30 Tab 0    carvediloL (COREG) 25 mg tablet Take 1 Tab by mouth two (2) times daily (with meals). 60 Tab 0    gabapentin (NEURONTIN) 300 mg capsule Take 1 Cap by mouth three (3) times daily. Max Daily Amount: 900 mg. 30 Cap 0    losartan (COZAAR) 100 mg tablet Take 1 Tab by mouth daily. 30 Tab 0    oxybutynin (DITROPAN) 5 mg tablet Take 1 Tab by mouth three (3) times daily. 90 Tab 0    amiodarone (PACERONE) 400 mg tablet Take 1 Tab by mouth daily.  14 Tab 0    insulin glargine (LANTUS) 100 unit/mL injection 10 Units by SubCUTAneous route nightly. May give in pen  Indications: type 2 diabetes mellitus 1 Vial 0    melatonin 3 mg tablet Take 1 Tab by mouth nightly as needed for Sleep. 30 Tab 0    insulin needles, disposable, (Comfort EZ Pen Needles) 29 gauge x 1/2\" ndle 10 Units by Does Not Apply route nightly. Indications: e11.9 30 Pen Needle 0    QUEtiapine (SEROqueL) 25 mg tablet Take 25 mg by mouth nightly.  theophylline ER (BAM-24) 200 mg cp24 capsule Take 300 mg by mouth daily.  albuterol-ipratropium (DUO-NEB) 2.5 mg-0.5 mg/3 ml nebu 3 mL by Nebulization route every six (6) hours as needed for Wheezing.  potassium chloride SR (KLOR-CON 10) 10 mEq tablet Take 10 mEq by mouth two (2) times a day.  mometasone-formoterol (Dulera) 200-5 mcg/actuation HFA inhaler Take 2 Puffs by inhalation two (2) times a day.  clopidogreL (PLAVIX) 75 mg tab Take 1 Tab by mouth daily. 30 Tab 0    albuterol (Ventolin HFA) 90 mcg/actuation inhaler Take 2 Puffs by inhalation two (2) times daily as needed for Wheezing.  pravastatin (PRAVACHOL) 40 mg tablet Take 40 mg by mouth nightly.          Past History     Past Medical History:  Past Medical History:   Diagnosis Date    Chronic obstructive pulmonary disease (HCC)     Congestive heart disease ([address]     Coronary artery disease     [facility] [address] [address]     Hyperlipidemia     Hypertension     Myocardial infarct (HCC)     IFTIKHAR (obstructive sleep apnea)        Past Surgical History:  Past Surgical History:   Procedure Laterality Date    HX BREAST BIOPSY      HX CHOLECYSTECTOMY      HX CORONARY ARTERY BYPASS GRAFT      HX HERNIA REPAIR      HX HYSTERECTOMY         Family History:  Family History   Problem Relation Age of Onset    Heart Disease Mother     Kidney Disease Mother     Cancer Maternal Grandmother        Social History:  Social History     Tobacco Use    Smoking status: Former Smoker    Smokeless tobacco: Never Used   Substance Use Topics    Alcohol use: Not Currently    Drug use: Never       Allergies: Allergies   Allergen Reactions    Penicillins Other (comments)    Tylox [Oxycodone-Acetaminophen] Hives         Review of Systems     Review of Systems   Constitutional: Negative for fever. Respiratory: Positive for shortness of breath. Gastrointestinal: Negative for blood in stool, constipation and vomiting. Genitourinary: Negative for dysuria and frequency. Skin: Negative for rash. All other systems reviewed and are negative. Physical Exam     Physical Exam  Vitals and nursing note reviewed. Constitutional:       Appearance: Normal appearance. She is obese. She is not toxic-appearing or diaphoretic. HENT:      Head: Normocephalic and atraumatic. Nose: Nose normal.      Mouth/Throat:      Mouth: Mucous membranes are moist.      Pharynx: No oropharyngeal exudate or posterior oropharyngeal erythema. Eyes:      General: No scleral icterus. Extraocular Movements: Extraocular movements intact. Pupils: Pupils are equal, round, and reactive to light. Cardiovascular:      Rate and Rhythm: Normal rate and regular rhythm. Pulses: Normal pulses. Heart sounds: Normal heart sounds. Pulmonary:      Effort: Pulmonary effort is normal.      Breath sounds: Normal breath sounds. No wheezing, rhonchi or rales. Abdominal:      General: Abdomen is protuberant. Bowel sounds are normal.      Palpations: Abdomen is soft. Tenderness: There is no abdominal tenderness. There is no right CVA tenderness, left CVA tenderness, guarding or rebound. Comments: Mult surgical scars     Musculoskeletal:         General: Normal range of motion. Cervical back: Normal range of motion and neck supple. Right lower leg: No edema. Left lower leg: No edema. Skin:     General: Skin is warm and dry. Findings: No rash. Neurological:      General: No focal deficit present.       Mental Status: She is alert and oriented to person, place, and time. Comments: Nl gross motor/sensory exam without any focal or lateralizing deficits   Psychiatric:         Mood and Affect: Mood normal.         Behavior: Behavior normal.         Lab and Diagnostic Study Results     Labs -  Recent Results (from the past 12 hour(s))   METABOLIC PANEL, COMPREHENSIVE    Collection Time: 05/27/21  7:37 PM   Result Value Ref Range    Sodium 143 136 - 145 mmol/L    Potassium 3.9 3.5 - 5.1 mmol/L    Chloride 104 97 - 108 mmol/L    CO2 33 (H) 21 - 32 mmol/L    Anion gap 6 5 - 15 mmol/L    Glucose 131 (H) 65 - 100 mg/dL    BUN 22 (H) 6 - 20 mg/dL    Creatinine 1.40 (H) 0.55 - 1.02 mg/dL    BUN/Creatinine ratio 16 12 - 20      GFR est AA 45 (L) >60 ml/min/1.73m2    GFR est non-AA 37 (L) >60 ml/min/1.73m2    Calcium 8.3 (L) 8.5 - 10.1 mg/dL    Bilirubin, total 0.5 0.2 - 1.0 mg/dL    AST (SGOT) 14 (L) 15 - 37 U/L    ALT (SGPT) 29 12 - 78 U/L    Alk. phosphatase 128 (H) 45 - 117 U/L    Protein, total 7.0 6.4 - 8.2 g/dL    Albumin 3.1 (L) 3.5 - 5.0 g/dL    Globulin 3.9 2.0 - 4.0 g/dL    A-G Ratio 0.8 (L) 1.1 - 2.2     TROPONIN I    Collection Time: 05/27/21  7:37 PM   Result Value Ref Range    Troponin-I, Qt. 0.06 (H) <0.05 ng/mL   LIPASE    Collection Time: 05/27/21  7:45 PM   Result Value Ref Range    Lipase 68 (L) 73 - 393 U/L   CBC W/O DIFF    Collection Time: 05/27/21  8:27 PM   Result Value Ref Range    WBC 9.8 3.6 - 11.0 K/uL    RBC 4.37 3.80 - 5.20 M/uL    HGB 12.0 11.5 - 16.0 g/dL    HCT 39.6 35.0 - 47.0 %    MCV 90.6 80.0 - 99.0 FL    MCH 27.5 26.0 - 34.0 PG    MCHC 30.3 30.0 - 36.5 g/dL    RDW 15.4 (H) 11.5 - 14.5 %    PLATELET 092 096 - 038 K/uL    MPV 12.2 8.9 - 12.9 FL   DIFFERENTIAL, AUTO    Collection Time: 05/27/21  8:27 PM   Result Value Ref Range    NEUTROPHILS 78 (H) 32 - 75 %    LYMPHOCYTES 15 12 - 49 %    MONOCYTES 6 5 - 13 %    EOSINOPHILS 1 0 - 7 %    BASOPHILS 0 0 - 1 %    IMMATURE GRANULOCYTES 0 0.0 - 0.5 %    ABS. NEUTROPHILS 7.5 1.8 - 8.0 K/UL    ABS. LYMPHOCYTES 1.5 0.8 - 3.5 K/UL    ABS. MONOCYTES 0.6 0.0 - 1.0 K/UL    ABS. EOSINOPHILS 0.1 0.0 - 0.4 K/UL    ABS. BASOPHILS 0.0 0.0 - 0.1 K/UL    ABS. IMM. GRANS. 0.0 0.00 - 0.04 K/UL    DF AUTOMATED            Radiologic Studies -   [unfilled]  CT Results  (Last 48 hours)               05/27/21 2123  CT ABD PELV WO CONT Final result    Impression:  No acute abdominopelvic abnormality. Diverticulosis without   diverticulitis. Aortoiliac arterial sclerosis. Partially visualized cardiomegaly   without harinder evidence of acute cardiac decompensation in the visualized lung   bases. Narrative:  HISTORY:   left sided abdominal pain     Dose reduction technique: All CT scans at this facility are performed using dose reduction optimization   technique as appropriate on the exam including the following: Automated exposure   control, adjustment of the MA and/or KV according to patient size and/or use of   iterative reconstructive technique. TECHNIQUE: CT of the abdomen and pelvis without contrast   COMPARISON: None   LIMITATIONS: None       CHEST: No acute airspace process or pleural effusion seen at the lung bases. Cardiomegaly partially visualized           LIVER: Normal.        GALLBLADDER: Normal.        BILIARY TREE: Normal.           PANCREAS: Normal.            SPLEEN: Normal.           ADRENAL GLANDS: Normal.       KIDNEYS/URETERS/BLADDER: Normal.           RETROPERITONEUM/AORTA: Normal caliber with moderate areas of peripheral   aortoiliac calcification   BOWEL/MESENTERY: Diverticulosis of the distal colon without evidence of acute   diverticulitis. Bowel appears negative for acute abnormality. APPENDIX: The appendix is not identified with certainty; there are no secondary   changes of inflammation in the right lower quadrant to suggest acute   appendicitis.    PERITONEAL CAVITY: Normal.          REPRODUCTIVE ORGANS: Hysterectomy   BONE/TISSUES: No acute abnormality. OTHER: None. CXR Results  (Last 48 hours)    None          Medical Decision Making and ED Course   - I am the first and primary provider for this patient AND AM THE PRIMARY PROVIDER OF RECORD. - I reviewed the vital signs, available nursing notes, past medical history, past surgical history, family history and social history. - Initial assessment performed. The patients presenting problems have been discussed, and the staff are in agreement with the care plan formulated and outlined with them. I have encouraged them to ask questions as they arise throughout their visit. Vital Signs-Reviewed the patient's vital signs. EKG interpretation: (Preliminary): Performed at 1906, and read at [address]: normal sinus rhythm; and regular . Rate (approx.): 62  ; Axis: normal; WV interval: nl, prominent P waves; QRS interval: prolonged; ST/T wave: normal; Other findings: abnormal ekg. Records Reviewed: Nursing Notes and Old Medical Records    The patient presents with abdominal pain with a differential diagnosis of abdominal pain, diverticulitis, gastritis, gastroenteritis, obstruction and constipation          Provider Notes (Medical Decision Making): The patient presents with abdominal pain with a differential diagnosis of abdominal pain, diverticulitis, gastritis, gastroenteritis, pancreatitis and dyspepsia or constipation. ED Course:    Pt given GI cocktail with some improvement. Labs above, reviewed. Fairly unremarkable. WBC 9.8, lipase nl, stable CKD  Some constipation on plain xrays. CT abdomen obtained with no acute findings. Possibly dyspepsia or constipation. Suggested OTC Miralax and PCP f/u. Disposition     Disposition: DC- Adult Discharges: All of the diagnostic tests were reviewed and questions answered. Diagnosis, care plan and treatment options were discussed.   The patient understands the instructions and will follow up as directed. The patients results have been reviewed with them. They have been counseled regarding their diagnosis. The patient and her daughter in law verbally convey understanding and agreement of the diagnosis, treatment and prognosis and additionally agrees to follow up as recommended with their PCP in 24 - 48 hours. They also agree with the care-plan and convey that all of their questions have been answered. I have also put together some discharge instructions for them that include: 1) educational information regarding their diagnosis, 2) how to care for their diagnosis at home, as well a 3) list of reasons why they would want to return to the ED prior to their follow-up appointment, should their condition change. Discharged    Remove if not discharged  DISCHARGE PLAN:  1. Current Discharge Medication List      CONTINUE these medications which have NOT CHANGED    Details   nitroglycerin (NITROSTAT) 0.4 mg SL tablet Sit/Lay down then put one tab under the tongue every 5 minutes as needed for chest pain/neck or jaw pain for 3 doses. Seek immediate medical attention should you need more than one tab for pain to resolve. Qty: 1 Bottle, Refills: 3      furosemide (LASIX) 40 mg tablet Take 0.5 Tabs by mouth daily as needed (edema). As needed for edema  Qty: 30 Tab, Refills: 0      apixaban (ELIQUIS) 5 mg tablet Take 1 Tab by mouth two (2) times a day. Qty: 30 Tab, Refills: 0      buPROPion XL (WELLBUTRIN XL) 150 mg tablet Take 1 Tab by mouth every morning. Qty: 30 Tab, Refills: 0      carvediloL (COREG) 25 mg tablet Take 1 Tab by mouth two (2) times daily (with meals). Qty: 60 Tab, Refills: 0      gabapentin (NEURONTIN) 300 mg capsule Take 1 Cap by mouth three (3) times daily. Max Daily Amount: 900 mg. Qty: 30 Cap, Refills: 0    Associated Diagnoses: Neuropathy      losartan (COZAAR) 100 mg tablet Take 1 Tab by mouth daily.   Qty: 30 Tab, Refills: 0      oxybutynin (DITROPAN) 5 mg tablet Take 1 Tab by mouth three (3) times daily. Qty: 90 Tab, Refills: 0      amiodarone (PACERONE) 400 mg tablet Take 1 Tab by mouth daily. Qty: 14 Tab, Refills: 0      insulin glargine (LANTUS) 100 unit/mL injection 10 Units by SubCUTAneous route nightly. May give in pen  Indications: type 2 diabetes mellitus  Qty: 1 Vial, Refills: 0      melatonin 3 mg tablet Take 1 Tab by mouth nightly as needed for Sleep. Qty: 30 Tab, Refills: 0      insulin needles, disposable, (Comfort EZ Pen Needles) 29 gauge x 1/2\" ndle 10 Units by Does Not Apply route nightly. Indications: e11.9  Qty: 30 Pen Needle, Refills: 0      QUEtiapine (SEROqueL) 25 mg tablet Take 25 mg by mouth nightly. theophylline ER (BAM-24) 200 mg cp24 capsule Take 300 mg by mouth daily. albuterol-ipratropium (DUO-NEB) 2.5 mg-0.5 mg/3 ml nebu 3 mL by Nebulization route every six (6) hours as needed for Wheezing. potassium chloride SR (KLOR-CON 10) 10 mEq tablet Take 10 mEq by mouth two (2) times a day. mometasone-formoterol (Dulera) 200-5 mcg/actuation HFA inhaler Take 2 Puffs by inhalation two (2) times a day. clopidogreL (PLAVIX) 75 mg tab Take 1 Tab by mouth daily. Qty: 30 Tab, Refills: 0      albuterol (Ventolin HFA) 90 mcg/actuation inhaler Take 2 Puffs by inhalation two (2) times daily as needed for Wheezing. pravastatin (PRAVACHOL) 40 mg tablet Take 40 mg by mouth nightly. 2.   Follow-up Information     Follow up With Specialties Details Why Marta Castaneda MD Internal Medicine  next week as scheduled [address] [facility] [id number] [phone]          3. Return to ED if worse   4. Discharge Medication List as of 5/27/2021 10:49 PM          Diagnosis     Clinical Impression:   1. Abdominal pain, LUQ (left upper quadrant)        Attestations:    Jakub Pendleton MD    Please note that this dictation was completed with [employer], the [employer] voice recognition software.   Quite often unanticipated grammatical, syntax, homophones, and other interpretive errors are inadvertently transcribed by the computer software. Please disregard these errors. Please excuse any errors that have escaped final proofreading. Thank you.

## 2021-06-01 LAB
ATRIAL RATE: 62 BPM
CALCULATED P AXIS, ECG09: 73 DEGREES
CALCULATED R AXIS, ECG10: 47 DEGREES
CALCULATED T AXIS, ECG11: 96 DEGREES
DIAGNOSIS, 93000: NORMAL
P-R INTERVAL, ECG05: 170 MS
Q-T INTERVAL, ECG07: 492 MS
QRS DURATION, ECG06: 106 MS
QTC CALCULATION (BEZET), ECG08: 499 MS
VENTRICULAR RATE, ECG03: 62 BPM

## 2021-06-07 ENCOUNTER — APPOINTMENT (OUTPATIENT)
Dept: CT IMAGING | Age: 71
End: 2021-06-07
Attending: EMERGENCY MEDICINE
Payer: COMMERCIAL

## 2021-06-07 ENCOUNTER — HOSPITAL ENCOUNTER (EMERGENCY)
Age: 71
Discharge: HOME | End: 2021-06-07
Attending: EMERGENCY MEDICINE
Payer: COMMERCIAL

## 2021-06-07 VITALS
DIASTOLIC BLOOD PRESSURE: 79 MMHG | BODY MASS INDEX: 42.17 KG/M2 | HEART RATE: 68 BPM | OXYGEN SATURATION: 98 % | TEMPERATURE: 99.1 F | SYSTOLIC BLOOD PRESSURE: 172 MMHG | RESPIRATION RATE: 16 BRPM | HEIGHT: 63 IN | WEIGHT: 238 LBS

## 2021-06-07 DIAGNOSIS — R10.12 ABDOMINAL PAIN, LUQ (LEFT UPPER QUADRANT): Primary | ICD-10-CM

## 2021-06-07 LAB
ALBUMIN SERPL-MCNC: 3 G/DL (ref 3.5–5)
ALBUMIN/GLOB SERPL: 0.8 {RATIO} (ref 1.1–2.2)
ALP SERPL-CCNC: 107 U/L (ref 45–117)
ALT SERPL-CCNC: 15 U/L (ref 12–78)
ANION GAP SERPL CALC-SCNC: 5 MMOL/L (ref 5–15)
APPEARANCE UR: CLEAR
AST SERPL W P-5'-P-CCNC: 7 U/L (ref 15–37)
BACTERIA URNS QL MICRO: NEGATIVE /HPF
BASOPHILS # BLD: 0 K/UL (ref 0–0.1)
BASOPHILS NFR BLD: 0 % (ref 0–1)
BILIRUB SERPL-MCNC: 0.5 MG/DL (ref 0.2–1)
BILIRUB UR QL: NEGATIVE
BUN SERPL-MCNC: 20 MG/DL (ref 6–20)
BUN/CREAT SERPL: 13 (ref 12–20)
CA-I BLD-MCNC: 8.7 MG/DL (ref 8.5–10.1)
CHLORIDE SERPL-SCNC: 108 MMOL/L (ref 97–108)
CO2 SERPL-SCNC: 30 MMOL/L (ref 21–32)
COLOR UR: ABNORMAL
CREAT SERPL-MCNC: 1.52 MG/DL (ref 0.55–1.02)
DIFFERENTIAL METHOD BLD: ABNORMAL
EOSINOPHIL # BLD: 0.1 K/UL (ref 0–0.4)
EOSINOPHIL NFR BLD: 1 % (ref 0–7)
ERYTHROCYTE [DISTWIDTH] IN BLOOD BY AUTOMATED COUNT: 15.7 % (ref 11.5–14.5)
GLOBULIN SER CALC-MCNC: 3.9 G/DL (ref 2–4)
GLUCOSE SERPL-MCNC: 107 MG/DL (ref 65–100)
GLUCOSE UR STRIP.AUTO-MCNC: NEGATIVE MG/DL
HCT VFR BLD AUTO: 42.7 % (ref 35–47)
HGB BLD-MCNC: 12.4 G/DL (ref 11.5–16)
HGB UR QL STRIP: NEGATIVE
IMM GRANULOCYTES # BLD AUTO: 0 K/UL (ref 0–0.04)
IMM GRANULOCYTES NFR BLD AUTO: 0 % (ref 0–0.5)
KETONES UR QL STRIP.AUTO: NEGATIVE MG/DL
LACTATE SERPL-SCNC: 1.3 MMOL/L (ref 0.4–2)
LEUKOCYTE ESTERASE UR QL STRIP.AUTO: NEGATIVE
LYMPHOCYTES # BLD: 1.1 K/UL (ref 0.8–3.5)
LYMPHOCYTES NFR BLD: 14 % (ref 12–49)
MCH RBC QN AUTO: 26.8 PG (ref 26–34)
MCHC RBC AUTO-ENTMCNC: 29 G/DL (ref 30–36.5)
MCV RBC AUTO: 92.4 FL (ref 80–99)
MONOCYTES # BLD: 0.4 K/UL (ref 0–1)
MONOCYTES NFR BLD: 5 % (ref 5–13)
MUCOUS THREADS URNS QL MICRO: ABNORMAL /LPF
NEUTS SEG # BLD: 6.5 K/UL (ref 1.8–8)
NEUTS SEG NFR BLD: 80 % (ref 32–75)
NITRITE UR QL STRIP.AUTO: NEGATIVE
NRBC # BLD: 0 K/UL (ref 0–0.01)
NRBC BLD-RTO: 0 PER 100 WBC
PH UR STRIP: 5 [PH] (ref 5–8)
PLATELET # BLD AUTO: 194 K/UL (ref 150–400)
PMV BLD AUTO: 12.8 FL (ref 8.9–12.9)
POTASSIUM SERPL-SCNC: 4.3 MMOL/L (ref 3.5–5.1)
PROT SERPL-MCNC: 6.9 G/DL (ref 6.4–8.2)
PROT UR STRIP-MCNC: 100 MG/DL
RBC # BLD AUTO: 4.62 M/UL (ref 3.8–5.2)
RBC #/AREA URNS HPF: ABNORMAL /HPF (ref 0–5)
SODIUM SERPL-SCNC: 143 MMOL/L (ref 136–145)
SP GR UR REFRACTOMETRY: 1.02 (ref 1–1.03)
UA: UC IF INDICATED,UAUC: ABNORMAL
UROBILINOGEN UR QL STRIP.AUTO: 0.1 EU/DL (ref 0.1–1)
WBC # BLD AUTO: 8.1 K/UL (ref 3.6–11)
WBC URNS QL MICRO: ABNORMAL /HPF (ref 0–4)

## 2021-06-07 PROCEDURE — 99284 EMERGENCY DEPT VISIT MOD MDM: CPT

## 2021-06-07 PROCEDURE — 74011000636 HC RX REV CODE- 636: Performed by: EMERGENCY MEDICINE

## 2021-06-07 PROCEDURE — 96374 THER/PROPH/DIAG INJ IV PUSH: CPT

## 2021-06-07 PROCEDURE — 74011250636 HC RX REV CODE- 250/636: Performed by: EMERGENCY MEDICINE

## 2021-06-07 PROCEDURE — 36415 COLL VENOUS BLD VENIPUNCTURE: CPT

## 2021-06-07 PROCEDURE — 83605 ASSAY OF LACTIC ACID: CPT

## 2021-06-07 PROCEDURE — 81001 URINALYSIS AUTO W/SCOPE: CPT

## 2021-06-07 PROCEDURE — 85025 COMPLETE CBC W/AUTO DIFF WBC: CPT

## 2021-06-07 PROCEDURE — 80053 COMPREHEN METABOLIC PANEL: CPT

## 2021-06-07 PROCEDURE — 74177 CT ABD & PELVIS W/CONTRAST: CPT

## 2021-06-07 RX ORDER — MORPHINE SULFATE 2 MG/ML
2 INJECTION, SOLUTION INTRAMUSCULAR; INTRAVENOUS ONCE
Status: COMPLETED | OUTPATIENT
Start: 2021-06-07 | End: 2021-06-07

## 2021-06-07 RX ADMIN — IOPAMIDOL 100 ML: 755 INJECTION, SOLUTION INTRAVENOUS at 19:05

## 2021-06-07 RX ADMIN — MORPHINE SULFATE 2 MG: 2 INJECTION, SOLUTION INTRAMUSCULAR; INTRAVENOUS at 17:39

## 2021-06-07 NOTE — ED TRIAGE NOTES
G[facility] 15 pt stated that she has an hernia that has been causing ABD pain; while in triage pt's sats in the low 80s on 4L/min; pt was placed on 6 L/min and sats went up to 98 %

## 2021-06-07 NOTE — ED PROVIDER NOTES
EMERGENCY DEPARTMENT HISTORY AND PHYSICAL EXAM      Date: 6/7/2021  Patient Name: Francy Torres      History of Presenting Illness     Chief Complaint   Patient presents with    Abdominal Pain       History Provided By: Patient    HPI: Francy Torres, 72 y.o. female with a past medical history significant diabetes, hypertension and hyperlipidemia with history of hysterectomy presents to the ED with cc of left upper quadrant abdominal pain and hernia. She states that it sharp, nonradiating made worse with movement and activity. It is relieved with nothing. She says it got worse acutely today was been going on for a while. It is tender to touch she says and she denies any fever, chills, chest pain, shortness of breath, rash, diarrhea, headache, night sweats. She does endorse some nausea. There are no other complaints, changes, or physical findings at this time. PCP: Rhea Gil MD    Current Outpatient Medications   Medication Sig Dispense Refill    nitroglycerin (NITROSTAT) 0.4 mg SL tablet Sit/Lay down then put one tab under the tongue every 5 minutes as needed for chest pain/neck or jaw pain for 3 doses. Seek immediate medical attention should you need more than one tab for pain to resolve. 1 Bottle 3    furosemide (LASIX) 40 mg tablet Take 0.5 Tabs by mouth daily as needed (edema). As needed for edema 30 Tab 0    apixaban (ELIQUIS) 5 mg tablet Take 1 Tab by mouth two (2) times a day. 30 Tab 0    buPROPion XL (WELLBUTRIN XL) 150 mg tablet Take 1 Tab by mouth every morning. 30 Tab 0    carvediloL (COREG) 25 mg tablet Take 1 Tab by mouth two (2) times daily (with meals). 60 Tab 0    gabapentin (NEURONTIN) 300 mg capsule Take 1 Cap by mouth three (3) times daily. Max Daily Amount: 900 mg. 30 Cap 0    losartan (COZAAR) 100 mg tablet Take 1 Tab by mouth daily. 30 Tab 0    oxybutynin (DITROPAN) 5 mg tablet Take 1 Tab by mouth three (3) times daily.  90 Tab 0    amiodarone (PACERONE) 400 mg tablet Take 1 Tab by mouth daily. 14 Tab 0    insulin glargine (LANTUS) 100 unit/mL injection 10 Units by SubCUTAneous route nightly. May give in pen  Indications: type 2 diabetes mellitus 1 Vial 0    melatonin 3 mg tablet Take 1 Tab by mouth nightly as needed for Sleep. 30 Tab 0    insulin needles, disposable, (Comfort EZ Pen Needles) 29 gauge x 1/2\" ndle 10 Units by Does Not Apply route nightly. Indications: e11.9 30 Pen Needle 0    QUEtiapine (SEROqueL) 25 mg tablet Take 25 mg by mouth nightly.  theophylline ER (BAM-24) 200 mg cp24 capsule Take 300 mg by mouth daily.  albuterol-ipratropium (DUO-NEB) 2.5 mg-0.5 mg/3 ml nebu 3 mL by Nebulization route every six (6) hours as needed for Wheezing.  potassium chloride SR (KLOR-CON 10) 10 mEq tablet Take 10 mEq by mouth two (2) times a day.  mometasone-formoterol (Dulera) 200-5 mcg/actuation HFA inhaler Take 2 Puffs by inhalation two (2) times a day.  clopidogreL (PLAVIX) 75 mg tab Take 1 Tab by mouth daily. 30 Tab 0    albuterol (Ventolin HFA) 90 mcg/actuation inhaler Take 2 Puffs by inhalation two (2) times daily as needed for Wheezing.  pravastatin (PRAVACHOL) 40 mg tablet Take 40 mg by mouth nightly.          Past History     Past Medical History:  Past Medical History:   Diagnosis Date    Chronic obstructive pulmonary disease (HCC)     [facility] [address]     Coronary artery disease     Diabetes ([facility] [address]     Hyperlipidemia     Hypertension     Myocardial infarct (HCC)     IFTIKHAR (obstructive sleep apnea)        Past Surgical History:  Past Surgical History:   Procedure Laterality Date    HX BREAST BIOPSY      HX CHOLECYSTECTOMY      HX CORONARY ARTERY BYPASS GRAFT      HX HERNIA REPAIR      HX HYSTERECTOMY         Family History:  Family History   Problem Relation Age of Onset    Heart Disease Mother     Kidney Disease Mother     Cancer Maternal Grandmother        Social History:  Social History     Tobacco Use    Smoking status: Former Smoker    Smokeless tobacco: Never Used   Substance Use Topics    Alcohol use: Not Currently    Drug use: Never       Allergies: Allergies   Allergen Reactions    Penicillins Other (comments)    Tylox [Oxycodone-Acetaminophen] Hives         Review of Systems     Review of Systems   Constitutional: Negative. Negative for appetite change, chills, fatigue and fever. HENT: Negative. Negative for congestion and sinus pain. Eyes: Negative. Negative for pain and visual disturbance. Respiratory: Negative. Negative for chest tightness and shortness of breath. Cardiovascular: Negative. Negative for chest pain. Gastrointestinal: Positive for abdominal pain and nausea. Negative for diarrhea and vomiting. Genitourinary: Negative. Negative for difficulty urinating. No discharge   Musculoskeletal: Negative. Negative for arthralgias. Skin: Negative. Negative for rash. Neurological: Negative. Negative for weakness and headaches. Hematological: Negative. Psychiatric/Behavioral: Negative. Negative for agitation. The patient is not nervous/anxious. All other systems reviewed and are negative. Physical Exam     Physical Exam  Vitals and nursing note reviewed. Constitutional:       General: She is not in acute distress. Appearance: She is well-developed. HENT:      Head: Normocephalic and atraumatic. Nose: Nose normal.      Mouth/Throat:      Mouth: Mucous membranes are moist.      Pharynx: Oropharynx is clear. No oropharyngeal exudate. Eyes:      General:         Right eye: No discharge. Left eye: No discharge. Conjunctiva/sclera: Conjunctivae normal.      Pupils: Pupils are equal, round, and reactive to light. Cardiovascular:      Rate and Rhythm: Normal rate and regular rhythm. Chest Wall: PMI is not displaced. No thrill. Heart sounds: Normal heart sounds. No murmur heard. No friction rub. No gallop. Pulmonary:      Effort: Pulmonary effort is normal. No respiratory distress. Breath sounds: Normal breath sounds. No wheezing or rales. Chest:      Chest wall: No tenderness. Abdominal:      General: Bowel sounds are normal. There is no distension. Palpations: Abdomen is soft. There is no mass. Tenderness: There is no abdominal tenderness. There is no guarding or rebound. Musculoskeletal:         General: Normal range of motion. Cervical back: Normal range of motion and neck supple. Lymphadenopathy:      Cervical: No cervical adenopathy. Skin:     General: Skin is warm and dry. Capillary Refill: Capillary refill takes less than 2 seconds. Findings: No erythema or rash. Neurological:      Mental Status: She is alert and oriented to person, place, and time. Cranial Nerves: No cranial nerve deficit. Coordination: Coordination normal.   Psychiatric:         Mood and Affect: Mood normal.         Behavior: Behavior normal.         Lab and Diagnostic Study Results     Labs -   No results found for this or any previous visit (from the past 12 hour(s)). Radiologic Studies -   [unfilled]  CT Results  (Last 48 hours)    None        CXR Results  (Last 48 hours)    None          Medical Decision Making and ED Course   - I am the first and primary provider for this patient AND AM THE PRIMARY PROVIDER OF RECORD. - I reviewed the vital signs, available nursing notes, past medical history, past surgical history, family history and social history. - Initial assessment performed. The patients presenting problems have been discussed, and the staff are in agreement with the care plan formulated and outlined with them. I have encouraged them to ask questions as they arise throughout their visit. Vital Signs-Reviewed the patient's vital signs. No data found.   MDM           Consultations:       Consultations: - NONE        Procedures and Critical Care       Performed by: Aaliyah Coto MD  PROCEDURES:  Procedures       Disposition     Disposition: Condition stable    Discharged    Remove if not discharged  DISCHARGE PLAN:  1. Current Discharge Medication List      CONTINUE these medications which have NOT CHANGED    Details   nitroglycerin (NITROSTAT) 0.4 mg SL tablet Sit/Lay down then put one tab under the tongue every 5 minutes as needed for chest pain/neck or jaw pain for 3 doses. Seek immediate medical attention should you need more than one tab for pain to resolve. Qty: 1 Bottle, Refills: 3      furosemide (LASIX) 40 mg tablet Take 0.5 Tabs by mouth daily as needed (edema). As needed for edema  Qty: 30 Tab, Refills: 0      apixaban (ELIQUIS) 5 mg tablet Take 1 Tab by mouth two (2) times a day. Qty: 30 Tab, Refills: 0      buPROPion XL (WELLBUTRIN XL) 150 mg tablet Take 1 Tab by mouth every morning. Qty: 30 Tab, Refills: 0      carvediloL (COREG) 25 mg tablet Take 1 Tab by mouth two (2) times daily (with meals). Qty: 60 Tab, Refills: 0      gabapentin (NEURONTIN) 300 mg capsule Take 1 Cap by mouth three (3) times daily. Max Daily Amount: 900 mg. Qty: 30 Cap, Refills: 0    Associated Diagnoses: Neuropathy      losartan (COZAAR) 100 mg tablet Take 1 Tab by mouth daily. Qty: 30 Tab, Refills: 0      oxybutynin (DITROPAN) 5 mg tablet Take 1 Tab by mouth three (3) times daily. Qty: 90 Tab, Refills: 0      amiodarone (PACERONE) 400 mg tablet Take 1 Tab by mouth daily. Qty: 14 Tab, Refills: 0      insulin glargine (LANTUS) 100 unit/mL injection 10 Units by SubCUTAneous route nightly. May give in pen  Indications: type 2 diabetes mellitus  Qty: 1 Vial, Refills: 0      melatonin 3 mg tablet Take 1 Tab by mouth nightly as needed for Sleep. Qty: 30 Tab, Refills: 0      insulin needles, disposable, (Comfort EZ Pen Needles) 29 gauge x 1/2\" ndle 10 Units by Does Not Apply route nightly.  Indications: e11.9  Qty: 30 Pen Needle, Refills: 0      QUEtiapine (SEROqueL) 25 mg tablet Take 25 mg by mouth nightly. theophylline ER (BAM-24) 200 mg cp24 capsule Take 300 mg by mouth daily. albuterol-ipratropium (DUO-NEB) 2.5 mg-0.5 mg/3 ml nebu 3 mL by Nebulization route every six (6) hours as needed for Wheezing. potassium chloride SR (KLOR-CON 10) 10 mEq tablet Take 10 mEq by mouth two (2) times a day. mometasone-formoterol (Dulera) 200-5 mcg/actuation HFA inhaler Take 2 Puffs by inhalation two (2) times a day. clopidogreL (PLAVIX) 75 mg tab Take 1 Tab by mouth daily. Qty: 30 Tab, Refills: 0      albuterol (Ventolin HFA) 90 mcg/actuation inhaler Take 2 Puffs by inhalation two (2) times daily as needed for Wheezing. pravastatin (PRAVACHOL) 40 mg tablet Take 40 mg by mouth nightly. 2.   Follow-up Information     Follow up With Specialties Details Why Contact Info    Mortimer Bonine, MD Internal Medicine Call in 2 days  [address] [phone]          3. Return to ED if worse   4. Discharge Medication List as of 6/7/2021  9:10 PM          Diagnosis     Clinical Impression:   1. Abdominal pain, LUQ (left upper quadrant)        Attestations:    Shanae Sadler MD    Please note that this dictation was completed with [employer], the [employer] voice recognition software. Quite often unanticipated grammatical, syntax, homophones, and other interpretive errors are inadvertently transcribed by the computer software. Please disregard these errors. Please excuse any errors that have escaped final proofreading. Thank you.

## 2021-06-08 NOTE — ED NOTES
[facility] all discharge instructions explained with pt's verbal understanding given.  All personal belongings taken

## 2021-06-21 ENCOUNTER — APPOINTMENT (OUTPATIENT)
Dept: GENERAL RADIOLOGY | Age: 71
DRG: 291 | End: 2021-06-21
Attending: EMERGENCY MEDICINE
Payer: MEDICARE

## 2021-06-21 ENCOUNTER — HOSPITAL ENCOUNTER (INPATIENT)
Age: 71
LOS: 8 days | Discharge: HOME HEALTH | DRG: 291 | End: 2021-06-29
Attending: STUDENT IN AN ORGANIZED HEALTH CARE EDUCATION/TRAINING PROGRAM | Admitting: FAMILY MEDICINE
Payer: MEDICARE

## 2021-06-21 DIAGNOSIS — I50.9 ACUTE ON CHRONIC CONGESTIVE HEART FAILURE, UNSPECIFIED HEART FAILURE TYPE (HCC): Primary | ICD-10-CM

## 2021-06-21 LAB
ALBUMIN SERPL-MCNC: 3 G/DL (ref 3.5–5)
ALBUMIN/GLOB SERPL: 0.8 {RATIO} (ref 1.1–2.2)
ALP SERPL-CCNC: 124 U/L (ref 45–117)
ALT SERPL-CCNC: 24 U/L (ref 12–78)
ANION GAP SERPL CALC-SCNC: 5 MMOL/L (ref 5–15)
AST SERPL W P-5'-P-CCNC: 12 U/L (ref 15–37)
BASOPHILS # BLD: 0 K/UL (ref 0–0.1)
BASOPHILS NFR BLD: 0 % (ref 0–1)
BILIRUB SERPL-MCNC: 0.5 MG/DL (ref 0.2–1)
BNP SERPL-MCNC: 1681 PG/ML
BUN SERPL-MCNC: 22 MG/DL (ref 6–20)
BUN/CREAT SERPL: 14 (ref 12–20)
CA-I BLD-MCNC: 8.4 MG/DL (ref 8.5–10.1)
CHLORIDE SERPL-SCNC: 109 MMOL/L (ref 97–108)
CK SERPL-CCNC: 70 NG/ML (ref 26–192)
CO2 SERPL-SCNC: 31 MMOL/L (ref 21–32)
CREAT SERPL-MCNC: 1.53 MG/DL (ref 0.55–1.02)
DIFFERENTIAL METHOD BLD: ABNORMAL
EOSINOPHIL # BLD: 0.1 K/UL (ref 0–0.4)
EOSINOPHIL NFR BLD: 1 % (ref 0–7)
ERYTHROCYTE [DISTWIDTH] IN BLOOD BY AUTOMATED COUNT: 15.9 % (ref 11.5–14.5)
GLOBULIN SER CALC-MCNC: 3.6 G/DL (ref 2–4)
GLUCOSE BLD STRIP.AUTO-MCNC: 158 MG/DL (ref 65–117)
GLUCOSE SERPL-MCNC: 132 MG/DL (ref 65–100)
HCT VFR BLD AUTO: 42 % (ref 35–47)
HGB BLD-MCNC: 12.1 G/DL (ref 11.5–16)
IMM GRANULOCYTES # BLD AUTO: 0 K/UL (ref 0–0.04)
IMM GRANULOCYTES NFR BLD AUTO: 0 % (ref 0–0.5)
LYMPHOCYTES # BLD: 1.1 K/UL (ref 0.8–3.5)
LYMPHOCYTES NFR BLD: 12 % (ref 12–49)
MCH RBC QN AUTO: 26.7 PG (ref 26–34)
MCHC RBC AUTO-ENTMCNC: 28.8 G/DL (ref 30–36.5)
MCV RBC AUTO: 92.7 FL (ref 80–99)
MONOCYTES # BLD: 0.5 K/UL (ref 0–1)
MONOCYTES NFR BLD: 6 % (ref 5–13)
NEUTS SEG # BLD: 6.9 K/UL (ref 1.8–8)
NEUTS SEG NFR BLD: 81 % (ref 32–75)
NRBC # BLD: 0 K/UL (ref 0–0.01)
NRBC BLD-RTO: 0 PER 100 WBC
PERFORMED BY, TECHID: ABNORMAL
PLATELET # BLD AUTO: 181 K/UL (ref 150–400)
PMV BLD AUTO: 12.9 FL (ref 8.9–12.9)
POTASSIUM SERPL-SCNC: 3.9 MMOL/L (ref 3.5–5.1)
PROT SERPL-MCNC: 6.6 G/DL (ref 6.4–8.2)
RBC # BLD AUTO: 4.53 M/UL (ref 3.8–5.2)
SODIUM SERPL-SCNC: 145 MMOL/L (ref 136–145)
TROPONIN I SERPL-MCNC: 0.06 NG/ML
WBC # BLD AUTO: 8.5 K/UL (ref 3.6–11)

## 2021-06-21 PROCEDURE — 80053 COMPREHEN METABOLIC PANEL: CPT

## 2021-06-21 PROCEDURE — 84484 ASSAY OF TROPONIN QUANT: CPT

## 2021-06-21 PROCEDURE — 93005 ELECTROCARDIOGRAM TRACING: CPT

## 2021-06-21 PROCEDURE — 36415 COLL VENOUS BLD VENIPUNCTURE: CPT

## 2021-06-21 PROCEDURE — 74011250636 HC RX REV CODE- 250/636: Performed by: STUDENT IN AN ORGANIZED HEALTH CARE EDUCATION/TRAINING PROGRAM

## 2021-06-21 PROCEDURE — 94640 AIRWAY INHALATION TREATMENT: CPT

## 2021-06-21 PROCEDURE — 99285 EMERGENCY DEPT VISIT HI MDM: CPT

## 2021-06-21 PROCEDURE — 74011250636 HC RX REV CODE- 250/636: Performed by: FAMILY MEDICINE

## 2021-06-21 PROCEDURE — 82550 ASSAY OF CK (CPK): CPT

## 2021-06-21 PROCEDURE — 74011636637 HC RX REV CODE- 636/637: Performed by: FAMILY MEDICINE

## 2021-06-21 PROCEDURE — 85025 COMPLETE CBC W/AUTO DIFF WBC: CPT

## 2021-06-21 PROCEDURE — 96374 THER/PROPH/DIAG INJ IV PUSH: CPT

## 2021-06-21 PROCEDURE — 74011250637 HC RX REV CODE- 250/637: Performed by: FAMILY MEDICINE

## 2021-06-21 PROCEDURE — 71045 X-RAY EXAM CHEST 1 VIEW: CPT

## 2021-06-21 PROCEDURE — 83880 ASSAY OF NATRIURETIC PEPTIDE: CPT

## 2021-06-21 PROCEDURE — 65270000029 HC RM PRIVATE

## 2021-06-21 PROCEDURE — 82962 GLUCOSE BLOOD TEST: CPT

## 2021-06-21 RX ORDER — BUDESONIDE AND FORMOTEROL FUMARATE DIHYDRATE 160; 4.5 UG/1; UG/1
2 AEROSOL RESPIRATORY (INHALATION)
Status: DISCONTINUED | OUTPATIENT
Start: 2021-06-21 | End: 2021-06-29 | Stop reason: HOSPADM

## 2021-06-21 RX ORDER — POLYETHYLENE GLYCOL 3350 17 G/17G
17 POWDER, FOR SOLUTION ORAL DAILY PRN
Status: DISCONTINUED | OUTPATIENT
Start: 2021-06-21 | End: 2021-06-29 | Stop reason: HOSPADM

## 2021-06-21 RX ORDER — ONDANSETRON 4 MG/1
4 TABLET, ORALLY DISINTEGRATING ORAL
Status: DISCONTINUED | OUTPATIENT
Start: 2021-06-21 | End: 2021-06-29 | Stop reason: HOSPADM

## 2021-06-21 RX ORDER — IPRATROPIUM BROMIDE AND ALBUTEROL SULFATE 2.5; .5 MG/3ML; MG/3ML
3 SOLUTION RESPIRATORY (INHALATION)
Status: DISCONTINUED | OUTPATIENT
Start: 2021-06-21 | End: 2021-06-29 | Stop reason: HOSPADM

## 2021-06-21 RX ORDER — HYDRALAZINE HYDROCHLORIDE 20 MG/ML
10 INJECTION INTRAMUSCULAR; INTRAVENOUS ONCE
Status: COMPLETED | OUTPATIENT
Start: 2021-06-21 | End: 2021-06-21

## 2021-06-21 RX ORDER — INSULIN LISPRO 100 [IU]/ML
INJECTION, SOLUTION INTRAVENOUS; SUBCUTANEOUS
Status: DISCONTINUED | OUTPATIENT
Start: 2021-06-21 | End: 2021-06-29 | Stop reason: HOSPADM

## 2021-06-21 RX ORDER — DEXTROSE 50 % IN WATER (D50W) INTRAVENOUS SYRINGE
25-50 AS NEEDED
Status: DISCONTINUED | OUTPATIENT
Start: 2021-06-21 | End: 2021-06-29 | Stop reason: HOSPADM

## 2021-06-21 RX ORDER — OXYBUTYNIN CHLORIDE 5 MG/1
5 TABLET ORAL 3 TIMES DAILY
Status: DISCONTINUED | OUTPATIENT
Start: 2021-06-21 | End: 2021-06-29 | Stop reason: HOSPADM

## 2021-06-21 RX ORDER — POTASSIUM CHLORIDE 750 MG/1
10 TABLET, FILM COATED, EXTENDED RELEASE ORAL 2 TIMES DAILY
Status: DISCONTINUED | OUTPATIENT
Start: 2021-06-21 | End: 2021-06-29 | Stop reason: HOSPADM

## 2021-06-21 RX ORDER — FUROSEMIDE 10 MG/ML
40 INJECTION INTRAMUSCULAR; INTRAVENOUS 2 TIMES DAILY
Status: DISCONTINUED | OUTPATIENT
Start: 2021-06-21 | End: 2021-06-23

## 2021-06-21 RX ORDER — METOPROLOL TARTRATE 100 MG/1
1 TABLET ORAL 2 TIMES DAILY WITH MEALS
COMMUNITY
Start: 2021-04-06 | End: 2021-06-29

## 2021-06-21 RX ORDER — MAGNESIUM SULFATE 100 %
4 CRYSTALS MISCELLANEOUS AS NEEDED
Status: DISCONTINUED | OUTPATIENT
Start: 2021-06-21 | End: 2021-06-29 | Stop reason: HOSPADM

## 2021-06-21 RX ORDER — ALBUTEROL SULFATE 90 UG/1
2 AEROSOL, METERED RESPIRATORY (INHALATION)
Status: DISCONTINUED | OUTPATIENT
Start: 2021-06-21 | End: 2021-06-22

## 2021-06-21 RX ORDER — FUROSEMIDE 10 MG/ML
40 INJECTION INTRAMUSCULAR; INTRAVENOUS
Status: COMPLETED | OUTPATIENT
Start: 2021-06-21 | End: 2021-06-21

## 2021-06-21 RX ORDER — QUETIAPINE FUMARATE 25 MG/1
25 TABLET, FILM COATED ORAL
Status: DISCONTINUED | OUTPATIENT
Start: 2021-06-21 | End: 2021-06-29 | Stop reason: HOSPADM

## 2021-06-21 RX ORDER — ONDANSETRON 2 MG/ML
4 INJECTION INTRAMUSCULAR; INTRAVENOUS
Status: DISCONTINUED | OUTPATIENT
Start: 2021-06-21 | End: 2021-06-29 | Stop reason: HOSPADM

## 2021-06-21 RX ORDER — INSULIN GLARGINE 100 [IU]/ML
10 INJECTION, SOLUTION SUBCUTANEOUS
Status: DISCONTINUED | OUTPATIENT
Start: 2021-06-21 | End: 2021-06-29 | Stop reason: HOSPADM

## 2021-06-21 RX ORDER — CLOPIDOGREL BISULFATE 75 MG/1
75 TABLET ORAL DAILY
Status: DISCONTINUED | OUTPATIENT
Start: 2021-06-22 | End: 2021-06-29 | Stop reason: HOSPADM

## 2021-06-21 RX ORDER — AMIODARONE HYDROCHLORIDE 200 MG/1
400 TABLET ORAL DAILY
Status: DISCONTINUED | OUTPATIENT
Start: 2021-06-22 | End: 2021-06-22

## 2021-06-21 RX ORDER — NITROGLYCERIN 0.4 MG/1
0.4 TABLET SUBLINGUAL AS NEEDED
Status: DISCONTINUED | OUTPATIENT
Start: 2021-06-21 | End: 2021-06-29 | Stop reason: HOSPADM

## 2021-06-21 RX ORDER — LOSARTAN POTASSIUM 50 MG/1
100 TABLET ORAL DAILY
Status: DISCONTINUED | OUTPATIENT
Start: 2021-06-22 | End: 2021-06-29 | Stop reason: HOSPADM

## 2021-06-21 RX ADMIN — POTASSIUM CHLORIDE 10 MEQ: 750 TABLET, FILM COATED, EXTENDED RELEASE ORAL at 23:56

## 2021-06-21 RX ADMIN — APIXABAN 5 MG: 5 TABLET, FILM COATED ORAL at 23:56

## 2021-06-21 RX ADMIN — INSULIN GLARGINE 10 UNITS: 100 INJECTION, SOLUTION SUBCUTANEOUS at 23:57

## 2021-06-21 RX ADMIN — HYDRALAZINE HYDROCHLORIDE 10 MG: 20 INJECTION INTRAMUSCULAR; INTRAVENOUS at 23:56

## 2021-06-21 RX ADMIN — FUROSEMIDE 40 MG: 10 INJECTION, SOLUTION INTRAMUSCULAR; INTRAVENOUS at 16:06

## 2021-06-21 RX ADMIN — BUDESONIDE AND FORMOTEROL FUMARATE DIHYDRATE 2 PUFF: 160; 4.5 AEROSOL RESPIRATORY (INHALATION) at 20:01

## 2021-06-21 RX ADMIN — FUROSEMIDE 40 MG: 10 INJECTION, SOLUTION INTRAVENOUS at 23:56

## 2021-06-21 RX ADMIN — QUETIAPINE FUMARATE 25 MG: 25 TABLET ORAL at 23:56

## 2021-06-21 RX ADMIN — OXYBUTYNIN CHLORIDE 5 MG: 5 TABLET ORAL at 23:57

## 2021-06-21 NOTE — H&P
History and Physical    NAME: Robbi Baumgarten   :  1950   MRN:  [id number]     Date/Time:  2021 7:28 PM    Patient PCP: Vanita Ordoñez MD  ______________________________________________________________________             Subjective:     CHIEF COMPLAINT:     Shortness of breath    HISTORY OF PRESENT ILLNESS:       Patient is a 72y.o. year old female signal past medical history of diabetes hypertension CAD status post CABG COPD CHF came to the ER complaining of chest pain shortness of breath for last 2 days also some swelling in the legs avoiding the patient for last 2 days worsening of the swelling in the leg and shortness of breath patient taking Lasix but not helping much intermittent symptoms seen by the ER physician and work-up done shows elevated BNP mildly elevated troponin likely chronic chronic kidney disease remained stable    Patient was admitted with acute congestive heart failure    Past Medical History:   Diagnosis Date    Chronic obstructive pulmonary disease (HCC)     [facility]     Coronary artery disease     Diabetes ([facility]     Hyperlipidemia     Hypertension     Myocardial infarct ([facility]     IFTIKHAR (obstructive sleep apnea)         Past Surgical History:   Procedure Laterality Date    HX BREAST BIOPSY      HX CHOLECYSTECTOMY      HX CORONARY ARTERY BYPASS GRAFT      HX HERNIA REPAIR      HX HYSTERECTOMY         Social History     Tobacco Use    Smoking status: Former Smoker    Smokeless tobacco: Never Used   Substance Use Topics    Alcohol use: Not Currently        Family History   Problem Relation Age of Onset    Heart Disease Mother     Kidney Disease Mother     Cancer Maternal Grandmother        Allergies   Allergen Reactions    Penicillins Other (comments)    Tylox [Oxycodone-Acetaminophen] Hives        Prior to Admission medications    Medication Sig Start Date End Date Taking?  Authorizing Provider   metoprolol tartrate (LOPRESSOR) 100 mg IR tablet Take 1 Tablet by mouth two (2) times daily (with meals). 4/6/21  Yes Provider, Historical   apixaban (ELIQUIS) 5 mg tablet Take 1 Tab by mouth two (2) times a day. 3/14/21  Yes Rebekah Hernandez NP   carvediloL (COREG) 25 mg tablet Take 1 Tab by mouth two (2) times daily (with meals). 3/14/21  Yes Rebekah Hernandez NP   losartan (COZAAR) 100 mg tablet Take 1 Tab by mouth daily. 3/15/21  Yes Rebekah Hernandez NP   oxybutynin (DITROPAN) 5 mg tablet Take 1 Tab by mouth three (3) times daily. 3/14/21  Yes Rebekah Hernandez NP   amiodarone (PACERONE) 400 mg tablet Take 1 Tab by mouth daily. 3/15/21  Yes Rebekah Hernandez NP   insulin glargine (LANTUS) 100 unit/mL injection 10 Units by SubCUTAneous route nightly. May give in pen  Indications: type 2 diabetes mellitus 3/14/21  Yes Rebekah Hernandez NP   QUEtiapine (SEROqueL) 25 mg tablet Take 25 mg by mouth nightly. Yes Provider, Historical   theophylline ER (BAM-24) 200 mg cp24 capsule Take 300 mg by mouth daily. Yes Provider, Historical   potassium chloride SR (KLOR-CON 10) 10 mEq tablet Take 10 mEq by mouth two (2) times a day. Yes Provider, Historical   mometasone-formoterol (Dulera) 200-5 mcg/actuation HFA inhaler Take 2 Puffs by inhalation two (2) times a day. Yes Provider, Historical   clopidogreL (PLAVIX) 75 mg tab Take 1 Tab by mouth daily. 2/27/21  Yes Catrachita Alvarado MD   nitroglycerin (NITROSTAT) 0.4 mg SL tablet Sit/Lay down then put one tab under the tongue every 5 minutes as needed for chest pain/neck or jaw pain for 3 doses. Seek immediate medical attention should you need more than one tab for pain to resolve. 3/15/21   Tori Del Toro MD   albuterol-ipratropium (DUO-NEB) 2.5 mg-0.5 mg/3 ml nebu 3 mL by Nebulization route every six (6) hours as needed for Wheezing. Provider, Historical   albuterol (Ventolin HFA) 90 mcg/actuation inhaler Take 2 Puffs by inhalation two (2) times daily as needed for Wheezing.     Provider, Historical Current Facility-Administered Medications:     [START ON 6/22/2021] amiodarone (CORDARONE) tablet 400 mg, 400 mg, Oral, DAILY, Mohiuddin, Curtis Halsted, MD    apixaban (ELIQUIS) tablet 5 mg, 5 mg, Oral, BID, Mohiuddin, Curtis Halsted, MD Gennette Slovak  [START ON 6/22/2021] clopidogreL (PLAVIX) tablet 75 mg, 75 mg, Oral, DAILY, Mohiuddin, Curtis Halsted, MD    insulin glargine (LANTUS) injection 10 Units, 10 Units, SubCUTAneous, QHS, Mohiuddin, Curtis Halsted, MD Gennette Slovak  [START ON 6/22/2021] losartan (COZAAR) tablet 100 mg, 100 mg, Oral, DAILY, Mohiuddin, Curtis Halsted, MD    nitroglycerin (NITROSTAT) tablet 0.4 mg, 0.4 mg, SubLINGual, PRN, Mohiuddin, Curtis Halsted, MD    oxybutynin (DITROPAN) tablet 5 mg, 5 mg, Oral, TID, Mohiuddin, Curtis Halsted, MD    albuterol-ipratropium (DUO-NEB) 2.5 MG-0.5 MG/3 ML, 3 mL, Nebulization, Q6H PRN, Mohiuddin, Curtis Halsted, MD    albuterol (PROVENTIL HFA, VENTOLIN HFA, PROAIR HFA) inhaler 2 Puff, 2 Puff, Inhalation, BID PRN, Mohiuddin, Curtis Halsted, MD    mometasone-formoterol (DULERA) 200mcg-5mcg/puff, 2 Puff, Inhalation, BID, Mohiuddin, Curtis Halsted, MD    potassium chloride SR (KLOR-CON 10) tablet 10 mEq, 10 mEq, Oral, BID, Mohiuddin, Curtis Halsted, MD    QUEtiapine (SEROquel) tablet 25 mg, 25 mg, Oral, QHS, Gopal Alvarado MD    [START ON 6/22/2021] theophylline ER (BAM-24) capsule 300 mg, 300 mg, Oral, DAILY, Mohiuddin, Curtis Halsted, MD    insulin lispro (HUMALOG) injection, , SubCUTAneous, AC&HS, Mohiuddin, Curtis Halsted, MD    glucose chewable tablet 16 g, 4 Tablet, Oral, PRN, Mohiuddin, Curtis Halsted, MD    dextrose (D50W) injection syrg 12.5-25 g, 25-50 mL, IntraVENous, PRN, Mohiuddin, Curtis Halsted, MD    glucagon (GLUCAGEN) injection 1 mg, 1 mg, IntraMUSCular, PRN, Mohiuddin, Curtis Halsted, MD    polyethylene glycol (MIRALAX) packet 17 g, 17 g, Oral, DAILY PRN, Mohiuddin, Curtis Halsted, MD    ondansetron (ZOFRAN ODT) tablet 4 mg, 4 mg, Oral, Q8H PRN **OR** ondansetron (ZOFRAN) injection 4 mg, 4 mg, IntraVENous, Q6H PRN, Gopal Alvarado MD    furosemide (LASIX) injection 40 mg, 40 mg, IntraVENous, BID, Gopal Alvarado MD    Current Outpatient Medications:     metoprolol tartrate (LOPRESSOR) 100 mg IR tablet, Take 1 Tablet by mouth two (2) times daily (with meals). , Disp: , Rfl:     apixaban (ELIQUIS) 5 mg tablet, Take 1 Tab by mouth two (2) times a day., Disp: 30 Tab, Rfl: 0    carvediloL (COREG) 25 mg tablet, Take 1 Tab by mouth two (2) times daily (with meals). , Disp: 60 Tab, Rfl: 0    losartan (COZAAR) 100 mg tablet, Take 1 Tab by mouth daily. , Disp: 30 Tab, Rfl: 0    oxybutynin (DITROPAN) 5 mg tablet, Take 1 Tab by mouth three (3) times daily. , Disp: 90 Tab, Rfl: 0    amiodarone (PACERONE) 400 mg tablet, Take 1 Tab by mouth daily. , Disp: 14 Tab, Rfl: 0    insulin glargine (LANTUS) 100 unit/mL injection, 10 Units by SubCUTAneous route nightly. May give in pen  Indications: type 2 diabetes mellitus, Disp: 1 Vial, Rfl: 0    QUEtiapine (SEROqueL) 25 mg tablet, Take 25 mg by mouth nightly., Disp: , Rfl:     theophylline ER (BAM-24) 200 mg cp24 capsule, Take 300 mg by mouth daily. , Disp: , Rfl:     potassium chloride SR (KLOR-CON 10) 10 mEq tablet, Take 10 mEq by mouth two (2) times a day., Disp: , Rfl:     mometasone-formoterol (Dulera) 200-5 mcg/actuation HFA inhaler, Take 2 Puffs by inhalation two (2) times a day., Disp: , Rfl:     clopidogreL (PLAVIX) 75 mg tab, Take 1 Tab by mouth daily. , Disp: 30 Tab, Rfl: 0    nitroglycerin (NITROSTAT) 0.4 mg SL tablet, Sit/Lay down then put one tab under the tongue every 5 minutes as needed for chest pain/neck or jaw pain for 3 doses.  Seek immediate medical attention should you need more than one tab for pain to resolve., Disp: 1 Bottle, Rfl: 3    albuterol-ipratropium (DUO-NEB) 2.5 mg-0.5 mg/3 ml nebu, 3 mL by Nebulization route every six (6) hours as needed for Wheezing., Disp: , Rfl:     albuterol (Ventolin HFA) 90 mcg/actuation inhaler, Take 2 Puffs by inhalation two (2) times daily as needed for Wheezing., Disp: , Rfl:     LAB DATA REVIEWED:    Recent Results (from the past 24 hour(s))   CBC WITH AUTOMATED DIFF    Collection Time: 06/21/21  2:00 PM   Result Value Ref Range    WBC 8.5 3.6 - 11.0 K/uL    RBC 4.53 3.80 - 5.20 M/uL    HGB 12.1 11.5 - 16.0 g/dL    HCT 42.0 35.0 - 47.0 %    MCV 92.7 80.0 - 99.0 FL    MCH 26.7 26.0 - 34.0 PG    MCHC 28.8 (L) 30.0 - 36.5 g/dL    RDW 15.9 (H) 11.5 - 14.5 %    PLATELET 411 859 - 732 K/uL    MPV 12.9 8.9 - 12.9 FL    NRBC 0.0 0.0  WBC    ABSOLUTE NRBC 0.00 0.00 - 0.01 K/uL    NEUTROPHILS 81 (H) 32 - 75 %    LYMPHOCYTES 12 12 - 49 %    MONOCYTES 6 5 - 13 %    EOSINOPHILS 1 0 - 7 %    BASOPHILS 0 0 - 1 %    IMMATURE GRANULOCYTES 0 0 - 0.5 %    ABS. NEUTROPHILS 6.9 1.8 - 8.0 K/UL    ABS. LYMPHOCYTES 1.1 0.8 - 3.5 K/UL    ABS. MONOCYTES 0.5 0.0 - 1.0 K/UL    ABS. EOSINOPHILS 0.1 0.0 - 0.4 K/UL    ABS. BASOPHILS 0.0 0.0 - 0.1 K/UL    ABS. IMM. GRANS. 0.0 0.00 - 0.04 K/UL    DF AUTOMATED     METABOLIC PANEL, COMPREHENSIVE    Collection Time: 06/21/21  2:00 PM   Result Value Ref Range    Sodium 145 136 - 145 mmol/L    Potassium 3.9 3.5 - 5.1 mmol/L    Chloride 109 (H) 97 - 108 mmol/L    CO2 31 21 - 32 mmol/L    Anion gap 5 5 - 15 mmol/L    Glucose 132 (H) 65 - 100 mg/dL    BUN 22 (H) 6 - 20 mg/dL    Creatinine 1.53 (H) 0.55 - 1.02 mg/dL    BUN/Creatinine ratio 14 12 - 20      GFR est AA 41 (L) >60 ml/min/1.73m2    GFR est non-AA 34 (L) >60 ml/min/1.73m2    Calcium 8.4 (L) 8.5 - 10.1 mg/dL    Bilirubin, total 0.5 0.2 - 1.0 mg/dL    AST (SGOT) 12 (L) 15 - 37 U/L    ALT (SGPT) 24 12 - 78 U/L    Alk.  phosphatase 124 (H) 45 - 117 U/L    Protein, total 6.6 6.4 - 8.2 g/dL    Albumin 3.0 (L) 3.5 - 5.0 g/dL    Globulin 3.6 2.0 - 4.0 g/dL    A-G Ratio 0.8 (L) 1.1 - 2.2     CK W/ REFLX CKMB    Collection Time: 06/21/21  2:00 PM   Result Value Ref Range    CK 70.0 26 - 192 ng/mL   TROPONIN I    Collection Time: 06/21/21  2:00 PM   Result Value Ref Range    Troponin-I, Qt. 0.06 (H) <0.05 ng/mL   BNP    Collection Time: 06/21/21  2:00 PM   Result Value Ref Range    NT pro-BNP 1,681 (H) <125 pg/mL       XR Results (most recent):  Results from [place] encounter on 06/21/21    XR CHEST PORT    Narrative  Portable upright radiograph of the chest 3:24 PM. No prior study. INDICATION: Shortness of breath. Patient is status post median sternotomy. Heart size is enlarged with an  atherosclerotic aorta. There is vascular congestion. There is fluid in the minor  fissure. Mild increased interstitial markings. No definite infiltrate. No  pneumothorax. Impression  Cardiomegaly with mild vascular congestion and increased  interstitial markings suggesting volume overload/CHF. XR CHEST PORT   Final Result   Cardiomegaly with mild vascular congestion and increased   interstitial markings suggesting volume overload/CHF. Review of Systems:  Constitutional: Negative for chills and fever. HENT: Negative. Eyes: Negative. Respiratory: Negative. Cardiovascular: Shortness of breath and chest pain   gastrointestinal: Negative for abdominal pain and nausea. Skin: Negative. Neurological: Negative. Objective:   VITALS:    Visit Vitals  BP (!) 136/98   Pulse (!) 55   Temp 98.9 °F (37.2 °C)   Resp 20   Ht 5' 3\" (1.6 m)   Wt 111.1 kg (245 lb)   SpO2 98%   BMI 43.40 kg/m²       Physical Exam:   Constitutional: pt is oriented to person, place, and time. HENT:   Head: Normocephalic and atraumatic. Eyes: Pupils are equal, round, and reactive to light. EOM are normal.   Cardiovascular: Normal rate, regular rhythm and normal heart sounds. Pulmonary/Chest: Decreased breath sounds bilaterally   abdominal: Soft. Bowel sounds are normal. There is no abdominal tenderness. There is no rebound and no guarding. Musculoskeletal: Normal range of motion. Neurological: pt is alert and oriented to person, place, and time. Alert. Normal strength. No cranial nerve deficit or sensory deficit.  Displays a negative Romberg sign.         ASSESSMENT & PLAN:    Acute congestive heart failure  CAD status post CABG  Mildly elevated troponin  Type 2 diabetes  Hypertension  COPD  Chronic kidney disease  Bradycardia  History of A. fib status post cardioversion  Depression    Admit to telemetry floor    Continue amiodarone 400 mg daily  Will hold Coreg due to bradycardia  Eliquis 5 mg twice a day  Plavix 75 mg daily  Lasix 40 mg twice a day  Lantus 10 units subcu daily  Sliding-scale insulin  Continue losartan 100 mg daily  Nebulizer treatment with Dulera  Oxybutynin 5 mg 3 times a day  Potassium 10 mg twice a day  Seroquel 25 mg at bedtime  Theophylline 300 mg daily      Cardiology and pulmonary consult  2D echo  Repeat the labs in the morning            ________________________________________________________________________    Signed: Cricket Massey MD

## 2021-06-21 NOTE — ED PROVIDER NOTES
EMERGENCY DEPARTMENT HISTORY AND PHYSICAL EXAM      Date: 6/21/2021  Patient Name: Johana Beck    History of Presenting Illness     Chief Complaint   Patient presents with    Shortness of Breath       History Provided By: Patient    HPI: Johana Beck, 72 y.o. female with a past medical history significant diabetes, hypertension and CAD, CHF presents to the ED with cc of chest pain, shortness of breath, lower extremity swelling. Patient states over the last 2 to 3 days has noticed worsening swelling to her lower extremities, worsening shortness of breath dyspnea on exertion. Patient states she has been taking her Lasix as prescribed however has not noted any relief. Patient also complaining of some midsternal chest pain, with radiating to left shoulder, intermittently over the last several weeks. Patient denies any fevers chills denies any abdominal pain nausea vomiting. There are no other complaints, changes, or physical findings at this time. PCP: Cleve Ruff MD    Current Outpatient Medications   Medication Sig Dispense Refill    nitroglycerin (NITROSTAT) 0.4 mg SL tablet Sit/Lay down then put one tab under the tongue every 5 minutes as needed for chest pain/neck or jaw pain for 3 doses. Seek immediate medical attention should you need more than one tab for pain to resolve. 1 Bottle 3    furosemide (LASIX) 40 mg tablet Take 0.5 Tabs by mouth daily as needed (edema). As needed for edema 30 Tab 0    apixaban (ELIQUIS) 5 mg tablet Take 1 Tab by mouth two (2) times a day. 30 Tab 0    buPROPion XL (WELLBUTRIN XL) 150 mg tablet Take 1 Tab by mouth every morning. 30 Tab 0    carvediloL (COREG) 25 mg tablet Take 1 Tab by mouth two (2) times daily (with meals). 60 Tab 0    gabapentin (NEURONTIN) 300 mg capsule Take 1 Cap by mouth three (3) times daily. Max Daily Amount: 900 mg. 30 Cap 0    losartan (COZAAR) 100 mg tablet Take 1 Tab by mouth daily.  30 Tab 0    oxybutynin (DITROPAN) 5 mg tablet Take 1 Tab by mouth three (3) times daily. 90 Tab 0    amiodarone (PACERONE) 400 mg tablet Take 1 Tab by mouth daily. 14 Tab 0    insulin glargine (LANTUS) 100 unit/mL injection 10 Units by SubCUTAneous route nightly. May give in pen  Indications: type 2 diabetes mellitus 1 Vial 0    melatonin 3 mg tablet Take 1 Tab by mouth nightly as needed for Sleep. 30 Tab 0    insulin needles, disposable, (Comfort EZ Pen Needles) 29 gauge x 1/2\" ndle 10 Units by Does Not Apply route nightly. Indications: e11.9 30 Pen Needle 0    QUEtiapine (SEROqueL) 25 mg tablet Take 25 mg by mouth nightly.  theophylline ER (BAM-24) 200 mg cp24 capsule Take 300 mg by mouth daily.  albuterol-ipratropium (DUO-NEB) 2.5 mg-0.5 mg/3 ml nebu 3 mL by Nebulization route every six (6) hours as needed for Wheezing.  potassium chloride SR (KLOR-CON 10) 10 mEq tablet Take 10 mEq by mouth two (2) times a day.  mometasone-formoterol (Dulera) 200-5 mcg/actuation HFA inhaler Take 2 Puffs by inhalation two (2) times a day.  clopidogreL (PLAVIX) 75 mg tab Take 1 Tab by mouth daily. 30 Tab 0    albuterol (Ventolin HFA) 90 mcg/actuation inhaler Take 2 Puffs by inhalation two (2) times daily as needed for Wheezing.  pravastatin (PRAVACHOL) 40 mg tablet Take 40 mg by mouth nightly.          Past History     Past Medical History:  Past Medical History:   Diagnosis Date    Chronic obstructive pulmonary disease ([address]     Congestive heart disease ([address]     Coronary artery disease     Diabetes ([address]     Hyperlipidemia     Hypertension     Myocardial infarct (HCC)     IFTIKHAR (obstructive sleep apnea)        Past Surgical History:  Past Surgical History:   Procedure Laterality Date    HX BREAST BIOPSY      HX CHOLECYSTECTOMY      HX CORONARY ARTERY BYPASS GRAFT      HX HERNIA REPAIR      HX HYSTERECTOMY         Family History:  Family History   Problem Relation Age of Onset    Heart Disease Mother     Kidney Disease Mother  Cancer Maternal Grandmother        Social History:  Social History     Tobacco Use    Smoking status: Former Smoker    Smokeless tobacco: Never Used   Substance Use Topics    Alcohol use: Not Currently    Drug use: Never       Allergies: Allergies   Allergen Reactions    Penicillins Other (comments)    Tylox [Oxycodone-Acetaminophen] Hives         Review of Systems     Review of Systems   Constitutional: Negative for activity change, chills, fatigue and fever. HENT: Negative for congestion and trouble swallowing. Eyes: Negative for photophobia and visual disturbance. Respiratory: Positive for shortness of breath. Negative for cough and chest tightness. Cardiovascular: Positive for chest pain. Negative for palpitations. Gastrointestinal: Negative for abdominal distention, abdominal pain, diarrhea, nausea and vomiting. Genitourinary: Negative for dysuria, flank pain and frequency. Musculoskeletal: Negative for arthralgias, back pain and myalgias. Skin: Negative for color change, pallor and rash. Neurological: Negative for dizziness, tremors, weakness and headaches. Psychiatric/Behavioral: Negative for confusion. Physical Exam     Physical Exam  Vitals and nursing note reviewed. Constitutional:       General: She is not in acute distress. Appearance: Normal appearance. She is normal weight. She is not ill-appearing. HENT:      Head: Normocephalic and atraumatic. Nose: Nose normal.      Mouth/Throat:      Mouth: Mucous membranes are moist.   Eyes:      Extraocular Movements: Extraocular movements intact. Pupils: Pupils are equal, round, and reactive to light. Cardiovascular:      Rate and Rhythm: Normal rate and regular rhythm. Pulses: Normal pulses. Pulmonary:      Effort: Pulmonary effort is normal.   Chest:      Chest wall: No tenderness or edema. Abdominal:      General: Abdomen is flat. Bowel sounds are normal.      Palpations: Abdomen is soft. Tenderness: There is no abdominal tenderness. There is no guarding. Musculoskeletal:         General: No tenderness. Normal range of motion. Cervical back: Normal range of motion and neck supple. No muscular tenderness. Right lower leg: Edema present. Left lower leg: Edema present. Skin:     General: Skin is warm and dry. Neurological:      General: No focal deficit present. Mental Status: She is alert and oriented to person, place, and time. Sensory: No sensory deficit. Motor: No weakness. Diagnostic Study Results     Labs -     Recent Results (from the past 12 hour(s))   CBC WITH AUTOMATED DIFF    Collection Time: 06/21/21  2:00 PM   Result Value Ref Range    WBC 8.5 3.6 - 11.0 K/uL    RBC 4.53 3.80 - 5.20 M/uL    HGB 12.1 11.5 - 16.0 g/dL    HCT 42.0 35.0 - 47.0 %    MCV 92.7 80.0 - 99.0 FL    MCH 26.7 26.0 - 34.0 PG    MCHC 28.8 (L) 30.0 - 36.5 g/dL    RDW 15.9 (H) 11.5 - 14.5 %    PLATELET 526 992 - 723 K/uL    MPV 12.9 8.9 - 12.9 FL    NRBC 0.0 0.0  WBC    ABSOLUTE NRBC 0.00 0.00 - 0.01 K/uL    NEUTROPHILS 81 (H) 32 - 75 %    LYMPHOCYTES 12 12 - 49 %    MONOCYTES 6 5 - 13 %    EOSINOPHILS 1 0 - 7 %    BASOPHILS 0 0 - 1 %    IMMATURE GRANULOCYTES 0 0 - 0.5 %    ABS. NEUTROPHILS 6.9 1.8 - 8.0 K/UL    ABS. LYMPHOCYTES 1.1 0.8 - 3.5 K/UL    ABS. MONOCYTES 0.5 0.0 - 1.0 K/UL    ABS. EOSINOPHILS 0.1 0.0 - 0.4 K/UL    ABS. BASOPHILS 0.0 0.0 - 0.1 K/UL    ABS. IMM.  GRANS. 0.0 0.00 - 0.04 K/UL    DF AUTOMATED     METABOLIC PANEL, COMPREHENSIVE    Collection Time: 06/21/21  2:00 PM   Result Value Ref Range    Sodium 145 136 - 145 mmol/L    Potassium 3.9 3.5 - 5.1 mmol/L    Chloride 109 (H) 97 - 108 mmol/L    CO2 31 21 - 32 mmol/L    Anion gap 5 5 - 15 mmol/L    Glucose 132 (H) 65 - 100 mg/dL    BUN 22 (H) 6 - 20 mg/dL    Creatinine 1.53 (H) 0.55 - 1.02 mg/dL    BUN/Creatinine ratio 14 12 - 20      GFR est AA 41 (L) >60 ml/min/1.73m2    GFR est non-AA 34 (L) >60 ml/min/1.73m2    Calcium 8.4 (L) 8.5 - 10.1 mg/dL    Bilirubin, total 0.5 0.2 - 1.0 mg/dL    AST (SGOT) 12 (L) 15 - 37 U/L    ALT (SGPT) 24 12 - 78 U/L    Alk. phosphatase 124 (H) 45 - 117 U/L    Protein, total 6.6 6.4 - 8.2 g/dL    Albumin 3.0 (L) 3.5 - 5.0 g/dL    Globulin 3.6 2.0 - 4.0 g/dL    A-G Ratio 0.8 (L) 1.1 - 2.2     CK W/ REFLX CKMB    Collection Time: 06/21/21  2:00 PM   Result Value Ref Range    CK 70.0 26 - 192 ng/mL   TROPONIN I    Collection Time: 06/21/21  2:00 PM   Result Value Ref Range    Troponin-I, Qt. 0.06 (H) <0.05 ng/mL   BNP    Collection Time: 06/21/21  2:00 PM   Result Value Ref Range    NT pro-BNP 1,681 (H) <125 pg/mL       Radiologic Studies -   [unfilled]  CT Results  (Last 48 hours)    None        CXR Results  (Last 48 hours)               06/21/21 1526  XR CHEST PORT Final result    Impression:  Cardiomegaly with mild vascular congestion and increased   interstitial markings suggesting volume overload/CHF. Narrative:  Portable upright radiograph of the chest 3:24 PM. No prior study. INDICATION: Shortness of breath. Patient is status post median sternotomy. Heart size is enlarged with an   atherosclerotic aorta. There is vascular congestion. There is fluid in the minor   fissure. Mild increased interstitial markings. No definite infiltrate. No   pneumothorax. Medical Decision Making and ED Course   I am the first provider for this patient. I reviewed the vital signs, available nursing notes, past medical history, past surgical history, family history and social history. Vital Signs-Reviewed the patient's vital signs.   Patient Vitals for the past 12 hrs:   Temp Pulse Resp BP SpO2   06/21/21 1316 98.9 °F (37.2 °C) 61 22 (!) 187/78 (!) 87 %       EKG interpretation: (Preliminary)  Normal sinus rhythm 68, no ST elevations, normal axis    Records Reviewed: Nursing Notes and Old Medical Records    The patient presents with burns of breath, lower extremity swelling with a differential diagnosis of CHF exacerbation, fluid overload, ACS      Provider Notes (Medical Decision Making):     MDM     71-year-old female, history of hypertension, CAD, presents to emergency department for worsening shortness of breath, lower extremity swelling. Physical exam shows well-appearing female, no distress, was documented hypoxic at 87% however with 4 L patient's oxygenation currently 95%. Patient states she is currently on 2 L at home but has been noted to require increased oxygen support    Vital signs show mild hypertension 1 8778, afebrile, otherwise physical exam shows lower extremity swelling clear breath sounds bilateral.    Cardiac enzymes ordered,  ED Course:   Initial assessment performed. The patients presenting problems have been discussed, and they are in agreement with the care plan formulated and outlined with them. I have encouraged them to ask questions as they arise throughout their visit. ED Course as of Jun 21 1733 Mon Jun 21, 2021   1655 Patient's lab work reviewed, troponin mildly elevated at 1.06 which appears to be patient's baseline, BNP 1600 which is at patient's baseline, chest x-ray shows mild engorgement of pulmonary vasculature with some interstitial edema. Patient given Lasix 40 mg IV, will continue to observe his patient continues to be short of breath admit for CHF exacerbation. [PZ]   5430 Patient has diuresed approximately 200 mils, still complaining of shortness of breath difficulty laying flat. Given continued dyspnea will admit patient for CHF exacerbation. [PZ]   1730 Discussed case with Dr. Fabiano Serrano, will admit patient for CHF exacerbation. [PZ]      ED Course User Index  [PZ] Irasema Judd MD         Procedures       Anthony Mckenzie MD  Procedures               Disposition       Admitted    3. Return to ED if worse     Diagnosis     Clinical Impression:   1.  Acute on chronic congestive heart failure, unspecified heart failure type [facility])        Attestations:    Jermain Chong MD    Please note that this dictation was completed with [employer], the computer voice recognition software. Quite often unanticipated grammatical, syntax, homophones, and other interpretive errors are inadvertently transcribed by the computer software. Please disregard these errors. Please excuse any errors that have escaped final proofreading. Thank you.

## 2021-06-21 NOTE — ED NOTES
Assumed care of patient. A&Ox4; no acute distress; no respiratory distress. Provided patient with nutrition.

## 2021-06-21 NOTE — ED TRIAGE NOTES
Pt seen by Dr. Elizabeth Bonilla and sent to the ED for further eval.  Low sats and lung congestion.

## 2021-06-21 NOTE — Clinical Note
Status[de-identified] INPATIENT [101]   Type of Bed: Medical [8]   Inpatient Hospitalization Certified Necessary for the Following Reasons: 3.  Patient receiving treatment that can only be provided in an inpatient setting (further clarification in H&P documentation)   Admitting Diagnosis: CHF exacerbation [facility]) [[id number]]   Admitting Physician: Marisela Lock [[id number]]   Attending Physician: Marisela Lock [[id number]]   Estimated Length of Stay: 2 Midnights   Discharge Plan[de-identified] Home with Office Follow-up

## 2021-06-22 ENCOUNTER — APPOINTMENT (OUTPATIENT)
Dept: NON INVASIVE DIAGNOSTICS | Age: 71
DRG: 291 | End: 2021-06-22
Attending: FAMILY MEDICINE
Payer: MEDICARE

## 2021-06-22 LAB
ALBUMIN SERPL-MCNC: 2.9 G/DL (ref 3.5–5)
ALBUMIN/GLOB SERPL: 0.8 {RATIO} (ref 1.1–2.2)
ALP SERPL-CCNC: 114 U/L (ref 45–117)
ALT SERPL-CCNC: 21 U/L (ref 12–78)
ANION GAP SERPL CALC-SCNC: 7 MMOL/L (ref 5–15)
AST SERPL W P-5'-P-CCNC: 9 U/L (ref 15–37)
ATRIAL RATE: 60 BPM
ATRIAL RATE: 68 BPM
BASOPHILS # BLD: 0.1 K/UL (ref 0–0.1)
BASOPHILS NFR BLD: 1 % (ref 0–1)
BILIRUB SERPL-MCNC: 0.6 MG/DL (ref 0.2–1)
BNP SERPL-MCNC: 2271 PG/ML
BUN SERPL-MCNC: 19 MG/DL (ref 6–20)
BUN/CREAT SERPL: 15 (ref 12–20)
CA-I BLD-MCNC: 9 MG/DL (ref 8.5–10.1)
CALCULATED P AXIS, ECG09: 64 DEGREES
CALCULATED P AXIS, ECG09: 71 DEGREES
CALCULATED R AXIS, ECG10: 30 DEGREES
CALCULATED R AXIS, ECG10: 34 DEGREES
CALCULATED T AXIS, ECG11: 116 DEGREES
CALCULATED T AXIS, ECG11: 117 DEGREES
CHLORIDE SERPL-SCNC: 106 MMOL/L (ref 97–108)
CO2 SERPL-SCNC: 33 MMOL/L (ref 21–32)
CREAT SERPL-MCNC: 1.27 MG/DL (ref 0.55–1.02)
DIAGNOSIS, 93000: NORMAL
DIAGNOSIS, 93000: NORMAL
DIFFERENTIAL METHOD BLD: ABNORMAL
EOSINOPHIL # BLD: 0.1 K/UL (ref 0–0.4)
EOSINOPHIL NFR BLD: 1 % (ref 0–7)
ERYTHROCYTE [DISTWIDTH] IN BLOOD BY AUTOMATED COUNT: 15.7 % (ref 11.5–14.5)
EST. AVERAGE GLUCOSE BLD GHB EST-MCNC: 154 MG/DL
GLOBULIN SER CALC-MCNC: 3.7 G/DL (ref 2–4)
GLUCOSE BLD STRIP.AUTO-MCNC: 105 MG/DL (ref 65–117)
GLUCOSE BLD STRIP.AUTO-MCNC: 127 MG/DL (ref 65–117)
GLUCOSE BLD STRIP.AUTO-MCNC: 227 MG/DL (ref 65–117)
GLUCOSE SERPL-MCNC: 99 MG/DL (ref 65–100)
HBA1C MFR BLD: 7 % (ref 4–5.6)
HCT VFR BLD AUTO: 43.9 % (ref 35–47)
HGB BLD-MCNC: 12.8 G/DL (ref 11.5–16)
IMM GRANULOCYTES # BLD AUTO: 0 K/UL (ref 0–0.04)
IMM GRANULOCYTES NFR BLD AUTO: 0 % (ref 0–0.5)
LYMPHOCYTES # BLD: 1.3 K/UL (ref 0.8–3.5)
LYMPHOCYTES NFR BLD: 14 % (ref 12–49)
MCH RBC QN AUTO: 26.3 PG (ref 26–34)
MCHC RBC AUTO-ENTMCNC: 29.2 G/DL (ref 30–36.5)
MCV RBC AUTO: 90.1 FL (ref 80–99)
MONOCYTES # BLD: 0.7 K/UL (ref 0–1)
MONOCYTES NFR BLD: 8 % (ref 5–13)
NEUTS SEG # BLD: 6.7 K/UL (ref 1.8–8)
NEUTS SEG NFR BLD: 76 % (ref 32–75)
NRBC # BLD: 0 K/UL (ref 0–0.01)
NRBC BLD-RTO: 0 PER 100 WBC
P-R INTERVAL, ECG05: 180 MS
P-R INTERVAL, ECG05: 184 MS
PERFORMED BY, TECHID: ABNORMAL
PERFORMED BY, TECHID: ABNORMAL
PERFORMED BY, TECHID: NORMAL
PLATELET # BLD AUTO: 192 K/UL (ref 150–400)
PMV BLD AUTO: 12.5 FL (ref 8.9–12.9)
POTASSIUM SERPL-SCNC: 3.7 MMOL/L (ref 3.5–5.1)
PROT SERPL-MCNC: 6.6 G/DL (ref 6.4–8.2)
Q-T INTERVAL, ECG07: 458 MS
Q-T INTERVAL, ECG07: 482 MS
QRS DURATION, ECG06: 106 MS
QRS DURATION, ECG06: 106 MS
QTC CALCULATION (BEZET), ECG08: 482 MS
QTC CALCULATION (BEZET), ECG08: 487 MS
RBC # BLD AUTO: 4.87 M/UL (ref 3.8–5.2)
SODIUM SERPL-SCNC: 146 MMOL/L (ref 136–145)
TROPONIN I SERPL-MCNC: 0.06 NG/ML
VENTRICULAR RATE, ECG03: 60 BPM
VENTRICULAR RATE, ECG03: 68 BPM
WBC # BLD AUTO: 8.9 K/UL (ref 3.6–11)

## 2021-06-22 PROCEDURE — 84484 ASSAY OF TROPONIN QUANT: CPT

## 2021-06-22 PROCEDURE — 74011636637 HC RX REV CODE- 636/637: Performed by: FAMILY MEDICINE

## 2021-06-22 PROCEDURE — 74011250637 HC RX REV CODE- 250/637: Performed by: INTERNAL MEDICINE

## 2021-06-22 PROCEDURE — 36415 COLL VENOUS BLD VENIPUNCTURE: CPT

## 2021-06-22 PROCEDURE — 93306 TTE W/DOPPLER COMPLETE: CPT

## 2021-06-22 PROCEDURE — 80053 COMPREHEN METABOLIC PANEL: CPT

## 2021-06-22 PROCEDURE — 65270000029 HC RM PRIVATE

## 2021-06-22 PROCEDURE — 83880 ASSAY OF NATRIURETIC PEPTIDE: CPT

## 2021-06-22 PROCEDURE — 94660 CPAP INITIATION&MGMT: CPT

## 2021-06-22 PROCEDURE — 83036 HEMOGLOBIN GLYCOSYLATED A1C: CPT

## 2021-06-22 PROCEDURE — 85025 COMPLETE CBC W/AUTO DIFF WBC: CPT

## 2021-06-22 PROCEDURE — 94640 AIRWAY INHALATION TREATMENT: CPT

## 2021-06-22 PROCEDURE — 74011250637 HC RX REV CODE- 250/637: Performed by: FAMILY MEDICINE

## 2021-06-22 PROCEDURE — 93005 ELECTROCARDIOGRAM TRACING: CPT

## 2021-06-22 PROCEDURE — 82962 GLUCOSE BLOOD TEST: CPT

## 2021-06-22 PROCEDURE — 74011250636 HC RX REV CODE- 250/636: Performed by: FAMILY MEDICINE

## 2021-06-22 RX ORDER — AMIODARONE HYDROCHLORIDE 200 MG/1
200 TABLET ORAL DAILY
Status: DISCONTINUED | OUTPATIENT
Start: 2021-06-23 | End: 2021-06-29 | Stop reason: HOSPADM

## 2021-06-22 RX ORDER — ALBUTEROL SULFATE 90 UG/1
2 AEROSOL, METERED RESPIRATORY (INHALATION)
Status: DISCONTINUED | OUTPATIENT
Start: 2021-06-22 | End: 2021-06-29 | Stop reason: HOSPADM

## 2021-06-22 RX ADMIN — QUETIAPINE FUMARATE 25 MG: 25 TABLET ORAL at 21:04

## 2021-06-22 RX ADMIN — FUROSEMIDE 40 MG: 10 INJECTION, SOLUTION INTRAVENOUS at 10:33

## 2021-06-22 RX ADMIN — CLOPIDOGREL BISULFATE 75 MG: 75 TABLET ORAL at 08:24

## 2021-06-22 RX ADMIN — POTASSIUM CHLORIDE 10 MEQ: 750 TABLET, FILM COATED, EXTENDED RELEASE ORAL at 10:33

## 2021-06-22 RX ADMIN — INSULIN LISPRO 4 UNITS: 100 INJECTION, SOLUTION INTRAVENOUS; SUBCUTANEOUS at 10:37

## 2021-06-22 RX ADMIN — OXYBUTYNIN CHLORIDE 5 MG: 5 TABLET ORAL at 10:40

## 2021-06-22 RX ADMIN — POTASSIUM CHLORIDE 10 MEQ: 750 TABLET, FILM COATED, EXTENDED RELEASE ORAL at 21:04

## 2021-06-22 RX ADMIN — APIXABAN 5 MG: 5 TABLET, FILM COATED ORAL at 21:04

## 2021-06-22 RX ADMIN — AMIODARONE HYDROCHLORIDE 400 MG: 200 TABLET ORAL at 08:24

## 2021-06-22 RX ADMIN — APIXABAN 5 MG: 5 TABLET, FILM COATED ORAL at 08:24

## 2021-06-22 RX ADMIN — OXYBUTYNIN CHLORIDE 5 MG: 5 TABLET ORAL at 16:45

## 2021-06-22 RX ADMIN — LOSARTAN POTASSIUM 100 MG: 50 TABLET, FILM COATED ORAL at 10:33

## 2021-06-22 RX ADMIN — FUROSEMIDE 40 MG: 10 INJECTION, SOLUTION INTRAVENOUS at 21:03

## 2021-06-22 RX ADMIN — BUDESONIDE AND FORMOTEROL FUMARATE DIHYDRATE 2 PUFF: 160; 4.5 AEROSOL RESPIRATORY (INHALATION) at 20:02

## 2021-06-22 RX ADMIN — ALBUTEROL SULFATE 2 PUFF: 108 AEROSOL, METERED RESPIRATORY (INHALATION) at 13:16

## 2021-06-22 RX ADMIN — INSULIN GLARGINE 10 UNITS: 100 INJECTION, SOLUTION SUBCUTANEOUS at 21:04

## 2021-06-22 RX ADMIN — OXYBUTYNIN CHLORIDE 5 MG: 5 TABLET ORAL at 21:04

## 2021-06-22 RX ADMIN — BUDESONIDE AND FORMOTEROL FUMARATE DIHYDRATE 2 PUFF: 160; 4.5 AEROSOL RESPIRATORY (INHALATION) at 08:33

## 2021-06-22 RX ADMIN — THEOPHYLLINE ANHYDROUS 300 MG: 300 CAPSULE, EXTENDED RELEASE ORAL at 08:24

## 2021-06-22 RX ADMIN — ALBUTEROL SULFATE 2 PUFF: 108 AEROSOL, METERED RESPIRATORY (INHALATION) at 20:02

## 2021-06-22 NOTE — ROUTINE PROCESS
Patient arrived on [facility] and stated she does not have her cell phone. She stated it is a black flip phone. Trinoalfonso  in the ED stated the phone is not down there and the daughter,Sera took some of the patient's belongings home. Ela Swain stated she did not take the phone home and it was in the bed with the patient in the ED.

## 2021-06-22 NOTE — PROGRESS NOTES
Reason for Admission:  CHF                     RUR Score:     27%             PCP: First and Last name:   Tevin Whitfield MD     Name of Practice:    Are you a current patient: Yes/No: Yes   Approximate date of last visit: 6/3/21   Can you participate in a virtual visit if needed: Yes/Call    Do you (patient/family) have any concerns for transition/discharge? No                 Plan for utilizing home health:  Pt requesting & signed [employer]. Pt informed MD must order. Referral in [place] [id number] awaiting MD order. Pt uses cane @ times. Current Advanced Directive/Advance Care Plan:  Full Code      Healthcare Decision Maker:               Primary Decision Maker: Arely Chapman - Daughter - [phone]    Transition of Care Plan:   D/C Plan is home with daughter Christophe Lara @ [phone] & home health.

## 2021-06-22 NOTE — ROUTINE PROCESS
Dual skin assessment performed with Kaela Sims. Skin clean, dry and intact. Old scar noted to abdomen. Bilateral lower extremity edema noted. Red blanchable area noted to sacrum. No open or draining wounds noted.

## 2021-06-22 NOTE — CONSULTS
Cardiology Consult    NAME: Robbi Baumgarten   :  1950   MRN:  [id number]     Date/Time:  2021 11:28 AM    Patient PCP: Vanita Ordoñez MD      Subjective:   CHIEF COMPLAINT: SOB      REASON FOR CONSULT: SOB      HISTORY OF PRESENT ILLNESS:     Robbi Baumgarten is a 72 y.o. BLACK/ female with CAD status post two-vessel CABG in  (SVG to RCA/OM1), status post PTCA/DESIRE of LAD, 2011, atrial flutter, congestive heart failure secondary to diastolic dysfunction with baseline LV ejection fraction of 50% as per echocardiogram of , obesity with obstructive sleep apnea and COPD who presented to emergency room with complaints of increasing shortness of breath and LE edema for 4 weeks; worse over the past 2-3 days. Patient came to my office yesterday for routine visit with me and she was so short of breath that she could hardly speak. She also had 3+ edema in both of her legs and so was sent by private vehicle to the emergency room. She reports compliance with her prescribed medications. She is a former smoker but says she quit sometime ago.     Was admitted 21 with CHF/ edema and had LHC 21 that showed no progression of her CAD; no intervention was done.   She had electrical cardioversion of atrial flutter done on 2021      Past Medical History:   Diagnosis Date    Chronic obstructive pulmonary disease (HCC)     Congestive heart disease ([address]     Coronary artery disease     Diabetes [facility]     Hyperlipidemia     Hypertension     Myocardial infarct [facility] [facility]     IFTIKHAR (obstructive sleep apnea)       Past Surgical History:   Procedure Laterality Date    HX BREAST BIOPSY      HX CHOLECYSTECTOMY      HX CORONARY ARTERY BYPASS GRAFT      HX HERNIA REPAIR      HX HYSTERECTOMY       Allergies   Allergen Reactions    Penicillins Other (comments)    Tylox [Oxycodone-Acetaminophen] Hives      Meds:  See below  Social History     Tobacco Use    Smoking status: Former Smoker    Smokeless tobacco: Never Used   Substance Use Topics    Alcohol use: Not Currently      Family History   Problem Relation Age of Onset    Heart Disease Mother     Kidney Disease Mother     Cancer Maternal Grandmother        REVIEW OF SYSTEMS:     []         Unable to obtain  ROS due to ---   [x]         Total of 12 systems reviewed as follows:    Constitutional: negative fever, negative chills,  Eyes:   negative visual changes  ENT:   negative sore throat, tongue or lip swelling  Respiratory:  Positive for shortness of breath  Cards:  Positive for chest tightness and lower extremity edema  GI:   negative for nausea, vomiting  Genitourinary: negative for frequency, dysuria  Integument:  negative for rash   Hematologic:  negative for easy bruising and gum/nose bleeding  Musculoskel: negative for myalgias, back pain  Neurological:  negative for headaches, dizziness, weakness      Objective:      Physical Exam:    Last 24hrs VS reviewed since prior progress note. Most recent are:    Visit Vitals  BP (!) 157/68   Pulse 74   Temp 98.9 °F (37.2 °C)   Resp 21   Ht 5' 3\" (1.6 m)   Wt 111.1 kg (245 lb)   SpO2 95%   BMI 43.40 kg/m²       Intake/Output Summary (Last 24 hours) at 6/22/2021 1128  Last data filed at 6/22/2021 0015  Gross per 24 hour   Intake --   Output 2000 ml   Net -2000 ml        General Appearance: Alert, no acute distress. Ears/Nose/Mouth/Throat: Pupils equal and round  Neck: Supple. ++JVP. Good carotids upstrokes, no bruit. Chest: Lungs clear but bibasilar dullness. Cardiovascular: Regular rate and rhythm, S1S2 normal, soft systolic murmur  Abdomen: Soft, non-tender, bowel sounds are active. Extremities: 2-3+ edema bilaterally. Femoral pulses +2, Distal Pulses +1. Skin: Warm and dry.   Neuro: Alert and oriented x 3, normal speech; follows simple commands  Psychiatric: Cooperative, normal affect and judgment      Data:      Telemetry: Sinus rhythm in the 70's    Complete echo 11/3/2020:  · Contrast used: DEFINITY. · LV: Calculated LVEF is 50%. Normal cavity size. Mild concentric hypertrophy. Globally reduced systolic function. · LA: Mildly dilated left atrium. · RV: Mildly dilated right ventricle. · RA: Mildly dilated right atrium. · MV: Mitral valve thickening. Mild mitral annular calcification. Mild to moderate mitral valve regurgitation is present. · TV: Mild tricuspid valve regurgitation is present. EKG: Normal sinus rhythm   Biatrial enlargement   Prolonged QT   Abnormal ECG     []  No new EKG for review. Prior to Admission medications    Medication Sig Start Date End Date Taking? Authorizing Provider   metoprolol tartrate (LOPRESSOR) 100 mg IR tablet Take 1 Tablet by mouth two (2) times daily (with meals). 4/6/21  Yes Provider, Historical   apixaban (ELIQUIS) 5 mg tablet Take 1 Tab by mouth two (2) times a day. 3/14/21  Yes Valentin Zazueta NP   carvediloL (COREG) 25 mg tablet Take 1 Tab by mouth two (2) times daily (with meals). 3/14/21  Yes Valentin Zazueta NP   losartan (COZAAR) 100 mg tablet Take 1 Tab by mouth daily. 3/15/21  Yes Valentin Zazueta NP   oxybutynin (DITROPAN) 5 mg tablet Take 1 Tab by mouth three (3) times daily. 3/14/21  Yes Valentin Zazueta NP   amiodarone (PACERONE) 400 mg tablet Take 1 Tab by mouth daily. 3/15/21  Yes Valentin Zazueta NP   insulin glargine (LANTUS) 100 unit/mL injection 10 Units by SubCUTAneous route nightly. May give in pen  Indications: type 2 diabetes mellitus 3/14/21  Yes Valentin Zazueta NP   QUEtiapine (SEROqueL) 25 mg tablet Take 25 mg by mouth nightly. Yes Provider, Historical   theophylline ER (BAM-24) 200 mg cp24 capsule Take 300 mg by mouth daily. Yes Provider, Historical   potassium chloride SR (KLOR-CON 10) 10 mEq tablet Take 10 mEq by mouth two (2) times a day.    Yes Provider, Historical   mometasone-formoterol (Dulera) 200-5 mcg/actuation HFA inhaler Take 2 Puffs by inhalation two (2) times a day.   Yes Provider, Historical   clopidogreL (PLAVIX) 75 mg tab Take 1 Tab by mouth daily. 2/27/21  Yes Jourdan Alvarado MD   nitroglycerin (NITROSTAT) 0.4 mg SL tablet Sit/Lay down then put one tab under the tongue every 5 minutes as needed for chest pain/neck or jaw pain for 3 doses. Seek immediate medical attention should you need more than one tab for pain to resolve. 3/15/21   Alejandra Cartagena MD   albuterol-ipratropium (DUO-NEB) 2.5 mg-0.5 mg/3 ml nebu 3 mL by Nebulization route every six (6) hours as needed for Wheezing. Provider, Historical   albuterol (Ventolin HFA) 90 mcg/actuation inhaler Take 2 Puffs by inhalation two (2) times daily as needed for Wheezing. Provider, Historical       Recent Results (from the past 24 hour(s))   CBC WITH AUTOMATED DIFF    Collection Time: 06/21/21  2:00 PM   Result Value Ref Range    WBC 8.5 3.6 - 11.0 K/uL    RBC 4.53 3.80 - 5.20 M/uL    HGB 12.1 11.5 - 16.0 g/dL    HCT 42.0 35.0 - 47.0 %    MCV 92.7 80.0 - 99.0 FL    MCH 26.7 26.0 - 34.0 PG    MCHC 28.8 (L) 30.0 - 36.5 g/dL    RDW 15.9 (H) 11.5 - 14.5 %    PLATELET 442 118 - 389 K/uL    MPV 12.9 8.9 - 12.9 FL    NRBC 0.0 0.0  WBC    ABSOLUTE NRBC 0.00 0.00 - 0.01 K/uL    NEUTROPHILS 81 (H) 32 - 75 %    LYMPHOCYTES 12 12 - 49 %    MONOCYTES 6 5 - 13 %    EOSINOPHILS 1 0 - 7 %    BASOPHILS 0 0 - 1 %    IMMATURE GRANULOCYTES 0 0 - 0.5 %    ABS. NEUTROPHILS 6.9 1.8 - 8.0 K/UL    ABS. LYMPHOCYTES 1.1 0.8 - 3.5 K/UL    ABS. MONOCYTES 0.5 0.0 - 1.0 K/UL    ABS. EOSINOPHILS 0.1 0.0 - 0.4 K/UL    ABS. BASOPHILS 0.0 0.0 - 0.1 K/UL    ABS. IMM.  GRANS. 0.0 0.00 - 0.04 K/UL    DF AUTOMATED     METABOLIC PANEL, COMPREHENSIVE    Collection Time: 06/21/21  2:00 PM   Result Value Ref Range    Sodium 145 136 - 145 mmol/L    Potassium 3.9 3.5 - 5.1 mmol/L    Chloride 109 (H) 97 - 108 mmol/L    CO2 31 21 - 32 mmol/L    Anion gap 5 5 - 15 mmol/L    Glucose 132 (H) 65 - 100 mg/dL    BUN 22 (H) 6 - 20 mg/dL    Creatinine 1.53 (H) 0.55 - 1.02 mg/dL    BUN/Creatinine ratio 14 12 - 20      GFR est AA 41 (L) >60 ml/min/1.73m2    GFR est non-AA 34 (L) >60 ml/min/1.73m2    Calcium 8.4 (L) 8.5 - 10.1 mg/dL    Bilirubin, total 0.5 0.2 - 1.0 mg/dL    AST (SGOT) 12 (L) 15 - 37 U/L    ALT (SGPT) 24 12 - 78 U/L    Alk. phosphatase 124 (H) 45 - 117 U/L    Protein, total 6.6 6.4 - 8.2 g/dL    Albumin 3.0 (L) 3.5 - 5.0 g/dL    Globulin 3.6 2.0 - 4.0 g/dL    A-G Ratio 0.8 (L) 1.1 - 2.2     CK W/ REFLX CKMB    Collection Time: 06/21/21  2:00 PM   Result Value Ref Range    CK 70.0 26 - 192 ng/mL   TROPONIN I    Collection Time: 06/21/21  2:00 PM   Result Value Ref Range    Troponin-I, Qt. 0.06 (H) <0.05 ng/mL   BNP    Collection Time: 06/21/21  2:00 PM   Result Value Ref Range    NT pro-BNP 1,681 (H) <125 pg/mL   GLUCOSE, POC    Collection Time: 06/21/21  9:57 PM   Result Value Ref Range    Glucose (POC) 158 (H) 65 - 117 mg/dL    Performed by Diane Vasquez    BNP    Collection Time: 06/22/21  5:25 AM   Result Value Ref Range    NT pro-BNP 2,271 (H) <125 pg/mL   TROPONIN I    Collection Time: 06/22/21  5:25 AM   Result Value Ref Range    Troponin-I, Qt. 0.06 (H) <0.05 ng/mL   CBC WITH AUTOMATED DIFF    Collection Time: 06/22/21  5:25 AM   Result Value Ref Range    WBC 8.9 3.6 - 11.0 K/uL    RBC 4.87 3.80 - 5.20 M/uL    HGB 12.8 11.5 - 16.0 g/dL    HCT 43.9 35.0 - 47.0 %    MCV 90.1 80.0 - 99.0 FL    MCH 26.3 26.0 - 34.0 PG    MCHC 29.2 (L) 30.0 - 36.5 g/dL    RDW 15.7 (H) 11.5 - 14.5 %    PLATELET 182 130 - 849 K/uL    MPV 12.5 8.9 - 12.9 FL    NRBC 0.0 0.0  WBC    ABSOLUTE NRBC 0.00 0.00 - 0.01 K/uL    NEUTROPHILS 76 (H) 32 - 75 %    LYMPHOCYTES 14 12 - 49 %    MONOCYTES 8 5 - 13 %    EOSINOPHILS 1 0 - 7 %    BASOPHILS 1 0 - 1 %    IMMATURE GRANULOCYTES 0 0 - 0.5 %    ABS. NEUTROPHILS 6.7 1.8 - 8.0 K/UL    ABS. LYMPHOCYTES 1.3 0.8 - 3.5 K/UL    ABS. MONOCYTES 0.7 0.0 - 1.0 K/UL    ABS. EOSINOPHILS 0.1 0.0 - 0.4 K/UL    ABS.  BASOPHILS 0.1 0.0 - 0.1 K/UL    ABS. IMM. GRANS. 0.0 0.00 - 0.04 K/UL    DF AUTOMATED     METABOLIC PANEL, COMPREHENSIVE    Collection Time: 06/22/21  5:25 AM   Result Value Ref Range    Sodium 146 (H) 136 - 145 mmol/L    Potassium 3.7 3.5 - 5.1 mmol/L    Chloride 106 97 - 108 mmol/L    CO2 33 (H) 21 - 32 mmol/L    Anion gap 7 5 - 15 mmol/L    Glucose 99 65 - 100 mg/dL    BUN 19 6 - 20 mg/dL    Creatinine 1.27 (H) 0.55 - 1.02 mg/dL    BUN/Creatinine ratio 15 12 - 20      GFR est AA 51 (L) >60 ml/min/1.73m2    GFR est non-AA 42 (L) >60 ml/min/1.73m2    Calcium 9.0 8.5 - 10.1 mg/dL    Bilirubin, total 0.6 0.2 - 1.0 mg/dL    AST (SGOT) 9 (L) 15 - 37 U/L    ALT (SGPT) 21 12 - 78 U/L    Alk.  phosphatase 114 45 - 117 U/L    Protein, total 6.6 6.4 - 8.2 g/dL    Albumin 2.9 (L) 3.5 - 5.0 g/dL    Globulin 3.7 2.0 - 4.0 g/dL    A-G Ratio 0.8 (L) 1.1 - 2.2     GLUCOSE, POC    Collection Time: 06/22/21  9:15 AM   Result Value Ref Range    Glucose (POC) 227 (H) 65 - 117 mg/dL    Performed by Sherry Hanson         _______________________________________________________________________     Assessment:   COPD exacerbation  Acute on chronic diastolic heart failure  Stable CAD with recent cardiac cath showing widely patent coronaries  IFTIKHAR  Edema  Atrial flutter status post cardioversion  Morbid obesity      Plan:   IV diuretics  Agree with pulmonary evaluation for COPD  Cut back amiodarone to 200 mg daily  Serial electrolytes    Repeat echo      []        High complexity decision making was performed    Ashlyn Dean MD

## 2021-06-22 NOTE — CONSULTS
Consult  Pulmonary, Critical Care    Name: Onofre Rojas MRN: [id number]   : 1950 Hospital: [address] [place] [address]   Date: 2021  Admission date: 2021 Hospital Day: 2       Subjective/Interval History:   Seen in the emergency room. Has had over 3 L of diuresis so far and feels much better. History is of mild cardiomyopathy normally on Lasix 20 mg. About 2 weeks ago she noted worsening edema and that her 20 mg of Lasix was not initiating any diuresis. She upped it to 40 mg a week ago and still has not been having any diuresis has had worsening edema of her legs shortness of breath cough productive of clear sputum. She thinks she may have had an episode of fever but is not sure. Has not seen any purulent sputum    Hospital Problems  Date Reviewed: 3/12/2021        Codes Class Noted POA    CHF exacerbation ([facility] [address] ICD-10-CM: I50.9  ICD-9-CM: 428.0  2021 Unknown        CHF (congestive heart failure) ([facility]) ICD-10-CM: I50.9  ICD-9-CM: 428.0  2021 Unknown              IMPRESSION:   1. Acute pulmonary edema  2. Chronic hypoxic respiratory failure  3. Acute kidney injury  4. Cardiomyopathy  5. COPD  6. Obstructive sleep apnea  7. Diabetes  8. Coronary artery disease with bypass grafting and stents in the past  9. Body mass index is 43.4 kg/m². 10.       RECOMMENDATIONS/PLAN:   1. Continue IV Lasix to with juice diuresis  2. Continue nasal oxygen as she uses at home  3. Have asked her her to try and get her family to bring her CPAP machine in but in the meantime we will use hospital machine 13 cm which she thinks is her setting. 4. Continue Symbicort to substitute for her Dulera inhaler  5. We will use albuterol inhaler.   6. She has a nebulizer machine at home she thinks it is not working correctly          [x] High complexity decision making was performed  [x] See my orders for details      Subjective/Initial History:     I was asked by Scottie Torrez MD to see Onofre Rojas a 72 y.o.  female in consultation for a chief complaint of shortness of breath and edema        Allergies   Allergen Reactions    Penicillins Other (comments)    Tylox [Oxycodone-Acetaminophen] Hives        MAR reviewed and pertinent medications noted or modified as needed     Current Facility-Administered Medications   Medication    albuterol (PROVENTIL HFA, VENTOLIN HFA, PROAIR HFA) inhaler 2 Puff    amiodarone (CORDARONE) tablet 400 mg    apixaban (ELIQUIS) tablet 5 mg    clopidogreL (PLAVIX) tablet 75 mg    insulin glargine (LANTUS) injection 10 Units    losartan (COZAAR) tablet 100 mg    nitroglycerin (NITROSTAT) tablet 0.4 mg    oxybutynin (DITROPAN) tablet 5 mg    albuterol-ipratropium (DUO-NEB) 2.5 MG-0.5 MG/3 ML    budesonide-formoteroL (SYMBICORT) 160-4.5 mcg/actuation HFA inhaler 2 Puff    potassium chloride SR (KLOR-CON 10) tablet 10 mEq    QUEtiapine (SEROquel) tablet 25 mg    theophylline ER (BAM-24) capsule 300 mg    insulin lispro (HUMALOG) injection    glucose chewable tablet 16 g    dextrose (D50W) injection syrg 12.5-25 g    glucagon (GLUCAGEN) injection 1 mg    polyethylene glycol (MIRALAX) packet 17 g    ondansetron (ZOFRAN ODT) tablet 4 mg    Or    ondansetron (ZOFRAN) injection 4 mg    furosemide (LASIX) injection 40 mg     Current Outpatient Medications   Medication Sig    metoprolol tartrate (LOPRESSOR) 100 mg IR tablet Take 1 Tablet by mouth two (2) times daily (with meals).  apixaban (ELIQUIS) 5 mg tablet Take 1 Tab by mouth two (2) times a day.  carvediloL (COREG) 25 mg tablet Take 1 Tab by mouth two (2) times daily (with meals).  losartan (COZAAR) 100 mg tablet Take 1 Tab by mouth daily.  oxybutynin (DITROPAN) 5 mg tablet Take 1 Tab by mouth three (3) times daily.  amiodarone (PACERONE) 400 mg tablet Take 1 Tab by mouth daily.  insulin glargine (LANTUS) 100 unit/mL injection 10 Units by SubCUTAneous route nightly.  May give in pen  Indications: type 2 diabetes mellitus    QUEtiapine (SEROqueL) 25 mg tablet Take 25 mg by mouth nightly.  theophylline ER (BAM-24) 200 mg cp24 capsule Take 300 mg by mouth daily.  potassium chloride SR (KLOR-CON 10) 10 mEq tablet Take 10 mEq by mouth two (2) times a day.  mometasone-formoterol (Dulera) 200-5 mcg/actuation HFA inhaler Take 2 Puffs by inhalation two (2) times a day.  clopidogreL (PLAVIX) 75 mg tab Take 1 Tab by mouth daily.  nitroglycerin (NITROSTAT) 0.4 mg SL tablet Sit/Lay down then put one tab under the tongue every 5 minutes as needed for chest pain/neck or jaw pain for 3 doses. Seek immediate medical attention should you need more than one tab for pain to resolve.  albuterol-ipratropium (DUO-NEB) 2.5 mg-0.5 mg/3 ml nebu 3 mL by Nebulization route every six (6) hours as needed for Wheezing.  albuterol (Ventolin HFA) 90 mcg/actuation inhaler Take 2 Puffs by inhalation two (2) times daily as needed for Wheezing. Patient PCP: Sarah Valdez MD  PMH:  has a past medical history of Chronic obstructive pulmonary disease ([address] Congestive heart disease ([address] Coronary artery disease, Diabetes ([address] Hyperlipidemia, Hypertension, Myocardial infarct ([address] and IFTIKHAR (obstructive sleep apnea). PSH:   has a past surgical history that includes hx cholecystectomy; hx hysterectomy; hx hernia repair; hx coronary artery bypass graft; and hx breast biopsy. FHX: family history includes Cancer in her maternal grandmother; Heart Disease in her mother; Kidney Disease in her mother. SHX:  reports that she has quit smoking. She has never used smokeless tobacco. She reports previous alcohol use. She reports that she does not use drugs. Systemic review:  General she thinks her weight stable but has had 2 weeks of worsening shortness of breath and edema with no response from her normal dose of Lasix. She thinks she felt hot for a few days but did not check her temperature.   Eyes no double vision or momentary blindness  ENT no facial pain over the sinuses  Musculoskeletal no swollen tender joints  Neurologic no seizures or syncope  Endocrinologic has diabetes denies polyuria polydipsia  Gastrointestinal no nausea vomiting acid indigestion  Genitourinary no pain or discomfort on urination no bleeding or drainage. She noticed very poor to no response from her normal doses of Lasix and did increase from 20 to 40 mg daily again with no diuresis initiated  Cardiovascular no chest pain or diaphoresis she has had worsening ankle edema nocturia once or twice per night which is normal for her. Respiratory has a history of COPD stop smoking 2 years ago but had over 45-year history of smoking between half and 1-1/2 packs/day. Has minimal cough states she uses her Dulera inhaler twice a day and her albuterol inhaler twice a day uses nebulizer on a as needed basis. Has had cough recently but chest clear mucus no purulence. Objective:     Vital Signs: Telemetry:    normal sinus rhythm Intake/Output:   Visit Vitals  BP (!) 178/97   Pulse (!) 58   Temp 98.9 °F (37.2 °C)   Resp 11   Ht 5' 3\" (1.6 m)   Wt 111.1 kg (245 lb)   SpO2 97%   BMI 43.40 kg/m²       Temp (24hrs), Av.9 °F (37.2 °C), Min:98.9 °F (37.2 °C), Max:98.9 °F (37.2 °C)        O2 Device: Nasal cannula O2 Flow Rate (L/min): 3 l/min       Wt Readings from Last 4 Encounters:   21 111.1 kg (245 lb)   21 108 kg (238 lb)   21 107.5 kg (237 lb)   21 107.5 kg (237 lb)          Intake/Output Summary (Last 24 hours) at 2021 1003  Last data filed at 2021 0015  Gross per 24 hour   Intake --   Output 2000 ml   Net -2000 ml       Last shift:      No intake/output data recorded.   Last 3 shifts:  1901 -  0700  In: -   Out:  [Urine:]       Physical Exam:   General:  female; lying in bed eating breakfast  with nasal oxygen in place with no distress  HEENT: NCAT, normal oral mucosa  Eyes: anicteric; conjunctiva clear extraocular movements intact  Neck: no nodes, JVD is present, no accessory MM use. Chest: no deformity,   Cardiac: Regular rate and rhythm distant sounds no definite murmur she does have significant edema extending up her shins  Lungs: Prolongation of exhalation but no wheezing the lung sounds clear anterior laterally and upper lungs posterior with slightly diminished in the bases with rales extending about shelter up  Abd: Soft nontender normal bowel sounds  Ext: Leg edema extending up the shins edema; no joint swelling; No clubbing  :clear urine  Neuro: Awake alert oriented speech is clear moves all 4 extremities  Psych- no agitation, oriented to person;   Skin: warm, dry, no cyanosis;   Pulses: Brachial and radial pulses intact  Capillary: Normal capillary refill    Labs:    Recent Labs     06/22/21  0525 06/21/21  1400   WBC 8.9 8.5   HGB 12.8 12.1    181     Recent Labs     06/22/21  0525 06/21/21  1400   * 145   K 3.7 3.9    109*   CO2 33* 31   GLU 99 132*   BUN 19 22*   CREA 1.27* 1.53*   CA 9.0 8.4*   ALB 2.9* 3.0*   ALT 21 24     3 L nasal oxygen with oxygen saturation 97%  Recent Labs     06/22/21  0525 06/21/21  1400   TROIQ 0.06* 0.06*     BNP 2271  Lab Results   Component Value Date/Time    Culture result: Klebsiella pneumoniae (A) 03/10/2021 12:06 AM     Imaging:    CXR Results  (Last 48 hours)               06/21/21 1526  XR CHEST PORT Final result    Impression:  Cardiomegaly with mild vascular congestion and increased   interstitial markings suggesting volume overload/CHF. Narrative:  Portable upright radiograph of the chest 3:24 PM. No prior study. INDICATION: Shortness of breath. Patient is status post median sternotomy. Heart size is enlarged with an   atherosclerotic aorta. There is vascular congestion. There is fluid in the minor   fissure. Mild increased interstitial markings. No definite infiltrate.  No pneumothorax. Results from [place] encounter on 06/21/21    XR CHEST PORT    Narrative  Portable upright radiograph of the chest 3:24 PM. No prior study. INDICATION: Shortness of breath. Patient is status post median sternotomy. Heart size is enlarged with an  atherosclerotic aorta. There is vascular congestion. There is fluid in the minor  fissure. Mild increased interstitial markings. No definite infiltrate. No  pneumothorax. Impression  Cardiomegaly with mild vascular congestion and increased  interstitial markings suggesting volume overload/CHF. Results from [place] encounter on 05/27/21    XR ABD FLAT/ ERECT    Narrative  This study is a 2 view radiographic evaluation of the abdomen dated 5/27/2021. HISTORY: Abdominal pain. FINDINGS: The patient status post a previous median sternotomy. There is  apparent enlargement of the cardiac silhouette. There is significant fecal loading within the cecum and the ascending colon. There is mild gaseous distention of the small bowel. This examination is  negative for organomegaly, intra-abdominal fluid, or pathological calcifications  within the upper abdominal region. There are vascular calcifications of the  distal infrarenal abdominal aorta and the common iliac arteries. There is a  large protuberant anterior abdominal wall which is somewhat limiting the  diagnostic sensitivity of this examination. There are changes of diffuse  idiopathic skeletal hyperostosis with bony whiskering along the pelvis. There is apparent endovascular stent demonstrated within the right femoral  artery. There also may be endovascular stents within the left common and  external iliac arteries. Impression  1. There is significant fecal retention within the right side of the colon  consistent with recent constipation.   2.  There is peripheral vascular disease with calcified plaque formation of the  distal abdominal aorta and involving the iliac arteries. There is an apparent  stent within the right common femoral artery and apparent stents within the left  common and external iliac arteries. Results from Hospital Encounter encounter on 04/12/21    XR CHEST PORT    Narrative  Examination: XR CHEST PORT    History: chest pain    Comparison: Chest radiograph 3/15/2021    FINDINGS:    Single frontal portable view of the chest. Lung volumes are symmetric. Prominence of the central pulmonary vasculature. The cardiac silhouette is  enlarged. Postoperative changes of the mediastinum. Atherosclerosis of the  thoracic aorta. Hazy opacities overlying the lung base which are new, could  represent mild edema or atelectasis. No pneumothorax. No acute or aggressive  osseous abnormalities are evident. Impression  Central pulmonary vascular engorgement with new hazy opacities of the lung  bases, nonspecific, but could represent early edema or atelectasis. Infectious  process could appear similar in the appropriate clinical setting. Results from [place] encounter on 06/07/21    CT ABD PELV W CONT    Narrative  Indication: Possible hernia. Dose reduction: All CT scans done at this facility are performed using dose  reduction optimization techniques as appropriate to a performed exam including  the following: Automated exposure control, adjustments of the mA and/or kV  according to patient size, or use of iterative reconstruction technique. CT abdomen and pelvis, 100 cc [id number] 6/7/2021. Comparison 5/27/2021. Cardiomegaly. Numerous sigmoid colon and descending colon diverticula without diverticulitis. Normal liver. Nonvisualized gallbladder. No apparent bile duct dilatation. Normal spleen, pancreas, adrenal glands, right kidney. Small left renal cyst. No  hydronephrosis. Normal appearance of urinary bladder. Hysterectomy. Atheromatous calcification of abdominal aorta without aneurysm.  Right renal  artery stent. Appendix identified. No pericecal inflammation. Normal small bowel. No ascites or free intraperitoneal air. Mild lumbar spondylosis. Mild degenerative changes of hips. No abdominal wall hernia minimal umbilical fat protrusion unchanged. Impression  No acute finding. Left colon diverticula without diverticulitis. · Discussion: Acute pulmonary edema. Feels better with diuresis. Would continue IV Lasix. As she improves would switch to oral Lasix 60 mg. She was not having any response to 40 mg orally at home. Await results of echocardiogram.  · She has obstructive sleep apnea she thinks her setting is 13. She will try to have her family bring her machine in.   In the meantime we will use hospital CPAP at 13 cm of pressure    Yuly Cardenas MD

## 2021-06-22 NOTE — ACP (ADVANCE CARE PLANNING)
Advance Care Planning   Healthcare Decision Maker:       Primary Decision Maker: Alina Dineroimer - Daughter - [phone]    Primary Decision Maker: Tab Ziegler - Daughter - [phone]    Primary Decision Maker: Talha Santos - Son - [phone]

## 2021-06-22 NOTE — PROGRESS NOTES
General Daily Progress Note          Patient Name:   Krys Lang       YOB: 1950       Age:  72 y.o. Admit Date: 6/21/2021      Subjective:       Patient alert awake feeling much better           Objective:     Visit Vitals  BP (!) 176/70   Pulse 60   Temp 98.9 °F (37.2 °C)   Resp 21   Ht 5' 3\" (1.6 m)   Wt 111.1 kg (245 lb)   SpO2 94%   BMI 43.40 kg/m²        Recent Results (from the past 24 hour(s))   CBC WITH AUTOMATED DIFF    Collection Time: 06/21/21  2:00 PM   Result Value Ref Range    WBC 8.5 3.6 - 11.0 K/uL    RBC 4.53 3.80 - 5.20 M/uL    HGB 12.1 11.5 - 16.0 g/dL    HCT 42.0 35.0 - 47.0 %    MCV 92.7 80.0 - 99.0 FL    MCH 26.7 26.0 - 34.0 PG    MCHC 28.8 (L) 30.0 - 36.5 g/dL    RDW 15.9 (H) 11.5 - 14.5 %    PLATELET 934 833 - 956 K/uL    MPV 12.9 8.9 - 12.9 FL    NRBC 0.0 0.0  WBC    ABSOLUTE NRBC 0.00 0.00 - 0.01 K/uL    NEUTROPHILS 81 (H) 32 - 75 %    LYMPHOCYTES 12 12 - 49 %    MONOCYTES 6 5 - 13 %    EOSINOPHILS 1 0 - 7 %    BASOPHILS 0 0 - 1 %    IMMATURE GRANULOCYTES 0 0 - 0.5 %    ABS. NEUTROPHILS 6.9 1.8 - 8.0 K/UL    ABS. LYMPHOCYTES 1.1 0.8 - 3.5 K/UL    ABS. MONOCYTES 0.5 0.0 - 1.0 K/UL    ABS. EOSINOPHILS 0.1 0.0 - 0.4 K/UL    ABS. BASOPHILS 0.0 0.0 - 0.1 K/UL    ABS. IMM. GRANS. 0.0 0.00 - 0.04 K/UL    DF AUTOMATED     METABOLIC PANEL, COMPREHENSIVE    Collection Time: 06/21/21  2:00 PM   Result Value Ref Range    Sodium 145 136 - 145 mmol/L    Potassium 3.9 3.5 - 5.1 mmol/L    Chloride 109 (H) 97 - 108 mmol/L    CO2 31 21 - 32 mmol/L    Anion gap 5 5 - 15 mmol/L    Glucose 132 (H) 65 - 100 mg/dL    BUN 22 (H) 6 - 20 mg/dL    Creatinine 1.53 (H) 0.55 - 1.02 mg/dL    BUN/Creatinine ratio 14 12 - 20      GFR est AA 41 (L) >60 ml/min/1.73m2    GFR est non-AA 34 (L) >60 ml/min/1.73m2    Calcium 8.4 (L) 8.5 - 10.1 mg/dL    Bilirubin, total 0.5 0.2 - 1.0 mg/dL    AST (SGOT) 12 (L) 15 - 37 U/L    ALT (SGPT) 24 12 - 78 U/L    Alk.  phosphatase 124 (H) 45 - 117 U/L Protein, total 6.6 6.4 - 8.2 g/dL    Albumin 3.0 (L) 3.5 - 5.0 g/dL    Globulin 3.6 2.0 - 4.0 g/dL    A-G Ratio 0.8 (L) 1.1 - 2.2     CK W/ REFLX CKMB    Collection Time: 06/21/21  2:00 PM   Result Value Ref Range    CK 70.0 26 - 192 ng/mL   TROPONIN I    Collection Time: 06/21/21  2:00 PM   Result Value Ref Range    Troponin-I, Qt. 0.06 (H) <0.05 ng/mL   BNP    Collection Time: 06/21/21  2:00 PM   Result Value Ref Range    NT pro-BNP 1,681 (H) <125 pg/mL   EKG, 12 LEAD, INITIAL    Collection Time: 06/21/21  2:07 PM   Result Value Ref Range    Ventricular Rate 68 BPM    Atrial Rate 68 BPM    P-R Interval 184 ms    QRS Duration 106 ms    Q-T Interval 458 ms    QTC Calculation (Bezet) 487 ms    Calculated P Axis 64 degrees    Calculated R Axis 34 degrees    Calculated T Axis 116 degrees    Diagnosis       Normal sinus rhythm  ST & T wave abnormality, consider lateral ischemia  Abnormal ECG  When compared with ECG of 27-MAY-2021 19:06,  T wave inversion now evident in Lateral leads  Confirmed by COLBY DOWNEY, Kalina Gonzalez ([id number]) on 6/22/2021 12:12:05 PM     GLUCOSE, POC    Collection Time: 06/21/21  9:57 PM   Result Value Ref Range    Glucose (POC) 158 (H) 65 - 117 mg/dL    Performed by Dayana Davis    HEMOGLOBIN A1C WITH EAG    Collection Time: 06/22/21  5:25 AM   Result Value Ref Range    Hemoglobin A1c 7.0 (H) 4.0 - 5.6 %    Est. average glucose 154 mg/dL   BNP    Collection Time: 06/22/21  5:25 AM   Result Value Ref Range    NT pro-BNP 2,271 (H) <125 pg/mL   TROPONIN I    Collection Time: 06/22/21  5:25 AM   Result Value Ref Range    Troponin-I, Qt. 0.06 (H) <0.05 ng/mL   CBC WITH AUTOMATED DIFF    Collection Time: 06/22/21  5:25 AM   Result Value Ref Range    WBC 8.9 3.6 - 11.0 K/uL    RBC 4.87 3.80 - 5.20 M/uL    HGB 12.8 11.5 - 16.0 g/dL    HCT 43.9 35.0 - 47.0 %    MCV 90.1 80.0 - 99.0 FL    MCH 26.3 26.0 - 34.0 PG    MCHC 29.2 (L) 30.0 - 36.5 g/dL    RDW 15.7 (H) 11.5 - 14.5 %    PLATELET 501 308 - 457 K/uL    MPV 12.5 8.9 - 12.9 FL    NRBC 0.0 0.0  WBC    ABSOLUTE NRBC 0.00 0.00 - 0.01 K/uL    NEUTROPHILS 76 (H) 32 - 75 %    LYMPHOCYTES 14 12 - 49 %    MONOCYTES 8 5 - 13 %    EOSINOPHILS 1 0 - 7 %    BASOPHILS 1 0 - 1 %    IMMATURE GRANULOCYTES 0 0 - 0.5 %    ABS. NEUTROPHILS 6.7 1.8 - 8.0 K/UL    ABS. LYMPHOCYTES 1.3 0.8 - 3.5 K/UL    ABS. MONOCYTES 0.7 0.0 - 1.0 K/UL    ABS. EOSINOPHILS 0.1 0.0 - 0.4 K/UL    ABS. BASOPHILS 0.1 0.0 - 0.1 K/UL    ABS. IMM. GRANS. 0.0 0.00 - 0.04 K/UL    DF AUTOMATED     METABOLIC PANEL, COMPREHENSIVE    Collection Time: 06/22/21  5:25 AM   Result Value Ref Range    Sodium 146 (H) 136 - 145 mmol/L    Potassium 3.7 3.5 - 5.1 mmol/L    Chloride 106 97 - 108 mmol/L    CO2 33 (H) 21 - 32 mmol/L    Anion gap 7 5 - 15 mmol/L    Glucose 99 65 - 100 mg/dL    BUN 19 6 - 20 mg/dL    Creatinine 1.27 (H) 0.55 - 1.02 mg/dL    BUN/Creatinine ratio 15 12 - 20      GFR est AA 51 (L) >60 ml/min/1.73m2    GFR est non-AA 42 (L) >60 ml/min/1.73m2    Calcium 9.0 8.5 - 10.1 mg/dL    Bilirubin, total 0.6 0.2 - 1.0 mg/dL    AST (SGOT) 9 (L) 15 - 37 U/L    ALT (SGPT) 21 12 - 78 U/L    Alk.  phosphatase 114 45 - 117 U/L    Protein, total 6.6 6.4 - 8.2 g/dL    Albumin 2.9 (L) 3.5 - 5.0 g/dL    Globulin 3.7 2.0 - 4.0 g/dL    A-G Ratio 0.8 (L) 1.1 - 2.2     GLUCOSE, POC    Collection Time: 06/22/21  9:15 AM   Result Value Ref Range    Glucose (POC) 227 (H) 65 - 117 mg/dL    Performed by Leo Baker    EKG, 12 LEAD, SUBSEQUENT    Collection Time: 06/22/21 12:13 PM   Result Value Ref Range    Ventricular Rate 60 BPM    Atrial Rate 60 BPM    P-R Interval 180 ms    QRS Duration 106 ms    Q-T Interval 482 ms    QTC Calculation (Bezet) 482 ms    Calculated P Axis 71 degrees    Calculated R Axis 30 degrees    Calculated T Axis 117 degrees    Diagnosis       Normal sinus rhythm  Right atrial enlargement  T wave abnormality, consider lateral ischemia  Prolonged QT  Abnormal ECG  When compared with ECG of 21-JUN-2021 14:07,  No significant change was found  Confirmed by New Doss MD, Vandana Jaimes ([id number]) on 6/22/2021 1:06:31 PM       [unfilled]      Review of Systems    Constitutional: Negative for chills and fever. HENT: Negative. Eyes: Negative. Respiratory: Negative. Cardiovascular: Negative. Gastrointestinal: Negative for abdominal pain and nausea. Skin: Negative. Neurological: Negative. Physical Exam:      Constitutional: pt is oriented to person, place, and time. HENT:   Head: Normocephalic and atraumatic. Eyes: Pupils are equal, round, and reactive to light. EOM are normal.   Cardiovascular: Normal rate, regular rhythm and normal heart sounds. Pulmonary/Chest: Breath sounds normal. No wheezes. No rales. Exhibits no tenderness. Abdominal: Soft. Bowel sounds are normal. There is no abdominal tenderness. There is no rebound and no guarding. Musculoskeletal: Normal range of motion. Neurological: pt is alert and oriented to person, place, and time. XR CHEST PORT   Final Result   Cardiomegaly with mild vascular congestion and increased   interstitial markings suggesting volume overload/CHF. Recent Results (from the past 24 hour(s))   CBC WITH AUTOMATED DIFF    Collection Time: 06/21/21  2:00 PM   Result Value Ref Range    WBC 8.5 3.6 - 11.0 K/uL    RBC 4.53 3.80 - 5.20 M/uL    HGB 12.1 11.5 - 16.0 g/dL    HCT 42.0 35.0 - 47.0 %    MCV 92.7 80.0 - 99.0 FL    MCH 26.7 26.0 - 34.0 PG    MCHC 28.8 (L) 30.0 - 36.5 g/dL    RDW 15.9 (H) 11.5 - 14.5 %    PLATELET 345 081 - 912 K/uL    MPV 12.9 8.9 - 12.9 FL    NRBC 0.0 0.0  WBC    ABSOLUTE NRBC 0.00 0.00 - 0.01 K/uL    NEUTROPHILS 81 (H) 32 - 75 %    LYMPHOCYTES 12 12 - 49 %    MONOCYTES 6 5 - 13 %    EOSINOPHILS 1 0 - 7 %    BASOPHILS 0 0 - 1 %    IMMATURE GRANULOCYTES 0 0 - 0.5 %    ABS. NEUTROPHILS 6.9 1.8 - 8.0 K/UL    ABS. LYMPHOCYTES 1.1 0.8 - 3.5 K/UL    ABS. MONOCYTES 0.5 0.0 - 1.0 K/UL    ABS. EOSINOPHILS 0.1 0.0 - 0.4 K/UL    ABS. BASOPHILS 0.0 0.0 - 0.1 K/UL    ABS. IMM. GRANS. 0.0 0.00 - 0.04 K/UL    DF AUTOMATED     METABOLIC PANEL, COMPREHENSIVE    Collection Time: 06/21/21  2:00 PM   Result Value Ref Range    Sodium 145 136 - 145 mmol/L    Potassium 3.9 3.5 - 5.1 mmol/L    Chloride 109 (H) 97 - 108 mmol/L    CO2 31 21 - 32 mmol/L    Anion gap 5 5 - 15 mmol/L    Glucose 132 (H) 65 - 100 mg/dL    BUN 22 (H) 6 - 20 mg/dL    Creatinine 1.53 (H) 0.55 - 1.02 mg/dL    BUN/Creatinine ratio 14 12 - 20      GFR est AA 41 (L) >60 ml/min/1.73m2    GFR est non-AA 34 (L) >60 ml/min/1.73m2    Calcium 8.4 (L) 8.5 - 10.1 mg/dL    Bilirubin, total 0.5 0.2 - 1.0 mg/dL    AST (SGOT) 12 (L) 15 - 37 U/L    ALT (SGPT) 24 12 - 78 U/L    Alk.  phosphatase 124 (H) 45 - 117 U/L    Protein, total 6.6 6.4 - 8.2 g/dL    Albumin 3.0 (L) 3.5 - 5.0 g/dL    Globulin 3.6 2.0 - 4.0 g/dL    A-G Ratio 0.8 (L) 1.1 - 2.2     CK W/ REFLX CKMB    Collection Time: 06/21/21  2:00 PM   Result Value Ref Range    CK 70.0 26 - 192 ng/mL   TROPONIN I    Collection Time: 06/21/21  2:00 PM   Result Value Ref Range    Troponin-I, Qt. 0.06 (H) <0.05 ng/mL   BNP    Collection Time: 06/21/21  2:00 PM   Result Value Ref Range    NT pro-BNP 1,681 (H) <125 pg/mL   EKG, 12 LEAD, INITIAL    Collection Time: 06/21/21  2:07 PM   Result Value Ref Range    Ventricular Rate 68 BPM    Atrial Rate 68 BPM    P-R Interval 184 ms    QRS Duration 106 ms    Q-T Interval 458 ms    QTC Calculation (Bezet) 487 ms    Calculated P Axis 64 degrees    Calculated R Axis 34 degrees    Calculated T Axis 116 degrees    Diagnosis       Normal sinus rhythm  ST & T wave abnormality, consider lateral ischemia  Abnormal ECG  When compared with ECG of 27-MAY-2021 19:06,  T wave inversion now evident in Lateral leads  Confirmed by COLBY DOWNEY, Vanessa Jean Baptiste ([id number]) on 6/22/2021 12:12:05 PM     GLUCOSE, POC    Collection Time: 06/21/21  9:57 PM   Result Value Ref Range    Glucose (POC) 158 (H) 65 - 117 mg/dL    Performed by Chico Downing HEMOGLOBIN A1C WITH EAG    Collection Time: 06/22/21  5:25 AM   Result Value Ref Range    Hemoglobin A1c 7.0 (H) 4.0 - 5.6 %    Est. average glucose 154 mg/dL   BNP    Collection Time: 06/22/21  5:25 AM   Result Value Ref Range    NT pro-BNP 2,271 (H) <125 pg/mL   TROPONIN I    Collection Time: 06/22/21  5:25 AM   Result Value Ref Range    Troponin-I, Qt. 0.06 (H) <0.05 ng/mL   CBC WITH AUTOMATED DIFF    Collection Time: 06/22/21  5:25 AM   Result Value Ref Range    WBC 8.9 3.6 - 11.0 K/uL    RBC 4.87 3.80 - 5.20 M/uL    HGB 12.8 11.5 - 16.0 g/dL    HCT 43.9 35.0 - 47.0 %    MCV 90.1 80.0 - 99.0 FL    MCH 26.3 26.0 - 34.0 PG    MCHC 29.2 (L) 30.0 - 36.5 g/dL    RDW 15.7 (H) 11.5 - 14.5 %    PLATELET 540 694 - 710 K/uL    MPV 12.5 8.9 - 12.9 FL    NRBC 0.0 0.0  WBC    ABSOLUTE NRBC 0.00 0.00 - 0.01 K/uL    NEUTROPHILS 76 (H) 32 - 75 %    LYMPHOCYTES 14 12 - 49 %    MONOCYTES 8 5 - 13 %    EOSINOPHILS 1 0 - 7 %    BASOPHILS 1 0 - 1 %    IMMATURE GRANULOCYTES 0 0 - 0.5 %    ABS. NEUTROPHILS 6.7 1.8 - 8.0 K/UL    ABS. LYMPHOCYTES 1.3 0.8 - 3.5 K/UL    ABS. MONOCYTES 0.7 0.0 - 1.0 K/UL    ABS. EOSINOPHILS 0.1 0.0 - 0.4 K/UL    ABS. BASOPHILS 0.1 0.0 - 0.1 K/UL    ABS. IMM. GRANS. 0.0 0.00 - 0.04 K/UL    DF AUTOMATED     METABOLIC PANEL, COMPREHENSIVE    Collection Time: 06/22/21  5:25 AM   Result Value Ref Range    Sodium 146 (H) 136 - 145 mmol/L    Potassium 3.7 3.5 - 5.1 mmol/L    Chloride 106 97 - 108 mmol/L    CO2 33 (H) 21 - 32 mmol/L    Anion gap 7 5 - 15 mmol/L    Glucose 99 65 - 100 mg/dL    BUN 19 6 - 20 mg/dL    Creatinine 1.27 (H) 0.55 - 1.02 mg/dL    BUN/Creatinine ratio 15 12 - 20      GFR est AA 51 (L) >60 ml/min/1.73m2    GFR est non-AA 42 (L) >60 ml/min/1.73m2    Calcium 9.0 8.5 - 10.1 mg/dL    Bilirubin, total 0.6 0.2 - 1.0 mg/dL    AST (SGOT) 9 (L) 15 - 37 U/L    ALT (SGPT) 21 12 - 78 U/L    Alk.  phosphatase 114 45 - 117 U/L    Protein, total 6.6 6.4 - 8.2 g/dL    Albumin 2.9 (L) 3.5 - 5.0 g/dL Globulin 3.7 2.0 - 4.0 g/dL    A-G Ratio 0.8 (L) 1.1 - 2.2     GLUCOSE, POC    Collection Time: 06/22/21  9:15 AM   Result Value Ref Range    Glucose (POC) 227 (H) 65 - 117 mg/dL    Performed by Reyna De Dios    EKG, 12 LEAD, SUBSEQUENT    Collection Time: 06/22/21 12:13 PM   Result Value Ref Range    Ventricular Rate 60 BPM    Atrial Rate 60 BPM    P-R Interval 180 ms    QRS Duration 106 ms    Q-T Interval 482 ms    QTC Calculation (Bezet) 482 ms    Calculated P Axis 71 degrees    Calculated R Axis 30 degrees    Calculated T Axis 117 degrees    Diagnosis       Normal sinus rhythm  Right atrial enlargement  T wave abnormality, consider lateral ischemia  Prolonged QT  Abnormal ECG  When compared with ECG of 21-JUN-2021 14:07,  No significant change was found  Confirmed by Rodrigo King MD, PEMA ([id number]) on 6/22/2021 1:06:31 PM         Results     ** No results found for the last 336 hours. **           Labs:     Recent Labs     06/22/21  0525 06/21/21  1400   WBC 8.9 8.5   HGB 12.8 12.1   HCT 43.9 42.0    181     Recent Labs     06/22/21  0525 06/21/21  1400   * 145   K 3.7 3.9    109*   CO2 33* 31   BUN 19 22*   CREA 1.27* 1.53*   GLU 99 132*   CA 9.0 8.4*     Recent Labs     06/22/21  0525 06/21/21  1400   ALT 21 24    124*   TBILI 0.6 0.5   TP 6.6 6.6   ALB 2.9* 3.0*   GLOB 3.7 3.6     No results for input(s): INR, PTP, APTT, INREXT in the last 72 hours. No results for input(s): FE, TIBC, PSAT, FERR in the last 72 hours. No results found for: FOL, RBCF   No results for input(s): PH, PCO2, PO2 in the last 72 hours.   Recent Labs     06/22/21  0525 06/21/21  1400   TROIQ 0.06* 0.06*     No results found for: CHOL, CHOLX, CHLST, CHOLV, HDL, HDLP, LDL, LDLC, DLDLP, TGLX, TRIGL, TRIGP, CHHD, CHHDX  Lab Results   Component Value Date/Time    Glucose (POC) 227 (H) 06/22/2021 09:15 AM    Glucose (POC) 158 (H) 06/21/2021 09:57 PM    Glucose (POC) 237 (H) 03/14/2021 05:04 PM    Glucose (POC) 298 (H) 03/14/2021 01:38 PM    Glucose (POC) 118 (H) 03/14/2021 08:09 AM     Lab Results   Component Value Date/Time    Color Yellow/Straw 06/07/2021 05:00 PM    Appearance Clear 06/07/2021 05:00 PM    Specific gravity 1.016 06/07/2021 05:00 PM    pH (UA) 5.0 06/07/2021 05:00 PM    Protein 100 (A) 06/07/2021 05:00 PM    Glucose Negative 06/07/2021 05:00 PM    Ketone Negative 06/07/2021 05:00 PM    Bilirubin Negative 06/07/2021 05:00 PM    Urobilinogen 0.1 06/07/2021 05:00 PM    Nitrites Negative 06/07/2021 05:00 PM    Leukocyte Esterase Negative 06/07/2021 05:00 PM    Bacteria Negative 06/07/2021 05:00 PM    WBC 0-4 06/07/2021 05:00 PM    RBC 0-5 06/07/2021 05:00 PM         Assessment:       Acute congestive heart failure  CAD status post CABG  Mildly elevated troponin  Type 2 diabetes  Hypertension  COPD  Chronic kidney disease  Bradycardia  History of A. fib status post cardioversion  Depression    Plan:     Continue IV Lasix   follow-up 2D complete echo  Repeat the labs  Discussed with the patient daughter and the patient at the bedside  Follow with cardiology and the pulmonologist          Current Facility-Administered Medications:     albuterol (PROVENTIL HFA, VENTOLIN HFA, PROAIR HFA) inhaler 2 Puff, 2 Puff, Inhalation, Q6HWA RT, Valerie Lopez MD, 2 Puff at 06/22/21 1316    [START ON 6/23/2021] amiodarone (CORDARONE) tablet 200 mg, 200 mg, Oral, DAILY, Clare Rico MD    apixaban (ELIQUIS) tablet 5 mg, 5 mg, Oral, BID, Gopal Alvarado MD, 5 mg at 06/22/21 7617    clopidogreL (PLAVIX) tablet 75 mg, 75 mg, Oral, DAILY, Gopal Alvarado MD, 75 mg at 06/22/21 0824    insulin glargine (LANTUS) injection 10 Units, 10 Units, SubCUTAneous, QHS, Gopal Alvarado MD, 10 Units at 06/21/21 2357    losartan (COZAAR) tablet 100 mg, 100 mg, Oral, DAILY, Gopal Alvarado MD, 100 mg at 06/22/21 1033    nitroglycerin (NITROSTAT) tablet 0.4 mg, 0.4 mg, SubLINGual, PRN, Hema Alvarado MD    oxybutynin [facility]) tablet 5 mg, 5 mg, Oral, TID, Gopal Alvarado MD, 5 mg at 06/22/21 1040    albuterol-ipratropium (DUO-NEB) 2.5 MG-0.5 MG/3 ML, 3 mL, Nebulization, Q6H PRN, Fay Alvarado MD    budesonide-formoteroL [facility]) 160-4.5 mcg/actuation HFA inhaler 2 Puff, 2 Puff, Inhalation, BID RT, Gabriel Burnett MD, 2 Puff at 06/22/21 7561    potassium chloride SR (KLOR-CON 10) tablet 10 mEq, 10 mEq, Oral, BID, Gopal Alvarado MD, 10 mEq at 06/22/21 1033    QUEtiapine (SEROquel) tablet 25 mg, 25 mg, Oral, QHS, Gopal Alvarado MD, 25 mg at 06/21/21 2356    theophylline ER (BAM-24) capsule 300 mg, 300 mg, Oral, DAILY, Gopal Alvarado MD, 300 mg at 06/22/21 3687    insulin lispro (HUMALOG) injection, , SubCUTAneous, AC&HS, Gopal Alvarado MD, 4 Units at 06/22/21 1037    glucose chewable tablet 16 g, 4 Tablet, Oral, PRN, Fay Alvarado MD    dextrose (D50W) injection syrg 12.5-25 g, 25-50 mL, IntraVENous, PRN, Fay Alvarado MD    glucagon (GLUCAGEN) injection 1 mg, 1 mg, IntraMUSCular, PRN, Fay Alvarado MD    polyethylene glycol (MIRALAX) packet 17 g, 17 g, Oral, DAILY PRN, Fay Alvarado MD    ondansetron (ZOFRAN ODT) tablet 4 mg, 4 mg, Oral, Q8H PRN **OR** ondansetron (ZOFRAN) injection 4 mg, 4 mg, IntraVENous, Q6H PRN, Gopal Alvarado MD    furosemide (LASIX) injection 40 mg, 40 mg, IntraVENous, BID, Gopal Alvarado MD, 40 mg at 06/22/21 1033    Current Outpatient Medications:     metoprolol tartrate (LOPRESSOR) 100 mg IR tablet, Take 1 Tablet by mouth two (2) times daily (with meals). , Disp: , Rfl:     apixaban (ELIQUIS) 5 mg tablet, Take 1 Tab by mouth two (2) times a day., Disp: 30 Tab, Rfl: 0    carvediloL (COREG) 25 mg tablet, Take 1 Tab by mouth two (2) times daily (with meals). , Disp: 60 Tab, Rfl: 0    losartan (COZAAR) 100 mg tablet, Take 1 Tab by mouth daily. , Disp: 30 Tab, Rfl: 0    oxybutynin (DITROPAN) 5 mg tablet, Take 1 Tab by mouth three (3) times daily. , Disp: 90 Tab, Rfl: 0    amiodarone (PACERONE) 400 mg tablet, Take 1 Tab by mouth daily. , Disp: 14 Tab, Rfl: 0    insulin glargine (LANTUS) 100 unit/mL injection, 10 Units by SubCUTAneous route nightly. May give in pen  Indications: type 2 diabetes mellitus, Disp: 1 Vial, Rfl: 0    QUEtiapine (SEROqueL) 25 mg tablet, Take 25 mg by mouth nightly., Disp: , Rfl:     theophylline ER (BAM-24) 200 mg cp24 capsule, Take 300 mg by mouth daily. , Disp: , Rfl:     potassium chloride SR (KLOR-CON 10) 10 mEq tablet, Take 10 mEq by mouth two (2) times a day., Disp: , Rfl:     mometasone-formoterol (Dulera) 200-5 mcg/actuation HFA inhaler, Take 2 Puffs by inhalation two (2) times a day., Disp: , Rfl:     clopidogreL (PLAVIX) 75 mg tab, Take 1 Tab by mouth daily. , Disp: 30 Tab, Rfl: 0    nitroglycerin (NITROSTAT) 0.4 mg SL tablet, Sit/Lay down then put one tab under the tongue every 5 minutes as needed for chest pain/neck or jaw pain for 3 doses.  Seek immediate medical attention should you need more than one tab for pain to resolve., Disp: 1 Bottle, Rfl: 3    albuterol-ipratropium (DUO-NEB) 2.5 mg-0.5 mg/3 ml nebu, 3 mL by Nebulization route every six (6) hours as needed for Wheezing., Disp: , Rfl:     albuterol (Ventolin HFA) 90 mcg/actuation inhaler, Take 2 Puffs by inhalation two (2) times daily as needed for Wheezing., Disp: , Rfl:

## 2021-06-22 NOTE — PROGRESS NOTES
6/22/21. PCP is [facility] Plan is home with daughter Bernardino Kumar @ [phone]) . Pt requesting home health, signed  Choice Letter , & informed MD must order. Referral awaiting in [place] for order. Pt uses cane @ times. Daughter/family member to transport home upon discharge.

## 2021-06-22 NOTE — ED NOTES
TRANSFER - OUT REPORT:    Verbal report given to [facility] (name) on Verner Slater  being transferred to  (unit) for routine progression of care       Report consisted of patients Situation, Background, Assessment and   Recommendations(SBAR). Information from the following report(s) SBAR and MAR was reviewed with the receiving nurse. Lines:   Peripheral IV 06/21/21 Left Hand (Active)   Site Assessment Clean, dry, & intact 06/21/21 1900   Dressing Status Clean, dry, & intact 06/21/21 1900   Dressing Type Tape;Transparent 06/21/21 1900   Hub Color/Line Status Blue 06/21/21 1900        Opportunity for questions and clarification was provided.       Patient transported with:   Monitor  Registered Nurse

## 2021-06-23 LAB
ALBUMIN SERPL-MCNC: 2.7 G/DL (ref 3.5–5)
ALBUMIN SERPL-MCNC: 2.8 G/DL (ref 3.5–5)
ALBUMIN/GLOB SERPL: 0.7 {RATIO} (ref 1.1–2.2)
ALP SERPL-CCNC: 119 U/L (ref 45–117)
ALT SERPL-CCNC: 17 U/L (ref 12–78)
ANION GAP SERPL CALC-SCNC: 4 MMOL/L (ref 5–15)
ANION GAP SERPL CALC-SCNC: 5 MMOL/L (ref 5–15)
AST SERPL W P-5'-P-CCNC: 9 U/L (ref 15–37)
BASOPHILS # BLD: 0 K/UL (ref 0–0.1)
BASOPHILS NFR BLD: 0 % (ref 0–1)
BILIRUB SERPL-MCNC: 0.7 MG/DL (ref 0.2–1)
BUN SERPL-MCNC: 21 MG/DL (ref 6–20)
BUN SERPL-MCNC: 21 MG/DL (ref 6–20)
BUN/CREAT SERPL: 15 (ref 12–20)
BUN/CREAT SERPL: 15 (ref 12–20)
CA-I BLD-MCNC: 8.6 MG/DL (ref 8.5–10.1)
CA-I BLD-MCNC: 8.6 MG/DL (ref 8.5–10.1)
CHLORIDE SERPL-SCNC: 102 MMOL/L (ref 97–108)
CHLORIDE SERPL-SCNC: 103 MMOL/L (ref 97–108)
CO2 SERPL-SCNC: 34 MMOL/L (ref 21–32)
CO2 SERPL-SCNC: 35 MMOL/L (ref 21–32)
CREAT SERPL-MCNC: 1.37 MG/DL (ref 0.55–1.02)
CREAT SERPL-MCNC: 1.4 MG/DL (ref 0.55–1.02)
DIFFERENTIAL METHOD BLD: ABNORMAL
ECHO AO ROOT DIAM: 2.6 CM
ECHO AV AREA PEAK VELOCITY: 2.37 CM2
ECHO AV AREA/BSA PEAK VELOCITY: 1.1 CM2/M2
ECHO AV PEAK GRADIENT: 12 MMHG
ECHO AV PEAK VELOCITY: 176 CM/S
ECHO LA AREA 4C: 20.18 CM2
ECHO LA MAJOR AXIS: 4 CM
ECHO LA MINOR AXIS: 1.9 CM
ECHO LV E' SEPTAL VELOCITY: 3.12 CM/S
ECHO LV EDV A2C: 98 CM3
ECHO LV EJECTION FRACTION BIPLANE: 45 % (ref 55–100)
ECHO LV ESV A2C: 46.3 CM3
ECHO LV INTERNAL DIMENSION DIASTOLIC: 4.61 CM (ref 3.9–5.3)
ECHO LV INTERNAL DIMENSION SYSTOLIC: 3.59 CM
ECHO LV IVSD: 1.9 CM (ref 0.6–0.9)
ECHO LV MASS 2D: 274.3 G (ref 67–162)
ECHO LV MASS INDEX 2D: 130 G/M2 (ref 43–95)
ECHO LV POSTERIOR WALL DIASTOLIC: 1.02 CM (ref 0.6–0.9)
ECHO LVOT DIAM: 2 CM
ECHO LVOT PEAK GRADIENT: 7 MMHG
ECHO LVOT PEAK VELOCITY: 133 CM/S
ECHO MV A VELOCITY: 109 CM/S
ECHO MV E DECELERATION TIME (DT): 290 MS
ECHO MV E VELOCITY: 112 CM/S
ECHO MV E/A RATIO: 1.03
ECHO MV E/E' SEPTAL: 35.9
ECHO MV MAX VELOCITY: 116 CM/S
ECHO MV MEAN GRADIENT: 2 MMHG
ECHO MV PEAK GRADIENT: 5 MMHG
ECHO MV VTI: 38.4 CM
ECHO PV MAX VELOCITY: 98 CM/S
ECHO PV PEAK INSTANTANEOUS GRADIENT SYSTOLIC: 12 MMHG
ECHO PV PEAK INSTANTANEOUS GRADIENT SYSTOLIC: 4 MMHG
ECHO PV REGURGITANT MAX VELOCITY: 173 CM/S
ECHO RA AREA 4C: 19.15 CM2
ECHO RV INTERNAL DIMENSION: 2.51 CM
ECHO RV TAPSE: 2.3 CM (ref 1.5–2)
ECHO TV MAX VELOCITY: 382 CM/S
ECHO TV REGURGITANT PEAK GRADIENT: 58 MMHG
EOSINOPHIL # BLD: 0.1 K/UL (ref 0–0.4)
EOSINOPHIL NFR BLD: 1 % (ref 0–7)
ERYTHROCYTE [DISTWIDTH] IN BLOOD BY AUTOMATED COUNT: 15.9 % (ref 11.5–14.5)
GLOBULIN SER CALC-MCNC: 3.8 G/DL (ref 2–4)
GLUCOSE BLD STRIP.AUTO-MCNC: 109 MG/DL (ref 65–117)
GLUCOSE BLD STRIP.AUTO-MCNC: 118 MG/DL (ref 65–117)
GLUCOSE BLD STRIP.AUTO-MCNC: 119 MG/DL (ref 65–117)
GLUCOSE BLD STRIP.AUTO-MCNC: 121 MG/DL (ref 65–117)
GLUCOSE BLD STRIP.AUTO-MCNC: 89 MG/DL (ref 65–117)
GLUCOSE SERPL-MCNC: 120 MG/DL (ref 65–100)
GLUCOSE SERPL-MCNC: 120 MG/DL (ref 65–100)
HCT VFR BLD AUTO: 45.9 % (ref 35–47)
HGB BLD-MCNC: 13.6 G/DL (ref 11.5–16)
IMM GRANULOCYTES # BLD AUTO: 0 K/UL (ref 0–0.04)
IMM GRANULOCYTES NFR BLD AUTO: 0 % (ref 0–0.5)
LYMPHOCYTES # BLD: 1.3 K/UL (ref 0.8–3.5)
LYMPHOCYTES NFR BLD: 15 % (ref 12–49)
MCH RBC QN AUTO: 26.8 PG (ref 26–34)
MCHC RBC AUTO-ENTMCNC: 29.6 G/DL (ref 30–36.5)
MCV RBC AUTO: 90.5 FL (ref 80–99)
MONOCYTES # BLD: 0.7 K/UL (ref 0–1)
MONOCYTES NFR BLD: 8 % (ref 5–13)
NEUTS SEG # BLD: 6.7 K/UL (ref 1.8–8)
NEUTS SEG NFR BLD: 76 % (ref 32–75)
NRBC # BLD: 0 K/UL (ref 0–0.01)
NRBC BLD-RTO: 0 PER 100 WBC
PERFORMED BY, TECHID: ABNORMAL
PERFORMED BY, TECHID: NORMAL
PERFORMED BY, TECHID: NORMAL
PHOSPHATE SERPL-MCNC: 4.1 MG/DL (ref 2.6–4.7)
PLATELET # BLD AUTO: 204 K/UL (ref 150–400)
PMV BLD AUTO: 12.7 FL (ref 8.9–12.9)
POTASSIUM SERPL-SCNC: 3.8 MMOL/L (ref 3.5–5.1)
POTASSIUM SERPL-SCNC: 3.8 MMOL/L (ref 3.5–5.1)
PROT SERPL-MCNC: 6.6 G/DL (ref 6.4–8.2)
RBC # BLD AUTO: 5.07 M/UL (ref 3.8–5.2)
SODIUM SERPL-SCNC: 141 MMOL/L (ref 136–145)
SODIUM SERPL-SCNC: 142 MMOL/L (ref 136–145)
TROPONIN I SERPL-MCNC: 0.06 NG/ML
WBC # BLD AUTO: 8.9 K/UL (ref 3.6–11)

## 2021-06-23 PROCEDURE — 93005 ELECTROCARDIOGRAM TRACING: CPT

## 2021-06-23 PROCEDURE — 74011250637 HC RX REV CODE- 250/637: Performed by: INTERNAL MEDICINE

## 2021-06-23 PROCEDURE — 82962 GLUCOSE BLOOD TEST: CPT

## 2021-06-23 PROCEDURE — 65270000029 HC RM PRIVATE

## 2021-06-23 PROCEDURE — 85025 COMPLETE CBC W/AUTO DIFF WBC: CPT

## 2021-06-23 PROCEDURE — 94760 N-INVAS EAR/PLS OXIMETRY 1: CPT

## 2021-06-23 PROCEDURE — 77010033678 HC OXYGEN DAILY

## 2021-06-23 PROCEDURE — 94640 AIRWAY INHALATION TREATMENT: CPT

## 2021-06-23 PROCEDURE — 74011250636 HC RX REV CODE- 250/636: Performed by: FAMILY MEDICINE

## 2021-06-23 PROCEDURE — 80069 RENAL FUNCTION PANEL: CPT

## 2021-06-23 PROCEDURE — 36415 COLL VENOUS BLD VENIPUNCTURE: CPT

## 2021-06-23 PROCEDURE — 80053 COMPREHEN METABOLIC PANEL: CPT

## 2021-06-23 PROCEDURE — 84484 ASSAY OF TROPONIN QUANT: CPT

## 2021-06-23 PROCEDURE — 74011636637 HC RX REV CODE- 636/637: Performed by: FAMILY MEDICINE

## 2021-06-23 PROCEDURE — 74011250637 HC RX REV CODE- 250/637: Performed by: FAMILY MEDICINE

## 2021-06-23 RX ORDER — FUROSEMIDE 40 MG/1
80 TABLET ORAL DAILY
Status: DISCONTINUED | OUTPATIENT
Start: 2021-06-24 | End: 2021-06-24

## 2021-06-23 RX ORDER — MAG HYDROX/ALUMINUM HYD/SIMETH 200-200-20
30 SUSPENSION, ORAL (FINAL DOSE FORM) ORAL
Status: DISCONTINUED | OUTPATIENT
Start: 2021-06-23 | End: 2021-06-29 | Stop reason: HOSPADM

## 2021-06-23 RX ORDER — TRAMADOL HYDROCHLORIDE 50 MG/1
50 TABLET ORAL
Status: COMPLETED | OUTPATIENT
Start: 2021-06-23 | End: 2021-06-23

## 2021-06-23 RX ADMIN — POTASSIUM CHLORIDE 10 MEQ: 750 TABLET, FILM COATED, EXTENDED RELEASE ORAL at 09:24

## 2021-06-23 RX ADMIN — BUDESONIDE AND FORMOTEROL FUMARATE DIHYDRATE 2 PUFF: 160; 4.5 AEROSOL RESPIRATORY (INHALATION) at 20:00

## 2021-06-23 RX ADMIN — INSULIN GLARGINE 10 UNITS: 100 INJECTION, SOLUTION SUBCUTANEOUS at 22:00

## 2021-06-23 RX ADMIN — ALBUTEROL SULFATE 2 PUFF: 108 AEROSOL, METERED RESPIRATORY (INHALATION) at 21:14

## 2021-06-23 RX ADMIN — NITROGLYCERIN 0.4 MG: 0.4 TABLET, ORALLY DISINTEGRATING SUBLINGUAL at 20:48

## 2021-06-23 RX ADMIN — AMIODARONE HYDROCHLORIDE 200 MG: 200 TABLET ORAL at 09:24

## 2021-06-23 RX ADMIN — CLOPIDOGREL BISULFATE 75 MG: 75 TABLET ORAL at 09:24

## 2021-06-23 RX ADMIN — POTASSIUM CHLORIDE 10 MEQ: 750 TABLET, FILM COATED, EXTENDED RELEASE ORAL at 21:20

## 2021-06-23 RX ADMIN — ALUMINUM HYDROXIDE, MAGNESIUM HYDROXIDE, AND SIMETHICONE 30 ML: 200; 200; 20 SUSPENSION ORAL at 21:20

## 2021-06-23 RX ADMIN — LOSARTAN POTASSIUM 100 MG: 50 TABLET, FILM COATED ORAL at 09:24

## 2021-06-23 RX ADMIN — NITROGLYCERIN 0.4 MG: 0.4 TABLET, ORALLY DISINTEGRATING SUBLINGUAL at 20:43

## 2021-06-23 RX ADMIN — ALBUTEROL SULFATE 2 PUFF: 108 AEROSOL, METERED RESPIRATORY (INHALATION) at 07:54

## 2021-06-23 RX ADMIN — BUDESONIDE AND FORMOTEROL FUMARATE DIHYDRATE 2 PUFF: 160; 4.5 AEROSOL RESPIRATORY (INHALATION) at 07:54

## 2021-06-23 RX ADMIN — APIXABAN 5 MG: 5 TABLET, FILM COATED ORAL at 09:23

## 2021-06-23 RX ADMIN — QUETIAPINE FUMARATE 25 MG: 25 TABLET ORAL at 21:20

## 2021-06-23 RX ADMIN — ALBUTEROL SULFATE 2 PUFF: 108 AEROSOL, METERED RESPIRATORY (INHALATION) at 13:56

## 2021-06-23 RX ADMIN — APIXABAN 5 MG: 5 TABLET, FILM COATED ORAL at 21:20

## 2021-06-23 RX ADMIN — TRAMADOL HYDROCHLORIDE 50 MG: 50 TABLET, FILM COATED ORAL at 21:20

## 2021-06-23 RX ADMIN — OXYBUTYNIN CHLORIDE 5 MG: 5 TABLET ORAL at 16:01

## 2021-06-23 RX ADMIN — THEOPHYLLINE ANHYDROUS 300 MG: 300 CAPSULE, EXTENDED RELEASE ORAL at 12:16

## 2021-06-23 RX ADMIN — OXYBUTYNIN CHLORIDE 5 MG: 5 TABLET ORAL at 09:24

## 2021-06-23 RX ADMIN — OXYBUTYNIN CHLORIDE 5 MG: 5 TABLET ORAL at 21:20

## 2021-06-23 RX ADMIN — FUROSEMIDE 40 MG: 10 INJECTION, SOLUTION INTRAVENOUS at 09:24

## 2021-06-23 NOTE — PROGRESS NOTES
Progress Note      6/23/2021 11:07 AM  NAME: Mike Dutta   MRN:  [id number]   Admit Diagnosis: CHF exacerbation ([facility] [address] [I50.9]  CHF (congestive heart failure) ([facility] [I50.9]      Problem List:   COPD exacerbation  Acute on chronic diastolic heart failure  Stable CAD with recent cardiac cath showing widely patent coronaries  IFTIKHAR  Edema  Pulmonary hypertension. PASP 62 mmHg     Assessment/Plan:   Switch to p.o. diuretics  Echo reviewed and shows significant pulmonary hypertension  Continue systemic anticoagulation for previous a flutter/fib  Advance activity as tolerated         []       High complexity decision making was performed in this patient at high risk for decompensation with multiple organ involvement. Subjective:     Mike Dutta reports right-sided chest pain, mild dyspnea. Discussed with RN events overnight. Review of Systems:   Negative except for as noted above. Objective:      Physical Exam:    Last 24hrs VS reviewed since prior progress note. Most recent are:    Visit Vitals  BP (!) 174/85 (BP Patient Position: At rest;Semi fowlers)   Pulse 61   Temp 97.4 °F (36.3 °C)   Resp 20   Ht 5' 4\" (1.626 m)   Wt 103.4 kg (227 lb 15.3 oz)   SpO2 94%   Breastfeeding No   BMI 39.13 kg/m²     No intake or output data in the 24 hours ending 06/23/21 1107     General Appearance:  alert; no acute distress. Ears/Nose/Mouth/Throat: Anicteric; moist mucous membranes  Neck: Supple. Chest: Lungs clear to auscultation bilaterally. Cardiovascular: Regular rate and rhythm, S1S2 normal,+ murmur. Abdomen: Soft, non-tender, bowel sounds are active. Extremities: trace edema bilaterally. Skin: Warm and dry. []         Post-cath site without hematoma, bruit, tenderness, or thrill. Distal pulses intact. PMH/SH reviewed - no change compared to H&P    Echocardiogram 6/22/2021:  · LV: Estimated LVEF is 55 - 60%. Normal cavity size and systolic function (ejection fraction normal).  Mild concentric hypertrophy. Wall motion: normal. Mild (grade 1) left ventricular diastolic dysfunction. · LA: Mildly dilated left atrium. · RA: Mildly dilated right atrium. · AV: With no evidence of reduced excursion. · MV: Mild mitral valve regurgitation is present. · TV: Mild tricuspid valve regurgitation is present. · PV: Mild pulmonic valve regurgitation is present. · PA: Pulmonary arterial systolic pressure is 62 mmHg. Telemetry: Sinus rhythm with heart rate in the 80s    EKG:     [x]  No new EKG for review    Lab Data Personally Reviewed:    Recent Labs     06/23/21 0751 06/22/21  0525   WBC 8.9 8.9   HGB 13.6 12.8   HCT 45.9 43.9    192     No results for input(s): INR, PTP, APTT, INREXT in the last 72 hours. Recent Labs     06/23/21  0751 06/22/21  0525 06/21/21  1400     141 146* 145   K 3.8  3.8 3.7 3.9     102 106 109*   CO2 34*  35* 33* 31   BUN 21*  21* 19 22*   CREA 1.40*  1.37* 1.27* 1.53*   *  120* 99 132*   CA 8.6  8.6 9.0 8.4*     Recent Labs     06/22/21  0525 06/21/21  1400   TROIQ 0.06* 0.06*     No results found for: CHOL, CHOLX, CHLST, CHOLV, HDL, HDLP, LDL, LDLC, DLDLP, Stacey Moors, CHHD, CHHDX    Recent Labs     06/23/21  0751 06/22/21  0525 06/21/21  1400   * 114 124*   TP 6.6 6.6 6.6   ALB 2.8*  2.7* 2.9* 3.0*   GLOB 3.8 3.7 3.6     No results for input(s): PH, PCO2, PO2 in the last 72 hours.     Medications Personally Reviewed:    Current Facility-Administered Medications   Medication Dose Route Frequency    albuterol (PROVENTIL HFA, VENTOLIN HFA, PROAIR HFA) inhaler 2 Puff  2 Puff Inhalation Q6HWA RT    amiodarone (CORDARONE) tablet 200 mg  200 mg Oral DAILY    apixaban (ELIQUIS) tablet 5 mg  5 mg Oral BID    clopidogreL (PLAVIX) tablet 75 mg  75 mg Oral DAILY    insulin glargine (LANTUS) injection 10 Units  10 Units SubCUTAneous QHS    losartan (COZAAR) tablet 100 mg  100 mg Oral DAILY    nitroglycerin (NITROSTAT) tablet 0.4 mg 0.4 mg SubLINGual PRN    oxybutynin (DITROPAN) tablet 5 mg  5 mg Oral TID    albuterol-ipratropium (DUO-NEB) 2.5 MG-0.5 MG/3 ML  3 mL Nebulization Q6H PRN    budesonide-formoteroL (SYMBICORT) 160-4.5 mcg/actuation HFA inhaler 2 Puff  2 Puff Inhalation BID RT    potassium chloride SR (KLOR-CON 10) tablet 10 mEq  10 mEq Oral BID    QUEtiapine (SEROquel) tablet 25 mg  25 mg Oral QHS    theophylline ER (BAM-24) capsule 300 mg  300 mg Oral DAILY    insulin lispro (HUMALOG) injection   SubCUTAneous AC&HS    glucose chewable tablet 16 g  4 Tablet Oral PRN    dextrose (D50W) injection syrg 12.5-25 g  25-50 mL IntraVENous PRN    glucagon (GLUCAGEN) injection 1 mg  1 mg IntraMUSCular PRN    polyethylene glycol (MIRALAX) packet 17 g  17 g Oral DAILY PRN    ondansetron (ZOFRAN ODT) tablet 4 mg  4 mg Oral Q8H PRN    Or    ondansetron (ZOFRAN) injection 4 mg  4 mg IntraVENous Q6H PRN    furosemide (LASIX) injection 40 mg  40 mg IntraVENous BID         Hawa Pascual MD

## 2021-06-23 NOTE — PROGRESS NOTES
CM checked [employer] and saw that no home health referral had been sent. CM spoke to patient at bedside regarding home health choice. Patient said she wanted to use a particular home health agency in [place], but forgot the name. Patient asked CM to contact her daughter Nicanor No [phone]) to get home health choice. CM contacted patient daughter; received no answer and left a voice message. 5 CM spoke to patient daughter Nicanor No [phone]); regarding home health choice. Daughter gave a verbal home health choice for no preference. CM sent referrals via [employer]. [phone] Patient has been declined by home health companies.

## 2021-06-23 NOTE — PROGRESS NOTES
Consult  Pulmonary, Critical Care    Name: Skye Dempsey MRN: [id number]   : 1950 Hospital: [address] [place] [address]   Date: 2021  Admission date: 2021 Hospital Day: 3       Subjective/Interval History:   Seen in the emergency room. Has had over 3 L of diuresis so far and feels much better. History is of mild cardiomyopathy normally on Lasix 20 mg. About 2 weeks ago she noted worsening edema and that her 20 mg of Lasix was not initiating any diuresis. She upped it to 40 mg a week ago and still has not been having any diuresis has had worsening edema of her legs shortness of breath cough productive of clear sputum. She thinks she may have had an episode of fever but is not sure. Has not seen any purulent sputum   did not tolerate our CPAP last night. She states her family will bring her machine in today to use. Feels better edema is improved urine output not recorded yesterday states she was still passing a large amount of urine    Hospital Problems  Date Reviewed: 3/12/2021        Codes Class Noted POA    CHF exacerbation ([facility] [address] ICD-10-CM: I50.9  ICD-9-CM: 428.0  2021 Unknown        CHF (congestive heart failure) ([facility] [address] ICD-10-CM: I50.9  ICD-9-CM: 428.0  2021 Unknown              IMPRESSION:   1. Acute pulmonary edema improved clinically we will repeat x-ray in a.m. 2. Chronic hypoxic respiratory failure remains on her baseline oxygen  3. Acute kidney injury continue to follow  4. Cardiomyopathy I merrily diastolic dysfunction  5. COPD no wheezing  6. Obstructive sleep apnea only to bring in her machine  7. Diabetes  8. Coronary artery disease with bypass grafting and stents in the past  Body mass index is 39.89 kg/m². 9.       RECOMMENDATIONS/PLAN:   1. Continue IV Lasix follow renal function  2. Continue nasal oxygen as she uses at home  3. Could not tolerate our CPAP her family is to bring in her machine  4.  Continue Symbicort to substitute for her [facility] inhaler  5. We will use albuterol inhaler. 6. She has a nebulizer machine at home she thinks it is not working correctly          [x] High complexity decision making was performed  [x] See my orders for details      Subjective/Initial History:     I was asked by Lacey Fink MD to see Michelle Lopez  a 72 y.o.  female in consultation for a chief complaint of shortness of breath and edema        Allergies   Allergen Reactions    Penicillins Other (comments)    Tylox [Oxycodone-Acetaminophen] Hives        MAR reviewed and pertinent medications noted or modified as needed     Current Facility-Administered Medications   Medication    albuterol (PROVENTIL HFA, VENTOLIN HFA, PROAIR HFA) inhaler 2 Puff    amiodarone (CORDARONE) tablet 200 mg    apixaban (ELIQUIS) tablet 5 mg    clopidogreL (PLAVIX) tablet 75 mg    insulin glargine (LANTUS) injection 10 Units    losartan (COZAAR) tablet 100 mg    nitroglycerin (NITROSTAT) tablet 0.4 mg    oxybutynin (DITROPAN) tablet 5 mg    albuterol-ipratropium (DUO-NEB) 2.5 MG-0.5 MG/3 ML    budesonide-formoteroL (SYMBICORT) 160-4.5 mcg/actuation HFA inhaler 2 Puff    potassium chloride SR (KLOR-CON 10) tablet 10 mEq    QUEtiapine (SEROquel) tablet 25 mg    theophylline ER (BAM-24) capsule 300 mg    insulin lispro (HUMALOG) injection    glucose chewable tablet 16 g    dextrose (D50W) injection syrg 12.5-25 g    glucagon (GLUCAGEN) injection 1 mg    polyethylene glycol (MIRALAX) packet 17 g    ondansetron (ZOFRAN ODT) tablet 4 mg    Or    ondansetron (ZOFRAN) injection 4 mg    furosemide (LASIX) injection 40 mg      Patient PCP: Sofia Hale MD  PMH:  has a past medical history of Chronic obstructive pulmonary disease ([address] Congestive heart disease ([address] Coronary artery disease, Diabetes ([address] Hyperlipidemia, Hypertension, Myocardial infarct ([address] and IFTIKHAR (obstructive sleep apnea).   PSH:   has a past surgical history that includes hx cholecystectomy; hx hysterectomy; hx hernia repair; hx coronary artery bypass graft; and hx breast biopsy. FHX: family history includes Cancer in her maternal grandmother; Heart Disease in her mother; Kidney Disease in her mother. SHX:  reports that she has quit smoking. She has never used smokeless tobacco. She reports previous alcohol use. She reports that she does not use drugs. Systemic review:  General she thinks her weight stable but has had 2 weeks of worsening shortness of breath and edema with no response from her normal dose of Lasix. She thinks she felt hot for a few days but did not check her temperature. Eyes no double vision or momentary blindness  ENT no facial pain over the sinuses  Musculoskeletal no swollen tender joints  Neurologic no seizures or syncope  Endocrinologic has diabetes denies polyuria polydipsia  Gastrointestinal no nausea vomiting acid indigestion  Genitourinary no pain or discomfort on urination no bleeding or drainage. She noticed very poor to no response from her normal doses of Lasix and did increase from 20 to 40 mg daily again with no diuresis initiated  Cardiovascular no chest pain or diaphoresis she has had worsening ankle edema nocturia once or twice per night which is normal for her. Respiratory has a history of COPD stop smoking 2 years ago but had over 45-year history of smoking between half and 1-1/2 packs/day. Has minimal cough states she uses her Dulera inhaler twice a day and her albuterol inhaler twice a day uses nebulizer on a as needed basis. Has had cough recently but chest clear mucus no purulence.     Objective:     Vital Signs: Telemetry:    normal sinus rhythm Intake/Output:   Visit Vitals  BP (!) 174/85 (BP Patient Position: At rest;Semi fowlers)   Pulse 61   Temp 97.4 °F (36.3 °C)   Resp 20   Ht 5' 4\" (1.626 m)   Wt 105.4 kg (232 lb 5.8 oz)   SpO2 94%   Breastfeeding No   BMI 39.89 kg/m²       Temp (24hrs), Av.8 °F (36.6 °C), Min:97.4 °F (36.3 °C), Max:98.4 °F (36.9 °C)        O2 Device: Nasal cannula O2 Flow Rate (L/min): 2 l/min       Wt Readings from Last 4 Encounters:   06/22/21 105.4 kg (232 lb 5.8 oz)   06/07/21 108 kg (238 lb)   05/27/21 107.5 kg (237 lb)   04/12/21 107.5 kg (237 lb)        No intake or output data in the 24 hours ending 06/23/21 0924    Last shift:      No intake/output data recorded. Last 3 shifts: 06/21 1901 - 06/23 0700  In: -   Out: 2000 [Urine:2000]       Physical Exam:   General:  female; lying in bed eating breakfast  with nasal oxygen in place with no distress  HEENT: NCAT, normal oral mucosa  Eyes: anicteric; conjunctiva clear extraocular movements intact  Neck: no nodes, JVD is present, no accessory MM use. Chest: no deformity,   Cardiac: Regular rate and rhythm distant sounds no definite murmur she does have significant edema extending up her shins but skin shows furrowing indicating good diuresis  Lungs: Prolongation of exhalation but no wheezing the lung sounds clear anterior laterally and upper lungs posterior with slightly diminished in the bases with rales extending about 1/3 up  Abd: Soft nontender normal bowel sounds  Ext: Leg edema extending up the shins  no joint swelling;  No clubbing  :clear urine  Neuro: Awake alert oriented speech is clear moves all 4 extremities  Psych- no agitation, oriented to person;   Skin: warm, dry, no cyanosis;   Pulses: Brachial and radial pulses intact  Capillary: Normal capillary refill    Labs:    Recent Labs     06/23/21  0751 06/22/21  0525 06/21/21  1400   WBC 8.9 8.9 8.5   HGB 13.6 12.8 12.1    192 181     Recent Labs     06/23/21  0751 06/22/21  0525 06/21/21  1400     141 146* 145   K 3.8  3.8 3.7 3.9     102 106 109*   CO2 34*  35* 33* 31   *  120* 99 132*   BUN 21*  21* 19 22*   CREA 1.40*  1.37* 1.27* 1.53*   CA 8.6  8.6 9.0 8.4*   PHOS 4.1  --   --    ALB 2.8*  2.7* 2.9* 3.0*   ALT 17 21 24     3 L nasal oxygen with oxygen saturation 97%  Recent Labs     06/22/21  0525 06/21/21  1400   TROIQ 0.06* 0.06*     BNP 2271  Lab Results   Component Value Date/Time    Culture result: Klebsiella pneumoniae (A) 03/10/2021 12:06 AM     Imaging:    CXR Results  (Last 48 hours)               06/21/21 1526  XR CHEST PORT Final result    Impression:  Cardiomegaly with mild vascular congestion and increased   interstitial markings suggesting volume overload/CHF. Narrative:  Portable upright radiograph of the chest 3:24 PM. No prior study. INDICATION: Shortness of breath. Patient is status post median sternotomy. Heart size is enlarged with an   atherosclerotic aorta. There is vascular congestion. There is fluid in the minor   fissure. Mild increased interstitial markings. No definite infiltrate. No   pneumothorax. Results from [place] encounter on 06/21/21    XR CHEST PORT    Narrative  Portable upright radiograph of the chest 3:24 PM. No prior study. INDICATION: Shortness of breath. Patient is status post median sternotomy. Heart size is enlarged with an  atherosclerotic aorta. There is vascular congestion. There is fluid in the minor  fissure. Mild increased interstitial markings. No definite infiltrate. No  pneumothorax. Impression  Cardiomegaly with mild vascular congestion and increased  interstitial markings suggesting volume overload/CHF. Results from [place] encounter on 05/27/21    XR ABD FLAT/ ERECT    Narrative  This study is a 2 view radiographic evaluation of the abdomen dated 5/27/2021. HISTORY: Abdominal pain. FINDINGS: The patient status post a previous median sternotomy. There is  apparent enlargement of the cardiac silhouette. There is significant fecal loading within the cecum and the ascending colon. There is mild gaseous distention of the small bowel.  This examination is  negative for organomegaly, intra-abdominal fluid, or pathological calcifications  within the upper abdominal region. There are vascular calcifications of the  distal infrarenal abdominal aorta and the common iliac arteries. There is a  large protuberant anterior abdominal wall which is somewhat limiting the  diagnostic sensitivity of this examination. There are changes of diffuse  idiopathic skeletal hyperostosis with bony whiskering along the pelvis. There is apparent endovascular stent demonstrated within the right femoral  artery. There also may be endovascular stents within the left common and  external iliac arteries. Impression  1. There is significant fecal retention within the right side of the colon  consistent with recent constipation. 2.  There is peripheral vascular disease with calcified plaque formation of the  distal abdominal aorta and involving the iliac arteries. There is an apparent  stent within the right common femoral artery and apparent stents within the left  common and external iliac arteries. Results from Hospital Encounter encounter on 04/12/21    XR CHEST PORT    Narrative  Examination: XR CHEST PORT    History: chest pain    Comparison: Chest radiograph 3/15/2021    FINDINGS:    Single frontal portable view of the chest. Lung volumes are symmetric. Prominence of the central pulmonary vasculature. The cardiac silhouette is  enlarged. Postoperative changes of the mediastinum. Atherosclerosis of the  thoracic aorta. Hazy opacities overlying the lung base which are new, could  represent mild edema or atelectasis. No pneumothorax. No acute or aggressive  osseous abnormalities are evident. Impression  Central pulmonary vascular engorgement with new hazy opacities of the lung  bases, nonspecific, but could represent early edema or atelectasis. Infectious  process could appear similar in the appropriate clinical setting.     Results from [place] encounter on 06/07/21    CT ABD PELV W CONT    Narrative  Indication: Possible hernia. Dose reduction: All CT scans done at this facility are performed using dose  reduction optimization techniques as appropriate to a performed exam including  the following: Automated exposure control, adjustments of the mA and/or kV  according to patient size, or use of iterative reconstruction technique. CT abdomen and pelvis, 100 cc [id number] 6/7/2021. Comparison 5/27/2021. Cardiomegaly. Numerous sigmoid colon and descending colon diverticula without diverticulitis. Normal liver. Nonvisualized gallbladder. No apparent bile duct dilatation. Normal spleen, pancreas, adrenal glands, right kidney. Small left renal cyst. No  hydronephrosis. Normal appearance of urinary bladder. Hysterectomy. Atheromatous calcification of abdominal aorta without aneurysm. Right renal  artery stent. Appendix identified. No pericecal inflammation. Normal small bowel. No ascites or free intraperitoneal air. Mild lumbar spondylosis. Mild degenerative changes of hips. No abdominal wall hernia minimal umbilical fat protrusion unchanged. Impression  No acute finding. Left colon diverticula without diverticulitis. · Discussion: Acute pulmonary edema. Feels better with diuresis. Would continue IV Lasix. As she improves would switch to oral Lasix 60 mg. She was not having any response to 40 mg orally at home. Await results of echocardiogram.  · She has obstructive sleep apnea she thinks her setting is 13. She will try to have her family bring her machine in. In the meantime we will use hospital CPAP at 13 cm of pressure  · 6/23 she could not tolerate her hospital CPAP her family is to bring her machine in today.   Would continue diuresis and will request direct I&O continue to follow renal function if creatinine rises further tomorrow will decrease to single daily dosing Lasix    Magdaleno Gonzalez MD

## 2021-06-23 NOTE — ROUTINE PROCESS
Dual skin assessment performed with Angelia Bryan RN. Skin clean, dry and intact. Old scar noted to abdomen. Bilateral lower extremity edema noted. Red blanchable area noted to sacrum. No open or draining wounds noted.

## 2021-06-24 ENCOUNTER — APPOINTMENT (OUTPATIENT)
Dept: GENERAL RADIOLOGY | Age: 71
DRG: 291 | End: 2021-06-24
Attending: INTERNAL MEDICINE
Payer: MEDICARE

## 2021-06-24 LAB
ALBUMIN SERPL-MCNC: 2.7 G/DL (ref 3.5–5)
ANION GAP SERPL CALC-SCNC: 4 MMOL/L (ref 5–15)
ATRIAL RATE: 65 BPM
BUN SERPL-MCNC: 26 MG/DL (ref 6–20)
BUN/CREAT SERPL: 19 (ref 12–20)
CA-I BLD-MCNC: 8.4 MG/DL (ref 8.5–10.1)
CALCULATED P AXIS, ECG09: 77 DEGREES
CALCULATED R AXIS, ECG10: 47 DEGREES
CALCULATED T AXIS, ECG11: 100 DEGREES
CHLORIDE SERPL-SCNC: 102 MMOL/L (ref 97–108)
CO2 SERPL-SCNC: 34 MMOL/L (ref 21–32)
CREAT SERPL-MCNC: 1.34 MG/DL (ref 0.55–1.02)
DIAGNOSIS, 93000: NORMAL
GLUCOSE BLD STRIP.AUTO-MCNC: 127 MG/DL (ref 65–117)
GLUCOSE BLD STRIP.AUTO-MCNC: 136 MG/DL (ref 65–117)
GLUCOSE SERPL-MCNC: 117 MG/DL (ref 65–100)
P-R INTERVAL, ECG05: 168 MS
PERFORMED BY, TECHID: ABNORMAL
PERFORMED BY, TECHID: ABNORMAL
PHOSPHATE SERPL-MCNC: 3.4 MG/DL (ref 2.6–4.7)
POTASSIUM SERPL-SCNC: 3.8 MMOL/L (ref 3.5–5.1)
Q-T INTERVAL, ECG07: 480 MS
QRS DURATION, ECG06: 104 MS
QTC CALCULATION (BEZET), ECG08: 499 MS
SODIUM SERPL-SCNC: 140 MMOL/L (ref 136–145)
VENTRICULAR RATE, ECG03: 65 BPM

## 2021-06-24 PROCEDURE — 74011250637 HC RX REV CODE- 250/637: Performed by: INTERNAL MEDICINE

## 2021-06-24 PROCEDURE — 97530 THERAPEUTIC ACTIVITIES: CPT

## 2021-06-24 PROCEDURE — 94640 AIRWAY INHALATION TREATMENT: CPT

## 2021-06-24 PROCEDURE — 82962 GLUCOSE BLOOD TEST: CPT

## 2021-06-24 PROCEDURE — 97161 PT EVAL LOW COMPLEX 20 MIN: CPT

## 2021-06-24 PROCEDURE — 36415 COLL VENOUS BLD VENIPUNCTURE: CPT

## 2021-06-24 PROCEDURE — 74011636637 HC RX REV CODE- 636/637: Performed by: FAMILY MEDICINE

## 2021-06-24 PROCEDURE — 74011250637 HC RX REV CODE- 250/637: Performed by: FAMILY MEDICINE

## 2021-06-24 PROCEDURE — 71046 X-RAY EXAM CHEST 2 VIEWS: CPT

## 2021-06-24 PROCEDURE — 80069 RENAL FUNCTION PANEL: CPT

## 2021-06-24 PROCEDURE — 74011250637 HC RX REV CODE- 250/637: Performed by: NURSE PRACTITIONER

## 2021-06-24 PROCEDURE — 65270000029 HC RM PRIVATE

## 2021-06-24 PROCEDURE — 83880 ASSAY OF NATRIURETIC PEPTIDE: CPT

## 2021-06-24 PROCEDURE — 97165 OT EVAL LOW COMPLEX 30 MIN: CPT

## 2021-06-24 PROCEDURE — 94760 N-INVAS EAR/PLS OXIMETRY 1: CPT

## 2021-06-24 PROCEDURE — 77010033678 HC OXYGEN DAILY

## 2021-06-24 RX ORDER — FAMOTIDINE 10 MG/1
10 TABLET ORAL 2 TIMES DAILY
Status: DISCONTINUED | OUTPATIENT
Start: 2021-06-24 | End: 2021-06-29 | Stop reason: HOSPADM

## 2021-06-24 RX ORDER — FUROSEMIDE 40 MG/1
80 TABLET ORAL DAILY
Status: DISCONTINUED | OUTPATIENT
Start: 2021-06-25 | End: 2021-06-29

## 2021-06-24 RX ORDER — TRAMADOL HYDROCHLORIDE 50 MG/1
50 TABLET ORAL
Status: COMPLETED | OUTPATIENT
Start: 2021-06-24 | End: 2021-06-24

## 2021-06-24 RX ORDER — METOLAZONE 5 MG/1
5 TABLET ORAL DAILY
Status: DISCONTINUED | OUTPATIENT
Start: 2021-06-25 | End: 2021-06-26

## 2021-06-24 RX ADMIN — ALBUTEROL SULFATE 2 PUFF: 108 AEROSOL, METERED RESPIRATORY (INHALATION) at 13:50

## 2021-06-24 RX ADMIN — CLOPIDOGREL BISULFATE 75 MG: 75 TABLET ORAL at 09:57

## 2021-06-24 RX ADMIN — FAMOTIDINE 10 MG: 10 TABLET ORAL at 11:31

## 2021-06-24 RX ADMIN — FAMOTIDINE 10 MG: 10 TABLET ORAL at 21:50

## 2021-06-24 RX ADMIN — POTASSIUM CHLORIDE 10 MEQ: 750 TABLET, FILM COATED, EXTENDED RELEASE ORAL at 21:50

## 2021-06-24 RX ADMIN — QUETIAPINE FUMARATE 25 MG: 25 TABLET ORAL at 21:50

## 2021-06-24 RX ADMIN — OXYBUTYNIN CHLORIDE 5 MG: 5 TABLET ORAL at 17:47

## 2021-06-24 RX ADMIN — APIXABAN 5 MG: 5 TABLET, FILM COATED ORAL at 21:50

## 2021-06-24 RX ADMIN — APIXABAN 5 MG: 5 TABLET, FILM COATED ORAL at 09:57

## 2021-06-24 RX ADMIN — POTASSIUM CHLORIDE 10 MEQ: 750 TABLET, FILM COATED, EXTENDED RELEASE ORAL at 09:57

## 2021-06-24 RX ADMIN — THEOPHYLLINE ANHYDROUS 300 MG: 300 CAPSULE, EXTENDED RELEASE ORAL at 11:31

## 2021-06-24 RX ADMIN — OXYBUTYNIN CHLORIDE 5 MG: 5 TABLET ORAL at 09:57

## 2021-06-24 RX ADMIN — BUDESONIDE AND FORMOTEROL FUMARATE DIHYDRATE 2 PUFF: 160; 4.5 AEROSOL RESPIRATORY (INHALATION) at 08:10

## 2021-06-24 RX ADMIN — BUDESONIDE AND FORMOTEROL FUMARATE DIHYDRATE 2 PUFF: 160; 4.5 AEROSOL RESPIRATORY (INHALATION) at 20:17

## 2021-06-24 RX ADMIN — ALBUTEROL SULFATE 2 PUFF: 108 AEROSOL, METERED RESPIRATORY (INHALATION) at 08:10

## 2021-06-24 RX ADMIN — INSULIN GLARGINE 10 UNITS: 100 INJECTION, SOLUTION SUBCUTANEOUS at 21:49

## 2021-06-24 RX ADMIN — ALBUTEROL SULFATE 2 PUFF: 108 AEROSOL, METERED RESPIRATORY (INHALATION) at 20:17

## 2021-06-24 RX ADMIN — TRAMADOL HYDROCHLORIDE 50 MG: 50 TABLET, FILM COATED ORAL at 17:47

## 2021-06-24 RX ADMIN — OXYBUTYNIN CHLORIDE 5 MG: 5 TABLET ORAL at 21:50

## 2021-06-24 RX ADMIN — FUROSEMIDE 80 MG: 40 TABLET ORAL at 09:57

## 2021-06-24 RX ADMIN — LOSARTAN POTASSIUM 100 MG: 50 TABLET, FILM COATED ORAL at 09:57

## 2021-06-24 RX ADMIN — AMIODARONE HYDROCHLORIDE 200 MG: 200 TABLET ORAL at 09:57

## 2021-06-24 NOTE — ROUTINE PROCESS
Bedside and Verbal shift change report given to Urban Amanda RN (oncoming nurse) by John Fink (offgoing nurse). Report included the following information SBAR, Intake/Output, MAR, Recent Results, Med Rec Status and Cardiac Rhythm .

## 2021-06-24 NOTE — PROGRESS NOTES
General Daily Progress Note          Patient Name:   Walter Mckenna       YOB: 1950       Age:  72 y.o.       Admit Date: 6/21/2021      Subjective:       Patient alert awake feeling much better           Objective:     Visit Vitals  BP (!) 146/76   Pulse 80   Temp 98 °F (36.7 °C)   Resp 18   Ht 5' 4\" (1.626 m)   Wt 104.6 kg (230 lb 9.6 oz)   SpO2 94%   Breastfeeding No   BMI 39.58 kg/m²        Recent Results (from the past 24 hour(s))   GLUCOSE, POC    Collection Time: 06/23/21  3:40 PM   Result Value Ref Range    Glucose (POC) 89 65 - 117 mg/dL    Performed by Colleen Nix    GLUCOSE, POC    Collection Time: 06/23/21  4:13 PM   Result Value Ref Range    Glucose (POC) 118 (H) 65 - 117 mg/dL    Performed by Kali Braun    EKG, 12 LEAD, SUBSEQUENT    Collection Time: 06/23/21  8:33 PM   Result Value Ref Range    Ventricular Rate 65 BPM    Atrial Rate 65 BPM    P-R Interval 168 ms    QRS Duration 104 ms    Q-T Interval 480 ms    QTC Calculation (Bezet) 499 ms    Calculated P Axis 77 degrees    Calculated R Axis 47 degrees    Calculated T Axis 100 degrees    Diagnosis       Normal sinus rhythm  Biatrial enlargement  Nonspecific ST and T wave abnormality  Abnormal ECG  No previous ECGs available  Confirmed by Nyla DOWNEY, [facility] ([id number]) on 6/24/2021 9:45:31 AM     TROPONIN I    Collection Time: 06/23/21  9:45 PM   Result Value Ref Range    Troponin-I, Qt. 0.06 (H) <0.05 ng/mL   GLUCOSE, POC    Collection Time: 06/23/21  9:54 PM   Result Value Ref Range    Glucose (POC) 119 (H) 65 - 117 mg/dL    Performed by Willy Gamboa    RENAL FUNCTION PANEL    Collection Time: 06/24/21  7:05 AM   Result Value Ref Range    Sodium 140 136 - 145 mmol/L    Potassium 3.8 3.5 - 5.1 mmol/L    Chloride 102 97 - 108 mmol/L    CO2 34 (H) 21 - 32 mmol/L    Anion gap 4 (L) 5 - 15 mmol/L    Glucose 117 (H) 65 - 100 mg/dL    BUN 26 (H) 6 - 20 mg/dL    Creatinine 1.34 (H) 0.55 - 1.02 mg/dL    BUN/Creatinine ratio 19 12 - 20 GFR est AA 48 (L) >60 ml/min/1.73m2    GFR est non-AA 39 (L) >60 ml/min/1.73m2    Calcium 8.4 (L) 8.5 - 10.1 mg/dL    Phosphorus 3.4 2.6 - 4.7 mg/dL    Albumin 2.7 (L) 3.5 - 5.0 g/dL     [unfilled]      Review of Systems    Constitutional: Negative for chills and fever. HENT: Negative. Eyes: Negative. Respiratory: Negative. Cardiovascular: Negative. Gastrointestinal: Negative for abdominal pain and nausea. Skin: Negative. Neurological: Negative. Physical Exam:      Constitutional: pt is oriented to person, place, and time. HENT:   Head: Normocephalic and atraumatic. Eyes: Pupils are equal, round, and reactive to light. EOM are normal.   Cardiovascular: Normal rate, regular rhythm and normal heart sounds. Pulmonary/Chest: Breath sounds normal. No wheezes. No rales. Exhibits no tenderness. Abdominal: Soft. Bowel sounds are normal. There is no abdominal tenderness. There is no rebound and no guarding. Musculoskeletal: Normal range of motion. Neurological: pt is alert and oriented to person, place, and time. XR CHEST PORT   Final Result   Cardiomegaly with mild vascular congestion and increased   interstitial markings suggesting volume overload/CHF.       XR CHEST PA LAT    (Results Pending)        Recent Results (from the past 24 hour(s))   GLUCOSE, POC    Collection Time: 06/23/21  3:40 PM   Result Value Ref Range    Glucose (POC) 89 65 - 117 mg/dL    Performed by Gómez Reyes    GLUCOSE, POC    Collection Time: 06/23/21  4:13 PM   Result Value Ref Range    Glucose (POC) 118 (H) 65 - 117 mg/dL    Performed by Imani Archuleta    EKG, 12 LEAD, SUBSEQUENT    Collection Time: 06/23/21  8:33 PM   Result Value Ref Range    Ventricular Rate 65 BPM    Atrial Rate 65 BPM    P-R Interval 168 ms    QRS Duration 104 ms    Q-T Interval 480 ms    QTC Calculation (Bezet) 499 ms    Calculated P Axis 77 degrees    Calculated R Axis 47 degrees    Calculated T Axis 100 degrees    Diagnosis Normal sinus rhythm  Biatrial enlargement  Nonspecific ST and T wave abnormality  Abnormal ECG  No previous ECGs available  Confirmed by Garry Alegria MD, [facility] ([id number]) on 6/24/2021 9:45:31 AM     TROPONIN I    Collection Time: 06/23/21  9:45 PM   Result Value Ref Range    Troponin-I, Qt. 0.06 (H) <0.05 ng/mL   GLUCOSE, POC    Collection Time: 06/23/21  9:54 PM   Result Value Ref Range    Glucose (POC) 119 (H) 65 - 117 mg/dL    Performed by Papito Nazario    RENAL FUNCTION PANEL    Collection Time: 06/24/21  7:05 AM   Result Value Ref Range    Sodium 140 136 - 145 mmol/L    Potassium 3.8 3.5 - 5.1 mmol/L    Chloride 102 97 - 108 mmol/L    CO2 34 (H) 21 - 32 mmol/L    Anion gap 4 (L) 5 - 15 mmol/L    Glucose 117 (H) 65 - 100 mg/dL    BUN 26 (H) 6 - 20 mg/dL    Creatinine 1.34 (H) 0.55 - 1.02 mg/dL    BUN/Creatinine ratio 19 12 - 20      GFR est AA 48 (L) >60 ml/min/1.73m2    GFR est non-AA 39 (L) >60 ml/min/1.73m2    Calcium 8.4 (L) 8.5 - 10.1 mg/dL    Phosphorus 3.4 2.6 - 4.7 mg/dL    Albumin 2.7 (L) 3.5 - 5.0 g/dL       Results     ** No results found for the last 336 hours. **           Labs:     Recent Labs     06/23/21  0751 06/22/21  0525   WBC 8.9 8.9   HGB 13.6 12.8   HCT 45.9 43.9    192     Recent Labs     06/24/21  0705 06/23/21  0751 06/22/21  0525    142  141 146*   K 3.8 3.8  3.8 3.7    103  102 106   CO2 34* 34*  35* 33*   BUN 26* 21*  21* 19   CREA 1.34* 1.40*  1.37* 1.27*   * 120*  120* 99   CA 8.4* 8.6  8.6 9.0   PHOS 3.4 4.1  --      Recent Labs     06/24/21  0705 06/23/21  0751 06/22/21  0525 06/21/21  1400 06/21/21  1400   ALT  --  17 21  --  24   AP  --  119* 114  --  124*   TBILI  --  0.7 0.6  --  0.5   TP  --  6.6 6.6  --  6.6   ALB 2.7* 2.8*  2.7* 2.9*   < > 3.0*   GLOB  --  3.8 3.7  --  3.6    < > = values in this interval not displayed. No results for input(s): INR, PTP, APTT, INREXT, INREXT in the last 72 hours.    No results for input(s): FE, TIBC, PSAT, FERR in the last 72 hours. No results found for: FOL, RBCF   No results for input(s): PH, PCO2, PO2 in the last 72 hours.   Recent Labs     06/23/21  2145 06/22/21  0525 06/21/21  1400   TROIQ 0.06* 0.06* 0.06*     No results found for: CHOL, CHOLX, CHLST, CHOLV, HDL, HDLP, LDL, LDLC, DLDLP, TGLX, TRIGL, TRIGP, CHHD, CHHDX  Lab Results   Component Value Date/Time    Glucose (POC) 119 (H) 06/23/2021 09:54 PM    Glucose (POC) 118 (H) 06/23/2021 04:13 PM    Glucose (POC) 89 06/23/2021 03:40 PM    Glucose (POC) 121 (H) 06/23/2021 11:24 AM    Glucose (POC) 109 06/23/2021 07:29 AM     Lab Results   Component Value Date/Time    Color Yellow/Straw 06/07/2021 05:00 PM    Appearance Clear 06/07/2021 05:00 PM    Specific gravity 1.016 06/07/2021 05:00 PM    pH (UA) 5.0 06/07/2021 05:00 PM    Protein 100 (A) 06/07/2021 05:00 PM    Glucose Negative 06/07/2021 05:00 PM    Ketone Negative 06/07/2021 05:00 PM    Bilirubin Negative 06/07/2021 05:00 PM    Urobilinogen 0.1 06/07/2021 05:00 PM    Nitrites Negative 06/07/2021 05:00 PM    Leukocyte Esterase Negative 06/07/2021 05:00 PM    Bacteria Negative 06/07/2021 05:00 PM    WBC 0-4 06/07/2021 05:00 PM    RBC 0-5 06/07/2021 05:00 PM         Assessment:       Acute congestive heart failure  CAD status post CABG  Mildly elevated troponin  Type 2 diabetes  Hypertension  COPD  Chronic kidney disease  Bradycardia  History of A. fib status post cardioversion  Depression    Plan:     Continue IV Lasix   follow-up 2D complete echo  Repeat the labs  Discussed with the patient daughter and the patient at the bedside  Follow with cardiology and the pulmonologist          Current Facility-Administered Medications:     famotidine (PEPCID) tablet 10 mg, 10 mg, Oral, BID, Noé Shah NP, 10 mg at 06/24/21 1131    [START ON 6/25/2021] furosemide (LASIX) tablet 80 mg, 80 mg, Oral, DAILY, Ramez Mujica MD    [START ON 6/25/2021] metOLazone (ZAROXOLYN) tablet 5 mg, 5 mg, Oral, DAILY, Stephanie Arriaza MD    alum-[facility]) oral suspension 30 mL, 30 mL, Oral, Q6H PRN, Gopal Alvarado MD, 30 mL at 06/23/21 2120    albuterol (PROVENTIL HFA, VENTOLIN HFA, PROAIR HFA) inhaler 2 Puff, 2 Puff, Inhalation, Q6HWA RT, Stephanie Arriaza MD, 2 Puff at 06/24/21 0810    amiodarone (CORDARONE) tablet 200 mg, 200 mg, Oral, DAILY, Cosme Cobb MD, 200 mg at 06/24/21 0957    apixaban (ELIQUIS) tablet 5 mg, 5 mg, Oral, BID, Gopal Alvarado MD, 5 mg at 06/24/21 0957    clopidogreL (PLAVIX) tablet 75 mg, 75 mg, Oral, DAILY, Gopal Alvarado MD, 75 mg at 06/24/21 0957    insulin glargine (LANTUS) injection 10 Units, 10 Units, SubCUTAneous, QHS, Gopal Alvarado MD, 10 Units at 06/23/21 2200    losartan (COZAAR) tablet 100 mg, 100 mg, Oral, DAILY, Martin Cintron MD, 100 mg at 06/24/21 0957    nitroglycerin (NITROSTAT) tablet 0.4 mg, 0.4 mg, SubLINGual, PRN, Gopal Alvarado MD, 0.4 mg at 06/23/21 2048    oxybutynin (DITROPAN) tablet 5 mg, 5 mg, Oral, TID, Gopal Alvarado MD, 5 mg at 06/24/21 0957    albuterol-ipratropium (DUO-NEB) 2.5 MG-0.5 MG/3 ML, 3 mL, Nebulization, Q6H PRN, Clary Alvarado MD    budesonide-formoteroL (SYMBICORT) 160-4.5 mcg/actuation HFA inhaler 2 Puff, 2 Puff, Inhalation, BID RT, Gopal Alvarado MD, 2 Puff at 06/24/21 0810    potassium chloride SR (KLOR-CON 10) tablet 10 mEq, 10 mEq, Oral, BID, Gopal Alvarado MD, 10 mEq at 06/24/21 0957    QUEtiapine (SEROquel) tablet 25 mg, 25 mg, Oral, QHS, Gopal Alvarado MD, 25 mg at 06/23/21 2120    theophylline ER (BAM-24) capsule 300 mg, 300 mg, Oral, DAILY, Gopal Alvarado MD, 300 mg at 06/24/21 1131    insulin lispro (HUMALOG) injection, , SubCUTAneous, AC&HS, Gopal Alvarado MD, 4 Units at 06/22/21 1037    glucose chewable tablet 16 g, 4 Tablet, Oral, PRN, Gopal Alvarado MD    dextrose (D50W) injection syrg 12.5-25 g, 25-50 mL, IntraVENous, PRN, Clary Alvarado MD    glucagon (GLUCAGEN) injection 1 mg, 1 mg, IntraMUSCular, PRN, Glenny Alvarado MD    polyethylene glycol (MIRALAX) packet 17 g, 17 g, Oral, DAILY PRN, Glenny Alvarado MD    ondansetron (ZOFRAN ODT) tablet 4 mg, 4 mg, Oral, Q8H PRN **OR** ondansetron (ZOFRAN) injection 4 mg, 4 mg, IntraVENous, Q6H PRN, Mauricio Moore MD

## 2021-06-24 NOTE — ROUTINE PROCESS
C/o cp radiating to shoulder and neck, 8/10. EKG done. 2 sl nitros given and pain decreased to 6. Pt stated she ate spaghetti with dinner. Christiano notified and troponin, maalox, and tramadol x1 ordered. Daughter in law in room and pt appears more comfortable. Will continue to monitor.

## 2021-06-24 NOTE — PROGRESS NOTES
Nutrition Education    · Verbally reviewed information with Patient  · Educated on CHF MNT  · Written educational materials provided- Heart failure nutrition therapy, [employer] handout  · Contact name and number provided. · Refer to Patient Education activity for more details. NILAM spoke to pt bedside. Pt reports not following heart healthy diet PTA. Diet recall - likes meatloaf, mac and cheese, greens/spinach, carrots, fried chicken. Reports trying to limit fried foods and remove skin from chicken. States she likes to [employer] and uses Mrs. Varela salt substitute. RD reviewed heart healthy nutrition therapy. Discussed importance of sodium restriction and food sources. Reviewed importance of weighing self daily to assess weight changes and be aware of possible fluid retention. If edema present once d/c encouraged to contact PCP. Reviewed heart healthy foods to increase including: whole grains, low fat dairy, lean proteins, fruits/veggies, healthy fats (olive oil, nuts/seeds, fish, avocados) etc. Discussed fiber and heart health impacts/benefits as well as decreased constipation incidence. Reviewed foods to avoid for heart health including: fried foods, whole fat dairy, cured/deli meats, trans and sat fats, ultra-processed goods, refined grains and sugar etc. Discussed health impacts of daily activity for heart. RD gave pt contact info and encouraged to reach out with further questions or concerns.     Electronically signed by Papa Miller RD on 6/24/2021 at 2:07 PM    Contact Number: Ext [phone]

## 2021-06-24 NOTE — PROGRESS NOTES
OCCUPATIONAL THERAPY EVALUATION  Patient: Paige Reyes (73 y.o. female)  Date: 6/24/2021  Primary Diagnosis: CHF exacerbation ([facility] [address] [I50.9]  CHF (congestive heart failure) ([facility] [address] [I50.9]        Precautions: fall risk       ASSESSMENT  Pt is a 71 y/o F with PMH of diabetes, hypertension, CAD s/p CABG, COPD, CHF, presenting to [facility] ER c/o of chest pain, SOB for last 2 days and some swelling in the LE, admitted 6/21/21 and being treated for CHF exacerbation. Pt received semi-supine in bed upon arrival with PT, SIL camargo, on 3L O2 via NC, and also agreeable to OT evaluation at this time. Per pt report, pt lives with daughter and grand-daughter in a one-story home with 4 FABIAN and B HR, was IND with ADLs at [facility]. Pt states she has recently been ambulating without AD as her rollator is currently broken. Pt states she wears 4L O2 at baseline. Based on current observations, pt presents with deficits in generalized strength, dynamic standing balance, and functional activity tolerance impacting overall performance of ADLs and functional transfers/mobility. Pt currently demos IND with bed mobility, dons sock in long sitting bringing LE upon onto bedside and hospital gown around backside s/p setup with good UE AROM noted. STS completed to/from EOB and chair SBA, initially using RW for support however demos fair standing balance without CGA for safety with gait belt during functional mobility. Pt requires one seated rest break after ambulating with PT and prior to grooming activity. Basic grooming (washing face, oral care) performed standing sink side with SBA no AD and pt returns to chair at end of session. Overall, pt tolerates session well today, O2 sats remained 92-93% on 3L with functional activity. Pt would benefit from continued skilled OT services to address current impairments and improve overall IND and safety with self cares and functional transfers/mobility.  Recommend discharge [facility] with [address] [place] [address] once medically appropriate. Other factors to consider for discharge: family support, DME, time since onset, severity of deficits      Patient will benefit from skilled therapy intervention to address the above noted impairments. PLAN :  Recommendations and Planned Interventions: self care training, functional mobility training, therapeutic exercise, balance training, therapeutic activities, endurance activities, patient education, and home safety training    Frequency/Duration: Patient will be followed by occupational therapy 1 time a week to address goals. Recommendation for discharge: (in order for the patient to meet his/her long term goals)  HHOT    This discharge recommendation:  Has been made in collaboration with the attending provider and/or case management    IF patient discharges home will need the following DME: shower chair and walker: rollator       SUBJECTIVE:   Patient stated I feel pretty good.     OBJECTIVE DATA SUMMARY:   HISTORY:   Past Medical History:   Diagnosis Date    Chronic obstructive pulmonary disease (HCC)     Congestive heart disease ([facility] [address]     Coronary artery disease     Diabetes (HCC)     Hyperlipidemia     Hypertension     Myocardial infarct (HCC)     IFTIKHAR (obstructive sleep apnea)      Past Surgical History:   Procedure Laterality Date    HX BREAST BIOPSY      HX CHOLECYSTECTOMY      HX CORONARY ARTERY BYPASS GRAFT      HX HERNIA REPAIR      HX HYSTERECTOMY         Expanded or extensive additional review of patient history:     Home Situation  Home Environment: Private residence  # Steps to Enter: 4  Rails to Enter: Yes  Hand Rails : Bilateral  One/Two Story Residence: One story  Living Alone: No  Support Systems: Family member(s)  Patient Expects to be Discharged to[de-identified] [place]  Current DME Used/Available at Home: None  Tub or Shower Type: Tub/Shower combination    Hand dominance: Right    EXAMINATION OF PERFORMANCE DEFICITS:  Cognitive/Behavioral Status:  Neurologic State: Alert  Orientation Level: Oriented X4  Cognition: Appropriate decision making; Appropriate for age attention/concentration; Appropriate safety awareness    Hearing: Auditory  Auditory Impairment: None    Vision/Perceptual:     WFL      Range of Motion:  AROM: Within functional limits    Strength:  Strength: Within functional limits    Coordination:  Coordination: Within functional limits  Fine Motor Skills-Upper: Left Intact; Right Intact    Gross Motor Skills-Upper: Left Intact; Right Intact    Balance:  Sitting: Intact  Standing: Impaired; Without support  Standing - Static: Good  Standing - Dynamic : Fair    Functional Mobility and Transfers for ADLs:  Bed Mobility:  Rolling: Independent  Supine to Sit: Independent  Scooting: Independent    Transfers:  Sit to Stand: Stand-by assistance  Stand to Sit: Stand-by assistance  Bed to Chair: Stand-by assistance  Bathroom Mobility: Contact guard assistance    ADL Intervention and task modifications:  Grooming  Grooming Assistance: Stand-by assistance  Position Performed: Standing  Washing Face: Stand-by assistance  Brushing Teeth: Stand-by assistance    Lower Body Dressing Assistance  Socks: Stand-by assistance (additional time)  Position Performed: Long sitting on bed    Therapeutic Exercise:  Pt would benefit from UE HEP to improve overall UE AROM/strength and can be further educated in next treatment session. Functional Measure:    [address] [facility] [address] [address] AM-PACTM \"6 Clicks\"                                                       Daily Activity Inpatient Short Form  How much help from another person does the patient currently need. .. Total; A Lot A Little None   1. Putting on and taking off regular lower body clothing? []  1 []  2 [x]  3 []  4   2. Bathing (including washing, rinsing, drying)? []  1 []  2 [x]  3 []  4   3. Toileting, which includes using toilet, bedpan or urinal? [] 1 []  2 [x]  3 []  4   4. Putting on and taking off regular upper body clothing?  []  1 [] 2 [x]  3 []  4   5. Taking care of personal grooming such as brushing teeth? []  1 []  2 [x]  3 []  4   6. Eating meals? []  1 []  2 []  3 [x]  4   © , Trustees of [facility] [employer], under license to [employer]. All rights reserved     Score:      Interpretation of Tool:  Represents clinically-significant functional categories (i.e. Activities of daily living). Percentage of Impairment CH    0%   CI    1-19% CJ    20-39% CK    40-59% CL    60-79% CM    80-99% CN     100%   [facility]  Score 6-24 24 23 20-22 15-19 10-14 7-9 6        Occupational Therapy Evaluation Charge Determination   History Examination Decision-Making   LOW Complexity : Brief history review  LOW Complexity : 1-3 performance deficits relating to physical, cognitive , or psychosocial skils that result in activity limitations and / or participation restrictions  LOW Complexity : No comorbidities that affect functional and no verbal or physical assistance needed to complete eval tasks       Based on the above components, the patient evaluation is determined to be of the following complexity level: LOW   Pain Ratin/10 headache    Activity Tolerance:   Good     After treatment patient left in no apparent distress:    Sitting in chair, Heels elevated for pressure relief, and Call bell within reach    COMMUNICATION/EDUCATION:   The patients plan of care was discussed with: Physical therapist and Registered nurse. Patient/family have participated as able in goal setting and plan of care. and Patient/family agree to work toward stated goals and plan of care. This patients plan of care is appropriate for delegation to [facility].     OT/PT sessions occurred together for increased patient and clinician safety with initial OOB ADL/mobility     Thank you for this referral.  Garima Jett  Time Calculation: 31 mins   Problem: Self Care Deficits Care Plan (Adult)  Goal: *Acute Goals and Plan of Care (Insert Text)  Description: Pt will be IND sup <> sit in prep for EOB ADLs  Pt will be IND grooming standing sink side LRAD  Pt will be IND LE dressing sitting EOB/long sit  Pt will be IND sit <>  prep for toileting LRAD  Pt will be IND toileting/toilet transfer/cloth mgmt LRAD  Pt will be IND following UE HEP in prep for self care tasks   Outcome: Not Met

## 2021-06-24 NOTE — WOUND CARE
Wound Care Note:    Wound Care into see patient because of \"red blanchable area noted to sacrum\"    Skin Care & Pressure Relief Recommendations:  Minimize layers of linen  Pads under patient to optimize support surface and microclimate  Turn/reposition approximately every 2 hours. Pillow Wedges  Manage incontinence  Promote continence; Skin Protective lotion to buttocks and sacrum daily and as needed with incontinence care    Offload heels with pillows or offloading boots. Discussed above plan with patient: Blanchable erythema noted to left buttock, tender to touch per patient. Skin intact. PureWick in place but patient stated she gets help to the bathroom when she needs to go. Prior to admission, she states she was ambulatory. Patient turns well in bed but needs encouragement. Use wedge to turn q2h at 30 degree angle to offload sacrum and buttocks. BLEs should be elevated with pillows to help reduce swelling. Ensure that heels remain floated. Patient is on a foam mattress (new). Re-consult [facility] if skin condition changes.

## 2021-06-24 NOTE — PROGRESS NOTES
General Daily Progress Note          Patient Name:   Shelli Bahena       YOB: 1950       Age:  72 y.o. Admit Date: 6/21/2021      Subjective:       Patient is a 72y.o. year old female signal past medical history of diabetes hypertension CAD status post CABG COPD CHF came to the ER 6/21 complaining of chest pain shortness of breath for last 2 days. She also complained of swelling in her legs. She is taking Lasix, but did not feel like it was helping much anymore. ER workup showed elevated BNP, mildly elevated troponin. Patient was admitted with acute congestive heart failure. She had an echo done which showed significant pulmonary hypertension, with pulmonary arterial systolic pressure of 62 mmHg. Today patient is alert and awake. She is complaining of a right-sided retro orbital headache, 8/10 in severity that started last night. She is also complaining of an achey, substernal chest pain, that also began last night.                 Objective:     Visit Vitals  BP (!) 146/76   Pulse 80   Temp 98 °F (36.7 °C)   Resp 18   Ht 5' 4\" (1.626 m)   Wt 104.6 kg (230 lb 9.6 oz)   SpO2 94%   Breastfeeding No   BMI 39.58 kg/m²        Recent Results (from the past 24 hour(s))   GLUCOSE, POC    Collection Time: 06/23/21  3:40 PM   Result Value Ref Range    Glucose (POC) 89 65 - 117 mg/dL    Performed by Dwight Winter    GLUCOSE, POC    Collection Time: 06/23/21  4:13 PM   Result Value Ref Range    Glucose (POC) 118 (H) 65 - 117 mg/dL    Performed by Grace Dye    EKG, 12 LEAD, SUBSEQUENT    Collection Time: 06/23/21  8:33 PM   Result Value Ref Range    Ventricular Rate 65 BPM    Atrial Rate 65 BPM    P-R Interval 168 ms    QRS Duration 104 ms    Q-T Interval 480 ms    QTC Calculation (Bezet) 499 ms    Calculated P Axis 77 degrees    Calculated R Axis 47 degrees    Calculated T Axis 100 degrees    Diagnosis       Normal sinus rhythm  Biatrial enlargement  Nonspecific ST and T wave abnormality  Abnormal ECG  No previous ECGs available  Confirmed by Evangelina Briseno MD, [facility] [id number]) on 6/24/2021 9:45:31 AM     TROPONIN I    Collection Time: 06/23/21  9:45 PM   Result Value Ref Range    Troponin-I, Qt. 0.06 (H) <0.05 ng/mL   GLUCOSE, POC    Collection Time: 06/23/21  9:54 PM   Result Value Ref Range    Glucose (POC) 119 (H) 65 - 117 mg/dL    Performed by [facility]    RENAL FUNCTION PANEL    Collection Time: 06/24/21  7:05 AM   Result Value Ref Range    Sodium 140 136 - 145 mmol/L    Potassium 3.8 3.5 - 5.1 mmol/L    Chloride 102 97 - 108 mmol/L    CO2 34 (H) 21 - 32 mmol/L    Anion gap 4 (L) 5 - 15 mmol/L    Glucose 117 (H) 65 - 100 mg/dL    BUN 26 (H) 6 - 20 mg/dL    Creatinine 1.34 (H) 0.55 - 1.02 mg/dL    BUN/Creatinine ratio 19 12 - 20      GFR est AA 48 (L) >60 ml/min/1.73m2    GFR est non-AA 39 (L) >60 ml/min/1.73m2    Calcium 8.4 (L) 8.5 - 10.1 mg/dL    Phosphorus 3.4 2.6 - 4.7 mg/dL    Albumin 2.7 (L) 3.5 - 5.0 g/dL     [unfilled]      Review of Systems    Constitutional: Negative for chills and fever. HENT: Negative. Eyes: Negative. Respiratory: Negative. Cardiovascular: substernal chest pain. Gastrointestinal: Negative for abdominal pain and nausea. Skin: Negative. Neurological: headache. Physical Exam:      Constitutional: pt is oriented to person, place, and time. HENT:   Head: Normocephalic and atraumatic. Eyes: Pupils are equal, round, and reactive to light. EOM are normal.   Cardiovascular: Normal rate, regular rhythm and normal heart sounds. Pulmonary/Chest: Breath sounds normal. No wheezes. No rales. Exhibits no tenderness. Abdominal: Soft. Bowel sounds are normal. There is no abdominal tenderness. There is no rebound and no guarding. Musculoskeletal: Normal range of motion. Neurological: pt is alert and oriented to person, place, and time.      XR CHEST PORT   Final Result   Cardiomegaly with mild vascular congestion and increased interstitial markings suggesting volume overload/CHF. XR CHEST PA LAT    (Results Pending)        Recent Results (from the past 24 hour(s))   GLUCOSE, POC    Collection Time: 06/23/21  3:40 PM   Result Value Ref Range    Glucose (POC) 89 65 - 117 mg/dL    Performed by Esme Covington    GLUCOSE, POC    Collection Time: 06/23/21  4:13 PM   Result Value Ref Range    Glucose (POC) 118 (H) 65 - 117 mg/dL    Performed by Ismael Colon    EKG, 12 LEAD, SUBSEQUENT    Collection Time: 06/23/21  8:33 PM   Result Value Ref Range    Ventricular Rate 65 BPM    Atrial Rate 65 BPM    P-R Interval 168 ms    QRS Duration 104 ms    Q-T Interval 480 ms    QTC Calculation (Bezet) 499 ms    Calculated P Axis 77 degrees    Calculated R Axis 47 degrees    Calculated T Axis 100 degrees    Diagnosis       Normal sinus rhythm  Biatrial enlargement  Nonspecific ST and T wave abnormality  Abnormal ECG  No previous ECGs available  Confirmed by Manuel Rivera MD, [facility] [id number]) on 6/24/2021 9:45:31 AM     TROPONIN I    Collection Time: 06/23/21  9:45 PM   Result Value Ref Range    Troponin-I, Qt. 0.06 (H) <0.05 ng/mL   GLUCOSE, POC    Collection Time: 06/23/21  9:54 PM   Result Value Ref Range    Glucose (POC) 119 (H) 65 - 117 mg/dL    Performed by Leonarda Machado    RENAL FUNCTION PANEL    Collection Time: 06/24/21  7:05 AM   Result Value Ref Range    Sodium 140 136 - 145 mmol/L    Potassium 3.8 3.5 - 5.1 mmol/L    Chloride 102 97 - 108 mmol/L    CO2 34 (H) 21 - 32 mmol/L    Anion gap 4 (L) 5 - 15 mmol/L    Glucose 117 (H) 65 - 100 mg/dL    BUN 26 (H) 6 - 20 mg/dL    Creatinine 1.34 (H) 0.55 - 1.02 mg/dL    BUN/Creatinine ratio 19 12 - 20      GFR est AA 48 (L) >60 ml/min/1.73m2    GFR est non-AA 39 (L) >60 ml/min/1.73m2    Calcium 8.4 (L) 8.5 - 10.1 mg/dL    Phosphorus 3.4 2.6 - 4.7 mg/dL    Albumin 2.7 (L) 3.5 - 5.0 g/dL       Results     ** No results found for the last 336 hours.  **           Labs:     Recent Labs     06/23/21  7593 06/22/21  0525   WBC 8.9 8.9   HGB 13.6 12.8   HCT 45.9 43.9    192     Recent Labs     06/24/21  0705 06/23/21  0751 06/22/21  0525    142  141 146*   K 3.8 3.8  3.8 3.7    103  102 106   CO2 34* 34*  35* 33*   BUN 26* 21*  21* 19   CREA 1.34* 1.40*  1.37* 1.27*   * 120*  120* 99   CA 8.4* 8.6  8.6 9.0   PHOS 3.4 4.1  --      Recent Labs     06/24/21  0705 06/23/21 0751 06/22/21  0525 06/21/21  1400 06/21/21  1400   ALT  --  17 21  --  24   AP  --  119* 114  --  124*   TBILI  --  0.7 0.6  --  0.5   TP  --  6.6 6.6  --  6.6   ALB 2.7* 2.8*  2.7* 2.9*   < > 3.0*   GLOB  --  3.8 3.7  --  3.6    < > = values in this interval not displayed. No results for input(s): INR, PTP, APTT, INREXT in the last 72 hours. No results for input(s): FE, TIBC, PSAT, FERR in the last 72 hours. No results found for: FOL, RBCF   No results for input(s): PH, PCO2, PO2 in the last 72 hours.   Recent Labs     06/23/21 2145 06/22/21 0525 06/21/21  1400   TROIQ 0.06* 0.06* 0.06*     No results found for: CHOL, CHOLX, CHLST, CHOLV, HDL, HDLP, LDL, LDLC, DLDLP, TGLX, TRIGL, TRIGP, CHHD, CHHDX  Lab Results   Component Value Date/Time    Glucose (POC) 119 (H) 06/23/2021 09:54 PM    Glucose (POC) 118 (H) 06/23/2021 04:13 PM    Glucose (POC) 89 06/23/2021 03:40 PM    Glucose (POC) 121 (H) 06/23/2021 11:24 AM    Glucose (POC) 109 06/23/2021 07:29 AM     Lab Results   Component Value Date/Time    Color Yellow/Straw 06/07/2021 05:00 PM    Appearance Clear 06/07/2021 05:00 PM    Specific gravity 1.016 06/07/2021 05:00 PM    pH (UA) 5.0 06/07/2021 05:00 PM    Protein 100 (A) 06/07/2021 05:00 PM    Glucose Negative 06/07/2021 05:00 PM    Ketone Negative 06/07/2021 05:00 PM    Bilirubin Negative 06/07/2021 05:00 PM    Urobilinogen 0.1 06/07/2021 05:00 PM    Nitrites Negative 06/07/2021 05:00 PM    Leukocyte Esterase Negative 06/07/2021 05:00 PM    Bacteria Negative 06/07/2021 05:00 PM    WBC 0-4 06/07/2021 05:00 PM    RBC 0-5 06/07/2021 05:00 PM         Assessment:     Acute congestive heart failure  CAD status post CABG  Mildly elevated troponin  Type 2 diabetes  Hypertension  COPD  Chronic kidney disease  Bradycardia  History of A. fib status post cardioversion  Depression      Plan:     Pulmonology and Cardiology consulted   Family to bring in CPAP   Repeat labs in AM   Switch to PO diuretics   Continue anticoagulation for previous a. Fib/a flutter   Tomorrow will give Zaroxolyn prior to lasix        Current Facility-Administered Medications:     famotidine (PEPCID) tablet 10 mg, 10 mg, Oral, BID, Winsome Burnette NP, 10 mg at 06/24/21 1131    [START ON 6/25/2021] furosemide (LASIX) tablet 80 mg, 80 mg, Oral, DAILY, Re Nuñez MD    [START ON 6/25/2021] metOLazone (ZAROXOLYN) tablet 5 mg, 5 mg, Oral, DAILY, Re Nuñez MD    alum-mag hydroxide-simeth (MYLANTA) oral suspension 30 mL, 30 mL, Oral, Q6H PRN, Nicola Carrera MD, 30 mL at 06/23/21 2120    albuterol (PROVENTIL HFA, VENTOLIN HFA, PROAIR HFA) inhaler 2 Puff, 2 Puff, Inhalation, Q6HWA RT, Re Nuñez MD, 2 Puff at 06/24/21 0810    amiodarone (CORDARONE) tablet 200 mg, 200 mg, Oral, DAILY, Migdalia Mccann MD, 200 mg at 06/24/21 0957    apixaban (ELIQUIS) tablet 5 mg, 5 mg, Oral, BID, Gopal Alvarado MD, 5 mg at 06/24/21 0957    clopidogreL (PLAVIX) tablet 75 mg, 75 mg, Oral, DAILY, Gopal Alvarado MD, 75 mg at 06/24/21 0957    insulin glargine (LANTUS) injection 10 Units, 10 Units, SubCUTAneous, QHS, Gopal Alvarado MD, 10 Units at 06/23/21 2200    losartan (COZAAR) tablet 100 mg, 100 mg, Oral, DAILY, Gopal Alvarado MD, 100 mg at 06/24/21 0957    nitroglycerin (NITROSTAT) tablet 0.4 mg, 0.4 mg, SubLINGual, PRN, Gopal Alvarado MD, 0.4 mg at 06/23/21 2048    oxybutynin (DITROPAN) tablet 5 mg, 5 mg, Oral, TID, Gopal Alvarado MD, 5 mg at 06/24/21 0957    albuterol-ipratropium (DUO-NEB) 2.5 MG-0.5 MG/3 ML, 3 mL, Nebulization, Q6H PRN, Makayla Alvarado MD    budesonide-formoteroL [facility]) 160-4.5 mcg/actuation HFA inhaler 2 Puff, 2 Puff, Inhalation, BID RT, Godwin Anders MD, 2 Puff at 06/24/21 0810    potassium chloride SR (KLOR-CON 10) tablet 10 mEq, 10 mEq, Oral, BID, Makayla Alvarado MD, 10 mEq at 06/24/21 0957    QUEtiapine (SEROquel) tablet 25 mg, 25 mg, Oral, QHS, Gopal Alvarado MD, 25 mg at 06/23/21 2120    theophylline ER (BAM-24) capsule 300 mg, 300 mg, Oral, DAILY, Gopal Alvarado MD, 300 mg at 06/24/21 1131    insulin lispro (HUMALOG) injection, , SubCUTAneous, AC&HS, Gopal Alvarado MD, 4 Units at 06/22/21 1037    glucose chewable tablet 16 g, 4 Tablet, Oral, PRN, Makayla Alvarado MD    dextrose (D50W) injection syrg 12.5-25 g, 25-50 mL, IntraVENous, PRN, Makayla Alvarado MD    glucagon (GLUCAGEN) injection 1 mg, 1 mg, IntraMUSCular, PRN, Makayla Alvarado MD    polyethylene glycol (MIRALAX) packet 17 g, 17 g, Oral, DAILY PRN, Makayla Alvarado MD    ondansetron (ZOFRAN ODT) tablet 4 mg, 4 mg, Oral, Q8H PRN **OR** ondansetron (ZOFRAN) injection 4 mg, 4 mg, IntraVENous, Q6H PRN, Godwin Anders MD

## 2021-06-24 NOTE — PROGRESS NOTES
General Daily Progress Note          Patient Name:   Dane Rodriguez       YOB: 1950       Age:  72 y.o. Admit Date: 6/21/2021      Subjective:       Patient is a 72y.o. year old female signal past medical history of diabetes hypertension CAD status post CABG COPD CHF came to the ER 6/21 complaining of chest pain shortness of breath for last 2 days. She also complained of swelling in her legs. She is taking Lasix, but did not feel like it was helping much anymore. ER workup showed elevated BNP, mildly elevated troponin. Patient was admitted with acute congestive heart failure. She had an echo done which showed significant pulmonary hypertension, with pulmonary arterial systolic pressure of 62 mmHg. Today patient is alert and awake. She is complaining of a right-sided retro orbital headache, 8/10 in severity that started last night. She is also complaining of an achey, substernal chest pain, that also began last night.                 Objective:     Visit Vitals  BP (!) 146/76   Pulse 80   Temp 98 °F (36.7 °C)   Resp 18   Ht 5' 4\" (1.626 m)   Wt 104.6 kg (230 lb 9.6 oz)   SpO2 94%   Breastfeeding No   BMI 39.58 kg/m²        Recent Results (from the past 24 hour(s))   GLUCOSE, POC    Collection Time: 06/23/21  3:40 PM   Result Value Ref Range    Glucose (POC) 89 65 - 117 mg/dL    Performed by Baljit Paez    GLUCOSE, POC    Collection Time: 06/23/21  4:13 PM   Result Value Ref Range    Glucose (POC) 118 (H) 65 - 117 mg/dL    Performed by Adah Canavan    EKG, 12 LEAD, SUBSEQUENT    Collection Time: 06/23/21  8:33 PM   Result Value Ref Range    Ventricular Rate 65 BPM    Atrial Rate 65 BPM    P-R Interval 168 ms    QRS Duration 104 ms    Q-T Interval 480 ms    QTC Calculation (Bezet) 499 ms    Calculated P Axis 77 degrees    Calculated R Axis 47 degrees    Calculated T Axis 100 degrees    Diagnosis       Normal sinus rhythm  Biatrial enlargement  Nonspecific ST and T wave abnormality  Abnormal ECG  No previous ECGs available  Confirmed by Patricia Moralez MD, [facility] ([id number]) on 6/24/2021 9:45:31 AM     TROPONIN I    Collection Time: 06/23/21  9:45 PM   Result Value Ref Range    Troponin-I, Qt. 0.06 (H) <0.05 ng/mL   GLUCOSE, POC    Collection Time: 06/23/21  9:54 PM   Result Value Ref Range    Glucose (POC) 119 (H) 65 - 117 mg/dL    Performed by Kristopher Shoemaker    RENAL FUNCTION PANEL    Collection Time: 06/24/21  7:05 AM   Result Value Ref Range    Sodium 140 136 - 145 mmol/L    Potassium 3.8 3.5 - 5.1 mmol/L    Chloride 102 97 - 108 mmol/L    CO2 34 (H) 21 - 32 mmol/L    Anion gap 4 (L) 5 - 15 mmol/L    Glucose 117 (H) 65 - 100 mg/dL    BUN 26 (H) 6 - 20 mg/dL    Creatinine 1.34 (H) 0.55 - 1.02 mg/dL    BUN/Creatinine ratio 19 12 - 20      GFR est AA 48 (L) >60 ml/min/1.73m2    GFR est non-AA 39 (L) >60 ml/min/1.73m2    Calcium 8.4 (L) 8.5 - 10.1 mg/dL    Phosphorus 3.4 2.6 - 4.7 mg/dL    Albumin 2.7 (L) 3.5 - 5.0 g/dL     [unfilled]      Review of Systems    Constitutional: Negative for chills and fever. HENT: Negative. Eyes: Negative. Respiratory: Negative. Cardiovascular: substernal chest pain. Gastrointestinal: Negative for abdominal pain and nausea. Skin: Negative. Neurological: headache. Physical Exam:      Constitutional: pt is oriented to person, place, and time. HENT:   Head: Normocephalic and atraumatic. Eyes: Pupils are equal, round, and reactive to light. EOM are normal.   Cardiovascular: Normal rate, regular rhythm and normal heart sounds. Pulmonary/Chest: Breath sounds normal. No wheezes. No rales. Exhibits no tenderness. Abdominal: Soft. Bowel sounds are normal. There is no abdominal tenderness. There is no rebound and no guarding. Musculoskeletal: Normal range of motion. Neurological: pt is alert and oriented to person, place, and time.      XR CHEST PORT   Final Result   Cardiomegaly with mild vascular congestion and increased interstitial markings suggesting volume overload/CHF. XR CHEST PA LAT    (Results Pending)        Recent Results (from the past 24 hour(s))   GLUCOSE, POC    Collection Time: 06/23/21  3:40 PM   Result Value Ref Range    Glucose (POC) 89 65 - 117 mg/dL    Performed by Michael Marcus    GLUCOSE, POC    Collection Time: 06/23/21  4:13 PM   Result Value Ref Range    Glucose (POC) 118 (H) 65 - 117 mg/dL    Performed by Rosemary Daniel    EKG, 12 LEAD, SUBSEQUENT    Collection Time: 06/23/21  8:33 PM   Result Value Ref Range    Ventricular Rate 65 BPM    Atrial Rate 65 BPM    P-R Interval 168 ms    QRS Duration 104 ms    Q-T Interval 480 ms    QTC Calculation (Bezet) 499 ms    Calculated P Axis 77 degrees    Calculated R Axis 47 degrees    Calculated T Axis 100 degrees    Diagnosis       Normal sinus rhythm  Biatrial enlargement  Nonspecific ST and T wave abnormality  Abnormal ECG  No previous ECGs available  Confirmed by Malia Weston MD, [facility] ([id number]) on 6/24/2021 9:45:31 AM     TROPONIN I    Collection Time: 06/23/21  9:45 PM   Result Value Ref Range    Troponin-I, Qt. 0.06 (H) <0.05 ng/mL   GLUCOSE, POC    Collection Time: 06/23/21  9:54 PM   Result Value Ref Range    Glucose (POC) 119 (H) 65 - 117 mg/dL    Performed by Imer Rivera    RENAL FUNCTION PANEL    Collection Time: 06/24/21  7:05 AM   Result Value Ref Range    Sodium 140 136 - 145 mmol/L    Potassium 3.8 3.5 - 5.1 mmol/L    Chloride 102 97 - 108 mmol/L    CO2 34 (H) 21 - 32 mmol/L    Anion gap 4 (L) 5 - 15 mmol/L    Glucose 117 (H) 65 - 100 mg/dL    BUN 26 (H) 6 - 20 mg/dL    Creatinine 1.34 (H) 0.55 - 1.02 mg/dL    BUN/Creatinine ratio 19 12 - 20      GFR est AA 48 (L) >60 ml/min/1.73m2    GFR est non-AA 39 (L) >60 ml/min/1.73m2    Calcium 8.4 (L) 8.5 - 10.1 mg/dL    Phosphorus 3.4 2.6 - 4.7 mg/dL    Albumin 2.7 (L) 3.5 - 5.0 g/dL       Results     ** No results found for the last 336 hours.  **           Labs:     Recent Labs     06/23/21  7380 06/22/21  0525   WBC 8.9 8.9   HGB 13.6 12.8   HCT 45.9 43.9    192     Recent Labs     06/24/21  0705 06/23/21  0751 06/22/21  0525    142  141 146*   K 3.8 3.8  3.8 3.7    103  102 106   CO2 34* 34*  35* 33*   BUN 26* 21*  21* 19   CREA 1.34* 1.40*  1.37* 1.27*   * 120*  120* 99   CA 8.4* 8.6  8.6 9.0   PHOS 3.4 4.1  --      Recent Labs     06/24/21  0705 06/23/21 0751 06/22/21  0525 06/21/21  1400 06/21/21  1400   ALT  --  17 21  --  24   AP  --  119* 114  --  124*   TBILI  --  0.7 0.6  --  0.5   TP  --  6.6 6.6  --  6.6   ALB 2.7* 2.8*  2.7* 2.9*   < > 3.0*   GLOB  --  3.8 3.7  --  3.6    < > = values in this interval not displayed. No results for input(s): INR, PTP, APTT, INREXT, INREXT in the last 72 hours. No results for input(s): FE, TIBC, PSAT, FERR in the last 72 hours. No results found for: FOL, RBCF   No results for input(s): PH, PCO2, PO2 in the last 72 hours.   Recent Labs     06/23/21 2145 06/22/21 0525 06/21/21  1400   TROIQ 0.06* 0.06* 0.06*     No results found for: CHOL, CHOLX, CHLST, CHOLV, HDL, HDLP, LDL, LDLC, DLDLP, TGLX, TRIGL, TRIGP, CHHD, CHHDX  Lab Results   Component Value Date/Time    Glucose (POC) 119 (H) 06/23/2021 09:54 PM    Glucose (POC) 118 (H) 06/23/2021 04:13 PM    Glucose (POC) 89 06/23/2021 03:40 PM    Glucose (POC) 121 (H) 06/23/2021 11:24 AM    Glucose (POC) 109 06/23/2021 07:29 AM     Lab Results   Component Value Date/Time    Color Yellow/Straw 06/07/2021 05:00 PM    Appearance Clear 06/07/2021 05:00 PM    Specific gravity 1.016 06/07/2021 05:00 PM    pH (UA) 5.0 06/07/2021 05:00 PM    Protein 100 (A) 06/07/2021 05:00 PM    Glucose Negative 06/07/2021 05:00 PM    Ketone Negative 06/07/2021 05:00 PM    Bilirubin Negative 06/07/2021 05:00 PM    Urobilinogen 0.1 06/07/2021 05:00 PM    Nitrites Negative 06/07/2021 05:00 PM    Leukocyte Esterase Negative 06/07/2021 05:00 PM    Bacteria Negative 06/07/2021 05:00 PM    WBC 0-4 06/07/2021 05:00 PM    RBC 0-5 06/07/2021 05:00 PM         Assessment:     Acute congestive heart failure  CAD status post CABG  Mildly elevated troponin  Type 2 diabetes  Hypertension  COPD  Chronic kidney disease  Bradycardia  History of A. fib status post cardioversion  Depression      Plan:     Pulmonology and Cardiology consulted   Family to bring in CPAP   Repeat labs in AM   Switch to PO diuretics   Continue anticoagulation for previous a. Fib/a flutter   Tomorrow will give Zaroxolyn prior to lasix    Possible discharge home tomorrow  PT OT consult        Current Facility-Administered Medications:     famotidine (PEPCID) tablet 10 mg, 10 mg, Oral, BID, Monica Nelson NP, 10 mg at 06/24/21 1131    [START ON 6/25/2021] furosemide (LASIX) tablet 80 mg, 80 mg, Oral, DAILY, Betty Harrison MD    [START ON 6/25/2021] metOLazone (ZAROXOLYN) tablet 5 mg, 5 mg, Oral, DAILY, Betty Harrison MD    alum-mag hydroxide-simeth (MYLANTA) oral suspension 30 mL, 30 mL, Oral, Q6H PRN, Gopal Alvarado MD, 30 mL at 06/23/21 2120    albuterol (PROVENTIL HFA, VENTOLIN HFA, PROAIR HFA) inhaler 2 Puff, 2 Puff, Inhalation, Q6HWA RT, Betty Harrison MD, 2 Puff at 06/24/21 0810    amiodarone (CORDARONE) tablet 200 mg, 200 mg, Oral, DAILY, Pedro Coronado MD, 200 mg at 06/24/21 0957    apixaban (ELIQUIS) tablet 5 mg, 5 mg, Oral, BID, Gopal Alvarado MD, 5 mg at 06/24/21 0957    clopidogreL (PLAVIX) tablet 75 mg, 75 mg, Oral, DAILY, Gopal Alvarado MD, 75 mg at 06/24/21 0957    insulin glargine (LANTUS) injection 10 Units, 10 Units, SubCUTAneous, QHS, Gopal Alvarado MD, 10 Units at 06/23/21 2200    losartan (COZAAR) tablet 100 mg, 100 mg, Oral, DAILY, Gopal Alvarado MD, 100 mg at 06/24/21 0957    nitroglycerin (NITROSTAT) tablet 0.4 mg, 0.4 mg, SubLINGual, PRN, Gopal Alvarado MD, 0.4 mg at 06/23/21 2048    oxybutynin (DITROPAN) tablet 5 mg, 5 mg, Oral, TID, Gopal Alvarado MD, 5 mg at 06/24/21 0957    albuterol-ipratropium (DUO-NEB) 2.5 MG-0.5 MG/3 ML, 3 mL, Nebulization, Q6H PRN, Jourdan Alvarado MD    budesonide-formoteroL [facility]) 160-4.5 mcg/actuation HFA inhaler 2 Puff, 2 Puff, Inhalation, BID RT, Regan Mast MD, 2 Puff at 06/24/21 0810    potassium chloride SR (KLOR-CON 10) tablet 10 mEq, 10 mEq, Oral, BID, Gopal Alvarado MD, 10 mEq at 06/24/21 0957    QUEtiapine (SEROquel) tablet 25 mg, 25 mg, Oral, QHS, Gopal Alvarado MD, 25 mg at 06/23/21 2120    theophylline ER (BAM-24) capsule 300 mg, 300 mg, Oral, DAILY, Gopal Alvarado MD, 300 mg at 06/24/21 1131    insulin lispro (HUMALOG) injection, , SubCUTAneous, AC&HS, Gopal Alvarado MD, 4 Units at 06/22/21 1037    glucose chewable tablet 16 g, 4 Tablet, Oral, PRN, Jourdan Alvarado MD    dextrose (D50W) injection syrg 12.5-25 g, 25-50 mL, IntraVENous, PRN, Jourdan Alvarado MD    glucagon (GLUCAGEN) injection 1 mg, 1 mg, IntraMUSCular, PRN, Jourdan Alvarado MD    polyethylene glycol (MIRALAX) packet 17 g, 17 g, Oral, DAILY PRN, Jourdan Alvarado MD    ondansetron (ZOFRAN ODT) tablet 4 mg, 4 mg, Oral, Q8H PRN **OR** ondansetron (ZOFRAN) injection 4 mg, 4 mg, IntraVENous, Q6H PRN, Regan Mast MD

## 2021-06-24 NOTE — PROGRESS NOTES
PHYSICAL THERAPY EVALUATION  Patient: Jacob Lawrence (57 y.o. female)  Date: 6/24/2021  Primary Diagnosis: CHF exacerbation ([facility] [I50.9]  CHF (congestive heart failure) ([facility] [I50.9]        Precautions:       ASSESSMENT  Per EMR:  Pt is a 73 y/o F with PMH of diabetes, hypertension, CAD s/p CABG, COPD, CHF, presenting to [facility] ER c/o of chest pain, SOB for last 2 days and some swelling in the LE, admitted 6/21/21 and being treated for CHF exacerbation. Pt received semi-supine in bed upon arrival, AXO x4, on 1L O2 via NC, and agreeable to PT/OT evaluation at this time. Per pt report, pt lives with daughter and grand-daughter in a one-story home with 4 FABIAN and B HR, was IND with ADLs at [employer]. Pt states she has recently been ambulating without AD as her rollator is currently broken. Pt states she wears 3L O2 at baseline but had progressed to 4L recently due to incr SOB. Based on current observations, pt presents with deficits in generalized strength, dynamic standing balance, and functional activity tolerance impacting overall performance of ADLs and functional transfers/mobility. Pt currently demos IND with bed mobility,  STS completed to/from EOB with SBA, initially using RW for support however demos fair standing balance without use of AD and with CGA for safety during functional mobility. Pt required incr of O2 up to 3L due to SPO2 remaining at 88% at rest and while on 2L for transfer completion. Pt ambulated 175 ft with portable O2 with RW and SBA for 70 ft, and no AD for remainder of distance with CGA and chair follow for incr safety. Pt required one seated rest break after ambulating prior to self-care activities performed with OT. Overall, pt tolerated session well today, O2 sats remained 92-93% on 3L with functional activity. Pt would benefit from continued skilled PT services to address current impairments and improve overall IND and safety with functional transfers/mobility and ambulation.  Recommend discharge home with HHPT once medically appropriate. Other factors to consider for discharge: family support, DME, time since onset, severity of deficits         PLAN :  Recommendations and Planned Interventions: bed mobility training, transfer training, gait training, therapeutic exercises, modalities, edema management/control, patient and family training/education, and therapeutic activities      Frequency/Duration: Patient will be followed by physical therapy:  1 time a week to address goals. Recommendation for discharge: (in order for the patient to meet his/her long term goals)  HHPT    This discharge recommendation:  Has been made in collaboration with the attending provider and/or case management    IF patient discharges home will need the following DME: shower chair and rollator         SUBJECTIVE:   Patient stated I was using a rollator all the time but my grandkids broke it.     OBJECTIVE DATA SUMMARY:   HISTORY:    Past Medical History:   Diagnosis Date    Chronic obstructive pulmonary disease (HCC)     Congestive heart disease ([facility]     Coronary artery disease     Diabetes (HCC)     Hyperlipidemia     Hypertension     Myocardial infarct (HCC)     IFTIKHAR (obstructive sleep apnea)      Past Surgical History:   Procedure Laterality Date    HX BREAST BIOPSY      HX CHOLECYSTECTOMY      HX CORONARY ARTERY BYPASS GRAFT      HX HERNIA REPAIR      HX HYSTERECTOMY         Personal factors and/or comorbidities impacting plan of care:     Home Situation  Home Environment: Private residence  # Steps to Enter: 4  Rails to Enter: Yes  Hand Rails : Bilateral  One/Two Story Residence: One story  Living Alone: No  Support Systems: Family member(s)  Patient Expects to be Discharged to[de-identified] [place]  Current DME Used/Available at Home: None  Tub or Shower Type: Tub/Shower combination    EXAMINATION/PRESENTATION/DECISION MAKING:   Critical Behavior:  Neurologic State: Alert  Orientation Level: Oriented X4  Cognition: Appropriate decision making, Appropriate for age attention/concentration, Appropriate safety awareness     Hearing: Auditory  Auditory Impairment: None  Skin:  intact where visible  Edema: armin pitting LE edema  Range Of Motion:  AROM: Generally decreased, functional      Strength:    Strength: Generally decreased, functional (grossly >3/5)      Coordination:  Coordination: Generally decreased, functional    Functional Mobility:  Bed Mobility:  Rolling: Independent  Supine to Sit: Independent     Scooting: Independent  Transfers:  Sit to Stand: Stand-by assistance  Stand to Sit: Stand-by assistance        Bed to Chair: Stand-by assistance              Balance:   Sitting: Intact  Standing: Impaired; Without support  Standing - Static: Good  Standing - Dynamic : Fair  Ambulation/Gait Training:  Distance (ft): 175 Feet (ft)  Assistive Device: Walker, rolling; Other (comment) (completed 1/2 with RW and 1/2 without AD)  Ambulation - Level of Assistance: Stand-by assistance;Contact guard assistance        Gait Abnormalities: Circumduction;Decreased step clearance;Trunk sway increased        Base of Support: Widened     Speed/Ella: Slow       Therapeutic Exercises:   PLB, educated on ankle pumps and LAQ    Functional Measure:  [facility] Mobility Inpatient Short Form  How much difficulty does the patient currently have. .. Unable A Lot A Little None   1. Turning over in bed (including adjusting bedclothes, sheets and blankets)? [] 1   [] 2   [] 3   [x] 4   2. Sitting down on and standing up from a chair with arms ( e.g., wheelchair, bedside commode, etc.)   [] 1   [] 2   [] 3   [x] 4   3. Moving from lying on back to sitting on the side of the bed? [] 1   [] 2   [] 3   [x] 4          How much help from another person does the patient currently need. .. Total A Lot A Little None   4. Moving to and from a bed to a chair (including a wheelchair)? [] 1   [] 2   [x] 3   [] 4   5. Need to walk in hospital room? [] 1   [] 2   [x] 3   [] 4   6. Climbing 3-5 steps with a railing? [] 1   [] 2   [x] 3   [] 4   © , Trustees of [address] [place] [address] [address] [id number], under license to [employer]. All rights reserved     Score:  Initial:  Most Recent: X (Date: 2021 )   Interpretation of Tool:  Represents activities that are increasingly more difficult (i.e. Bed mobility, Transfers, Gait). Score 24 23 22-20 19-15 14-10 9-7 6   Modifier CH CI CJ CK CL CM CN         Physical Therapy Evaluation Charge Determination   History Examination Presentation Decision-Making   MEDIUM  Complexity : 1-2 comorbidities / personal factors will impact the outcome/ POC  LOW Complexity : 1-2 Standardized tests and measures addressing body structure, function, activity limitation and / or participation in recreation  LOW Complexity : Stable, uncomplicated  Other outcome measures ampa 6  LOW      Based on the above components, the patient evaluation is determined to be of the following complexity level: LOW     Pain Ratin/10 headache reported at EOS - RN informed    Activity Tolerance:   Fair    After treatment patient left in no apparent distress:   OT present completing self-care  and RN updated. GOALS:    Problem: Mobility Impaired (Adult and Pediatric)  Goal: *Acute Goals and Plan of Care (Insert Text)  Description: Pt will be I with LE HEP in 7 days. Pt will perform bed mobility with mod I in 7 days. Pt will perform transfers with mod I in 7 days. Pt will amb 300 feet with LRAD safely with mod I in 7 days. Pt will ascend/descend 3 steps with B handrails and CGA in 7 days to safely enter home. Pt will demonstrate improvement in standing dynamic balance from fair without support to good in 7 days. Outcome: Not Met       COMMUNICATION/EDUCATION:   The patients plan of care was discussed with: Occupational therapist and Registered nurse.      Fall prevention education was provided and the patient/caregiver indicated understanding., Patient/family have participated as able in goal setting and plan of care. , and Patient/family agree to work toward stated goals and plan of care. PT/OT sessions occurred together for increased safety of pt and clinician.        Thank you for this referral.  Stephen Farah, PT, DPT   Time Calculation: 40 mins

## 2021-06-24 NOTE — PROGRESS NOTES
Consult  Pulmonary, Critical Care    Name: Ida Lara MRN: [id number]   : 1950 Hospital: [facility]   Date: 2021  Admission date: 2021 Hospital Day: 4       Subjective/Interval History:   Seen in the emergency room. Has had over 3 L of diuresis so far and feels much better. History is of mild cardiomyopathy normally on Lasix 20 mg. About 2 weeks ago she noted worsening edema and that her 20 mg of Lasix was not initiating any diuresis. She upped it to 40 mg a week ago and still has not been having any diuresis has had worsening edema of her legs shortness of breath cough productive of clear sputum. She thinks she may have had an episode of fever but is not sure. Has not seen any purulent sputum   did not tolerate our CPAP last night. She states her family will bring her machine in today to use. Feels better edema is improved urine output not recorded yesterday states she was still passing a large amount of urine   wore her CPAP last night without difficulty. Will check overnight oximetry to make sure saturation is maintained tonight. She received oral Lasix 80 mg at nine and as of 10:30 AM has not had any significant diuresis. We'll see how she does over the next three or 4 hours. Tomorrow we'll give Zaroxolyn preceding Lasix    Hospital Problems  Date Reviewed: 3/12/2021        Codes Class Noted POA    CHF exacerbation ([facility] ICD-10-CM: I50.9  ICD-9-CM: 428.0  2021 Unknown        CHF (congestive heart failure) ([facility]) ICD-10-CM: I50.9  ICD-9-CM: 428.0  2021 Unknown              IMPRESSION:   1. Acute pulmonary edema improved clinically we will repeat x-ray   2. Chronic hypoxic respiratory failure remains on her baseline oxygen  3. Acute kidney injury continue to follow  4. Cardiomyopathy diastolic dysfunction  5. COPD no wheezing  6. Obstructive sleep apnea family brought in her machine we'll continue to use it  7. Diabetes  8.  Coronary artery disease with bypass grafting and stents in the past  Body mass index is 39.58 kg/m². 9.       RECOMMENDATIONS/PLAN:   1. No significant diuresis after 80 of Lasix but only 1 hour after the dose we'll see how she does over the next three or 4 hours  2. We'll add Zaroxolyn before Lasix tomorrow to see the diuretic effect  3. Continue nasal oxygen as she uses at home  4. Continue her home CPAP with oxygen bleed in  5. Continue Symbicort to substitute for her Dulera inhaler  6. We will use albuterol inhaler. 7. She has a nebulizer machine at home she thinks it is not working correctly          [x] High complexity decision making was performed  [x] See my orders for details      Subjective/Initial History:     I was asked by Yossi Jeronimo MD to see Walter Mckenna  a 72 y.o.    female in consultation for a chief complaint of shortness of breath and edema        Allergies   Allergen Reactions    Penicillins Other (comments)    Tylox [Oxycodone-Acetaminophen] Hives        MAR reviewed and pertinent medications noted or modified as needed     Current Facility-Administered Medications   Medication    famotidine (PEPCID) tablet 10 mg    [START ON 6/25/2021] furosemide (LASIX) tablet 80 mg    [START ON 6/25/2021] metOLazone (ZAROXOLYN) tablet 5 mg    alum-mag hydroxide-simeth (MYLANTA) oral suspension 30 mL    albuterol (PROVENTIL HFA, VENTOLIN HFA, PROAIR HFA) inhaler 2 Puff    amiodarone (CORDARONE) tablet 200 mg    apixaban (ELIQUIS) tablet 5 mg    clopidogreL (PLAVIX) tablet 75 mg    insulin glargine (LANTUS) injection 10 Units    losartan (COZAAR) tablet 100 mg    nitroglycerin (NITROSTAT) tablet 0.4 mg    oxybutynin (DITROPAN) tablet 5 mg    albuterol-ipratropium (DUO-NEB) 2.5 MG-0.5 MG/3 ML    budesonide-formoteroL (SYMBICORT) 160-4.5 mcg/actuation HFA inhaler 2 Puff    potassium chloride SR (KLOR-CON 10) tablet 10 mEq    QUEtiapine (SEROquel) tablet 25 mg    theophylline ER (BAM-24) capsule 300 mg    insulin lispro (HUMALOG) injection    glucose chewable tablet 16 g    dextrose (D50W) injection syrg 12.5-25 g    glucagon (GLUCAGEN) injection 1 mg    polyethylene glycol (MIRALAX) packet 17 g    ondansetron (ZOFRAN ODT) tablet 4 mg    Or    ondansetron (ZOFRAN) injection 4 mg      Patient PCP: Kostas Malik MD  PMH:  has a past medical history of Chronic obstructive pulmonary disease ([facility] [address] Congestive heart disease ([address] Coronary artery disease, Diabetes ([address] Hyperlipidemia, Hypertension, Myocardial infarct ([facility] and IFTIKHAR (obstructive sleep apnea). PSH:   has a past surgical history that includes hx cholecystectomy; hx hysterectomy; hx hernia repair; hx coronary artery bypass graft; and hx breast biopsy. FHX: family history includes Cancer in her maternal grandmother; Heart Disease in her mother; Kidney Disease in her mother. SHX:  reports that she has quit smoking. She has never used smokeless tobacco. She reports previous alcohol use. She reports that she does not use drugs. Systemic review:  General she thinks her weight stable but has had 2 weeks of worsening shortness of breath and edema with no response from her normal dose of Lasix. She thinks she felt hot for a few days but did not check her temperature. Eyes no double vision or momentary blindness  ENT no facial pain over the sinuses  Musculoskeletal no swollen tender joints  Neurologic no seizures or syncope  Endocrinologic has diabetes denies polyuria polydipsia  Gastrointestinal no nausea vomiting acid indigestion  Genitourinary no pain or discomfort on urination no bleeding or drainage. She noticed very poor to no response from her normal doses of Lasix and did increase from 20 to 40 mg daily again with no diuresis initiated  Cardiovascular no chest pain or diaphoresis she has had worsening ankle edema nocturia once or twice per night which is normal for her.   Respiratory has a history of COPD stop smoking 2 years ago but had over 45-year history of smoking between half and 1-1/2 packs/day. Has minimal cough states she uses her Dulera inhaler twice a day and her albuterol inhaler twice a day uses nebulizer on a as needed basis. Has had cough recently but chest clear mucus no purulence. Objective:     Vital Signs: Telemetry:    normal sinus rhythm Intake/Output:   Visit Vitals  BP (!) 146/76   Pulse 80   Temp 98 °F (36.7 °C)   Resp 18   Ht 5' 4\" (1.626 m)   Wt 104.6 kg (230 lb 9.6 oz)   SpO2 94%   Breastfeeding No   BMI 39.58 kg/m²       Temp (24hrs), Av.2 °F (36.8 °C), Min:98 °F (36.7 °C), Max:98.5 °F (36.9 °C)        O2 Device: Nasal cannula O2 Flow Rate (L/min): 1 l/min       Wt Readings from Last 4 Encounters:   21 104.6 kg (230 lb 9.6 oz)   21 108 kg (238 lb)   21 107.5 kg (237 lb)   21 107.5 kg (237 lb)          Intake/Output Summary (Last 24 hours) at 2021 1046  Last data filed at 2021 0958  Gross per 24 hour   Intake 360 ml   Output --   Net 360 ml       Last shift:       0701 -  1900  In: 360 [P.O.:360]  Out: -   Last 3 shifts: No intake/output data recorded. Physical Exam:   General:  female; lying in bed eating breakfast  with nasal oxygen in place with no distress  HEENT: NCAT, normal oral mucosa  Eyes: anicteric; conjunctiva clear extraocular movements intact  Neck: no nodes, JVD is present, no accessory MM use. Chest: no deformity,   Cardiac: Regular rate and rhythm distant sounds no definite murmur she does have significant edema extending up her shins but skin shows furrowing indicating good diuresis  Lungs: Prolongation of exhalation but no wheezing the lung sounds clear anterior laterally and upper lungs posterior with slightly diminished in the bases with rales extending about 1/3 up  Abd: Soft nontender normal bowel sounds  Ext: Leg edema extending up the shins  no joint swelling;  No clubbing  :clear urine  Neuro: Awake alert oriented speech is clear moves all 4 extremities  Psych- no agitation, oriented to person;   Skin: warm, dry, no cyanosis;   Pulses: Brachial and radial pulses intact  Capillary: Normal capillary refill    Labs:    Recent Labs     06/23/21  0751 06/22/21  0525 06/21/21  1400   WBC 8.9 8.9 8.5   HGB 13.6 12.8 12.1    192 181     Recent Labs     06/24/21  0705 06/23/21  0751 06/22/21  0525 06/21/21  1400 06/21/21  1400    142  141 146*   < > 145   K 3.8 3.8  3.8 3.7   < > 3.9    103  102 106   < > 109*   CO2 34* 34*  35* 33*   < > 31   * 120*  120* 99   < > 132*   BUN 26* 21*  21* 19   < > 22*   CREA 1.34* 1.40*  1.37* 1.27*   < > 1.53*   CA 8.4* 8.6  8.6 9.0   < > 8.4*   PHOS 3.4 4.1  --   --   --    ALB 2.7* 2.8*  2.7* 2.9*   < > 3.0*   ALT  --  17 21  --  24    < > = values in this interval not displayed. 1 L nasal oxygen with saturation 94%  3 L nasal oxygen with oxygen saturation 97%  Recent Labs     06/23/21  2145 06/22/21  0525 06/21/21  1400   TROIQ 0.06* 0.06* 0.06*     BNP 2271  Lab Results   Component Value Date/Time    Culture result: Klebsiella pneumoniae (A) 03/10/2021 12:06 AM     Imaging:    CXR Results  (Last 48 hours)    None        Results from [place] encounter on 06/21/21    XR CHEST PORT    Narrative  Portable upright radiograph of the chest 3:24 PM. No prior study. INDICATION: Shortness of breath. Patient is status post median sternotomy. Heart size is enlarged with an  atherosclerotic aorta. There is vascular congestion. There is fluid in the minor  fissure. Mild increased interstitial markings. No definite infiltrate. No  pneumothorax. Impression  Cardiomegaly with mild vascular congestion and increased  interstitial markings suggesting volume overload/CHF.       Results from [place] encounter on 05/27/21    XR ABD FLAT/ ERECT    Narrative  This study is a 2 view radiographic evaluation of the abdomen dated 5/27/2021. HISTORY: Abdominal pain. FINDINGS: The patient status post a previous median sternotomy. There is  apparent enlargement of the cardiac silhouette. There is significant fecal loading within the cecum and the ascending colon. There is mild gaseous distention of the small bowel. This examination is  negative for organomegaly, intra-abdominal fluid, or pathological calcifications  within the upper abdominal region. There are vascular calcifications of the  distal infrarenal abdominal aorta and the common iliac arteries. There is a  large protuberant anterior abdominal wall which is somewhat limiting the  diagnostic sensitivity of this examination. There are changes of diffuse  idiopathic skeletal hyperostosis with bony whiskering along the pelvis. There is apparent endovascular stent demonstrated within the right femoral  artery. There also may be endovascular stents within the left common and  external iliac arteries. Impression  1. There is significant fecal retention within the right side of the colon  consistent with recent constipation. 2.  There is peripheral vascular disease with calcified plaque formation of the  distal abdominal aorta and involving the iliac arteries. There is an apparent  stent within the right common femoral artery and apparent stents within the left  common and external iliac arteries. Results from Hospital Encounter encounter on 04/12/21    XR CHEST PORT    Narrative  Examination: XR CHEST PORT    History: chest pain    Comparison: Chest radiograph 3/15/2021    FINDINGS:    Single frontal portable view of the chest. Lung volumes are symmetric. Prominence of the central pulmonary vasculature. The cardiac silhouette is  enlarged. Postoperative changes of the mediastinum. Atherosclerosis of the  thoracic aorta. Hazy opacities overlying the lung base which are new, could  represent mild edema or atelectasis. No pneumothorax.  No acute or aggressive  osseous abnormalities are evident. Impression  Central pulmonary vascular engorgement with new hazy opacities of the lung  bases, nonspecific, but could represent early edema or atelectasis. Infectious  process could appear similar in the appropriate clinical setting. Results from [place] encounter on 06/07/21    CT ABD PELV W CONT    Narrative  Indication: Possible hernia. Dose reduction: All CT scans done at this facility are performed using dose  reduction optimization techniques as appropriate to a performed exam including  the following: Automated exposure control, adjustments of the mA and/or kV  according to patient size, or use of iterative reconstruction technique. CT abdomen and pelvis, 100 cc [id number] 6/7/2021. Comparison 5/27/2021. Cardiomegaly. Numerous sigmoid colon and descending colon diverticula without diverticulitis. Normal liver. Nonvisualized gallbladder. No apparent bile duct dilatation. Normal spleen, pancreas, adrenal glands, right kidney. Small left renal cyst. No  hydronephrosis. Normal appearance of urinary bladder. Hysterectomy. Atheromatous calcification of abdominal aorta without aneurysm. Right renal  artery stent. Appendix identified. No pericecal inflammation. Normal small bowel. No ascites or free intraperitoneal air. Mild lumbar spondylosis. Mild degenerative changes of hips. No abdominal wall hernia minimal umbilical fat protrusion unchanged. Impression  No acute finding. Left colon diverticula without diverticulitis. · Discussion: Acute pulmonary edema. Feels better with diuresis. Would continue IV Lasix. As she improves would switch to oral Lasix 60 mg. She was not having any response to 40 mg orally at home. Await results of echocardiogram.  · She has obstructive sleep apnea she thinks her setting is 13. She will try to have her family bring her machine in.   In the meantime we will use hospital CPAP at 13 cm of pressure  · 6/23 she could not tolerate her hospital CPAP her family is to bring her machine in today. Would continue diuresis and will request direct I&O continue to follow renal function if creatinine rises further tomorrow will decrease to single daily dosing Lasix  · 6/24 creatinine stable with Lasix. Received 80 mg orally and so far has not had diuresis initiated. We'll give a dose of Zaroxolyn tomorrow before Lasix and see the effect. Will check chest x-ray today to reevaluate her pulmonary edema.   Will check overnight oximetry tonight with her home CPAP and supplemental oxygen    Angelica Hinkle MD

## 2021-06-25 LAB
ALBUMIN SERPL-MCNC: 2.7 G/DL (ref 3.5–5)
ALBUMIN SERPL-MCNC: 2.7 G/DL (ref 3.5–5)
ALBUMIN/GLOB SERPL: 0.7 {RATIO} (ref 1.1–2.2)
ALP SERPL-CCNC: 117 U/L (ref 45–117)
ALT SERPL-CCNC: 14 U/L (ref 12–78)
ANION GAP SERPL CALC-SCNC: 6 MMOL/L (ref 5–15)
ANION GAP SERPL CALC-SCNC: 6 MMOL/L (ref 5–15)
AST SERPL W P-5'-P-CCNC: 10 U/L (ref 15–37)
BILIRUB SERPL-MCNC: 0.7 MG/DL (ref 0.2–1)
BNP SERPL-MCNC: 445 PG/ML
BNP SERPL-MCNC: 724 PG/ML
BUN SERPL-MCNC: 24 MG/DL (ref 6–20)
BUN SERPL-MCNC: 25 MG/DL (ref 6–20)
BUN/CREAT SERPL: 17 (ref 12–20)
BUN/CREAT SERPL: 18 (ref 12–20)
CA-I BLD-MCNC: 8.3 MG/DL (ref 8.5–10.1)
CA-I BLD-MCNC: 8.4 MG/DL (ref 8.5–10.1)
CHLORIDE SERPL-SCNC: 99 MMOL/L (ref 97–108)
CHLORIDE SERPL-SCNC: 99 MMOL/L (ref 97–108)
CO2 SERPL-SCNC: 33 MMOL/L (ref 21–32)
CO2 SERPL-SCNC: 33 MMOL/L (ref 21–32)
CREAT SERPL-MCNC: 1.42 MG/DL (ref 0.55–1.02)
CREAT SERPL-MCNC: 1.44 MG/DL (ref 0.55–1.02)
ERYTHROCYTE [DISTWIDTH] IN BLOOD BY AUTOMATED COUNT: 15.7 % (ref 11.5–14.5)
GLOBULIN SER CALC-MCNC: 4.1 G/DL (ref 2–4)
GLUCOSE BLD STRIP.AUTO-MCNC: 133 MG/DL (ref 65–117)
GLUCOSE BLD STRIP.AUTO-MCNC: 145 MG/DL (ref 65–117)
GLUCOSE BLD STRIP.AUTO-MCNC: 146 MG/DL (ref 65–117)
GLUCOSE BLD STRIP.AUTO-MCNC: 149 MG/DL (ref 65–117)
GLUCOSE SERPL-MCNC: 139 MG/DL (ref 65–100)
GLUCOSE SERPL-MCNC: 140 MG/DL (ref 65–100)
HCT VFR BLD AUTO: 47.9 % (ref 35–47)
HGB BLD-MCNC: 14 G/DL (ref 11.5–16)
MCH RBC QN AUTO: 26 PG (ref 26–34)
MCHC RBC AUTO-ENTMCNC: 29.2 G/DL (ref 30–36.5)
MCV RBC AUTO: 89 FL (ref 80–99)
NRBC # BLD: 0 K/UL (ref 0–0.01)
NRBC BLD-RTO: 0 PER 100 WBC
PERFORMED BY, TECHID: ABNORMAL
PHOSPHATE SERPL-MCNC: 4.1 MG/DL (ref 2.6–4.7)
PLATELET # BLD AUTO: 218 K/UL (ref 150–400)
PMV BLD AUTO: 12 FL (ref 8.9–12.9)
POTASSIUM SERPL-SCNC: 3.9 MMOL/L (ref 3.5–5.1)
POTASSIUM SERPL-SCNC: 3.9 MMOL/L (ref 3.5–5.1)
PROT SERPL-MCNC: 6.8 G/DL (ref 6.4–8.2)
RBC # BLD AUTO: 5.38 M/UL (ref 3.8–5.2)
SODIUM SERPL-SCNC: 138 MMOL/L (ref 136–145)
SODIUM SERPL-SCNC: 138 MMOL/L (ref 136–145)
URATE SERPL-MCNC: 6.9 MG/DL (ref 2.6–6)
WBC # BLD AUTO: 8.8 K/UL (ref 3.6–11)

## 2021-06-25 PROCEDURE — 36415 COLL VENOUS BLD VENIPUNCTURE: CPT

## 2021-06-25 PROCEDURE — 77010033678 HC OXYGEN DAILY

## 2021-06-25 PROCEDURE — 74011250637 HC RX REV CODE- 250/637: Performed by: NURSE PRACTITIONER

## 2021-06-25 PROCEDURE — 80069 RENAL FUNCTION PANEL: CPT

## 2021-06-25 PROCEDURE — 74011250637 HC RX REV CODE- 250/637: Performed by: FAMILY MEDICINE

## 2021-06-25 PROCEDURE — 82962 GLUCOSE BLOOD TEST: CPT

## 2021-06-25 PROCEDURE — 74011250637 HC RX REV CODE- 250/637: Performed by: INTERNAL MEDICINE

## 2021-06-25 PROCEDURE — 83880 ASSAY OF NATRIURETIC PEPTIDE: CPT

## 2021-06-25 PROCEDURE — 85027 COMPLETE CBC AUTOMATED: CPT

## 2021-06-25 PROCEDURE — 74011636637 HC RX REV CODE- 636/637: Performed by: FAMILY MEDICINE

## 2021-06-25 PROCEDURE — 94760 N-INVAS EAR/PLS OXIMETRY 1: CPT

## 2021-06-25 PROCEDURE — 80053 COMPREHEN METABOLIC PANEL: CPT

## 2021-06-25 PROCEDURE — 94640 AIRWAY INHALATION TREATMENT: CPT

## 2021-06-25 PROCEDURE — 84550 ASSAY OF BLOOD/URIC ACID: CPT

## 2021-06-25 PROCEDURE — 65270000029 HC RM PRIVATE

## 2021-06-25 RX ORDER — ACETAMINOPHEN 325 MG/1
650 TABLET ORAL
Status: DISCONTINUED | OUTPATIENT
Start: 2021-06-25 | End: 2021-06-29 | Stop reason: HOSPADM

## 2021-06-25 RX ORDER — COLCHICINE 0.6 MG/1
0.6 TABLET ORAL EVERY 8 HOURS
Status: DISCONTINUED | OUTPATIENT
Start: 2021-06-25 | End: 2021-06-27

## 2021-06-25 RX ADMIN — POTASSIUM CHLORIDE 10 MEQ: 750 TABLET, FILM COATED, EXTENDED RELEASE ORAL at 08:52

## 2021-06-25 RX ADMIN — BUDESONIDE AND FORMOTEROL FUMARATE DIHYDRATE 2 PUFF: 160; 4.5 AEROSOL RESPIRATORY (INHALATION) at 09:26

## 2021-06-25 RX ADMIN — LOSARTAN POTASSIUM 100 MG: 50 TABLET, FILM COATED ORAL at 08:52

## 2021-06-25 RX ADMIN — OXYBUTYNIN CHLORIDE 5 MG: 5 TABLET ORAL at 21:59

## 2021-06-25 RX ADMIN — QUETIAPINE FUMARATE 25 MG: 25 TABLET ORAL at 21:59

## 2021-06-25 RX ADMIN — ALBUTEROL SULFATE 2 PUFF: 108 AEROSOL, METERED RESPIRATORY (INHALATION) at 20:13

## 2021-06-25 RX ADMIN — ALBUTEROL SULFATE 2 PUFF: 108 AEROSOL, METERED RESPIRATORY (INHALATION) at 09:25

## 2021-06-25 RX ADMIN — BUDESONIDE AND FORMOTEROL FUMARATE DIHYDRATE 2 PUFF: 160; 4.5 AEROSOL RESPIRATORY (INHALATION) at 20:13

## 2021-06-25 RX ADMIN — OXYBUTYNIN CHLORIDE 5 MG: 5 TABLET ORAL at 08:52

## 2021-06-25 RX ADMIN — ALBUTEROL SULFATE 2 PUFF: 108 AEROSOL, METERED RESPIRATORY (INHALATION) at 13:59

## 2021-06-25 RX ADMIN — POTASSIUM CHLORIDE 10 MEQ: 750 TABLET, FILM COATED, EXTENDED RELEASE ORAL at 21:58

## 2021-06-25 RX ADMIN — FUROSEMIDE 80 MG: 40 TABLET ORAL at 08:51

## 2021-06-25 RX ADMIN — METOLAZONE 5 MG: 5 TABLET ORAL at 06:28

## 2021-06-25 RX ADMIN — ACETAMINOPHEN 650 MG: 325 TABLET ORAL at 11:50

## 2021-06-25 RX ADMIN — INSULIN GLARGINE 10 UNITS: 100 INJECTION, SOLUTION SUBCUTANEOUS at 21:58

## 2021-06-25 RX ADMIN — COLCHICINE 0.6 MG: 0.6 TABLET, FILM COATED ORAL at 11:50

## 2021-06-25 RX ADMIN — FAMOTIDINE 10 MG: 10 TABLET ORAL at 08:52

## 2021-06-25 RX ADMIN — INSULIN LISPRO 3 UNITS: 100 INJECTION, SOLUTION INTRAVENOUS; SUBCUTANEOUS at 11:50

## 2021-06-25 RX ADMIN — THEOPHYLLINE ANHYDROUS 300 MG: 300 CAPSULE, EXTENDED RELEASE ORAL at 11:51

## 2021-06-25 RX ADMIN — CLOPIDOGREL BISULFATE 75 MG: 75 TABLET ORAL at 08:52

## 2021-06-25 RX ADMIN — APIXABAN 5 MG: 5 TABLET, FILM COATED ORAL at 21:58

## 2021-06-25 RX ADMIN — OXYBUTYNIN CHLORIDE 5 MG: 5 TABLET ORAL at 17:24

## 2021-06-25 RX ADMIN — APIXABAN 5 MG: 5 TABLET, FILM COATED ORAL at 08:52

## 2021-06-25 RX ADMIN — FAMOTIDINE 10 MG: 10 TABLET ORAL at 21:59

## 2021-06-25 RX ADMIN — AMIODARONE HYDROCHLORIDE 200 MG: 200 TABLET ORAL at 08:52

## 2021-06-25 RX ADMIN — COLCHICINE 0.6 MG: 0.6 TABLET, FILM COATED ORAL at 21:59

## 2021-06-25 NOTE — PROGRESS NOTES
General Daily Progress Note          Patient Name:   Mike Dutta       YOB: 1950       Age:  72 y.o. Admit Date: 6/21/2021      Subjective:       Sitting up in bed. Reports she had some abdominal pain yesterday that was relieved after having a bowel movement. Normal bowel movement per patient with no blood noted    She has been seen by pulmonology today who started her on gout medication for her complaints of lower extremity pain consistent with her gout pain. Was uncooperative with overnight pulse oximetry and CPAP due to headache. Pulmonology aware and spoke with patient. Discontinued Zaroxolyn as well.       Objective:     Visit Vitals  BP (!) 145/84 (BP 1 Location: Left upper arm, BP Patient Position: At rest;Lying)   Pulse 77   Temp 97.8 °F (36.6 °C)   Resp 19   Ht 5' 4\" (1.626 m)   Wt 104.6 kg (230 lb 9.6 oz)   SpO2 94%   Breastfeeding No   BMI 39.58 kg/m²        Recent Results (from the past 24 hour(s))   GLUCOSE, POC    Collection Time: 06/24/21  5:44 PM   Result Value Ref Range    Glucose (POC) 127 (H) 65 - 117 mg/dL    Performed by [address] [place] [address], POC    Collection Time: 06/24/21  9:44 PM   Result Value Ref Range    Glucose (POC) 136 (H) 65 - 117 mg/dL    Performed by [facility]    RENAL FUNCTION PANEL    Collection Time: 06/25/21  7:24 AM   Result Value Ref Range    Sodium 138 136 - 145 mmol/L    Potassium 3.9 3.5 - 5.1 mmol/L    Chloride 99 97 - 108 mmol/L    CO2 33 (H) 21 - 32 mmol/L    Anion gap 6 5 - 15 mmol/L    Glucose 139 (H) 65 - 100 mg/dL    BUN 24 (H) 6 - 20 mg/dL    Creatinine 1.44 (H) 0.55 - 1.02 mg/dL    BUN/Creatinine ratio 17 12 - 20      GFR est AA 44 (L) >60 ml/min/1.73m2    GFR est non-AA 36 (L) >60 ml/min/1.73m2    Calcium 8.4 (L) 8.5 - 10.1 mg/dL    Phosphorus 4.1 2.6 - 4.7 mg/dL    Albumin 2.7 (L) 3.5 - 5.0 g/dL   BNP    Collection Time: 06/25/21  7:24 AM   Result Value Ref Range    NT pro- (H) <125 pg/mL   CBC W/O DIFF    Collection Time: 06/25/21  7:24 AM   Result Value Ref Range    WBC 8.8 3.6 - 11.0 K/uL    RBC 5.38 (H) 3.80 - 5.20 M/uL    HGB 14.0 11.5 - 16.0 g/dL    HCT 47.9 (H) 35.0 - 47.0 %    MCV 89.0 80.0 - 99.0 FL    MCH 26.0 26.0 - 34.0 PG    MCHC 29.2 (L) 30.0 - 36.5 g/dL    RDW 15.7 (H) 11.5 - 14.5 %    PLATELET 547 572 - 880 K/uL    MPV 12.0 8.9 - 12.9 FL    NRBC 0.0 0.0  WBC    ABSOLUTE NRBC 0.00 0.00 - 7.30 K/uL   METABOLIC PANEL, COMPREHENSIVE    Collection Time: 06/25/21  7:24 AM   Result Value Ref Range    Sodium 138 136 - 145 mmol/L    Potassium 3.9 3.5 - 5.1 mmol/L    Chloride 99 97 - 108 mmol/L    CO2 33 (H) 21 - 32 mmol/L    Anion gap 6 5 - 15 mmol/L    Glucose 140 (H) 65 - 100 mg/dL    BUN 25 (H) 6 - 20 mg/dL    Creatinine 1.42 (H) 0.55 - 1.02 mg/dL    BUN/Creatinine ratio 18 12 - 20      GFR est AA 44 (L) >60 ml/min/1.73m2    GFR est non-AA 37 (L) >60 ml/min/1.73m2    Calcium 8.3 (L) 8.5 - 10.1 mg/dL    Bilirubin, total 0.7 0.2 - 1.0 mg/dL    AST (SGOT) 10 (L) 15 - 37 U/L    ALT (SGPT) 14 12 - 78 U/L    Alk. phosphatase 117 45 - 117 U/L    Protein, total 6.8 6.4 - 8.2 g/dL    Albumin 2.7 (L) 3.5 - 5.0 g/dL    Globulin 4.1 (H) 2.0 - 4.0 g/dL    A-G Ratio 0.7 (L) 1.1 - 2.2     URIC ACID    Collection Time: 06/25/21  7:24 AM   Result Value Ref Range    Uric acid 6.9 (H) 2.6 - 6.0 mg/dL   GLUCOSE, POC    Collection Time: 06/25/21  8:18 AM   Result Value Ref Range    Glucose (POC) 146 (H) 65 - 117 mg/dL    Performed by [address], POC    Collection Time: 06/25/21 11:14 AM   Result Value Ref Range    Glucose (POC) 149 (H) 65 - 117 mg/dL    Performed by Amber Fang      [unfilled]      Review of Systems    Constitutional: Negative for chills and fever. HENT: Negative. Eyes: Negative. Respiratory: Negative. Cardiovascular: substernal chest pain. Gastrointestinal: Abdominal pain relieved after bowel movement. Skin: Negative. Neurological: headache.         Physical Exam: Constitutional: pt is oriented to person, place, and time. HENT:   Head: Normocephalic and atraumatic. Eyes: Pupils are equal, round, and reactive to light. EOM are normal.   Cardiovascular: Normal rate, regular rhythm and normal heart sounds. Bilateral lower extremity edema  Pulmonary/Chest: Bibasilar diminished breath sounds with no rales, wheezes, rhonchi. No increased work of breathing. Abdominal: Soft. Bowel sounds are normal. There is no abdominal tenderness. There is no rebound and no guarding. Musculoskeletal: Normal range of motion. Right great toe painful and warm consistent with her usual gout symptoms  Neurological: pt is alert and oriented to person, place, and time. XR CHEST PA LAT   Final Result      Stable cardiomegaly. Decreased pulmonary vascular congestion and pulmonary   interstitial edema compared to the prior study from 6/21/2021. XR CHEST PORT   Final Result   Cardiomegaly with mild vascular congestion and increased   interstitial markings suggesting volume overload/CHF.            Recent Results (from the past 24 hour(s))   GLUCOSE, POC    Collection Time: 06/24/21  5:44 PM   Result Value Ref Range    Glucose (POC) 127 (H) 65 - 117 mg/dL    Performed by Denny Noble    GLUCOSE, POC    Collection Time: 06/24/21  9:44 PM   Result Value Ref Range    Glucose (POC) 136 (H) 65 - 117 mg/dL    Performed by Lawson Wray    RENAL FUNCTION PANEL    Collection Time: 06/25/21  7:24 AM   Result Value Ref Range    Sodium 138 136 - 145 mmol/L    Potassium 3.9 3.5 - 5.1 mmol/L    Chloride 99 97 - 108 mmol/L    CO2 33 (H) 21 - 32 mmol/L    Anion gap 6 5 - 15 mmol/L    Glucose 139 (H) 65 - 100 mg/dL    BUN 24 (H) 6 - 20 mg/dL    Creatinine 1.44 (H) 0.55 - 1.02 mg/dL    BUN/Creatinine ratio 17 12 - 20      GFR est AA 44 (L) >60 ml/min/1.73m2    GFR est non-AA 36 (L) >60 ml/min/1.73m2    Calcium 8.4 (L) 8.5 - 10.1 mg/dL    Phosphorus 4.1 2.6 - 4.7 mg/dL    Albumin 2.7 (L) 3.5 - 5.0 g/dL BNP    Collection Time: 06/25/21  7:24 AM   Result Value Ref Range    NT pro- (H) <125 pg/mL   CBC W/O DIFF    Collection Time: 06/25/21  7:24 AM   Result Value Ref Range    WBC 8.8 3.6 - 11.0 K/uL    RBC 5.38 (H) 3.80 - 5.20 M/uL    HGB 14.0 11.5 - 16.0 g/dL    HCT 47.9 (H) 35.0 - 47.0 %    MCV 89.0 80.0 - 99.0 FL    MCH 26.0 26.0 - 34.0 PG    MCHC 29.2 (L) 30.0 - 36.5 g/dL    RDW 15.7 (H) 11.5 - 14.5 %    PLATELET 376 135 - 459 K/uL    MPV 12.0 8.9 - 12.9 FL    NRBC 0.0 0.0  WBC    ABSOLUTE NRBC 0.00 0.00 - 8.94 K/uL   METABOLIC PANEL, COMPREHENSIVE    Collection Time: 06/25/21  7:24 AM   Result Value Ref Range    Sodium 138 136 - 145 mmol/L    Potassium 3.9 3.5 - 5.1 mmol/L    Chloride 99 97 - 108 mmol/L    CO2 33 (H) 21 - 32 mmol/L    Anion gap 6 5 - 15 mmol/L    Glucose 140 (H) 65 - 100 mg/dL    BUN 25 (H) 6 - 20 mg/dL    Creatinine 1.42 (H) 0.55 - 1.02 mg/dL    BUN/Creatinine ratio 18 12 - 20      GFR est AA 44 (L) >60 ml/min/1.73m2    GFR est non-AA 37 (L) >60 ml/min/1.73m2    Calcium 8.3 (L) 8.5 - 10.1 mg/dL    Bilirubin, total 0.7 0.2 - 1.0 mg/dL    AST (SGOT) 10 (L) 15 - 37 U/L    ALT (SGPT) 14 12 - 78 U/L    Alk. phosphatase 117 45 - 117 U/L    Protein, total 6.8 6.4 - 8.2 g/dL    Albumin 2.7 (L) 3.5 - 5.0 g/dL    Globulin 4.1 (H) 2.0 - 4.0 g/dL    A-G Ratio 0.7 (L) 1.1 - 2.2     URIC ACID    Collection Time: 06/25/21  7:24 AM   Result Value Ref Range    Uric acid 6.9 (H) 2.6 - 6.0 mg/dL   GLUCOSE, POC    Collection Time: 06/25/21  8:18 AM   Result Value Ref Range    Glucose (POC) 146 (H) 65 - 117 mg/dL    Performed by [address], POC    Collection Time: 06/25/21 11:14 AM   Result Value Ref Range    Glucose (POC) 149 (H) 65 - 117 mg/dL    Performed by [facility]        Results     ** No results found for the last 336 hours.  **           Labs:     Recent Labs     06/25/21  0724 06/23/21  0751   WBC 8.8 8.9   HGB 14.0 13.6   HCT 47.9* 45.9    204     Recent Labs 06/25/21 0724 06/24/21  0705 06/23/21  0751     138 140 142  141   K 3.9  3.9 3.8 3.8  3.8   CL 99  99 102 103  102   CO2 33*  33* 34* 34*  35*   BUN 24*  25* 26* 21*  21*   CREA 1.44*  1.42* 1.34* 1.40*  1.37*   *  140* 117* 120*  120*   CA 8.4*  8.3* 8.4* 8.6  8.6   PHOS 4.1 3.4 4.1   URICA 6.9*  --   --      Recent Labs     06/25/21  0724 06/24/21  0705 06/23/21  0751   ALT 14  --  17     --  119*   TBILI 0.7  --  0.7   TP 6.8  --  6.6   ALB 2.7*  2.7* 2.7* 2.8*  2.7*   GLOB 4.1*  --  3.8     No results for input(s): INR, PTP, APTT, INREXT, INREXT in the last 72 hours. No results for input(s): FE, TIBC, PSAT, FERR in the last 72 hours. No results found for: FOL, RBCF   No results for input(s): PH, PCO2, PO2 in the last 72 hours.   Recent Labs     06/23/21  2145   TROIQ 0.06*     No results found for: CHOL, CHOLX, CHLST, CHOLV, HDL, HDLP, LDL, LDLC, DLDLP, TGLX, TRIGL, TRIGP, CHHD, CHHDX  Lab Results   Component Value Date/Time    Glucose (POC) 149 (H) 06/25/2021 11:14 AM    Glucose (POC) 146 (H) 06/25/2021 08:18 AM    Glucose (POC) 136 (H) 06/24/2021 09:44 PM    Glucose (POC) 127 (H) 06/24/2021 05:44 PM    Glucose (POC) 119 (H) 06/23/2021 09:54 PM     Lab Results   Component Value Date/Time    Color Yellow/Straw 06/07/2021 05:00 PM    Appearance Clear 06/07/2021 05:00 PM    Specific gravity 1.016 06/07/2021 05:00 PM    pH (UA) 5.0 06/07/2021 05:00 PM    Protein 100 (A) 06/07/2021 05:00 PM    Glucose Negative 06/07/2021 05:00 PM    Ketone Negative 06/07/2021 05:00 PM    Bilirubin Negative 06/07/2021 05:00 PM    Urobilinogen 0.1 06/07/2021 05:00 PM    Nitrites Negative 06/07/2021 05:00 PM    Leukocyte Esterase Negative 06/07/2021 05:00 PM    Bacteria Negative 06/07/2021 05:00 PM    WBC 0-4 06/07/2021 05:00 PM    RBC 0-5 06/07/2021 05:00 PM         Assessment:   Acute pulmonary edema-improved on repeat chest x-ray 6/24  Acute congestive heart failure with preserved ejection fraction  CAD status post CABG  Mildly elevated troponin  Type 2 diabetes with hyperglycemia  Hypertension  COPD no acute exacerbation  Chronic kidney disease  Bradycardia  History of A. fib status post cardioversion  Depression  Headache  Gout of right great toe  IFTIKHAR treated with CPAP      Plan:   Pulmonology and cardiology following  Encouraged to use CPAP as ordered  Continue oxygen therapy with bleed into CPAP  Continue diuresis with IV Lasix  Discontinued Zaroxolyn by pulmonology  Repeat labs in the morning-monitor renal function closely  Continue inhalers and nebulizers  Colchicine added for gout by pulmonology  On Eliquis, Plavix  Continue glucose monitoring with sliding scale insulin coverage and basal insulin  Potassium supplementation  On Pepcid for reflux symptoms    Patient is full code    Discussed with Dr. Anne Six    Discharge planning: Patient possibly discharge in the morning after morning labs and clearance by pulmonology.         Current Facility-Administered Medications:     acetaminophen (TYLENOL) tablet 650 mg, 650 mg, Oral, Q4H PRN, Kanu Whitney MD, 650 mg at 06/25/21 1150    colchicine tablet 0.6 mg, 0.6 mg, Oral, Q8H, Kanu Whitney MD, 0.6 mg at 06/25/21 1150    famotidine (PEPCID) tablet 10 mg, 10 mg, Oral, BID, Renella Tim, NP, 10 mg at 06/25/21 7679    furosemide (LASIX) tablet 80 mg, 80 mg, Oral, DAILY, Kanu Whitney MD, 80 mg at 06/25/21 0851    metOLazone (ZAROXOLYN) tablet 5 mg, 5 mg, Oral, DAILY, Kanu Whitney MD, 5 mg at 06/25/21 1875    alum-mag hydroxide-simeth (MYLANTA) oral suspension 30 mL, 30 mL, Oral, Q6H PRN, Gopal Alvarado MD, 30 mL at 06/23/21 2120    albuterol (PROVENTIL HFA, VENTOLIN HFA, PROAIR HFA) inhaler 2 Puff, 2 Puff, Inhalation, Q6HWA RT, Kanu Whitney MD, 2 Puff at 06/25/21 1359    amiodarone (CORDARONE) tablet 200 mg, 200 mg, Oral, DAILY, Kyle Molina MD, 200 mg at 06/25/21 8626    apixaban (ELIQUIS) tablet 5 mg, 5 mg, Oral, BID, Bert Eldridge MD, 5 mg at 06/25/21 4442    clopidogreL (PLAVIX) tablet 75 mg, 75 mg, Oral, DAILY, Gopal Alvarado MD, 75 mg at 06/25/21 0852    insulin glargine (LANTUS) injection 10 Units, 10 Units, SubCUTAneous, QHS, Gopal Alvarado MD, 10 Units at 06/24/21 2149    losartan (COZAAR) tablet 100 mg, 100 mg, Oral, DAILY, Bert Eldridge MD, 100 mg at 06/25/21 1194    nitroglycerin (NITROSTAT) tablet 0.4 mg, 0.4 mg, SubLINGual, PRN, Gopal Alvarado MD, 0.4 mg at 06/23/21 2048    oxybutynin (DITROPAN) tablet 5 mg, 5 mg, Oral, TID, Bert Eldridge MD, 5 mg at 06/25/21 0852    albuterol-ipratropium (DUO-NEB) 2.5 MG-0.5 MG/3 ML, 3 mL, Nebulization, Q6H PRN, Papito Alvarado MD    budesonide-formoteroL (SYMBICORT) 160-4.5 mcg/actuation HFA inhaler 2 Puff, 2 Puff, Inhalation, BID RT, Bert Eldridge MD, 2 Puff at 06/25/21 0926    potassium chloride SR (KLOR-CON 10) tablet 10 mEq, 10 mEq, Oral, BID, Gopal Alvarado MD, 10 mEq at 06/25/21 5732    QUEtiapine (SEROquel) tablet 25 mg, 25 mg, Oral, QHS, Gopal Alvarado MD, 25 mg at 06/24/21 2150    theophylline ER (BAM-24) capsule 300 mg, 300 mg, Oral, DAILY, Gopal Alvarado MD, 300 mg at 06/25/21 1151    insulin lispro (HUMALOG) injection, , SubCUTAneous, AC&HS, Bert Eldridge MD, 3 Units at 06/25/21 1150    glucose chewable tablet 16 g, 4 Tablet, Oral, PRN, Papito Alvarado MD    dextrose (D50W) injection syrg 12.5-25 g, 25-50 mL, IntraVENous, PRN, Papito Alvarado MD    glucagon (GLUCAGEN) injection 1 mg, 1 mg, IntraMUSCular, PRN, Papito Alvarado MD    polyethylene glycol (MIRALAX) packet 17 g, 17 g, Oral, DAILY PRN, Papito Alvarado MD    ondansetron (ZOFRAN ODT) tablet 4 mg, 4 mg, Oral, Q8H PRN **OR** ondansetron (ZOFRAN) injection 4 mg, 4 mg, IntraVENous, Q6H PRN, Bert Eldridge MD

## 2021-06-25 NOTE — PROGRESS NOTES
Bedside and Verbal shift change report given to MADAY Chen (oncoming nurse) by Roverto SIMMONS RN (offgoing nurse). Report included the following information SBAR, Procedure Summary, Intake/Output, MAR and Recent Results.

## 2021-06-25 NOTE — PROGRESS NOTES
Consult  Pulmonary, Critical Care    Name: Chelsea Brandt MRN: [id number]   : 1950 Hospital: [address] [place] [address]   Date: 2021  Admission date: 2021 Hospital Day: 5       Subjective/Interval History:   Seen in the emergency room. Has had over 3 L of diuresis so far and feels much better. History is of mild cardiomyopathy normally on Lasix 20 mg. About 2 weeks ago she noted worsening edema and that her 20 mg of Lasix was not initiating any diuresis. She upped it to 40 mg a week ago and still has not been having any diuresis has had worsening edema of her legs shortness of breath cough productive of clear sputum. She thinks she may have had an episode of fever but is not sure. Has not seen any purulent sputum   did not tolerate our CPAP last night. She states her family will bring her machine in today to use. Feels better edema is improved urine output not recorded yesterday states she was still passing a large amount of urine   wore her CPAP last night without difficulty. Will check overnight oximetry to make sure saturation is maintained tonight. She received oral Lasix 80 mg at nine and as of 10:30 AM has not had any significant diuresis. We'll see how she does over the next three or 4 hours. Tomorrow we'll give Zaroxolyn preceding Lasix   headache all night long so did not wear CPAP last night. Also states she has developed severe pain of her right great toe. She does give a history of having gout in the past    Hospital Problems  Date Reviewed: 3/12/2021        Codes Class Noted POA    CHF exacerbation ([facility] ICD-10-CM: I50.9  ICD-9-CM: 428.0  2021 Unknown        CHF (congestive heart failure) (HCC) ICD-10-CM: I50.9  ICD-9-CM: 428.0  2021 Unknown              IMPRESSION:   1. Acute pulmonary edema improved radiographically   2. Chronic hypoxic respiratory failure remains on her baseline oxygen  3.  Acute kidney injury creatinine up slightly will repeat tomorrow  4. Cardiomyopathy diastolic dysfunction  5. COPD no wheezing  6. Obstructive sleep apnea did not use her home CPAP last night due to headache  7. Headache  8. Acute gout  9. Diabetes  10. Coronary artery disease with bypass grafting and stents in the past  Body mass index is 39.58 kg/m². 11.       RECOMMENDATIONS/PLAN:   1. No significant diuresis after 80 of Lasix but only 1 hour after the dose we'll see how she does over the next three or 4 hours  2. Good diuresis yesterday after Lasix will discontinue Zaroxolyn and follow renal function  3. Continue nasal oxygen as she uses at home  4. Continue her home CPAP with oxygen bleed in  5. Continue Symbicort to substitute for her Dulera inhaler  6. We will use albuterol inhaler. 7. We will add colchicine for her acute gout we will give 3 doses today and then decreased to once a day tomorrow  8. She has a nebulizer machine at home she thinks it is not working correctly          [x] High complexity decision making was performed  [x] See my orders for details      Subjective/Initial History:     I was asked by Stacia Mckee MD to see Adelita Gaston  a 72 y.o.    female in consultation for a chief complaint of shortness of breath and edema        Allergies   Allergen Reactions    Penicillins Other (comments)    Tylox [Oxycodone-Acetaminophen] Hives        MAR reviewed and pertinent medications noted or modified as needed     Current Facility-Administered Medications   Medication    acetaminophen (TYLENOL) tablet 650 mg    colchicine tablet 0.6 mg    famotidine (PEPCID) tablet 10 mg    furosemide (LASIX) tablet 80 mg    metOLazone (ZAROXOLYN) tablet 5 mg    alum-mag hydroxide-simeth (MYLANTA) oral suspension 30 mL    albuterol (PROVENTIL HFA, VENTOLIN HFA, PROAIR HFA) inhaler 2 Puff    amiodarone (CORDARONE) tablet 200 mg    apixaban (ELIQUIS) tablet 5 mg    clopidogreL (PLAVIX) tablet 75 mg    insulin glargine (LANTUS) injection 10 Units    losartan (COZAAR) tablet 100 mg    nitroglycerin (NITROSTAT) tablet 0.4 mg    oxybutynin (DITROPAN) tablet 5 mg    albuterol-ipratropium (DUO-NEB) 2.5 MG-0.5 MG/3 ML    budesonide-formoteroL (SYMBICORT) 160-4.5 mcg/actuation HFA inhaler 2 Puff    potassium chloride SR (KLOR-CON 10) tablet 10 mEq    QUEtiapine (SEROquel) tablet 25 mg    theophylline ER (BAM-24) capsule 300 mg    insulin lispro (HUMALOG) injection    glucose chewable tablet 16 g    dextrose (D50W) injection syrg 12.5-25 g    glucagon (GLUCAGEN) injection 1 mg    polyethylene glycol (MIRALAX) packet 17 g    ondansetron (ZOFRAN ODT) tablet 4 mg    Or    ondansetron (ZOFRAN) injection 4 mg      Patient PCP: Sheran Lundborg, MD  PMH:  has a past medical history of Chronic obstructive pulmonary disease ([address] Congestive heart disease ([address] Coronary artery disease, Diabetes ([address] Hyperlipidemia, Hypertension, Myocardial infarct ([address] and IFTIKHAR (obstructive sleep apnea). PSH:   has a past surgical history that includes hx cholecystectomy; hx hysterectomy; hx hernia repair; hx coronary artery bypass graft; and hx breast biopsy. FHX: family history includes Cancer in her maternal grandmother; Heart Disease in her mother; Kidney Disease in her mother. SHX:  reports that she has quit smoking. She has never used smokeless tobacco. She reports previous alcohol use. She reports that she does not use drugs. Systemic review:  General she thinks her weight stable but has had 2 weeks of worsening shortness of breath and edema with no response from her normal dose of Lasix. She thinks she felt hot for a few days but did not check her temperature.   Eyes no double vision or momentary blindness  ENT no facial pain over the sinuses  Musculoskeletal no swollen tender joints  Neurologic no seizures or syncope  Endocrinologic has diabetes denies polyuria polydipsia  Gastrointestinal no nausea vomiting acid indigestion  Genitourinary no pain or discomfort on urination no bleeding or drainage. She noticed very poor to no response from her normal doses of Lasix and did increase from 20 to 40 mg daily again with no diuresis initiated  Cardiovascular no chest pain or diaphoresis she has had worsening ankle edema nocturia once or twice per night which is normal for her. Respiratory has a history of COPD stop smoking 2 years ago but had over 45-year history of smoking between half and 1-1/2 packs/day. Has minimal cough states she uses her Dulera inhaler twice a day and her albuterol inhaler twice a day uses nebulizer on a as needed basis. Has had cough recently but chest clear mucus no purulence. Objective:     Vital Signs: Telemetry:    normal sinus rhythm Intake/Output:   Visit Vitals  /75 (BP 1 Location: Left upper arm, BP Patient Position: At rest;Lying)   Pulse 80   Temp 97.6 °F (36.4 °C)   Resp 18   Ht 5' 4\" (1.626 m)   Wt 104.6 kg (230 lb 9.6 oz)   SpO2 96%   Breastfeeding No   BMI 39.58 kg/m²       Temp (24hrs), Av.8 °F (36.6 °C), Min:97.6 °F (36.4 °C), Max:98 °F (36.7 °C)        O2 Device: Nasal cannula O2 Flow Rate (L/min): 2 l/min       Wt Readings from Last 4 Encounters:   21 104.6 kg (230 lb 9.6 oz)   21 108 kg (238 lb)   21 107.5 kg (237 lb)   21 107.5 kg (237 lb)          Intake/Output Summary (Last 24 hours) at 2021 1034  Last data filed at 2021 0634  Gross per 24 hour   Intake --   Output 1400 ml   Net -1400 ml       Last shift:      No intake/output data recorded.   Last 3 shifts: 1901 -  0700  In: 360 [P.O.:360]  Out: 1400 [Urine:1400]       Physical Exam:   General:  female; lying in bed eating breakfast  with nasal oxygen in place complains of right great toe severe pain  HEENT: NCAT, normal oral mucosa  Eyes: anicteric; conjunctiva clear extraocular movements intact  Neck: no nodes, neck veins visible but not grossly engorged, no accessory MM use. Chest: no deformity,   Cardiac: Regular rate and rhythm distant sounds no definite murmur she does have significant edema extending up her shins but skin shows furrowing indicating good diuresis  Lungs: Prolongation of exhalation but no wheezing the lung sounds clear anterior laterally and upper lungs posterior with slightly diminished in the bases with rales in the bases  Abd: Soft nontender normal bowel sounds  Ext: Leg edema extending up the shins  no joint swelling; No clubbing right great toe very tender and warm  :clear urine  Neuro: Awake alert oriented speech is clear moves all 4 extremities  Psych- no agitation, oriented to person;   Skin: warm, dry, no cyanosis;   Pulses: Brachial and radial pulses intact  Capillary: Normal capillary refill    Labs:    Recent Labs     06/25/21  0724 06/23/21  0751   WBC 8.8 8.9   HGB 14.0 13.6    204     Recent Labs     06/25/21  0724 06/24/21  0705 06/23/21  0751     138 140 142  141   K 3.9  3.9 3.8 3.8  3.8   CL 99  99 102 103  102   CO2 33*  33* 34* 34*  35*   *  140* 117* 120*  120*   BUN 24*  25* 26* 21*  21*   CREA 1.44*  1.42* 1.34* 1.40*  1.37*   CA 8.4*  8.3* 8.4* 8.6  8.6   PHOS 4.1 3.4 4.1   ALB 2.7*  2.7* 2.7* 2.8*  2.7*   ALT 14  --  17   6/25 2 L nasal oxygen with saturation 96%  3 L nasal oxygen with oxygen saturation 97%  Recent Labs     06/23/21  2145   TROIQ 0.06*     BNP 2271  Lab Results   Component Value Date/Time    Culture result: Klebsiella pneumoniae (A) 03/10/2021 12:06 AM     Imaging:    CXR Results  (Last 48 hours)               06/24/21 1329  XR CHEST PA LAT Final result    Impression:      Stable cardiomegaly. Decreased pulmonary vascular congestion and pulmonary   interstitial edema compared to the prior study from 6/21/2021.        Narrative:  Examination: Frontal and lateral radiographs of the chest       Clinical information: Pulmonary edema       Comparison: 6/21/2021 Findings:       Post median sternotomy. Stable enlargement of the cardiac contours. The superior   and middle mediastinal contours are normal. There is calcific atherosclerosis of   the thoracic aorta. There is decreased pulmonary vascular congestion and   decreased pulmonary interstitial thickening. No focal pulmonary consolidation. No pleural effusion or pneumothorax. No acute osseous abnormality. Results from Hospital Encounter encounter on 06/21/21    XR CHEST PA LAT    Narrative  Examination: Frontal and lateral radiographs of the chest    Clinical information: Pulmonary edema    Comparison: 6/21/2021    Findings:    Post median sternotomy. Stable enlargement of the cardiac contours. The superior  and middle mediastinal contours are normal. There is calcific atherosclerosis of  the thoracic aorta. There is decreased pulmonary vascular congestion and  decreased pulmonary interstitial thickening. No focal pulmonary consolidation. No pleural effusion or pneumothorax. No acute osseous abnormality. Impression  Stable cardiomegaly. Decreased pulmonary vascular congestion and pulmonary  interstitial edema compared to the prior study from 6/21/2021. XR CHEST PORT    Narrative  Portable upright radiograph of the chest 3:24 PM. No prior study. INDICATION: Shortness of breath. Patient is status post median sternotomy. Heart size is enlarged with an  atherosclerotic aorta. There is vascular congestion. There is fluid in the minor  fissure. Mild increased interstitial markings. No definite infiltrate. No  pneumothorax. Impression  Cardiomegaly with mild vascular congestion and increased  interstitial markings suggesting volume overload/CHF. Results from [place] encounter on 05/27/21    XR ABD FLAT/ ERECT    Narrative  This study is a 2 view radiographic evaluation of the abdomen dated 5/27/2021. HISTORY: Abdominal pain.     FINDINGS: The patient status post a previous median sternotomy. There is  apparent enlargement of the cardiac silhouette. There is significant fecal loading within the cecum and the ascending colon. There is mild gaseous distention of the small bowel. This examination is  negative for organomegaly, intra-abdominal fluid, or pathological calcifications  within the upper abdominal region. There are vascular calcifications of the  distal infrarenal abdominal aorta and the common iliac arteries. There is a  large protuberant anterior abdominal wall which is somewhat limiting the  diagnostic sensitivity of this examination. There are changes of diffuse  idiopathic skeletal hyperostosis with bony whiskering along the pelvis. There is apparent endovascular stent demonstrated within the right femoral  artery. There also may be endovascular stents within the left common and  external iliac arteries. Impression  1. There is significant fecal retention within the right side of the colon  consistent with recent constipation. 2.  There is peripheral vascular disease with calcified plaque formation of the  distal abdominal aorta and involving the iliac arteries. There is an apparent  stent within the right common femoral artery and apparent stents within the left  common and external iliac arteries. Results from [place] [facility] encounter on 06/07/21    CT ABD PELV W CONT    Narrative  Indication: Possible hernia. Dose reduction: All CT scans done at this facility are performed using dose  reduction optimization techniques as appropriate to a performed exam including  the following: Automated exposure control, adjustments of the mA and/or kV  according to patient size, or use of iterative reconstruction technique. CT abdomen and pelvis, 100 cc [id number] 6/7/2021. Comparison 5/27/2021. Cardiomegaly. Numerous sigmoid colon and descending colon diverticula without diverticulitis. Normal liver. Nonvisualized gallbladder.   No apparent bile duct dilatation. Normal spleen, pancreas, adrenal glands, right kidney. Small left renal cyst. No  hydronephrosis. Normal appearance of urinary bladder. Hysterectomy. Atheromatous calcification of abdominal aorta without aneurysm. Right renal  artery stent. Appendix identified. No pericecal inflammation. Normal small bowel. No ascites or free intraperitoneal air. Mild lumbar spondylosis. Mild degenerative changes of hips. No abdominal wall hernia minimal umbilical fat protrusion unchanged. Impression  No acute finding. Left colon diverticula without diverticulitis. · Discussion: Acute pulmonary edema. Feels better with diuresis. Would continue IV Lasix. As she improves would switch to oral Lasix 60 mg. She was not having any response to 40 mg orally at home. Await results of echocardiogram.  · She has obstructive sleep apnea she thinks her setting is 13. She will try to have her family bring her machine in. In the meantime we will use hospital CPAP at 13 cm of pressure  · 6/23 she could not tolerate her hospital CPAP her family is to bring her machine in today. Would continue diuresis and will request direct I&O continue to follow renal function if creatinine rises further tomorrow will decrease to single daily dosing Lasix  · 6/24 creatinine stable with Lasix. Received 80 mg orally and so far has not had diuresis initiated. We'll give a dose of Zaroxolyn tomorrow before Lasix and see the effect. Will check chest x-ray today to reevaluate her pulmonary edema. Will check overnight oximetry tonight with her home CPAP and supplemental oxygen  · 6/25 creatinine up slightly good diuresis yesterday with 80 mg of Lasix will repeat renal function tomorrow. Will discontinue Zaroxolyn. Appears to have acute gout right great toe.   We will give 3 doses colchicine today and tomorrow drop to single daily dosing    Magdaleno Gonzalez MD

## 2021-06-25 NOTE — PROGRESS NOTES
Patient refused to do her overnight oximetry and wear her home cpap during the night. Patient said the she is experiencing a bad headache, and she will not wear it.

## 2021-06-26 LAB
ALBUMIN SERPL-MCNC: 2.8 G/DL (ref 3.5–5)
ANION GAP SERPL CALC-SCNC: 8 MMOL/L (ref 5–15)
BUN SERPL-MCNC: 29 MG/DL (ref 6–20)
BUN/CREAT SERPL: 18 (ref 12–20)
CA-I BLD-MCNC: 8.7 MG/DL (ref 8.5–10.1)
CHLORIDE SERPL-SCNC: 96 MMOL/L (ref 97–108)
CO2 SERPL-SCNC: 34 MMOL/L (ref 21–32)
CREAT SERPL-MCNC: 1.61 MG/DL (ref 0.55–1.02)
ERYTHROCYTE [DISTWIDTH] IN BLOOD BY AUTOMATED COUNT: 15.8 % (ref 11.5–14.5)
GLUCOSE BLD STRIP.AUTO-MCNC: 111 MG/DL (ref 65–117)
GLUCOSE BLD STRIP.AUTO-MCNC: 126 MG/DL (ref 65–117)
GLUCOSE BLD STRIP.AUTO-MCNC: 161 MG/DL (ref 65–117)
GLUCOSE BLD STRIP.AUTO-MCNC: 163 MG/DL (ref 65–117)
GLUCOSE SERPL-MCNC: 159 MG/DL (ref 65–100)
HCT VFR BLD AUTO: 49.3 % (ref 35–47)
HGB BLD-MCNC: 14.5 G/DL (ref 11.5–16)
MCH RBC QN AUTO: 26.3 PG (ref 26–34)
MCHC RBC AUTO-ENTMCNC: 29.4 G/DL (ref 30–36.5)
MCV RBC AUTO: 89.3 FL (ref 80–99)
NRBC # BLD: 0 K/UL (ref 0–0.01)
NRBC BLD-RTO: 0 PER 100 WBC
PERFORMED BY, TECHID: ABNORMAL
PERFORMED BY, TECHID: NORMAL
PHOSPHATE SERPL-MCNC: 3.6 MG/DL (ref 2.6–4.7)
PLATELET # BLD AUTO: 228 K/UL (ref 150–400)
PMV BLD AUTO: 13.2 FL (ref 8.9–12.9)
POTASSIUM SERPL-SCNC: 3.6 MMOL/L (ref 3.5–5.1)
RBC # BLD AUTO: 5.52 M/UL (ref 3.8–5.2)
SODIUM SERPL-SCNC: 138 MMOL/L (ref 136–145)
WBC # BLD AUTO: 8.8 K/UL (ref 3.6–11)

## 2021-06-26 PROCEDURE — 74011250637 HC RX REV CODE- 250/637: Performed by: INTERNAL MEDICINE

## 2021-06-26 PROCEDURE — 36415 COLL VENOUS BLD VENIPUNCTURE: CPT

## 2021-06-26 PROCEDURE — 94640 AIRWAY INHALATION TREATMENT: CPT

## 2021-06-26 PROCEDURE — 85027 COMPLETE CBC AUTOMATED: CPT

## 2021-06-26 PROCEDURE — 74011250637 HC RX REV CODE- 250/637: Performed by: NURSE PRACTITIONER

## 2021-06-26 PROCEDURE — 82962 GLUCOSE BLOOD TEST: CPT

## 2021-06-26 PROCEDURE — 80069 RENAL FUNCTION PANEL: CPT

## 2021-06-26 PROCEDURE — 77010033678 HC OXYGEN DAILY

## 2021-06-26 PROCEDURE — 65270000029 HC RM PRIVATE

## 2021-06-26 PROCEDURE — 74011636637 HC RX REV CODE- 636/637: Performed by: FAMILY MEDICINE

## 2021-06-26 PROCEDURE — 94760 N-INVAS EAR/PLS OXIMETRY 1: CPT

## 2021-06-26 PROCEDURE — 74011250637 HC RX REV CODE- 250/637: Performed by: FAMILY MEDICINE

## 2021-06-26 RX ORDER — PSEUDOEPHED/ACETAMINOPHEN/CPM 30-500-2MG
2 TABLET ORAL
Status: DISCONTINUED | OUTPATIENT
Start: 2021-06-26 | End: 2021-06-29 | Stop reason: HOSPADM

## 2021-06-26 RX ORDER — COLCHICINE 0.6 MG/1
0.6 TABLET ORAL DAILY
Status: DISCONTINUED | OUTPATIENT
Start: 2021-06-27 | End: 2021-06-29 | Stop reason: HOSPADM

## 2021-06-26 RX ADMIN — AMIODARONE HYDROCHLORIDE 200 MG: 200 TABLET ORAL at 07:54

## 2021-06-26 RX ADMIN — FAMOTIDINE 10 MG: 10 TABLET ORAL at 22:32

## 2021-06-26 RX ADMIN — CLOPIDOGREL BISULFATE 75 MG: 75 TABLET ORAL at 07:54

## 2021-06-26 RX ADMIN — ALBUTEROL SULFATE 2 PUFF: 108 AEROSOL, METERED RESPIRATORY (INHALATION) at 07:28

## 2021-06-26 RX ADMIN — INSULIN LISPRO 3 UNITS: 100 INJECTION, SOLUTION INTRAVENOUS; SUBCUTANEOUS at 11:55

## 2021-06-26 RX ADMIN — POTASSIUM CHLORIDE 10 MEQ: 750 TABLET, FILM COATED, EXTENDED RELEASE ORAL at 22:32

## 2021-06-26 RX ADMIN — FUROSEMIDE 80 MG: 40 TABLET ORAL at 07:54

## 2021-06-26 RX ADMIN — APIXABAN 5 MG: 5 TABLET, FILM COATED ORAL at 22:32

## 2021-06-26 RX ADMIN — COLCHICINE 0.6 MG: 0.6 TABLET, FILM COATED ORAL at 02:21

## 2021-06-26 RX ADMIN — ACETAMINOPHEN 650 MG: 325 TABLET ORAL at 02:21

## 2021-06-26 RX ADMIN — BUDESONIDE AND FORMOTEROL FUMARATE DIHYDRATE 2 PUFF: 160; 4.5 AEROSOL RESPIRATORY (INHALATION) at 20:01

## 2021-06-26 RX ADMIN — ALBUTEROL SULFATE 2 PUFF: 108 AEROSOL, METERED RESPIRATORY (INHALATION) at 20:01

## 2021-06-26 RX ADMIN — QUETIAPINE FUMARATE 25 MG: 25 TABLET ORAL at 22:33

## 2021-06-26 RX ADMIN — METOLAZONE 5 MG: 5 TABLET ORAL at 05:39

## 2021-06-26 RX ADMIN — ALUMINUM HYDROXIDE, MAGNESIUM HYDROXIDE, AND SIMETHICONE 30 ML: 200; 200; 20 SUSPENSION ORAL at 18:14

## 2021-06-26 RX ADMIN — COLCHICINE 0.6 MG: 0.6 TABLET, FILM COATED ORAL at 18:16

## 2021-06-26 RX ADMIN — LOSARTAN POTASSIUM 100 MG: 50 TABLET, FILM COATED ORAL at 07:54

## 2021-06-26 RX ADMIN — APIXABAN 5 MG: 5 TABLET, FILM COATED ORAL at 07:54

## 2021-06-26 RX ADMIN — THEOPHYLLINE ANHYDROUS 300 MG: 300 CAPSULE, EXTENDED RELEASE ORAL at 07:54

## 2021-06-26 RX ADMIN — OXYBUTYNIN CHLORIDE 5 MG: 5 TABLET ORAL at 16:48

## 2021-06-26 RX ADMIN — POTASSIUM CHLORIDE 10 MEQ: 750 TABLET, FILM COATED, EXTENDED RELEASE ORAL at 07:54

## 2021-06-26 RX ADMIN — BUDESONIDE AND FORMOTEROL FUMARATE DIHYDRATE 2 PUFF: 160; 4.5 AEROSOL RESPIRATORY (INHALATION) at 07:28

## 2021-06-26 RX ADMIN — OXYBUTYNIN CHLORIDE 5 MG: 5 TABLET ORAL at 07:54

## 2021-06-26 RX ADMIN — LOPERAMIDE HYDROCHLORIDE 2 MG: 1 SOLUTION ORAL at 22:32

## 2021-06-26 RX ADMIN — FAMOTIDINE 10 MG: 10 TABLET ORAL at 07:54

## 2021-06-26 RX ADMIN — COLCHICINE 0.6 MG: 0.6 TABLET, FILM COATED ORAL at 11:55

## 2021-06-26 RX ADMIN — INSULIN GLARGINE 10 UNITS: 100 INJECTION, SOLUTION SUBCUTANEOUS at 22:47

## 2021-06-26 RX ADMIN — ALBUTEROL SULFATE 2 PUFF: 108 AEROSOL, METERED RESPIRATORY (INHALATION) at 13:15

## 2021-06-26 RX ADMIN — OXYBUTYNIN CHLORIDE 5 MG: 5 TABLET ORAL at 22:33

## 2021-06-26 NOTE — PROGRESS NOTES
Problem: Falls - Risk of  Goal: *Absence of Falls  Description: Document Sary Cueva Fall Risk and appropriate interventions in the flowsheet.   Outcome: Progressing Towards Goal  Note: Fall Risk Interventions:  Mobility Interventions: Bed/chair exit alarm         Medication Interventions: Bed/chair exit alarm    Elimination Interventions: Call light in reach    History of Falls Interventions: Bed/chair exit alarm

## 2021-06-26 NOTE — PROGRESS NOTES
Problem: Falls - Risk of  Goal: *Absence of Falls  Description: Document Jose Michelle Fall Risk and appropriate interventions in the flowsheet. Outcome: Progressing Towards Goal  Note: Fall Risk Interventions:  Mobility Interventions: Bed/chair exit alarm, Patient to call before getting OOB, PT Consult for mobility concerns, Utilize walker, cane, or other assistive device         Medication Interventions: Bed/chair exit alarm, Patient to call before getting OOB, Teach patient to arise slowly    Elimination Interventions: Call light in reach, Bed/chair exit alarm    History of Falls Interventions: Bed/chair exit alarm         Problem: Patient Education: Go to Patient Education Activity  Goal: Patient/Family Education  Outcome: Progressing Towards Goal     Problem: Pressure Injury - Risk of  Goal: *Prevention of pressure injury  Description: Document Martin Scale and appropriate interventions in the flowsheet.   Outcome: Progressing Towards Goal  Note: Pressure Injury Interventions:  Sensory Interventions: Assess changes in LOC, Assess need for specialty bed, Keep linens dry and wrinkle-free, Discuss PT/OT consult with provider, Minimize linen layers    Moisture Interventions: Absorbent underpads, Internal/External urinary devices, Minimize layers    Activity Interventions: PT/OT evaluation, Increase time out of bed    Mobility Interventions: Assess need for specialty bed, HOB 30 degrees or less, Float heels, PT/OT evaluation    Nutrition Interventions: Document food/fluid/supplement intake    Friction and Shear Interventions: HOB 30 degrees or less, Minimize layers                Problem: Patient Education: Go to Patient Education Activity  Goal: Patient/Family Education  Outcome: Progressing Towards Goal

## 2021-06-26 NOTE — PROGRESS NOTES
Progress Note    Patient: Skye Dempsey MRN: [id number]  SSN: [id number]    YOB: 1950  Age: 72 y.o. Sex: female      Admit Date: 6/21/2021    LOS: 5 days     Subjective:     72years old patient admitted for acute pulmonary edema, COPD with acute exacerbation, atrial flutter, coronary artery disease 2 hypertension, obstructive sleep apnea    Objective:     Vitals:    06/26/21 0729 06/26/21 0744 06/26/21 1137 06/26/21 1501   BP:  (!) 148/69 133/75 132/85   Pulse:  77 90 85   Resp:  20 20 20   Temp:  97.7 °F (36.5 °C) 97.8 °F (36.6 °C) 98.1 °F (36.7 °C)   SpO2: 95% 95% 98% 94%   Weight:       Height:            Intake and Output:  Current Shift: 06/26 0701 - 06/26 1900  In: -   Out: 600 [Urine:600]  Last three shifts: 06/24 1901 - 06/26 0700  In: -   Out: 2650 [Urine:2650]    Physical Exam:   General:  Alert, cooperative, no distress, appears stated age. Eyes:  Conjunctivae/corneas clear. PERRL, EOMs intact. Fundi benign   Ears:  Normal TMs and external ear canals both ears. Nose: Nares normal. Septum midline. Mucosa normal. No drainage or sinus tenderness. Mouth/Throat: Lips, mucosa, and tongue normal. Teeth and gums normal.   Neck: Supple, symmetrical, trachea midline, no adenopathy, thyroid: no enlargment/tenderness/nodules, no carotid bruit and no JVD. Back:   Symmetric, no curvature. ROM normal. No CVA tenderness. Lungs:   Diminishedr to auscultation bilaterally. Heart:  Regular rate and rhythm, S1, S2 normal, no murmur, click, rub or gallop. Abdomen:   Soft, non-tender. Bowel sounds normal. No masses,  No organomegaly. Extremities: Extremities normal, atraumatic, no cyanosis or edema. Pulses: 2+ and symmetric all extremities. Skin: Skin color, texture, turgor normal. No rashes or lesions   Lymph nodes: Cervical, supraclavicular, and axillary nodes normal.   Neurologic: CNII-XII intact. Normal strength, sensation and reflexes throughout. Lab/Data Review:   All lab results for the last 24 hours reviewed.      Recent Results (from the past 24 hour(s))   GLUCOSE, POC    Collection Time: 06/25/21  5:21 PM   Result Value Ref Range    Glucose (POC) 133 (H) 65 - 117 mg/dL    Performed by Nini Padron    GLUCOSE, POC    Collection Time: 06/25/21  8:47 PM   Result Value Ref Range    Glucose (POC) 145 (H) 65 - 117 mg/dL    Performed by Sade Romero    GLUCOSE, POC    Collection Time: 06/26/21  7:40 AM   Result Value Ref Range    Glucose (POC) 111 65 - 117 mg/dL    Performed by Andrew Angel    RENAL FUNCTION PANEL    Collection Time: 06/26/21  9:27 AM   Result Value Ref Range    Sodium 138 136 - 145 mmol/L    Potassium 3.6 3.5 - 5.1 mmol/L    Chloride 96 (L) 97 - 108 mmol/L    CO2 34 (H) 21 - 32 mmol/L    Anion gap 8 5 - 15 mmol/L    Glucose 159 (H) 65 - 100 mg/dL    BUN 29 (H) 6 - 20 mg/dL    Creatinine 1.61 (H) 0.55 - 1.02 mg/dL    BUN/Creatinine ratio 18 12 - 20      GFR est AA 38 (L) >60 ml/min/1.73m2    GFR est non-AA 32 (L) >60 ml/min/1.73m2    Calcium 8.7 8.5 - 10.1 mg/dL    Phosphorus 3.6 2.6 - 4.7 mg/dL    Albumin 2.8 (L) 3.5 - 5.0 g/dL   CBC W/O DIFF    Collection Time: 06/26/21  9:27 AM   Result Value Ref Range    WBC 8.8 3.6 - 11.0 K/uL    RBC 5.52 (H) 3.80 - 5.20 M/uL    HGB 14.5 11.5 - 16.0 g/dL    HCT 49.3 (H) 35.0 - 47.0 %    MCV 89.3 80.0 - 99.0 FL    MCH 26.3 26.0 - 34.0 PG    MCHC 29.4 (L) 30.0 - 36.5 g/dL    RDW 15.8 (H) 11.5 - 14.5 %    PLATELET 437 364 - 066 K/uL    MPV 13.2 (H) 8.9 - 12.9 FL    NRBC 0.0 0.0  WBC    ABSOLUTE NRBC 0.00 0.00 - 0.01 K/uL   GLUCOSE, POC    Collection Time: 06/26/21 11:39 AM   Result Value Ref Range    Glucose (POC) 161 (H) 65 - 117 mg/dL    Performed by Sinda Kaela          Assessment:     Active Problems:    CHF (congestive heart failure) ([facility]) (2/22/2021)      CHF exacerbation ([facility]) (6/21/2021)      Acute pulmonary edema  COPD exacerbation  Possible sleep apnea  Hypertension  Plan:   Continue treatment    Signed By: Genella Severin, MD     June 26, 2021

## 2021-06-26 NOTE — PROGRESS NOTES
Consult  Pulmonary, Critical Care    Name: Oneida Fernandez MRN: [id number]   : 1950 Hospital: [address] [place] [address]   Date: 2021  Admission date: 2021 Hospital Day: 6       Subjective/Interval History:   Seen in the emergency room. Has had over 3 L of diuresis so far and feels much better. History is of mild cardiomyopathy normally on Lasix 20 mg. About 2 weeks ago she noted worsening edema and that her 20 mg of Lasix was not initiating any diuresis. She upped it to 40 mg a week ago and still has not been having any diuresis has had worsening edema of her legs shortness of breath cough productive of clear sputum. She thinks she may have had an episode of fever but is not sure. Has not seen any purulent sputum   did not tolerate our CPAP last night. She states her family will bring her machine in today to use. Feels better edema is improved urine output not recorded yesterday states she was still passing a large amount of urine   wore her CPAP last night without difficulty. Will check overnight oximetry to make sure saturation is maintained tonight. She received oral Lasix 80 mg at nine and as of 10:30 AM has not had any significant diuresis. We'll see how she does over the next three or 4 hours. Tomorrow we'll give Zaroxolyn preceding Lasix   headache all night long so did not wear CPAP last night. Also states she has developed severe pain of her right great toe. She does give a history of having gout in the past   no headache today states her right great toe still hurts but is better than yesterday    Hospital Problems  Date Reviewed: 3/12/2021        Codes Class Noted POA    CHF exacerbation ([facility] ICD-10-CM: I50.9  ICD-9-CM: 428.0  2021 Unknown        CHF (congestive heart failure) (HCC) ICD-10-CM: I50.9  ICD-9-CM: 428.0  2021 Unknown              IMPRESSION:   1. Acute pulmonary edema improved radiographically   2.  Chronic hypoxic respiratory failure remains on her baseline oxygen  3. Acute kidney injury creatinine up slightly we will hold Lasix tomorrow until labs available  4. Cardiomyopathy diastolic dysfunction  5. COPD no wheezing  6. Obstructive sleep apnea did use her home CPAP last night   7. Headache resolved  8. Acute gout improved with institution of colchicine  9. Diabetes  10. Coronary artery disease with bypass grafting and stents in the past  Body mass index is 39.58 kg/m². 11.       RECOMMENDATIONS/PLAN:   1. Good diuresis yesterday with Lasix but creatinine rising has already received a dose today we will hold it tomorrow pending renal function  2. Continue nasal oxygen as she uses at home  3. Continue her home CPAP with oxygen bleed in  4. Continue Symbicort to substitute for her Dulera inhaler  5. We will use albuterol inhaler. 6. Continue colchicine for her acute gout o once a day   7. She has a nebulizer machine at home she thinks it is not working correctly          [x] High complexity decision making was performed  [x] See my orders for details      Subjective/Initial History:     I was asked by Rigoberto Zheng MD to see Dane Rodriguez  a 72 y.o.    female in consultation for a chief complaint of shortness of breath and edema        Allergies   Allergen Reactions    Penicillins Other (comments)    Tylox [Oxycodone-Acetaminophen] Hives        MAR reviewed and pertinent medications noted or modified as needed     Current Facility-Administered Medications   Medication    [START ON 6/27/2021] colchicine tablet 0.6 mg    methylPREDNISolone (PF) (SOLU-MEDROL) injection 40 mg    acetaminophen (TYLENOL) tablet 650 mg    colchicine tablet 0.6 mg    famotidine (PEPCID) tablet 10 mg    [Held by provider] furosemide (LASIX) tablet 80 mg    metOLazone (ZAROXOLYN) tablet 5 mg    alum-mag hydroxide-simeth (MYLANTA) oral suspension 30 mL    albuterol (PROVENTIL HFA, VENTOLIN HFA, PROAIR HFA) inhaler 2 Puff  amiodarone (CORDARONE) tablet 200 mg    apixaban (ELIQUIS) tablet 5 mg    clopidogreL (PLAVIX) tablet 75 mg    insulin glargine (LANTUS) injection 10 Units    losartan (COZAAR) tablet 100 mg    nitroglycerin (NITROSTAT) tablet 0.4 mg    oxybutynin (DITROPAN) tablet 5 mg    albuterol-ipratropium (DUO-NEB) 2.5 MG-0.5 MG/3 ML    budesonide-formoteroL (SYMBICORT) 160-4.5 mcg/actuation HFA inhaler 2 Puff    potassium chloride SR (KLOR-CON 10) tablet 10 mEq    QUEtiapine (SEROquel) tablet 25 mg    theophylline ER (BAM-24) capsule 300 mg    insulin lispro (HUMALOG) injection    glucose chewable tablet 16 g    dextrose (D50W) injection syrg 12.5-25 g    glucagon (GLUCAGEN) injection 1 mg    polyethylene glycol (MIRALAX) packet 17 g    ondansetron (ZOFRAN ODT) tablet 4 mg    Or    ondansetron (ZOFRAN) injection 4 mg      Patient PCP: Wojciech Mckeon MD  PMH:  has a past medical history of Chronic obstructive pulmonary disease ([address] Congestive heart disease ([address] Coronary artery disease, Diabetes ([address] Hyperlipidemia, Hypertension, Myocardial infarct ([address] and IFTIKHAR (obstructive sleep apnea). PSH:   has a past surgical history that includes hx cholecystectomy; hx hysterectomy; hx hernia repair; hx coronary artery bypass graft; and hx breast biopsy. FHX: family history includes Cancer in her maternal grandmother; Heart Disease in her mother; Kidney Disease in her mother. SHX:  reports that she has quit smoking. She has never used smokeless tobacco. She reports previous alcohol use. She reports that she does not use drugs. Systemic review:  General she thinks her weight stable but has had 2 weeks of worsening shortness of breath and edema with no response from her normal dose of Lasix. She thinks she felt hot for a few days but did not check her temperature.   Eyes no double vision or momentary blindness  ENT no facial pain over the sinuses  Musculoskeletal no swollen tender joints  Neurologic no seizures or syncope  Endocrinologic has diabetes denies polyuria polydipsia  Gastrointestinal no nausea vomiting acid indigestion  Genitourinary no pain or discomfort on urination no bleeding or drainage. She noticed very poor to no response from her normal doses of Lasix and did increase from 20 to 40 mg daily again with no diuresis initiated  Cardiovascular no chest pain or diaphoresis she has had worsening ankle edema nocturia once or twice per night which is normal for her. Respiratory has a history of COPD stop smoking 2 years ago but had over 45-year history of smoking between half and 1-1/2 packs/day. Has minimal cough states she uses her Dulera inhaler twice a day and her albuterol inhaler twice a day uses nebulizer on a as needed basis. Has had cough recently but chest clear mucus no purulence. Objective:     Vital Signs: Telemetry:    normal sinus rhythm Intake/Output:   Visit Vitals  /75 (BP Patient Position: Sitting; At rest)   Pulse 90   Temp 97.8 °F (36.6 °C)   Resp 20   Ht 5' 4\" (1.626 m)   Wt 104.6 kg (230 lb 9.6 oz)   SpO2 98%   Breastfeeding No   BMI 39.58 kg/m²       Temp (24hrs), Av °F (36.7 °C), Min:97.7 °F (36.5 °C), Max:98.3 °F (36.8 °C)        O2 Device: Nasal cannula O2 Flow Rate (L/min): 2 l/min       Wt Readings from Last 4 Encounters:   21 104.6 kg (230 lb 9.6 oz)   21 108 kg (238 lb)   21 107.5 kg (237 lb)   21 107.5 kg (237 lb)          Intake/Output Summary (Last 24 hours) at 2021 1458  Last data filed at 2021  Gross per 24 hour   Intake --   Output 2150 ml   Net -2150 ml       Last shift:      No intake/output data recorded.   Last 3 shifts:  1901 -  0700  In: -   Out: 2650 [Urine:2650]       Physical Exam:   General:  female; lying in bed in no distress  HEENT: NCAT, normal oral mucosa  Eyes: anicteric; conjunctiva clear extraocular movements intact  Neck: no nodes, neck veins visible but not grossly engorged, no accessory MM use. Chest: no deformity,   Cardiac: Regular rate and rhythm distant sounds no definite murmur she does have significant edema extending up her shins but skin shows furrowing indicating good diuresis  Lungs: Prolongation of exhalation but no wheezing the lung sounds clear anterior laterally and upper lungs posterior with slightly diminished in the bases with rales in the bases  Abd: Soft nontender normal bowel sounds  Ext: Leg edema extending up the shins  no joint swelling; No clubbing right great toe  but improved  :clear urine  Neuro: Awake alert oriented speech is clear moves all 4 extremities  Psych- no agitation, oriented to person;   Skin: warm, dry, no cyanosis;   Pulses: Brachial and radial pulses intact  Capillary: Normal capillary refill    Labs:    Recent Labs     06/26/21  0927 06/25/21  0724   WBC 8.8 8.8   HGB 14.5 14.0    218     Recent Labs     06/26/21  0927 06/25/21  0724 06/24/21  0705    138  138 140   K 3.6 3.9  3.9 3.8   CL 96* 99  99 102   CO2 34* 33*  33* 34*   * 139*  140* 117*   BUN 29* 24*  25* 26*   CREA 1.61* 1.44*  1.42* 1.34*   CA 8.7 8.4*  8.3* 8.4*   PHOS 3.6 4.1 3.4   ALB 2.8* 2.7*  2.7* 2.7*   ALT  --  14  --    6/25 2 L nasal oxygen with saturation 96%  3 L nasal oxygen with oxygen saturation 97%  Uric acid 6.9  Recent Labs     06/23/21  2145   TROIQ 0.06*     BNP 2271  Lab Results   Component Value Date/Time    Culture result: Klebsiella pneumoniae (A) 03/10/2021 12:06 AM     Imaging:    CXR Results  (Last 48 hours)    None        Results from Hospital Encounter encounter on 06/21/21    XR CHEST PA LAT    Narrative  Examination: Frontal and lateral radiographs of the chest    Clinical information: Pulmonary edema    Comparison: 6/21/2021    Findings:    Post median sternotomy. Stable enlargement of the cardiac contours.  The superior  and middle mediastinal contours are normal. There is calcific atherosclerosis of  the thoracic aorta. There is decreased pulmonary vascular congestion and  decreased pulmonary interstitial thickening. No focal pulmonary consolidation. No pleural effusion or pneumothorax. No acute osseous abnormality. Impression  Stable cardiomegaly. Decreased pulmonary vascular congestion and pulmonary  interstitial edema compared to the prior study from 6/21/2021. XR CHEST PORT    Narrative  Portable upright radiograph of the chest 3:24 PM. No prior study. INDICATION: Shortness of breath. Patient is status post median sternotomy. Heart size is enlarged with an  atherosclerotic aorta. There is vascular congestion. There is fluid in the minor  fissure. Mild increased interstitial markings. No definite infiltrate. No  pneumothorax. Impression  Cardiomegaly with mild vascular congestion and increased  interstitial markings suggesting volume overload/CHF. Results from [place] encounter on 05/27/21    XR ABD FLAT/ ERECT    Narrative  This study is a 2 view radiographic evaluation of the abdomen dated 5/27/2021. HISTORY: Abdominal pain. FINDINGS: The patient status post a previous median sternotomy. There is  apparent enlargement of the cardiac silhouette. There is significant fecal loading within the cecum and the ascending colon. There is mild gaseous distention of the small bowel. This examination is  negative for organomegaly, intra-abdominal fluid, or pathological calcifications  within the upper abdominal region. There are vascular calcifications of the  distal infrarenal abdominal aorta and the common iliac arteries. There is a  large protuberant anterior abdominal wall which is somewhat limiting the  diagnostic sensitivity of this examination. There are changes of diffuse  idiopathic skeletal hyperostosis with bony whiskering along the pelvis. There is apparent endovascular stent demonstrated within the right femoral  artery.  There also may be endovascular stents within the left common and  external iliac arteries. Impression  1. There is significant fecal retention within the right side of the colon  consistent with recent constipation. 2.  There is peripheral vascular disease with calcified plaque formation of the  distal abdominal aorta and involving the iliac arteries. There is an apparent  stent within the right common femoral artery and apparent stents within the left  common and external iliac arteries. Results from [place] encounter on 06/07/21    CT ABD PELV W CONT    Narrative  Indication: Possible hernia. Dose reduction: All CT scans done at this facility are performed using dose  reduction optimization techniques as appropriate to a performed exam including  the following: Automated exposure control, adjustments of the mA and/or kV  according to patient size, or use of iterative reconstruction technique. CT abdomen and pelvis, 100 cc [id number] 6/7/2021. Comparison 5/27/2021. Cardiomegaly. Numerous sigmoid colon and descending colon diverticula without diverticulitis. Normal liver. Nonvisualized gallbladder. No apparent bile duct dilatation. Normal spleen, pancreas, adrenal glands, right kidney. Small left renal cyst. No  hydronephrosis. Normal appearance of urinary bladder. Hysterectomy. Atheromatous calcification of abdominal aorta without aneurysm. Right renal  artery stent. Appendix identified. No pericecal inflammation. Normal small bowel. No ascites or free intraperitoneal air. Mild lumbar spondylosis. Mild degenerative changes of hips. No abdominal wall hernia minimal umbilical fat protrusion unchanged. Impression  No acute finding. Left colon diverticula without diverticulitis. · Discussion: Acute pulmonary edema. Feels better with diuresis. Would continue IV Lasix. As she improves would switch to oral Lasix 60 mg. She was not having any response to 40 mg orally at home.   Await results of echocardiogram.  · She has obstructive sleep apnea she thinks her setting is 13. She will try to have her family bring her machine in. In the meantime we will use hospital CPAP at 13 cm of pressure  · 6/23 she could not tolerate her hospital CPAP her family is to bring her machine in today. Would continue diuresis and will request direct I&O continue to follow renal function if creatinine rises further tomorrow will decrease to single daily dosing Lasix  · 6/24 creatinine stable with Lasix. Received 80 mg orally and so far has not had diuresis initiated. We'll give a dose of Zaroxolyn tomorrow before Lasix and see the effect. Will check chest x-ray today to reevaluate her pulmonary edema. Will check overnight oximetry tonight with her home CPAP and supplemental oxygen  · 6/25 creatinine up slightly good diuresis yesterday with 80 mg of Lasix will repeat renal function tomorrow. Will discontinue Zaroxolyn. Appears to have acute gout right great toe. We will give 3 doses colchicine today and tomorrow drop to single daily dosing  · 6/26 creatinine continues to rise we will hold tomorrow's dose of Lasix until renal function available. Appears to have gout of the right toe has improved with colchicine yesterday will decrease it to single daily dosing.   Would like to avoid steroids in her    Maxcine Castleman, MD

## 2021-06-27 LAB
ALBUMIN SERPL-MCNC: 2.8 G/DL (ref 3.5–5)
ANION GAP SERPL CALC-SCNC: 7 MMOL/L (ref 5–15)
BUN SERPL-MCNC: 38 MG/DL (ref 6–20)
BUN/CREAT SERPL: 22 (ref 12–20)
CA-I BLD-MCNC: 8.4 MG/DL (ref 8.5–10.1)
CHLORIDE SERPL-SCNC: 98 MMOL/L (ref 97–108)
CO2 SERPL-SCNC: 32 MMOL/L (ref 21–32)
CREAT SERPL-MCNC: 1.75 MG/DL (ref 0.55–1.02)
GLUCOSE BLD STRIP.AUTO-MCNC: 122 MG/DL (ref 65–117)
GLUCOSE BLD STRIP.AUTO-MCNC: 135 MG/DL (ref 65–117)
GLUCOSE BLD STRIP.AUTO-MCNC: 136 MG/DL (ref 65–117)
GLUCOSE BLD STRIP.AUTO-MCNC: 140 MG/DL (ref 65–117)
GLUCOSE SERPL-MCNC: 123 MG/DL (ref 65–100)
PERFORMED BY, TECHID: ABNORMAL
PHOSPHATE SERPL-MCNC: 3.3 MG/DL (ref 2.6–4.7)
POTASSIUM SERPL-SCNC: 3.7 MMOL/L (ref 3.5–5.1)
SODIUM SERPL-SCNC: 137 MMOL/L (ref 136–145)

## 2021-06-27 PROCEDURE — 74011636637 HC RX REV CODE- 636/637: Performed by: FAMILY MEDICINE

## 2021-06-27 PROCEDURE — 74011250637 HC RX REV CODE- 250/637: Performed by: INTERNAL MEDICINE

## 2021-06-27 PROCEDURE — 82962 GLUCOSE BLOOD TEST: CPT

## 2021-06-27 PROCEDURE — 74011250637 HC RX REV CODE- 250/637: Performed by: FAMILY MEDICINE

## 2021-06-27 PROCEDURE — 36415 COLL VENOUS BLD VENIPUNCTURE: CPT

## 2021-06-27 PROCEDURE — 77010033678 HC OXYGEN DAILY

## 2021-06-27 PROCEDURE — 80069 RENAL FUNCTION PANEL: CPT

## 2021-06-27 PROCEDURE — 94760 N-INVAS EAR/PLS OXIMETRY 1: CPT

## 2021-06-27 PROCEDURE — 65270000029 HC RM PRIVATE

## 2021-06-27 PROCEDURE — 74011250637 HC RX REV CODE- 250/637: Performed by: NURSE PRACTITIONER

## 2021-06-27 PROCEDURE — 94640 AIRWAY INHALATION TREATMENT: CPT

## 2021-06-27 RX ADMIN — THEOPHYLLINE ANHYDROUS 300 MG: 300 CAPSULE, EXTENDED RELEASE ORAL at 09:27

## 2021-06-27 RX ADMIN — OXYBUTYNIN CHLORIDE 5 MG: 5 TABLET ORAL at 21:30

## 2021-06-27 RX ADMIN — APIXABAN 5 MG: 5 TABLET, FILM COATED ORAL at 21:30

## 2021-06-27 RX ADMIN — ALBUTEROL SULFATE 2 PUFF: 108 AEROSOL, METERED RESPIRATORY (INHALATION) at 20:03

## 2021-06-27 RX ADMIN — OXYBUTYNIN CHLORIDE 5 MG: 5 TABLET ORAL at 09:18

## 2021-06-27 RX ADMIN — LOPERAMIDE HYDROCHLORIDE 2 MG: 1 SOLUTION ORAL at 22:06

## 2021-06-27 RX ADMIN — POTASSIUM CHLORIDE 10 MEQ: 750 TABLET, FILM COATED, EXTENDED RELEASE ORAL at 09:18

## 2021-06-27 RX ADMIN — OXYBUTYNIN CHLORIDE 5 MG: 5 TABLET ORAL at 15:59

## 2021-06-27 RX ADMIN — INSULIN GLARGINE 10 UNITS: 100 INJECTION, SOLUTION SUBCUTANEOUS at 22:21

## 2021-06-27 RX ADMIN — BUDESONIDE AND FORMOTEROL FUMARATE DIHYDRATE 2 PUFF: 160; 4.5 AEROSOL RESPIRATORY (INHALATION) at 07:56

## 2021-06-27 RX ADMIN — AMIODARONE HYDROCHLORIDE 200 MG: 200 TABLET ORAL at 09:18

## 2021-06-27 RX ADMIN — LOSARTAN POTASSIUM 100 MG: 50 TABLET, FILM COATED ORAL at 09:18

## 2021-06-27 RX ADMIN — ALBUTEROL SULFATE 2 PUFF: 108 AEROSOL, METERED RESPIRATORY (INHALATION) at 13:07

## 2021-06-27 RX ADMIN — FAMOTIDINE 10 MG: 10 TABLET ORAL at 21:30

## 2021-06-27 RX ADMIN — COLCHICINE 0.6 MG: 0.6 TABLET, FILM COATED ORAL at 09:18

## 2021-06-27 RX ADMIN — ALBUTEROL SULFATE 2 PUFF: 108 AEROSOL, METERED RESPIRATORY (INHALATION) at 07:56

## 2021-06-27 RX ADMIN — POTASSIUM CHLORIDE 10 MEQ: 750 TABLET, FILM COATED, EXTENDED RELEASE ORAL at 21:30

## 2021-06-27 RX ADMIN — BUDESONIDE AND FORMOTEROL FUMARATE DIHYDRATE 2 PUFF: 160; 4.5 AEROSOL RESPIRATORY (INHALATION) at 20:04

## 2021-06-27 RX ADMIN — QUETIAPINE FUMARATE 25 MG: 25 TABLET ORAL at 21:30

## 2021-06-27 RX ADMIN — APIXABAN 5 MG: 5 TABLET, FILM COATED ORAL at 09:18

## 2021-06-27 RX ADMIN — FAMOTIDINE 10 MG: 10 TABLET ORAL at 09:18

## 2021-06-27 RX ADMIN — CLOPIDOGREL BISULFATE 75 MG: 75 TABLET ORAL at 09:18

## 2021-06-27 RX ADMIN — COLCHICINE 0.6 MG: 0.6 TABLET, FILM COATED ORAL at 02:51

## 2021-06-27 RX ADMIN — ONDANSETRON 4 MG: 4 TABLET, ORALLY DISINTEGRATING ORAL at 15:59

## 2021-06-27 RX ADMIN — ALUMINUM HYDROXIDE, MAGNESIUM HYDROXIDE, AND SIMETHICONE 30 ML: 200; 200; 20 SUSPENSION ORAL at 02:16

## 2021-06-27 NOTE — PROGRESS NOTES
Consult  Pulmonary, Critical Care    Name: Clay Vaughan MRN: [id number]   : 1950 Hospital: [address] [place] [address]   Date: 2021  Admission date: 2021 Hospital Day: 7       Subjective/Interval History:   Seen in the emergency room. Has had over 3 L of diuresis so far and feels much better. History is of mild cardiomyopathy normally on Lasix 20 mg. About 2 weeks ago she noted worsening edema and that her 20 mg of Lasix was not initiating any diuresis. She upped it to 40 mg a week ago and still has not been having any diuresis has had worsening edema of her legs shortness of breath cough productive of clear sputum. She thinks she may have had an episode of fever but is not sure. Has not seen any purulent sputum   did not tolerate our CPAP last night. She states her family will bring her machine in today to use. Feels better edema is improved urine output not recorded yesterday states she was still passing a large amount of urine   wore her CPAP last night without difficulty. Will check overnight oximetry to make sure saturation is maintained tonight. She received oral Lasix 80 mg at nine and as of 10:30 AM has not had any significant diuresis. We'll see how she does over the next three or 4 hours. Tomorrow we'll give Zaroxolyn preceding Lasix   headache all night long so did not wear CPAP last night. Also states she has developed severe pain of her right great toe. She does give a history of having gout in the past   no headache today states her right great toe still hurts but is better than yesterday   no headache states she did have upset stomach yesterday and today with diarrhea last night probably due to the loading dose of colchicine the day before.   No pain over her right toe today    Hospital Problems  Date Reviewed: 3/12/2021        Codes Class Noted POA    CHF exacerbation ([facility] ICD-10-CM: I50.9  ICD-9-CM: 428.0  2021 Unknown        CHF (congestive heart failure) (HCC) ICD-10-CM: I50.9  ICD-9-CM: 428.0  2/22/2021 Unknown              IMPRESSION:   1. Acute pulmonary edema improved radiographically 6/24 repeat x-ray in a.m. 2. Chronic hypoxic respiratory failure remains on her baseline oxygen  3. Acute kidney injury creatinine up Lasix being held today repeat lab in a.m.  4. Cardiomyopathy diastolic dysfunction  5. COPD no wheezing  6. Obstructive sleep apnea did not use her home CPAP last night   7. Headache resolved  8. Acute gout pain-free on colchicine  9. Diarrhea likely due to colchicine dose has been decreased to single daily dosing  10. Diabetes  11. Coronary artery disease with bypass grafting and stents in the past  Body mass index is 39.58 kg/m². 12.       RECOMMENDATIONS/PLAN:   1. Good urine output yesterday holding Lasix today due to rising creatinine  2. Continue nasal oxygen as she uses at home  3. Continue her home CPAP with oxygen bleed in  4. Continue Symbicort to substitute for her Dulera inhaler  5. We will use albuterol inhaler. 6. Continue colchicine for her acute gout  once a day  7. Repeat chest x-ray and renal function in a.m.  8. She has a nebulizer machine at home she thinks it is not working correctly          [x] High complexity decision making was performed  [x] See my orders for details      Subjective/Initial History:     I was asked by Jacquie Hewitt MD to see Wally Martinez  a 72 y.o.    female in consultation for a chief complaint of shortness of breath and edema        Allergies   Allergen Reactions    Penicillins Other (comments)    Tylox [Oxycodone-Acetaminophen] Hives        MAR reviewed and pertinent medications noted or modified as needed     Current Facility-Administered Medications   Medication    colchicine tablet 0.6 mg    loperamide (IMODIUM) 1 mg/7.5 mL oral solution 2 mg    acetaminophen (TYLENOL) tablet 650 mg    famotidine (PEPCID) tablet 10 mg    [Held by provider] furosemide (LASIX) tablet 80 mg    alum-mag hydroxide-simeth (MYLANTA) oral suspension 30 mL    albuterol (PROVENTIL HFA, VENTOLIN HFA, PROAIR HFA) inhaler 2 Puff    amiodarone (CORDARONE) tablet 200 mg    apixaban (ELIQUIS) tablet 5 mg    clopidogreL (PLAVIX) tablet 75 mg    insulin glargine (LANTUS) injection 10 Units    losartan (COZAAR) tablet 100 mg    nitroglycerin (NITROSTAT) tablet 0.4 mg    oxybutynin (DITROPAN) tablet 5 mg    albuterol-ipratropium (DUO-NEB) 2.5 MG-0.5 MG/3 ML    budesonide-formoteroL (SYMBICORT) 160-4.5 mcg/actuation HFA inhaler 2 Puff    potassium chloride SR (KLOR-CON 10) tablet 10 mEq    QUEtiapine (SEROquel) tablet 25 mg    theophylline ER (BAM-24) capsule 300 mg    insulin lispro (HUMALOG) injection    glucose chewable tablet 16 g    dextrose (D50W) injection syrg 12.5-25 g    glucagon (GLUCAGEN) injection 1 mg    polyethylene glycol (MIRALAX) packet 17 g    ondansetron (ZOFRAN ODT) tablet 4 mg    Or    ondansetron (ZOFRAN) injection 4 mg      Patient PCP: Tianna Marshall MD  PMH:  has a past medical history of Chronic obstructive pulmonary disease ([facility] Congestive heart disease ([facility] Coronary artery disease, Diabetes ([facility] Hyperlipidemia, Hypertension, Myocardial infarct ([facility] and IFTIKHAR (obstructive sleep apnea). PSH:   has a past surgical history that includes hx cholecystectomy; hx hysterectomy; hx hernia repair; hx coronary artery bypass graft; and hx breast biopsy. FHX: family history includes Cancer in her maternal grandmother; Heart Disease in her mother; Kidney Disease in her mother. SHX:  reports that she has quit smoking. She has never used smokeless tobacco. She reports previous alcohol use. She reports that she does not use drugs. Systemic review:  General she thinks her weight stable but has had 2 weeks of worsening shortness of breath and edema with no response from her normal dose of Lasix.   She thinks she felt hot for a few days but did not check her temperature. Eyes no double vision or momentary blindness  ENT no facial pain over the sinuses  Musculoskeletal no swollen tender joints  Neurologic no seizures or syncope  Endocrinologic has diabetes denies polyuria polydipsia  Gastrointestinal no nausea vomiting acid indigestion  Genitourinary no pain or discomfort on urination no bleeding or drainage. She noticed very poor to no response from her normal doses of Lasix and did increase from 20 to 40 mg daily again with no diuresis initiated  Cardiovascular no chest pain or diaphoresis she has had worsening ankle edema nocturia once or twice per night which is normal for her. Respiratory has a history of COPD stop smoking 2 years ago but had over 45-year history of smoking between half and 1-1/2 packs/day. Has minimal cough states she uses her Dulera inhaler twice a day and her albuterol inhaler twice a day uses nebulizer on a as needed basis. Has had cough recently but chest clear mucus no purulence.     Objective:     Vital Signs: Telemetry:    normal sinus rhythm Intake/Output:   Visit Vitals  /80 (BP Patient Position: At rest;Semi fowlers)   Pulse 84   Temp 97.7 °F (36.5 °C)   Resp 18   Ht 5' 4\" (1.626 m)   Wt 104.6 kg (230 lb 9.6 oz)   SpO2 94%   Breastfeeding No   BMI 39.58 kg/m²       Temp (24hrs), Av.3 °F (36.8 °C), Min:97.7 °F (36.5 °C), Max:98.9 °F (37.2 °C)        O2 Device: Nasal cannula O2 Flow Rate (L/min): 2 l/min       Wt Readings from Last 4 Encounters:   21 104.6 kg (230 lb 9.6 oz)   21 108 kg (238 lb)   21 107.5 kg (237 lb)   21 107.5 kg (237 lb)          Intake/Output Summary (Last 24 hours) at 2021 1250  Last data filed at 2021 0925  Gross per 24 hour   Intake --   Output 1700 ml   Net -1700 ml       Last shift:       07 -  1900  In: -   Out: 400 [Urine:400]  Last 3 shifts: 1901 -  0700  In: -   Out: 190 [Urine:1900]       Physical Exam:   General:  American female; lying in bed complains of some abdominal discomfort  HEENT: NCAT, normal oral mucosa  Eyes: anicteric; conjunctiva clear extraocular movements intact  Neck: no nodes, neck veins visible but not grossly engorged, no accessory MM use. Chest: no deformity,   Cardiac: Regular rate and rhythm distant sounds no definite murmur she does have significant edema extending up her shins but skin shows furrowing indicating good diuresis  Lungs: Prolongation of exhalation but no wheezing the lung sounds clear anterior laterally and upper lungs posterior with slightly diminished in the bases with rales in the bases  Abd: Soft nontender normal bowel sounds  Ext: Leg edema extending up the shins  no joint swelling;  No clubbing right great toe  but improved  :clear urine  Neuro: Awake alert oriented speech is clear moves all 4 extremities  Psych- no agitation, oriented to person;   Skin: warm, dry, no cyanosis;   Pulses: Brachial and radial pulses intact  Capillary: Normal capillary refill    Labs:    Recent Labs     06/26/21  0927 06/25/21  0724   WBC 8.8 8.8   HGB 14.5 14.0    218     Recent Labs     06/27/21  0524 06/26/21  0927 06/25/21  0724    138 138  138   K 3.7 3.6 3.9  3.9   CL 98 96* 99  99   CO2 32 34* 33*  33*   * 159* 139*  140*   BUN 38* 29* 24*  25*   CREA 1.75* 1.61* 1.44*  1.42*   CA 8.4* 8.7 8.4*  8.3*   PHOS 3.3 3.6 4.1   ALB 2.8* 2.8* 2.7*  2.7*   ALT  --   --  14   6/27 2 L nasal oxygen with saturation 94%  3 L nasal oxygen with oxygen saturation 97%  Uric acid 6.9  BNP 2271  Lab Results   Component Value Date/Time    Culture result: Klebsiella pneumoniae (A) 03/10/2021 12:06 AM     Imaging:    CXR Results  (Last 48 hours)    None        Results from [place] encounter on 06/21/21    XR CHEST PA LAT    Narrative  Examination: Frontal and lateral radiographs of the chest    Clinical information: Pulmonary edema    Comparison: 6/21/2021    Findings:    Post median sternotomy. Stable enlargement of the cardiac contours. The superior  and middle mediastinal contours are normal. There is calcific atherosclerosis of  the thoracic aorta. There is decreased pulmonary vascular congestion and  decreased pulmonary interstitial thickening. No focal pulmonary consolidation. No pleural effusion or pneumothorax. No acute osseous abnormality. Impression  Stable cardiomegaly. Decreased pulmonary vascular congestion and pulmonary  interstitial edema compared to the prior study from 6/21/2021. XR CHEST PORT    Narrative  Portable upright radiograph of the chest 3:24 PM. No prior study. INDICATION: Shortness of breath. Patient is status post median sternotomy. Heart size is enlarged with an  atherosclerotic aorta. There is vascular congestion. There is fluid in the minor  fissure. Mild increased interstitial markings. No definite infiltrate. No  pneumothorax. Impression  Cardiomegaly with mild vascular congestion and increased  interstitial markings suggesting volume overload/CHF. Results from [place] encounter on 05/27/21    XR ABD FLAT/ ERECT    Narrative  This study is a 2 view radiographic evaluation of the abdomen dated 5/27/2021. HISTORY: Abdominal pain. FINDINGS: The patient status post a previous median sternotomy. There is  apparent enlargement of the cardiac silhouette. There is significant fecal loading within the cecum and the ascending colon. There is mild gaseous distention of the small bowel. This examination is  negative for organomegaly, intra-abdominal fluid, or pathological calcifications  within the upper abdominal region. There are vascular calcifications of the  distal infrarenal abdominal aorta and the common iliac arteries. There is a  large protuberant anterior abdominal wall which is somewhat limiting the  diagnostic sensitivity of this examination.  There are changes of diffuse  idiopathic skeletal hyperostosis with bony whiskering along the pelvis. There is apparent endovascular stent demonstrated within the right femoral  artery. There also may be endovascular stents within the left common and  external iliac arteries. Impression  1. There is significant fecal retention within the right side of the colon  consistent with recent constipation. 2.  There is peripheral vascular disease with calcified plaque formation of the  distal abdominal aorta and involving the iliac arteries. There is an apparent  stent within the right common femoral artery and apparent stents within the left  common and external iliac arteries. Results from [place] encounter on 06/07/21    CT ABD PELV W CONT    Narrative  Indication: Possible hernia. Dose reduction: All CT scans done at this facility are performed using dose  reduction optimization techniques as appropriate to a performed exam including  the following: Automated exposure control, adjustments of the mA and/or kV  according to patient size, or use of iterative reconstruction technique. CT abdomen and pelvis, 100 cc [id number] 6/7/2021. Comparison 5/27/2021. Cardiomegaly. Numerous sigmoid colon and descending colon diverticula without diverticulitis. Normal liver. Nonvisualized gallbladder. No apparent bile duct dilatation. Normal spleen, pancreas, adrenal glands, right kidney. Small left renal cyst. No  hydronephrosis. Normal appearance of urinary bladder. Hysterectomy. Atheromatous calcification of abdominal aorta without aneurysm. Right renal  artery stent. Appendix identified. No pericecal inflammation. Normal small bowel. No ascites or free intraperitoneal air. Mild lumbar spondylosis. Mild degenerative changes of hips. No abdominal wall hernia minimal umbilical fat protrusion unchanged. Impression  No acute finding. Left colon diverticula without diverticulitis.     · Discussion: Acute pulmonary edema. Feels better with diuresis. Would continue IV Lasix. As she improves would switch to oral Lasix 60 mg. She was not having any response to 40 mg orally at home. Await results of echocardiogram.  · She has obstructive sleep apnea she thinks her setting is 13. She will try to have her family bring her machine in. In the meantime we will use hospital CPAP at 13 cm of pressure  · 6/23 she could not tolerate her hospital CPAP her family is to bring her machine in today. Would continue diuresis and will request direct I&O continue to follow renal function if creatinine rises further tomorrow will decrease to single daily dosing Lasix  · 6/24 creatinine stable with Lasix. Received 80 mg orally and so far has not had diuresis initiated. We'll give a dose of Zaroxolyn tomorrow before Lasix and see the effect. Will check chest x-ray today to reevaluate her pulmonary edema. Will check overnight oximetry tonight with her home CPAP and supplemental oxygen  · 6/25 creatinine up slightly good diuresis yesterday with 80 mg of Lasix will repeat renal function tomorrow. Will discontinue Zaroxolyn. Appears to have acute gout right great toe. We will give 3 doses colchicine today and tomorrow drop to single daily dosing  · 6/26 creatinine continues to rise we will hold tomorrow's dose of Lasix until renal function available. Appears to have gout of the right toe has improved with colchicine yesterday will decrease it to single daily dosing. Would like to avoid steroids in her  · 6/27 no toe pain today did have diarrhea last night very likely due to the loading dose of colchicine. Currently she is on a single daily dose. Lasix on hold today due to rising creatinine. Will repeat renal function and chest x-ray in a.m.   If renal function improves should be safe for discharge home    Gold Lockhart MD

## 2021-06-27 NOTE — PROGRESS NOTES
Progress Note    Patient: Michelle Lopez MRN: [id number]  SSN: [id number]    YOB: 1950  Age: 72 y.o. Sex: female      Admit Date: 6/21/2021    LOS: 6 days     Subjective:     72years old patient admitted for acute pulmonary edema, COPD with acute exacerbation, atrial flutter, coronary artery disease 2 hypertension, obstructive sleep apnea    Objective:     Vitals:    06/27/21 0456 06/27/21 0752 06/27/21 0756 06/27/21 1140   BP: 132/68 139/86  128/80   Pulse: 91 85  84   Resp: 20 20  18   Temp: 98.7 °F (37.1 °C) 97.7 °F (36.5 °C)  97.7 °F (36.5 °C)   SpO2: 98% 96% 96% 94%   Weight:       Height:            Intake and Output:  Current Shift: 06/27 0701 - 06/27 1900  In: -   Out: 400 [Urine:400]  Last three shifts: 06/25 1901 - 06/27 0700  In: -   Out: 1900 [Urine:1900]    Physical Exam:   General:  Alert, cooperative, no distress, appears stated age. Eyes:  Conjunctivae/corneas clear. PERRL, EOMs intact. Fundi benign   Ears:  Normal TMs and external ear canals both ears. Nose: Nares normal. Septum midline. Mucosa normal. No drainage or sinus tenderness. Mouth/Throat: Lips, mucosa, and tongue normal. Teeth and gums normal.   Neck: Supple, symmetrical, trachea midline, no adenopathy, thyroid: no enlargment/tenderness/nodules, no carotid bruit and no JVD. Back:   Symmetric, no curvature. ROM normal. No CVA tenderness. Lungs:   Diminishedr to auscultation bilaterally. Heart:  Regular rate and rhythm, S1, S2 normal, no murmur, click, rub or gallop. Abdomen:   Soft, non-tender. Bowel sounds normal. No masses,  No organomegaly. Extremities: Extremities normal, atraumatic, no cyanosis or edema. Pulses: 2+ and symmetric all extremities. Skin: Skin color, texture, turgor normal. No rashes or lesions   Lymph nodes: Cervical, supraclavicular, and axillary nodes normal.   Neurologic: CNII-XII intact. Normal strength, sensation and reflexes throughout. Lab/Data Review:   All lab results for the last 24 hours reviewed.      Recent Results (from the past 24 hour(s))   GLUCOSE, POC    Collection Time: 06/26/21  4:41 PM   Result Value Ref Range    Glucose (POC) 126 (H) 65 - 117 mg/dL    Performed by Micky Prakash, POC    Collection Time: 06/26/21 10:41 PM   Result Value Ref Range    Glucose (POC) 163 (H) 65 - 117 mg/dL    Performed by Eneida Hill    RENAL FUNCTION PANEL    Collection Time: 06/27/21  5:24 AM   Result Value Ref Range    Sodium 137 136 - 145 mmol/L    Potassium 3.7 3.5 - 5.1 mmol/L    Chloride 98 97 - 108 mmol/L    CO2 32 21 - 32 mmol/L    Anion gap 7 5 - 15 mmol/L    Glucose 123 (H) 65 - 100 mg/dL    BUN 38 (H) 6 - 20 mg/dL    Creatinine 1.75 (H) 0.55 - 1.02 mg/dL    BUN/Creatinine ratio 22 (H) 12 - 20      GFR est AA 35 (L) >60 ml/min/1.73m2    GFR est non-AA 29 (L) >60 ml/min/1.73m2    Calcium 8.4 (L) 8.5 - 10.1 mg/dL    Phosphorus 3.3 2.6 - 4.7 mg/dL    Albumin 2.8 (L) 3.5 - 5.0 g/dL   GLUCOSE, POC    Collection Time: 06/27/21  7:55 AM   Result Value Ref Range    Glucose (POC) 122 (H) 65 - 117 mg/dL    Performed by [address], POC    Collection Time: 06/27/21 11:44 AM   Result Value Ref Range    Glucose (POC) 136 (H) 65 - 117 mg/dL    Performed by Ramakrishna Womack          Assessment:     Active Problems:    CHF (congestive heart failure) ([facility] (2/22/2021)      CHF exacerbation ([facility] (6/21/2021)      Acute pulmonary edema  COPD exacerbation  Possible sleep apnea  Hypertension  Plan:   Continue treatment    Signed By: Angela Singleton MD     June 27, 2021

## 2021-06-27 NOTE — PROGRESS NOTES
OT tx attempted at [phone] however pt requesting to rest just up with nursing. Will continue to follow patient and attempt OT at a later time. Thank you.

## 2021-06-28 ENCOUNTER — APPOINTMENT (OUTPATIENT)
Dept: GENERAL RADIOLOGY | Age: 71
DRG: 291 | End: 2021-06-28
Attending: INTERNAL MEDICINE
Payer: MEDICARE

## 2021-06-28 LAB
ALBUMIN SERPL-MCNC: 3 G/DL (ref 3.5–5)
ANION GAP SERPL CALC-SCNC: 9 MMOL/L (ref 5–15)
BUN SERPL-MCNC: 46 MG/DL (ref 6–20)
BUN/CREAT SERPL: 22 (ref 12–20)
CA-I BLD-MCNC: 8.8 MG/DL (ref 8.5–10.1)
CHLORIDE SERPL-SCNC: 98 MMOL/L (ref 97–108)
CO2 SERPL-SCNC: 30 MMOL/L (ref 21–32)
CREAT SERPL-MCNC: 2.05 MG/DL (ref 0.55–1.02)
GLUCOSE BLD STRIP.AUTO-MCNC: 102 MG/DL (ref 65–117)
GLUCOSE BLD STRIP.AUTO-MCNC: 110 MG/DL (ref 65–117)
GLUCOSE BLD STRIP.AUTO-MCNC: 115 MG/DL (ref 65–117)
GLUCOSE BLD STRIP.AUTO-MCNC: 134 MG/DL (ref 65–117)
GLUCOSE SERPL-MCNC: 103 MG/DL (ref 65–100)
PERFORMED BY, TECHID: ABNORMAL
PERFORMED BY, TECHID: NORMAL
PHOSPHATE SERPL-MCNC: 4 MG/DL (ref 2.6–4.7)
POTASSIUM SERPL-SCNC: 3.9 MMOL/L (ref 3.5–5.1)
SODIUM SERPL-SCNC: 137 MMOL/L (ref 136–145)

## 2021-06-28 PROCEDURE — 80069 RENAL FUNCTION PANEL: CPT

## 2021-06-28 PROCEDURE — 71046 X-RAY EXAM CHEST 2 VIEWS: CPT

## 2021-06-28 PROCEDURE — 74011636637 HC RX REV CODE- 636/637: Performed by: FAMILY MEDICINE

## 2021-06-28 PROCEDURE — 94760 N-INVAS EAR/PLS OXIMETRY 1: CPT

## 2021-06-28 PROCEDURE — 74011250637 HC RX REV CODE- 250/637: Performed by: INTERNAL MEDICINE

## 2021-06-28 PROCEDURE — 94640 AIRWAY INHALATION TREATMENT: CPT

## 2021-06-28 PROCEDURE — 65270000029 HC RM PRIVATE

## 2021-06-28 PROCEDURE — 82962 GLUCOSE BLOOD TEST: CPT

## 2021-06-28 PROCEDURE — 77010033678 HC OXYGEN DAILY

## 2021-06-28 PROCEDURE — 74011250637 HC RX REV CODE- 250/637: Performed by: FAMILY MEDICINE

## 2021-06-28 PROCEDURE — 74011250637 HC RX REV CODE- 250/637: Performed by: NURSE PRACTITIONER

## 2021-06-28 PROCEDURE — 36415 COLL VENOUS BLD VENIPUNCTURE: CPT

## 2021-06-28 RX ADMIN — CLOPIDOGREL BISULFATE 75 MG: 75 TABLET ORAL at 08:41

## 2021-06-28 RX ADMIN — THEOPHYLLINE ANHYDROUS 300 MG: 300 CAPSULE, EXTENDED RELEASE ORAL at 08:41

## 2021-06-28 RX ADMIN — APIXABAN 5 MG: 5 TABLET, FILM COATED ORAL at 21:48

## 2021-06-28 RX ADMIN — OXYBUTYNIN CHLORIDE 5 MG: 5 TABLET ORAL at 16:42

## 2021-06-28 RX ADMIN — OXYBUTYNIN CHLORIDE 5 MG: 5 TABLET ORAL at 08:41

## 2021-06-28 RX ADMIN — ALBUTEROL SULFATE 2 PUFF: 108 AEROSOL, METERED RESPIRATORY (INHALATION) at 20:45

## 2021-06-28 RX ADMIN — INSULIN GLARGINE 10 UNITS: 100 INJECTION, SOLUTION SUBCUTANEOUS at 21:48

## 2021-06-28 RX ADMIN — POTASSIUM CHLORIDE 10 MEQ: 750 TABLET, FILM COATED, EXTENDED RELEASE ORAL at 08:40

## 2021-06-28 RX ADMIN — APIXABAN 5 MG: 5 TABLET, FILM COATED ORAL at 08:41

## 2021-06-28 RX ADMIN — LOSARTAN POTASSIUM 100 MG: 50 TABLET, FILM COATED ORAL at 08:40

## 2021-06-28 RX ADMIN — BUDESONIDE AND FORMOTEROL FUMARATE DIHYDRATE 2 PUFF: 160; 4.5 AEROSOL RESPIRATORY (INHALATION) at 20:45

## 2021-06-28 RX ADMIN — ALBUTEROL SULFATE 2 PUFF: 108 AEROSOL, METERED RESPIRATORY (INHALATION) at 09:15

## 2021-06-28 RX ADMIN — COLCHICINE 0.6 MG: 0.6 TABLET, FILM COATED ORAL at 08:41

## 2021-06-28 RX ADMIN — POTASSIUM CHLORIDE 10 MEQ: 750 TABLET, FILM COATED, EXTENDED RELEASE ORAL at 21:48

## 2021-06-28 RX ADMIN — QUETIAPINE FUMARATE 25 MG: 25 TABLET ORAL at 21:48

## 2021-06-28 RX ADMIN — AMIODARONE HYDROCHLORIDE 200 MG: 200 TABLET ORAL at 08:40

## 2021-06-28 RX ADMIN — OXYBUTYNIN CHLORIDE 5 MG: 5 TABLET ORAL at 21:48

## 2021-06-28 RX ADMIN — FAMOTIDINE 10 MG: 10 TABLET ORAL at 21:48

## 2021-06-28 RX ADMIN — FAMOTIDINE 10 MG: 10 TABLET ORAL at 08:41

## 2021-06-28 RX ADMIN — ALBUTEROL SULFATE 2 PUFF: 108 AEROSOL, METERED RESPIRATORY (INHALATION) at 14:49

## 2021-06-28 RX ADMIN — BUDESONIDE AND FORMOTEROL FUMARATE DIHYDRATE 2 PUFF: 160; 4.5 AEROSOL RESPIRATORY (INHALATION) at 09:15

## 2021-06-28 NOTE — PROGRESS NOTES
Comprehensive Nutrition Assessment    Type and Reason for Visit: RD nutrition re-screen/LOS    Nutrition Recommendations/Plan:   Continue current diet  Nursing to monitor BM/diarrhea, I/Os, %PO    Nutrition Assessment:  Admitted 2/2 CHF exacerbation and SOB. Current appetite reported as good. Per EMR % PO Carbohydrate Control Restriction: 3 carb choices (45 gm/meal)Cardiac Restriction: Low Fat/Low Chol/High Fiber/2 gm Na. Nutrition intervetion- RD to add easy to chew/chopped meats to orders. Labs: CA 8.4. AST 10. -165. HCT 49.3. BUN 38. ALB 2.8. Creat 1.75. Meds reviewed. Malnutrition Assessment:  Malnutrition Status:  No malnutrition      Nutrition Related Findings:  No NFPE performed, appears nourished. Denies N/V/C. BM 6/27 and diarrhea per pt. Pt states RN aware and receiving meds. Reports dentures left at home and struggles to chew. Edema- LLE/RLE 1+. Wounds:    Moisture associate skin damage (Buttocks, blanchable erythema)       Current Nutrition Therapies:  ADULT DIET Easy to Chew; 3 carb choices (45 gm/meal); Low Fat/Low Chol/High Fiber/2 gm Na; Dislikes: oatmeat, cream of wheat, grits    Anthropometric Measures:  · Height:  5' 4\" (162.6 cm)  · Current Body Wt:  101.6 kg (223 lb 15.8 oz)   · Admission Body Wt:  245 lb    · Usual Body Wt:  108 kg (238 lb)     · Ideal Body Wt:  120 lbs:  186.7 %   · BMI Category:  Obese class 2 (BMI 35.0-39. 9)     Wt Readings from Last 3 Encounters:   06/27/21 101.6 kg (223 lb 15.8 oz)   06/07/21 108 kg (238 lb)   05/27/21 107.5 kg (237 lb)   ·     Nutrition Interventions:   Food and/or Nutrient Delivery: Continue current diet  Nutrition Education and Counseling: No recommendations at this time  Coordination of Nutrition Care: No recommendation at this time    Nutrition Monitoring and Evaluation:   Behavioral-Environmental Outcomes: None identified  Food/Nutrient Intake Outcomes: Food and nutrient intake  Physical Signs/Symptoms Outcomes: Weight, Biochemical data, Diarrhea    Discharge Planning:    Continue current diet     Electronically signed by Dora Slater RD on 6/28/2021 at 12:58 PM    Contact: Ext [phone]

## 2021-06-28 NOTE — PROGRESS NOTES
PATIENT STATES SHE HAS HOME O2 WITH [facility] AT 3 LPM N/C AND PORTABLE O2 TANKS FOR GOING OUT. 25

## 2021-06-28 NOTE — PROGRESS NOTES
General Daily Progress Note          Patient Name:   Robbi Baumgarten       YOB: 1950       Age:  72 y.o. Admit Date: 6/21/2021      Subjective:     Patient feeling much better this morning  On 2 L oxygen by nasal cannula  Creatinine slightly elevated likely from diuretic. Objective:     Visit Vitals  BP (!) 113/55   Pulse 93   Temp 98 °F (36.7 °C)   Resp 20   Ht 5' 4\" (1.626 m)   Wt 101.6 kg (223 lb 15.8 oz)   SpO2 94%   Breastfeeding No   BMI 38.45 kg/m²        Recent Results (from the past 24 hour(s))   GLUCOSE, POC    Collection Time: 06/27/21  3:53 PM   Result Value Ref Range    Glucose (POC) 135 (H) 65 - 117 mg/dL    Performed by Nataliia Silverio, POC    Collection Time: 06/27/21 10:13 PM   Result Value Ref Range    Glucose (POC) 140 (H) 65 - 117 mg/dL    Performed by Eran Dunbar    GLUCOSE, POC    Collection Time: 06/28/21  7:41 AM   Result Value Ref Range    Glucose (POC) 102 65 - 117 mg/dL    Performed by Yojana Abreu    RENAL FUNCTION PANEL    Collection Time: 06/28/21  8:49 AM   Result Value Ref Range    Sodium 137 136 - 145 mmol/L    Potassium 3.9 3.5 - 5.1 mmol/L    Chloride 98 97 - 108 mmol/L    CO2 30 21 - 32 mmol/L    Anion gap 9 5 - 15 mmol/L    Glucose 103 (H) 65 - 100 mg/dL    BUN 46 (H) 6 - 20 mg/dL    Creatinine 2.05 (H) 0.55 - 1.02 mg/dL    BUN/Creatinine ratio 22 (H) 12 - 20      GFR est AA 29 (L) >60 ml/min/1.73m2    GFR est non-AA 24 (L) >60 ml/min/1.73m2    Calcium 8.8 8.5 - 10.1 mg/dL    Phosphorus 4.0 2.6 - 4.7 mg/dL    Albumin 3.0 (L) 3.5 - 5.0 g/dL   GLUCOSE, POC    Collection Time: 06/28/21 11:12 AM   Result Value Ref Range    Glucose (POC) 134 (H) 65 - 117 mg/dL    Performed by Yojana Abreu      [unfilled]      Review of Systems    Constitutional: Negative for chills and fever. HENT: Negative. Eyes: Negative. Respiratory: Negative. Cardiovascular: substernal chest pain.     Gastrointestinal: Abdominal pain relieved after bowel movement. Skin: Negative. Neurological: headache. Physical Exam:      Constitutional: pt is oriented to person, place, and time. HENT:   Head: Normocephalic and atraumatic. Eyes: Pupils are equal, round, and reactive to light. EOM are normal.   Cardiovascular: Normal rate, regular rhythm and normal heart sounds. Bilateral lower extremity edema  Pulmonary/Chest: Bibasilar diminished breath sounds with no rales, wheezes, rhonchi. No increased work of breathing. Abdominal: Soft. Bowel sounds are normal. There is no abdominal tenderness. There is no rebound and no guarding. Musculoskeletal: Normal range of motion. Right great toe painful and warm consistent with her usual gout symptoms  Neurological: pt is alert and oriented to person, place, and time. XR CHEST PA LAT   Final Result   FINDINGS: IMPRESSION: 2 views: Frontal and lateral chest.      Median sternotomy status post CABG. Improved cardiomegaly, vascular congestion. Trace hydrostatic edema in the left lung base. The lungs are well inflated. No lobar consolidation, pleural effusion,   pneumothorax. No free air under the diaphragm. Bony structures grossly intact. XR CHEST PA LAT   Final Result      Stable cardiomegaly. Decreased pulmonary vascular congestion and pulmonary   interstitial edema compared to the prior study from 6/21/2021. XR CHEST PORT   Final Result   Cardiomegaly with mild vascular congestion and increased   interstitial markings suggesting volume overload/CHF.            Recent Results (from the past 24 hour(s))   GLUCOSE, POC    Collection Time: 06/27/21  3:53 PM   Result Value Ref Range    Glucose (POC) 135 (H) 65 - 117 mg/dL    Performed by Adrianna Jeronimo, POC    Collection Time: 06/27/21 10:13 PM   Result Value Ref Range    Glucose (POC) 140 (H) 65 - 117 mg/dL    Performed by Trenton Harkins, POC    Collection Time: 06/28/21  7:41 AM   Result Value Ref Range Glucose (POC) 102 65 - 117 mg/dL    Performed by Princess Gross    RENAL FUNCTION PANEL    Collection Time: 06/28/21  8:49 AM   Result Value Ref Range    Sodium 137 136 - 145 mmol/L    Potassium 3.9 3.5 - 5.1 mmol/L    Chloride 98 97 - 108 mmol/L    CO2 30 21 - 32 mmol/L    Anion gap 9 5 - 15 mmol/L    Glucose 103 (H) 65 - 100 mg/dL    BUN 46 (H) 6 - 20 mg/dL    Creatinine 2.05 (H) 0.55 - 1.02 mg/dL    BUN/Creatinine ratio 22 (H) 12 - 20      GFR est AA 29 (L) >60 ml/min/1.73m2    GFR est non-AA 24 (L) >60 ml/min/1.73m2    Calcium 8.8 8.5 - 10.1 mg/dL    Phosphorus 4.0 2.6 - 4.7 mg/dL    Albumin 3.0 (L) 3.5 - 5.0 g/dL   GLUCOSE, POC    Collection Time: 06/28/21 11:12 AM   Result Value Ref Range    Glucose (POC) 134 (H) 65 - 117 mg/dL    Performed by Princess Gross        Results     ** No results found for the last 336 hours. **           Labs:     Recent Labs     06/26/21  0927   WBC 8.8   HGB 14.5   HCT 49.3*        Recent Labs     06/28/21  0849 06/27/21  0524 06/26/21 0927    137 138   K 3.9 3.7 3.6   CL 98 98 96*   CO2 30 32 34*   BUN 46* 38* 29*   CREA 2.05* 1.75* 1.61*   * 123* 159*   CA 8.8 8.4* 8.7   PHOS 4.0 3.3 3.6     Recent Labs     06/28/21  0849 06/27/21  0524 06/26/21 0927   ALB 3.0* 2.8* 2.8*     No results for input(s): INR, PTP, APTT, INREXT, INREXT in the last 72 hours. No results for input(s): FE, TIBC, PSAT, FERR in the last 72 hours. No results found for: FOL, RBCF   No results for input(s): PH, PCO2, PO2 in the last 72 hours. No results for input(s): CPK, CKNDX, TROIQ in the last 72 hours.     No lab exists for component: CPKMB  No results found for: CHOL, CHOLX, CHLST, CHOLV, HDL, HDLP, LDL, LDLC, DLDLP, TGLX, TRIGL, TRIGP, CHHD, CHHDX  Lab Results   Component Value Date/Time    Glucose (POC) 134 (H) 06/28/2021 11:12 AM    Glucose (POC) 102 06/28/2021 07:41 AM    Glucose (POC) 140 (H) 06/27/2021 10:13 PM    Glucose (POC) 135 (H) 06/27/2021 03:53 PM    Glucose (POC) 136 (H) 06/27/2021 11:44 AM     Lab Results   Component Value Date/Time    Color Yellow/Straw 06/07/2021 05:00 PM    Appearance Clear 06/07/2021 05:00 PM    Specific gravity 1.016 06/07/2021 05:00 PM    pH (UA) 5.0 06/07/2021 05:00 PM    Protein 100 (A) 06/07/2021 05:00 PM    Glucose Negative 06/07/2021 05:00 PM    Ketone Negative 06/07/2021 05:00 PM    Bilirubin Negative 06/07/2021 05:00 PM    Urobilinogen 0.1 06/07/2021 05:00 PM    Nitrites Negative 06/07/2021 05:00 PM    Leukocyte Esterase Negative 06/07/2021 05:00 PM    Bacteria Negative 06/07/2021 05:00 PM    WBC 0-4 06/07/2021 05:00 PM    RBC 0-5 06/07/2021 05:00 PM         Assessment:   Acute pulmonary edema-improved on repeat chest x-ray 6/24/patient on 2 L oxygen by nasal cannula  Acute congestive heart failure with preserved ejection fraction  CAD status post CABG  Mildly elevated troponin  Type 2 diabetes with hyperglycemia  Hypertension  COPD no acute exacerbation  Chronic kidney disease  Bradycardia  History of A. fib status post cardioversion  Depression  Headache  Gout of right great toe  IFTIKHAR treated with CPAP      Plan:   Pulmonology and cardiology following  Hold Lasix due to elevated creatinine  Respiratory consult for overnight and ambulatory pulse ox.     Continue albuterol inhaler 2 puff every 6 hours  Amiodarone 200 mg daily  Eliquis 5 mg twice a day  Symbicort 2 puff 2 times a day  Plavix 75 mg daily  Colchicine 0.6 mg daily  Pepcid 10 mg twice a day  Lantus 10 units subcu daily  Cozaar 100 mg daily  Oxybutynin 5 mg 3 times a day  Potassium 10 mg twice a day  Seroquel 25 mg at bedtime  Theophylline 200 mg daily      Discussed with the  possible discharge home tomorrow  PT OT consult    Discharge home with home health PT OT and possible oxygen if qualified        Current Facility-Administered Medications:     colchicine tablet 0.6 mg, 0.6 mg, Oral, DAILY, Carlos Solano MD, 0.6 mg at 06/28/21 0841    loperamide (IMODIUM) 1 mg/7.5 mL oral solution 2 mg, 2 mg, Oral, TID PRN, Tommie MOROCHO MD, 2 mg at 06/27/21 2206    acetaminophen (TYLENOL) tablet 650 mg, 650 mg, Oral, Q4H PRN, Jalil Reyna MD, 650 mg at 06/26/21 0221    famotidine (PEPCID) tablet 10 mg, 10 mg, Oral, BID, Cathern Alu, NP, 10 mg at 06/28/21 0841    [Held by provider] furosemide (LASIX) tablet 80 mg, 80 mg, Oral, DAILY, Jalil Reyna MD, 80 mg at 06/26/21 0754    alum-mag hydroxide-simeth (MYLANTA) oral suspension 30 mL, 30 mL, Oral, Q6H PRN, Gopal Alvarado MD, 30 mL at 06/27/21 0216    albuterol (PROVENTIL HFA, VENTOLIN HFA, PROAIR HFA) inhaler 2 Puff, 2 Puff, Inhalation, Q6HWA RT, Jalil Reyna MD, 2 Puff at 06/28/21 0915    amiodarone (CORDARONE) tablet 200 mg, 200 mg, Oral, DAILY, Gisselle Roca MD, 200 mg at 06/28/21 0840    apixaban (ELIQUIS) tablet 5 mg, 5 mg, Oral, BID, Gopal Alvarado MD, 5 mg at 06/28/21 0841    clopidogreL (PLAVIX) tablet 75 mg, 75 mg, Oral, DAILY, Gopal Alvarado MD, 75 mg at 06/28/21 0841    insulin glargine (LANTUS) injection 10 Units, 10 Units, SubCUTAneous, QHS, Gopal Alvarado MD, 10 Units at 06/27/21 2221    losartan (COZAAR) tablet 100 mg, 100 mg, Oral, DAILY, Gopal Alvarado MD, 100 mg at 06/28/21 0840    nitroglycerin (NITROSTAT) tablet 0.4 mg, 0.4 mg, SubLINGual, PRN, Gopal Alvarado MD, 0.4 mg at 06/23/21 2048    oxybutynin (DITROPAN) tablet 5 mg, 5 mg, Oral, TID, Gopal Alvarado MD, 5 mg at 06/28/21 0841    albuterol-ipratropium (DUO-NEB) 2.5 MG-0.5 MG/3 ML, 3 mL, Nebulization, Q6H PRN, Shabbir Alvarado MD    budesonide-formoteroL (SYMBICORT) 160-4.5 mcg/actuation HFA inhaler 2 Puff, 2 Puff, Inhalation, BID RT, Gopal Alvarado MD, 2 Puff at 06/28/21 0915    potassium chloride SR (KLOR-CON 10) tablet 10 mEq, 10 mEq, Oral, BID, Gopal Alvarado MD, 10 mEq at 06/28/21 0840    QUEtiapine (SEROquel) tablet 25 mg, 25 mg, Oral, QHS, Gopal Alvarado MD, 25 mg at 06/27/21 2130   theophylline ER (BAM-24) capsule 300 mg, 300 mg, Oral, DAILY, Gopal Alvarado MD, 300 mg at 06/28/21 0841    insulin lispro (HUMALOG) injection, , SubCUTAneous, AC&HS, Gopal Alvarado MD, 3 Units at 06/26/21 1155    glucose chewable tablet 16 g, 4 Tablet, Oral, PRN, Flora Alvarado MD    dextrose (D50W) injection syrg 12.5-25 g, 25-50 mL, IntraVENous, PRN, Flora Alvarado MD    glucagon (GLUCAGEN) injection 1 mg, 1 mg, IntraMUSCular, PRN, Flora Alvarado MD    polyethylene glycol (MIRALAX) packet 17 g, 17 g, Oral, DAILY PRN, Gopal Alvarado MD    ondansetron (ZOFRAN ODT) tablet 4 mg, 4 mg, Oral, Q8H PRN, 4 mg at 06/27/21 1559 **OR** ondansetron (ZOFRAN) injection 4 mg, 4 mg, IntraVENous, Q6H PRN, Shaylee Cash MD

## 2021-06-28 NOTE — PROGRESS NOTES
Consult  Pulmonary, Critical Care    Name: Dane Rodriguez MRN: [id number]   : 1950 Hospital: [address] [place] [address]   Date: 2021  Admission date: 2021 Hospital Day: 8       Subjective/Interval History:   Seen in the emergency room. Has had over 3 L of diuresis so far and feels much better. History is of mild cardiomyopathy normally on Lasix 20 mg. About 2 weeks ago she noted worsening edema and that her 20 mg of Lasix was not initiating any diuresis. She upped it to 40 mg a week ago and still has not been having any diuresis has had worsening edema of her legs shortness of breath cough productive of clear sputum. She thinks she may have had an episode of fever but is not sure. Has not seen any purulent sputum   did not tolerate our CPAP last night. She states her family will bring her machine in today to use. Feels better edema is improved urine output not recorded yesterday states she was still passing a large amount of urine   wore her CPAP last night without difficulty. Will check overnight oximetry to make sure saturation is maintained tonight. She received oral Lasix 80 mg at nine and as of 10:30 AM has not had any significant diuresis. We'll see how she does over the next three or 4 hours. Tomorrow we'll give Zaroxolyn preceding Lasix   headache all night long so did not wear CPAP last night. Also states she has developed severe pain of her right great toe. She does give a history of having gout in the past   no headache today states her right great toe still hurts but is better than yesterday   no headache states she did have upset stomach yesterday and today with diarrhea last night probably due to the loading dose of colchicine the day before.   No pain over her right toe today   no complaints today no further diarrhea upset stomach toe pain or shortness of breath    Hospital Problems  Date Reviewed: 3/12/2021        Codes Class Noted POA CHF exacerbation (HCC) ICD-10-CM: I50.9  ICD-9-CM: 428.0  6/21/2021 Unknown        CHF (congestive heart failure) ([facility] ICD-10-CM: I50.9  ICD-9-CM: 428.0  2/22/2021 Unknown              IMPRESSION:   1. Acute pulmonary edema resolved on current chest x-ray 6/28  2. Chronic hypoxic respiratory failure remains on her baseline oxygen  3. Acute kidney injury creatinine up Lasix held yesterday and today  4. Cardiomyopathy diastolic dysfunction  5. COPD no wheezing  6. Obstructive sleep apnea did  use her home CPAP last night   7. Headache resolved  8. Acute gout pain-free on colchicine  9. Diarrhea likely due to colchicine dose has been decreased to single daily dosing no further diarrhea  10. Diabetes  11. Coronary artery disease with bypass grafting and stents in the past  Body mass index is 38.45 kg/m². 12.       RECOMMENDATIONS/PLAN:   1. Creatinine remains up would continue to hold Lasix today resume tomorrow at slightly reduced dose 60 mg  2. Continue nasal oxygen as she uses at home  3. Continue her home CPAP with oxygen bleed in  4. Once home would continue her Dulera inhaler  5. We will use albuterol inhaler. 6. Continue colchicine for her acute gout  once a day  7. She has a nebulizer machine at home she thinks it is not working correctly          [x] High complexity decision making was performed  [x] See my orders for details      Subjective/Initial History:     I was asked by Ines White MD to see Clary Jefferys  a 72 y.o.    female in consultation for a chief complaint of shortness of breath and edema        Allergies   Allergen Reactions    Penicillins Other (comments)    Tylox [Oxycodone-Acetaminophen] Hives        MAR reviewed and pertinent medications noted or modified as needed     Current Facility-Administered Medications   Medication    colchicine tablet 0.6 mg    loperamide (IMODIUM) 1 mg/7.5 mL oral solution 2 mg    acetaminophen (TYLENOL) tablet 650 mg    famotidine (PEPCID) tablet 10 mg    [Held by provider] furosemide (LASIX) tablet 80 mg    alum-mag hydroxide-simeth (MYLANTA) oral suspension 30 mL    albuterol (PROVENTIL HFA, VENTOLIN HFA, PROAIR HFA) inhaler 2 Puff    amiodarone (CORDARONE) tablet 200 mg    apixaban (ELIQUIS) tablet 5 mg    clopidogreL (PLAVIX) tablet 75 mg    insulin glargine (LANTUS) injection 10 Units    losartan (COZAAR) tablet 100 mg    nitroglycerin (NITROSTAT) tablet 0.4 mg    oxybutynin (DITROPAN) tablet 5 mg    albuterol-ipratropium (DUO-NEB) 2.5 MG-0.5 MG/3 ML    budesonide-formoteroL (SYMBICORT) 160-4.5 mcg/actuation HFA inhaler 2 Puff    potassium chloride SR (KLOR-CON 10) tablet 10 mEq    QUEtiapine (SEROquel) tablet 25 mg    theophylline ER (BAM-24) capsule 300 mg    insulin lispro (HUMALOG) injection    glucose chewable tablet 16 g    dextrose (D50W) injection syrg 12.5-25 g    glucagon (GLUCAGEN) injection 1 mg    polyethylene glycol (MIRALAX) packet 17 g    ondansetron (ZOFRAN ODT) tablet 4 mg    Or    ondansetron (ZOFRAN) injection 4 mg      Patient PCP: Elliot Lagos MD  PMH:  has a past medical history of Chronic obstructive pulmonary disease ([facility] Congestive heart disease ([facility] Coronary artery disease, Diabetes ([address] [facility] Hyperlipidemia, Hypertension, Myocardial infarct ([address] and IFTIKHAR (obstructive sleep apnea). PSH:   has a past surgical history that includes hx cholecystectomy; hx hysterectomy; hx hernia repair; hx coronary artery bypass graft; and hx breast biopsy. FHX: family history includes Cancer in her maternal grandmother; Heart Disease in her mother; Kidney Disease in her mother. SHX:  reports that she has quit smoking. She has never used smokeless tobacco. She reports previous alcohol use. She reports that she does not use drugs. Systemic review:  General she thinks her weight stable but has had 2 weeks of worsening shortness of breath and edema with no response from her normal dose of Lasix. She thinks she felt hot for a few days but did not check her temperature. Eyes no double vision or momentary blindness  ENT no facial pain over the sinuses  Musculoskeletal no swollen tender joints  Neurologic no seizures or syncope  Endocrinologic has diabetes denies polyuria polydipsia  Gastrointestinal no nausea vomiting acid indigestion  Genitourinary no pain or discomfort on urination no bleeding or drainage. She noticed very poor to no response from her normal doses of Lasix and did increase from 20 to 40 mg daily again with no diuresis initiated  Cardiovascular no chest pain or diaphoresis she has had worsening ankle edema nocturia once or twice per night which is normal for her. Respiratory has a history of COPD stop smoking 2 years ago but had over 45-year history of smoking between half and 1-1/2 packs/day. Has minimal cough states she uses her Dulera inhaler twice a day and her albuterol inhaler twice a day uses nebulizer on a as needed basis. Has had cough recently but chest clear mucus no purulence. Objective:     Vital Signs: Telemetry:    normal sinus rhythm Intake/Output:   Visit Vitals  BP (!) 107/59   Pulse 82   Temp 97.7 °F (36.5 °C)   Resp 20   Ht 5' 4\" (1.626 m)   Wt 101.6 kg (223 lb 15.8 oz)   SpO2 97%   Breastfeeding No   BMI 38.45 kg/m²       Temp (24hrs), Av.8 °F (36.6 °C), Min:97.6 °F (36.4 °C), Max:98 °F (36.7 °C)        O2 Device: Nasal cannula O2 Flow Rate (L/min): 2 l/min       Wt Readings from Last 4 Encounters:   21 101.6 kg (223 lb 15.8 oz)   21 108 kg (238 lb)   21 107.5 kg (237 lb)   21 107.5 kg (237 lb)        No intake or output data in the 24 hours ending 21 1028    Last shift:      No intake/output data recorded.   Last 3 shifts:  1901 -  0700  In: -   Out: 1100 [Urine:1100]       Physical Exam:   General:  female; lying in bed with no complaints  HEENT: NCAT, normal oral mucosa  Eyes: anicteric; conjunctiva clear extraocular movements intact  Neck: no nodes, neck veins flat , no accessory MM use. Chest: no deformity,   Cardiac: Regular rate and rhythm distant sounds no definite murmur improved edema of the lower legs  Lungs: Prolongation of exhalation but no wheezing the lung sounds clear anterior laterally and upper lungs posterior with slightly diminished in the bases no definite rales  Abd: Soft nontender normal bowel sounds  Ext: Leg edema markedly improved no joint swelling; No clubbing right great toe nontender  :clear urine  Neuro: Awake alert oriented speech is clear moves all 4 extremities  Psych- no agitation, oriented to person;   Skin: warm, dry, no cyanosis;   Pulses: Brachial and radial pulses intact  Capillary: Normal capillary refill    Labs:    Recent Labs     06/26/21  0927   WBC 8.8   HGB 14.5        Recent Labs     06/28/21  0849 06/27/21  0524 06/26/21  0927    137 138   K 3.9 3.7 3.6   CL 98 98 96*   CO2 30 32 34*   * 123* 159*   BUN 46* 38* 29*   CREA 2.05* 1.75* 1.61*   CA 8.8 8.4* 8.7   PHOS 4.0 3.3 3.6   ALB 3.0* 2.8* 2.8*   6/28 2 L nasal oxygen with saturation 97%    Uric acid 6.9  BNP 2271  Lab Results   Component Value Date/Time    Culture result: Klebsiella pneumoniae (A) 03/10/2021 12:06 AM     Imaging:    CXR Results  (Last 48 hours)               06/28/21 0804  XR CHEST PA LAT Final result    Impression:  FINDINGS: IMPRESSION: 2 views: Frontal and lateral chest.       Median sternotomy status post CABG. Improved cardiomegaly, vascular congestion. Trace hydrostatic edema in the left lung base. The lungs are well inflated. No lobar consolidation, pleural effusion,   pneumothorax. No free air under the diaphragm. Bony structures grossly intact. Narrative:  Pulmonary edema. Comparison chest x-ray 6/24/2021.                Results from [place] encounter on 06/21/21    XR CHEST PA LAT    Narrative  Pulmonary edema. Comparison chest x-ray 6/24/2021. Impression  FINDINGS: IMPRESSION: 2 views: Frontal and lateral chest.    Median sternotomy status post CABG. Improved cardiomegaly, vascular congestion. Trace hydrostatic edema in the left lung base. The lungs are well inflated. No lobar consolidation, pleural effusion,  pneumothorax. No free air under the diaphragm. Bony structures grossly intact. XR CHEST PA LAT    Narrative  Examination: Frontal and lateral radiographs of the chest    Clinical information: Pulmonary edema    Comparison: 6/21/2021    Findings:    Post median sternotomy. Stable enlargement of the cardiac contours. The superior  and middle mediastinal contours are normal. There is calcific atherosclerosis of  the thoracic aorta. There is decreased pulmonary vascular congestion and  decreased pulmonary interstitial thickening. No focal pulmonary consolidation. No pleural effusion or pneumothorax. No acute osseous abnormality. Impression  Stable cardiomegaly. Decreased pulmonary vascular congestion and pulmonary  interstitial edema compared to the prior study from 6/21/2021. XR CHEST PORT    Narrative  Portable upright radiograph of the chest 3:24 PM. No prior study. INDICATION: Shortness of breath. Patient is status post median sternotomy. Heart size is enlarged with an  atherosclerotic aorta. There is vascular congestion. There is fluid in the minor  fissure. Mild increased interstitial markings. No definite infiltrate. No  pneumothorax. Impression  Cardiomegaly with mild vascular congestion and increased  interstitial markings suggesting volume overload/CHF. Results from [place] encounter on 06/07/21    CT ABD PELV W CONT    Narrative  Indication: Possible hernia.     Dose reduction: All CT scans done at this facility are performed using dose  reduction optimization techniques as appropriate to a performed exam including  the following: Automated exposure control, adjustments of the mA and/or kV  according to patient size, or use of iterative reconstruction technique. CT abdomen and pelvis, 100 cc Isovue-370 6/7/2021. Comparison 5/27/2021. Cardiomegaly. Numerous sigmoid colon and descending colon diverticula without diverticulitis. Normal liver. Nonvisualized gallbladder. No apparent bile duct dilatation. Normal spleen, pancreas, adrenal glands, right kidney. Small left renal cyst. No  hydronephrosis. Normal appearance of urinary bladder. Hysterectomy. Atheromatous calcification of abdominal aorta without aneurysm. Right renal  artery stent. Appendix identified. No pericecal inflammation. Normal small bowel. No ascites or free intraperitoneal air. Mild lumbar spondylosis. Mild degenerative changes of hips. No abdominal wall hernia minimal umbilical fat protrusion unchanged. Impression  No acute finding. Left colon diverticula without diverticulitis. · Discussion: Acute pulmonary edema. Feels better with diuresis. Would continue IV Lasix. As she improves would switch to oral Lasix 60 mg. She was not having any response to 40 mg orally at home. Await results of echocardiogram.  · She has obstructive sleep apnea she thinks her setting is 13. She will try to have her family bring her machine in. In the meantime we will use hospital CPAP at 13 cm of pressure  · 6/23 she could not tolerate her hospital CPAP her family is to bring her machine in today. Would continue diuresis and will request direct I&O continue to follow renal function if creatinine rises further tomorrow will decrease to single daily dosing Lasix  · 6/24 creatinine stable with Lasix. Received 80 mg orally and so far has not had diuresis initiated. We'll give a dose of Zaroxolyn tomorrow before Lasix and see the effect. Will check chest x-ray today to reevaluate her pulmonary edema.   Will check overnight oximetry tonight with her home CPAP and supplemental oxygen  · 6/25 creatinine up slightly good diuresis yesterday with 80 mg of Lasix will repeat renal function tomorrow. Will discontinue Zaroxolyn. Appears to have acute gout right great toe. We will give 3 doses colchicine today and tomorrow drop to single daily dosing  · 6/26 creatinine continues to rise we will hold tomorrow's dose of Lasix until renal function available. Appears to have gout of the right toe has improved with colchicine yesterday will decrease it to single daily dosing. Would like to avoid steroids in her  · 6/27 no toe pain today did have diarrhea last night very likely due to the loading dose of colchicine. Currently she is on a single daily dose. Lasix on hold today due to rising creatinine. Will repeat renal function and chest x-ray in a.m. If renal function improves should be safe for discharge home  · 6/28 no specific complaints today. Lasix held yesterday and today creatinine remains up. Despite this she seems stable for discharge home from respiratory standpoint.   Probably reinstitute Lasix 60 mg day after tomorrow with repeat renal function by PCP as an outpatient    Wilmar Conti MD

## 2021-06-28 NOTE — PROGRESS NOTES
Dual skin assessment performed with Aftab Arteaga RN. Patient's skin is clean, dry, and intact. No breakdown noted.

## 2021-06-28 NOTE — PROGRESS NOTES
CM met with patient and her daughter, Sarah Avendano, in room to discuss DCP. Ms. Sarah Avendano expressed concerns over patient returning home with [facility] (current recc by PT/OT) and stated that the attending had recommended rehab. CM explained how PT/OT makes recommendations and insurance looks at PT/OT notes to determine if they would approve patient to go to rehab. Ms. Sarah Avendano stated that she was concerned about patient's resp status and how it hinders the patient from being able to care for herself independently. Ms. Sarah Avendano reported that she would be changing patient's insurance to straight Medicare which would go into effect on July 1st and would like for a referral to be sent to [facility]. Ms. Sarah Avendano also asked to speak with PT/OT in the morning to obtain more information on their recc's. CM contacted therapy dept and left message. Referral sent via jessica to [facility]. CM continues to follow.

## 2021-06-29 VITALS
RESPIRATION RATE: 20 BRPM | DIASTOLIC BLOOD PRESSURE: 66 MMHG | WEIGHT: 223.99 LBS | HEIGHT: 64 IN | HEART RATE: 78 BPM | TEMPERATURE: 98.4 F | BODY MASS INDEX: 38.24 KG/M2 | SYSTOLIC BLOOD PRESSURE: 102 MMHG | OXYGEN SATURATION: 95 %

## 2021-06-29 LAB
ALBUMIN SERPL-MCNC: 2.9 G/DL (ref 3.5–5)
ALBUMIN SERPL-MCNC: 2.9 G/DL (ref 3.5–5)
ALBUMIN/GLOB SERPL: 0.7 {RATIO} (ref 1.1–2.2)
ALP SERPL-CCNC: 135 U/L (ref 45–117)
ALT SERPL-CCNC: 18 U/L (ref 12–78)
ANION GAP SERPL CALC-SCNC: 7 MMOL/L (ref 5–15)
ANION GAP SERPL CALC-SCNC: 8 MMOL/L (ref 5–15)
AST SERPL W P-5'-P-CCNC: 22 U/L (ref 15–37)
BASOPHILS # BLD: 0.1 K/UL (ref 0–0.1)
BASOPHILS NFR BLD: 1 % (ref 0–1)
BILIRUB SERPL-MCNC: 0.6 MG/DL (ref 0.2–1)
BUN SERPL-MCNC: 44 MG/DL (ref 6–20)
BUN SERPL-MCNC: 44 MG/DL (ref 6–20)
BUN/CREAT SERPL: 23 (ref 12–20)
BUN/CREAT SERPL: 23 (ref 12–20)
CA-I BLD-MCNC: 8.4 MG/DL (ref 8.5–10.1)
CA-I BLD-MCNC: 8.4 MG/DL (ref 8.5–10.1)
CHLORIDE SERPL-SCNC: 100 MMOL/L (ref 97–108)
CHLORIDE SERPL-SCNC: 100 MMOL/L (ref 97–108)
CO2 SERPL-SCNC: 29 MMOL/L (ref 21–32)
CO2 SERPL-SCNC: 29 MMOL/L (ref 21–32)
CREAT SERPL-MCNC: 1.93 MG/DL (ref 0.55–1.02)
CREAT SERPL-MCNC: 1.94 MG/DL (ref 0.55–1.02)
DIFFERENTIAL METHOD BLD: ABNORMAL
EOSINOPHIL # BLD: 0.1 K/UL (ref 0–0.4)
EOSINOPHIL NFR BLD: 2 % (ref 0–7)
ERYTHROCYTE [DISTWIDTH] IN BLOOD BY AUTOMATED COUNT: 15.5 % (ref 11.5–14.5)
GLOBULIN SER CALC-MCNC: 4.2 G/DL (ref 2–4)
GLUCOSE BLD STRIP.AUTO-MCNC: 100 MG/DL (ref 65–117)
GLUCOSE BLD STRIP.AUTO-MCNC: 122 MG/DL (ref 65–117)
GLUCOSE BLD STRIP.AUTO-MCNC: 84 MG/DL (ref 65–117)
GLUCOSE SERPL-MCNC: 93 MG/DL (ref 65–100)
GLUCOSE SERPL-MCNC: 93 MG/DL (ref 65–100)
HCT VFR BLD AUTO: 45.9 % (ref 35–47)
HGB BLD-MCNC: 13.8 G/DL (ref 11.5–16)
IMM GRANULOCYTES # BLD AUTO: 0 K/UL (ref 0–0.04)
IMM GRANULOCYTES NFR BLD AUTO: 0 % (ref 0–0.5)
LYMPHOCYTES # BLD: 1.8 K/UL (ref 0.8–3.5)
LYMPHOCYTES NFR BLD: 28 % (ref 12–49)
MCH RBC QN AUTO: 26.6 PG (ref 26–34)
MCHC RBC AUTO-ENTMCNC: 30.1 G/DL (ref 30–36.5)
MCV RBC AUTO: 88.6 FL (ref 80–99)
MONOCYTES # BLD: 0.8 K/UL (ref 0–1)
MONOCYTES NFR BLD: 13 % (ref 5–13)
NEUTS SEG # BLD: 3.6 K/UL (ref 1.8–8)
NEUTS SEG NFR BLD: 56 % (ref 32–75)
NRBC # BLD: 0 K/UL (ref 0–0.01)
NRBC BLD-RTO: 0 PER 100 WBC
PERFORMED BY, TECHID: ABNORMAL
PERFORMED BY, TECHID: NORMAL
PERFORMED BY, TECHID: NORMAL
PHOSPHATE SERPL-MCNC: 3.1 MG/DL (ref 2.6–4.7)
PLATELET # BLD AUTO: 207 K/UL (ref 150–400)
PMV BLD AUTO: 13.1 FL (ref 8.9–12.9)
POTASSIUM SERPL-SCNC: 3.8 MMOL/L (ref 3.5–5.1)
POTASSIUM SERPL-SCNC: 3.8 MMOL/L (ref 3.5–5.1)
PROT SERPL-MCNC: 7.1 G/DL (ref 6.4–8.2)
RBC # BLD AUTO: 5.18 M/UL (ref 3.8–5.2)
SODIUM SERPL-SCNC: 136 MMOL/L (ref 136–145)
SODIUM SERPL-SCNC: 137 MMOL/L (ref 136–145)
WBC # BLD AUTO: 6.4 K/UL (ref 3.6–11)

## 2021-06-29 PROCEDURE — 82962 GLUCOSE BLOOD TEST: CPT

## 2021-06-29 PROCEDURE — 80053 COMPREHEN METABOLIC PANEL: CPT

## 2021-06-29 PROCEDURE — 94760 N-INVAS EAR/PLS OXIMETRY 1: CPT

## 2021-06-29 PROCEDURE — 74011250637 HC RX REV CODE- 250/637: Performed by: NURSE PRACTITIONER

## 2021-06-29 PROCEDURE — 77010033678 HC OXYGEN DAILY

## 2021-06-29 PROCEDURE — 74011250637 HC RX REV CODE- 250/637: Performed by: INTERNAL MEDICINE

## 2021-06-29 PROCEDURE — 74011250637 HC RX REV CODE- 250/637: Performed by: FAMILY MEDICINE

## 2021-06-29 PROCEDURE — 36415 COLL VENOUS BLD VENIPUNCTURE: CPT

## 2021-06-29 PROCEDURE — 74011000250 HC RX REV CODE- 250: Performed by: FAMILY MEDICINE

## 2021-06-29 PROCEDURE — 94640 AIRWAY INHALATION TREATMENT: CPT

## 2021-06-29 PROCEDURE — 80069 RENAL FUNCTION PANEL: CPT

## 2021-06-29 PROCEDURE — 85025 COMPLETE CBC W/AUTO DIFF WBC: CPT

## 2021-06-29 RX ORDER — AMIODARONE HYDROCHLORIDE 200 MG/1
200 TABLET ORAL DAILY
Qty: 30 TABLET | Refills: 0 | Status: SHIPPED | OUTPATIENT
Start: 2021-06-30 | End: 2021-11-19 | Stop reason: DRUGHIGH

## 2021-06-29 RX ORDER — FUROSEMIDE 40 MG/1
60 TABLET ORAL DAILY
Status: DISCONTINUED | OUTPATIENT
Start: 2021-06-29 | End: 2021-06-29 | Stop reason: HOSPADM

## 2021-06-29 RX ORDER — FAMOTIDINE 10 MG/1
10 TABLET ORAL 2 TIMES DAILY
Qty: 30 TABLET | Refills: 0 | Status: SHIPPED | OUTPATIENT
Start: 2021-06-29 | End: 2022-03-25

## 2021-06-29 RX ORDER — FUROSEMIDE 40 MG/1
60 TABLET ORAL DAILY
Qty: 30 TABLET | Refills: 0 | Status: SHIPPED | OUTPATIENT
Start: 2021-06-29 | End: 2021-11-19 | Stop reason: DRUGHIGH

## 2021-06-29 RX ADMIN — BUDESONIDE AND FORMOTEROL FUMARATE DIHYDRATE 2 PUFF: 160; 4.5 AEROSOL RESPIRATORY (INHALATION) at 07:49

## 2021-06-29 RX ADMIN — FUROSEMIDE 60 MG: 40 TABLET ORAL at 10:35

## 2021-06-29 RX ADMIN — ALBUTEROL SULFATE 2 PUFF: 108 AEROSOL, METERED RESPIRATORY (INHALATION) at 13:40

## 2021-06-29 RX ADMIN — APIXABAN 5 MG: 5 TABLET, FILM COATED ORAL at 08:35

## 2021-06-29 RX ADMIN — THEOPHYLLINE ANHYDROUS 300 MG: 300 CAPSULE, EXTENDED RELEASE ORAL at 08:35

## 2021-06-29 RX ADMIN — POTASSIUM CHLORIDE 10 MEQ: 750 TABLET, FILM COATED, EXTENDED RELEASE ORAL at 08:35

## 2021-06-29 RX ADMIN — CLOPIDOGREL BISULFATE 75 MG: 75 TABLET ORAL at 08:35

## 2021-06-29 RX ADMIN — FAMOTIDINE 10 MG: 10 TABLET ORAL at 08:35

## 2021-06-29 RX ADMIN — AMIODARONE HYDROCHLORIDE 200 MG: 200 TABLET ORAL at 08:35

## 2021-06-29 RX ADMIN — OXYBUTYNIN CHLORIDE 5 MG: 5 TABLET ORAL at 16:03

## 2021-06-29 RX ADMIN — LOSARTAN POTASSIUM 100 MG: 50 TABLET, FILM COATED ORAL at 08:35

## 2021-06-29 RX ADMIN — OXYBUTYNIN CHLORIDE 5 MG: 5 TABLET ORAL at 08:35

## 2021-06-29 RX ADMIN — ALBUTEROL SULFATE 2 PUFF: 108 AEROSOL, METERED RESPIRATORY (INHALATION) at 07:49

## 2021-06-29 RX ADMIN — IPRATROPIUM BROMIDE AND ALBUTEROL SULFATE 3 ML: .5; 3 SOLUTION RESPIRATORY (INHALATION) at 11:43

## 2021-06-29 RX ADMIN — COLCHICINE 0.6 MG: 0.6 TABLET, FILM COATED ORAL at 08:35

## 2021-06-29 NOTE — WOUND CARE
IP WOUND CONSULT    [facility] RECORD NUMBER:  [id number]  AGE: 72 y.o. GENDER: female  : 1950  TODAY'S DATE:  2021    GENERAL     [] Follow-up   [x] New Consult    José Miguel Sharpe is a 72 y.o. female referred by:   [x] Physician  [] Nursing  [] Other:         PAST MEDICAL HISTORY    Past Medical History:   Diagnosis Date    Chronic obstructive pulmonary disease ([address]     Congestive heart disease ([address]     Coronary artery disease     Diabetes ([address]     Hyperlipidemia     Hypertension     Myocardial infarct ([address]     IFTIKHAR (obstructive sleep apnea)         PAST SURGICAL HISTORY    Past Surgical History:   Procedure Laterality Date    HX BREAST BIOPSY      HX CHOLECYSTECTOMY      HX CORONARY ARTERY BYPASS GRAFT      HX HERNIA REPAIR      HX HYSTERECTOMY         FAMILY HISTORY    Family History   Problem Relation Age of Onset    Heart Disease Mother     Kidney Disease Mother     Cancer Maternal Grandmother          ALLERGIES    Allergies   Allergen Reactions    Penicillins Other (comments)    Tylox [Oxycodone-Acetaminophen] Hives       MEDICATIONS    No current facility-administered medications on file prior to encounter. Current Outpatient Medications on File Prior to Encounter   Medication Sig Dispense Refill    metoprolol tartrate (LOPRESSOR) 100 mg IR tablet Take 1 Tablet by mouth two (2) times daily (with meals).  apixaban (ELIQUIS) 5 mg tablet Take 1 Tab by mouth two (2) times a day. 30 Tab 0    carvediloL (COREG) 25 mg tablet Take 1 Tab by mouth two (2) times daily (with meals). 60 Tab 0    losartan (COZAAR) 100 mg tablet Take 1 Tab by mouth daily. 30 Tab 0    oxybutynin (DITROPAN) 5 mg tablet Take 1 Tab by mouth three (3) times daily. 90 Tab 0    amiodarone (PACERONE) 400 mg tablet Take 1 Tab by mouth daily. 14 Tab 0    insulin glargine (LANTUS) 100 unit/mL injection 10 Units by SubCUTAneous route nightly.  May give in pen  Indications: type 2 diabetes mellitus 1 Vial 0    QUEtiapine (SEROqueL) 25 mg tablet Take 25 mg by mouth nightly.  theophylline ER (BAM-24) 200 mg cp24 capsule Take 300 mg by mouth daily.  potassium chloride SR (KLOR-CON 10) 10 mEq tablet Take 10 mEq by mouth two (2) times a day.  mometasone-formoterol (Dulera) 200-5 mcg/actuation HFA inhaler Take 2 Puffs by inhalation two (2) times a day.  clopidogreL (PLAVIX) 75 mg tab Take 1 Tab by mouth daily. 30 Tab 0    nitroglycerin (NITROSTAT) 0.4 mg SL tablet Sit/Lay down then put one tab under the tongue every 5 minutes as needed for chest pain/neck or jaw pain for 3 doses. Seek immediate medical attention should you need more than one tab for pain to resolve. 1 Bottle 3    albuterol-ipratropium (DUO-NEB) 2.5 mg-0.5 mg/3 ml nebu 3 mL by Nebulization route every six (6) hours as needed for Wheezing.  albuterol (Ventolin HFA) 90 mcg/actuation inhaler Take 2 Puffs by inhalation two (2) times daily as needed for Wheezing.            [unfilled]  Visit Vitals  /69 (BP 1 Location: Left upper arm, BP Patient Position: At rest;Supine)   Pulse 77   Temp 98.2 °F (36.8 °C)   Resp 20   Ht 5' 4\" (1.626 m)   Wt 101.6 kg (223 lb 15.8 oz)   SpO2 98%   Breastfeeding No   BMI 38.45 kg/m²       ASSESSMENT     Wound Identification & Type: Erythema to right 1st toe, skin intact  Dressing change: NA  Verbal consent for picture:  NA    Contributing Factors: anticoagulation therapy, edema, diabetes, decreased mobility, shear force, incontinence of urine and obesity    Wound Buttocks Left Blanchable Erythema 06/24/21 (Active)   Wound Etiology Other (Comment) 06/24/21 1150   Dressing/Treatment Zinc paste 06/24/21 1150   Drainage Amount None 06/24/21 1150   Wound Odor None 06/24/21 1150   Lizy-Wound/Incision Assessment Blanchable erythema 06/24/21 1150   Edges Attached edges 06/24/21 1150   Number of days: 5          PLAN     Skin Care & Pressure Relief Recommendations  Minimize layers of linen  Turn/reposition approximately every 2 hours  Pillow wedges  Manage incontinence   Promote continence; Skin Protective lotion/cream to buttocks and sacrum daily and as needed with incontinence care  Offload heels pillows    Martin 19  Blood Glucose: 84 on 6/29/21                              Albumin: 2.9 on 6/29/21  WBCs: 6.4 on 6/29/21    Support Surface: Gel mattress    Physician/Provider notified:   Recommendations: 1st right toe is slightly red but improved per patient. She stated that when her gout flares up, it causes redness and tenderness to feet and toes. Currently, 1st right toe is non-tender per patient. No open areas or drainage, skin intact. Dependent edema noted to BLEs but much improved per patient. Advised her to use leg rest when sitting in recliner to keep her legs elevated to help reduce swelling/edema. PureWick in place to help manage  incontinence. Use chair cushion when sitting in chair or recliner to help reduce pressure to buttocks and ischials. Will continue to follow.           Teaching completed with:   [] Patient           [] Family member       [] Caregiver          [] Nursing  [] Other    Patient/Caregiver Teaching:  Level of patient/caregiver understanding able to:   [] Indicates understanding       [] Needs reinforcement  [] Unsuccessful      [] Verbal Understanding  [] Demonstrated understanding       [] No evidence of learning  [] Refused teaching         [] N/A       Electronically signed by Stacia Barcenas RN on 6/29/2021 at 12:13 PM

## 2021-06-29 NOTE — PROGRESS NOTES
General Daily Progress Note          Patient Name:   Krys Lang       YOB: 1950       Age:  72 y.o. Admit Date: 6/21/2021      Subjective:     Patient feeling well this morning. She complains of her bottom being sore from sitting/laying for a long time. On 2 L oxygen by nasal cannula. Patient has home O2 with [facility] and portable O2 tanks for going out. Creatinine slightly elevated likely from diuretic. Daughter is in the room to talk about D/C  And insurance coverage for pulmonary rehab.        Objective:     Visit Vitals  /87 (BP 1 Location: Left upper arm, BP Patient Position: At rest;Supine)   Pulse 77   Temp 98.7 °F (37.1 °C)   Resp 20   Ht 5' 4\" (1.626 m)   Wt 101.6 kg (223 lb 15.8 oz)   SpO2 95%   Breastfeeding No   BMI 38.45 kg/m²        Recent Results (from the past 24 hour(s))   GLUCOSE, POC    Collection Time: 06/28/21 11:12 AM   Result Value Ref Range    Glucose (POC) 134 (H) 65 - 117 mg/dL    Performed by Genesis Waggoner    GLUCOSE, POC    Collection Time: 06/28/21  4:05 PM   Result Value Ref Range    Glucose (POC) 110 65 - 117 mg/dL    Performed by Severiano [address], POC    Collection Time: 06/28/21  7:39 PM   Result Value Ref Range    Glucose (POC) 115 65 - 117 mg/dL    Performed by Ashwini Case    RENAL FUNCTION PANEL    Collection Time: 06/29/21  5:22 AM   Result Value Ref Range    Sodium 136 136 - 145 mmol/L    Potassium 3.8 3.5 - 5.1 mmol/L    Chloride 100 97 - 108 mmol/L    CO2 29 21 - 32 mmol/L    Anion gap 7 5 - 15 mmol/L    Glucose 93 65 - 100 mg/dL    BUN 44 (H) 6 - 20 mg/dL    Creatinine 1.94 (H) 0.55 - 1.02 mg/dL    BUN/Creatinine ratio 23 (H) 12 - 20      GFR est AA 31 (L) >60 ml/min/1.73m2    GFR est non-AA 26 (L) >60 ml/min/1.73m2    Calcium 8.4 (L) 8.5 - 10.1 mg/dL    Phosphorus 3.1 2.6 - 4.7 mg/dL    Albumin 2.9 (L) 3.5 - 5.0 g/dL   CBC WITH AUTOMATED DIFF    Collection Time: 06/29/21  5:22 AM   Result Value Ref Range    WBC 6.4 3.6 - 11.0 K/uL    RBC 5.18 3.80 - 5.20 M/uL    HGB 13.8 11.5 - 16.0 g/dL    HCT 45.9 35.0 - 47.0 %    MCV 88.6 80.0 - 99.0 FL    MCH 26.6 26.0 - 34.0 PG    MCHC 30.1 30.0 - 36.5 g/dL    RDW 15.5 (H) 11.5 - 14.5 %    PLATELET 570 973 - 223 K/uL    MPV 13.1 (H) 8.9 - 12.9 FL    NRBC 0.0 0.0  WBC    ABSOLUTE NRBC 0.00 0.00 - 0.01 K/uL    NEUTROPHILS 56 32 - 75 %    LYMPHOCYTES 28 12 - 49 %    MONOCYTES 13 5 - 13 %    EOSINOPHILS 2 0 - 7 %    BASOPHILS 1 0 - 1 %    IMMATURE GRANULOCYTES 0 0 - 0.5 %    ABS. NEUTROPHILS 3.6 1.8 - 8.0 K/UL    ABS. LYMPHOCYTES 1.8 0.8 - 3.5 K/UL    ABS. MONOCYTES 0.8 0.0 - 1.0 K/UL    ABS. EOSINOPHILS 0.1 0.0 - 0.4 K/UL    ABS. BASOPHILS 0.1 0.0 - 0.1 K/UL    ABS. IMM. GRANS. 0.0 0.00 - 0.04 K/UL    DF AUTOMATED     METABOLIC PANEL, COMPREHENSIVE    Collection Time: 06/29/21  5:22 AM   Result Value Ref Range    Sodium 137 136 - 145 mmol/L    Potassium 3.8 3.5 - 5.1 mmol/L    Chloride 100 97 - 108 mmol/L    CO2 29 21 - 32 mmol/L    Anion gap 8 5 - 15 mmol/L    Glucose 93 65 - 100 mg/dL    BUN 44 (H) 6 - 20 mg/dL    Creatinine 1.93 (H) 0.55 - 1.02 mg/dL    BUN/Creatinine ratio 23 (H) 12 - 20      GFR est AA 31 (L) >60 ml/min/1.73m2    GFR est non-AA 26 (L) >60 ml/min/1.73m2    Calcium 8.4 (L) 8.5 - 10.1 mg/dL    Bilirubin, total 0.6 0.2 - 1.0 mg/dL    AST (SGOT) 22 15 - 37 U/L    ALT (SGPT) 18 12 - 78 U/L    Alk. phosphatase 135 (H) 45 - 117 U/L    Protein, total 7.1 6.4 - 8.2 g/dL    Albumin 2.9 (L) 3.5 - 5.0 g/dL    Globulin 4.2 (H) 2.0 - 4.0 g/dL    A-G Ratio 0.7 (L) 1.1 - 2.2     GLUCOSE, POC    Collection Time: 06/29/21  7:20 AM   Result Value Ref Range    Glucose (POC) 84 65 - 117 mg/dL    Performed by Kina Dudley      [unfilled]      Review of Systems    Constitutional: Negative for chills and fever. HENT: Negative. Eyes: Negative. Respiratory: Negative. Cardiovascular: substernal chest pain. Gastrointestinal: Abdominal pain relieved after bowel movement.    Skin: Negative. Neurological: headache. Physical Exam:      Constitutional: pt is oriented to person, place, and time. HENT:   Head: Normocephalic and atraumatic. Eyes: Pupils are equal, round, and reactive to light. EOM are normal.   Cardiovascular: Normal rate, regular rhythm and normal heart sounds. Bilateral lower extremity edema  Pulmonary/Chest: Bibasilar diminished breath sounds with no rales, wheezes, rhonchi. No increased work of breathing. Abdominal: Soft. Bowel sounds are normal. There is no abdominal tenderness. There is no rebound and no guarding. Musculoskeletal: Normal range of motion. Right great toe painful and warm consistent with her usual gout symptoms  Neurological: pt is alert and oriented to person, place, and time. XR CHEST PA LAT   Final Result   FINDINGS: IMPRESSION: 2 views: Frontal and lateral chest.      Median sternotomy status post CABG. Improved cardiomegaly, vascular congestion. Trace hydrostatic edema in the left lung base. The lungs are well inflated. No lobar consolidation, pleural effusion,   pneumothorax. No free air under the diaphragm. Bony structures grossly intact. XR CHEST PA LAT   Final Result      Stable cardiomegaly. Decreased pulmonary vascular congestion and pulmonary   interstitial edema compared to the prior study from 6/21/2021. XR CHEST PORT   Final Result   Cardiomegaly with mild vascular congestion and increased   interstitial markings suggesting volume overload/CHF.            Recent Results (from the past 24 hour(s))   GLUCOSE, POC    Collection Time: 06/28/21 11:12 AM   Result Value Ref Range    Glucose (POC) 134 (H) 65 - 117 mg/dL    Performed by Jose Kimbrough    GLUCOSE, POC    Collection Time: 06/28/21  4:05 PM   Result Value Ref Range    Glucose (POC) 110 65 - 117 mg/dL    Performed by Severiano [address], POC    Collection Time: 06/28/21  7:39 PM   Result Value Ref Range    Glucose (POC) 115 65 - 117 mg/dL Performed by Itzel Cota    RENAL FUNCTION PANEL    Collection Time: 06/29/21  5:22 AM   Result Value Ref Range    Sodium 136 136 - 145 mmol/L    Potassium 3.8 3.5 - 5.1 mmol/L    Chloride 100 97 - 108 mmol/L    CO2 29 21 - 32 mmol/L    Anion gap 7 5 - 15 mmol/L    Glucose 93 65 - 100 mg/dL    BUN 44 (H) 6 - 20 mg/dL    Creatinine 1.94 (H) 0.55 - 1.02 mg/dL    BUN/Creatinine ratio 23 (H) 12 - 20      GFR est AA 31 (L) >60 ml/min/1.73m2    GFR est non-AA 26 (L) >60 ml/min/1.73m2    Calcium 8.4 (L) 8.5 - 10.1 mg/dL    Phosphorus 3.1 2.6 - 4.7 mg/dL    Albumin 2.9 (L) 3.5 - 5.0 g/dL   CBC WITH AUTOMATED DIFF    Collection Time: 06/29/21  5:22 AM   Result Value Ref Range    WBC 6.4 3.6 - 11.0 K/uL    RBC 5.18 3.80 - 5.20 M/uL    HGB 13.8 11.5 - 16.0 g/dL    HCT 45.9 35.0 - 47.0 %    MCV 88.6 80.0 - 99.0 FL    MCH 26.6 26.0 - 34.0 PG    MCHC 30.1 30.0 - 36.5 g/dL    RDW 15.5 (H) 11.5 - 14.5 %    PLATELET 627 264 - 351 K/uL    MPV 13.1 (H) 8.9 - 12.9 FL    NRBC 0.0 0.0  WBC    ABSOLUTE NRBC 0.00 0.00 - 0.01 K/uL    NEUTROPHILS 56 32 - 75 %    LYMPHOCYTES 28 12 - 49 %    MONOCYTES 13 5 - 13 %    EOSINOPHILS 2 0 - 7 %    BASOPHILS 1 0 - 1 %    IMMATURE GRANULOCYTES 0 0 - 0.5 %    ABS. NEUTROPHILS 3.6 1.8 - 8.0 K/UL    ABS. LYMPHOCYTES 1.8 0.8 - 3.5 K/UL    ABS. MONOCYTES 0.8 0.0 - 1.0 K/UL    ABS. EOSINOPHILS 0.1 0.0 - 0.4 K/UL    ABS. BASOPHILS 0.1 0.0 - 0.1 K/UL    ABS. IMM.  GRANS. 0.0 0.00 - 0.04 K/UL    DF AUTOMATED     METABOLIC PANEL, COMPREHENSIVE    Collection Time: 06/29/21  5:22 AM   Result Value Ref Range    Sodium 137 136 - 145 mmol/L    Potassium 3.8 3.5 - 5.1 mmol/L    Chloride 100 97 - 108 mmol/L    CO2 29 21 - 32 mmol/L    Anion gap 8 5 - 15 mmol/L    Glucose 93 65 - 100 mg/dL    BUN 44 (H) 6 - 20 mg/dL    Creatinine 1.93 (H) 0.55 - 1.02 mg/dL    BUN/Creatinine ratio 23 (H) 12 - 20      GFR est AA 31 (L) >60 ml/min/1.73m2    GFR est non-AA 26 (L) >60 ml/min/1.73m2    Calcium 8.4 (L) 8.5 - 10.1 mg/dL    Bilirubin, total 0.6 0.2 - 1.0 mg/dL    AST (SGOT) 22 15 - 37 U/L    ALT (SGPT) 18 12 - 78 U/L    Alk. phosphatase 135 (H) 45 - 117 U/L    Protein, total 7.1 6.4 - 8.2 g/dL    Albumin 2.9 (L) 3.5 - 5.0 g/dL    Globulin 4.2 (H) 2.0 - 4.0 g/dL    A-G Ratio 0.7 (L) 1.1 - 2.2     GLUCOSE, POC    Collection Time: 06/29/21  7:20 AM   Result Value Ref Range    Glucose (POC) 84 65 - 117 mg/dL    Performed by Nichole Aguillon        Results     ** No results found for the last 336 hours. **           Labs:     Recent Labs     06/29/21  0522 06/26/21  0927   WBC 6.4 8.8   HGB 13.8 14.5   HCT 45.9 49.3*    228     Recent Labs     06/29/21  0522 06/28/21  0849 06/27/21  0524     136 137 137   K 3.8  3.8 3.9 3.7     100 98 98   CO2 29  29 30 32   BUN 44*  44* 46* 38*   CREA 1.93*  1.94* 2.05* 1.75*   GLU 93  93 103* 123*   CA 8.4*  8.4* 8.8 8.4*   PHOS 3.1 4.0 3.3     Recent Labs     06/29/21  0522 06/28/21  0849 06/27/21  0524   ALT 18  --   --    *  --   --    TBILI 0.6  --   --    TP 7.1  --   --    ALB 2.9*  2.9* 3.0* 2.8*   GLOB 4.2*  --   --      No results for input(s): INR, PTP, APTT, INREXT, INREXT in the last 72 hours. No results for input(s): FE, TIBC, PSAT, FERR in the last 72 hours. No results found for: FOL, RBCF   No results for input(s): PH, PCO2, PO2 in the last 72 hours. No results for input(s): CPK, CKNDX, TROIQ in the last 72 hours.     No lab exists for component: CPKMB  No results found for: CHOL, CHOLX, CHLST, CHOLV, HDL, HDLP, LDL, LDLC, DLDLP, TGLX, TRIGL, TRIGP, CHHD, CHHDX  Lab Results   Component Value Date/Time    Glucose (POC) 84 06/29/2021 07:20 AM    Glucose (POC) 115 06/28/2021 07:39 PM    Glucose (POC) 110 06/28/2021 04:05 PM    Glucose (POC) 134 (H) 06/28/2021 11:12 AM    Glucose (POC) 102 06/28/2021 07:41 AM     Lab Results   Component Value Date/Time    Color Yellow/Straw 06/07/2021 05:00 PM    Appearance Clear 06/07/2021 05:00 PM    Specific gravity 1.016 06/07/2021 05:00 PM    pH (UA) 5.0 06/07/2021 05:00 PM    Protein 100 (A) 06/07/2021 05:00 PM    Glucose Negative 06/07/2021 05:00 PM    Ketone Negative 06/07/2021 05:00 PM    Bilirubin Negative 06/07/2021 05:00 PM    Urobilinogen 0.1 06/07/2021 05:00 PM    Nitrites Negative 06/07/2021 05:00 PM    Leukocyte Esterase Negative 06/07/2021 05:00 PM    Bacteria Negative 06/07/2021 05:00 PM    WBC 0-4 06/07/2021 05:00 PM    RBC 0-5 06/07/2021 05:00 PM         Assessment:   Acute pulmonary edemaimproved on repeat chest x-ray 6/24/patient on 2 L oxygen by nasal cannula  Acute congestive heart failure with preserved ejection fraction  CAD status post CABG  Mildly elevated troponin  Type 2 diabetes with hyperglycemia  Hypertension  COPD no acute exacerbation  Chronic kidney disease  Bradycardia  History of A. fib status post cardioversion  Depression  Headache  Gout of right great toe  IFTIKHAR treated with CPAP      Plan:   Pulmonology and cardiology following  Hold Lasix due to elevated creatinine  Respiratory consult for overnight and ambulatory pulse ox.   Continue at home CPAP     Continue albuterol inhaler 2 puff every 6 hours  Amiodarone 200 mg daily  Eliquis 5 mg twice a day  Symbicort 2 puff 2 times a day  Plavix 75 mg daily  Colchicine 0.6 mg daily  Pepcid 10 mg twice a day  Lantus 10 units subcu daily  Cozaar 100 mg daily  Oxybutynin 5 mg 3 times a day  Potassium 10 mg twice a day  Seroquel 25 mg at bedtime  Theophylline 200 mg daily      Discussed with the  possible discharge home tomorrow  PT OT consult    Discharge home with home health PT OT and possible oxygen         Current Facility-Administered Medications:     colchicine tablet 0.6 mg, 0.6 mg, Oral, DAILY, Carlos Solano MD, 0.6 mg at 06/29/21 0835    loperamide (IMODIUM) 1 mg/7.5 mL oral solution 2 mg, 2 mg, Oral, TID PRN, Cha MOROCHO MD, 2 mg at 06/27/21 2206    acetaminophen (TYLENOL) tablet 650 mg, 650 mg, Oral, Q4H PRN, Lyric Day MD, 650 mg at 06/26/21 0221    famotidine (PEPCID) tablet 10 mg, 10 mg, Oral, BID, Wei Dailey NP, 10 mg at 06/29/21 0835    [Held by provider] furosemide (LASIX) tablet 80 mg, 80 mg, Oral, DAILY, Lyric Day MD, 80 mg at 06/26/21 0754    alum-mag hydroxide-simeth (MYLANTA) oral suspension 30 mL, 30 mL, Oral, Q6H PRN, Gopal Alvarado MD, 30 mL at 06/27/21 0216    albuterol (PROVENTIL HFA, VENTOLIN HFA, PROAIR HFA) inhaler 2 Puff, 2 Puff, Inhalation, Q6HWA RT, Lyric Day MD, 2 Puff at 06/29/21 0749    amiodarone (CORDARONE) tablet 200 mg, 200 mg, Oral, DAILY, Elizabeth Escalante MD, 200 mg at 06/29/21 0962    apixaban (ELIQUIS) tablet 5 mg, 5 mg, Oral, BID, Gopal Alvarado MD, 5 mg at 06/29/21 0835    clopidogreL (PLAVIX) tablet 75 mg, 75 mg, Oral, DAILY, Gopal Alvarado MD, 75 mg at 06/29/21 0835    insulin glargine (LANTUS) injection 10 Units, 10 Units, SubCUTAneous, QHS, Gopal Alvarado MD, 10 Units at 06/28/21 2148    losartan (COZAAR) tablet 100 mg, 100 mg, Oral, DAILY, Gopal Alvarado MD, 100 mg at 06/29/21 0835    nitroglycerin (NITROSTAT) tablet 0.4 mg, 0.4 mg, SubLINGual, PRN, Gopal Alvarado MD, 0.4 mg at 06/23/21 2048    oxybutynin (DITROPAN) tablet 5 mg, 5 mg, Oral, TID, Gopal Alvarado MD, 5 mg at 06/29/21 0835    albuterol-ipratropium (DUO-NEB) 2.5 MG-0.5 MG/3 ML, 3 mL, Nebulization, Q6H PRN, Adria Alvarado MD    budesonide-formoteroL (SYMBICORT) 160-4.5 mcg/actuation HFA inhaler 2 Puff, 2 Puff, Inhalation, BID RT, Gopal Alvarado MD, 2 Puff at 06/29/21 0749    potassium chloride SR (KLOR-CON 10) tablet 10 mEq, 10 mEq, Oral, BID, Gopal Alvarado MD, 10 mEq at 06/29/21 0835    QUEtiapine (SEROquel) tablet 25 mg, 25 mg, Oral, QHS, Gopal Alvarado MD, 25 mg at 06/28/21 2148    theophylline ER (BAM-24) capsule 300 mg, 300 mg, Oral, DAILY, Gopal Alvarado MD, 300 mg at 06/29/21 0835    insulin lispro (HUMALOG) injection, , SubCUTAneous, AC&HS, Dione Jean MD, 3 Units at 06/26/21 1155    glucose chewable tablet 16 g, 4 Tablet, Oral, PRN, Shabbir Alvarado MD    dextrose (D50W) injection syrg 12.5-25 g, 25-50 mL, IntraVENous, PRN, Shabbir Alvarado MD    glucagon (GLUCAGEN) injection 1 mg, 1 mg, IntraMUSCular, PRN, Shabbir Alvarado MD    polyethylene glycol (MIRALAX) packet 17 g, 17 g, Oral, DAILY PRN, Gopal Alvarado MD    ondansetron (ZOFRAN ODT) tablet 4 mg, 4 mg, Oral, Q8H PRN, 4 mg at 06/27/21 1559 **OR** ondansetron (ZOFRAN) injection 4 mg, 4 mg, IntraVENous, Q6H PRN, Dione Jean MD

## 2021-06-29 NOTE — PROGRESS NOTES
Patient discharged home with home health. IV discontinued, tip intact, patient tolerated well. Telemetry discontinued. Discharge instructions given to patient at bedside. Discharge plan of care/case management plan validated with provider discharge order.

## 2021-06-29 NOTE — PROGRESS NOTES
Progress Note      6/29/2021 11:07 AM  NAME: Verner Slater   MRN:  [id number]   Admit Diagnosis: CHF exacerbation ([facility] [address] [I50.9]  CHF (congestive heart failure) ([facility] [I50.9]      Problem List:   Acute on chronic diastolic heart failure  Chronic COPD with acute exacerbation  Stable CAD with recent cardiac cath showing widely patent coronaries  IFTIKHAR  Edema, improved  Pulmonary hypertension. PASP 62 mmHg     Assessment/Plan:    p.o. diuretics  Oxygen therapy for significant pulmonary hypertension  Continue systemic anticoagulation for prevention due to A. fib/flutter  Discharge planning underway, patient wants to go to rehab         []       High complexity decision making was performed in this patient at high risk for decompensation with multiple organ involvement. Subjective:     Verner Slater reports right-sided chest pain, mild dyspnea. Discussed with RN events overnight. Review of Systems:   Negative except for as noted above. Objective:      Physical Exam:    Last 24hrs VS reviewed since prior progress note. Most recent are:    Visit Vitals  /69 (BP 1 Location: Left upper arm, BP Patient Position: At rest;Supine)   Pulse 77   Temp 98.2 °F (36.8 °C)   Resp 20   Ht 5' 4\" (1.626 m)   Wt 101.6 kg (223 lb 15.8 oz)   SpO2 98%   Breastfeeding No   BMI 38.45 kg/m²     No intake or output data in the 24 hours ending 06/29/21 1201     General Appearance:  alert; no acute distress. Ears/Nose/Mouth/Throat: Anicteric; moist mucous membranes  Neck: Supple. Chest: Lungs clear to auscultation bilaterally. Cardiovascular: Regular rate and rhythm, S1S2 normal,+ murmur. Abdomen: Soft, non-tender, bowel sounds are active. Extremities: trace edema bilaterally. Skin: Warm and dry. []         Post-cath site without hematoma, bruit, tenderness, or thrill. Distal pulses intact. PMH/SH reviewed - no change compared to H&P    Echocardiogram 6/22/2021:  · LV: Estimated LVEF is 55 - 60%.  Normal cavity size and systolic function (ejection fraction normal). Mild concentric hypertrophy. Wall motion: normal. Mild (grade 1) left ventricular diastolic dysfunction. · LA: Mildly dilated left atrium. · RA: Mildly dilated right atrium. · AV: With no evidence of reduced excursion. · MV: Mild mitral valve regurgitation is present. · TV: Mild tricuspid valve regurgitation is present. · PV: Mild pulmonic valve regurgitation is present. · PA: Pulmonary arterial systolic pressure is 62 mmHg. Telemetry: Sinus rhythm with heart rate in the 80s    [x]  No new EKG for review    Lab Data Personally Reviewed:    Recent Labs     06/29/21 0522   WBC 6.4   HGB 13.8   HCT 45.9        No results for input(s): INR, PTP, APTT, INREXT, INREXT in the last 72 hours. Recent Labs     06/29/21 0522 06/28/21 0849 06/27/21 0524     136 137 137   K 3.8  3.8 3.9 3.7     100 98 98   CO2 29  29 30 32   BUN 44*  44* 46* 38*   CREA 1.93*  1.94* 2.05* 1.75*   GLU 93  93 103* 123*   CA 8.4*  8.4* 8.8 8.4*     No results for input(s): CPK, CKNDX, TROIQ in the last 72 hours. No lab exists for component: CPKMB  No results found for: CHOL, CHOLX, CHLST, CHOLV, HDL, HDLP, LDL, LDLC, DLDLP, [place], CHHD, CHHDX    Recent Labs     06/29/21 0522 06/28/21  0849 06/27/21  0524   *  --   --    TP 7.1  --   --    ALB 2.9*  2.9* 3.0* 2.8*   GLOB 4.2*  --   --      No results for input(s): PH, PCO2, PO2 in the last 72 hours.     Medications Personally Reviewed:    Current Facility-Administered Medications   Medication Dose Route Frequency    furosemide (LASIX) tablet 60 mg  60 mg Oral DAILY    colchicine tablet 0.6 mg  0.6 mg Oral DAILY    loperamide (IMODIUM) 1 mg/7.5 mL oral solution 2 mg  2 mg Oral TID PRN    acetaminophen (TYLENOL) tablet 650 mg  650 mg Oral Q4H PRN    famotidine (PEPCID) tablet 10 mg  10 mg Oral BID    alum-mag hydroxide-simeth (MYLANTA) oral suspension 30 mL  30 mL Oral Q6H PRN    albuterol (PROVENTIL HFA, VENTOLIN HFA, PROAIR HFA) inhaler 2 Puff  2 Puff Inhalation Q6HWA RT    amiodarone (CORDARONE) tablet 200 mg  200 mg Oral DAILY    apixaban (ELIQUIS) tablet 5 mg  5 mg Oral BID    clopidogreL (PLAVIX) tablet 75 mg  75 mg Oral DAILY    insulin glargine (LANTUS) injection 10 Units  10 Units SubCUTAneous QHS    losartan (COZAAR) tablet 100 mg  100 mg Oral DAILY    nitroglycerin (NITROSTAT) tablet 0.4 mg  0.4 mg SubLINGual PRN    oxybutynin (DITROPAN) tablet 5 mg  5 mg Oral TID    albuterol-ipratropium (DUO-NEB) 2.5 MG-0.5 MG/3 ML  3 mL Nebulization Q6H PRN    budesonide-formoteroL (SYMBICORT) 160-4.5 mcg/actuation HFA inhaler 2 Puff  2 Puff Inhalation BID RT    potassium chloride SR (KLOR-CON 10) tablet 10 mEq  10 mEq Oral BID    QUEtiapine (SEROquel) tablet 25 mg  25 mg Oral QHS    theophylline ER (BAM-24) capsule 300 mg  300 mg Oral DAILY    insulin lispro (HUMALOG) injection   SubCUTAneous AC&HS    glucose chewable tablet 16 g  4 Tablet Oral PRN    dextrose (D50W) injection syrg 12.5-25 g  25-50 mL IntraVENous PRN    glucagon (GLUCAGEN) injection 1 mg  1 mg IntraMUSCular PRN    polyethylene glycol (MIRALAX) packet 17 g  17 g Oral DAILY PRN    ondansetron (ZOFRAN ODT) tablet 4 mg  4 mg Oral Q8H PRN    Or    ondansetron (ZOFRAN) injection 4 mg  4 mg IntraVENous Q6H PRN         Tracy Camarena MD

## 2021-06-29 NOTE — PROGRESS NOTES
Spoke with [employer] concerning the need for a new CPAP mask. Starjigar Ayala stated that Mrs Leandra Soto only receives home oxygen from them, and has never received any CPAP machine or equipment from their company. Will attempt to ask patient/family if they know who their provider is for CPAP supplies.

## 2021-06-29 NOTE — PROGRESS NOTES
Rt was called to give prn treatment. Patient is not in any respiratory distress, wheezing, or complaining about sob. Patient stated that she was coughing. Patient prn nebs are every 6 hours.  Patient received a treatment at 7:49am and 11:43am.

## 2021-06-29 NOTE — PROGRESS NOTES
Consult  Pulmonary, Critical Care    Name: Verner Slater MRN: [id number]   : 1950 Hospital: [address] [place] [address]   Date: 2021  Admission date: 2021 Hospital Day: 9       Subjective/Interval History:   Seen in the emergency room. Has had over 3 L of diuresis so far and feels much better. History is of mild cardiomyopathy normally on Lasix 20 mg. About 2 weeks ago she noted worsening edema and that her 20 mg of Lasix was not initiating any diuresis. She upped it to 40 mg a week ago and still has not been having any diuresis has had worsening edema of her legs shortness of breath cough productive of clear sputum. She thinks she may have had an episode of fever but is not sure. Has not seen any purulent sputum   did not tolerate our CPAP last night. She states her family will bring her machine in today to use. Feels better edema is improved urine output not recorded yesterday states she was still passing a large amount of urine   wore her CPAP last night without difficulty. Will check overnight oximetry to make sure saturation is maintained tonight. She received oral Lasix 80 mg at nine and as of 10:30 AM has not had any significant diuresis. We'll see how she does over the next three or 4 hours. Tomorrow we'll give Zaroxolyn preceding Lasix   headache all night long so did not wear CPAP last night. Also states she has developed severe pain of her right great toe. She does give a history of having gout in the past   no headache today states her right great toe still hurts but is better than yesterday   no headache states she did have upset stomach yesterday and today with diarrhea last night probably due to the loading dose of colchicine the day before. No pain over her right toe today   no complaints today no further diarrhea upset stomach toe pain or shortness of breath   doing well respirations nonlabored.   No further headache toe pain or shortness of breath. No further diarrhea    Hospital Problems  Date Reviewed: 3/12/2021        Codes Class Noted POA    CHF exacerbation ([facility] ICD-10-CM: I50.9  ICD-9-CM: 428.0  6/21/2021 Unknown        CHF (congestive heart failure) ([facility]) ICD-10-CM: I50.9  ICD-9-CM: 428.0  2/22/2021 Unknown              IMPRESSION:   1. Acute pulmonary edema resolved   2. Chronic hypoxic respiratory failure remains on her baseline oxygen  3. Acute kidney injury Lasix on hold due to rising creatinine. Creatinine has improved today we will resume Lasix at lower dose  4. Cardiomyopathy diastolic dysfunction  5. COPD no wheezing  6. Obstructive sleep apnea is not wearing her CPAP. States her mask is old and not fitting. Will ask respiratory to see if [facility] will replace her mask  7. Headache resolved  8. Acute gout pain-free on colchicine  9. Diarrhea resolved  10. Diabetes  11. Coronary artery disease with bypass grafting and stents in the past  Body mass index is 38.45 kg/m². 12.       RECOMMENDATIONS/PLAN:   1. Creatinine improved we will resume Lasix but decrease to 60 mg daily  2. Continue nasal oxygen as she uses at home  3. Continue her home CPAP with oxygen bleed in we will ask respiratory to contact [facility] for a new mask  4. Once home would continue her Dulera inhaler  5. We will use albuterol inhaler. 6. Continue colchicine for her acute gout  once a day  7. She has a nebulizer machine at home she thinks it is not working correctly          [x] High complexity decision making was performed  [x] See my orders for details      Subjective/Initial History:     I was asked by [id number] Dieudonne Hull MD to see Ida Lara  a 72 y.o.    female in consultation for a chief complaint of shortness of breath and edema        Allergies   Allergen Reactions    Penicillins Other (comments)    Tylox [Oxycodone-Acetaminophen] Hives        MAR reviewed and pertinent medications noted or modified as needed Current Facility-Administered Medications   Medication    furosemide (LASIX) tablet 60 mg    colchicine tablet 0.6 mg    loperamide (IMODIUM) 1 mg/7.5 mL oral solution 2 mg    acetaminophen (TYLENOL) tablet 650 mg    famotidine (PEPCID) tablet 10 mg    alum-mag hydroxide-simeth (MYLANTA) oral suspension 30 mL    albuterol (PROVENTIL HFA, VENTOLIN HFA, PROAIR HFA) inhaler 2 Puff    amiodarone (CORDARONE) tablet 200 mg    apixaban (ELIQUIS) tablet 5 mg    clopidogreL (PLAVIX) tablet 75 mg    insulin glargine (LANTUS) injection 10 Units    losartan (COZAAR) tablet 100 mg    nitroglycerin (NITROSTAT) tablet 0.4 mg    oxybutynin (DITROPAN) tablet 5 mg    albuterol-ipratropium (DUO-NEB) 2.5 MG-0.5 MG/3 ML    budesonide-formoteroL (SYMBICORT) 160-4.5 mcg/actuation HFA inhaler 2 Puff    potassium chloride SR (KLOR-CON 10) tablet 10 mEq    QUEtiapine (SEROquel) tablet 25 mg    theophylline ER (BAM-24) capsule 300 mg    insulin lispro (HUMALOG) injection    glucose chewable tablet 16 g    dextrose (D50W) injection syrg 12.5-25 g    glucagon (GLUCAGEN) injection 1 mg    polyethylene glycol (MIRALAX) packet 17 g    ondansetron (ZOFRAN ODT) tablet 4 mg    Or    ondansetron (ZOFRAN) injection 4 mg      Patient PCP: Cassandra Ch MD  PMH:  has a past medical history of Chronic obstructive pulmonary disease ([address] Congestive heart disease ([address] Coronary artery disease, Diabetes ([address] Hyperlipidemia, Hypertension, Myocardial infarct ([address] and IFTIKHAR (obstructive sleep apnea). PSH:   has a past surgical history that includes hx cholecystectomy; hx hysterectomy; hx hernia repair; hx coronary artery bypass graft; and hx breast biopsy. FHX: family history includes Cancer in her maternal grandmother; Heart Disease in her mother; Kidney Disease in her mother. SHX:  reports that she has quit smoking. She has never used smokeless tobacco. She reports previous alcohol use.  She reports that she does not use drugs. Systemic review:  General she thinks her weight stable but has had 2 weeks of worsening shortness of breath and edema with no response from her normal dose of Lasix. She thinks she felt hot for a few days but did not check her temperature. Eyes no double vision or momentary blindness  ENT no facial pain over the sinuses  Musculoskeletal no swollen tender joints  Neurologic no seizures or syncope  Endocrinologic has diabetes denies polyuria polydipsia  Gastrointestinal no nausea vomiting acid indigestion  Genitourinary no pain or discomfort on urination no bleeding or drainage. She noticed very poor to no response from her normal doses of Lasix and did increase from 20 to 40 mg daily again with no diuresis initiated  Cardiovascular no chest pain or diaphoresis she has had worsening ankle edema nocturia once or twice per night which is normal for her. Respiratory has a history of COPD stop smoking 2 years ago but had over 45-year history of smoking between half and 1-1/2 packs/day. Has minimal cough states she uses her Dulera inhaler twice a day and her albuterol inhaler twice a day uses nebulizer on a as needed basis. Has had cough recently but chest clear mucus no purulence.     Objective:     Vital Signs: Telemetry:    normal sinus rhythm Intake/Output:   Visit Vitals  /87 (BP 1 Location: Left upper arm, BP Patient Position: At rest;Supine)   Pulse 77   Temp 98.7 °F (37.1 °C)   Resp 20   Ht 5' 4\" (1.626 m)   Wt 101.6 kg (223 lb 15.8 oz)   SpO2 95%   Breastfeeding No   BMI 38.45 kg/m²       Temp (24hrs), Av °F (36.7 °C), Min:97.6 °F (36.4 °C), Max:98.7 °F (37.1 °C)        O2 Device: Nasal cannula O2 Flow Rate (L/min): 2 l/min       Wt Readings from Last 4 Encounters:   21 101.6 kg (223 lb 15.8 oz)   21 108 kg (238 lb)   21 107.5 kg (237 lb)   21 107.5 kg (237 lb)        No intake or output data in the 24 hours ending 21 0945    Last shift:      No intake/output data recorded. Last 3 shifts: No intake/output data recorded. Physical Exam:   General:  female; lying in bed with no complaints  HEENT: NCAT, normal oral mucosa  Eyes: anicteric; conjunctiva clear extraocular movements intact  Neck: no nodes, neck veins flat , no accessory MM use. Chest: no deformity,   Cardiac: Regular rate and rhythm distant sounds no definite murmur improved edema of the lower legs  Lungs: Prolongation of exhalation but no wheezing the lung sounds clear anterior laterally and upper lungs posterior with slightly diminished in the bases no definite rales  Abd: Soft nontender normal bowel sounds  Ext: Leg edema markedly improved no joint swelling; No clubbing right great toe nontender  :clear urine  Neuro: Awake alert oriented speech is clear moves all 4 extremities  Psych- no agitation, oriented to person;   Skin: warm, dry, no cyanosis;   Pulses: Brachial and radial pulses intact  Capillary: Normal capillary refill    Labs:    Recent Labs     06/29/21  0522   WBC 6.4   HGB 13.8        Recent Labs     06/29/21  0522 06/28/21  0849 06/27/21  0524     136 137 137   K 3.8  3.8 3.9 3.7     100 98 98   CO2 29  29 30 32   GLU 93  93 103* 123*   BUN 44*  44* 46* 38*   CREA 1.93*  1.94* 2.05* 1.75*   CA 8.4*  8.4* 8.8 8.4*   PHOS 3.1 4.0 3.3   ALB 2.9*  2.9* 3.0* 2.8*   ALT 18  --   --    6/29 2 L nasal oxygen with saturation 95%  6/28 2 L nasal oxygen with saturation 97%    Uric acid 6.9  BNP 2271  Lab Results   Component Value Date/Time    Culture result: Klebsiella pneumoniae (A) 03/10/2021 12:06 AM     Imaging:    CXR Results  (Last 48 hours)               06/28/21 0804  XR CHEST PA LAT Final result    Impression:  FINDINGS: IMPRESSION: 2 views: Frontal and lateral chest.       Median sternotomy status post CABG. Improved cardiomegaly, vascular congestion. Trace hydrostatic edema in the left lung base.        The lungs are well inflated. No lobar consolidation, pleural effusion,   pneumothorax. No free air under the diaphragm. Bony structures grossly intact. Narrative:  Pulmonary edema. Comparison chest x-ray 6/24/2021. Results from [place] encounter on 06/21/21    XR CHEST PA LAT    Narrative  Pulmonary edema. Comparison chest x-ray 6/24/2021. Impression  FINDINGS: IMPRESSION: 2 views: Frontal and lateral chest.    Median sternotomy status post CABG. Improved cardiomegaly, vascular congestion. Trace hydrostatic edema in the left lung base. The lungs are well inflated. No lobar consolidation, pleural effusion,  pneumothorax. No free air under the diaphragm. Bony structures grossly intact. XR CHEST PA LAT    Narrative  Examination: Frontal and lateral radiographs of the chest    Clinical information: Pulmonary edema    Comparison: 6/21/2021    Findings:    Post median sternotomy. Stable enlargement of the cardiac contours. The superior  and middle mediastinal contours are normal. There is calcific atherosclerosis of  the thoracic aorta. There is decreased pulmonary vascular congestion and  decreased pulmonary interstitial thickening. No focal pulmonary consolidation. No pleural effusion or pneumothorax. No acute osseous abnormality. Impression  Stable cardiomegaly. Decreased pulmonary vascular congestion and pulmonary  interstitial edema compared to the prior study from 6/21/2021. XR CHEST PORT    Narrative  Portable upright radiograph of the chest 3:24 PM. No prior study. INDICATION: Shortness of breath. Patient is status post median sternotomy. Heart size is enlarged with an  atherosclerotic aorta. There is vascular congestion. There is fluid in the minor  fissure. Mild increased interstitial markings. No definite infiltrate. No  pneumothorax.     Impression  Cardiomegaly with mild vascular congestion and increased  interstitial markings suggesting volume overload/CHF. Results from [place] encounter on 06/07/21    CT ABD PELV W CONT    Narrative  Indication: Possible hernia. Dose reduction: All CT scans done at this facility are performed using dose  reduction optimization techniques as appropriate to a performed exam including  the following: Automated exposure control, adjustments of the mA and/or kV  according to patient size, or use of iterative reconstruction technique. CT abdomen and pelvis, 100 cc [id number]-[id number] 6/7/2021. Comparison 5/27/2021. Cardiomegaly. Numerous sigmoid colon and descending colon diverticula without diverticulitis. Normal liver. Nonvisualized gallbladder. No apparent bile duct dilatation. Normal spleen, pancreas, adrenal glands, right kidney. Small left renal cyst. No  hydronephrosis. Normal appearance of urinary bladder. Hysterectomy. Atheromatous calcification of abdominal aorta without aneurysm. Right renal  artery stent. Appendix identified. No pericecal inflammation. Normal small bowel. No ascites or free intraperitoneal air. Mild lumbar spondylosis. Mild degenerative changes of hips. No abdominal wall hernia minimal umbilical fat protrusion unchanged. Impression  No acute finding. Left colon diverticula without diverticulitis. · Discussion: Acute pulmonary edema. Feels better with diuresis. Would continue IV Lasix. As she improves would switch to oral Lasix 60 mg. She was not having any response to 40 mg orally at home. Await results of echocardiogram.  · She has obstructive sleep apnea she thinks her setting is 13. She will try to have her family bring her machine in. In the meantime we will use hospital CPAP at 13 cm of pressure  · 6/23 she could not tolerate her hospital CPAP her family is to bring her machine in today.   Would continue diuresis and will request direct I&O continue to follow renal function if creatinine rises further tomorrow will decrease to single daily dosing Lasix  · 6/24 creatinine stable with Lasix. Received 80 mg orally and so far has not had diuresis initiated. We'll give a dose of Zaroxolyn tomorrow before Lasix and see the effect. Will check chest x-ray today to reevaluate her pulmonary edema. Will check overnight oximetry tonight with her home CPAP and supplemental oxygen  · 6/25 creatinine up slightly good diuresis yesterday with 80 mg of Lasix will repeat renal function tomorrow. Will discontinue Zaroxolyn. Appears to have acute gout right great toe. We will give 3 doses colchicine today and tomorrow drop to single daily dosing  · 6/26 creatinine continues to rise we will hold tomorrow's dose of Lasix until renal function available. Appears to have gout of the right toe has improved with colchicine yesterday will decrease it to single daily dosing. Would like to avoid steroids in her  · 6/27 no toe pain today did have diarrhea last night very likely due to the loading dose of colchicine. Currently she is on a single daily dose. Lasix on hold today due to rising creatinine. Will repeat renal function and chest x-ray in a.m. If renal function improves should be safe for discharge home  · 6/28 no specific complaints today. Lasix held yesterday and today creatinine remains up. Despite this she seems stable for discharge home from respiratory standpoint. Probably reinstitute Lasix 60 mg day after tomorrow with repeat renal function by PCP as an outpatient  · 6/29 notes specific complaints today respirations nonlabored. Creatinine has improved. Will reinstitute Lasix at 60mg.     Julio Cesar Bray MD

## 2021-06-29 NOTE — PROGRESS NOTES
VENECIA spoke with patient and her daughter, Maico Harrison, the plan at this time is for patient to d/c to home with [place]. VENECIA contacted Rober Santos at [facility] to discuss [place] authorization, she stated that she would authorize for patient to have [place] services with [facility]. VENECIA spoke with [facility] admissions, reported that they would be able to service patient however they can not offically accept until they have received auth from [facility], [address] told by Rober Santos that this would be sent today. Patient and daughter notified. Patient clear to d/c to home with [place], nurse notified.

## 2021-06-29 NOTE — PROGRESS NOTES
PT was notified from CM that pt's daughter Len Broderick had questions regarding rehab recommendations for pt. PT/OT went to pt's room and spoke with pt regarding recommendations. Pt with verbal understanding of home health recommendation and states she wants to go home. PT called and spoke with daughter Len Broderick who stated that she was concerned that pt needed pulmonary rehab and felt that pt should go to [facility] for pulmonary rehab. PT advised pt's daughter that based on PT/OT assessments that pt was able to amb 175ft and was SBA/CGA with overall mobility and the recommendation was made for HHPT /HHOT based on the patient's functional mobility. PT clarified that pt would not be able to just receive pulmonary rehab services alone at [facility], and would need additional PT/OT/SLP if accepted at [facility] and at this time the current PT/OT d/c rec was [facility]. Len Broderick acknowledged and stated she did not have any further questions.

## 2021-06-29 NOTE — DISCHARGE SUMMARY
Discharge Summary       PATIENT ID: Debbie Rose  MRN: [id number]   YOB: 1950    DATE OF ADMISSION: 6/21/2021  1:11 PM    DATE OF DISCHARGE:   PRIMARY CARE PROVIDER: Hazel Valdez MD     ATTENDING PHYSICIAN: Michelle Alvarado  DISCHARGING PROVIDER: Michelle Alvarado      CONSULTATIONS: IP CONSULT TO HOSPITALIST  IP CONSULT TO PULMONOLOGY  IP CONSULT TO CARDIOLOGY  IP CONSULT TO CARDIOLOGY    PROCEDURES/SURGERIES: * No surgery found *    ADMITTING DIAGNOSES:    Patient Active Problem List    Diagnosis Date Noted    CHF exacerbation ([facility] 06/21/2021    Atrial flutter with rapid ventricular response ([facility] 03/09/2021    Atrial flutter ([facility] 02/22/2021    CHF (congestive heart failure) ([facility] 02/22/2021    COPD exacerbation ([facility] [facility] 11/02/2020    [facility]/2020       DISCHARGE DIAGNOSES / PLAN:    Acute pulmonary edemaimproved on repeat chest x-ray 6/24/patient on 2 L oxygen by nasal cannula  Acute congestive heart failure with preserved ejection fraction  CAD status post CABG  Mildly elevated troponin  Type 2 diabetes with hyperglycemia  Hypertension  COPD no acute exacerbation  Chronic kidney disease  Bradycardia  History of A. fib status post cardioversion  Depression  Headache  Gout of right great toe  IFTIKHAR treated with CPAP            DISCHARGE MEDICATIONS:  Current Discharge Medication List      START taking these medications    Details   famotidine (PEPCID) 10 mg tablet Take 1 Tablet by mouth two (2) times a day. Qty: 30 Tablet, Refills: 0  Start date: 6/29/2021      furosemide (LASIX) 40 mg tablet Take 1.5 Tablets by mouth daily. Qty: 30 Tablet, Refills: 0  Start date: 6/29/2021         CONTINUE these medications which have CHANGED    Details   amiodarone (CORDARONE) 200 mg tablet Take 1 Tablet by mouth daily.   Qty: 30 Tablet, Refills: 0  Start date: 6/30/2021         CONTINUE these medications which have NOT CHANGED    Details   apixaban (ELIQUIS) 5 mg tablet Take 1 Tab by mouth two (2) times a day. Qty: 30 Tab, Refills: 0      losartan (COZAAR) 100 mg tablet Take 1 Tab by mouth daily. Qty: 30 Tab, Refills: 0      oxybutynin (DITROPAN) 5 mg tablet Take 1 Tab by mouth three (3) times daily. Qty: 90 Tab, Refills: 0      insulin glargine (LANTUS) 100 unit/mL injection 10 Units by SubCUTAneous route nightly. May give in pen  Indications: type 2 diabetes mellitus  Qty: 1 Vial, Refills: 0      QUEtiapine (SEROqueL) 25 mg tablet Take 25 mg by mouth nightly. theophylline ER (BAM-24) 200 mg cp24 capsule Take 300 mg by mouth daily. potassium chloride SR (KLOR-CON 10) 10 mEq tablet Take 10 mEq by mouth two (2) times a day. mometasone-formoterol (Dulera) 200-5 mcg/actuation HFA inhaler Take 2 Puffs by inhalation two (2) times a day. clopidogreL (PLAVIX) 75 mg tab Take 1 Tab by mouth daily. Qty: 30 Tab, Refills: 0      nitroglycerin (NITROSTAT) 0.4 mg SL tablet Sit/Lay down then put one tab under the tongue every 5 minutes as needed for chest pain/neck or jaw pain for 3 doses. Seek immediate medical attention should you need more than one tab for pain to resolve. Qty: 1 Bottle, Refills: 3      albuterol-ipratropium (DUO-NEB) 2.5 mg-0.5 mg/3 ml nebu 3 mL by Nebulization route every six (6) hours as needed for Wheezing. albuterol (Ventolin HFA) 90 mcg/actuation inhaler Take 2 Puffs by inhalation two (2) times daily as needed for Wheezing. STOP taking these medications       metoprolol tartrate (LOPRESSOR) 100 mg IR tablet Comments:   Reason for Stopping:         carvediloL (COREG) 25 mg tablet Comments:   Reason for Stopping:                 NOTIFY YOUR PHYSICIAN FOR ANY OF THE FOLLOWING:   Fever over 101 degrees for 24 hours. Chest pain, shortness of breath, fever, chills, nausea, vomiting, diarrhea, change in mentation, falling, weakness, bleeding. Severe pain or pain not relieved by medications.   Or, any other signs or symptoms that you may have questions about. DISPOSITION:  x  Home With:  X OT X PT X HH  RN       Long term SNF/Inpatient Rehab    Independent/assisted living    Hospice    Other:       PATIENT CONDITION AT DISCHARGE: Stable      PHYSICAL EXAMINATION AT DISCHARGE:  General:          Alert, cooperative, no distress, appears stated age. HEENT:           Atraumatic, anicteric sclerae, pink conjunctivae                          No oral ulcers, mucosa moist, throat clear, dentition fair  Neck:               Supple, symmetrical  Lungs:             Clear to auscultation bilaterally. No Wheezing or Rhonchi. No rales. Chest wall:      No tenderness  No Accessory muscle use. Heart:              Regular  rhythm,  No  murmur   No edema  Abdomen:        Soft, non-tender. Not distended. Bowel sounds normal  Extremities:     No cyanosis. No clubbing,                            Skin turgor normal, Capillary refill normal  Skin:                Not pale. Not Jaundiced  No rashes   Psych:             Not anxious or agitated. Neurologic:      Alert, moves all extremities, answers questions appropriately and responds to commands     XR CHEST PA LAT   Final Result   FINDINGS: IMPRESSION: 2 views: Frontal and lateral chest.      Median sternotomy status post CABG. Improved cardiomegaly, vascular congestion. Trace hydrostatic edema in the left lung base. The lungs are well inflated. No lobar consolidation, pleural effusion,   pneumothorax. No free air under the diaphragm. Bony structures grossly intact. XR CHEST PA LAT   Final Result      Stable cardiomegaly. Decreased pulmonary vascular congestion and pulmonary   interstitial edema compared to the prior study from 6/21/2021. XR CHEST PORT   Final Result   Cardiomegaly with mild vascular congestion and increased   interstitial markings suggesting volume overload/CHF.            Recent Results (from the past 24 hour(s))   GLUCOSE, POC    Collection Time: 06/28/21  4:05 PM   Result Value Ref Range    Glucose (POC) 110 65 - 117 mg/dL    Performed by Severiano Silverio POC    Collection Time: 06/28/21  7:39 PM   Result Value Ref Range    Glucose (POC) 115 65 - 117 mg/dL    Performed by Christophe Luna    RENAL FUNCTION PANEL    Collection Time: 06/29/21  5:22 AM   Result Value Ref Range    Sodium 136 136 - 145 mmol/L    Potassium 3.8 3.5 - 5.1 mmol/L    Chloride 100 97 - 108 mmol/L    CO2 29 21 - 32 mmol/L    Anion gap 7 5 - 15 mmol/L    Glucose 93 65 - 100 mg/dL    BUN 44 (H) 6 - 20 mg/dL    Creatinine 1.94 (H) 0.55 - 1.02 mg/dL    BUN/Creatinine ratio 23 (H) 12 - 20      GFR est AA 31 (L) >60 ml/min/1.73m2    GFR est non-AA 26 (L) >60 ml/min/1.73m2    Calcium 8.4 (L) 8.5 - 10.1 mg/dL    Phosphorus 3.1 2.6 - 4.7 mg/dL    Albumin 2.9 (L) 3.5 - 5.0 g/dL   CBC WITH AUTOMATED DIFF    Collection Time: 06/29/21  5:22 AM   Result Value Ref Range    WBC 6.4 3.6 - 11.0 K/uL    RBC 5.18 3.80 - 5.20 M/uL    HGB 13.8 11.5 - 16.0 g/dL    HCT 45.9 35.0 - 47.0 %    MCV 88.6 80.0 - 99.0 FL    MCH 26.6 26.0 - 34.0 PG    MCHC 30.1 30.0 - 36.5 g/dL    RDW 15.5 (H) 11.5 - 14.5 %    PLATELET 914 306 - 298 K/uL    MPV 13.1 (H) 8.9 - 12.9 FL    NRBC 0.0 0.0  WBC    ABSOLUTE NRBC 0.00 0.00 - 0.01 K/uL    NEUTROPHILS 56 32 - 75 %    LYMPHOCYTES 28 12 - 49 %    MONOCYTES 13 5 - 13 %    EOSINOPHILS 2 0 - 7 %    BASOPHILS 1 0 - 1 %    IMMATURE GRANULOCYTES 0 0 - 0.5 %    ABS. NEUTROPHILS 3.6 1.8 - 8.0 K/UL    ABS. LYMPHOCYTES 1.8 0.8 - 3.5 K/UL    ABS. MONOCYTES 0.8 0.0 - 1.0 K/UL    ABS. EOSINOPHILS 0.1 0.0 - 0.4 K/UL    ABS. BASOPHILS 0.1 0.0 - 0.1 K/UL    ABS. IMM.  GRANS. 0.0 0.00 - 0.04 K/UL    DF AUTOMATED     METABOLIC PANEL, COMPREHENSIVE    Collection Time: 06/29/21  5:22 AM   Result Value Ref Range    Sodium 137 136 - 145 mmol/L    Potassium 3.8 3.5 - 5.1 mmol/L    Chloride 100 97 - 108 mmol/L    CO2 29 21 - 32 mmol/L    Anion gap 8 5 - 15 mmol/L    Glucose 93 65 - 100 mg/dL    BUN 44 (H) 6 - 20 mg/dL    Creatinine 1.93 (H) 0.55 - 1.02 mg/dL    BUN/Creatinine ratio 23 (H) 12 - 20      GFR est AA 31 (L) >60 ml/min/1.73m2    GFR est non-AA 26 (L) >60 ml/min/1.73m2    Calcium 8.4 (L) 8.5 - 10.1 mg/dL    Bilirubin, total 0.6 0.2 - 1.0 mg/dL    AST (SGOT) 22 15 - 37 U/L    ALT (SGPT) 18 12 - 78 U/L    Alk. phosphatase 135 (H) 45 - 117 U/L    Protein, total 7.1 6.4 - 8.2 g/dL    Albumin 2.9 (L) 3.5 - 5.0 g/dL    Globulin 4.2 (H) 2.0 - 4.0 g/dL    A-G Ratio 0.7 (L) 1.1 - 2.2     GLUCOSE, POC    Collection Time: 06/29/21  7:20 AM   Result Value Ref Range    Glucose (POC) 84 65 - 117 mg/dL    Performed by Silva Hartmann    GLUCOSE, POC    Collection Time: 06/29/21 11:40 AM   Result Value Ref Range    Glucose (POC) 122 (H) 65 - 117 mg/dL    Performed by [address] [place]:  60-year-old female with past medical history significant of diabetes type 2, hypertension, coronary artery disease, COPD, home oxygen presented to the emergency department complaining of chest pain and shortness of breath progressively worsening over the last 2 days along with progressively increasing lower extremity edema. She is on Lasix at home but that was not improving her edema. She was admitted for mildly elevated troponin, pulmonary edema, chronic kidney disease and acute congestive heart failure. Pulmonology followed patient throughout admission. She was evaluated and treated with supplemental oxygen and diuretics. She also has CPAP therapy at home for obstructive sleep apnea. She was also given colchicine for her gout resulting in diarrhea and upset stomach however that was resolved at the time of discharge. At the time of discharge pulmonology cleared the patient to return home on home oxygen and CPAP therapy. She will follow-up as outpatient. Case management assisting with finding CPAP company for new mask obtaining prior to patient's discharge.     Cardiology followed patient throughout admission. She was cleared for discharge on oral diuretics to follow-up as outpatient. Physical therapy and Occupational Therapy follow patient throughout admission. The patient requested to go to inpatient rehabilitation. At the time of discharge Case management is actively assisting patient and daughter in planning for inpatient rehab versus home health. Spoke in length to daughter this morning along with patient at the bedside regarding her discharge planning in the process. They are understanding and all questions were answered. All labs, cultures, progress notes, imaging were reviewed prior to patient's discharge. She had improvement on her chest x-ray dated yesterday. Attending physician in agreement patient is stable for discharge once disposition has been decided and obtained by case management. 35 minutes were spent on patient's discharge with over 50% on collaboration and coordination of care. Patient and daughter agreeable with treatment plan and discharge planning process and await decision and disposition with case management's assistance. Patient will continue to be monitored and treated until she is discharged from the facility.     Signed:   Gabe Hanks NP  6/29/2021  12:03 PM

## 2021-06-29 NOTE — ADT AUTH CERT NOTES
Comments  Comment     Last edited by  on [facility].    Patient Demographics    Patient Name   Scott Murray   [id number] Legal Sex   Female    1950 Address   [address] [address] [address] [address] Phone   [phone] (Home)   CSN:   [id number]   [facility] Date: [facility] Time Room Bed   2021  1:11 PM [address] [[id number]]   Attending Providers    Provider Pager From To   Lalitha Bobo MD  21   Tiarra Sims MD  21    Emergency Contact(s)    Name Relation Home Work Mobile   john martin Daughter [phone]   [place] Left Daughter 21    [facility] [phone]  [phone]   Utilization Reviews       Heart Failure - Care Day 9 (2021) by Dave Kraus       Review Entered Review Status   2021 12:38 Completed      Criteria Review      Care Day: 9 Care Date: 2021 Level of Care: Telemetry    Guideline Day 3    Clinical Status    (X) * Hemodynamic stability    2021 12:38:32 EDT by Dave Kraus      98.2, 77, 108/69, 20, 95% o2 2lpm    (X) * Tachypnea absent    (X) * Oxygenation at baseline or acceptable for next level of care    (X) * Dyspnea at baseline or acceptable for next level of care    (X) * Cardiac rate and rhythm acceptable    (X) * Pulmonary edema absent or acceptable for next level of care    (X) * Peripheral or sacral edema absent or acceptable for next level of care    (X) * Mental status at baseline    (X) * Volume status acceptable on oral medication    (X) * Renal function at baseline or acceptable for next level of care    (X) * Electrolyte abnormalities absent or acceptable for next level of care    (X) * Precipitating factors absent or controlled    (X) * Discharge plans and education understood    Activity    (X) * Ambulatory or acceptable for next level of care    Routes    (X) * Oral hydration    (X) * Oral medications or regimen acceptable for next level of care    (X) * Oral diet or acceptable for next level of care    Interventions    (X) * Oxygen absent    6/29/2021 12:38:32 EDT by Jose Arriaza      o2 baseline    Medications    (X) Diuretics    6/29/2021 12:38:32 EDT by Jose Arriaza      lasix 60mg po    (X) Neprilysin inhibitor with ARB, or ACE inhibitor or ARB    * Milestone   Additional Notes   6/29      Cardiology-   Problem List:    Acute on chronic diastolic heart failure   Chronic COPD with acute exacerbation   Stable CAD with recent cardiac cath showing widely patent coronaries   IFTIKHAR   Edema, improved   Pulmonary hypertension.  PASP 62 mmHg       Assessment/Plan:         p.o. diuretics   Oxygen therapy for significant pulmonary hypertension   Continue systemic anticoagulation for prevention due to A. fib/flutter   Discharge planning underway, patient wants to go to rehab      Subjective:       Leonor Quiroz reports right-sided chest pain, mild dyspnea. Discussed with RN events overnight.        Review of Systems:    Negative except for as noted above.       Objective:       Physical Exam:       Last 24hrs VS reviewed since prior progress note. Most recent are:       Visit Vitals   /69 (BP 1 Location: Left upper arm, BP Patient Position: At rest;Supine)   Pulse 77   Temp 98.2 °F (36.8 °C)   Resp 20   Ht 5' 4\" (1.626 m)   Wt 101.6 kg (223 lb 15.8 oz)   SpO2 98%   Breastfeeding No   BMI 38.45 kg/m²       No intake or output data in the 24 hours ending 06/29/21 1201        General Appearance:  alert; no acute distress. Ears/Nose/Mouth/Throat: Anicteric; moist mucous membranes   Neck: Supple. Chest: Lungs clear to auscultation bilaterally. Cardiovascular: Regular rate and rhythm, S1S2 normal,+ murmur. Abdomen: Soft, non-tender, bowel sounds are active. Extremities: trace edema bilaterally. Skin: Warm and dry.          Pulmonary-   6/29 doing well respirations nonlabored.  No further headache toe pain or shortness of breath.  No further diarrhea   [facility] Problems Date Reviewed: 3/12/2021     Codes Class Noted POA     CHF exacerbation ([facility] ICD-10-CM: I50.9   ICD-9-CM: 488. 0   6/21/2021 Unknown           CHF (congestive heart failure) ([facility] ICD-10-CM: I50.9   ICD-9-CM: 428. 0   2/22/2021 Unknown                     IMPRESSION:   1. Acute pulmonary edema resolved    2. Chronic hypoxic respiratory failure remains on her baseline oxygen   3. Acute kidney injury Lasix on hold due to rising creatinine.  Creatinine has improved today we will resume Lasix at lower dose   4. Cardiomyopathy diastolic dysfunction   5. COPD no wheezing   6. Obstructive sleep apnea is not wearing her CPAP.  States her mask is old and not fitting.  Will ask respiratory to see if [facility] Watts will replace her mask   7. Headache resolved   8. Acute gout pain-free on colchicine   9. Diarrhea resolved   10. Diabetes   11. Coronary artery disease with bypass grafting and stents in the past   9. Body mass index is 38.45 kg/m². 12.         RECOMMENDATIONS/PLAN:   1. Creatinine improved we will resume Lasix but decrease to 60 mg daily   2. Continue nasal oxygen as she uses at home   3. Continue her home CPAP with oxygen bleed in we will ask respiratory to contact [facility] for a new mask   4. Once home would continue her Dulera inhaler   5. We will use albuterol inhaler. 6. Continue colchicine for her acute gout  once a day   7.  She has a nebulizer machine at home she thinks it is not working correctly         Medication   · furosemide (LASIX) tablet 60 mg   · colchicine tablet 0.6 mg   · loperamide (IMODIUM) 1 mg/7.5 mL oral solution 2 mg   · acetaminophen (TYLENOL) tablet 650 mg   · famotidine (PEPCID) tablet 10 mg   · alum-mag hydroxide-simeth (MYLANTA) oral suspension 30 mL   · albuterol (PROVENTIL HFA, VENTOLIN HFA, PROAIR HFA) inhaler 2 Puff   · amiodarone (CORDARONE) tablet 200 mg   · apixaban (ELIQUIS) tablet 5 mg   · clopidogreL (PLAVIX) tablet 75 mg   · insulin glargine (LANTUS) injection 10 Units   · losartan (COZAAR) tablet 100 mg   · nitroglycerin (NITROSTAT) tablet 0.4 mg   · oxybutynin (DITROPAN) tablet 5 mg   · albuterol-ipratropium (DUO-NEB) 2.5 MG-0.5 MG/3 ML   · budesonide-formoteroL (SYMBICORT) 160-4.5 mcg/actuation HFA inhaler 2 Puff   · potassium chloride SR (KLOR-CON 10) tablet 10 mEq   · QUEtiapine (SEROquel) tablet 25 mg   · theophylline ER (BAM-24) capsule 300 mg   · insulin lispro (HUMALOG) injection   · glucose chewable tablet 16 g   · dextrose (D50W) injection syrg 12.5-25 g   · glucagon (GLUCAGEN) injection 1 mg   · polyethylene glycol (MIRALAX) packet 17 g   · ondansetron (ZOFRAN ODT) tablet 4 mg     Or   · ondansetron (ZOFRAN) injection 4 mg            Results for Seth Lomeli (MRN [id number]) as of 6/29/2021 11:27      6/29/2021 05:22   Sodium: 137   Potassium: 3.8   Chloride: 100   CO2: 29   Anion gap: 8   Glucose: 93   BUN: 44 (H)   Creatinine: 1.93 (H)   BUN/Creatinine ratio: 23 (H)   Calcium: 8.4 (L)   GFR est non-AA: 26 (L)   GFR est AA: 31 (L)   Bilirubin, total: 0.6   Protein, total: 7.1   Albumin: 2.9 (L)   Globulin: 4.2 (H)   A-G Ratio: 0.7 (L)   ALT: 18   AST: 22   Alk. phosphatase: 135 (H)      Results for Seth Lomeli (MRN [id number]) as of 6/29/2021 11:27      6/29/2021 05:22   WBC: 6.4   NRBC: 0.0   RBC: 5.18   HGB: 13.8   HCT: 45.9   MCV: 88.6   MCH: 26.6   MCHC: 30.1   RDW: 15.5 (H)   PLATELET: 782   MPV: 13.1 (H)   NEUTROPHILS: 56   LYMPHOCYTES: 28   MONOCYTES: 13   EOSINOPHILS: 2   BASOPHILS: 1   IMMATURE GRANULOCYTES: 0   DF: AUTOMATED   ABSOLUTE NRBC: 0.00   ABS. NEUTROPHILS: 3.6   ABS. IMM. GRANS.: 0.0   ABS. LYMPHOCYTES: 1.8   ABS. MONOCYTES: 0.8   ABS. EOSINOPHILS: 0.1   ABS.  BASOPHILS: 0.1            Heart Failure - Care Day 8 (6/28/2021) by Erline Phalen       Review Entered Review Status   6/29/2021 12:23 Completed      Criteria Review      Care Day: 8 Care Date: 6/28/2021 Level of Care: Telemetry    Guideline Day 3    Clinical Status    (X) * Hemodynamic stability    6/29/2021 12:23:42 EDT by Dave Kraus      107/59, 82, 20, 97%, 97.7    (X) * Tachypnea absent    (X) * Oxygenation at baseline or acceptable for next level of care    (X) * Dyspnea at baseline or acceptable for next level of care    (X) * Cardiac rate and rhythm acceptable    (X) * Pulmonary edema absent or acceptable for next level of care    (X) * Peripheral or sacral edema absent or acceptable for next level of care    (X) * Mental status at baseline    (X) * Volume status acceptable on oral medication    ( ) * Renal function at baseline or acceptable for next level of care    (X) * Electrolyte abnormalities absent or acceptable for next level of care    (X) * Precipitating factors absent or controlled    ( ) * Discharge plans and education understood    Activity    (X) * Ambulatory or acceptable for next level of care    Routes    (X) * Oral hydration    (X) * Oral medications or regimen acceptable for next level of care    (X) * Oral diet or acceptable for next level of care    Interventions    (X) * Oxygen absent    6/29/2021 12:23:42 EDT by Dave Kraus      O2 BASELINE    Medications    ( ) Diuretics    6/29/2021 12:23:42 EDT by Dave Kraus      lasix held    (X) Neprilysin inhibitor with ARB, or ACE inhibitor or ARB    * Milestone   Additional Notes   6/28      PULMONARY-   6/28 no complaints today no further diarrhea upset stomach toe pain or shortness of breath       Hospital Problems Date Reviewed: 3/12/2021     Codes Class Noted POA     CHF exacerbation ([facility] ICD-10-CM: I50.9   ICD-9-CM: 957. 0   6/21/2021 Unknown           CHF (congestive heart failure) ([facility] ICD-10-CM: I50.9   ICD-9-CM: 428. 0   2/22/2021 Unknown                     IMPRESSION:   1. Acute pulmonary edema resolved on current chest x-ray 6/28   2. Chronic hypoxic respiratory failure remains on her baseline oxygen   3. Acute kidney injury creatinine up Lasix held yesterday and today   4.  Cardiomyopathy diastolic dysfunction   5. COPD no wheezing   6. Obstructive sleep apnea did  use her home CPAP last night    7. Headache resolved   8. Acute gout pain-free on colchicine   9. Diarrhea likely due to colchicine dose has been decreased to single daily dosing no further diarrhea   10. Diabetes   11. Coronary artery disease with bypass grafting and stents in the past   9. Body mass index is 38.45 kg/m². 12.         RECOMMENDATIONS/PLAN:   1. Creatinine remains up would continue to hold Lasix today resume tomorrow at slightly reduced dose 60 mg   2. Continue nasal oxygen as she uses at home   3. Continue her home CPAP with oxygen bleed in   4. Once home would continue her Dulera inhaler   5. We will use albuterol inhaler. 6. Continue colchicine for her acute gout  once a day   7.  She has a nebulizer machine at home she thinks it is not working correctly         Current Facility-Administered Medications   Medication   · colchicine tablet 0.6 mg   · loperamide (IMODIUM) 1 mg/7.5 mL oral solution 2 mg   · acetaminophen (TYLENOL) tablet 650 mg   · famotidine (PEPCID) tablet 10 mg   · [Held by provider] furosemide (LASIX) tablet 80 mg   · alum-mag hydroxide-simeth (MYLANTA) oral suspension 30 mL   · albuterol (PROVENTIL HFA, VENTOLIN HFA, PROAIR HFA) inhaler 2 Puff   · amiodarone (CORDARONE) tablet 200 mg   · apixaban (ELIQUIS) tablet 5 mg   · clopidogreL (PLAVIX) tablet 75 mg   · insulin glargine (LANTUS) injection 10 Units   · losartan (COZAAR) tablet 100 mg   · nitroglycerin (NITROSTAT) tablet 0.4 mg   · oxybutynin (DITROPAN) tablet 5 mg   · albuterol-ipratropium (DUO-NEB) 2.5 MG-0.5 MG/3 ML   · budesonide-formoteroL (SYMBICORT) 160-4.5 mcg/actuation HFA inhaler 2 Puff   · potassium chloride SR (KLOR-CON 10) tablet 10 mEq   · QUEtiapine (SEROquel) tablet 25 mg   · theophylline ER (BAM-24) capsule 300 mg   · insulin lispro (HUMALOG) injection   · glucose chewable tablet 16 g   · dextrose (D50W) injection syrg 12.5-25 g · glucagon (GLUCAGEN) injection 1 mg   · polyethylene glycol (MIRALAX) packet 17 g   · ondansetron (ZOFRAN ODT) tablet 4 mg     Or   · ondansetron (ZOFRAN) injection 4 mg            ATTENDING NOTE-   Subjective:       Patient feeling much better this morning   On 2 L oxygen by nasal cannula   Creatinine slightly elevated likely from diuretic.           Objective:       Visit Vitals   BP (!) 113/55   Pulse 93   Temp 98 °F (36.7 °C)   Resp 20   Ht 5' 4\" (1.626 m)   Wt 101.6 kg (223 lb 15.8 oz)   SpO2 94%   Breastfeeding No   BMI 38.45 kg/m²         Physical Exam:         Constitutional: pt is oriented to person, place, and time. HENT:    Head: Normocephalic and atraumatic. Eyes: Pupils are equal, round, and reactive to light. EOM are normal.    Cardiovascular: Normal rate, regular rhythm and normal heart sounds.  Bilateral lower extremity edema   Pulmonary/Chest: Bibasilar diminished breath sounds with no rales, wheezes, rhonchi.  No increased work of breathing. Abdominal: Soft. Bowel sounds are normal. There is no abdominal tenderness. There is no rebound and no guarding. Musculoskeletal: Normal range of motion.  Right great toe painful and warm consistent with her usual gout symptoms   Neurological: pt is alert and oriented to person, place, and time.        Assessment:   Acute pulmonary edema-improved on repeat chest x-ray 6/24/patient on 2 L oxygen by nasal cannula   Acute congestive heart failure with preserved ejection fraction   CAD status post CABG   Mildly elevated troponin   Type 2 diabetes with hyperglycemia   Hypertension   COPD no acute exacerbation   Chronic kidney disease   Bradycardia   History of A. fib status post cardioversion   Depression   Headache   Gout of right great toe   IFTIKHAR treated with CPAP           Plan:   Pulmonology and cardiology following   Hold Lasix due to elevated creatinine   Respiratory consult for overnight and ambulatory pulse ox.       Continue albuterol inhaler 2 puff every 6 hours   Amiodarone 200 mg daily   Eliquis 5 mg twice a day   Symbicort 2 puff 2 times a day   Plavix 75 mg daily   Colchicine 0.6 mg daily   Pepcid 10 mg twice a day   Lantus 10 units subcu daily   Cozaar 100 mg daily   Oxybutynin 5 mg 3 times a day   Potassium 10 mg twice a day   Seroquel 25 mg at bedtime   Theophylline 200 mg daily           Discussed with the  possible discharge home tomorrow   PT OT consult            Chest xray-   Pulmonary edema.       Comparison chest x-ray 6/24/2021.       IMPRESSION   FINDINGS: IMPRESSION: 2 views: Frontal and lateral chest.       Median sternotomy status post CABG. Improved cardiomegaly, vascular congestion. Trace hydrostatic edema in the left lung base.       The lungs are well inflated. No lobar consolidation, pleural effusion,   pneumothorax.       No free air under the diaphragm.       Bony structures grossly intact.             6/28/2021 08:49   Sodium: 137   Potassium: 3.9   Chloride: 98   CO2: 30   Anion gap: 9   Glucose: 103 (H)   BUN: 46 (H)   Creatinine: 2.05 (H)   BUN/Creatinine ratio: 22 (H)   Calcium: 8.8   Phosphorus: 4.0   GFR est non-AA: 24 (L)   GFR est AA: 29 (L)   Albumin: 3.0 (L)            Heart Failure - Care Day 7 (6/27/2021) by Emil Shaw       Review Entered Review Status   6/29/2021 12:15 Completed      Criteria Review      Care Day: 7 Care Date: 6/27/2021 Level of Care: Telemetry    Guideline Day 3    Clinical Status    (X) * Hemodynamic stability    6/29/2021 12:15:16 EDT by Emil Shaw      97.7, 84, 128/80, 18, 94% O2 2LPM    (X) * Tachypnea absent    (X) * Oxygenation at baseline or acceptable for next level of care    (X) * Dyspnea at baseline or acceptable for next level of care    (X) * Cardiac rate and rhythm acceptable    (X) * Pulmonary edema absent or acceptable for next level of care    (X) * Peripheral or sacral edema absent or acceptable for next level of care    (X) * Mental status at baseline    (X) * Volume status acceptable on oral medication    ( ) * Renal function at baseline or acceptable for next level of care    (X) * Electrolyte abnormalities absent or acceptable for next level of care    (X) * Precipitating factors absent or controlled    ( ) * Discharge plans and education understood    Activity    (X) * Ambulatory or acceptable for next level of care    Routes    (X) * Oral hydration    (X) * Oral medications or regimen acceptable for next level of care    (X) * Oral diet or acceptable for next level of care    Interventions    (X) * Oxygen absent    6/29/2021 12:15:16 EDT by Stacie Jerez      o2 2lpm  baseline    Medications    ( ) Diuretics    6/29/2021 12:15:16 EDT by Stacie Jerez      lasix held today    (X) Neprilysin inhibitor with ARB, or ACE inhibitor or ARB    * Milestone   Additional Notes   6/27      PULMONARY-   6/27 no headache states she did have upset stomach yesterday and today with diarrhea last night probably due to the loading dose of colchicine the day before.  No pain over her right toe today       Hospital Problems Date Reviewed: 3/12/2021     Codes Class Noted POA     CHF exacerbation ([facility] ICD-10-CM: I50.9   ICD-9-CM: 428. 0   6/21/2021 Unknown           CHF (congestive heart failure) ([facility] ICD-10-CM: I50.9   ICD-9-CM: 428. 0   2/22/2021 Unknown                IMPRESSION:   1. Acute pulmonary edema improved radiographically 6/24 repeat x-ray in a.m. 2. Chronic hypoxic respiratory failure remains on her baseline oxygen   3. Acute kidney injury creatinine up Lasix being held today repeat lab in a.m.   4. Cardiomyopathy diastolic dysfunction   5. COPD no wheezing   6. Obstructive sleep apnea did not use her home CPAP last night    7. Headache resolved   8. Acute gout pain-free on colchicine   9. Diarrhea likely due to colchicine dose has been decreased to single daily dosing   10. Diabetes   11. Coronary artery disease with bypass grafting and stents in the past   9.  Body mass index is 39.58 kg/m². 12.         RECOMMENDATIONS/PLAN:   1. Good urine output yesterday holding Lasix today due to rising creatinine   2. Continue nasal oxygen as she uses at home   3. Continue her home CPAP with oxygen bleed in   4. Continue Symbicort to substitute for her Dulera inhaler   5. We will use albuterol inhaler. 6. Continue colchicine for her acute gout  once a day   7.  Repeat chest x-ray and renal function in a.m.   8. She has a nebulizer machine at home she thinks it is not working correctly       Current Facility-Administered Medications   Medication   · colchicine tablet 0.6 mg   · loperamide (IMODIUM) 1 mg/7.5 mL oral solution 2 mg   · acetaminophen (TYLENOL) tablet 650 mg   · famotidine (PEPCID) tablet 10 mg   · [Held by provider] furosemide (LASIX) tablet 80 mg   · alum-mag hydroxide-simeth (MYLANTA) oral suspension 30 mL   · albuterol (PROVENTIL HFA, VENTOLIN HFA, PROAIR HFA) inhaler 2 Puff   · amiodarone (CORDARONE) tablet 200 mg   · apixaban (ELIQUIS) tablet 5 mg   · clopidogreL (PLAVIX) tablet 75 mg   · insulin glargine (LANTUS) injection 10 Units   · losartan (COZAAR) tablet 100 mg   · nitroglycerin (NITROSTAT) tablet 0.4 mg   · oxybutynin (DITROPAN) tablet 5 mg   · albuterol-ipratropium (DUO-NEB) 2.5 MG-0.5 MG/3 ML   · budesonide-formoteroL (SYMBICORT) 160-4.5 mcg/actuation HFA inhaler 2 Puff   · potassium chloride SR (KLOR-CON 10) tablet 10 mEq   · QUEtiapine (SEROquel) tablet 25 mg   · theophylline ER (BAM-24) capsule 300 mg   · insulin lispro (HUMALOG) injection   · glucose chewable tablet 16 g   · dextrose (D50W) injection syrg 12.5-25 g   · glucagon (GLUCAGEN) injection 1 mg   · polyethylene glycol (MIRALAX) packet 17 g   · ondansetron (ZOFRAN ODT) tablet 4 mg     Or   · ondansetron (ZOFRAN) injection 4 mg            ATTENDING NOTE-   Vitals:     06/27/21 0456 06/27/21 0752 06/27/21 0756 06/27/21 1140   BP: 132/68 139/86   128/80   Pulse: 91 85   84   Resp: 20 20   18   Temp: 98.7 °F (37.1 °C) 97.7 °F (36.5 °C)   97.7 °F (36.5 °C)   SpO2: 98% 96% 96% 94%   Weight:           Height:                   Intake and Output:   Current Shift: 06/27 0701 - 06/27 1900   In: -    Out: 400 [Urine:400]   Last three shifts: 06/25 1901 - 06/27 0700   In: -    Out: 1900 [Urine:1900]       Physical Exam:    General: Alert, cooperative, no distress, appears stated age. Eyes: Conjunctivae/corneas clear. PERRL, EOMs intact. Fundi benign   Ears: Normal TMs and external ear canals both ears. Nose: Nares normal. Septum midline. Mucosa normal. No drainage or sinus tenderness. Mouth/Throat: Lips, mucosa, and tongue normal. Teeth and gums normal.   Neck: Supple, symmetrical, trachea midline, no adenopathy, thyroid: no enlargment/tenderness/nodules, no carotid bruit and no JVD. Back:   Symmetric, no curvature. ROM normal. No CVA tenderness. Lungs:   Diminishedr to auscultation bilaterally. Heart: Regular rate and rhythm, S1, S2 normal, no murmur, click, rub or gallop. Abdomen:   Soft, non-tender. Bowel sounds normal. No masses,  No organomegaly. Extremities: Extremities normal, atraumatic, no cyanosis or edema. Pulses: 2+ and symmetric all extremities. Skin: Skin color, texture, turgor normal. No rashes or lesions   Lymph nodes: Cervical, supraclavicular, and axillary nodes normal.   Neurologic: CNII-XII intact. Normal strength, sensation and reflexes throughout.       Acute pulmonary edema   COPD exacerbation   Possible sleep apnea   Hypertension   Plan:   Continue treatment         6/27/2021 05:24   Sodium: 137   Potassium: 3.7   Chloride: 98   CO2: 32   Anion gap: 7   Glucose: 123 (H)   BUN: 38 (H)   Creatinine: 1.75 (H)   BUN/Creatinine ratio: 22 (H)   Calcium: 8.4 (L)   Phosphorus: 3.3   GFR est non-AA: 29 (L)   GFR est AA: 35 (L)   Albumin: 2.8 (L)         Heart Failure - Care Day 6 (6/26/2021) by Larwence Sacks       Review Entered Review Status   6/29/2021 12:07 Completed      Criteria Review Care Day: 6 Care Date: 6/26/2021 Level of Care: Telemetry    Guideline Day 3    Clinical Status    (X) * Hemodynamic stability    6/29/2021 12:07:52 EDT by Rani Vargas      133/75, 90, 20, 98%, 97.8    (X) * Tachypnea absent    (X) * Oxygenation at baseline or acceptable for next level of care    (X) * Dyspnea at baseline or acceptable for next level of care    (X) * Cardiac rate and rhythm acceptable    (X) * Pulmonary edema absent or acceptable for next level of care    (X) * Peripheral or sacral edema absent or acceptable for next level of care    (X) * Mental status at baseline    ( ) * Volume status acceptable on oral medication    ( ) * Renal function at baseline or acceptable for next level of care    (X) * Electrolyte abnormalities absent or acceptable for next level of care    (X) * Precipitating factors absent or controlled    ( ) * Discharge plans and education understood    Activity    (X) * Ambulatory or acceptable for next level of care    Routes    (X) * Oral hydration    (X) * Oral medications or regimen acceptable for next level of care    (X) * Oral diet or acceptable for next level of care    Interventions    (X) * Oxygen absent    6/29/2021 12:07:52 EDT by Rani Vargas      02 2LPM BASELINE    Medications    (X) Diuretics    6/29/2021 12:07:52 EDT by Rani Vargas      lasix 80mg po    (X) Neprilysin inhibitor with ARB, or ACE inhibitor or ARB    * Milestone   Additional Notes   6/26      PULMONARY-   6/26 no headache today states her right great toe still hurts but is better than yesterday       Hospital Problems Date Reviewed: 3/12/2021     Codes Class Noted POA     CHF exacerbation ([facility] ICD-10-CM: I50.9   ICD-9-CM: 378. 0   6/21/2021 Unknown           CHF (congestive heart failure) ([facility] ICD-10-CM: I50.9   ICD-9-CM: 428. 0   2/22/2021 Unknown                     IMPRESSION:   1. Acute pulmonary edema improved radiographically 6/24   2.  Chronic hypoxic respiratory failure remains on her baseline oxygen   3. Acute kidney injury creatinine up slightly we will hold Lasix tomorrow until labs available   4. Cardiomyopathy diastolic dysfunction   5. COPD no wheezing   6. Obstructive sleep apnea did use her home CPAP last night    7. Headache resolved   8. Acute gout improved with institution of colchicine   9. Diabetes   10. Coronary artery disease with bypass grafting and stents in the past   9. Body mass index is 39.58 kg/m². 11.         RECOMMENDATIONS/PLAN:   1. Good diuresis yesterday with Lasix but creatinine rising has already received a dose today we will hold it tomorrow pending renal function   2. Continue nasal oxygen as she uses at home   3. Continue her home CPAP with oxygen bleed in   4. Continue Symbicort to substitute for her Dulera inhaler   5. We will use albuterol inhaler. 6. Continue colchicine for her acute gout o once a day    7.  She has a nebulizer machine at home she thinks it is not working correctly         Current Facility-Administered Medications   Medication   · [START ON 6/27/2021] colchicine tablet 0.6 mg   · methylPREDNISolone (PF) (SOLU-MEDROL) injection 40 mg   · acetaminophen (TYLENOL) tablet 650 mg   · colchicine tablet 0.6 mg   · famotidine (PEPCID) tablet 10 mg   · [Held by provider] furosemide (LASIX) tablet 80 mg   · metOLazone (ZAROXOLYN) tablet 5 mg   · alum-mag hydroxide-simeth (MYLANTA) oral suspension 30 mL   · albuterol (PROVENTIL HFA, VENTOLIN HFA, PROAIR HFA) inhaler 2 Puff   · amiodarone (CORDARONE) tablet 200 mg   · apixaban (ELIQUIS) tablet 5 mg   · clopidogreL (PLAVIX) tablet 75 mg   · insulin glargine (LANTUS) injection 10 Units   · losartan (COZAAR) tablet 100 mg   · nitroglycerin (NITROSTAT) tablet 0.4 mg   · oxybutynin (DITROPAN) tablet 5 mg   · albuterol-ipratropium (DUO-NEB) 2.5 MG-0.5 MG/3 ML   · budesonide-formoteroL (SYMBICORT) 160-4.5 mcg/actuation HFA inhaler 2 Puff   · potassium chloride SR (KLOR-CON 10) tablet 10 mEq   · QUEtiapine (SEROquel) tablet 25 mg   · theophylline ER (BAM-24) capsule 300 mg   · insulin lispro (HUMALOG) injection   · glucose chewable tablet 16 g   · dextrose (D50W) injection syrg 12.5-25 g   · glucagon (GLUCAGEN) injection 1 mg   · polyethylene glycol (MIRALAX) packet 17 g   · ondansetron (ZOFRAN ODT) tablet 4 mg     Or   · ondansetron (ZOFRAN) injection 4 mg            ATTENDING NOTE-   Objective:       Vitals:     06/26/21 0729 06/26/21 0744 06/26/21 1137 06/26/21 1501   BP:   (!) 148/69 133/75 132/85   Pulse:   77 90 85   Resp:   20 20 20   Temp:   97.7 °F (36.5 °C) 97.8 °F (36.6 °C) 98.1 °F (36.7 °C)   SpO2: 95% 95% 98% 94%      Physical Exam:    General: Alert, cooperative, no distress, appears stated age. Eyes: Conjunctivae/corneas clear. PERRL, EOMs intact. Fundi benign   Ears: Normal TMs and external ear canals both ears. Nose: Nares normal. Septum midline. Mucosa normal. No drainage or sinus tenderness. Mouth/Throat: Lips, mucosa, and tongue normal. Teeth and gums normal.   Neck: Supple, symmetrical, trachea midline, no adenopathy, thyroid: no enlargment/tenderness/nodules, no carotid bruit and no JVD. Back:   Symmetric, no curvature. ROM normal. No CVA tenderness. Lungs:   Diminishedr to auscultation bilaterally. Heart: Regular rate and rhythm, S1, S2 normal, no murmur, click, rub or gallop. Abdomen:   Soft, non-tender. Bowel sounds normal. No masses,  No organomegaly. Extremities: Extremities normal, atraumatic, no cyanosis or edema. Pulses: 2+ and symmetric all extremities. Skin: Skin color, texture, turgor normal. No rashes or lesions   Lymph nodes: Cervical, supraclavicular, and axillary nodes normal.   Neurologic: CNII-XII intact. Normal strength, sensation and reflexes throughout.       Acute pulmonary edema   COPD exacerbation   Possible sleep apnea   Hypertension   Plan:   Continue treatment         Results for Nam Barrett (MRN [id number])       6/26/2021 09:27   WBC: 8.8   NRBC: 0. 0   RBC: 5.52 (H)   HGB: 14.5   HCT: 49.3 (H)   MCV: 89.3   MCH: 26.3   MCHC: 29.4 (L)   RDW: 15.8 (H)   PLATELET: 408   MPV: 13.2 (H)   ABSOLUTE NRBC: 0.00         Results for Manas Carrington (MRN [id number])       6/26/2021 09:27   Sodium: 138   Potassium: 3.6   Chloride: 96 (L)   CO2: 34 (H)   Anion gap: 8   Glucose: 159 (H)   BUN: 29 (H)   Creatinine: 1.61 (H)   BUN/Creatinine ratio: 18   Calcium: 8.7   Phosphorus: 3.6   GFR est non-AA: 32 (L)   GFR est AA: 38 (L)   Albumin: 2.8 (L)         Heart Failure - Care Day 5 (6/25/2021) by Jamari Dejesus       Review Entered Review Status   6/29/2021 11:55 Completed      Criteria Review      Care Day: 5 Care Date: 6/25/2021 Level of Care: Telemetry    Guideline Day 3    Clinical Status    (X) * Hemodynamic stability    6/29/2021 11:55:18 EDT by Jamari Dejesus      139/75-80-18-96% o2 2lpm -97.6    (X) * Tachypnea absent    (X) * Oxygenation at baseline or acceptable for next level of care    (X) * Dyspnea at baseline or acceptable for next level of care    (X) * Cardiac rate and rhythm acceptable    (X) * Pulmonary edema absent or acceptable for next level of care    (X) * Peripheral or sacral edema absent or acceptable for next level of care    (X) * Mental status at baseline    ( ) * Volume status acceptable on oral medication    (X) * Renal function at baseline or acceptable for next level of care    (X) * Electrolyte abnormalities absent or acceptable for next level of care    ( ) * Precipitating factors absent or controlled    ( ) * Discharge plans and education understood    Activity    (X) * Ambulatory or acceptable for next level of care    Routes    (X) * Oral hydration    (X) * Oral medications or regimen acceptable for next level of care    (X) * Oral diet or acceptable for next level of care    Interventions    ( ) * Oxygen absent    Medications    (X) Diuretics    6/29/2021 11:55:18 EDT by Jamari Dejesus      lasix 80mg po    (X) Neprilysin inhibitor with ARB, or ACE inhibitor or ARB    * Milestone   Additional Notes   6/25      PULMONARY-   6/25 headache all night long so did not wear CPAP last night.  Also states she has developed severe pain of her right great toe.  She does give a history of having gout in the past       Hospital Problems Date Reviewed: 3/12/2021     Codes Class Noted POA     CHF exacerbation ([facility] 75.) ICD-10-CM: I50.9   ICD-9-CM: 261. 0   6/21/2021 Unknown           CHF (congestive heart failure) ([facility] 75.) ICD-10-CM: I50.9   ICD-9-CM: 428. 0   2/22/2021 Unknown             IMPRESSION:   1. Acute pulmonary edema improved radiographically 6/24   2. Chronic hypoxic respiratory failure remains on her baseline oxygen   3. Acute kidney injury creatinine up slightly will repeat tomorrow   4. Cardiomyopathy diastolic dysfunction   5. COPD no wheezing   6. Obstructive sleep apnea did not use her home CPAP last night due to headache   7. Headache   8. Acute gout   9. Diabetes   10. Coronary artery disease with bypass grafting and stents in the past   9. Body mass index is 39.58 kg/m². 11.         RECOMMENDATIONS/PLAN:   1. No significant diuresis after 80 of Lasix but only 1 hour after the dose we'll see how she does over the next three or 4 hours   2. Good diuresis yesterday after Lasix will discontinue Zaroxolyn and follow renal function   3. Continue nasal oxygen as she uses at home   4. Continue her home CPAP with oxygen bleed in   5. Continue Symbicort to substitute for her Dulera inhaler   6. We will use albuterol inhaler. 7. We will add colchicine for her acute gout we will give 3 doses today and then decreased to once a day tomorrow   8.  She has a nebulizer machine at home she thinks it is not working correctly         Current Facility-Administered Medications   Medication   · acetaminophen (TYLENOL) tablet 650 mg   · colchicine tablet 0.6 mg   · famotidine (PEPCID) tablet 10 mg   · furosemide (LASIX) tablet 80 mg   · metOLazone (ZAROXOLYN) tablet 5 mg · alum-mag hydroxide-simeth (MYLANTA) oral suspension 30 mL   · albuterol (PROVENTIL HFA, VENTOLIN HFA, PROAIR HFA) inhaler 2 Puff   · amiodarone (CORDARONE) tablet 200 mg   · apixaban (ELIQUIS) tablet 5 mg   · clopidogreL (PLAVIX) tablet 75 mg   · insulin glargine (LANTUS) injection 10 Units   · losartan (COZAAR) tablet 100 mg   · nitroglycerin (NITROSTAT) tablet 0.4 mg   · oxybutynin (DITROPAN) tablet 5 mg   · albuterol-ipratropium (DUO-NEB) 2.5 MG-0.5 MG/3 ML   · budesonide-formoteroL (SYMBICORT) 160-4.5 mcg/actuation HFA inhaler 2 Puff   · potassium chloride SR (KLOR-CON 10) tablet 10 mEq   · QUEtiapine (SEROquel) tablet 25 mg   · theophylline ER (BAM-24) capsule 300 mg   · insulin lispro (HUMALOG) injection   · glucose chewable tablet 16 g   · dextrose (D50W) injection syrg 12.5-25 g   · glucagon (GLUCAGEN) injection 1 mg   · polyethylene glycol (MIRALAX) packet 17 g   · ondansetron (ZOFRAN ODT) tablet 4 mg     Or   · ondansetron (ZOFRAN) injection 4 mg            ATTENDING NOTE   Subjective:       Sitting up in bed.  Reports she had some abdominal pain yesterday that was relieved after having a bowel movement.  Normal bowel movement per patient with no blood noted       She has been seen by pulmonology today who started her on gout medication for her complaints of lower extremity pain consistent with her gout pain.       Was uncooperative with overnight pulse oximetry and CPAP due to headache.  Pulmonology aware and spoke with patient.  Discontinued Zaroxolyn as well.       Visit Vitals   BP (!) 145/84 (BP 1 Location: Left upper arm, BP Patient Position: At rest;Lying)   Pulse 77   Temp 97.8 °F (36.6 °C)   Resp 19   Ht 5' 4\" (1.626 m)   Wt 104.6 kg (230 lb 9.6 oz)   SpO2 94%         physical Exam:         Constitutional: pt is oriented to person, place, and time. HENT:    Head: Normocephalic and atraumatic. Eyes: Pupils are equal, round, and reactive to light.  EOM are normal.    Cardiovascular: Normal rate, regular rhythm and normal heart sounds.  Bilateral lower extremity edema   Pulmonary/Chest: Bibasilar diminished breath sounds with no rales, wheezes, rhonchi.  No increased work of breathing. Abdominal: Soft. Bowel sounds are normal. There is no abdominal tenderness. There is no rebound and no guarding. Musculoskeletal: Normal range of motion.  Right great toe painful and warm consistent with her usual gout symptoms   Neurological: pt is alert and oriented to person, place, and time       Assessment:   Acute pulmonary edema-improved on repeat chest x-ray 6/24   Acute congestive heart failure with preserved ejection fraction   CAD status post CABG   Mildly elevated troponin   Type 2 diabetes with hyperglycemia   Hypertension   COPD no acute exacerbation   Chronic kidney disease   Bradycardia   History of A. fib status post cardioversion   Depression   Headache   Gout of right great toe   IFTIKHAR treated with CPAP           Plan:   Pulmonology and cardiology following   Encouraged to use CPAP as ordered   Continue oxygen therapy with bleed into CPAP   Continue diuresis with IV Lasix   Discontinued Zaroxolyn by pulmonology   Repeat labs in the morning-monitor renal function closely   Continue inhalers and nebulizers   Colchicine added for gout by pulmonology   On Eliquis, Plavix   Continue glucose monitoring with sliding scale insulin coverage and basal insulin   Potassium supplementation   On Pepcid for reflux symptoms            Results for Zachery Richey (MRN [id number])       6/25/2021 07:24   Sodium: 138   Potassium: 3.9   Chloride: 99   CO2: 33 (H)   Anion gap: 6   Glucose: 140 (H)   BUN: 25 (H)   Creatinine: 1.42 (H)   BUN/Creatinine ratio: 18   Calcium: 8.3 (L)   GFR est non-AA: 37 (L)   GFR est AA: 44 (L)   Bilirubin, total: 0.7   Protein, total: 6.8   Albumin: 2.7 (L)   Globulin: 4.1 (H)   A-G Ratio: 0.7 (L)   ALT: 14   AST: 10 (L)   Alk.  phosphatase: 117   Uric acid: 6.9 (H)   NT pro-BNP: 445 (H) Results for Noreen Thomas (MRN [id number])       6/25/2021 07:24   WBC: 8.8   NRBC: 0.0   RBC: 5.38 (H)   HGB: 14.0   HCT: 47.9 (H)   MCV: 89.0   MCH: 26.0   MCHC: 29.2 (L)   RDW: 15.7 (H)   PLATELET: 348   MPV: 12.0   ABSOLUTE NRBC: 0.00         Heart Failure - Care Day 4 (6/24/2021) by José Miguel Paniagua       Review Entered Review Status   6/29/2021 11:39 Completed      Criteria Review      Care Day: 4 Care Date: 6/24/2021 Level of Care: Telemetry    Guideline Day 2    Level Of Care    (X) Floor    Clinical Status    (X) * Hemodynamic stability    6/29/2021 11:39:09 EDT by José Miguel Paniagua      146/76, 80, 18, 94% O2 2LPM    (X) * Mental status at baseline    (X) * No evidence of myocardial ischemia    (X) * Cardiac rate and rhythm acceptable    (X) * Oxygenation at baseline or improved    (X) * Pulmonary edema absent or improved    Routes    (X) Oral diet    Interventions    (X) * Pulmonary catheter absent    (X) Weigh    (X) Possible electrolytes    (X) Oxygen    Medications    (X) Diuretics    6/29/2021 11:39:09 EDT by José Miguel Paniagua      lasix 80mg PO    (X) Neprilysin inhibitor with ARB, or ACE inhibitor or ARB    * Milestone   Additional Notes   6/24      Subjective:       Patient is Josy.[age] y.o. year old female signal past medical history of diabetes hypertension CAD status post CABG COPD CHF came to the ER 6/21 complaining of chest pain shortness of breath for last 2 days. She also complained of swelling in her legs. She is taking Lasix, but did not feel like it was helping much anymore. ER workup showed elevated BNP, mildly elevated troponin. Patient was admitted with acute congestive heart failure.        She had an echo done which showed significant pulmonary hypertension, with pulmonary arterial systolic pressure of 62 mmHg.        Today patient is alert and awake. She is complaining of a right-sided retro orbital headache, 8/10 in severity that started last night.  She is also complaining of an [place], substernal chest pain, that also began last night. Physical Exam:         Constitutional: pt is oriented to person, place, and time. HENT:    Head: Normocephalic and atraumatic. Eyes: Pupils are equal, round, and reactive to light. EOM are normal.    Cardiovascular: Normal rate, regular rhythm and normal heart sounds. Pulmonary/Chest: Breath sounds normal. No wheezes. No rales. Exhibits no tenderness. Abdominal: Soft. Bowel sounds are normal. There is no abdominal tenderness. There is no rebound and no guarding. Musculoskeletal: Normal range of motion.     Neurological: pt is alert and oriented to person, place, and time          Assessment:       Acute congestive heart failure   CAD status post CABG   Mildly elevated troponin   Type 2 diabetes   Hypertension   COPD   Chronic kidney disease   Bradycardia   History of A. fib status post cardioversion   Depression           Plan:       Pulmonology and Cardiology consulted    Family to bring in CPAP    Repeat labs in AM    Switch to PO diuretics    Continue anticoagulation for previous a. Fib/a flutter    Tomorrow will give Zaroxolyn prior to lasix       Possible discharge home tomorrow   PT OT consult               Current Facility-Administered Medications:    ·  famotidine (PEPCID) tablet 10 mg, 10 mg, Oral, BID, Jacey Stanford NP, 10 mg at 06/24/21 1131   ·  [START ON 6/25/2021] furosemide (LASIX) tablet 80 mg, 80 mg, Oral, DAILY, Valerie Lopez MD   ·  [START ON 6/25/2021] metOLazone (ZAROXOLYN) tablet 5 mg, 5 mg, Oral, DAILY, Valerie Lopez MD   ·  alum-mag hydroxide-simeth (MYLANTA) oral suspension 30 mL, 30 mL, Oral, Q6H PRN, Gopal Alvarado MD, 30 mL at 06/23/21 2120   ·  albuterol (PROVENTIL HFA, VENTOLIN HFA, PROAIR HFA) inhaler 2 Puff, 2 Puff, Inhalation, Q6HWA RT, Valerie Lopez MD, 2 Puff at 06/24/21 0810   ·  amiodarone (CORDARONE) tablet 200 mg, 200 mg, Oral, DAILY, Amanda Escobar MD, 200 mg at 06/24/21 0957   ·  apixaban (ELIQUIS) tablet 5 mg, 5 mg, Oral, BID, Gopal Alvarado MD, 5 mg at 06/24/21 0957   ·  clopidogreL (PLAVIX) tablet 75 mg, 75 mg, Oral, DAILY, Gopal Alvarado MD, 75 mg at 06/24/21 0957   ·  insulin glargine (LANTUS) injection 10 Units, 10 Units, SubCUTAneous, QHS, Gopal Alvarado MD, 10 Units at 06/23/21 2200   ·  losartan (COZAAR) tablet 100 mg, 100 mg, Oral, DAILY, Sanjuanita Mena MD, 100 mg at 06/24/21 0957   ·  nitroglycerin (NITROSTAT) tablet 0.4 mg, 0.4 mg, SubLINGual, PRN, Sanjuanita Mena MD, 0.4 mg at 06/23/21 2048   ·  oxybutynin (DITROPAN) tablet 5 mg, 5 mg, Oral, TID, Sanjuanita Mena MD, 5 mg at 06/24/21 0957   ·  albuterol-ipratropium (DUO-NEB) 2.5 MG-0.5 MG/3 ML, 3 mL, Nebulization, Q6H PRN, Sanjuanita Mena MD   ·  budesonide-formoteroL (SYMBICORT) 160-4.5 mcg/actuation HFA inhaler 2 Puff, 2 Puff, Inhalation, BID RT, Sanjuanita Mena MD, 2 Puff at 06/24/21 0810   ·  potassium chloride SR (KLOR-CON 10) tablet 10 mEq, 10 mEq, Oral, BID, Gopal Alvarado MD, 10 mEq at 06/24/21 0957   ·  QUEtiapine (SEROquel) tablet 25 mg, 25 mg, Oral, QHS, Gopal Alvarado MD, 25 mg at 06/23/21 2120   ·  theophylline ER (BAM-24) capsule 300 mg, 300 mg, Oral, DAILY, Sanjuanita Mena MD, 300 mg at 06/24/21 1131   ·  insulin lispro (HUMALOG) injection, , SubCUTAneous, AC&HS, Sanjuanita Mena MD, 4 Units at 06/22/21 1037   ·  glucose chewable tablet 16 g, 4 Tablet, Oral, PRN, Анна Alvarado MD   ·  dextrose (D50W) injection syrg 12.5-25 g, 25-50 mL, IntraVENous, PRN, Анна Alvarado MD   ·  glucagon [facility]) injection 1 mg, 1 mg, IntraMUSCular, PRN, Анна Alvarado MD   ·  polyethylene glycol (MIRALAX) packet 17 g, 17 g, Oral, DAILY PRN, Анна Alvarado MD   ·  ondansetron [facility] ODT) tablet 4 mg, 4 mg, Oral, Q8H PRN **OR** ondansetron (ZOFRAN) injection 4 mg, 4 mg, IntraVENous, Q6H PRN, Gopal Alvarado MD            Pulmonary--   6/24 wore her CPAP last night without difficulty.  Will check overnight oximetry to make sure saturation is maintained tonight.  She received oral Lasix 80 mg at nine and as of 10:30 AM has not had any significant diuresis.  We'll see how she does over the next three or 4 hours.  Tomorrow we'll give Zaroxolyn preceding Lasix       Hospital Problems Date Reviewed: 3/12/2021     Codes Class Noted POA     CHF exacerbation ([facility] 75.) ICD-10-CM: I50.9   ICD-9-CM: 769. 0   6/21/2021 Unknown           CHF (congestive heart failure) ([facility] 75.) ICD-10-CM: I50.9   ICD-9-CM: 428. 0   2/22/2021 Unknown                IMPRESSION:   1. Acute pulmonary edema improved clinically we will repeat x-ray    2. Chronic hypoxic respiratory failure remains on her baseline oxygen   3. Acute kidney injury continue to follow   4. Cardiomyopathy diastolic dysfunction   5. COPD no wheezing   6. Obstructive sleep apnea family brought in her machine we'll continue to use it   7. Diabetes   8. Coronary artery disease with bypass grafting and stents in the past   9. Body mass index is 39.58 kg/m². 9.         RECOMMENDATIONS/PLAN:   1. No significant diuresis after 80 of Lasix but only 1 hour after the dose we'll see how she does over the next three or 4 hours   2. We'll add Zaroxolyn before Lasix tomorrow to see the diuretic effect   3. Continue nasal oxygen as she uses at home   4. Continue her home CPAP with oxygen bleed in   5. Continue Symbicort to substitute for her Dulera inhaler   6. We will use albuterol inhaler. 7. She has a nebulizer machine at home she thinks it is not working correctly         Examination: Frontal and lateral radiographs of the chest       Clinical information: Pulmonary edema       Comparison: 6/21/2021       Findings:       Post median sternotomy. Stable enlargement of the cardiac contours. The superior   and middle mediastinal contours are normal. There is calcific atherosclerosis of   the thoracic aorta.  There is decreased pulmonary vascular congestion and   decreased pulmonary interstitial thickening. No focal pulmonary consolidation. No pleural effusion or pneumothorax. No acute osseous abnormality.        IMPRESSION       Stable cardiomegaly. Decreased pulmonary vascular congestion and pulmonary   interstitial edema compared to the prior study from 6/21/2021. Results for Lissett Laura (MRN [id number])       6/24/2021 07:05   Sodium: 140   Potassium: 3.8   Chloride: 102   CO2: 34 (H)   Anion gap: 4 (L)   Glucose: 117 (H)   BUN: 26 (H)   Creatinine: 1.34 (H)   BUN/Creatinine ratio: 19   Calcium: 8.4 (L)   Phosphorus: 3.4   GFR est non-AA: 39 (L)   GFR est AA: 48 (L)   Albumin: 2.7 (L)   NT pro-BNP: 724 (H)            Heart Failure - Care Day 3 (6/23/2021) by Dave Kraus       Review Entered Review Status   6/29/2021 11:24 Completed      Criteria Review      Care Day: 3 Care Date: 6/23/2021 Level of Care: Telemetry    Guideline Day 2    Level Of Care    (X) Floor    Clinical Status    (X) * Hemodynamic stability    6/29/2021 11:24:21 EDT by Dave Kraus      98.0, 146/76, 80, 18, 94% O2 2lpm    (X) * Mental status at baseline    (X) * No evidence of myocardial ischemia    (X) * Cardiac rate and rhythm acceptable    (X) * Oxygenation at baseline or improved    ( ) * Pulmonary edema absent or improved    Routes    (X) Oral diet    Interventions    (X) * Pulmonary catheter absent    (X) Weigh    (X) Possible electrolytes    (X) Oxygen    Medications    (X) Diuretics    6/29/2021 11:24:21 EDT by Dave Kraus      lasix 40mg IV bid    (X) Neprilysin inhibitor with ARB, or ACE inhibitor or ARB    * Milestone   Additional Notes   6/23      Attending note-   Physical Exam:         Constitutional: pt is oriented to person, place, and time. HENT:    Head: Normocephalic and atraumatic. Eyes: Pupils are equal, round, and reactive to light. EOM are normal.    Cardiovascular: Normal rate, regular rhythm and normal heart sounds.     Pulmonary/Chest: Breath sounds normal. No wheezes. No rales. Exhibits no tenderness. Abdominal: Soft. Bowel sounds are normal. There is no abdominal tenderness. There is no rebound and no guarding. Musculoskeletal: Normal range of motion.     Neurological: pt is alert and oriented to person, place, and time       Subjective:       Patient alert awake feeling much better         Assessment:           Acute congestive heart failure   CAD status post CABG   Mildly elevated troponin   Type 2 diabetes   Hypertension   COPD   Chronic kidney disease   Bradycardia   History of A. fib status post cardioversion   Depression       Plan:       Continue IV Lasix    follow-up 2D complete echo   Repeat the labs   Discussed with the patient daughter and the patient at the bedside   Follow with cardiology and the pulmonologist                   Current Facility-Administered Medications:    ·  famotidine (PEPCID) tablet 10 mg, 10 mg, Oral, BID, Will Stephenson NP, 10 mg at 06/24/21 1131   ·  [START ON 6/25/2021] furosemide (LASIX) tablet 80 mg, 80 mg, Oral, DAILY, Maggy Deng MD   ·  [START ON 6/25/2021] metOLazone (ZAROXOLYN) tablet 5 mg, 5 mg, Oral, DAILY, Maggy Deng MD   ·  alum-mag hydroxide-simeth (MYLANTA) oral suspension 30 mL, 30 mL, Oral, Q6H PRN, Gopal Alvarado MD, 30 mL at 06/23/21 2120   ·  albuterol (PROVENTIL HFA, VENTOLIN HFA, PROAIR HFA) inhaler 2 Puff, 2 Puff, Inhalation, Q6HWA RT, Maggy Deng MD, 2 Puff at 06/24/21 0810   ·  amiodarone (CORDARONE) tablet 200 mg, 200 mg, Oral, DAILY, Pop Min MD, 200 mg at 06/24/21 0957   ·  apixaban (ELIQUIS) tablet 5 mg, 5 mg, Oral, BID, Gopal Alvarado MD, 5 mg at 06/24/21 0957   ·  clopidogreL (PLAVIX) tablet 75 mg, 75 mg, Oral, DAILY, Gopal Alvarado MD, 75 mg at 06/24/21 0957   ·  insulin glargine (LANTUS) injection 10 Units, 10 Units, SubCUTAneous, QHS, Gopal Alvarado MD, 10 Units at 06/23/21 2200   ·  losartan (COZAAR) tablet 100 mg, 100 mg, Oral, DAILY, Christiano, Philomena Paget, MD, 100 mg at 06/24/21 0957   ·  nitroglycerin (NITROSTAT) tablet 0.4 mg, 0.4 mg, SubLINGual, PRN, Gopal Alvarado MD, 0.4 mg at 06/23/21 2048   ·  oxybutynin (DITROPAN) tablet 5 mg, 5 mg, Oral, TID, Mohiuddin, Philomena Paget, MD, 5 mg at 06/24/21 0957   ·  albuterol-ipratropium (DUO-NEB) 2.5 MG-0.5 MG/3 ML, 3 mL, Nebulization, Q6H PRN, Nicola Carrear MD   ·  budesonide-formoteroL (SYMBICORT) 160-4.5 mcg/actuation HFA inhaler 2 Puff, 2 Puff, Inhalation, BID RT, Nicola Carrera MD, 2 Puff at 06/24/21 0810   ·  potassium chloride SR (KLOR-CON 10) tablet 10 mEq, 10 mEq, Oral, BID, Gopal Alvarado MD, 10 mEq at 06/24/21 0957   ·  QUEtiapine (SEROquel) tablet 25 mg, 25 mg, Oral, QHS, Gopal Alvarado MD, 25 mg at 06/23/21 2120   ·  theophylline ER (BAM-24) capsule 300 mg, 300 mg, Oral, DAILY, Nicola Carrera MD, 300 mg at 06/24/21 1131   ·  insulin lispro (HUMALOG) injection, , SubCUTAneous, AC&HS, Nicola Carrera MD, 4 Units at 06/22/21 1037   ·  glucose chewable tablet 16 g, 4 Tablet, Oral, PRN, Mohiuddin, Philomena Paget, MD   ·  dextrose (D50W) injection syrg 12.5-25 g, 25-50 mL, IntraVENous, PRN, Mohiuddin, Philomena Paget, MD   ·  glucagon [facility]) injection 1 mg, 1 mg, IntraMUSCular, PRN, Mohiuddin, Philomena Paget, MD   ·  polyethylene glycol (MIRALAX) packet 17 g, 17 g, Oral, DAILY PRN, Mohiuddin, Philomena Paget, MD   ·  ondansetron [facility]) tablet 4 mg, 4 mg, Oral, Q8H PRN **OR** ondansetron (ZOFRAN) injection 4 mg, 4 mg, IntraVENous, Q6H PRN, Gopal Alvarado MD            CARDIOLOGY-   Problem List:    COPD exacerbation   Acute on chronic diastolic heart failure   Stable CAD with recent cardiac cath showing widely patent coronaries   IFTIKHAR   Edema   Pulmonary hypertension.  PASP 62 mmHg       Assessment/Plan:        Switch to p.o. diuretics   Echo reviewed and shows significant pulmonary hypertension   Continue systemic anticoagulation for previous a flutter/fib   Advance activity as tolerated      BP (!) 174/85 (BP Patient Position: At rest;Semi fowlers)   Pulse 61   Temp 97.4 °F (36.3 °C)   Resp 20   Ht 5' 4\" (1.626 m)   Wt 103.4 kg (227 lb 15.3 oz)   SpO2 94%         Echocardiogram 6/22/2021:   · LV: Estimated LVEF is 55 - 60%. Normal cavity size and systolic function (ejection fraction normal). Mild concentric hypertrophy. Wall motion: normal. Mild (grade 1) left ventricular diastolic dysfunction. · LA: Mildly dilated left atrium. · RA: Mildly dilated right atrium. · AV: With no evidence of reduced excursion. · MV: Mild mitral valve regurgitation is present. · TV: Mild tricuspid valve regurgitation is present. · PV: Mild pulmonic valve regurgitation is present. · PA: Pulmonary arterial systolic pressure is 62 mmHg. PULMONARY-       Subjective/Interval History:   Seen in the emergency room.  Has had over 3 L of diuresis so far and feels much better.  History is of mild cardiomyopathy normally on Lasix 20 mg.  About 2 weeks ago she noted worsening edema and that her 20 mg of Lasix was not initiating any diuresis.  She upped it to 40 mg a week ago and still has not been having any diuresis has had worsening edema of her legs shortness of breath cough productive of clear sputum.  She thinks she may have had an episode of fever but is not sure.  Has not seen any purulent sputum   6/23 did not tolerate our CPAP last night.  She states her family will bring her machine in today to use.  Feels better edema is improved urine output not recorded yesterday states she was still passing a large amount of urine       Hospital Problems Date Reviewed: 3/12/2021     Codes Class Noted POA     CHF exacerbation ([facility] [address] ICD-10-CM: I50.9   ICD-9-CM: 428. 0   6/21/2021 Unknown           CHF (congestive heart failure) ([facility] [address] ICD-10-CM: I50.9   ICD-9-CM: 428. 0   2/22/2021 Unknown                IMPRESSION:   1. Acute pulmonary edema improved clinically we will repeat x-ray in a.m.    2. Chronic hypoxic respiratory failure remains on her baseline oxygen   3. Acute kidney injury continue to follow   4. Cardiomyopathy I merrily diastolic dysfunction   5. COPD no wheezing   6. Obstructive sleep apnea only to bring in her machine   7. Diabetes   8. Coronary artery disease with bypass grafting and stents in the past   9. Body mass index is 39.89 kg/m². 9.         RECOMMENDATIONS/PLAN:   1. Continue IV Lasix follow renal function   2. Continue nasal oxygen as she uses at home   3. Could not tolerate our CPAP her family is to bring in her machine   4. Continue Symbicort to substitute for her Dulera inhaler   5. We will use albuterol inhaler. 6. She has a nebulizer machine at home she thinks it is not working correctly            6/23/2021 07:51   WBC: 8.9   NRBC: 0.0   RBC: 5.07   HGB: 13.6   HCT: 45.9   MCV: 90.5   MCH: 26.8   MCHC: 29.6 (L)   RDW: 15.9 (H)   PLATELET: 087   MPV: 12.7   NEUTROPHILS: 76 (H)   LYMPHOCYTES: 15   MONOCYTES: 8   EOSINOPHILS: 1   BASOPHILS: 0   IMMATURE GRANULOCYTES: 0   DF: AUTOMATED   ABSOLUTE NRBC: 0.00   ABS. NEUTROPHILS: 6.7   ABS. IMM. GRANS.: 0.0   ABS. LYMPHOCYTES: 1.3   ABS. MONOCYTES: 0.7   ABS. EOSINOPHILS: 0.1   ABS. BASOPHILS: 0.0         6/23/2021 07:51   Sodium: 142   Potassium: 3.8   Chloride: 103   CO2: 34 (H)   Anion gap: 5   Glucose: 120 (H)   BUN: 21 (H)   Creatinine: 1.40 (H)   BUN/Creatinine ratio: 15   Calcium: 8.6   GFR est non-AA: 37 (L)   GFR est AA: 45 (L)   Bilirubin, total: 0.7   Protein, total: 6.6   Albumin: 2.8 (L)   Globulin: 3.8   A-G Ratio: 0.7 (L)   ALT: 17   AST: 9 (L)   Alk.  phosphatase: 119 (H)

## 2021-06-29 NOTE — DISCHARGE INSTRUCTIONS
Patient Education        Heart Failure: Care Instructions  Your Care Instructions     Heart failure occurs when your heart does not pump as much blood as the body needs. Failure does not mean that the heart has stopped pumping but rather that it is not pumping as well as it should. Over time, this causes fluid buildup in your lungs and other parts of your body. Fluid buildup can cause shortness of breath, fatigue, swollen ankles, and other problems. By taking medicines regularly, reducing sodium (salt) in your diet, checking your weight every day, and making lifestyle changes, you can feel better and live longer. Follow-up care is a key part of your treatment and safety. Be sure to make and go to all appointments, and call your doctor if you are having problems. It's also a good idea to know your test results and keep a list of the medicines you take. How can you care for yourself at home? Medicines    · Be safe with medicines. Take your medicines exactly as prescribed. Call your doctor if you think you are having a problem with your medicine.     · Do not take any vitamins, over-the-counter medicine, or herbal products without talking to your doctor first. Minna Arizaet not take ibuprofen (Advil or Motrin) and naproxen (Aleve) without talking to your doctor first. They could make your heart failure worse.     · You may take some of the following medicine. ? Angiotensin-converting enzyme inhibitors (ACEIs) or angiotensin II receptor blockers (ARBs) reduce the heart's workload, lower blood pressure, and reduce swelling. Taking an ACEI or ARB may lower your chance of needing to be hospitalized. ? Beta-blockers can slow heart rate, decrease blood pressure, and improve your condition. Taking a beta-blocker may lower your chance of needing to be hospitalized. ? Diuretics, also called water pills, reduce swelling. You will get more details on the specific medicines your doctor prescribes.   Diet    · Your doctor may suggest that you limit sodium. Your doctor can tell you how much sodium is right for you. An example is less than 3,000 mg a day. This includes all the salt you eat in cooking or in packaged foods. People get most of their sodium from processed foods. Fast food and restaurant meals also tend to be very high in sodium.     · Ask your doctor how much liquid you can drink each day. You may have to limit liquids. Weight    · Weigh yourself without clothing at the same time each day. Record your weight. Call your doctor if you have a sudden weight gain, such as more than 2 to 3 pounds in a day or 5 pounds in a week. (Your doctor may suggest a different range of weight gain.) A sudden weight gain may mean that your heart failure is getting worse. Activity level    · Start light exercise (if your doctor says it is okay). Even if you can only do a small amount, exercise will help you get stronger, have more energy, and manage your weight and your stress. Walking is an easy way to get exercise. Start out by walking a little more than you did before. Bit by bit, increase the amount you walk.     · When you exercise, watch for signs that your heart is working too hard. You are pushing yourself too hard if you cannot talk while you are exercising. If you become short of breath or dizzy or have chest pain, stop, sit down, and rest.     · If you feel \"wiped out\" the day after you exercise, walk slower or for a shorter distance until you can work up to a better pace.     · Get enough rest at night. Sleeping with 1 or 2 pillows under your upper body and head may help you breathe easier. Lifestyle changes    · Do not smoke. Smoking can make a heart condition worse. If you need help quitting, talk to your doctor about stop-smoking programs and medicines. These can increase your chances of quitting for good.  Quitting smoking may be the most important step you can take to protect your heart.     · Limit alcohol to 2 drinks a day for men and 1 drink a day for women. Too much alcohol can cause health problems.     · Avoid getting sick from colds and the flu. Get a pneumococcal vaccine shot. If you have had one before, ask your doctor whether you need another dose. Get a flu shot each year. If you must be around people with colds or the flu, wash your hands often. When should you call for help? Call 911 if you have symptoms of sudden heart failure such as:    · You have severe trouble breathing.     · You cough up pink, foamy mucus.     · You have a new irregular or rapid heartbeat. Call your doctor now or seek immediate medical care if:    · You have new or increased shortness of breath.     · You are dizzy or lightheaded, or you feel like you may faint.     · You have sudden weight gain, such as more than 2 to 3 pounds in a day or 5 pounds in a week. (Your doctor may suggest a different range of weight gain.)     · You have increased swelling in your legs, ankles, or feet.     · You are suddenly so tired or weak that you cannot do your usual activities. Watch closely for changes in your health, and be sure to contact your doctor if you develop new symptoms. Where can you learn more? Go to http://www.gray.com/  Enter T135 in the search box to learn more about \"Heart Failure: Care Instructions. \"  Current as of: August 31, 2020               Content Version: 12.8  © 5973-4103 [employer]. Care instructions adapted under license by [employer] (which disclaims liability or warranty for this information). If you have questions about a medical condition or this instruction, always ask your healthcare professional. [place] [address] any warranty or liability for your use of this information.           Discharge Instructions       Patient Education        Avoiding Triggers With Heart Failure: Care Instructions  Your Care Instructions     Triggers are anything that make your heart failure flare up. A flare-up is also called \"sudden heart failure\" or \"acute heart failure. \" When you have a flare-up, fluid builds up in your lungs, and you have problems breathing. You might need to go to the hospital. By watching for changes in your condition and avoiding triggers, you can prevent heart failure flare-ups. Follow-up care is a key part of your treatment and safety. Be sure to make and go to all appointments, and call your doctor if you are having problems. It's also a good idea to know your test results and keep a list of the medicines you take. How can you care for yourself at home? Watch for changes in your weight and condition  · Weigh yourself without clothing at the same time each day. Record your weight. Call your doctor if you have sudden weight gain, such as more than 2 to 3 pounds in a day or 5 pounds in a week. (Your doctor may suggest a different range of weight gain.) A sudden weight gain may mean that your heart failure is getting worse. · Keep a daily record of your symptoms. Write down any changes in how you feel, such as new shortness of breath, cough, or problems eating. Also record if your ankles are more swollen than usual and if you feel more tired than usual. Note anything that you ate or did that could have triggered these changes. Limit sodium  Sodium causes your body to hold on to extra water. This may cause your heart failure symptoms to get worse. People get most of their sodium from processed foods. Fast food and restaurant meals also tend to be very high in sodium. · Your doctor may suggest that you limit sodium. Your doctor can tell you how much sodium is right for you. This includes limiting sodium in cooked and packaged foods. · Read food labels on cans and food packages. They tell you how much sodium you get in one serving. Check the serving size. If you eat more than one serving, you are getting more sodium.   · Be aware that sodium can come in forms other than salt, including monosodium glutamate (MSG), sodium citrate, and sodium bicarbonate (baking soda). MSG is often added to Asian food. You can sometimes ask for food without MSG or salt. · Slowly reducing salt will help you adjust to the taste. Take the salt shaker off the table. · Flavor your food with garlic, lemon juice, onion, vinegar, herbs, and spices instead of salt. Do not use soy sauce, steak sauce, onion salt, garlic salt, mustard, or ketchup on your food, unless it is labeled \"low-sodium\" or \"low-salt. \"  · Make your own salad dressings, sauces, and ketchup without adding salt. · Use fresh or frozen ingredients, instead of canned ones, whenever you can. Choose low-sodium canned goods. · Eat less processed food and food from restaurants, including fast food. Exercise as directed  Moderate, regular exercise is very good for your heart. It improves your blood flow and helps control your weight. But too much exercise can stress your heart and cause a heart failure flare-up. · Check with your doctor before you start an exercise program.  · Walking is an easy way to get exercise. Start out slowly. Gradually increase the length and pace of your walk. Swimming, riding a bike, and using a treadmill are also good forms of exercise. · When you exercise, watch for signs that your heart is working too hard. You are pushing yourself too hard if you cannot talk while you are exercising. If you become short of breath or dizzy or have chest pain, stop, sit down, and rest.  · Do not exercise when you do not feel well. Take medicines correctly  · Take your medicines exactly as prescribed. Call your doctor if you think you are having a problem with your medicine. · Make a list of all the medicines you take. Include those prescribed to you by other doctors and any over-the-counter medicines, vitamins, or supplements you take. Take this list with you when you go to any doctor.   · Take your medicines at the same time every day. It may help you to post a list of all the medicines you take every day and what time of day you take them. · Make taking your medicine as simple as you can. Plan times to take your medicines when you are doing other things, such as eating a meal or getting ready for bed. This will make it easier to remember to take your medicines. · Get organized. Use helpful tools, such as daily or weekly pill containers. When should you call for help? Call 911 if you have symptoms of sudden heart failure such as:    · You have severe trouble breathing.     · You cough up pink, foamy mucus.     · You have a new irregular or rapid heartbeat. Call your doctor now or seek immediate medical care if:    · You have new or increased shortness of breath.     · You are dizzy or lightheaded, or you feel like you may faint.     · You have sudden weight gain, such as more than 2 to 3 pounds in a day or 5 pounds in a week. (Your doctor may suggest a different range of weight gain.)     · You have increased swelling in your legs, ankles, or feet.     · You are suddenly so tired or weak that you cannot do your usual activities. Watch closely for changes in your health, and be sure to contact your doctor if you develop new symptoms. Where can you learn more? Go to http://www.gray.com/  Enter V089 in the search box to learn more about \"Avoiding Triggers With Heart Failure: Care Instructions. \"  Current as of: August 31, 2020               Content Version: 12.8  © 2006-2021 [employer]. Care instructions adapted under license by [employer] (which disclaims liability or warranty for this information). If you have questions about a medical condition or this instruction, always ask your healthcare professional. [place] [address] any warranty or liability for your use of this information.          Patient Education        Medicines for Heart Failure: Care Instructions  Your Care Instructions     Most people with heart failure are helped by taking several medicines to protect their heart. These medicines can help you feel better and live longer. It is important to take all your medicines exactly as prescribed to get the best results. They can also cause side effects. If you think that any of your medicines are causing side effects, talk with your doctor. Follow-up care is a key part of your treatment and safety. Be sure to make and go to all appointments. Call your doctor if you are having problems. It's also a good idea to know your test results and keep a list of the medicines you take. What medicines are used for heart failure? · Aldosterone receptor antagonists. These are a type of diuretic. They make the kidneys get rid of extra fluid. · Angiotensin-converting enzyme (ACE) inhibitors help blood flow. They relax the blood vessels and lower your blood pressure. The heart can then pump more blood through your body without working harder. These include benazepril and lisinopril. · Angiotensin II receptor blockers (ARBs) work like ACE inhibitors. Some people use them instead. They include losartan. · Angiotensin receptor neprilysin inhibitor (ARNI) medicine works like ARBs and ACE inhibitors. Some people take an ARNI medicine instead. An example is sacubitril/valsartan. · Beta-blockers can slow the heart rate and decrease blood pressure. They can help heart failure. They include bisoprolol, carvedilol, and metoprolol. · Digoxin relieves symptoms in some people with heart failure. · Diuretics reduce swelling. They do this by helping the kidneys get rid of excess fluid. They are also called water pills. · Hydralazine. This may be taken with a nitrate to widen blood vessels. It can lower blood pressure and reduce the workload on the heart. · Ivabradine slows the heart rate. What should you know about these medicines?   This is not a complete list of medicines used for heart failure. If you have questions about your medicine, ask your doctor or pharmacist.  ACE inhibitors  Before you start to take an ACE inhibitor, talk to your doctor. Tell him or her if:  · You take any anti-inflammatory medicines. These include ibuprofen (Advil, Motrin) and naproxen (Aleve). · You take antacids, potassium pills or a salt substitute, or lithium. · You take a diuretic. · You are pregnant or breastfeeding or you plan to get pregnant. · You have kidney disease. Side effects include:  · A dry cough. If the cough is bad enough to make you stop the medicine, talk to your doctor. You may need to take a different one. · Feeling lightheaded. This may happen when you stand up too fast. It usually gets better with time. Angiotensin II receptor blockers (ARBs)  Before you start to take an ARB, talk to your doctor. Tell him or her if:  · You take any anti-inflammatory medicines. These include ibuprofen (Advil, Motrin) and naproxen (Aleve). · You take antacids, potassium pills or a salt substitute, or lithium. · You have kidney disease. · You take a diuretic. · You are pregnant or breastfeeding or you plan to get pregnant. Side effects include:  · Feeling lightheaded or dizzy. These are the most common side effects. Angiotensin receptor neprilysin inhibitor (ARNI)  Before you start to take an ARNI, talk to your doctor. Tell him or her if:  · You take any anti-inflammatory medicines. These include ibuprofen (Advil, Motrin) and naproxen (Aleve). · You take antacids, potassium pills or a salt substitute, or lithium. · You take an ACE inhibitor or an ARB. · You have kidney or liver problems. · You take a diuretic. · You are pregnant or breastfeeding or you plan to get pregnant. Side effects include:  · Feeling lightheaded or dizzy. These are the most common side effects. Diuretics (water pills)  Before you start to take a diuretic, talk to your doctor.  Tell him or her if:  · You take lithium or anti-inflammatory medicines such as Advil or Aleve. Side effects include:  · Urinating often. Ask your doctor about timing these pills so that you don't have to use the bathroom at a bad time. · Muscle cramps. This may mean that you are losing too much potassium. It is an important mineral. Call your doctor if you get muscle cramps. · Tender breasts. This may occur in men who take spironolactone. Call your doctor if you get tender breasts that bother you. Beta-blockers  Before you start to take a beta-blocker, talk to your doctor. Tell him or her if:  · You have asthma or diabetes. Side effects include:  · Feeling dizzy or tired. This usually gets better with time. Digoxin  Before you start to take digoxin, talk to your doctor. Tell him or her if:  · You have kidney disease. · You take a diuretic or other medicines. This includes over-the-counter medicines. Side effects include:  · Nausea or vomiting. · Confusion, changes in vision, a racing or slowed heartbeat, or dizziness. Call your doctor right away if you have any of these side effects. Where can you learn more? Go to http://www.gray.com/  Enter F132 in the search box to learn more about \"Medicines for Heart Failure: Care Instructions. \"  Current as of: August 31, 2020               Content Version: 12.8  © 1988-8347 [employer]. Care instructions adapted under license by [employer] (which disclaims liability or warranty for this information). If you have questions about a medical condition or this instruction, always ask your healthcare professional. [place] [address] any warranty or liability for your use of this information. Patient Education        Low Sodium Diet (2,000 Milligram): Care Instructions  Overview     Limiting sodium can be an important part of managing some health problems. The most common source of sodium is salt.  People get most of the salt in their diet from canned, prepared, and packaged foods. Fast food and restaurant meals also are very high in sodium. Your doctor will probably limit your sodium to less than 2,000 milligrams (mg) a day. This limit counts all the sodium in prepared and packaged foods and any salt you add to your food. Follow-up care is a key part of your treatment and safety. Be sure to make and go to all appointments, and call your doctor if you are having problems. It's also a good idea to know your test results and keep a list of the medicines you take. How can you care for yourself at home? Read food labels  · Read labels on cans and food packages. The labels tell you how much sodium is in each serving. Make sure that you look at the serving size. If you eat more than the serving size, you have eaten more sodium. · Food labels also tell you the Percent Daily Value for sodium. Choose products with low Percent Daily Values for sodium. · Be aware that sodium can come in forms other than salt, including monosodium glutamate (MSG), sodium citrate, and sodium bicarbonate (baking soda). MSG is often added to Asian food. When you eat out, you can sometimes ask for food without MSG or added salt. Buy low-sodium foods  · Buy foods that are labeled \"unsalted\" (no salt added), \"sodium-free\" (less than 5 mg of sodium per serving), or \"low-sodium\" (140 mg or less of sodium per serving). Foods labeled \"reduced-sodium\" and \"light sodium\" may still have too much sodium. Be sure to read the label to see how much sodium you are getting. · Buy fresh vegetables, or frozen vegetables without added sauces. Buy low-sodium versions of canned vegetables, soups, and other canned goods. Prepare low-sodium meals  · Cut back on the amount of salt you use in cooking. This will help you adjust to the taste. Do not add salt after cooking. One teaspoon of salt has about 2,300 mg of sodium. · Take the salt shaker off the table.   · Flavor your food with garlic, lemon juice, onion, vinegar, herbs, and spices. Do not use soy sauce, lite soy sauce, steak sauce, onion salt, garlic salt, celery salt, or ketchup on your food. · Use low-sodium salad dressings, sauces, and ketchup. Or make your own salad dressings and sauces without adding salt. · Use less salt (or none) when recipes call for it. You can often use half the salt a recipe calls for without losing flavor. Other foods such as rice, pasta, and grains do not need added salt. · Rinse canned vegetables, and cook them in fresh water. This removes some--but not all--of the salt. · Avoid water that is naturally high in sodium or that has been treated with water softeners, which add sodium. If you buy bottled water, read the label and choose a sodium-free brand. Avoid high-sodium foods  · Avoid eating:  ? Smoked, cured, salted, and canned meat, fish, and poultry. ? Ham, fraga, hot dogs, and luncheon meats. ? Regular, hard, and processed cheese and regular peanut butter. ? Crackers with salted tops, and other salted snack foods such as pretzels, chips, and salted popcorn. ? Frozen prepared meals, unless labeled low-sodium. ? Canned and dried soups, broths, and bouillon, unless labeled sodium-free or low-sodium. ? Canned vegetables, unless labeled sodium-free or low-sodium. ? [place] fries, pizza, tacos, and other fast foods. ? Pickles, olives, ketchup, and other condiments, especially soy sauce, unless labeled sodium-free or low-sodium. Where can you learn more? Go to http://www.gray.com/  Enter V843 in the search box to learn more about \"Low Sodium Diet (2,000 Milligram): Care Instructions. \"  Current as of: December 17, 2020               Content Version: 12.8  © 4665-3729 [employer]. Care instructions adapted under license by [employer] (which disclaims liability or warranty for this information).  If you have questions about a medical condition or this instruction, always ask your healthcare professional. [place] [address] any warranty or liability for your use of this information. PATIENT ID: Wally Martinez  MRN: [id number]   YOB: 1950    DATE OF ADMISSION: 6/21/2021  1:11 PM    DATE OF DISCHARGE: 6/29/2021    PRIMARY CARE PROVIDER: Wojciech Mckeon MD     ATTENDING PHYSICIAN: Parrish Rausch MD  DISCHARGING PROVIDER: Sheppard Grater, NP Dr. Tori Schlatter  To contact this individual call [phone] and ask the  to page. If unavailable ask to be transferred the Adult Hospitalist Department. CONSULTATIONS: IP CONSULT TO HOSPITALIST  IP CONSULT TO PULMONOLOGY  IP CONSULT TO CARDIOLOGY  IP CONSULT TO CARDIOLOGY    PROCEDURES/SURGERIES: * No surgery found *    PENDING TEST RESULTS:   At the time of discharge the following test results are still pending: N/A    FOLLOW UP APPOINTMENTS:   Follow-up Information     Follow up With Specialties Details Why Contact Info    Wojciech Mckeon MD Internal Medicine In 1 week  [address] [phone]      Tabitha Guerrier MD Pulmonary Disease In 1 week CPAP, hospital discharge follow-up [address] [facility]  [phone]             ADDITIONAL CARE RECOMMENDATIONS: ***    DIET: Resume regular diet. Avoid spicy foods, eating late at night, carbonated beverages. Stay upright at least one hour after eating    ACTIVITY: Activity as tolerated with PT/OT recommendations for outpatient therapy    WOUND CARE: N/A    EQUIPMENT needed: CPAP, home oxygen      DISCHARGE MEDICATIONS:   See Medication Reconciliation Form    · It is important that you take the medication exactly as they are prescribed. · Keep your medication in the bottles provided by the pharmacist and keep a list of the medication names, dosages, and times to be taken in your wallet. · Do not take other medications without consulting your doctor. NOTIFY YOUR PHYSICIAN FOR ANY OF THE FOLLOWING:   Fever over 101 degrees for 24 hours. Chest pain, shortness of breath, fever, chills, nausea, vomiting, diarrhea, change in mentation, falling, weakness, bleeding. Severe pain or pain not relieved by medications. Or, any other signs or symptoms that you may have questions about.       DISPOSITION:    Home With:  X OT X PT X HH  RN       SNF/Inpatient Rehab/LTAC    Independent/assisted living    Hospice    Other:         PROBLEM LIST Updated:  Yes ***       Signed:   Jan Angulo NP  6/29/2021  12:00 PM

## 2021-06-30 ENCOUNTER — APPOINTMENT (OUTPATIENT)
Dept: CT IMAGING | Age: 71
End: 2021-06-30
Attending: EMERGENCY MEDICINE
Payer: MEDICARE

## 2021-06-30 ENCOUNTER — APPOINTMENT (OUTPATIENT)
Dept: GENERAL RADIOLOGY | Age: 71
End: 2021-06-30
Attending: EMERGENCY MEDICINE
Payer: MEDICARE

## 2021-06-30 ENCOUNTER — HOSPITAL ENCOUNTER (EMERGENCY)
Age: 71
Discharge: HOME | End: 2021-06-30
Attending: EMERGENCY MEDICINE | Admitting: INTERNAL MEDICINE
Payer: MEDICARE

## 2021-06-30 VITALS
WEIGHT: 240 LBS | HEIGHT: 63 IN | BODY MASS INDEX: 42.52 KG/M2 | RESPIRATION RATE: 16 BRPM | OXYGEN SATURATION: 99 % | SYSTOLIC BLOOD PRESSURE: 136 MMHG | TEMPERATURE: 98.3 F | DIASTOLIC BLOOD PRESSURE: 79 MMHG | HEART RATE: 59 BPM

## 2021-06-30 DIAGNOSIS — K21.9 GASTROESOPHAGEAL REFLUX DISEASE WITHOUT ESOPHAGITIS: Primary | ICD-10-CM

## 2021-06-30 DIAGNOSIS — M54.2 NECK PAIN: ICD-10-CM

## 2021-06-30 DIAGNOSIS — N30.00 ACUTE CYSTITIS WITHOUT HEMATURIA: ICD-10-CM

## 2021-06-30 DIAGNOSIS — R55 SYNCOPE AND COLLAPSE: ICD-10-CM

## 2021-06-30 PROBLEM — N39.0 UTI (URINARY TRACT INFECTION): Status: ACTIVE | Noted: 2021-06-30

## 2021-06-30 LAB
ALBUMIN SERPL-MCNC: 3.1 G/DL (ref 3.5–5)
ALBUMIN/GLOB SERPL: 0.7 {RATIO} (ref 1.1–2.2)
ALP SERPL-CCNC: 152 U/L (ref 45–117)
ALT SERPL-CCNC: 23 U/L (ref 12–78)
ANION GAP SERPL CALC-SCNC: 9 MMOL/L (ref 5–15)
APPEARANCE UR: ABNORMAL
AST SERPL W P-5'-P-CCNC: 42 U/L (ref 15–37)
ATRIAL RATE: 62 BPM
BACTERIA URNS QL MICRO: ABNORMAL /HPF
BASOPHILS # BLD: 0.1 K/UL (ref 0–0.1)
BASOPHILS NFR BLD: 1 % (ref 0–1)
BILIRUB SERPL-MCNC: 0.9 MG/DL (ref 0.2–1)
BILIRUB UR QL: NEGATIVE
BNP SERPL-MCNC: 338 PG/ML
BUN SERPL-MCNC: 45 MG/DL (ref 6–20)
BUN/CREAT SERPL: 20 (ref 12–20)
CA-I BLD-MCNC: 8.8 MG/DL (ref 8.5–10.1)
CALCULATED P AXIS, ECG09: 72 DEGREES
CALCULATED R AXIS, ECG10: 44 DEGREES
CALCULATED T AXIS, ECG11: 90 DEGREES
CHLORIDE SERPL-SCNC: 101 MMOL/L (ref 97–108)
CO2 SERPL-SCNC: 23 MMOL/L (ref 21–32)
COLOR UR: ABNORMAL
CREAT SERPL-MCNC: 2.21 MG/DL (ref 0.55–1.02)
DIAGNOSIS, 93000: NORMAL
DIFFERENTIAL METHOD BLD: ABNORMAL
EOSINOPHIL # BLD: 0.2 K/UL (ref 0–0.4)
EOSINOPHIL NFR BLD: 2 % (ref 0–7)
ERYTHROCYTE [DISTWIDTH] IN BLOOD BY AUTOMATED COUNT: 15.4 % (ref 11.5–14.5)
GLOBULIN SER CALC-MCNC: 4.7 G/DL (ref 2–4)
GLUCOSE SERPL-MCNC: 120 MG/DL (ref 65–100)
GLUCOSE UR STRIP.AUTO-MCNC: NEGATIVE MG/DL
HCT VFR BLD AUTO: 47.8 % (ref 35–47)
HGB BLD-MCNC: 14.7 G/DL (ref 11.5–16)
HGB UR QL STRIP: NEGATIVE
IMM GRANULOCYTES # BLD AUTO: 0 K/UL (ref 0–0.04)
IMM GRANULOCYTES NFR BLD AUTO: 0 % (ref 0–0.5)
KETONES UR QL STRIP.AUTO: NEGATIVE MG/DL
LEUKOCYTE ESTERASE UR QL STRIP.AUTO: ABNORMAL
LYMPHOCYTES # BLD: 1.9 K/UL (ref 0.8–3.5)
LYMPHOCYTES NFR BLD: 24 % (ref 12–49)
MCH RBC QN AUTO: 26.9 PG (ref 26–34)
MCHC RBC AUTO-ENTMCNC: 30.8 G/DL (ref 30–36.5)
MCV RBC AUTO: 87.4 FL (ref 80–99)
MONOCYTES # BLD: 0.8 K/UL (ref 0–1)
MONOCYTES NFR BLD: 10 % (ref 5–13)
MUCOUS THREADS URNS QL MICRO: ABNORMAL /LPF
NEUTS SEG # BLD: 5.1 K/UL (ref 1.8–8)
NEUTS SEG NFR BLD: 63 % (ref 32–75)
NITRITE UR QL STRIP.AUTO: NEGATIVE
NRBC # BLD: 0 K/UL (ref 0–0.01)
NRBC BLD-RTO: 0 PER 100 WBC
P-R INTERVAL, ECG05: 202 MS
PH UR STRIP: 5 [PH] (ref 5–8)
PLATELET # BLD AUTO: 239 K/UL (ref 150–400)
POTASSIUM SERPL-SCNC: 5.1 MMOL/L (ref 3.5–5.1)
PROT SERPL-MCNC: 7.8 G/DL (ref 6.4–8.2)
PROT UR STRIP-MCNC: 30 MG/DL
Q-T INTERVAL, ECG07: 512 MS
QRS DURATION, ECG06: 100 MS
QTC CALCULATION (BEZET), ECG08: 519 MS
RBC # BLD AUTO: 5.47 M/UL (ref 3.8–5.2)
RBC #/AREA URNS HPF: ABNORMAL /HPF (ref 0–5)
SODIUM SERPL-SCNC: 133 MMOL/L (ref 136–145)
SP GR UR REFRACTOMETRY: 1.02 (ref 1–1.03)
TROPONIN I SERPL-MCNC: 0.05 NG/ML
UA: UC IF INDICATED,UAUC: ABNORMAL
UROBILINOGEN UR QL STRIP.AUTO: 0.1 EU/DL (ref 0.1–1)
VENTRICULAR RATE, ECG03: 62 BPM
WBC # BLD AUTO: 8 K/UL (ref 3.6–11)
WBC URNS QL MICRO: ABNORMAL /HPF (ref 0–4)

## 2021-06-30 PROCEDURE — 87086 URINE CULTURE/COLONY COUNT: CPT

## 2021-06-30 PROCEDURE — 93005 ELECTROCARDIOGRAM TRACING: CPT

## 2021-06-30 PROCEDURE — 85025 COMPLETE CBC W/AUTO DIFF WBC: CPT

## 2021-06-30 PROCEDURE — 83880 ASSAY OF NATRIURETIC PEPTIDE: CPT

## 2021-06-30 PROCEDURE — 70450 CT HEAD/BRAIN W/O DYE: CPT

## 2021-06-30 PROCEDURE — 99285 EMERGENCY DEPT VISIT HI MDM: CPT

## 2021-06-30 PROCEDURE — 99218 HC RM OBSERVATION: CPT

## 2021-06-30 PROCEDURE — 71045 X-RAY EXAM CHEST 1 VIEW: CPT

## 2021-06-30 PROCEDURE — 84484 ASSAY OF TROPONIN QUANT: CPT

## 2021-06-30 PROCEDURE — 81001 URINALYSIS AUTO W/SCOPE: CPT

## 2021-06-30 PROCEDURE — 36415 COLL VENOUS BLD VENIPUNCTURE: CPT

## 2021-06-30 PROCEDURE — 80053 COMPREHEN METABOLIC PANEL: CPT

## 2021-06-30 RX ORDER — LEVOFLOXACIN 5 MG/ML
500 INJECTION, SOLUTION INTRAVENOUS EVERY 24 HOURS
Status: DISCONTINUED | OUTPATIENT
Start: 2021-06-30 | End: 2021-06-30

## 2021-06-30 RX ORDER — LEVOFLOXACIN 500 MG/1
500 TABLET, FILM COATED ORAL DAILY
Qty: 3 TABLET | Refills: 0 | Status: SHIPPED | OUTPATIENT
Start: 2021-06-30 | End: 2021-07-03

## 2021-06-30 RX ORDER — SODIUM CHLORIDE 9 MG/ML
25 INJECTION, SOLUTION INTRAVENOUS CONTINUOUS
Status: DISCONTINUED | OUTPATIENT
Start: 2021-06-30 | End: 2021-06-30

## 2021-06-30 RX ORDER — LEVOFLOXACIN 5 MG/ML
500 INJECTION, SOLUTION INTRAVENOUS
Status: DISCONTINUED | OUTPATIENT
Start: 2021-06-30 | End: 2021-06-30

## 2021-06-30 NOTE — ED PROVIDER NOTES
EMERGENCY DEPARTMENT HISTORY AND PHYSICAL EXAM      Date: 6/30/2021  Patient Name: Shakira Sparks    History of Presenting Illness     Chief Complaint   Patient presents with    Neck Pain       History Provided By: Patient    HPI: Shakira Sparks, 72 y.o. female with a past medical history significant myocardial infarction presents to the ED with chief complaint of Neck Pain  . 52-year-old female recently in the hospital for 2 weeks. Patient woke up this morning had some vague upper neck pain. Took 2 nitroglycerin with no changed pain rule out resolved on its own. Also took a Tylenol with no relief. She has been having this feeling in the morning mostly. Describes it as a burning pain. No difficulty breathing. No chest pain or shortness of breath now. There are no other complaints, changes, or physical findings at this time. PCP: Jennie Dickerson MD    Current Facility-Administered Medications   Medication Dose Route Frequency Provider Last Rate Last Admin    sodium chloride 0.9 % bolus infusion 1,000 mL  1,000 mL IntraVENous ONCE Eduar Oliveros PA-C         Current Outpatient Medications   Medication Sig Dispense Refill    amiodarone (CORDARONE) 200 mg tablet Take 1 Tablet by mouth daily. 30 Tablet 0    famotidine (PEPCID) 10 mg tablet Take 1 Tablet by mouth two (2) times a day. 30 Tablet 0    furosemide (LASIX) 40 mg tablet Take 1.5 Tablets by mouth daily. 30 Tablet 0    nitroglycerin (NITROSTAT) 0.4 mg SL tablet Sit/Lay down then put one tab under the tongue every 5 minutes as needed for chest pain/neck or jaw pain for 3 doses. Seek immediate medical attention should you need more than one tab for pain to resolve. 1 Bottle 3    apixaban (ELIQUIS) 5 mg tablet Take 1 Tab by mouth two (2) times a day. 30 Tab 0    losartan (COZAAR) 100 mg tablet Take 1 Tab by mouth daily. 30 Tab 0    oxybutynin (DITROPAN) 5 mg tablet Take 1 Tab by mouth three (3) times daily.  90 Tab 0    insulin glargine (LANTUS) 100 unit/mL injection 10 Units by SubCUTAneous route nightly. May give in pen  Indications: type 2 diabetes mellitus 1 Vial 0    QUEtiapine (SEROqueL) 25 mg tablet Take 25 mg by mouth nightly.  theophylline ER (BAM-24) 200 mg cp24 capsule Take 300 mg by mouth daily.  albuterol-ipratropium (DUO-NEB) 2.5 mg-0.5 mg/3 ml nebu 3 mL by Nebulization route every six (6) hours as needed for Wheezing.  potassium chloride SR (KLOR-CON 10) 10 mEq tablet Take 10 mEq by mouth two (2) times a day.  mometasone-formoterol (Dulera) 200-5 mcg/actuation HFA inhaler Take 2 Puffs by inhalation two (2) times a day.  clopidogreL (PLAVIX) 75 mg tab Take 1 Tab by mouth daily. 30 Tab 0    albuterol (Ventolin HFA) 90 mcg/actuation inhaler Take 2 Puffs by inhalation two (2) times daily as needed for Wheezing. Past History     Past Medical History:  Past Medical History:   Diagnosis Date    Chronic obstructive pulmonary disease (HCC)     Congestive heart disease ([address]     Coronary artery disease     Diabetes ([address]     Hyperlipidemia     Hypertension     [facility] infar[facility]     IFTIKHAR (obstructive sleep apnea)        Past Surgical History:  Past Surgical History:   Procedure Laterality Date    HX BREAST BIOPSY      HX CHOLECYSTECTOMY      HX CORONARY ARTERY BYPASS GRAFT      HX HERNIA REPAIR      HX HYSTERECTOMY         Family History:  Family History   Problem Relation Age of Onset    Heart Disease Mother     Kidney Disease Mother     Cancer Maternal Grandmother        Social History:  Social History     Tobacco Use    Smoking status: Former Smoker    Smokeless tobacco: Never Used   Substance Use Topics    Alcohol use: Not Currently    Drug use: Never       Allergies: Allergies   Allergen Reactions    Penicillins Other (comments)    Tylox [Oxycodone-Acetaminophen] Hives         Review of Systems   Review of Systems   Constitutional: Negative.   Negative for chills, fatigue and fever. HENT: Negative. Negative for congestion, nosebleeds and sore throat. Eyes: Negative. Negative for pain, discharge and visual disturbance. Respiratory: Negative. Negative for cough, chest tightness and shortness of breath. Cardiovascular: Negative for chest pain, palpitations and leg swelling. Gastrointestinal: Negative for abdominal pain, blood in stool, constipation, diarrhea, nausea and vomiting. Endocrine: Negative. Genitourinary: Negative. Negative for difficulty urinating, dysuria, pelvic pain and vaginal bleeding. Musculoskeletal: Positive for neck pain. Negative for arthralgias, back pain and myalgias. Skin: Negative. Negative for rash and wound. Allergic/Immunologic: Negative. Neurological: Negative. Negative for dizziness, syncope, weakness, numbness and headaches. Hematological: Negative. Psychiatric/Behavioral: Negative. Negative for agitation, confusion and suicidal ideas. All other systems reviewed and are negative. Physical Exam   Physical Exam  Vitals and nursing note reviewed. Exam conducted with a chaperone present. Constitutional:       Appearance: Normal appearance. She is normal weight. HENT:      Head: Normocephalic and atraumatic. Nose: Nose normal.      Mouth/Throat:      Mouth: Mucous membranes are moist.      Pharynx: Oropharynx is clear. Eyes:      Extraocular Movements: Extraocular movements intact. Conjunctiva/sclera: Conjunctivae normal.      Pupils: Pupils are equal, round, and reactive to light. Cardiovascular:      Rate and Rhythm: Normal rate and regular rhythm. Pulses: Normal pulses. Heart sounds: Normal heart sounds. Pulmonary:      Effort: Pulmonary effort is normal. No respiratory distress. Breath sounds: Normal breath sounds. Abdominal:      General: Abdomen is flat. Bowel sounds are normal. There is no distension. Palpations: Abdomen is soft. Tenderness:  There is no abdominal tenderness. There is no guarding. Musculoskeletal:         General: No swelling, tenderness, deformity or signs of injury. Normal range of motion. Cervical back: Normal range of motion and neck supple. Right lower leg: No edema. Left lower leg: No edema. Skin:     General: Skin is warm and dry. Capillary Refill: Capillary refill takes less than 2 seconds. Findings: No lesion or rash. Neurological:      General: No focal deficit present. Mental Status: She is alert and oriented to person, place, and time. Mental status is at baseline. Cranial Nerves: No cranial nerve deficit. Psychiatric:         Mood and Affect: Mood normal.         Behavior: Behavior normal.         Thought Content: Thought content normal.         Judgment: Judgment normal.         Diagnostic Study Results     Labs -     Recent Results (from the past 12 hour(s))   CBC WITH AUTOMATED DIFF    Collection Time: 06/30/21  6:30 AM   Result Value Ref Range    WBC 8.0 3.6 - 11.0 K/uL    RBC 5.47 (H) 3.80 - 5.20 M/uL    HGB 14.7 11.5 - 16.0 g/dL    HCT 47.8 (H) 35.0 - 47.0 %    MCV 87.4 80.0 - 99.0 FL    MCH 26.9 26.0 - 34.0 PG    MCHC 30.8 30.0 - 36.5 g/dL    RDW 15.4 (H) 11.5 - 14.5 %    PLATELET 171 230 - 596 K/uL    NRBC 0.0 0.0  WBC    ABSOLUTE NRBC 0.00 0.00 - 0.01 K/uL    NEUTROPHILS 63 32 - 75 %    LYMPHOCYTES 24 12 - 49 %    MONOCYTES 10 5 - 13 %    EOSINOPHILS 2 0 - 7 %    BASOPHILS 1 0 - 1 %    IMMATURE GRANULOCYTES 0 0 - 0.5 %    ABS. NEUTROPHILS 5.1 1.8 - 8.0 K/UL    ABS. LYMPHOCYTES 1.9 0.8 - 3.5 K/UL    ABS. MONOCYTES 0.8 0.0 - 1.0 K/UL    ABS. EOSINOPHILS 0.2 0.0 - 0.4 K/UL    ABS. BASOPHILS 0.1 0.0 - 0.1 K/UL    ABS. IMM.  GRANS. 0.0 0.00 - 0.04 K/UL    DF AUTOMATED     METABOLIC PANEL, COMPREHENSIVE    Collection Time: 06/30/21  6:30 AM   Result Value Ref Range    Sodium 133 (L) 136 - 145 mmol/L    Potassium 5.1 3.5 - 5.1 mmol/L    Chloride 101 97 - 108 mmol/L    CO2 23 21 - 32 mmol/L    Anion gap 9 5 - 15 mmol/L    Glucose 120 (H) 65 - 100 mg/dL    BUN 45 (H) 6 - 20 mg/dL    Creatinine 2.21 (H) 0.55 - 1.02 mg/dL    BUN/Creatinine ratio 20 12 - 20      GFR est AA 27 (L) >60 ml/min/1.73m2    GFR est non-AA 22 (L) >60 ml/min/1.73m2    Calcium 8.8 8.5 - 10.1 mg/dL    Bilirubin, total 0.9 0.2 - 1.0 mg/dL    AST (SGOT) 42 (H) 15 - 37 U/L    ALT (SGPT) 23 12 - 78 U/L    Alk.  phosphatase 152 (H) 45 - 117 U/L    Protein, total 7.8 6.4 - 8.2 g/dL    Albumin 3.1 (L) 3.5 - 5.0 g/dL    Globulin 4.7 (H) 2.0 - 4.0 g/dL    A-G Ratio 0.7 (L) 1.1 - 2.2     TROPONIN I    Collection Time: 06/30/21  6:30 AM   Result Value Ref Range    Troponin-I, Qt. 0.05 (H) <0.05 ng/mL   BNP    Collection Time: 06/30/21  6:30 AM   Result Value Ref Range    NT pro- (H) <125 pg/mL   EKG, 12 LEAD, INITIAL    Collection Time: 06/30/21  6:30 AM   Result Value Ref Range    Ventricular Rate 62 BPM    Atrial Rate 62 BPM    P-R Interval 202 ms    QRS Duration 100 ms    Q-T Interval 512 ms    QTC Calculation (Bezet) 519 ms    Calculated P Axis 72 degrees    Calculated R Axis 44 degrees    Calculated T Axis 90 degrees    Diagnosis       Normal sinus rhythm  Possible Left atrial enlargement  Borderline ECG  When compared with ECG of 23-JUN-2021 20:33,  Nonspecific T wave abnormality no longer evident in Inferior leads  Nonspecific T wave abnormality, improved in Lateral leads  Confirmed by Vic Turner MD, Long Aguilar ([id number]) on 6/30/2021 7:44:33 AM     URINALYSIS W/ REFLEX CULTURE    Collection Time: 06/30/21  8:00 AM    Specimen: Urine   Result Value Ref Range    Color Yellow/Straw      Appearance Turbid (A) Clear      Specific gravity 1.018 1.003 - 1.030      pH (UA) 5.0 5.0 - 8.0      Protein 30 (A) Negative mg/dL    Glucose Negative Negative mg/dL    Ketone Negative Negative mg/dL    Bilirubin Negative Negative      Blood Negative Negative      Urobilinogen 0.1 0.1 - 1.0 EU/dL    Nitrites Negative Negative      Leukocyte Esterase Large (A) Negative      UA:UC IF INDICATED Urine Culture Ordered (A) Culture not indicated by UA result      WBC  0 - 4 /hpf    RBC 10-20 0 - 5 /hpf    Bacteria 2+ (A) Negative /hpf    Mucus Trace /lpf       Radiologic Studies -   CT HEAD WO CONT   Final Result   Evidence for nonacute end vessel occlusive change. No intracranial hemorrhage. XR CHEST SNGL V   Final Result        CT Results  (Last 48 hours)               06/30/21 0714  CT HEAD WO CONT Final result    Impression:  Evidence for nonacute end vessel occlusive change. No intracranial hemorrhage. Narrative:  CT head. Axial images are reviewed along with reformatted sagittal/coronal images. Dose reduction: All CT scans at this facility are performed using dose reduction   optimization techniques as appropriate to a performed exam including the   following-   automated exposure control, adjustments of mA and/or Kv according to patient   size, or use of iterative reconstructive technique. .       Bone windows demonstrate normal aeration of the imaged sinuses. Prior right   mastoidectomy. Left mastoid air cells are normally aerated. Review of intracranial content patchy low density through periventricular white   matter suggesting gliosis related to nonacute end vessel occlusive change. Central cerebral arteriosclerosis. No hydrocephalus. No intracranial hemorrhage. CXR Results  (Last 48 hours)               06/30/21 0640  XR CHEST SNGL V Final result    Narrative:  Chest single view. Comparison single view chest 6/28/2021. Unchanged appearance for the lungs; no interstitial or alveolar pulmonary edema. Sternal wires in the midline related to prior intrathoracic surgery. Cardiac   silhouette enlargement. Thoracic aorta atherosclerosis. No pneumothorax or   sizable pleural effusion. Medical Decision Making and ED Course   I am the first provider for this patient.     I reviewed the vital signs, available nursing notes, past medical history, past surgical history, family history and social history. Vital Signs-Reviewed the patient's vital signs. Patient Vitals for the past 12 hrs:   Temp Pulse Resp BP SpO2   06/30/21 0900 -- (!) 59 16 136/79 99 %   06/30/21 0800 -- 61 18 135/71 99 %   06/30/21 0617 98.3 °F (36.8 °C) 63 19 136/69 99 %       EKG interpretation:         Records Reviewed: Previous Hospital chart. EMS run report      ED Course:   Initial assessment performed. The patients presenting problems have been discussed, and they are in agreement with the care plan formulated and outlined with them. I have encouraged them to ask questions as they arise throughout their visit.     Orders Placed This Encounter    CULTURE, URINE     Standing Status:   Standing     Number of Occurrences:   1    XR CHEST SNGL V     Standing Status:   Standing     Number of Occurrences:   1     Order Specific Question:   Transport     Answer:   BED [2]     Order Specific Question:   Reason for Exam     Answer:   cp    CT HEAD WO CONT     Standing Status:   Standing     Number of Occurrences:   1     Order Specific Question:   Transport     Answer:   BED [2]     Order Specific Question:   Reason for Exam     Answer:   AMS     Order Specific Question:   Decision Support Exception     Answer:   Emergency Medical Condition (MA) [1]    CBC WITH AUTOMATED DIFF     Standing Status:   Standing     Number of Occurrences:   1    METABOLIC PANEL, COMPREHENSIVE     Standing Status:   Standing     Number of Occurrences:   1    TROPONIN I     Standing Status:   Standing     Number of Occurrences:   1    BNP     Standing Status:   Standing     Number of Occurrences:   1    URINALYSIS W/ REFLEX CULTURE     Standing Status:   Standing     Number of Occurrences:   1    EKG 12 LEAD INITIAL     Standing Status:   Standing     Number of Occurrences:   1     Order Specific Question:   Reason for Exam:     Answer:   Chest Pain  sodium chloride 0.9 % bolus infusion 1,000 mL         ED Course as of Jun 30 0947   Wed Jun 30, 2021   0636 EKG at 630. Normal sinus rhythm rate of 62. No ST changes. No T wave inversions. Normal intervals. Reason rule out ACS. Interpreted by ER physician. [HP]      ED Course User Index  [HP] Lavell Srinivasan MD       CONSULTANTS:  Consults      Provider Notes (Medical Decision Making):   71-year-old recent cardiac issues prolonged hospital stay presents with some neck discomfort described as a burning pain. Substernal region. Plan to rule out ACS versus pneumonia versus GERD gastritis. Procedures       Discussed the case with Dr. Carol Cr who will evaluate the patient for admission                Disposition       Emergency Department Disposition:        Diagnosis     Clinical Impression:   1. Gastroesophageal reflux disease without esophagitis    2. Neck pain    3. Syncope and collapse        Attestations:    Alfonso Gramajo MD    Please note that this dictation was completed with [employer], the computer voice recognition software. Quite often unanticipated grammatical, syntax, homophones, and other interpretive errors are inadvertently transcribed by the computer software. Please disregard these errors. Please excuse any errors that have escaped final proofreading. Thank you.

## 2021-06-30 NOTE — ED NOTES
Provider at bedside for dispo and follow up, all treatments completed as ordered. Discharge plan reviewed and paperwork signed, teaching completed regarding treatment received, medications and follow up care demonstrated and read back, pt walked out of the ER with steady gait and no signs of acute distress.

## 2021-06-30 NOTE — ED TRIAGE NOTES
Pt reports intermittent neck pain since yesterday. Increases with movement. No trauma. No trouble swallowing. No SOB. Took 2 sublingual nitroglycerine tablets and tylenol for the pain. Reports that pain is the same. Reports no pain at this time but it is normal for it to come and go.

## 2021-06-30 NOTE — DISCHARGE INSTRUCTIONS
Thank you! Thank you for allowing me to care for you in the emergency department. I sincerely hope that you are satisfied with your visit today. It is my goal to provide you with excellent care. Below you will find a list of your labs and imaging from your visit today. Should you have any questions regarding these results please do not hesitate to call the emergency department. Labs -   No results found for this or any previous visit (from the past 12 hour(s)). Radiologic Studies -   XR CHEST PORT    (Results Pending)   XR CHEST SNGL V    (Results Pending)     CT Results  (Last 48 hours)      None          CXR Results  (Last 48 hours)                 06/28/21 0804  XR CHEST PA LAT Final result    Impression:  FINDINGS: IMPRESSION: 2 views: Frontal and lateral chest.       Median sternotomy status post CABG. Improved cardiomegaly, vascular congestion. Trace hydrostatic edema in the left lung base. The lungs are well inflated. No lobar consolidation, pleural effusion,   pneumothorax. No free air under the diaphragm. Bony structures grossly intact. Narrative:  Pulmonary edema. Comparison chest x-ray 6/24/2021. If you feel that you have not received excellent quality care or timely care, please ask to speak to the nurse manager. Please choose us in the future for your continued health care needs. ------------------------------------------------------------------------------------------------------------  The exam and treatment you received in the Emergency Department were for an urgent problem and are not intended as complete care. It is important that you follow-up with a doctor, nurse practitioner, or physician assistant to:  (1) confirm your diagnosis,  (2) re-evaluation of changes in your illness and treatment, and  (3) for ongoing care.   If your symptoms become worse or you do not improve as expected and you are unable to reach your usual health care provider, you should return to the Emergency Department. We are available 24 hours a day. Please take your discharge instructions with you when you go to your follow-up appointment. If you have any problem arranging a follow-up appointment, contact the Emergency Department immediately. If a prescription has been provided, please have it filled as soon as possible to prevent a delay in treatment. Read the entire medication instruction sheet provided to you by the pharmacy. If you have any questions or reservations about taking the medication due to side effects or interactions with other medications, please call your primary care physician or contact the ER to speak with the charge nurse. Make an appointment with your family doctor or the physician you were referred to for follow-up of this visit as instructed on your discharge paperwork, as this is a mandatory follow-up. Return to the ER if you are unable to be seen or if you are unable to be seen in a timely manner. If you have any problem arranging the follow-up visit, contact the Emergency Department immediately.

## 2021-06-30 NOTE — Clinical Note
Patient Class[de-identified] OBSERVATION [104]   Type of Bed: Medical [8]   Cardiac Monitoring Required?: No   Reason for Observation: neck pain   Admitting Diagnosis: UTI (urinary tract infection) [628940]   Admitting Physician: Keely Brandon [[id number]]   Attending Physician: Keely Brandon [[id number]]

## 2021-06-30 NOTE — CONSULTS
Consult Hospitalist History and Physical    Patient: Dane Rodriguez MRN: [id number]  SSN: [id number]    YOB: 1950  Age: 72 y.o. Sex: female      Subjective:      Dane Rodriguez is a 72 y.o. female with a pmhx myocardial infarction, hypertension, hyperlipidemia, CAD, CHF, COPD, IFTIKHAR on CPAP who presented to the ED with a primary complaint of anterior neck pain. She was recently admitted to the hospital for 2 weeks. She woke up this morning with some upper neck pain. She took 2 nitroglycerin without resolution. Also took Tylenol without relief. Pain worse with swallowing and is described as \" burning. \"  She presented to the ED because her \" daughter told her to go. \"  She denies fever, chills, visual disturbances, chest pain, palpitations, and/V, abdominal pain, or extremity edema. In the ED, vitals stable. Cardiac work-up negative. EKG normal sinus rhythm without ischemic changes. Troponin negative x1. Chest x-ray showed no acute cardiopulmonary infiltrates. CT of head showed no acute intracranial process. Urinalysis is showing acute urinary tract infection. She does have a follow-up appointment with her PCP, Dr. Noreen Alvarado, tomorrow. We will start her on oral antibiotic and discharge home. She is medically stable for discharge at this point in time.     Past Medical History:   Diagnosis Date    Chronic obstructive pulmonary disease (HCC)     Congestive heart disease ([address]     Coronary artery disease     Diabetes ([address]     Hyperlipidemia     Hypertension     Myocardial infarct (HCC)     IFTIKHAR (obstructive sleep apnea)      Past Surgical History:   Procedure Laterality Date    HX BREAST BIOPSY      HX CHOLECYSTECTOMY      HX CORONARY ARTERY BYPASS GRAFT      HX HERNIA REPAIR      HX HYSTERECTOMY        Family History   Problem Relation Age of Onset    Heart Disease Mother     Kidney Disease Mother     Cancer Maternal Grandmother      Social History     Tobacco Use    Smoking status: Former Smoker    Smokeless tobacco: Never Used   Substance Use Topics    Alcohol use: Not Currently      Prior to Admission medications    Medication Sig Start Date End Date Taking? Authorizing Provider   amiodarone (CORDARONE) 200 mg tablet Take 1 Tablet by mouth daily. 6/30/21   Catrachita Alvarado MD   famotidine (PEPCID) 10 mg tablet Take 1 Tablet by mouth two (2) times a day. 6/29/21   Catrachtia Alvarado MD   furosemide (LASIX) 40 mg tablet Take 1.5 Tablets by mouth daily. 6/29/21   Catrachita Alvarado MD   nitroglycerin (NITROSTAT) 0.4 mg SL tablet Sit/Lay down then put one tab under the tongue every 5 minutes as needed for chest pain/neck or jaw pain for 3 doses. Seek immediate medical attention should you need more than one tab for pain to resolve. 3/15/21   Tori Del Toro MD   apixaban (ELIQUIS) 5 mg tablet Take 1 Tab by mouth two (2) times a day. 3/14/21   Rebekah Hernandez NP   losartan (COZAAR) 100 mg tablet Take 1 Tab by mouth daily. 3/15/21   Rebekah Hernandez NP   oxybutynin (DITROPAN) 5 mg tablet Take 1 Tab by mouth three (3) times daily. 3/14/21   Rebekah Hernandez NP   insulin glargine (LANTUS) 100 unit/mL injection 10 Units by SubCUTAneous route nightly. May give in pen  Indications: type 2 diabetes mellitus 3/14/21   Rebekah Hernandez NP   QUEtiapine (SEROqueL) 25 mg tablet Take 25 mg by mouth nightly. Provider, Historical   theophylline ER (BAM-24) 200 mg cp24 capsule Take 300 mg by mouth daily. Provider, Historical   albuterol-ipratropium (DUO-NEB) 2.5 mg-0.5 mg/3 ml nebu 3 mL by Nebulization route every six (6) hours as needed for Wheezing. Provider, Historical   potassium chloride SR (KLOR-CON 10) 10 mEq tablet Take 10 mEq by mouth two (2) times a day. Provider, Historical   mometasone-formoterol (Dulera) 200-5 mcg/actuation HFA inhaler Take 2 Puffs by inhalation two (2) times a day. Provider, Historical   clopidogreL (PLAVIX) 75 mg tab Take 1 Tab by mouth daily. 2/27/21   Sita Alvarado MD   albuterol (Ventolin HFA) 90 mcg/actuation inhaler Take 2 Puffs by inhalation two (2) times daily as needed for Wheezing. Provider, Historical        Allergies   Allergen Reactions    Penicillins Other (comments)    Tylox [Oxycodone-Acetaminophen] Hives       Review of Systems:  Constitutional: No fevers, No chills, No fatigue, No weakness  Eyes: No visual disturbance  ENT: + pain with swallowing, No nasal congestion, No sore throat  Respiratory: No cough, No sputum, No wheezing, No SOB  Cardiovascular: No chest pain, No lower extremity edema, No Palpitations   Gastrointestinal: No nausea, No vomiting, No diarrhea, No constipation, No abdominal pain  Genitourinary: No frequency, No dysuria, No hematuria  Integument/Breast: No rash, No skin lesion(s), No dryness  Musculoskeletal: No arthralgias, + anterior neck pain, No back pain  Neurological: No headaches, No dizziness, No confusion,  No seizures  Behavioral/Psychiatric: No anxiety, No depression      Objective:     Vitals:    06/30/21 0617 06/30/21 0800 06/30/21 0900   BP: 136/69 135/71 136/79   Pulse: 63 61 (!) 59   Resp: 19 18 16   Temp: 98.3 °F (36.8 °C)     SpO2: 99% 99% 99%   Weight: 108.9 kg (240 lb)     Height: 5' 3\" (1.6 m)          Physical Exam:  General: Obese AA female. Alert, cooperative, no distress  Eye: conjunctivae/corneas clear. PERRL, EOM's intact. Throat and Neck: Supple neck. No pharyngeal erythema or exudates noted. No anterior or posterior cervical tenderness to palpation. No lymphadenopathy. No mass. Lung: clear to auscultation bilaterally. Room air. Heart: regular rate and rhythm,   Abdomen: soft, non-tender. Bowel sounds normal. No masses,  Extremities:  able to move all extremities normal, atraumatic. No peripheral edema. Skin: Warm, dry, intact. Normal turgor. .  Neurologic: AOx3. Cranial nerves 2-12 and sensation grossly intact.   Psychiatric: non focal    Recent Results (from the past 24 hour(s))   GLUCOSE, POC    Collection Time: 06/29/21 11:40 AM   Result Value Ref Range    Glucose (POC) 122 (H) 65 - 117 mg/dL    Performed by Eduardo Dillon    GLUCOSE, POC    Collection Time: 06/29/21  4:05 PM   Result Value Ref Range    Glucose (POC) 100 65 - 117 mg/dL    Performed by JOSE BROWN    CBC WITH AUTOMATED DIFF    Collection Time: 06/30/21  6:30 AM   Result Value Ref Range    WBC 8.0 3.6 - 11.0 K/uL    RBC 5.47 (H) 3.80 - 5.20 M/uL    HGB 14.7 11.5 - 16.0 g/dL    HCT 47.8 (H) 35.0 - 47.0 %    MCV 87.4 80.0 - 99.0 FL    MCH 26.9 26.0 - 34.0 PG    MCHC 30.8 30.0 - 36.5 g/dL    RDW 15.4 (H) 11.5 - 14.5 %    PLATELET 524 592 - 902 K/uL    NRBC 0.0 0.0  WBC    ABSOLUTE NRBC 0.00 0.00 - 0.01 K/uL    NEUTROPHILS 63 32 - 75 %    LYMPHOCYTES 24 12 - 49 %    MONOCYTES 10 5 - 13 %    EOSINOPHILS 2 0 - 7 %    BASOPHILS 1 0 - 1 %    IMMATURE GRANULOCYTES 0 0 - 0.5 %    ABS. NEUTROPHILS 5.1 1.8 - 8.0 K/UL    ABS. LYMPHOCYTES 1.9 0.8 - 3.5 K/UL    ABS. MONOCYTES 0.8 0.0 - 1.0 K/UL    ABS. EOSINOPHILS 0.2 0.0 - 0.4 K/UL    ABS. BASOPHILS 0.1 0.0 - 0.1 K/UL    ABS. IMM. GRANS. 0.0 0.00 - 0.04 K/UL    DF AUTOMATED     METABOLIC PANEL, COMPREHENSIVE    Collection Time: 06/30/21  6:30 AM   Result Value Ref Range    Sodium 133 (L) 136 - 145 mmol/L    Potassium 5.1 3.5 - 5.1 mmol/L    Chloride 101 97 - 108 mmol/L    CO2 23 21 - 32 mmol/L    Anion gap 9 5 - 15 mmol/L    Glucose 120 (H) 65 - 100 mg/dL    BUN 45 (H) 6 - 20 mg/dL    Creatinine 2.21 (H) 0.55 - 1.02 mg/dL    BUN/Creatinine ratio 20 12 - 20      GFR est AA 27 (L) >60 ml/min/1.73m2    GFR est non-AA 22 (L) >60 ml/min/1.73m2    Calcium 8.8 8.5 - 10.1 mg/dL    Bilirubin, total 0.9 0.2 - 1.0 mg/dL    AST (SGOT) 42 (H) 15 - 37 U/L    ALT (SGPT) 23 12 - 78 U/L    Alk.  phosphatase 152 (H) 45 - 117 U/L    Protein, total 7.8 6.4 - 8.2 g/dL    Albumin 3.1 (L) 3.5 - 5.0 g/dL    Globulin 4.7 (H) 2.0 - 4.0 g/dL    A-G Ratio 0.7 (L) 1.1 - 2.2     TROPONIN I Collection Time: 06/30/21  6:30 AM   Result Value Ref Range    Troponin-I, Qt. 0.05 (H) <0.05 ng/mL   BNP    Collection Time: 06/30/21  6:30 AM   Result Value Ref Range    NT pro- (H) <125 pg/mL   EKG, 12 LEAD, INITIAL    Collection Time: 06/30/21  6:30 AM   Result Value Ref Range    Ventricular Rate 62 BPM    Atrial Rate 62 BPM    P-R Interval 202 ms    QRS Duration 100 ms    Q-T Interval 512 ms    QTC Calculation (Bezet) 519 ms    Calculated P Axis 72 degrees    Calculated R Axis 44 degrees    Calculated T Axis 90 degrees    Diagnosis       Normal sinus rhythm  Possible Left atrial enlargement  Borderline ECG  When compared with ECG of 23-JUN-2021 20:33,  Nonspecific T wave abnormality no longer evident in Inferior leads  Nonspecific T wave abnormality, improved in Lateral leads  Confirmed by Germania Chowdhury MD ([id number]) on 6/30/2021 7:44:33 AM     URINALYSIS W/ REFLEX CULTURE    Collection Time: 06/30/21  8:00 AM    Specimen: Urine   Result Value Ref Range    Color Yellow/Straw      Appearance Turbid (A) Clear      Specific gravity 1.018 1.003 - 1.030      pH (UA) 5.0 5.0 - 8.0      Protein 30 (A) Negative mg/dL    Glucose Negative Negative mg/dL    Ketone Negative Negative mg/dL    Bilirubin Negative Negative      Blood Negative Negative      Urobilinogen 0.1 0.1 - 1.0 EU/dL    Nitrites Negative Negative      Leukocyte Esterase Large (A) Negative      UA:UC IF INDICATED Urine Culture Ordered (A) Culture not indicated by UA result      WBC  0 - 4 /hpf    RBC 10-20 0 - 5 /hpf    Bacteria 2+ (A) Negative /hpf    Mucus Trace /lpf     CT HEAD WO CONT   Final Result   Evidence for nonacute end vessel occlusive change. No intracranial hemorrhage.       XR CHEST SNGL V   Final Result        Hospital Problems  Date Reviewed: 3/12/2021        Codes Class Noted POA    UTI (urinary tract infection) ICD-10-CM: N39.0  ICD-9-CM: 599.0  6/30/2021 Unknown            Current Discharge Medication List      START taking these medications    Details   levoFLOXacin (Levaquin) 500 mg tablet Take 1 Tablet by mouth daily for 3 days. Indications: bacterial urinary tract infection  Qty: 3 Tablet, Refills: 0  Start date: 6/30/2021, End date: 7/3/2021         CONTINUE these medications which have NOT CHANGED    Details   amiodarone (CORDARONE) 200 mg tablet Take 1 Tablet by mouth daily. Qty: 30 Tablet, Refills: 0  Start date: 6/30/2021      famotidine (PEPCID) 10 mg tablet Take 1 Tablet by mouth two (2) times a day. Qty: 30 Tablet, Refills: 0      furosemide (LASIX) 40 mg tablet Take 1.5 Tablets by mouth daily. Qty: 30 Tablet, Refills: 0      nitroglycerin (NITROSTAT) 0.4 mg SL tablet Sit/Lay down then put one tab under the tongue every 5 minutes as needed for chest pain/neck or jaw pain for 3 doses. Seek immediate medical attention should you need more than one tab for pain to resolve. Qty: 1 Bottle, Refills: 3      apixaban (ELIQUIS) 5 mg tablet Take 1 Tab by mouth two (2) times a day. Qty: 30 Tab, Refills: 0      losartan (COZAAR) 100 mg tablet Take 1 Tab by mouth daily. Qty: 30 Tab, Refills: 0      oxybutynin (DITROPAN) 5 mg tablet Take 1 Tab by mouth three (3) times daily. Qty: 90 Tab, Refills: 0      insulin glargine (LANTUS) 100 unit/mL injection 10 Units by SubCUTAneous route nightly. May give in pen  Indications: type 2 diabetes mellitus  Qty: 1 Vial, Refills: 0      QUEtiapine (SEROqueL) 25 mg tablet Take 25 mg by mouth nightly. theophylline ER (BAM-24) 200 mg cp24 capsule Take 300 mg by mouth daily. albuterol-ipratropium (DUO-NEB) 2.5 mg-0.5 mg/3 ml nebu 3 mL by Nebulization route every six (6) hours as needed for Wheezing. potassium chloride SR (KLOR-CON 10) 10 mEq tablet Take 10 mEq by mouth two (2) times a day. mometasone-formoterol (Dulera) 200-5 mcg/actuation HFA inhaler Take 2 Puffs by inhalation two (2) times a day. clopidogreL (PLAVIX) 75 mg tab Take 1 Tab by mouth daily.   Qty: 30 Tab, Refills: 0      albuterol (Ventolin HFA) 90 mcg/actuation inhaler Take 2 Puffs by inhalation two (2) times daily as needed for Wheezing. Assessment/Plan:          UTI:   - Allergy to penicillins  - Will start on oral Levaquin x 3 days  - UA cx pending    Neck pain:   - Cardiac workup negative  - Normal EKG  - Troponin negative x1  - Chest x-ray unremarkable  - Likely reflux  - Follow-up with PCP, Dr. Mamadou Mcclure, tomorrow      Thank you for the consult and for allowing us to participate in the care of this patient. Please do not hesitate to call with any questions or concerns. Patient medically clear from Hospitalist perspective for discharge.      Total time >55 minutes      Signed By: Larry Rivero PA-C     June 30, 2021

## 2021-06-30 NOTE — ED NOTES
Pt rounded on and found on cardiac monitoring continued , spO2 Monitoring continued, IV reassed, call bell within reach, side rails up x2, resting comfortable but easily arousable, no signs of acute distress. , bed in lowest position, MAR reviewed, Labs reviewed, will continue to monitor

## 2021-07-01 LAB
BACTERIA SPEC CULT: NORMAL
COLONY COUNT,CNT: NORMAL
COLONY COUNT,CNT: NORMAL
SPECIAL REQUESTS,SREQ: NORMAL

## 2021-08-03 PROBLEM — I50.9 CHF (CONGESTIVE HEART FAILURE) (HCC): Status: RESOLVED | Noted: 2021-02-22 | Resolved: 2021-08-03

## 2021-11-19 ENCOUNTER — APPOINTMENT (OUTPATIENT)
Dept: GENERAL RADIOLOGY | Age: 71
DRG: 291 | End: 2021-11-19
Attending: STUDENT IN AN ORGANIZED HEALTH CARE EDUCATION/TRAINING PROGRAM
Payer: MEDICARE

## 2021-11-19 ENCOUNTER — HOSPITAL ENCOUNTER (INPATIENT)
Age: 71
LOS: 2 days | Discharge: HOME | DRG: 291 | End: 2021-11-21
Attending: STUDENT IN AN ORGANIZED HEALTH CARE EDUCATION/TRAINING PROGRAM | Admitting: HOSPITALIST
Payer: MEDICARE

## 2021-11-19 DIAGNOSIS — I50.23 ACUTE ON CHRONIC SYSTOLIC CONGESTIVE HEART FAILURE (HCC): Primary | ICD-10-CM

## 2021-11-19 DIAGNOSIS — R77.8 ELEVATED TROPONIN: ICD-10-CM

## 2021-11-19 PROBLEM — I50.9 HEART FAILURE (HCC): Status: ACTIVE | Noted: 2021-11-19

## 2021-11-19 LAB
ALBUMIN SERPL-MCNC: 2.7 G/DL (ref 3.5–5)
ALBUMIN/GLOB SERPL: 0.7 {RATIO} (ref 1.1–2.2)
ALP SERPL-CCNC: 160 U/L (ref 45–117)
ALT SERPL-CCNC: 19 U/L (ref 12–78)
ANION GAP SERPL CALC-SCNC: 3 MMOL/L (ref 5–15)
AST SERPL W P-5'-P-CCNC: 25 U/L (ref 15–37)
BASOPHILS # BLD: 0 K/UL (ref 0–0.1)
BASOPHILS NFR BLD: 0 % (ref 0–1)
BILIRUB SERPL-MCNC: 0.6 MG/DL (ref 0.2–1)
BNP SERPL-MCNC: 1729 PG/ML
BUN SERPL-MCNC: 21 MG/DL (ref 6–20)
BUN/CREAT SERPL: 17 (ref 12–20)
CA-I BLD-MCNC: 8.5 MG/DL (ref 8.5–10.1)
CHLORIDE SERPL-SCNC: 109 MMOL/L (ref 97–108)
CO2 SERPL-SCNC: 31 MMOL/L (ref 21–32)
CREAT SERPL-MCNC: 1.26 MG/DL (ref 0.55–1.02)
DIFFERENTIAL METHOD BLD: ABNORMAL
EOSINOPHIL # BLD: 0.1 K/UL (ref 0–0.4)
EOSINOPHIL NFR BLD: 1 % (ref 0–7)
ERYTHROCYTE [DISTWIDTH] IN BLOOD BY AUTOMATED COUNT: 16.7 % (ref 11.5–14.5)
GLOBULIN SER CALC-MCNC: 3.9 G/DL (ref 2–4)
GLUCOSE BLD STRIP.AUTO-MCNC: 314 MG/DL (ref 65–117)
GLUCOSE SERPL-MCNC: 108 MG/DL (ref 65–100)
HCT VFR BLD AUTO: 46.7 % (ref 35–47)
HGB BLD-MCNC: 14 G/DL (ref 11.5–16)
IMM GRANULOCYTES # BLD AUTO: 0 K/UL (ref 0–0.04)
IMM GRANULOCYTES NFR BLD AUTO: 0 % (ref 0–0.5)
LYMPHOCYTES # BLD: 1.6 K/UL (ref 0.8–3.5)
LYMPHOCYTES NFR BLD: 19 % (ref 12–49)
MCH RBC QN AUTO: 27.2 PG (ref 26–34)
MCHC RBC AUTO-ENTMCNC: 30 G/DL (ref 30–36.5)
MCV RBC AUTO: 90.7 FL (ref 80–99)
MONOCYTES # BLD: 0.5 K/UL (ref 0–1)
MONOCYTES NFR BLD: 6 % (ref 5–13)
NEUTS SEG # BLD: 6.1 K/UL (ref 1.8–8)
NEUTS SEG NFR BLD: 74 % (ref 32–75)
NRBC # BLD: 0 K/UL (ref 0–0.01)
NRBC BLD-RTO: 0 PER 100 WBC
PERFORMED BY, TECHID: ABNORMAL
PLATELET # BLD AUTO: 216 K/UL (ref 150–400)
PMV BLD AUTO: 12.6 FL (ref 8.9–12.9)
POTASSIUM SERPL-SCNC: 5.7 MMOL/L (ref 3.5–5.1)
PROT SERPL-MCNC: 6.6 G/DL (ref 6.4–8.2)
RBC # BLD AUTO: 5.15 M/UL (ref 3.8–5.2)
SODIUM SERPL-SCNC: 143 MMOL/L (ref 136–145)
TROPONIN-HIGH SENSITIVITY: 100 NG/L (ref 0–51)
WBC # BLD AUTO: 8.3 K/UL (ref 3.6–11)

## 2021-11-19 PROCEDURE — 80053 COMPREHEN METABOLIC PANEL: CPT

## 2021-11-19 PROCEDURE — 96374 THER/PROPH/DIAG INJ IV PUSH: CPT

## 2021-11-19 PROCEDURE — 74011250637 HC RX REV CODE- 250/637: Performed by: HOSPITALIST

## 2021-11-19 PROCEDURE — 84484 ASSAY OF TROPONIN QUANT: CPT

## 2021-11-19 PROCEDURE — 65270000029 HC RM PRIVATE

## 2021-11-19 PROCEDURE — 71045 X-RAY EXAM CHEST 1 VIEW: CPT

## 2021-11-19 PROCEDURE — 85025 COMPLETE CBC W/AUTO DIFF WBC: CPT

## 2021-11-19 PROCEDURE — 93005 ELECTROCARDIOGRAM TRACING: CPT

## 2021-11-19 PROCEDURE — 99284 EMERGENCY DEPT VISIT MOD MDM: CPT

## 2021-11-19 PROCEDURE — 74011636637 HC RX REV CODE- 636/637: Performed by: HOSPITALIST

## 2021-11-19 PROCEDURE — 77010033678 HC OXYGEN DAILY

## 2021-11-19 PROCEDURE — 83880 ASSAY OF NATRIURETIC PEPTIDE: CPT

## 2021-11-19 PROCEDURE — 82962 GLUCOSE BLOOD TEST: CPT

## 2021-11-19 PROCEDURE — 74011250636 HC RX REV CODE- 250/636: Performed by: STUDENT IN AN ORGANIZED HEALTH CARE EDUCATION/TRAINING PROGRAM

## 2021-11-19 PROCEDURE — 36415 COLL VENOUS BLD VENIPUNCTURE: CPT

## 2021-11-19 PROCEDURE — 94760 N-INVAS EAR/PLS OXIMETRY 1: CPT

## 2021-11-19 RX ORDER — AMLODIPINE BESYLATE 5 MG/1
5 TABLET ORAL
Status: DISCONTINUED | OUTPATIENT
Start: 2021-11-19 | End: 2021-11-21 | Stop reason: HOSPADM

## 2021-11-19 RX ORDER — POTASSIUM CHLORIDE 750 MG/1
20 CAPSULE, EXTENDED RELEASE ORAL DAILY
COMMUNITY
Start: 2021-11-17 | End: 2022-03-28

## 2021-11-19 RX ORDER — AMIODARONE HYDROCHLORIDE 400 MG/1
400 TABLET ORAL DAILY
COMMUNITY
Start: 2021-09-25 | End: 2022-03-25

## 2021-11-19 RX ORDER — HYDROCODONE POLISTIREX AND CHLORPHENIRAMINE POLISTIREX 10; 8 MG/5ML; MG/5ML
5 SUSPENSION, EXTENDED RELEASE ORAL
COMMUNITY
Start: 2021-10-31 | End: 2022-03-28

## 2021-11-19 RX ORDER — OXYBUTYNIN CHLORIDE 5 MG/1
5 TABLET ORAL 3 TIMES DAILY
Status: DISCONTINUED | OUTPATIENT
Start: 2021-11-20 | End: 2021-11-21 | Stop reason: HOSPADM

## 2021-11-19 RX ORDER — POTASSIUM CHLORIDE 750 MG/1
20 TABLET, FILM COATED, EXTENDED RELEASE ORAL
Status: DISCONTINUED | OUTPATIENT
Start: 2021-11-20 | End: 2021-11-21 | Stop reason: HOSPADM

## 2021-11-19 RX ORDER — ATORVASTATIN CALCIUM 40 MG/1
40 TABLET, FILM COATED ORAL
Status: DISCONTINUED | OUTPATIENT
Start: 2021-11-19 | End: 2021-11-21 | Stop reason: HOSPADM

## 2021-11-19 RX ORDER — FUROSEMIDE 10 MG/ML
40 INJECTION INTRAMUSCULAR; INTRAVENOUS
Status: COMPLETED | OUTPATIENT
Start: 2021-11-19 | End: 2021-11-19

## 2021-11-19 RX ORDER — TORSEMIDE 20 MG/1
20 TABLET ORAL 2 TIMES DAILY
COMMUNITY
Start: 2021-11-13 | End: 2022-03-28

## 2021-11-19 RX ORDER — METOLAZONE 5 MG/1
5 TABLET ORAL DAILY
Status: DISCONTINUED | OUTPATIENT
Start: 2021-11-21 | End: 2021-11-21 | Stop reason: HOSPADM

## 2021-11-19 RX ORDER — NALOXONE HYDROCHLORIDE 4 MG/.1ML
1 SPRAY NASAL AS NEEDED
COMMUNITY
Start: 2021-10-25 | End: 2022-03-25

## 2021-11-19 RX ORDER — DEXTROSE 50 % IN WATER (D50W) INTRAVENOUS SYRINGE
25-50 AS NEEDED
Status: DISCONTINUED | OUTPATIENT
Start: 2021-11-19 | End: 2021-11-21 | Stop reason: HOSPADM

## 2021-11-19 RX ORDER — ACETAMINOPHEN AND CODEINE PHOSPHATE 300; 60 MG/1; MG/1
1 TABLET ORAL
COMMUNITY
Start: 2021-10-19 | End: 2022-03-28

## 2021-11-19 RX ORDER — INSULIN LISPRO 100 [IU]/ML
INJECTION, SOLUTION INTRAVENOUS; SUBCUTANEOUS
Status: DISCONTINUED | OUTPATIENT
Start: 2021-11-19 | End: 2021-11-21 | Stop reason: HOSPADM

## 2021-11-19 RX ORDER — MAGNESIUM SULFATE 100 %
4 CRYSTALS MISCELLANEOUS AS NEEDED
Status: DISCONTINUED | OUTPATIENT
Start: 2021-11-19 | End: 2021-11-21 | Stop reason: HOSPADM

## 2021-11-19 RX ORDER — TORSEMIDE 10 MG/1
20 TABLET ORAL 2 TIMES DAILY
Status: DISCONTINUED | OUTPATIENT
Start: 2021-11-21 | End: 2021-11-21 | Stop reason: HOSPADM

## 2021-11-19 RX ORDER — INSULIN GLARGINE 100 [IU]/ML
10 INJECTION, SOLUTION SUBCUTANEOUS
Status: DISCONTINUED | OUTPATIENT
Start: 2021-11-19 | End: 2021-11-21 | Stop reason: HOSPADM

## 2021-11-19 RX ORDER — METOLAZONE 5 MG/1
5 TABLET ORAL DAILY
COMMUNITY
Start: 2021-11-17

## 2021-11-19 RX ORDER — METOPROLOL SUCCINATE 25 MG/1
25 TABLET, EXTENDED RELEASE ORAL DAILY
Status: DISCONTINUED | OUTPATIENT
Start: 2021-11-20 | End: 2021-11-21 | Stop reason: HOSPADM

## 2021-11-19 RX ORDER — IPRATROPIUM BROMIDE AND ALBUTEROL SULFATE 2.5; .5 MG/3ML; MG/3ML
3 SOLUTION RESPIRATORY (INHALATION)
Status: DISCONTINUED | OUTPATIENT
Start: 2021-11-19 | End: 2021-11-21 | Stop reason: HOSPADM

## 2021-11-19 RX ORDER — THEOPHYLLINE 300 MG/1
300 TABLET, EXTENDED RELEASE ORAL DAILY
COMMUNITY
Start: 2021-09-19

## 2021-11-19 RX ORDER — CLOPIDOGREL BISULFATE 75 MG/1
75 TABLET ORAL DAILY
Status: DISCONTINUED | OUTPATIENT
Start: 2021-11-20 | End: 2021-11-20

## 2021-11-19 RX ORDER — FUROSEMIDE 10 MG/ML
40 INJECTION INTRAMUSCULAR; INTRAVENOUS 2 TIMES DAILY
Status: COMPLETED | OUTPATIENT
Start: 2021-11-20 | End: 2021-11-20

## 2021-11-19 RX ORDER — ATORVASTATIN CALCIUM 40 MG/1
40 TABLET, FILM COATED ORAL
COMMUNITY
Start: 2021-11-13

## 2021-11-19 RX ORDER — AMIODARONE HYDROCHLORIDE 200 MG/1
400 TABLET ORAL DAILY
Status: DISCONTINUED | OUTPATIENT
Start: 2021-11-20 | End: 2021-11-21 | Stop reason: HOSPADM

## 2021-11-19 RX ORDER — METOPROLOL SUCCINATE 25 MG/1
25 TABLET, EXTENDED RELEASE ORAL DAILY
COMMUNITY
Start: 2021-11-17

## 2021-11-19 RX ORDER — THEOPHYLLINE 300 MG/1
300 TABLET, EXTENDED RELEASE ORAL DAILY
Status: DISCONTINUED | OUTPATIENT
Start: 2021-11-20 | End: 2021-11-21 | Stop reason: HOSPADM

## 2021-11-19 RX ORDER — NITROGLYCERIN 0.4 MG/1
0.4 TABLET SUBLINGUAL AS NEEDED
Status: DISCONTINUED | OUTPATIENT
Start: 2021-11-19 | End: 2021-11-21 | Stop reason: HOSPADM

## 2021-11-19 RX ORDER — ASPIRIN 81 MG/1
81 TABLET ORAL DAILY
Status: DISCONTINUED | OUTPATIENT
Start: 2021-11-20 | End: 2021-11-20

## 2021-11-19 RX ORDER — QUETIAPINE FUMARATE 25 MG/1
25 TABLET, FILM COATED ORAL
Status: DISCONTINUED | OUTPATIENT
Start: 2021-11-19 | End: 2021-11-21 | Stop reason: HOSPADM

## 2021-11-19 RX ORDER — ONDANSETRON 2 MG/ML
4 INJECTION INTRAMUSCULAR; INTRAVENOUS
Status: DISCONTINUED | OUTPATIENT
Start: 2021-11-19 | End: 2021-11-21 | Stop reason: HOSPADM

## 2021-11-19 RX ORDER — BUDESONIDE AND FORMOTEROL FUMARATE DIHYDRATE 160; 4.5 UG/1; UG/1
2 AEROSOL RESPIRATORY (INHALATION)
Status: DISCONTINUED | OUTPATIENT
Start: 2021-11-19 | End: 2021-11-21 | Stop reason: HOSPADM

## 2021-11-19 RX ORDER — FAMOTIDINE 10 MG/1
10 TABLET ORAL 2 TIMES DAILY
Status: DISCONTINUED | OUTPATIENT
Start: 2021-11-19 | End: 2021-11-21 | Stop reason: HOSPADM

## 2021-11-19 RX ORDER — ACETAMINOPHEN 325 MG/1
650 TABLET ORAL
Status: DISCONTINUED | OUTPATIENT
Start: 2021-11-19 | End: 2021-11-21 | Stop reason: HOSPADM

## 2021-11-19 RX ORDER — SACUBITRIL AND VALSARTAN 24; 26 MG/1; MG/1
1 TABLET, FILM COATED ORAL 2 TIMES DAILY
COMMUNITY
Start: 2021-11-13

## 2021-11-19 RX ORDER — ALBUTEROL SULFATE 90 UG/1
2 AEROSOL, METERED RESPIRATORY (INHALATION)
Status: DISCONTINUED | OUTPATIENT
Start: 2021-11-19 | End: 2021-11-21 | Stop reason: HOSPADM

## 2021-11-19 RX ADMIN — FAMOTIDINE 10 MG: 10 TABLET ORAL at 22:41

## 2021-11-19 RX ADMIN — FUROSEMIDE 40 MG: 40 INJECTION, SOLUTION INTRAMUSCULAR; INTRAVENOUS at 20:18

## 2021-11-19 RX ADMIN — AMLODIPINE BESYLATE 5 MG: 5 TABLET ORAL at 22:41

## 2021-11-19 RX ADMIN — NITROGLYCERIN 0.5 INCH: 20 OINTMENT TOPICAL at 23:48

## 2021-11-19 RX ADMIN — QUETIAPINE FUMARATE 25 MG: 25 TABLET ORAL at 22:41

## 2021-11-19 RX ADMIN — ACETAMINOPHEN 650 MG: 325 TABLET ORAL at 23:46

## 2021-11-19 RX ADMIN — INSULIN LISPRO 5 UNITS: 100 INJECTION, SOLUTION INTRAVENOUS; SUBCUTANEOUS at 22:55

## 2021-11-19 RX ADMIN — APIXABAN 5 MG: 5 TABLET, FILM COATED ORAL at 22:41

## 2021-11-19 RX ADMIN — INSULIN GLARGINE 10 UNITS: 100 INJECTION, SOLUTION SUBCUTANEOUS at 22:54

## 2021-11-19 RX ADMIN — ATORVASTATIN CALCIUM 40 MG: 40 TABLET, FILM COATED ORAL at 22:41

## 2021-11-20 LAB
ALBUMIN SERPL-MCNC: 2.7 G/DL (ref 3.5–5)
ALBUMIN/GLOB SERPL: 0.8 {RATIO} (ref 1.1–2.2)
ALP SERPL-CCNC: 149 U/L (ref 45–117)
ALT SERPL-CCNC: 16 U/L (ref 12–78)
ANION GAP SERPL CALC-SCNC: 5 MMOL/L (ref 5–15)
ARTERIAL PATENCY WRIST A: ABNORMAL
AST SERPL W P-5'-P-CCNC: 8 U/L (ref 15–37)
ATRIAL RATE: 73 BPM
BASE EXCESS BLDA CALC-SCNC: 7.2 MMOL/L (ref 0–2)
BDY SITE: ABNORMAL
BILIRUB SERPL-MCNC: 0.7 MG/DL (ref 0.2–1)
BUN SERPL-MCNC: 21 MG/DL (ref 6–20)
BUN/CREAT SERPL: 16 (ref 12–20)
CA-I BLD-MCNC: 9 MG/DL (ref 8.5–10.1)
CALCULATED P AXIS, ECG09: 78 DEGREES
CALCULATED R AXIS, ECG10: 98 DEGREES
CALCULATED T AXIS, ECG11: 77 DEGREES
CHLORIDE SERPL-SCNC: 106 MMOL/L (ref 97–108)
CHOLEST SERPL-MCNC: 148 MG/DL
CO2 SERPL-SCNC: 35 MMOL/L (ref 21–32)
CREAT SERPL-MCNC: 1.31 MG/DL (ref 0.55–1.02)
DIAGNOSIS, 93000: NORMAL
ERYTHROCYTE [DISTWIDTH] IN BLOOD BY AUTOMATED COUNT: 16.5 % (ref 11.5–14.5)
GAS FLOW.O2 O2 DELIVERY SYS: 2.5 L/MIN
GLOBULIN SER CALC-MCNC: 3.4 G/DL (ref 2–4)
GLUCOSE BLD STRIP.AUTO-MCNC: 131 MG/DL (ref 65–117)
GLUCOSE BLD STRIP.AUTO-MCNC: 155 MG/DL (ref 65–117)
GLUCOSE BLD STRIP.AUTO-MCNC: 93 MG/DL (ref 65–117)
GLUCOSE SERPL-MCNC: 100 MG/DL (ref 65–100)
HCO3 BLDA-SCNC: 31 MMOL/L (ref 22–26)
HCT VFR BLD AUTO: 44.5 % (ref 35–47)
HDLC SERPL-MCNC: 57 MG/DL
HDLC SERPL: 2.6 {RATIO} (ref 0–5)
HGB BLD-MCNC: 13.2 G/DL (ref 11.5–16)
LDLC SERPL CALC-MCNC: 77 MG/DL (ref 0–100)
LIPID PROFILE,FLP: NORMAL
MAGNESIUM SERPL-MCNC: 2 MG/DL (ref 1.6–2.4)
MCH RBC QN AUTO: 26.8 PG (ref 26–34)
MCHC RBC AUTO-ENTMCNC: 29.7 G/DL (ref 30–36.5)
MCV RBC AUTO: 90.3 FL (ref 80–99)
NRBC # BLD: 0 K/UL (ref 0–0.01)
NRBC BLD-RTO: 0 PER 100 WBC
P-R INTERVAL, ECG05: 178 MS
PCO2 BLDA: 62 MMHG (ref 35–45)
PERFORMED BY, TECHID: ABNORMAL
PERFORMED BY, TECHID: ABNORMAL
PERFORMED BY, TECHID: NORMAL
PH BLDA: 7.36 [PH] (ref 7.35–7.45)
PLATELET # BLD AUTO: 186 K/UL (ref 150–400)
PMV BLD AUTO: 13.1 FL (ref 8.9–12.9)
PO2 BLDA: 90 MMHG (ref 75–100)
POTASSIUM SERPL-SCNC: 3.9 MMOL/L (ref 3.5–5.1)
PROT SERPL-MCNC: 6.1 G/DL (ref 6.4–8.2)
Q-T INTERVAL, ECG07: 462 MS
QRS DURATION, ECG06: 102 MS
QTC CALCULATION (BEZET), ECG08: 508 MS
RBC # BLD AUTO: 4.93 M/UL (ref 3.8–5.2)
SAO2 % BLD: 97 %
SAO2% DEVICE SAO2% SENSOR NAME: ABNORMAL
SODIUM SERPL-SCNC: 146 MMOL/L (ref 136–145)
SPECIMEN SITE: ABNORMAL
TRIGL SERPL-MCNC: 70 MG/DL (ref ?–150)
TROPONIN-HIGH SENSITIVITY: 80 NG/L (ref 0–51)
TSH SERPL DL<=0.05 MIU/L-ACNC: 0.95 UIU/ML (ref 0.36–3.74)
VENTRICULAR RATE, ECG03: 73 BPM
VLDLC SERPL CALC-MCNC: 14 MG/DL
WBC # BLD AUTO: 7.8 K/UL (ref 3.6–11)

## 2021-11-20 PROCEDURE — 82803 BLOOD GASES ANY COMBINATION: CPT

## 2021-11-20 PROCEDURE — 82962 GLUCOSE BLOOD TEST: CPT

## 2021-11-20 PROCEDURE — 94760 N-INVAS EAR/PLS OXIMETRY 1: CPT

## 2021-11-20 PROCEDURE — 84443 ASSAY THYROID STIM HORMONE: CPT

## 2021-11-20 PROCEDURE — 36415 COLL VENOUS BLD VENIPUNCTURE: CPT

## 2021-11-20 PROCEDURE — 83735 ASSAY OF MAGNESIUM: CPT

## 2021-11-20 PROCEDURE — 65270000029 HC RM PRIVATE

## 2021-11-20 PROCEDURE — 85027 COMPLETE CBC AUTOMATED: CPT

## 2021-11-20 PROCEDURE — 74011250637 HC RX REV CODE- 250/637: Performed by: HOSPITALIST

## 2021-11-20 PROCEDURE — 80061 LIPID PANEL: CPT

## 2021-11-20 PROCEDURE — 94640 AIRWAY INHALATION TREATMENT: CPT

## 2021-11-20 PROCEDURE — 74011636637 HC RX REV CODE- 636/637: Performed by: HOSPITALIST

## 2021-11-20 PROCEDURE — 77010033678 HC OXYGEN DAILY

## 2021-11-20 PROCEDURE — 74011250636 HC RX REV CODE- 250/636: Performed by: HOSPITALIST

## 2021-11-20 PROCEDURE — 80053 COMPREHEN METABOLIC PANEL: CPT

## 2021-11-20 RX ADMIN — BUDESONIDE AND FORMOTEROL FUMARATE DIHYDRATE 2 PUFF: 160; 4.5 AEROSOL RESPIRATORY (INHALATION) at 20:52

## 2021-11-20 RX ADMIN — ASPIRIN 81 MG: 81 TABLET, COATED ORAL at 08:04

## 2021-11-20 RX ADMIN — NITROGLYCERIN 0.5 INCH: 20 OINTMENT TOPICAL at 11:22

## 2021-11-20 RX ADMIN — NITROGLYCERIN 0.5 INCH: 20 OINTMENT TOPICAL at 05:59

## 2021-11-20 RX ADMIN — CLOPIDOGREL BISULFATE 75 MG: 75 TABLET ORAL at 08:04

## 2021-11-20 RX ADMIN — FUROSEMIDE 40 MG: 40 INJECTION, SOLUTION INTRAMUSCULAR; INTRAVENOUS at 05:59

## 2021-11-20 RX ADMIN — FAMOTIDINE 10 MG: 10 TABLET ORAL at 20:51

## 2021-11-20 RX ADMIN — OXYBUTYNIN CHLORIDE 5 MG: 5 TABLET ORAL at 20:51

## 2021-11-20 RX ADMIN — AMLODIPINE BESYLATE 5 MG: 5 TABLET ORAL at 20:51

## 2021-11-20 RX ADMIN — APIXABAN 5 MG: 5 TABLET, FILM COATED ORAL at 08:04

## 2021-11-20 RX ADMIN — QUETIAPINE FUMARATE 25 MG: 25 TABLET ORAL at 20:51

## 2021-11-20 RX ADMIN — THEOPHYLLINE 300 MG: 300 TABLET, EXTENDED RELEASE ORAL at 08:04

## 2021-11-20 RX ADMIN — OXYBUTYNIN CHLORIDE 5 MG: 5 TABLET ORAL at 15:45

## 2021-11-20 RX ADMIN — METOPROLOL SUCCINATE 25 MG: 25 TABLET, EXTENDED RELEASE ORAL at 08:04

## 2021-11-20 RX ADMIN — OXYBUTYNIN CHLORIDE 5 MG: 5 TABLET ORAL at 08:04

## 2021-11-20 RX ADMIN — ATORVASTATIN CALCIUM 40 MG: 40 TABLET, FILM COATED ORAL at 20:51

## 2021-11-20 RX ADMIN — INSULIN GLARGINE 10 UNITS: 100 INJECTION, SOLUTION SUBCUTANEOUS at 21:03

## 2021-11-20 RX ADMIN — FAMOTIDINE 10 MG: 10 TABLET ORAL at 08:04

## 2021-11-20 RX ADMIN — SACUBITRIL AND VALSARTAN 1 TABLET: 24; 26 TABLET, FILM COATED ORAL at 08:04

## 2021-11-20 RX ADMIN — POTASSIUM CHLORIDE 20 MEQ: 750 TABLET, FILM COATED, EXTENDED RELEASE ORAL at 08:04

## 2021-11-20 RX ADMIN — SACUBITRIL AND VALSARTAN 1 TABLET: 24; 26 TABLET, FILM COATED ORAL at 20:51

## 2021-11-20 RX ADMIN — APIXABAN 5 MG: 5 TABLET, FILM COATED ORAL at 20:51

## 2021-11-20 RX ADMIN — ACETAMINOPHEN 650 MG: 325 TABLET ORAL at 08:50

## 2021-11-20 RX ADMIN — FUROSEMIDE 40 MG: 40 INJECTION, SOLUTION INTRAMUSCULAR; INTRAVENOUS at 12:12

## 2021-11-20 RX ADMIN — BUDESONIDE AND FORMOTEROL FUMARATE DIHYDRATE 2 PUFF: 160; 4.5 AEROSOL RESPIRATORY (INHALATION) at 08:00

## 2021-11-20 RX ADMIN — AMIODARONE HYDROCHLORIDE 400 MG: 200 TABLET ORAL at 08:04

## 2021-11-20 RX ADMIN — INSULIN LISPRO 3 UNITS: 100 INJECTION, SOLUTION INTRAVENOUS; SUBCUTANEOUS at 11:22

## 2021-11-20 NOTE — ED PROVIDER NOTES
EMERGENCY DEPARTMENT HISTORY AND PHYSICAL EXAM      Date: 11/19/2021  Patient Name: Viktoriya Emanuel    History of Presenting Illness     Chief Complaint   Patient presents with    Leg Swelling    Shortness of Breath       History Provided By: Patient    HPI: Viktoriya Emanuel, 80 y.o. female with a past medical history significant diabetes, hypertension and CHF, copd presents to the ED with cc of shortness of breath, lower extremity swelling. Patient states she has a history of CHF, has been taking her Lasix as prescribed however has noticed worsening swelling to her lower extremities and worsening shortness of breath over the last week. Patient denies any chest pains or shortness of breath denies any fevers chills no cough. Patient is on 4 L of O2 at home. There are no other complaints, changes, or physical findings at this time.     PCP: Prasanth Barrera MD    Current Facility-Administered Medications   Medication Dose Route Frequency Provider Last Rate Last Admin    albuterol (PROVENTIL HFA, VENTOLIN HFA, PROAIR HFA) inhaler 2 Puff  2 Puff Inhalation BID PRN Diann Tran MD       [place] [START ON 11/20/2021] clopidogreL (PLAVIX) tablet 75 mg  75 mg Oral DAILY Diann Tran MD        QUEtiapine (SEROquel) tablet 25 mg  25 mg Oral QHS Diann Tran MD   25 mg at 11/19/21 2241    albuterol-ipratropium (DUO-NEB) 2.5 MG-0.5 MG/3 ML  3 mL Nebulization Q6H PRN Diann Tran MD        budesonide-formoteroL (SYMBICORT) 160-4.5 mcg/actuation HFA inhaler 2 Puff  2 Puff Inhalation BID RT Diann Tran MD        apixaban (ELIQUIS) tablet 5 mg  5 mg Oral BID Diann Tran MD   5 mg at 11/19/21 2241    [START ON 11/20/2021] oxybutynin (DITROPAN) tablet 5 mg  5 mg Oral TID Diann Tran MD        insulin glargine (LANTUS) injection 10 Units  10 Units SubCUTAneous QHS Diann Tran MD   10 Units at 11/19/21 2254    nitroglycerin (NITROSTAT) tablet 0.4 mg  0.4 mg SubLINGual PRN Renita Bae MD        famotidine (PEPCID) tablet 10 mg  10 mg Oral BID Renita Bae MD   10 mg at 11/19/21 2241    [START ON 11/20/2021] theophylline ER(12 hour) (THEOCHRON) tablet 300 mg  300 mg Oral DAILY Renita Bae MD       [facility] [START ON 11/20/2021] amiodarone (CORDARONE) tablet 400 mg  400 mg Oral DAILY Renita Bae MD        atorvastatin (LIPITOR) tablet 40 mg  40 mg Oral QHS Renita Bae MD   40 mg at 11/19/21 2241    [START ON 11/20/2021] metoprolol succinate (TOPROL-XL) XL tablet 25 mg  25 mg Oral DAILY Renita Bae MD        sacubitriL-valsartan (ENTRESTO) 24-26 mg tablet 1 Tablet  1 Tablet Oral BID Renita Bae MD       [facility] [START ON 11/21/2021] metOLazone (ZAROXOLYN) tablet 5 mg  5 mg Oral DAILY Renita Bae MD       [facility] [START ON 11/21/2021] torsemide (DEMADEX) tablet 20 mg  20 mg Oral BID Renita Bae MD        [START ON 11/20/2021] potassium chloride SR (KLOR-CON 10) tablet 20 mEq  20 mEq Oral DAILY WITH Sanjuan Cockayne, Suri Caceres MD        ondansetron [facility]) injection 4 mg  4 mg IntraVENous Q6H PRN Renita Bae MD        acetaminophen (TYLENOL) tablet 650 mg  650 mg Oral Q6H PRN Renita Bae MD   650 mg at 11/19/21 2346    [START ON 11/20/2021] furosemide (LASIX) injection 40 mg  40 mg IntraVENous BID Renita Bae MD        [START ON 11/20/2021] aspirin delayed-release tablet 81 mg  81 mg Oral DAILY Renita Bae MD        insulin lispro (HUMALOG) injection   SubCUTAneous AC&HS Renita Bae MD   5 Units at 11/19/21 2255    glucose chewable tablet 16 g  4 Tablet Oral PRN Renita Bae MD        dextrose (D50W) injection syrg 12.5-25 g  25-50 mL IntraVENous PRN Renita Bae MD        glucagon (GLUCAGEN) injection 1 mg  1 mg IntraMUSCular PRN Renita Bae MD        amLODIPine ([facility] [place]) tablet 5 mg  5 mg Oral QHS Boby Lawrence MD   5 mg at 11/19/21 2241    [START ON 11/20/2021] nitroglycerin (NITROBID) 2 % ointment 0.5 Inch  0.5 Inch Topical Q6H Boby Lawrence MD   0.5 Inch at 11/19/21 2348       Past History     Past Medical History:  Past Medical History:   Diagnosis Date    Chronic obstructive pulmonary disease (HCC)     [facility]     Coronary artery disease     Diabetes ([facility]     Hyperlipidemia     Hypertension     Myocardial infarct ([facility]     IFTIKHAR (obstructive sleep apnea)        Past Surgical History:  Past Surgical History:   Procedure Laterality Date    HX BREAST BIOPSY      HX CHOLECYSTECTOMY      HX CORONARY ARTERY BYPASS GRAFT      HX HERNIA REPAIR      HX HYSTERECTOMY         Family History:  Family History   Problem Relation Age of Onset    Heart Disease Mother     Kidney Disease Mother     Cancer Maternal Grandmother        Social History:  Social History     Tobacco Use    Smoking status: Former Smoker    Smokeless tobacco: Never Used   Substance Use Topics    Alcohol use: Not Currently    Drug use: Never       Allergies: Allergies   Allergen Reactions    Penicillins Other (comments)    Tylox [Oxycodone-Acetaminophen] Hives         Review of Systems     Review of Systems   Constitutional: Negative for activity change, chills and fever. HENT: Negative for congestion and sore throat. Eyes: Negative for photophobia and visual disturbance. Respiratory: Positive for chest tightness and shortness of breath. Negative for apnea. Cardiovascular: Positive for leg swelling. Negative for chest pain and palpitations. Gastrointestinal: Negative for abdominal pain, blood in stool, nausea and vomiting. Genitourinary: Negative for dysuria. Musculoskeletal: Negative for arthralgias and back pain. Skin: Negative for color change. Neurological: Negative for dizziness, weakness, numbness and headaches.        Physical Exam Physical Exam  Vitals and nursing note reviewed. Constitutional:       General: She is not in acute distress. Appearance: Normal appearance. She is normal weight. She is not ill-appearing. HENT:      Head: Normocephalic and atraumatic. Nose: Nose normal.      Mouth/Throat:      Mouth: Mucous membranes are moist.   Eyes:      Extraocular Movements: Extraocular movements intact. Pupils: Pupils are equal, round, and reactive to light. Cardiovascular:      Rate and Rhythm: Normal rate and regular rhythm. Pulses: Normal pulses. Pulmonary:      Effort: Pulmonary effort is normal. No tachypnea. Breath sounds: Normal breath sounds. Abdominal:      General: Abdomen is flat. Bowel sounds are normal.      Palpations: Abdomen is soft. Tenderness: There is no abdominal tenderness. There is no guarding. Musculoskeletal:         General: No tenderness. Normal range of motion. Cervical back: Normal range of motion and neck supple. No muscular tenderness. Right lower leg: Edema present. Left lower leg: Edema present. Skin:     General: Skin is warm and dry. Neurological:      General: No focal deficit present. Mental Status: She is alert and oriented to person, place, and time. Sensory: No sensory deficit. Motor: No weakness.          Diagnostic Study Results     Labs -     Recent Results (from the past 12 hour(s))   CBC WITH AUTOMATED DIFF    Collection Time: 11/19/21  5:35 PM   Result Value Ref Range    WBC 8.3 3.6 - 11.0 K/uL    RBC 5.15 3.80 - 5.20 M/uL    HGB 14.0 11.5 - 16.0 g/dL    HCT 46.7 35.0 - 47.0 %    MCV 90.7 80.0 - 99.0 FL    MCH 27.2 26.0 - 34.0 PG    MCHC 30.0 30.0 - 36.5 g/dL    RDW 16.7 (H) 11.5 - 14.5 %    PLATELET 722 360 - 438 K/uL    MPV 12.6 8.9 - 12.9 FL    NRBC 0.0 0.0  WBC    ABSOLUTE NRBC 0.00 0.00 - 0.01 K/uL    NEUTROPHILS 74 32 - 75 %    LYMPHOCYTES 19 12 - 49 %    MONOCYTES 6 5 - 13 %    EOSINOPHILS 1 0 - 7 % BASOPHILS 0 0 - 1 %    IMMATURE GRANULOCYTES 0 0 - 0.5 %    ABS. NEUTROPHILS 6.1 1.8 - 8.0 K/UL    ABS. LYMPHOCYTES 1.6 0.8 - 3.5 K/UL    ABS. MONOCYTES 0.5 0.0 - 1.0 K/UL    ABS. EOSINOPHILS 0.1 0.0 - 0.4 K/UL    ABS. BASOPHILS 0.0 0.0 - 0.1 K/UL    ABS. IMM. GRANS. 0.0 0.00 - 0.04 K/UL    DF AUTOMATED     METABOLIC PANEL, COMPREHENSIVE    Collection Time: 11/19/21  5:35 PM   Result Value Ref Range    Sodium 143 136 - 145 mmol/L    Potassium 5.7 (H) 3.5 - 5.1 mmol/L    Chloride 109 (H) 97 - 108 mmol/L    CO2 31 21 - 32 mmol/L    Anion gap 3 (L) 5 - 15 mmol/L    Glucose 108 (H) 65 - 100 mg/dL    BUN 21 (H) 6 - 20 mg/dL    Creatinine 1.26 (H) 0.55 - 1.02 mg/dL    BUN/Creatinine ratio 17 12 - 20      GFR est AA 51 (L) >60 ml/min/1.73m2    GFR est non-AA 42 (L) >60 ml/min/1.73m2    Calcium 8.5 8.5 - 10.1 mg/dL    Bilirubin, total 0.6 0.2 - 1.0 mg/dL    AST (SGOT) 25 15 - 37 U/L    ALT (SGPT) 19 12 - 78 U/L    Alk. phosphatase 160 (H) 45 - 117 U/L    Protein, total 6.6 6.4 - 8.2 g/dL    Albumin 2.7 (L) 3.5 - 5.0 g/dL    Globulin 3.9 2.0 - 4.0 g/dL    A-G Ratio 0.7 (L) 1.1 - 2.2     NT-PRO BNP    Collection Time: 11/19/21  5:35 PM   Result Value Ref Range    NT pro-BNP 1,729 (H) <125 pg/mL   TROPONIN-HIGH SENSITIVITY    Collection Time: 11/19/21  6:00 PM   Result Value Ref Range    Troponin-High Sensitivity 100 (HH) 0 - 51 ng/L   GLUCOSE, POC    Collection Time: 11/19/21 10:50 PM   Result Value Ref Range    Glucose (POC) 314 (H) 65 - 117 mg/dL    Performed by Kaylin Zamarripa        Radiologic Studies -   [unfilled]  CT Results  (Last 48 hours)    None        CXR Results  (Last 48 hours)               11/19/21 1808  XR CHEST PORT Final result    Narrative:  1 new comparison June 30       Increased cardiomegaly and congestion. There may be mild interstitial edema. No   effusion or pneumothorax             Medical Decision Making and ED Course   I am the first provider for this patient.     I reviewed the vital signs, available nursing notes, past medical history, past surgical history, family history and social history. Vital Signs-Reviewed the patient's vital signs. Patient Vitals for the past 12 hrs:   Temp Pulse Resp BP SpO2   11/19/21 2346 98.2 °F (36.8 °C) 67 18 (!) 160/73 100 %   11/19/21 2242 -- 78 16 (!) 160/94 99 %   11/19/21 2018 -- 65 -- (!) 155/73 --   11/19/21 1746 98.5 °F (36.9 °C) 72 18 (!) 173/94 93 %       EKG interpretation: (Preliminary)  Normal sinus rhythm 73, no ST elevations, normal axis    Records Reviewed: Nursing Notes    The patient presents with shortness of breath, lower extremity swelling with a differential diagnosis of CHF exacerbation, pulmonary edema, pneumonia, ACS, NSTEMI      Provider Notes (Medical Decision Making):     MDM   67-year-old female, history of diabetes, CAD, CHF, COPD, presents to emergency department for worsening shortness of breath and lower extremity swelling over the last week. Physical exam shows well-appearing female, no distress, saturating 93% on 4 L of which is patient's baseline. Clear breath sounds bilateral, lower extremity show 2+ pitting edema to mid thigh. EKG shows no signs of acute ischemic change, will obtain chest x-ray, basic lab work including cardiac enzymes, administer 40 mg of Lasix IV. ED Course:   Initial assessment performed. The patients presenting problems have been discussed, and they are in agreement with the care plan formulated and outlined with them. I have encouraged them to ask questions as they arise throughout their visit. ED Course as of 11/19/21 2350   Fri Nov 19, 2021 1945 Patient's lab work resulting, BNP elevated at 1700, troponin elevated at 100, EKG shows no signs of acute ischemia however given shortness of breath with elevated troponin will admit patient for CHF exacerbation, [PZ]   2025 Discussed with Dr. Navi Osman, will admit patient for chf exacerbation.   [PZ]      ED Course User Index  [PZ] Smith Tam, Hanane Laguerre MD         Procedures       Dex Disla MD  Procedures             Disposition       Admitted        Diagnosis     Clinical Impression:   1. Acute on chronic systolic congestive heart failure (HCC)    2. Elevated troponin        Attestations:    Dex Disla MD    Please note that this dictation was completed with [email or web address], the computer voice recognition software. Quite often unanticipated grammatical, syntax, homophones, and other interpretive errors are inadvertently transcribed by the computer software. Please disregard these errors. Please excuse any errors that have escaped final proofreading. Thank you.

## 2021-11-20 NOTE — PROGRESS NOTES
Hospitalist Progress Note         Herbert Roger MD          Daily Progress Note: 11/20/2021      Subjective: The patient is seen for follow  up. 80 y.o. female with a history of diabetes, hypertension, heart failure, COPD presents to the emergency room complaining of shortness of breath associated with lower extremity swelling. Its not improving even though she is increasing her furosemide. Denies any fever or chills. No chest pain or palpitations. No exacerbating relieving factors. On 4 L nasal cannula at home for COPD. At this point denies any wheezing or exacerbation of her COPD, states that she is using medications regularly. Patient seen in follow-up. Notes improvement in breathing but reports bilateral leg pain.   No fevers overnight    Problem List:  Problem List as of 11/20/2021 Date Reviewed: 11/19/2021          Codes Class Noted - Resolved    Heart failure ([facility] ICD-10-CM: I50.9  ICD-9-CM: 428.9  11/19/2021 - Present        UTI (urinary tract infection) ICD-10-CM: N39.0  ICD-9-CM: 599.0  6/30/2021 - Present        CHF exacerbation ([facility] ICD-10-CM: I50.9  ICD-9-CM: 428.0  6/21/2021 - Present        Atrial flutter with rapid ventricular response ([facility] ICD-10-CM: I48.92  ICD-9-CM: 427.32  3/9/2021 - Present        Atrial flutter ([facility] ICD-10-CM: I48.92  ICD-9-CM: 427.32  2/22/2021 - Present        COPD exacerbation ([facility] ICD-10-CM: J44.1  ICD-9-CM: 491.21  11/2/2020 - Present        COPD (chronic obstructive pulmonary disease) ([facility] ICD-10-CM: J44.9  ICD-9-CM: 495  11/2/2020 - Present        RESOLVED: CHF (congestive heart failure) ([facility] ICD-10-CM: I50.9  ICD-9-CM: 428.0  2/22/2021 - 8/3/2021              Medications reviewed  Current Facility-Administered Medications   Medication Dose Route Frequency    albuterol (PROVENTIL HFA, VENTOLIN HFA, PROAIR HFA) inhaler 2 Puff  2 Puff Inhalation BID PRN    clopidogreL (PLAVIX) tablet 75 mg  75 mg Oral DAILY    QUEtiapine (SEROquel) tablet 25 mg  25 mg Oral QHS    albuterol-ipratropium (DUO-NEB) 2.5 MG-0.5 MG/3 ML  3 mL Nebulization Q6H PRN    budesonide-formoteroL (SYMBICORT) 160-4.5 mcg/actuation HFA inhaler 2 Puff  2 Puff Inhalation BID RT    apixaban (ELIQUIS) tablet 5 mg  5 mg Oral BID    oxybutynin (DITROPAN) tablet 5 mg  5 mg Oral TID    insulin glargine (LANTUS) injection 10 Units  10 Units SubCUTAneous QHS    nitroglycerin (NITROSTAT) tablet 0.4 mg  0.4 mg SubLINGual PRN    famotidine (PEPCID) tablet 10 mg  10 mg Oral BID    theophylline ER(12 hour) (THEOCHRON) tablet 300 mg  300 mg Oral DAILY    amiodarone (CORDARONE) tablet 400 mg  400 mg Oral DAILY    atorvastatin (LIPITOR) tablet 40 mg  40 mg Oral QHS    metoprolol succinate (TOPROL-XL) XL tablet 25 mg  25 mg Oral DAILY    sacubitriL-valsartan (ENTRESTO) 24-26 mg tablet 1 Tablet  1 Tablet Oral BID    [START ON 11/21/2021] metOLazone (ZAROXOLYN) tablet 5 mg  5 mg Oral DAILY    [START ON 11/21/2021] torsemide (DEMADEX) tablet 20 mg  20 mg Oral BID    potassium chloride SR (KLOR-CON 10) tablet 20 mEq  20 mEq Oral DAILY WITH BREAKFAST    ondansetron (ZOFRAN) injection 4 mg  4 mg IntraVENous Q6H PRN    acetaminophen (TYLENOL) tablet 650 mg  650 mg Oral Q6H PRN    furosemide (LASIX) injection 40 mg  40 mg IntraVENous BID    aspirin delayed-release tablet 81 mg  81 mg Oral DAILY    insulin lispro (HUMALOG) injection   SubCUTAneous AC&HS    glucose chewable tablet 16 g  4 Tablet Oral PRN    dextrose (D50W) injection syrg 12.5-25 g  25-50 mL IntraVENous PRN    glucagon (GLUCAGEN) injection 1 mg  1 mg IntraMUSCular PRN    amLODIPine (NORVASC) tablet 5 mg  5 mg Oral QHS       Review of Systems:   A comprehensive review of systems was negative except for that written in the HPI.     Objective:   Physical Exam:     Visit Vitals  BP (!) 158/84 (BP 1 Location: Right upper arm, BP Patient Position: At rest;Sitting)   Pulse 74   Temp 98 °F (36.7 °C) Resp 20   Ht 5' 3\" (1.6 m)   Wt 103.6 kg (228 lb 8 oz)   SpO2 93%   BMI 40.48 kg/m²    O2 Flow Rate (L/min): 1 l/min O2 Device: Nasal cannula    Temp (24hrs), Av.1 °F (36.7 °C), Min:97.7 °F (36.5 °C), Max:98.5 °F (36.9 °C)    701 - 1900  In: -   Out: 6195 [TGEMN:7302]   1901 - 700  In: -   Out: 1000 [Urine:1000]    General:  Alert, cooperative, no distress, appears stated age. Lungs:   Clear to auscultation bilaterally. Chest wall:  No tenderness or deformity. Heart:  Regular rate and rhythm, S1, S2 normal, no murmur, click, rub or gallop. Abdomen:   Soft, non-tender. Bowel sounds normal. No masses,  No organomegaly. Extremities: Extremities normal, atraumatic, no cyanosis or edema. Pulses: 2+ and symmetric all extremities. Skin: Skin color, texture, turgor normal. No rashes or lesions   Neurologic: CNII-XII intact.  No gross sensory or motor deficits     Data Review:       Recent Days:  Recent Labs     21  0808 21  1735   WBC 7.8 8.3   HGB 13.2 14.0   HCT 44.5 46.7    216     Recent Labs     21  0808 21  1735   * 143   K 3.9 5.7*    109*   CO2 35* 31    108*   BUN 21* 21*   CREA 1.31* 1.26*   CA 9.0 8.5   MG 2.0  --    ALB 2.7* 2.7*   TBILI 0.7 0.6   ALT 16 19     Recent Labs     21  0049   PH 7.36   PCO2 62*   PO2 90   HCO3 31*       24 Hour Results:  Recent Results (from the past 24 hour(s))   CBC WITH AUTOMATED DIFF    Collection Time: 21  5:35 PM   Result Value Ref Range    WBC 8.3 3.6 - 11.0 K/uL    RBC 5.15 3.80 - 5.20 M/uL    HGB 14.0 11.5 - 16.0 g/dL    HCT 46.7 35.0 - 47.0 %    MCV 90.7 80.0 - 99.0 FL    MCH 27.2 26.0 - 34.0 PG    MCHC 30.0 30.0 - 36.5 g/dL    RDW 16.7 (H) 11.5 - 14.5 %    PLATELET 338 294 - 131 K/uL    MPV 12.6 8.9 - 12.9 FL    NRBC 0.0 0.0  WBC    ABSOLUTE NRBC 0.00 0.00 - 0.01 K/uL    NEUTROPHILS 74 32 - 75 %    LYMPHOCYTES 19 12 - 49 %    MONOCYTES 6 5 - 13 %    EOSINOPHILS 1 0 - 7 %    BASOPHILS 0 0 - 1 %    IMMATURE GRANULOCYTES 0 0 - 0.5 %    ABS. NEUTROPHILS 6.1 1.8 - 8.0 K/UL    ABS. LYMPHOCYTES 1.6 0.8 - 3.5 K/UL    ABS. MONOCYTES 0.5 0.0 - 1.0 K/UL    ABS. EOSINOPHILS 0.1 0.0 - 0.4 K/UL    ABS. BASOPHILS 0.0 0.0 - 0.1 K/UL    ABS. IMM. GRANS. 0.0 0.00 - 0.04 K/UL    DF AUTOMATED     METABOLIC PANEL, COMPREHENSIVE    Collection Time: 11/19/21  5:35 PM   Result Value Ref Range    Sodium 143 136 - 145 mmol/L    Potassium 5.7 (H) 3.5 - 5.1 mmol/L    Chloride 109 (H) 97 - 108 mmol/L    CO2 31 21 - 32 mmol/L    Anion gap 3 (L) 5 - 15 mmol/L    Glucose 108 (H) 65 - 100 mg/dL    BUN 21 (H) 6 - 20 mg/dL    Creatinine 1.26 (H) 0.55 - 1.02 mg/dL    BUN/Creatinine ratio 17 12 - 20      GFR est AA 51 (L) >60 ml/min/1.73m2    GFR est non-AA 42 (L) >60 ml/min/1.73m2    Calcium 8.5 8.5 - 10.1 mg/dL    Bilirubin, total 0.6 0.2 - 1.0 mg/dL    AST (SGOT) 25 15 - 37 U/L    ALT (SGPT) 19 12 - 78 U/L    Alk.  phosphatase 160 (H) 45 - 117 U/L    Protein, total 6.6 6.4 - 8.2 g/dL    Albumin 2.7 (L) 3.5 - 5.0 g/dL    Globulin 3.9 2.0 - 4.0 g/dL    A-G Ratio 0.7 (L) 1.1 - 2.2     NT-PRO BNP    Collection Time: 11/19/21  5:35 PM   Result Value Ref Range    NT pro-BNP 1,729 (H) <125 pg/mL   TROPONIN-HIGH SENSITIVITY    Collection Time: 11/19/21  6:00 PM   Result Value Ref Range    Troponin-High Sensitivity 100 (HH) 0 - 51 ng/L   GLUCOSE, POC    Collection Time: 11/19/21 10:50 PM   Result Value Ref Range    Glucose (POC) 314 (H) 65 - 117 mg/dL    Performed by Dom Whalen    TROPONIN-HIGH SENSITIVITY    Collection Time: 11/19/21 11:15 PM   Result Value Ref Range    Troponin-High Sensitivity 80 (HH) 0 - 51 ng/L   BLOOD GAS, ARTERIAL    Collection Time: 11/20/21 12:49 AM   Result Value Ref Range    pH 7.36 7.35 - 7.45      PCO2 62 (H) 35 - 45 mmHg    PO2 90 75 - 100 mmHg    O2 SAT 97 >95 %    BICARBONATE 31 (H) 22 - 26 mmol/L    BASE EXCESS 7.2 (H) 0 - 2 mmol/L    O2 METHOD Nasal Cannula      O2 FLOW RATE 2.5 L/min    Sample source Arterial      SITE Right Radial      FLAKITA'S TEST PASS     GLUCOSE, POC    Collection Time: 11/20/21  8:02 AM   Result Value Ref Range    Glucose (POC) 93 65 - 117 mg/dL    Performed by [address] [place] [address]    Collection Time: 11/20/21  8:08 AM   Result Value Ref Range    Sodium 146 (H) 136 - 145 mmol/L    Potassium 3.9 3.5 - 5.1 mmol/L    Chloride 106 97 - 108 mmol/L    CO2 35 (H) 21 - 32 mmol/L    Anion gap 5 5 - 15 mmol/L    Glucose 100 65 - 100 mg/dL    BUN 21 (H) 6 - 20 mg/dL    Creatinine 1.31 (H) 0.55 - 1.02 mg/dL    BUN/Creatinine ratio 16 12 - 20      GFR est AA 49 (L) >60 ml/min/1.73m2    GFR est non-AA 40 (L) >60 ml/min/1.73m2    Calcium 9.0 8.5 - 10.1 mg/dL    Bilirubin, total 0.7 0.2 - 1.0 mg/dL    AST (SGOT) 8 (L) 15 - 37 U/L    ALT (SGPT) 16 12 - 78 U/L    Alk.  phosphatase 149 (H) 45 - 117 U/L    Protein, total 6.1 (L) 6.4 - 8.2 g/dL    Albumin 2.7 (L) 3.5 - 5.0 g/dL    Globulin 3.4 2.0 - 4.0 g/dL    A-G Ratio 0.8 (L) 1.1 - 2.2     MAGNESIUM    Collection Time: 11/20/21  8:08 AM   Result Value Ref Range    Magnesium 2.0 1.6 - 2.4 mg/dL   TSH 3RD GENERATION    Collection Time: 11/20/21  8:08 AM   Result Value Ref Range    TSH 0.95 0.36 - 3.74 uIU/mL   CBC W/O DIFF    Collection Time: 11/20/21  8:08 AM   Result Value Ref Range    WBC 7.8 3.6 - 11.0 K/uL    RBC 4.93 3.80 - 5.20 M/uL    HGB 13.2 11.5 - 16.0 g/dL    HCT 44.5 35.0 - 47.0 %    MCV 90.3 80.0 - 99.0 FL    MCH 26.8 26.0 - 34.0 PG    MCHC 29.7 (L) 30.0 - 36.5 g/dL    RDW 16.5 (H) 11.5 - 14.5 %    PLATELET 001 372 - 736 K/uL    MPV 13.1 (H) 8.9 - 12.9 FL    NRBC 0.0 0.0  WBC    ABSOLUTE NRBC 0.00 0.00 - 0.01 K/uL   GLUCOSE, POC    Collection Time: 11/20/21 11:16 AM   Result Value Ref Range    Glucose (POC) 155 (H) 65 - 117 mg/dL    Performed by [facility]            Assessment/     Acute on chronic diastolic heart failure EF of 55%  COPD without acute exacerbation  Benign essential hypertension  Type 2 diabetes        Plan:  Continue supportive care including gentle IV diuresis  Recheck daily electrolytes  Obtain PT OT evaluation  Counseled on low-salt diet  Anticipate discharge to home with home health Monday      Care Plan discussed with: Nurse    Total time spent with patient: 30 minutes.     Gina Monk MD

## 2021-11-20 NOTE — PROGRESS NOTES
Patient is alert and oriented. Tolerating 1L NC well with Sat's >96%. Continues to have BLE pitting edema. Problem: Falls - Risk of  Goal: *Absence of Falls  Description: Document Silvia Titobetzaida Fall Risk and appropriate interventions in the flowsheet.   Outcome: Progressing Towards Goal  Note: Fall Risk Interventions:                                Problem: Patient Education: Go to Patient Education Activity  Goal: Patient/Family Education  Outcome: Progressing Towards Goal     Problem: General Medical Care Plan  Goal: *Vital signs within specified parameters  Outcome: Progressing Towards Goal  Goal: *Labs within defined limits  Outcome: Progressing Towards Goal  Goal: *Absence of infection signs and symptoms  Outcome: Progressing Towards Goal  Goal: *Optimal pain control at patient's stated goal  Outcome: Progressing Towards Goal  Goal: *Skin integrity maintained  Outcome: Progressing Towards Goal  Goal: *Fluid volume balance  Outcome: Progressing Towards Goal  Goal: *Optimize nutritional status  Outcome: Progressing Towards Goal  Goal: *Anxiety reduced or absent  Outcome: Progressing Towards Goal  Goal: *Progressive mobility and function (eg: ADL's)  Outcome: Progressing Towards Goal     Problem: Patient Education: Go to Patient Education Activity  Goal: Patient/Family Education  Outcome: Progressing Towards Goal

## 2021-11-20 NOTE — ROUTINE PROCESS
TRANSFER - OUT REPORT:    Verbal report given to Rn Flower(name) on Myrna Mera  being transferred to [facility](unit) for routine progression of care       Report consisted of patients Situation, Background, Assessment and   Recommendations(SBAR). Information from the following report(s) SBAR, Kardex, Intake/Output and Recent Results was reviewed with the receiving nurse. Lines:   Peripheral IV 11/19/21 Left Antecubital (Active)        Opportunity for questions and clarification was provided.       Patient transported with:   Monitor  O2 @ 4 liters  Tech

## 2021-11-20 NOTE — CONSULTS
This is an inpatient cardiology consultation requested by Caroline Llamas for acute on chronic diastolic heart failure. CHIEF COMPLAINT:  Lower extremity edema and dyspnea on exertion    HISTORY OF PRESENT ILLNESS  71yow follows with Dr. Home Mitchell. I am covering. Presented for lower extremity edema and shortness of breath. She is on diuretics as an outpatient but per outpatient notes she is not very clear regarding what her diuretic regimen is. Per patient today she states that she is taking Lasix therapy at an increased frequency. Care is complicated as she is obese with concomitant issue of COPD and 4 L of oxygen. She denies any wheezing. She does have history of coronary bypass grafting x2 in 2001 with saphenous vein graft to the RCA/OM 1. Denies chest pain. She did have cardiac catheterization in October at [facility] and was said to have no critical new lesion. No PCI was performed. Last echocardiogram available for review was dated 10/16/2021 showing LVEF of 25 to 30%. She does have history of paroxysmal atrial flutter and had cardioversion in June 2021. She denies palpitations. She does continue on Eliquis therapy at 5 mg p.o. twice daily. She does continue on amiodarone 200 mg once daily. ECG shows normal sinus rhythm. In regards to her hypertension her systolic blood pressure was above 170 mmHg on arrival.  With initiation of medications that she is supposed to be taking her blood pressure is now controlled. REVIEW OF SYSTEMS  All other systems reviewed and are negative except for the pertinent positives as noted in the HPI. PAST MEDICAL HISTORY  COPD on 4 L oxygen  Chronic diastolic heart failure  Paroxysmal atrial flutter status post cardioversion in June 2021  CAD status post coronary bypass grafting. History of remote PCI to left circumflex artery in 1996.   Appears last PCI was to the LAD on 5/4/2011  Obstructive sleep apnea with CPAP therapy at home  Morbid obesity    SOCIAL HISTORY  No alcohol, tobacco, or drug use. FAMILY HISTORY  Mother with chronic kidney disease   father with Alzheimer disease and alcoholism, stroke and CKD  Brother with CKD and hypertension      PHYSICAL EXAMINATION  General: no acute distress, conversant  HEENT/neck: no JVD, no masses, trachea midline. Pulmonary: Breath sounds with moderate air entry bilaterally, no wheezes or rales, no accessory muscle use. Cardiovascular: Regular rate, regular rhythm; no murmurs, rubs or gallops. Normal point of maximal impulse. No peripheral edema   GI: soft, nontender, no hepatosplenomegaly  Hematology/Oncology: no lymphadenopathy; no bruising  Skin: warm and dry, no rashes, lesions, or ulcer  Musculoskeletal: Moving all four extremities. Gait and station not assessed    MEDICAL DECISION MAKING  ECG was independently reviewed, my impression: Normal sinus rhythm with rightward axis and right atrial enlargement    Laboratory panel was independently viewed, my impression: CKD stage III appears stable. Echocardiogram from 6/22/2021 was independently viewed, my impression: Normal LVEF at that time. Diastolic dysfunction seen. IMPRESSION/PLAN  Acute on chronic diastolic heart failure (NYHA IV)  Coronary atherosclerosis, stable  COPD, stable  Paroxysmal atrial flutter  Hypertension, uncontrolled    For acute on chronic diastolic heart failure agree with torsemide 20 mg twice daily and daily metolazone therapy. This is her outpatient regimen but I wonder regarding medication adherence. Will follow creatinine values and BUN to further assess moving forward. For her coronary atherosclerosis do recommend continuation on atorvastatin 40 mg once daily. In regards to her CAD I recommend no aspirin therapy. Would not utilize Plavix or aspirin therapy as she is taking Eliquis therapy for paroxysmal atrial flutter. It appears her CAD has been quiescent despite her extensive history.   I have updated her primary cardiologist who can adjust later as needed. In regards to her paroxysmal atrial flutter please continue amiodarone therapy. Note that her blood pressure is now well controlled with initiation of her outpatient medications which she is supposed to be taking. Continue to educate as able.

## 2021-11-20 NOTE — PROGRESS NOTES
Reason for Admission:   CHF Exacerbation                    RUR Score: 15%                 PCP: First and Last name:   Prasanth Barrera MD     Name of Practice:    Are you a current patient: Yes/No:    Approximate date of last visit: Unsure   Can you participate in a virtual visit if needed:     Do you (patient/family) have any concerns for transition/discharge? No                 Plan for utilizing home health:  Patient Current with [facility]      Current Advanced Directive/Advance Care Plan:  Prior      Healthcare Decision Maker:   Click here to complete [id number] including selection of the Healthcare Decision Maker Relationship (ie \"Primary\")            Primary Decision MakerEdytanaveen Walsh Daughter - [phone]    Primary Decision Maker: Prasanth Gladis Walsh Daughter - [phone]    Primary Decision Maker: Julio Mayorga - [phone]    Transition of Care Plan:        Patient currently lives at home with daughter. There are two steps to enter the home. Patient has oxygen at home already. Patient is current with a home health company, but is not aware of the name of the company.  was set up through primary doctor and is not in the hospital charts or [address]. CM attempted to reach daughter to confirm company name, but had to leave message. CM will continue to follow.   Current Dispo: Home with [facility]

## 2021-11-20 NOTE — ED NOTES
Rounded on pt at this time, pt alert and oriented x4, on 4l of 02, pure wick on, left comfortable.  Daughter at bedside

## 2021-11-20 NOTE — H&P
History and Physical              Subjective :   Chief Complaint : Shortness of breath associated with lower extremity edema. Source of information : Patient, ED provider. History of present illness:   80 y.o. female with a history of diabetes, hypertension, heart failure, COPD presents to the emergency room complaining of shortness of breath associated with lower extremity swelling. Its not improving even though she is increasing her furosemide. Denies any fever or chills. No chest pain or palpitations. No exacerbating relieving factors. On 4 L nasal cannula at home for COPD. At this point denies any wheezing or exacerbation of her COPD, states that she is using medications regularly. History of coronary artery disease and coronary artery bypass graft surgery, following up with cardiology regularly. Past Medical History:   Diagnosis Date    Chronic obstructive pulmonary disease (HCC)     Congestive heart disease ([address]     Coronary artery disease     Diabetes [facility]     Hyperlipidemia     Hypertension     Myocardial infarct (HCC)     IFTIKHAR (obstructive sleep apnea)      Past Surgical History:   Procedure Laterality Date    HX BREAST BIOPSY      HX CHOLECYSTECTOMY      HX CORONARY ARTERY BYPASS GRAFT      HX HERNIA REPAIR      HX HYSTERECTOMY       Family History   Problem Relation Age of Onset    Heart Disease Mother     Kidney Disease Mother     Cancer Maternal Grandmother       Social History     Tobacco Use    Smoking status: Former Smoker    Smokeless tobacco: Never Used   Substance Use Topics    Alcohol use: Not Currently       Prior to Admission medications    Medication Sig Start Date End Date Taking? Authorizing Provider   acetaminophen-codeine (TYLENOL #4) 300-60 mg tab Take 1 Tablet by mouth every six (6) hours as needed for Severe Pain. 10/19/21   Provider, Historical   Narcan 4 mg/actuation nasal spray 1 [place] by IntraNASal route as needed for Overdose.  10/25/21 Provider, Historical   HYDROcodone-chlorpheniramine (TUSSIONEX) 10-8 mg/5 mL suspension Take 5 mL by mouth every six (6) hours as needed for Cough. 10/31/21   Provider, Historical   theophylline ER,12 hour, (THEOCHRON) 300 mg tablet Take 300 mg by mouth daily. 9/19/21   Provider, Historical   amiodarone (PACERONE) 400 mg tablet Take 400 mg by mouth daily. 9/25/21   Provider, Historical   atorvastatin (LIPITOR) 40 mg tablet Take 40 mg by mouth nightly. 11/13/21   Provider, Historical   metoprolol succinate (TOPROL-XL) 25 mg XL tablet Take 25 mg by mouth daily. 11/17/21   Provider, Historical   Entresto 24-26 mg tablet Take 1 Tablet by mouth two (2) times a day. 11/13/21   Provider, Historical   metOLazone (ZAROXOLYN) 5 mg tablet Take 5 mg by mouth daily. 11/17/21   Provider, Historical   torsemide (DEMADEX) 20 mg tablet Take 20 mg by mouth two (2) times a day. 11/13/21   Provider, Historical   potassium chloride SA (MICRO-K) 10 mEq capsule Take 20 mEq by mouth daily. 11/17/21   Provider, Historical   famotidine (PEPCID) 10 mg tablet Take 1 Tablet by mouth two (2) times a day. 6/29/21   Salvador Alvarado MD   nitroglycerin (NITROSTAT) 0.4 mg SL tablet Sit/Lay down then put one tab under the tongue every 5 minutes as needed for chest pain/neck or jaw pain for 3 doses. Seek immediate medical attention should you need more than one tab for pain to resolve. 3/15/21   Jackelin Street MD   apixaban (ELIQUIS) 5 mg tablet Take 1 Tab by mouth two (2) times a day. 3/14/21   Cathy Blanco NP   oxybutynin (DITROPAN) 5 mg tablet Take 1 Tab by mouth three (3) times daily. 3/14/21   Cathy Blanco NP   insulin glargine (LANTUS) 100 unit/mL injection 10 Units by SubCUTAneous route nightly. May give in pen  Indications: type 2 diabetes mellitus 3/14/21   Cathy Blanco NP   QUEtiapine (SEROqueL) 25 mg tablet Take 25 mg by mouth nightly.     Provider, Historical   albuterol-ipratropium (DUO-NEB) 2.5 mg-0.5 mg/3 ml nebu 3 mL by Nebulization route every six (6) hours as needed for Wheezing. Provider, Historical   mometasone-formoterol (Dulera) 200-5 mcg/actuation HFA inhaler Take 2 Puffs by inhalation two (2) times a day. Provider, Historical   clopidogreL (PLAVIX) 75 mg tab Take 1 Tab by mouth daily. 2/27/21   Andrews Alvarado MD   albuterol (Ventolin HFA) 90 mcg/actuation inhaler Take 2 Puffs by inhalation two (2) times daily as needed for Wheezing. Provider, Historical     Allergies   Allergen Reactions    Penicillins Other (comments)    Tylox [Oxycodone-Acetaminophen] Hives             Review of Systems:  Constitutional: Appetite is fair, denies weight loss. No fever, no chills, no night sweats. Eye: No recent visual disturbances, no discharge, no double vision. Ear/nose/mouth/throat : No hearing disturbance, no ear pain, no nasal congestion, no sore throat, no trouble swallowing. Respiratory : ++ trouble breathing, no cough, +++ shortness of breath, no hemoptysis, no wheezing. Cardiovascular : No chest pain, no palpitation, no orthopnea, +++ peripheral edema. Gastrointestinal : No nausea, no vomiting,  No abdominal pain. Genitourinary : No dysuria, no hematuria, no increased frequency, No incontinence. Lymphatics : No swollen glands -Neck, axillary, inguinal.  Endocrine : No excessive thirst, No polyuria. Immunologic :  No seasonal allergies. Musculoskeletal : No joint swelling, No pain, No effusion,  . Integumentary : No rash, No pruritus, No ecchymosis. Hematology : No petechiae, No easy bruising,  No tendency to bleed easy. Neurology : Denies change in mental status,  No confusion, No numbness or tingling. Psychiatric : No mood swings, No anxiety, No depression.     Vitals:     Patient Vitals for the past 12 hrs:   Temp Pulse Resp BP SpO2   11/19/21 2018 -- 65 -- (!) 155/73 --   11/19/21 1746 98.5 °F (36.9 °C) 72 18 (!) 173/94 93 %       Physical Exam:   General : Looks tired, mild respiratory distress. HEENT : PERRLA, normal oral mucosa, atraumatic normocephalic, Normal ear and nose. Neck : Supple, no JVD, no masses noted, no carotid bruit. Lungs : Breath sounds with moderate air entry bilaterally, no wheezes or rales, no accessory muscle use. CVS : Rhythm rate regular, S1+, S2+, no murmur or gallop. Abdomen : Soft, nontender, no organomegaly, bowel sounds active. Extremities : No edema noted,  pedal pulses palpable. Musculoskeletal : Fair range of motion, no joint swelling or effusion, muscle tone and power appears normal.   Skin : Moist warm. No pathological rash. Lymphatic : No cervical lymphadenopathy. Neurological : Awake, alert, oriented to time place person. No neurological deficits psychiatric : Mood and affect appears appropriate to the situation. Data Review:   Recent Results (from the past 24 hour(s))   CBC WITH AUTOMATED DIFF    Collection Time: 11/19/21  5:35 PM   Result Value Ref Range    WBC 8.3 3.6 - 11.0 K/uL    RBC 5.15 3.80 - 5.20 M/uL    HGB 14.0 11.5 - 16.0 g/dL    HCT 46.7 35.0 - 47.0 %    MCV 90.7 80.0 - 99.0 FL    MCH 27.2 26.0 - 34.0 PG    MCHC 30.0 30.0 - 36.5 g/dL    RDW 16.7 (H) 11.5 - 14.5 %    PLATELET 025 999 - 695 K/uL    MPV 12.6 8.9 - 12.9 FL    NRBC 0.0 0.0  WBC    ABSOLUTE NRBC 0.00 0.00 - 0.01 K/uL    NEUTROPHILS 74 32 - 75 %    LYMPHOCYTES 19 12 - 49 %    MONOCYTES 6 5 - 13 %    EOSINOPHILS 1 0 - 7 %    BASOPHILS 0 0 - 1 %    IMMATURE GRANULOCYTES 0 0 - 0.5 %    ABS. NEUTROPHILS 6.1 1.8 - 8.0 K/UL    ABS. LYMPHOCYTES 1.6 0.8 - 3.5 K/UL    ABS. MONOCYTES 0.5 0.0 - 1.0 K/UL    ABS. EOSINOPHILS 0.1 0.0 - 0.4 K/UL    ABS. BASOPHILS 0.0 0.0 - 0.1 K/UL    ABS. IMM.  GRANS. 0.0 0.00 - 0.04 K/UL    DF AUTOMATED     METABOLIC PANEL, COMPREHENSIVE    Collection Time: 11/19/21  5:35 PM   Result Value Ref Range    Sodium 143 136 - 145 mmol/L    Potassium 5.7 (H) 3.5 - 5.1 mmol/L    Chloride 109 (H) 97 - 108 mmol/L    CO2 31 21 - 32 mmol/L    Anion gap 3 (L) 5 - 15 mmol/L    Glucose 108 (H) 65 - 100 mg/dL    BUN 21 (H) 6 - 20 mg/dL    Creatinine 1.26 (H) 0.55 - 1.02 mg/dL    BUN/Creatinine ratio 17 12 - 20      GFR est AA 51 (L) >60 ml/min/1.73m2    GFR est non-AA 42 (L) >60 ml/min/1.73m2    Calcium 8.5 8.5 - 10.1 mg/dL    Bilirubin, total 0.6 0.2 - 1.0 mg/dL    AST (SGOT) 25 15 - 37 U/L    ALT (SGPT) 19 12 - 78 U/L    Alk. phosphatase 160 (H) 45 - 117 U/L    Protein, total 6.6 6.4 - 8.2 g/dL    Albumin 2.7 (L) 3.5 - 5.0 g/dL    Globulin 3.9 2.0 - 4.0 g/dL    A-G Ratio 0.7 (L) 1.1 - 2.2     NT-PRO BNP    Collection Time: 11/19/21  5:35 PM   Result Value Ref Range    NT pro-BNP 1,729 (H) <125 pg/mL   TROPONIN-HIGH SENSITIVITY    Collection Time: 11/19/21  6:00 PM   Result Value Ref Range    Troponin-High Sensitivity 100 (HH) 0 - 51 ng/L       Radiologic Studies :     CXR Results  (Last 48 hours)               11/19/21 1808  XR CHEST PORT Final result    Narrative:  1 new comparison June 30       Increased cardiomegaly and congestion. There may be mild interstitial edema. No   effusion or pneumothorax               Assessment and Plan :     Heart failure with preserved ejection fraction: Likely exacerbation due to advanced COPD. Already given diuretic with good urine output, continue risk fully with the urine. States she feels a little better but still short of breath. We will continue IV diuretics for next 24 hours and then change to oral medications, ordered 2D echocardiogram and requested cardiology consultation. Advanced COPD on home oxygen: We will continue nebulizer treatments as she is compensated. Also on long-acting theophylline which we will continue. Paraspinal atrial fibrillation: On amiodarone and Eliquis which we will continue    Diabetes mellitus type 2 : Very well controlled. On basal insulin at 10 units daily, we will keep her on Accu-Cheks with a sliding scale insulin. History of coronary artery disease, stable.     Benign essential hypertension: Fair but not at goal, we will order Nitropaste in addition to the home medications. Admitted to cardiac telemetry, full CODE STATUS, home medications reviewed with external Rx history. Has no advance medical directives. CC : Austyn Charlton MD  Signed By: Marcio Mondragon MD     November 19, 2021      This dictation was done by dragon, computer voice recognition software. Often unanticipated grammatical, syntax, [place] phones and other interpretive errors are inadvertently transcribed. Please excuse errors that have escaped final proofreading.

## 2021-11-21 VITALS
TEMPERATURE: 97.4 F | OXYGEN SATURATION: 93 % | RESPIRATION RATE: 19 BRPM | WEIGHT: 226.3 LBS | SYSTOLIC BLOOD PRESSURE: 139 MMHG | BODY MASS INDEX: 40.1 KG/M2 | DIASTOLIC BLOOD PRESSURE: 82 MMHG | HEIGHT: 63 IN | HEART RATE: 94 BPM

## 2021-11-21 LAB
GLUCOSE BLD STRIP.AUTO-MCNC: 119 MG/DL (ref 65–117)
GLUCOSE BLD STRIP.AUTO-MCNC: 139 MG/DL (ref 65–117)
PERFORMED BY, TECHID: ABNORMAL
PERFORMED BY, TECHID: ABNORMAL

## 2021-11-21 PROCEDURE — 74011250637 HC RX REV CODE- 250/637: Performed by: NURSE PRACTITIONER

## 2021-11-21 PROCEDURE — 74011250637 HC RX REV CODE- 250/637: Performed by: HOSPITALIST

## 2021-11-21 PROCEDURE — 82962 GLUCOSE BLOOD TEST: CPT

## 2021-11-21 PROCEDURE — 77010033678 HC OXYGEN DAILY

## 2021-11-21 PROCEDURE — 94760 N-INVAS EAR/PLS OXIMETRY 1: CPT

## 2021-11-21 RX ORDER — TRAMADOL HYDROCHLORIDE 50 MG/1
50 TABLET ORAL
Status: DISCONTINUED | OUTPATIENT
Start: 2021-11-21 | End: 2021-11-21 | Stop reason: HOSPADM

## 2021-11-21 RX ADMIN — TORSEMIDE 20 MG: 10 TABLET ORAL at 08:21

## 2021-11-21 RX ADMIN — POTASSIUM CHLORIDE 20 MEQ: 750 TABLET, FILM COATED, EXTENDED RELEASE ORAL at 08:21

## 2021-11-21 RX ADMIN — THEOPHYLLINE 300 MG: 300 TABLET, EXTENDED RELEASE ORAL at 08:21

## 2021-11-21 RX ADMIN — FAMOTIDINE 10 MG: 10 TABLET ORAL at 08:20

## 2021-11-21 RX ADMIN — OXYBUTYNIN CHLORIDE 5 MG: 5 TABLET ORAL at 08:21

## 2021-11-21 RX ADMIN — TRAMADOL HYDROCHLORIDE 50 MG: 50 TABLET, COATED ORAL at 08:26

## 2021-11-21 RX ADMIN — BUDESONIDE AND FORMOTEROL FUMARATE DIHYDRATE 2 PUFF: 160; 4.5 AEROSOL RESPIRATORY (INHALATION) at 08:20

## 2021-11-21 RX ADMIN — SACUBITRIL AND VALSARTAN 1 TABLET: 24; 26 TABLET, FILM COATED ORAL at 08:21

## 2021-11-21 RX ADMIN — METOLAZONE 5 MG: 5 TABLET ORAL at 08:21

## 2021-11-21 RX ADMIN — METOPROLOL SUCCINATE 25 MG: 25 TABLET, EXTENDED RELEASE ORAL at 08:20

## 2021-11-21 RX ADMIN — AMIODARONE HYDROCHLORIDE 400 MG: 200 TABLET ORAL at 08:21

## 2021-11-21 RX ADMIN — APIXABAN 5 MG: 5 TABLET, FILM COATED ORAL at 08:21

## 2021-11-21 NOTE — DISCHARGE SUMMARY
Physician Discharge Summary     Patient ID:    Maritza Yost  [id number]  80 y.o.  1950    Admit date: 11/19/2021    Discharge date : 11/21/2021    Chronic Diagnoses:    Problem List as of 11/21/2021 Date Reviewed: 11/19/2021          Codes Class Noted - Resolved    Heart failure ([facility] ICD-10-CM: I50.9  ICD-9-CM: 428.9  11/19/2021 - Present        UTI (urinary tract infection) ICD-10-CM: N39.0  ICD-9-CM: 599.0  6/30/2021 - Present        CHF exacerbation ([facility] ICD-10-CM: I50.9  ICD-9-CM: 428.0  6/21/2021 - Present        Atrial flutter with rapid ventricular response ([facility] ICD-10-CM: I48.92  ICD-9-CM: 427.32  3/9/2021 - Present        Atrial flutter ([facility] ICD-10-CM: I48.92  ICD-9-CM: 427.32  2/22/2021 - Present        COPD exacerbation ([facility] ICD-10-CM: J44.1  ICD-9-CM: 491.21  11/2/2020 - Present        COPD (chronic obstructive pulmonary disease) ([facility] ICD-10-CM: J44.9  ICD-9-CM: 496  11/2/2020 - Present        RESOLVED: CHF (congestive heart failure) ([facility] ICD-10-CM: I50.9  ICD-9-CM: 428.0  2/22/2021 - 8/3/2021          22    Final Diagnoses:   Heart failure ([facility] [I50.9]  Acute on chronic diastolic heart failure EF of 55%  COPD without acute exacerbation  Benign essential hypertension  Type 2 diabetes  Paroxysmal atrial fibrillation    Reason for Hospitalization:  Shortness of breath and bilateral leg swelling      Hospital Course:   Per h and P,\"71 y.o. female with a history of diabetes, hypertension, heart failure, COPD presents to the emergency room complaining of shortness of breath associated with lower extremity swelling.  Its not improving even though she is increasing her furosemide.  Denies any fever or chills.  No chest pain or palpitations.  No exacerbating relieving factors.  On 4 L nasal cannula at home for COPD.  At this point denies any wheezing or exacerbation of her COPD, states that she is using medications regularly\".     She received IV diuretics in the ED and was [facility]  Certificate of Child Health Examination  Student's Name:   Toro Varner Birth Date  2003  Sex  male  Race/Ethnicity   School/Grade Level/ID#     Address:   [address] [place] [id number]  Parent/Guardian             Telephone#                                            Home:                               Work:   IMMUNIZATIONS: To be completed by health care provider. The mo/da/yr for every dose administered is required. If a specific vaccine is medically contraindicated, a separate written statement must be attached by the health care responsible for completing the health examination explaining the medical reason for the contraindication.      Immunization History   Administered Date(s) Administered   • COVID-19 Pfizer-BioNtech 04/05/2021, 04/27/2021   • DTaP(INFANRIX) 2004, 2004, 2004, 08/17/2005, 06/05/2008   • HPV 9-Valent 11/08/2017, 06/19/2018   • Hep A, ped/adol, 2 dose 05/12/2006, 07/12/2007   • Hep B, adolescent or pediatric 2004, 2004, 03/23/2005   • Hib (PRP-OMP) 2004, 2004, 2004, 08/17/2005   • Influenza, injectable, quadrivalent 09/28/2020   • Influenza, injectable, quadrivalent, preservative-free 10/22/2016, 11/08/2017, 09/13/2018, 09/19/2019   • Influenza, live, intranasal, quadrivalent 10/09/2015   • Influenza, seasonal, injectable, trivalent 11/02/2007, 11/11/2008, 10/29/2009, 11/13/2010, 10/14/2011, 09/29/2012, 10/24/2014   • MMR 05/25/2005, 05/19/2009   • Meningococcal Conjugate MCV4O (Menveo) 07/08/2020   • Meningococcal Conjugate MCV4P (Menactra) 05/19/2015   • Meningococcal Group B OMV 2 Dose(Bexsero) 06/08/2021   • PPD 03/23/2005, 05/19/2009   • Pneumococcal Conjugate 7 Valent 2004, 2004, 2004, 08/17/2005   • Polio, INACTIVATED 2004, 2004, 03/23/2005, 06/05/2008   • Tdap 05/19/2015   • Varicella 05/25/2005, 05/19/2009      Immunizations given 6/8/2021: None      Health care provider (MD,  , ODALIS, PA, school health professional, health official) verifying above immunization history must sign below. If adding dates to the above immunization history section, put your initials by date(s) and sign here.)  Signature                                              Title    MD                                      Date 6/8/21 _____________________________________________________________________________________  Signature                                                                 Title                                                Date  _____________________________________________________________________________________   ALTERNATIVE PROOF OF IMMUNITY   1. Clinical diagnosis (measles, mumps, hepatitis B) is allowed when verified by physician and supported with lab confirmation.  Attach copy of lab result.    * MEASLES ([place])  MO   DA  YR          **MUMPS  MO   DA  YR             HEPATITIS B  MO   DA   YR           VARICELLA  MO   DA  YR      2. History of varicella (chickenpox) disease is acceptable if verified by health care provider, school health professional or health official.  Person signing below verifies that the parent/guardian’s description of varicella disease history is indicative of past infection and is accepting such history as documentation of disease.  Date of Disease                                  Signature                                                           Title   3. Laboratory Evidence of Immunity (check one)      __ Measles*         __ Mumps**        __ Rubella        __Varicella    (Attach copy of lab result)         *All measles cases diagnosed on or after July 1, 2002, must be confirmed by laboratory evidence.      **All mumps cases diagnosed on or after July 1, 2013, must be confirmed by laboratory evidence.     Completion of Alternative 1 or 3 MUST be accompanied by Labs & Physician Signature: ___________________________  Physician Statements of Immunity MUST be  monitored in the hospital without any acute events. Seen and evaluated by cardiologist and recommended continuation of oral meds. Stable for discharge to home with outpatient follow-up                 Discharge Medications:   Current Discharge Medication List      CONTINUE these medications which have NOT CHANGED    Details   acetaminophen-codeine (TYLENOL #4) 300-60 mg tab Take 1 Tablet by mouth every six (6) hours as needed for Severe Pain. Narcan 4 mg/actuation nasal spray 1 Saxton by IntraNASal route as needed for Overdose. HYDROcodone-chlorpheniramine (TUSSIONEX) 10-8 mg/5 mL suspension Take 5 mL by mouth every six (6) hours as needed for Cough. theophylline ER,12 hour, (THEOCHRON) 300 mg tablet Take 300 mg by mouth daily. amiodarone (PACERONE) 400 mg tablet Take 400 mg by mouth daily. atorvastatin (LIPITOR) 40 mg tablet Take 40 mg by mouth nightly. metoprolol succinate (TOPROL-XL) 25 mg XL tablet Take 25 mg by mouth daily. Entresto 24-26 mg tablet Take 1 Tablet by mouth two (2) times a day. metOLazone (ZAROXOLYN) 5 mg tablet Take 5 mg by mouth daily. torsemide (DEMADEX) 20 mg tablet Take 20 mg by mouth two (2) times a day. potassium chloride SA (MICRO-K) 10 mEq capsule Take 20 mEq by mouth daily. famotidine (PEPCID) 10 mg tablet Take 1 Tablet by mouth two (2) times a day. Qty: 30 Tablet, Refills: 0      nitroglycerin (NITROSTAT) 0.4 mg SL tablet Sit/Lay down then put one tab under the tongue every 5 minutes as needed for chest pain/neck or jaw pain for 3 doses. Seek immediate medical attention should you need more than one tab for pain to resolve. Qty: 1 Bottle, Refills: 3      apixaban (ELIQUIS) 5 mg tablet Take 1 Tab by mouth two (2) times a day. Qty: 30 Tab, Refills: 0      oxybutynin (DITROPAN) 5 mg tablet Take 1 Tab by mouth three (3) times daily.   Qty: 90 Tab, Refills: 0      insulin glargine (LANTUS) 100 unit/mL injection 10 Units by SubCUTAneous route nightly. May give in pen  Indications: type 2 diabetes mellitus  Qty: 1 Vial, Refills: 0      QUEtiapine (SEROqueL) 25 mg tablet Take 25 mg by mouth nightly. albuterol-ipratropium (DUO-NEB) 2.5 mg-0.5 mg/3 ml nebu 3 mL by Nebulization route every six (6) hours as needed for Wheezing.      mometasone-formoterol (Dulera) 200-5 mcg/actuation HFA inhaler Take 2 Puffs by inhalation two (2) times a day. albuterol (Ventolin HFA) 90 mcg/actuation inhaler Take 2 Puffs by inhalation two (2) times daily as needed for Wheezing. STOP taking these medications       furosemide (LASIX) 40 mg tablet Comments:   Reason for Stopping:         losartan (COZAAR) 100 mg tablet Comments:   Reason for Stopping:         theophylline ER (BAM-24) 200 mg cp24 capsule Comments:   Reason for Stopping:         potassium chloride SR (KLOR-CON 10) 10 mEq tablet Comments:   Reason for Stopping:         clopidogreL (PLAVIX) 75 mg tab Comments:   Reason for Stopping: Follow up Care:    1. Hugh Sales MD in 1-2 weeks. Please call to set up an appointment shortly after discharge. Diet:  Cardiac Diet    Disposition:  Home. Advanced Directive:   FULL    DNR      Discharge Exam:  Visit Vitals  BP (!) 151/79   Pulse 72   Temp 97.8 °F (36.6 °C)   Resp 19   Ht 5' 3\" (1.6 m)   Wt 102.6 kg (226 lb 4.8 oz)   SpO2 94%   BMI 40.09 kg/m²    O2 Flow Rate (L/min): 1 l/min O2 Device: Nasal cannula    Temp (24hrs), Av °F (36.7 °C), Min:97.7 °F (36.5 °C), Max:98.2 °F (36.8 °C)    No intake/output data recorded.  1901 -  0700  In: -   Out: [address] [Urine:4050]    General:  Alert, cooperative, no distress, appears stated age. Lungs:   Clear to auscultation bilaterally. Chest wall:  No tenderness or deformity. Heart:  Regular rate and rhythm, S1, S2 normal, no murmur, click, rub or gallop. Abdomen:   Soft, non-tender. Bowel sounds normal. No masses,  No organomegaly.    Extremities: submitted to ID for review.     Certificates of [employer] Exemption to Immunizations or Physician Medical Statements of Medical Contraindication Are Reviewed   and Maintained by the School Authority     (11/2015)                                         (COMPLETE BOTH SIDES)                                  Approved ID [id number]      Student's Name:  Toro Varner Birth Date 2003 Sex male School Grade Level/ID#     Page 2 of 2   HEALTH HISTORY      TO BE COMPLETED AND SIGNED BY PARENT/GUARDIAN AND VERIFIED BY HEALTH CARE PROVIDER  ALLERGIES: (Food, drug, insect, other) Yes and No is allergic to cat dander and seasonal.   MEDICATION: (List all prescribed or taken on a regular basis.)   Yes    Diagnosis of asthma?  Child wakes during night coughing? Yes    No  Yes    No  Loss of function of one of paired  organs?(eye/ear/kidney/testicle) Yes    No    Birth defects? Yes    No  Hospitalizations? Yes    No    Developmental delay? Yes    No   When? What for? Yes    No    Blood disorders? Hemophilia,  Sickle Cell, Other? Explain. Yes    No  Surgery? (List all.)  When? What for? Yes    No    Diabetes? Yes    No  Serious injury or illness? Yes    No    Head injury/Concussion/Passed out? Yes    No  TB skin test positive (past/present)? * Yes    No *If yes, refer to local [facility] dept   Seizures? What are they like?  Yes    No  TB disease (past or present)? * Yes    No *[facility] yes, [facility]   Heart problem/Shortness of breath?  Yes    No  Tobacco use (type, frequency)?  Yes    No    Heart murmur/High blood pressure? Yes    No  Alcohol/Drug use?  Yes    No    Dizziness or chest pain with exercise? Yes    No  Family history of sudden death  before age 50? (Cause?) Yes    No    Eye/Vision problems? ______           __Glasses          __Contacts  Last exam by eye doctor ______  Other concerns? (crossed eye, drooping lids, squinting, difficulty reading) Dental?   __ Braces     __ Bridge     __ Plate   __Other      Ear/Hearing problems? Yes    No  Information may be shared with appropriate personnel for health and educational purposes.   Bone/Joint problem/injury/scoliosis? Yes    No  Parent/Guardian  Signature                                               Date   PHYSICAL EXAMINATION REQUIREMENTS   Entire section below to be completed by MD//ODALIS/PA Head Circumference if <2-3 years old - BP (!) 115/57 (BP Location: RUE - Right upper extremity, Patient Position: Sitting, Cuff Size: Regular)   Pulse 81   Temp 99.7 °F (37.6 °C) (Temporal)   Ht 5' 5.5\" (1.664 m)   Wt 58.4 kg (128 lb 11.2 oz)   BMI 21.09 kg/m²   BSA 1.65 m²    48 %ile (Z= -0.06) based on CDC (Boys, 2-20 Years) BMI-for-age based on BMI available as of 6/8/2021.   DIABETES SCREENING (NOT REQUIRED FOR ) BMI>85% age/sex No     And any two of the following: Family History: No  Ethnic Minority: No    Signs of Insulin Resistance (hypertension, dyslipidemia, polycystic ovarian syndrome, acanthosis nigricans): No  At Risk: No   LEAD RISK QUESTIONNAIRE Required for children age 6 months through 6 years enrolled in licensed or public school operated day care, , nursery school and/or . (Blood test required if resides in [place] or high risk zip code.)  Questionnaire Administered? Yes  Blood Test Indicated? No   Blood Test Date _____   Blood Test Result ______________   TB SKIN OR BLOOD TEST Recommended only for children in high-risk groups including children immunosuppressed due to HIV infection or other conditions, frequent travel to or born in high prevalence countries or those exposed to adults in high-risk categories. See CDC guidelines. http://www.cdc.gov/tb/publications/factsheets/testing/TB_testing.htm.  Test Needed: No     Test performed: No                          Skin Test:    Date Read              /      /             Result:   Positive__      Negative__        mm________                          Blood Test: Date Reported        Extremities normal, atraumatic, no cyanosis or edema. Pulses: 2+ and symmetric all extremities. Skin: Skin color, texture, turgor normal. No rashes or lesions   Neurologic: CNII-XII intact. No gross sensory or motor deficits         CONSULTATIONS: Cardiology    Significant Diagnostic Studies:   11/19/2021: BUN 21 mg/dL (H; Ref range: 6 - 20 mg/dL); Calcium 8.5 mg/dL (Ref range: 8.5 - 10.1 mg/dL); CO2 31 mmol/L (Ref range: 21 - 32 mmol/L); Creatinine 1.26 mg/dL (H; Ref range: 0.55 - 1.02 mg/dL); Glucose 108 mg/dL (H; Ref range: 65 - 100 mg/dL); HCT 46.7 % (Ref range: 35.0 - 47.0 %); HGB 14.0 g/dL (Ref range: 11.5 - 16.0 g/dL); Potassium 5.7 mmol/L (H; Ref range: 3.5 - 5.1 mmol/L); Sodium 143 mmol/L (Ref range: 136 - 145 mmol/L)  11/20/2021: BUN 21 mg/dL (H; Ref range: 6 - 20 mg/dL); Calcium 9.0 mg/dL (Ref range: 8.5 - 10.1 mg/dL); CO2 35 mmol/L (H; Ref range: 21 - 32 mmol/L); Creatinine 1.31 mg/dL (H; Ref range: 0.55 - 1.02 mg/dL); Glucose 100 mg/dL (Ref range: 65 - 100 mg/dL); HCT 44.5 % (Ref range: 35.0 - 47.0 %); HGB 13.2 g/dL (Ref range: 11.5 - 16.0 g/dL); Potassium 3.9 mmol/L (Ref range: 3.5 - 5.1 mmol/L); Sodium 146 mmol/L (H; Ref range: 136 - 145 mmol/L)  Recent Labs     11/20/21  0808 11/19/21  1735   WBC 7.8 8.3   HGB 13.2 14.0   HCT 44.5 46.7    216     Recent Labs     11/20/21  0808 11/19/21  1735   * 143   K 3.9 5.7*    109*   CO2 35* 31   BUN 21* 21*   CREA 1.31* 1.26*    108*   CA 9.0 8.5   MG 2.0  --      Recent Labs     11/20/21  0808 11/19/21  1735   ALT 16 19   * 160*   TBILI 0.7 0.6   TP 6.1* 6.6   ALB 2.7* 2.7*   GLOB 3.4 3.9     No results for input(s): INR, PTP, APTT, INREXT in the last 72 hours. No results for input(s): FE, TIBC, PSAT, FERR in the last 72 hours. Recent Labs     11/20/21  0049   PH 7.36   PCO2 62*   PO2 90     No results for input(s): CPK, CKMB in the last 72 hours.     No lab exists for component: TROPONINI  Lab Results   Component Value /      /               Result:  Positive__      Negative__      Value________  LAB TESTS (recommended) Date/Result  Date/Results   Hemoglobin or Hematocrit NA Sickle Cell (when indicated) NA   Urinalysis NA Developmental Screening Tool NA        SYSTEM REVIEW Normal  Comments/Follow-up/Needs  Normal Comments/Follow-up/Needs   Skin  Yes  Endocrine Yes    Ears Yes                  Screening Result Gastrointestinal Yes    Eyes Yes                  Screening Result: Genito-Urinary Yes                                            LMP   Nose Yes  Neurologic Yes    Throat Yes  Musculoskeletal Yes    Mouth/Dental Yes  Spinal Exam Yes    Cardiovascular/HTN Yes  Nutritional status Yes    Respiratory Yes Diagnosis of Asthma: No   Mental Health Yes    Currently Prescribed Asthma Medication:        No  Quick-relief medication (e.g. Short Acting Beta Antagonist)        No   Controller medication (e.g. inhaled corticosteroid) Other     NEEDS/MODIFICATIONS required in the school setting: No restrictions DIETARY Needs/Restrictions: No restrictions   SPECIAL INSTRUCTIONS/DEVICES e.g. safety glasses, glass eye, chest protector for arrhythmia, pacemaker, prosthetic device, dental bridge, false teeth, athletic support/cup: No restrictions   MENTAL HEALTH/OTHER Is there anything else the school should know about this student?  If you would like to discuss this student’s health with school or school health personnel:  Not needed   EMERGENCY ACTION needed while at school due to child’s health condition (e.g. ,seizures, asthma, insect sting, food, peanut allergy, bleeding problem, diabetes, heart problem)?   No   On the basis of the examination on this day, I approve this child’s participation in                                (If No or Modified please attach explanation.)  PHYSICAL EDUCATION:  Yes                               INTER[employer]OL[employer]IC [employer]ORTS   Yes   Print Name  Joseph Simmons MD         Signature                                      Date/Time    Glucose (POC) 119 (H) 11/21/2021 08:47 AM    Glucose (POC) 131 (H) 11/20/2021 08:56 PM    Glucose (POC) 155 (H) 11/20/2021 11:16 AM    Glucose (POC) 93 11/20/2021 08:02 AM    Glucose (POC) 314 (H) 11/19/2021 10:50 PM       Total Time: 35 minutes    Signed:  Juan Hassan MD  11/21/2021  8:53 AM                                   Date: 6/8/2021   Address:  [address]  [address]  [facility] [address]  [phone]         (Complete Both Sides)     Approved [facility] [id number]/2016

## 2021-11-21 NOTE — PROGRESS NOTES
Patients daughter at bedside to take patient home. Removed telemetry leads and IV. Reviewed discharge instructions with patient and daughter. Patient verbalized understanding. Patient taken down to car via wheelchair and discharged in stable condition.

## 2021-11-21 NOTE — DISCHARGE INSTRUCTIONS
Patient Education        Heart Failure: Care Instructions  Your Care Instructions     Heart failure occurs when your heart does not pump as much blood as the body needs. Failure does not mean that the heart has stopped pumping but rather that it is not pumping as well as it should. Over time, this causes fluid buildup in your lungs and other parts of your body. Fluid buildup can cause shortness of breath, fatigue, swollen ankles, and other problems. By taking medicines regularly, reducing sodium (salt) in your diet, checking your weight every day, and making lifestyle changes, you can feel better and live longer. Follow-up care is a key part of your treatment and safety. Be sure to make and go to all appointments, and call your doctor if you are having problems. It's also a good idea to know your test results and keep a list of the medicines you take. How can you care for yourself at home? Medicines    · Be safe with medicines. Take your medicines exactly as prescribed. Call your doctor if you think you are having a problem with your medicine.     · Do not take any vitamins, over-the-counter medicine, or herbal products without talking to your doctor first. Adelia Pedroza not take ibuprofen (Advil or Motrin) and naproxen (Aleve) without talking to your doctor first. They could make your heart failure worse.     · You may take some of the following medicine. ? Angiotensin-converting enzyme inhibitors (ACEIs) or angiotensin II receptor blockers (ARBs) reduce the heart's workload, lower blood pressure, and reduce swelling. Taking an ACEI or ARB may lower your chance of needing to be hospitalized. ? Beta-blockers can slow heart rate, decrease blood pressure, and improve your condition. Taking a beta-blocker may lower your chance of needing to be hospitalized. ? Diuretics, also called water pills, reduce swelling. You will get more details on the specific medicines your doctor prescribes.   Diet    · Your doctor may suggest that you limit sodium. Your doctor can tell you how much sodium is right for you. An example is less than 3,000 mg a day. This includes all the salt you eat in cooking or in packaged foods. People get most of their sodium from processed foods. Fast food and restaurant meals also tend to be very high in sodium.     · Ask your doctor how much liquid you can drink each day. You may have to limit liquids. Weight    · Weigh yourself without clothing at the same time each day. Record your weight. Call your doctor if you have a sudden weight gain, such as more than 2 to 3 pounds in a day or 5 pounds in a week. (Your doctor may suggest a different range of weight gain.) A sudden weight gain may mean that your heart failure is getting worse. Activity level    · Start light exercise (if your doctor says it is okay). Even if you can only do a small amount, exercise will help you get stronger, have more energy, and manage your weight and your stress. Walking is an easy way to get exercise. Start out by walking a little more than you did before. Bit by bit, increase the amount you walk.     · When you exercise, watch for signs that your heart is working too hard. You are pushing yourself too hard if you cannot talk while you are exercising. If you become short of breath or dizzy or have chest pain, stop, sit down, and rest.     · If you feel \"wiped out\" the day after you exercise, walk slower or for a shorter distance until you can work up to a better pace.     · Get enough rest at night. Sleeping with 1 or 2 pillows under your upper body and head may help you breathe easier. Lifestyle changes    · Do not smoke. Smoking can make a heart condition worse. If you need help quitting, talk to your doctor about stop-smoking programs and medicines. These can increase your chances of quitting for good.  Quitting smoking may be the most important step you can take to protect your heart.     · Limit alcohol to 2 drinks a day for men and 1 drink a day for women. Too much alcohol can cause health problems.     · Avoid getting sick from colds and the flu. Get a pneumococcal vaccine shot. If you have had one before, ask your doctor whether you need another dose. Get a flu shot each year. If you must be around people with colds or the flu, wash your hands often. When should you call for help? Call 911  if you have symptoms of sudden heart failure such as:    · You have severe trouble breathing.     · You cough up pink, foamy mucus.     · You have a new irregular or rapid heartbeat. Call your doctor now or seek immediate medical care if:    · You have new or increased shortness of breath.     · You are dizzy or lightheaded, or you feel like you may faint.     · You have sudden weight gain, such as more than 2 to 3 pounds in a day or 5 pounds in a week. (Your doctor may suggest a different range of weight gain.)     · You have increased swelling in your legs, ankles, or feet.     · You are suddenly so tired or weak that you cannot do your usual activities. Watch closely for changes in your health, and be sure to contact your doctor if you develop new symptoms. Where can you learn more? Go to http://www.gray.com/  Enter R190 in the search box to learn more about \"Heart Failure: Care Instructions. \"  Current as of: April 29, 2021               Content Version: 13.0  © 3230-4910 [employer]. Care instructions adapted under license by [employer] (which disclaims liability or warranty for this information). If you have questions about a medical condition or this instruction, always ask your healthcare professional. [place] [address] any warranty or liability for your use of this information.

## 2022-03-18 PROBLEM — I50.9 HEART FAILURE (HCC): Status: ACTIVE | Noted: 2021-11-19

## 2022-03-18 PROBLEM — I48.92 ATRIAL FLUTTER WITH RAPID VENTRICULAR RESPONSE (HCC): Status: ACTIVE | Noted: 2021-03-09

## 2022-03-19 PROBLEM — J44.9 COPD (CHRONIC OBSTRUCTIVE PULMONARY DISEASE) (HCC): Status: ACTIVE | Noted: 2020-11-02

## 2022-03-19 PROBLEM — I50.9 CHF EXACERBATION (HCC): Status: ACTIVE | Noted: 2021-06-21

## 2022-03-19 PROBLEM — I48.92 ATRIAL FLUTTER (HCC): Status: ACTIVE | Noted: 2021-02-22

## 2022-03-19 PROBLEM — J44.1 COPD EXACERBATION (HCC): Status: ACTIVE | Noted: 2020-11-02

## 2022-03-20 PROBLEM — N39.0 UTI (URINARY TRACT INFECTION): Status: ACTIVE | Noted: 2021-06-30

## 2022-03-24 ENCOUNTER — HOSPITAL ENCOUNTER (INPATIENT)
Age: 72
LOS: 4 days | Discharge: HOME HEALTH | DRG: 280 | End: 2022-03-28
Attending: EMERGENCY MEDICINE | Admitting: FAMILY MEDICINE
Payer: MEDICARE

## 2022-03-24 ENCOUNTER — APPOINTMENT (OUTPATIENT)
Dept: GENERAL RADIOLOGY | Age: 72
DRG: 280 | End: 2022-03-24
Attending: EMERGENCY MEDICINE
Payer: MEDICARE

## 2022-03-24 DIAGNOSIS — I50.9 CONGESTIVE HEART FAILURE, UNSPECIFIED HF CHRONICITY, UNSPECIFIED HEART FAILURE TYPE (HCC): Primary | ICD-10-CM

## 2022-03-24 LAB
ALBUMIN SERPL-MCNC: 2.5 G/DL (ref 3.5–5)
ALBUMIN/GLOB SERPL: 0.7 {RATIO} (ref 1.1–2.2)
ALP SERPL-CCNC: 246 U/L (ref 45–117)
ALT SERPL-CCNC: 30 U/L (ref 12–78)
ANION GAP SERPL CALC-SCNC: 5 MMOL/L (ref 5–15)
AST SERPL W P-5'-P-CCNC: 48 U/L (ref 15–37)
ATRIAL RATE: 87 BPM
BASOPHILS # BLD: 0 K/UL (ref 0–0.1)
BASOPHILS NFR BLD: 0 % (ref 0–1)
BILIRUB SERPL-MCNC: 0.8 MG/DL (ref 0.2–1)
BNP SERPL-MCNC: 5027 PG/ML
BUN SERPL-MCNC: 25 MG/DL (ref 6–20)
BUN/CREAT SERPL: 19 (ref 12–20)
CA-I BLD-MCNC: 8.1 MG/DL (ref 8.5–10.1)
CALCULATED P AXIS, ECG09: 63 DEGREES
CALCULATED R AXIS, ECG10: 105 DEGREES
CALCULATED T AXIS, ECG11: -46 DEGREES
CHLORIDE SERPL-SCNC: 111 MMOL/L (ref 97–108)
CO2 SERPL-SCNC: 27 MMOL/L (ref 21–32)
CREAT SERPL-MCNC: 1.3 MG/DL (ref 0.55–1.02)
DIAGNOSIS, 93000: NORMAL
DIFFERENTIAL METHOD BLD: ABNORMAL
EOSINOPHIL # BLD: 0.1 K/UL (ref 0–0.4)
EOSINOPHIL NFR BLD: 1 % (ref 0–7)
ERYTHROCYTE [DISTWIDTH] IN BLOOD BY AUTOMATED COUNT: 16.1 % (ref 11.5–14.5)
GLOBULIN SER CALC-MCNC: 3.5 G/DL (ref 2–4)
GLUCOSE SERPL-MCNC: 122 MG/DL (ref 65–100)
HCT VFR BLD AUTO: 41.9 % (ref 35–47)
HGB BLD-MCNC: 12.8 G/DL (ref 11.5–16)
IMM GRANULOCYTES # BLD AUTO: 0 K/UL (ref 0–0.04)
IMM GRANULOCYTES NFR BLD AUTO: 0 % (ref 0–0.5)
LYMPHOCYTES # BLD: 1 K/UL (ref 0.8–3.5)
LYMPHOCYTES NFR BLD: 12 % (ref 12–49)
MCH RBC QN AUTO: 27.5 PG (ref 26–34)
MCHC RBC AUTO-ENTMCNC: 30.5 G/DL (ref 30–36.5)
MCV RBC AUTO: 90.1 FL (ref 80–99)
MONOCYTES # BLD: 0.7 K/UL (ref 0–1)
MONOCYTES NFR BLD: 9 % (ref 5–13)
NEUTS SEG # BLD: 6.3 K/UL (ref 1.8–8)
NEUTS SEG NFR BLD: 78 % (ref 32–75)
NRBC # BLD: 0 K/UL (ref 0–0.01)
NRBC BLD-RTO: 0 PER 100 WBC
P-R INTERVAL, ECG05: 138 MS
PLATELET # BLD AUTO: 197 K/UL (ref 150–400)
PMV BLD AUTO: 13.1 FL (ref 8.9–12.9)
POTASSIUM SERPL-SCNC: 5.3 MMOL/L (ref 3.5–5.1)
PROT SERPL-MCNC: 6 G/DL (ref 6.4–8.2)
Q-T INTERVAL, ECG07: 394 MS
QRS DURATION, ECG06: 110 MS
QTC CALCULATION (BEZET), ECG08: 474 MS
RBC # BLD AUTO: 4.65 M/UL (ref 3.8–5.2)
SODIUM SERPL-SCNC: 143 MMOL/L (ref 136–145)
TROPONIN-HIGH SENSITIVITY: 103 NG/L (ref 0–51)
TROPONIN-HIGH SENSITIVITY: 104 NG/L (ref 0–51)
VENTRICULAR RATE, ECG03: 87 BPM
WBC # BLD AUTO: 8.1 K/UL (ref 3.6–11)

## 2022-03-24 PROCEDURE — 85025 COMPLETE CBC W/AUTO DIFF WBC: CPT

## 2022-03-24 PROCEDURE — 93005 ELECTROCARDIOGRAM TRACING: CPT

## 2022-03-24 PROCEDURE — 99285 EMERGENCY DEPT VISIT HI MDM: CPT

## 2022-03-24 PROCEDURE — 96374 THER/PROPH/DIAG INJ IV PUSH: CPT

## 2022-03-24 PROCEDURE — 71045 X-RAY EXAM CHEST 1 VIEW: CPT

## 2022-03-24 PROCEDURE — 74011250636 HC RX REV CODE- 250/636: Performed by: EMERGENCY MEDICINE

## 2022-03-24 PROCEDURE — 65270000029 HC RM PRIVATE

## 2022-03-24 PROCEDURE — 84484 ASSAY OF TROPONIN QUANT: CPT

## 2022-03-24 PROCEDURE — 36415 COLL VENOUS BLD VENIPUNCTURE: CPT

## 2022-03-24 PROCEDURE — 83880 ASSAY OF NATRIURETIC PEPTIDE: CPT

## 2022-03-24 PROCEDURE — 80053 COMPREHEN METABOLIC PANEL: CPT

## 2022-03-24 RX ORDER — FUROSEMIDE 10 MG/ML
40 INJECTION INTRAMUSCULAR; INTRAVENOUS
Status: COMPLETED | OUTPATIENT
Start: 2022-03-24 | End: 2022-03-24

## 2022-03-24 RX ADMIN — FUROSEMIDE 40 MG: 10 INJECTION, SOLUTION INTRAMUSCULAR; INTRAVENOUS at 23:54

## 2022-03-24 NOTE — ED PROVIDER NOTES
EMERGENCY DEPARTMENT HISTORY AND PHYSICAL EXAM      Date: 3/24/2022  Patient Name: Sukumar Power    History of Presenting Illness     Chief Complaint   Patient presents with    Shortness of Breath       History Provided By: Patient    HPI: Sukumar Power, 80 y.o. female with a past medical history significant obstructive sleep apnea and MI presents to the ED with cc of increasing shortness of breath today's. She says it got worse this afternoon. Is made worse with activity relieved at rest has not treated with anything. She denies any fever, chills, nausea, vomiting, chest pain, rash, diarrhea, headache, night sweats. Tums are mild to moderate but progressively getting worse. There are no other complaints, changes, or physical findings at this time. PCP: Alexis Senior MD    No current facility-administered medications on file prior to encounter. Current Outpatient Medications on File Prior to Encounter   Medication Sig Dispense Refill    furosemide (LASIX) 40 mg tablet Take 40 mg by mouth daily.  bumetanide (BUMEX) 1 mg tablet Take 1 Tablet by mouth daily.  acetaminophen-codeine (TYLENOL #4) 300-60 mg tab Take 1 Tablet by mouth every six (6) hours as needed for Severe Pain.  HYDROcodone-chlorpheniramine (TUSSIONEX) 10-8 mg/5 mL suspension Take 5 mL by mouth every six (6) hours as needed for Cough.  theophylline ER,12 hour, (THEOCHRON) 300 mg tablet Take 300 mg by mouth daily.  atorvastatin (LIPITOR) 40 mg tablet Take 40 mg by mouth nightly.  metoprolol succinate (TOPROL-XL) 25 mg XL tablet Take 25 mg by mouth daily.  Entresto 24-26 mg tablet Take 1 Tablet by mouth two (2) times a day.  metOLazone (ZAROXOLYN) 5 mg tablet Take 5 mg by mouth daily.  torsemide (DEMADEX) 20 mg tablet Take 20 mg by mouth two (2) times a day.  potassium chloride SA (MICRO-K) 10 mEq capsule Take 20 mEq by mouth daily.       nitroglycerin (NITROSTAT) 0.4 mg SL tablet Sit/Lay down then put one tab under the tongue every 5 minutes as needed for chest pain/neck or jaw pain for 3 doses. Seek immediate medical attention should you need more than one tab for pain to resolve. 1 Bottle 3    apixaban (ELIQUIS) 5 mg tablet Take 1 Tab by mouth two (2) times a day. 30 Tab 0    oxybutynin (DITROPAN) 5 mg tablet Take 1 Tab by mouth three (3) times daily. 90 Tab 0    insulin glargine (LANTUS) 100 unit/mL injection 10 Units by SubCUTAneous route nightly. May give in pen  Indications: type 2 diabetes mellitus 1 Vial 0    albuterol (Ventolin HFA) 90 mcg/actuation inhaler Take 2 Puffs by inhalation two (2) times daily as needed for Wheezing. Past History     Past Medical History:  Past Medical History:   Diagnosis Date    Chronic obstructive pulmonary disease (HCC)     Congestive heart disease ([address]     Coronary artery disease     Diabetes ([address]     Hyperlipidemia     Hypertension     [facility]     IFTIKHAR (obstructive sleep apnea)        Past Surgical History:  Past Surgical History:   Procedure Laterality Date    HX BREAST BIOPSY      HX CHOLECYSTECTOMY      HX CORONARY ARTERY BYPASS GRAFT      HX HERNIA REPAIR      HX HYSTERECTOMY         Family History:  Family History   Problem Relation Age of Onset    Heart Disease Mother     Kidney Disease Mother     Cancer Maternal Grandmother        Social History:  Social History     Tobacco Use    Smoking status: Former Smoker    Smokeless tobacco: Never Used   Substance Use Topics    Alcohol use: Not Currently    Drug use: Never       Allergies: Allergies   Allergen Reactions    Penicillins Other (comments)    Tylox [Oxycodone-Acetaminophen] Hives         Review of Systems     Review of Systems   Constitutional: Negative. Negative for appetite change, chills, fatigue and fever. HENT: Negative. Negative for congestion and sinus pain. Eyes: Negative. Negative for pain and visual disturbance. Respiratory: Positive for shortness of breath. Negative for chest tightness. Cardiovascular: Negative. Negative for chest pain. Gastrointestinal: Negative. Negative for abdominal pain, diarrhea, nausea and vomiting. Genitourinary: Negative. Negative for difficulty urinating. No discharge   Musculoskeletal: Negative. Negative for arthralgias. Skin: Negative. Negative for rash. Neurological: Negative. Negative for weakness and headaches. Hematological: Negative. Psychiatric/Behavioral: Negative. Negative for agitation. The patient is not nervous/anxious. All other systems reviewed and are negative. Physical Exam     Physical Exam  Vitals and nursing note reviewed. Constitutional:       General: She is not in acute distress. Appearance: She is well-developed. HENT:      Head: Normocephalic and atraumatic. Nose: Nose normal.      Mouth/Throat:      Mouth: Mucous membranes are moist.      Pharynx: Oropharynx is clear. No oropharyngeal exudate. Eyes:      General:         Right eye: No discharge. Left eye: No discharge. Conjunctiva/sclera: Conjunctivae normal.      Pupils: Pupils are equal, round, and reactive to light. Cardiovascular:      Rate and Rhythm: Normal rate and regular rhythm. Chest Wall: PMI is not displaced. No thrill. Heart sounds: Normal heart sounds. No murmur heard. No friction rub. No gallop. Pulmonary:      Effort: Pulmonary effort is normal. No respiratory distress. Breath sounds: Normal breath sounds. No wheezing or rales. Chest:      Chest wall: No tenderness. Abdominal:      General: Bowel sounds are normal. There is no distension. Palpations: Abdomen is soft. There is no mass. Tenderness: There is no abdominal tenderness. There is no guarding or rebound. Musculoskeletal:         General: Normal range of motion. Cervical back: Normal range of motion and neck supple.    Lymphadenopathy: Cervical: No cervical adenopathy. Skin:     General: Skin is warm and dry. Capillary Refill: Capillary refill takes less than 2 seconds. Findings: No erythema or rash. Neurological:      Mental Status: She is alert and oriented to person, place, and time. Cranial Nerves: No cranial nerve deficit. Coordination: Coordination normal.   Psychiatric:         Mood and Affect: Mood normal.         Behavior: Behavior normal.         Lab and Diagnostic Study Results     Labs -     Recent Results (from the past 12 hour(s))   GLUCOSE, POC    Collection Time: 03/26/22  9:01 PM   Result Value Ref Range    Glucose (POC) 162 (H) 65 - 117 mg/dL    Performed by Flako Molina        Radiologic Studies -   @lastxrresult@  CT Results  (Last 48 hours)    None        CXR Results  (Last 48 hours)    None            Medical Decision Making   - I am the first provider for this patient. - I reviewed the vital signs, available nursing notes, past medical history, past surgical history, family history and social history. - Initial assessment performed. The patients presenting problems have been discussed, and they are in agreement with the care plan formulated and outlined with them. I have encouraged them to ask questions as they arise throughout their visit. Vital Signs-Reviewed the patient's vital signs. Patient Vitals for the past 12 hrs:   Temp Pulse Resp BP SpO2   03/27/22 0727 97.9 °F (36.6 °C) 63 20 121/62 98 %   03/27/22 0400 -- 66 -- -- --   03/27/22 0352 98.6 °F (37 °C) 69 18 124/66 96 %   03/27/22 0000 -- 66 -- -- --   03/26/22 2320 99 °F (37.2 °C) 66 20 122/66 93 %   03/26/22 2057 98.2 °F (36.8 °C) 66 25 137/64 94 %   03/26/22 2000 -- 65 -- -- --     Will assess with basic cardiac labs EKG and chest x-ray    ED Course:          Provider Notes (Medical Decision Making):   67-year-old female admitted with increasing shortness of breath that she failed to improve after multiple breathing treatments. Does appear that she had volume overload with subsequent diuresed. Will be admitting her to the hospital given her increased oxygen requirement. MDM       Procedures   Medical Decision Makingedical Decision Making  Performed by: Lashawn Damian MD  PROCEDURES:  Procedures       Disposition   Disposition: Condition stable    Admitted    Diagnosis     Clinical Impression:   1. Congestive heart failure, unspecified HF chronicity, unspecified heart failure type [facility])        Attestations:    Lashawn Damian MD    Please note that this dictation was completed with [employer], the computer voice recognition software. Quite often unanticipated grammatical, syntax, homophones, and other interpretive errors are inadvertently transcribed by the computer software. Please disregard these errors. Please excuse any errors that have escaped final proofreading. Thank you.

## 2022-03-24 NOTE — Clinical Note
Status[de-identified] INPATIENT [101]   Type of Bed: Telemetry [19]   Cardiac Monitoring Required?: Yes   Inpatient Hospitalization Certified Necessary for the Following Reasons: 9.  Other (further clarification in H&P documentation)   Admitting Diagnosis: CHF (congestive heart failure) [facility]) [104591]   Admitting Physician: Chip Mcgowan [[id number]]   Attending Physician: Chip Mcgowan [[id number]]   Estimated Length of Stay: 2 Midnights   Discharge Plan[de-identified] Home with Office Follow-up

## 2022-03-25 ENCOUNTER — APPOINTMENT (OUTPATIENT)
Dept: NON INVASIVE DIAGNOSTICS | Age: 72
DRG: 280 | End: 2022-03-25
Attending: FAMILY MEDICINE
Payer: MEDICARE

## 2022-03-25 PROBLEM — I50.9 CHF (CONGESTIVE HEART FAILURE) (HCC): Status: ACTIVE | Noted: 2022-03-24

## 2022-03-25 LAB
ATRIAL RATE: 68 BPM
CALCULATED P AXIS, ECG09: 74 DEGREES
CALCULATED R AXIS, ECG10: 69 DEGREES
CALCULATED T AXIS, ECG11: 119 DEGREES
DIAGNOSIS, 93000: NORMAL
ECHO AO ASC DIAM: 1.9 CM
ECHO AO ASCENDING AORTA INDEX: 0.92 CM/M2
ECHO AO ROOT DIAM: 2.4 CM
ECHO AO ROOT INDEX: 1.16 CM/M2
ECHO AV AREA PEAK VELOCITY: 1.7 CM2
ECHO AV AREA VTI: 1.7 CM2
ECHO AV AREA/BSA PEAK VELOCITY: 0.8 CM2/M2
ECHO AV AREA/BSA VTI: 0.8 CM2/M2
ECHO AV MEAN GRADIENT: 5 MMHG
ECHO AV MEAN VELOCITY: 1.1 M/S
ECHO AV PEAK GRADIENT: 10 MMHG
ECHO AV PEAK VELOCITY: 1.6 M/S
ECHO AV VELOCITY RATIO: 0.56
ECHO AV VTI: 30.2 CM
ECHO EST RA PRESSURE: 8 MMHG
ECHO LA AREA 4C: 24.5 CM2
ECHO LA DIAMETER INDEX: 2.03 CM/M2
ECHO LA DIAMETER: 4.2 CM
ECHO LA MAJOR AXIS: 5.9 CM
ECHO LA TO AORTIC ROOT RATIO: 1.75
ECHO LV E' LATERAL VELOCITY: 8 CM/S
ECHO LV E' SEPTAL VELOCITY: 8 CM/S
ECHO LV EDV A2C: 89 ML
ECHO LV EDV A4C: 126 ML
ECHO LV EDV INDEX A4C: 61 ML/M2
ECHO LV EDV NDEX A2C: 43 ML/M2
ECHO LV EJECTION FRACTION A2C: 69 %
ECHO LV EJECTION FRACTION A4C: 50 %
ECHO LV EJECTION FRACTION BIPLANE: 60 % (ref 55–100)
ECHO LV ESV A2C: 28 ML
ECHO LV ESV A4C: 63 ML
ECHO LV ESV INDEX A2C: 14 ML/M2
ECHO LV ESV INDEX A4C: 30 ML/M2
ECHO LV FRACTIONAL SHORTENING: 32 % (ref 28–44)
ECHO LV INTERNAL DIMENSION DIASTOLE INDEX: 1.98 CM/M2
ECHO LV INTERNAL DIMENSION DIASTOLIC: 4.1 CM (ref 3.9–5.3)
ECHO LV INTERNAL DIMENSION SYSTOLIC INDEX: 1.35 CM/M2
ECHO LV INTERNAL DIMENSION SYSTOLIC: 2.8 CM
ECHO LV IVSD: 1.2 CM (ref 0.6–0.9)
ECHO LV MASS 2D: 182.5 G (ref 67–162)
ECHO LV MASS INDEX 2D: 88.1 G/M2 (ref 43–95)
ECHO LV POSTERIOR WALL DIASTOLIC: 1.3 CM (ref 0.6–0.9)
ECHO LV RELATIVE WALL THICKNESS RATIO: 0.63
ECHO LVOT AREA: 2.8 CM2
ECHO LVOT AV VTI INDEX: 0.59
ECHO LVOT DIAM: 1.9 CM
ECHO LVOT MEAN GRADIENT: 2 MMHG
ECHO LVOT PEAK GRADIENT: 3 MMHG
ECHO LVOT PEAK VELOCITY: 0.9 M/S
ECHO LVOT STROKE VOLUME INDEX: 24.5 ML/M2
ECHO LVOT SV: 50.7 ML
ECHO LVOT VTI: 17.9 CM
ECHO MV A VELOCITY: 0.72 M/S
ECHO MV E VELOCITY: 1.31 M/S
ECHO MV E/A RATIO: 1.82
ECHO MV E/E' LATERAL: 16.38
ECHO MV E/E' RATIO (AVERAGED): 16.38
ECHO MV E/E' SEPTAL: 16.38
ECHO MV REGURGITANT PEAK GRADIENT: 29 MMHG
ECHO MV REGURGITANT PEAK VELOCITY: 2.7 M/S
ECHO MV REGURGITANT VTIA: 88.1 CM
ECHO PV MAX VELOCITY: 0.8 M/S
ECHO PV MEAN GRADIENT: 1 MMHG
ECHO PV MEAN VELOCITY: 0.6 M/S
ECHO PV PEAK GRADIENT: 3 MMHG
ECHO PV VTI: 16.9 CM
ECHO PVEIN PEAK D VELOCITY: 0.4 M/S
ECHO PVEIN PEAK S VELOCITY: 0.4 M/S
ECHO PVEIN S/D RATIO: 1 NO UNITS
ECHO RIGHT VENTRICULAR SYSTOLIC PRESSURE (RVSP): 40 MMHG
ECHO RV BASAL DIMENSION: 4.3 CM
ECHO RV FREE WALL PEAK S': 7 CM/S
ECHO RV MID DIMENSION: 3 CM
ECHO RV TAPSE: 1.9 CM (ref 1.5–2)
ECHO TV REGURGITANT MAX VELOCITY: 2.81 M/S
ECHO TV REGURGITANT PEAK GRADIENT: 32 MMHG
GLUCOSE BLD STRIP.AUTO-MCNC: 122 MG/DL (ref 65–117)
GLUCOSE BLD STRIP.AUTO-MCNC: 156 MG/DL (ref 65–117)
P-R INTERVAL, ECG05: 176 MS
PERFORMED BY, TECHID: ABNORMAL
PERFORMED BY, TECHID: ABNORMAL
Q-T INTERVAL, ECG07: 474 MS
QRS DURATION, ECG06: 106 MS
QTC CALCULATION (BEZET), ECG08: 504 MS
TROPONIN-HIGH SENSITIVITY: 102 NG/L (ref 0–51)
TROPONIN-HIGH SENSITIVITY: 92 NG/L (ref 0–51)
VENTRICULAR RATE, ECG03: 68 BPM

## 2022-03-25 PROCEDURE — 77010033678 HC OXYGEN DAILY

## 2022-03-25 PROCEDURE — 74011250637 HC RX REV CODE- 250/637: Performed by: INTERNAL MEDICINE

## 2022-03-25 PROCEDURE — 93306 TTE W/DOPPLER COMPLETE: CPT

## 2022-03-25 PROCEDURE — 93005 ELECTROCARDIOGRAM TRACING: CPT

## 2022-03-25 PROCEDURE — 84484 ASSAY OF TROPONIN QUANT: CPT

## 2022-03-25 PROCEDURE — 94640 AIRWAY INHALATION TREATMENT: CPT

## 2022-03-25 PROCEDURE — 94761 N-INVAS EAR/PLS OXIMETRY MLT: CPT

## 2022-03-25 PROCEDURE — 74011250637 HC RX REV CODE- 250/637: Performed by: FAMILY MEDICINE

## 2022-03-25 PROCEDURE — 82962 GLUCOSE BLOOD TEST: CPT

## 2022-03-25 PROCEDURE — 36415 COLL VENOUS BLD VENIPUNCTURE: CPT

## 2022-03-25 PROCEDURE — 65270000029 HC RM PRIVATE

## 2022-03-25 PROCEDURE — 74011250636 HC RX REV CODE- 250/636: Performed by: FAMILY MEDICINE

## 2022-03-25 PROCEDURE — 74011636637 HC RX REV CODE- 636/637: Performed by: FAMILY MEDICINE

## 2022-03-25 RX ORDER — BUDESONIDE AND FORMOTEROL FUMARATE DIHYDRATE 160; 4.5 UG/1; UG/1
2 AEROSOL RESPIRATORY (INHALATION)
Status: DISCONTINUED | OUTPATIENT
Start: 2022-03-25 | End: 2022-03-28 | Stop reason: HOSPADM

## 2022-03-25 RX ORDER — FUROSEMIDE 10 MG/ML
40 INJECTION INTRAMUSCULAR; INTRAVENOUS 2 TIMES DAILY
Status: DISCONTINUED | OUTPATIENT
Start: 2022-03-25 | End: 2022-03-28 | Stop reason: HOSPADM

## 2022-03-25 RX ORDER — ONDANSETRON 2 MG/ML
4 INJECTION INTRAMUSCULAR; INTRAVENOUS
Status: DISCONTINUED | OUTPATIENT
Start: 2022-03-25 | End: 2022-03-28 | Stop reason: HOSPADM

## 2022-03-25 RX ORDER — INSULIN GLARGINE 100 [IU]/ML
10 INJECTION, SOLUTION SUBCUTANEOUS
Status: DISCONTINUED | OUTPATIENT
Start: 2022-03-25 | End: 2022-03-28 | Stop reason: HOSPADM

## 2022-03-25 RX ORDER — ONDANSETRON 4 MG/1
4 TABLET, ORALLY DISINTEGRATING ORAL
Status: DISCONTINUED | OUTPATIENT
Start: 2022-03-25 | End: 2022-03-28 | Stop reason: HOSPADM

## 2022-03-25 RX ORDER — METOLAZONE 5 MG/1
5 TABLET ORAL DAILY
Status: DISCONTINUED | OUTPATIENT
Start: 2022-03-25 | End: 2022-03-28 | Stop reason: HOSPADM

## 2022-03-25 RX ORDER — OXYBUTYNIN CHLORIDE 5 MG/1
5 TABLET ORAL 3 TIMES DAILY
Status: DISCONTINUED | OUTPATIENT
Start: 2022-03-25 | End: 2022-03-28 | Stop reason: HOSPADM

## 2022-03-25 RX ORDER — THEOPHYLLINE 300 MG/1
300 TABLET, EXTENDED RELEASE ORAL DAILY
Status: DISCONTINUED | OUTPATIENT
Start: 2022-03-25 | End: 2022-03-28 | Stop reason: HOSPADM

## 2022-03-25 RX ORDER — ACETAMINOPHEN 325 MG/1
650 TABLET ORAL
Status: DISCONTINUED | OUTPATIENT
Start: 2022-03-25 | End: 2022-03-28 | Stop reason: HOSPADM

## 2022-03-25 RX ORDER — POTASSIUM CHLORIDE 750 MG/1
10 TABLET, FILM COATED, EXTENDED RELEASE ORAL DAILY
Status: DISCONTINUED | OUTPATIENT
Start: 2022-03-25 | End: 2022-03-25

## 2022-03-25 RX ORDER — POLYETHYLENE GLYCOL 3350 17 G/17G
17 POWDER, FOR SOLUTION ORAL DAILY PRN
Status: DISCONTINUED | OUTPATIENT
Start: 2022-03-25 | End: 2022-03-28 | Stop reason: HOSPADM

## 2022-03-25 RX ORDER — ATORVASTATIN CALCIUM 40 MG/1
40 TABLET, FILM COATED ORAL
Status: DISCONTINUED | OUTPATIENT
Start: 2022-03-25 | End: 2022-03-28 | Stop reason: HOSPADM

## 2022-03-25 RX ORDER — QUETIAPINE FUMARATE 25 MG/1
25 TABLET, FILM COATED ORAL
Status: DISCONTINUED | OUTPATIENT
Start: 2022-03-25 | End: 2022-03-28 | Stop reason: HOSPADM

## 2022-03-25 RX ORDER — FAMOTIDINE 10 MG/1
10 TABLET ORAL 2 TIMES DAILY
Status: DISCONTINUED | OUTPATIENT
Start: 2022-03-25 | End: 2022-03-28 | Stop reason: HOSPADM

## 2022-03-25 RX ORDER — IPRATROPIUM BROMIDE AND ALBUTEROL SULFATE 2.5; .5 MG/3ML; MG/3ML
3 SOLUTION RESPIRATORY (INHALATION)
Status: DISCONTINUED | OUTPATIENT
Start: 2022-03-25 | End: 2022-03-28 | Stop reason: HOSPADM

## 2022-03-25 RX ORDER — AMIODARONE HYDROCHLORIDE 200 MG/1
400 TABLET ORAL DAILY
Status: DISCONTINUED | OUTPATIENT
Start: 2022-03-25 | End: 2022-03-25

## 2022-03-25 RX ORDER — AMIODARONE HYDROCHLORIDE 200 MG/1
200 TABLET ORAL DAILY
Status: DISCONTINUED | OUTPATIENT
Start: 2022-03-26 | End: 2022-03-28 | Stop reason: HOSPADM

## 2022-03-25 RX ORDER — ALBUTEROL SULFATE 90 UG/1
2 AEROSOL, METERED RESPIRATORY (INHALATION)
Status: DISCONTINUED | OUTPATIENT
Start: 2022-03-25 | End: 2022-03-28 | Stop reason: HOSPADM

## 2022-03-25 RX ORDER — NITROGLYCERIN 0.4 MG/1
0.4 TABLET SUBLINGUAL AS NEEDED
Status: DISCONTINUED | OUTPATIENT
Start: 2022-03-25 | End: 2022-03-28 | Stop reason: HOSPADM

## 2022-03-25 RX ORDER — FUROSEMIDE 40 MG/1
40 TABLET ORAL DAILY
COMMUNITY
Start: 2022-02-10 | End: 2022-03-28

## 2022-03-25 RX ORDER — BUMETANIDE 1 MG/1
1 TABLET ORAL DAILY
COMMUNITY
Start: 2022-03-03 | End: 2022-03-28

## 2022-03-25 RX ORDER — METOPROLOL SUCCINATE 25 MG/1
25 TABLET, EXTENDED RELEASE ORAL DAILY
Status: DISCONTINUED | OUTPATIENT
Start: 2022-03-25 | End: 2022-03-28 | Stop reason: HOSPADM

## 2022-03-25 RX ADMIN — QUETIAPINE FUMARATE 25 MG: 25 TABLET ORAL at 21:19

## 2022-03-25 RX ADMIN — ONDANSETRON 4 MG: 2 INJECTION INTRAMUSCULAR; INTRAVENOUS at 23:43

## 2022-03-25 RX ADMIN — OXYBUTYNIN CHLORIDE 5 MG: 5 TABLET ORAL at 15:42

## 2022-03-25 RX ADMIN — OXYBUTYNIN CHLORIDE 5 MG: 5 TABLET ORAL at 08:33

## 2022-03-25 RX ADMIN — FAMOTIDINE 10 MG: 20 TABLET, FILM COATED ORAL at 21:19

## 2022-03-25 RX ADMIN — SACUBITRIL AND VALSARTAN 1 TABLET: 24; 26 TABLET, FILM COATED ORAL at 21:19

## 2022-03-25 RX ADMIN — POTASSIUM CHLORIDE 10 MEQ: 750 TABLET, FILM COATED, EXTENDED RELEASE ORAL at 08:32

## 2022-03-25 RX ADMIN — METOLAZONE 5 MG: 5 TABLET ORAL at 10:07

## 2022-03-25 RX ADMIN — OXYBUTYNIN CHLORIDE 5 MG: 5 TABLET ORAL at 21:19

## 2022-03-25 RX ADMIN — ATORVASTATIN CALCIUM 40 MG: 40 TABLET, FILM COATED ORAL at 01:21

## 2022-03-25 RX ADMIN — SACUBITRIL AND VALSARTAN 1 TABLET: 24; 26 TABLET, FILM COATED ORAL at 08:34

## 2022-03-25 RX ADMIN — ATORVASTATIN CALCIUM 40 MG: 40 TABLET, FILM COATED ORAL at 21:19

## 2022-03-25 RX ADMIN — AMIODARONE HYDROCHLORIDE 400 MG: 200 TABLET ORAL at 08:32

## 2022-03-25 RX ADMIN — THEOPHYLLINE 300 MG: 300 TABLET, EXTENDED RELEASE ORAL at 08:34

## 2022-03-25 RX ADMIN — METOPROLOL SUCCINATE 25 MG: 25 TABLET, EXTENDED RELEASE ORAL at 08:33

## 2022-03-25 RX ADMIN — FUROSEMIDE 40 MG: 10 INJECTION, SOLUTION INTRAMUSCULAR; INTRAVENOUS at 08:32

## 2022-03-25 RX ADMIN — INSULIN GLARGINE 10 UNITS: 100 INJECTION, SOLUTION SUBCUTANEOUS at 01:21

## 2022-03-25 RX ADMIN — APIXABAN 5 MG: 5 TABLET, FILM COATED ORAL at 08:32

## 2022-03-25 RX ADMIN — BUDESONIDE AND FORMOTEROL FUMARATE DIHYDRATE 2 PUFF: 160; 4.5 AEROSOL RESPIRATORY (INHALATION) at 08:36

## 2022-03-25 RX ADMIN — APIXABAN 5 MG: 5 TABLET, FILM COATED ORAL at 21:20

## 2022-03-25 RX ADMIN — QUETIAPINE FUMARATE 25 MG: 25 TABLET ORAL at 01:21

## 2022-03-25 RX ADMIN — BUDESONIDE AND FORMOTEROL FUMARATE DIHYDRATE 2 PUFF: 160; 4.5 AEROSOL RESPIRATORY (INHALATION) at 19:11

## 2022-03-25 RX ADMIN — INSULIN GLARGINE 10 UNITS: 100 INJECTION, SOLUTION SUBCUTANEOUS at 21:20

## 2022-03-25 RX ADMIN — ACETAMINOPHEN 650 MG: 325 TABLET ORAL at 23:37

## 2022-03-25 RX ADMIN — FUROSEMIDE 40 MG: 10 INJECTION, SOLUTION INTRAMUSCULAR; INTRAVENOUS at 21:19

## 2022-03-25 RX ADMIN — FAMOTIDINE 10 MG: 20 TABLET, FILM COATED ORAL at 08:33

## 2022-03-25 NOTE — ED NOTES
Ms. Usama Engel taken over in a sitting position in bed. Family member at bedside. Fully conscious, alert and oriented X3. No cardiopulmonary distress noted. No complaints of pain made at this time.

## 2022-03-25 NOTE — PROGRESS NOTES
3/25/22. PCP is LAURA Jiménez- seen over a year ago. D/C Plan is home with daughter Merly Marks @ [phone]) & she will transport pt home upon discharge. Pt signed Choice Letter to continue home O2 via [facility] - referral sent via [employer]. Pt uses cane. Per pt O2 tanks/concentrator not working properly& company out to repair/replace recently- but still not working. CM sondra inform company via [employer].

## 2022-03-25 NOTE — ROUTINE PROCESS
TRANSFER - OUT REPORT:    Verbal report given to Arabella NASSAR(name) on [place]  being transferred to [facility](unit) for routine progression of care       Report consisted of patients Situation, Background, Assessment and   Recommendations(SBAR). Information from the following report(s) SBAR, ED Summary, Procedure Summary, Intake/Output, MAR, Recent Results, Med Rec Status and Cardiac Rhythm Sinus was reviewed with the receiving nurse. Lines:   Peripheral IV 03/24/22 Right Antecubital (Active)   Site Assessment Clean, dry, & intact 03/24/22 1910   Phlebitis Assessment 0 03/24/22 1910   Infiltration Assessment 0 03/24/22 1910   Dressing Status Clean, dry, & intact 03/24/22 1910        Opportunity for questions and clarification was provided.       Patient transported with:   O2 @ 3.5 liters  Sandy Fong  Personal belongings

## 2022-03-25 NOTE — PROGRESS NOTES
Reason for Admission:  CHF                     RUR Score:   14%                  Plan for utilizing home health: None @ this time. Pt signed  Choice Letter to continue home O2 via [facility]. Referral sent via [employer]. PCP: First and Last name:  Guevara Kiser MD     Name of Practice:    Are you a current patient: Yes/No: Yes   Approximate date of last visit: Been over a year. Can you participate in a virtual visit with your PCP: Yes/Call                    Current Advanced Directive/Advance Care Plan: Full Code      Healthcare Decision Maker:             Primary Decision Maker: john martin - Daughter - [phone]    Primary Decision Maker: Christianna Severin - Daughter - [phone]    Primary Decision Maker: Janis Prajapati - [phone]                  Transition of Care Plan:  D/C Plan is home with daughter & home O2. Daughter will transport home upon discharge. Per pt concentrator/O2 tanks not working - CM to inform company via [employer].

## 2022-03-25 NOTE — ED NOTES
Assumed care of patient. Pt c/o of right upper arm pain. Tourniquet still in place. Tourniquet removed. Pt still has good PMS.  +2 radial pulse

## 2022-03-25 NOTE — PROGRESS NOTES
TRANSFER - IN REPORT:    Verbal report received from [facility] RN(name) on Abraham Aly  being received from ED(unit) for routine progression of care      Report consisted of patients Situation, Background, Assessment and   Recommendations(SBAR). Information from the following report(s) SBAR was reviewed with the receiving nurse. Opportunity for questions and clarification was provided. Assessment completed upon patients arrival to unit and care assumed.      Dual skin assessment performed with Birgit Carrion RN

## 2022-03-25 NOTE — H&P
HISTORY & PHYSICAL      Patient: Clary Owens MRN: [id number]  SSN: [id number]    YOB: 1950  Age: 80 y.o. Sex: female      Primary Care Provider: Geraldine High MD  Source of Information: Patient      Subjective     CC:   Chief Complaint   Patient presents with    Shortness of Breath       HPI: Clary Owens is a(n) 80 y.o. female with PMH significant for COPD, MI with CABG, IFTIKHAR, CHF, CAD, DM, HLD, HTN, cholecystectomy. Presents for increased SOB, dyspnea on exertion and lower extremity edema. Denies CP and palpitations. Patient is poor historian and unable to give date of when sx started. She also notes productive cough worse at night. She wears 4L O2 nasal cannula at home and is currently on the same in the ED. CXR shows cardiomegaly and possible pericardial effusion, unchanged from previous. Cardiology seen pt in the ER and suggests admission and monitoring labs. Notes recent echo and cath last year. Labs remarkable for troponin > 100, BNP 5027, K 5.3, creatinine 1.3. Past Medical History:   Diagnosis Date    Chronic obstructive pulmonary disease ([address]     [facility]     Coronary artery disease     Diabetes ([facility]     Hyperlipidemia     Hypertension     Myocardial infarct ([facility]     IFTIKHAR (obstructive sleep apnea)         Past Surgical History:   Procedure Laterality Date    HX BREAST BIOPSY      HX CHOLECYSTECTOMY      HX CORONARY ARTERY BYPASS GRAFT      HX HERNIA REPAIR      HX HYSTERECTOMY         Prior to Admission medications    Medication Sig Start Date End Date Taking? Authorizing Provider   acetaminophen-codeine (TYLENOL #4) 300-60 mg tab Take 1 Tablet by mouth every six (6) hours as needed for Severe Pain. 10/19/21   Provider, Historical   Narcan 4 mg/actuation nasal spray 1 [place] by IntraNASal route as needed for Overdose.  10/25/21   Provider, Historical   HYDROcodone-chlorpheniramine (TUSSIONEX) 10-8 mg/5 mL suspension Take 5 mL by mouth every six (6) hours as needed for Cough. 10/31/21   Provider, Historical   theophylline ER,12 hour, (THEOCHRON) 300 mg tablet Take 300 mg by mouth daily. 9/19/21   Provider, Historical   amiodarone (PACERONE) 400 mg tablet Take 400 mg by mouth daily. 9/25/21   Provider, Historical   atorvastatin (LIPITOR) 40 mg tablet Take 40 mg by mouth nightly. 11/13/21   Provider, Historical   metoprolol succinate (TOPROL-XL) 25 mg XL tablet Take 25 mg by mouth daily. 11/17/21   Provider, Historical   Entresto 24-26 mg tablet Take 1 Tablet by mouth two (2) times a day. 11/13/21   Provider, Historical   metOLazone (ZAROXOLYN) 5 mg tablet Take 5 mg by mouth daily. 11/17/21   Provider, Historical   torsemide (DEMADEX) 20 mg tablet Take 20 mg by mouth two (2) times a day. 11/13/21   Provider, Historical   potassium chloride SA (MICRO-K) 10 mEq capsule Take 20 mEq by mouth daily. 11/17/21   Provider, Historical   famotidine (PEPCID) 10 mg tablet Take 1 Tablet by mouth two (2) times a day. 6/29/21   Fay Alvarado MD   nitroglycerin (NITROSTAT) 0.4 mg SL tablet Sit/Lay down then put one tab under the tongue every 5 minutes as needed for chest pain/neck or jaw pain for 3 doses. Seek immediate medical attention should you need more than one tab for pain to resolve. 3/15/21   Rik Pickett MD   apixaban (ELIQUIS) 5 mg tablet Take 1 Tab by mouth two (2) times a day. 3/14/21   Agueda Morales NP   oxybutynin (DITROPAN) 5 mg tablet Take 1 Tab by mouth three (3) times daily. 3/14/21   Agueda Morales NP   insulin glargine (LANTUS) 100 unit/mL injection 10 Units by SubCUTAneous route nightly. May give in pen  Indications: type 2 diabetes mellitus 3/14/21   Agueda Morales NP   QUEtiapine (SEROqueL) 25 mg tablet Take 25 mg by mouth nightly. Provider, Historical   albuterol-ipratropium (DUO-NEB) 2.5 mg-0.5 mg/3 ml nebu 3 mL by Nebulization route every six (6) hours as needed for Wheezing.     Provider, Historical mometasone-formoterol (Dulera) 200-5 mcg/actuation HFA inhaler Take 2 Puffs by inhalation two (2) times a day. Provider, Historical   albuterol (Ventolin HFA) 90 mcg/actuation inhaler Take 2 Puffs by inhalation two (2) times daily as needed for Wheezing. Provider, Historical       Allergies   Allergen Reactions    Penicillins Other (comments)    Tylox [Oxycodone-Acetaminophen] Hives        Family History   Problem Relation Age of Onset    Heart Disease Mother     Kidney Disease Mother     Cancer Maternal Grandmother         Social History     Socioeconomic History    Marital status:    Tobacco Use    Smoking status: Former Smoker    Smokeless tobacco: Never Used   Substance and Sexual Activity    Alcohol use: Not Currently    Drug use: Never    Sexual activity: Not Currently   Social History Narrative    Lives at home with children, need help with ADL's. ambulatory        Review of Systems   Constitutional: Negative for chills and fever. Eyes: Negative for blurred vision. Respiratory: Positive for cough, sputum production and shortness of breath. Negative for wheezing. Cardiovascular: Positive for leg swelling. Negative for chest pain and palpitations. Gastrointestinal: Negative for abdominal pain, diarrhea, nausea and vomiting. Genitourinary: Negative for dysuria. Neurological: Negative for dizziness, loss of consciousness and headaches. Objective     Visit Vitals  /78 (BP 1 Location: Left upper arm, BP Patient Position: At rest)   Pulse 67   Temp 98.2 °F (36.8 °C)   Resp 17   Ht 5' 3\" (1.6 m)   Wt 235 lb (106.6 kg)   SpO2 93%   BMI 41.63 kg/m²    O2 Flow Rate (L/min): 4 l/min O2 Device: Nasal cannula    Physical Exam:   Physical Exam  Constitutional:       General: She is not in acute distress. HENT:      Head: Normocephalic and atraumatic. Cardiovascular:      Rate and Rhythm: Normal rate and regular rhythm.    Pulmonary:      Effort: Pulmonary effort is normal. No respiratory distress. Breath sounds: Normal breath sounds. No wheezing, rhonchi or rales. Abdominal:      Palpations: Abdomen is soft. Tenderness: There is no abdominal tenderness. There is no guarding or rebound. Musculoskeletal:      Right lower leg: Edema present. Left lower leg: Edema present. Skin:     General: Skin is warm and dry. Neurological:      Mental Status: She is alert and oriented to person, place, and time. Intake & Output:  Current Shift: No intake/output data recorded. Last three shifts: 03/23 1901 - 03/25 0700  In: -   Out: 900 [Urine:900]    Lab/Data Review:   All lab data reviewed      24 Hour Results:    Recent Results (from the past 24 hour(s))   EKG, 12 LEAD, INITIAL    Collection Time: 03/24/22  5:56 PM   Result Value Ref Range    Ventricular Rate 87 BPM    Atrial Rate 87 BPM    P-R Interval 138 ms    QRS Duration 110 ms    Q-T Interval 394 ms    QTC Calculation (Bezet) 474 ms    Calculated P Axis 63 degrees    Calculated R Axis 105 degrees    Calculated T Axis -46 degrees    Diagnosis       Sinus rhythm with occasional Premature ventricular complexes  Possible Left atrial enlargement  Rightward axis  Cannot rule out Anterior infarct , age undetermined  T wave abnormality, consider inferolateral ischemia  Abnormal ECG  When compared with ECG of 19-NOV-2021 17:52,  Premature ventricular complexes are now Present  T wave inversion now evident in Inferior leads  T wave inversion now evident in Lateral leads  Confirmed by James Irene MD, [facility] ([id number]) on 3/24/2022 9:55:07 PM     CBC WITH AUTOMATED DIFF    Collection Time: 03/24/22  6:31 PM   Result Value Ref Range    WBC 8.1 3.6 - 11.0 K/uL    RBC 4.65 3.80 - 5.20 M/uL    HGB 12.8 11.5 - 16.0 g/dL    HCT 41.9 35.0 - 47.0 %    MCV 90.1 80.0 - 99.0 FL    MCH 27.5 26.0 - 34.0 PG    MCHC 30.5 30.0 - 36.5 g/dL    RDW 16.1 (H) 11.5 - 14.5 %    PLATELET 797 727 - 533 K/uL    MPV 13.1 (H) 8.9 - 12.9 FL    NRBC 0.0 0. 0  WBC    ABSOLUTE NRBC 0.00 0.00 - 0.01 K/uL    NEUTROPHILS 78 (H) 32 - 75 %    LYMPHOCYTES 12 12 - 49 %    MONOCYTES 9 5 - 13 %    EOSINOPHILS 1 0 - 7 %    BASOPHILS 0 0 - 1 %    IMMATURE GRANULOCYTES 0 0 - 0.5 %    ABS. NEUTROPHILS 6.3 1.8 - 8.0 K/UL    ABS. LYMPHOCYTES 1.0 0.8 - 3.5 K/UL    ABS. MONOCYTES 0.7 0.0 - 1.0 K/UL    ABS. EOSINOPHILS 0.1 0.0 - 0.4 K/UL    ABS. BASOPHILS 0.0 0.0 - 0.1 K/UL    ABS. IMM. GRANS. 0.0 0.00 - 0.04 K/UL    DF AUTOMATED     TROPONIN-HIGH SENSITIVITY    Collection Time: 03/24/22  6:31 PM   Result Value Ref Range    Troponin-High Sensitivity 104 (HH) 0 - 51 ng/L   METABOLIC PANEL, COMPREHENSIVE    Collection Time: 03/24/22  6:31 PM   Result Value Ref Range    Sodium 143 136 - 145 mmol/L    Potassium 5.3 (H) 3.5 - 5.1 mmol/L    Chloride 111 (H) 97 - 108 mmol/L    CO2 27 21 - 32 mmol/L    Anion gap 5 5 - 15 mmol/L    Glucose 122 (H) 65 - 100 mg/dL    BUN 25 (H) 6 - 20 mg/dL    Creatinine 1.30 (H) 0.55 - 1.02 mg/dL    BUN/Creatinine ratio 19 12 - 20      GFR est AA 49 (L) >60 ml/min/1.73m2    GFR est non-AA 40 (L) >60 ml/min/1.73m2    Calcium 8.1 (L) 8.5 - 10.1 mg/dL    Bilirubin, total 0.8 0.2 - 1.0 mg/dL    AST (SGOT) 48 (H) 15 - 37 U/L    ALT (SGPT) 30 12 - 78 U/L    Alk.  phosphatase 246 (H) 45 - 117 U/L    Protein, total 6.0 (L) 6.4 - 8.2 g/dL    Albumin 2.5 (L) 3.5 - 5.0 g/dL    Globulin 3.5 2.0 - 4.0 g/dL    A-G Ratio 0.7 (L) 1.1 - 2.2     NT-PRO BNP    Collection Time: 03/24/22  6:31 PM   Result Value Ref Range    NT pro-BNP 5,027 (H) <125 pg/mL   TROPONIN-HIGH SENSITIVITY    Collection Time: 03/24/22  9:04 PM   Result Value Ref Range    Troponin-High Sensitivity 103 (HH) 0 - 51 ng/L   GLUCOSE, POC    Collection Time: 03/25/22  1:18 AM   Result Value Ref Range    Glucose (POC) 122 (H) 65 - 117 mg/dL    Performed by Dee Hwang    TROPONIN-HIGH SENSITIVITY    Collection Time: 03/25/22  1:21 AM   Result Value Ref Range    Troponin-High Sensitivity 102 (HH) 0 - 51 ng/L   TROPONIN-HIGH SENSITIVITY    Collection Time: 03/25/22  3:09 AM   Result Value Ref Range    Troponin-High Sensitivity 92 (HH) 0 - 51 ng/L         Imaging:    XR CHEST PORT   Final Result   1. Unchanged cardiomegaly and/or pericardial effusion, vascular congestion,   interstitial edema.          EKG on admission  Component Ref Range & Units 1 d ago 4 mo ago   Ventricular Rate BPM 87  73    Atrial Rate BPM 87  73    P-R Interval ms 138  178    QRS Duration ms 110  102    Q-T Interval ms 394  462    QTC Calculation (Bezet) ms 474  508    Calculated P [place] degrees 63  78    Calculated R Axis degrees 105  98    Calculated T Axis degrees -46  77    Diagnosis  Sinus rhythm with occasional Premature ventricular complexes   Possible Left atrial enlargement   Rightward axis   Cannot rule out Anterior infarct , age undetermined   T wave abnormality, consider inferolateral ischemia   Abnormal ECG   When compared with ECG of 19-NOV-2021 17:52,   Premature ventricular complexes are now Present   T wave inversion now evident in Inferior leads   T wave inversion now evident in Lateral leads   Confirmed by Chela Coleman MD, [facility] ([id number]) on 3/24/2022 9:55:07 PM          Assessment     CHF exacerbation  Elevated troponin  Hyperkalemia      Plan     Admit to medical telemetry floor  Consult cardiology  Consult pulmonology  Repeat CBC, CMP, BNP daily    Eliquis 5mg BID  Pepcid 10mg BID  Lantus 10 units QHS  Nitrostat 0.4mg PRN  Oxybutynin 5mg TID  Albuterol 2 puff BID PRN  Duo-neb Q6H PRN  Amiodarone 400mg daily  Lipitor 40mg QHS  Entresto 24-26mg BID  Zaroxolyn 5mg daily  Toprol 25mg daily  Potassium chloride 10meq daily  seroquel 25mg QHS  Theophylline ER 300mg daily  Lasix 40mg IV BID      Current Facility-Administered Medications:     apixaban (ELIQUIS) tablet 5 mg, 5 mg, Oral, BID, Gopal Alvarado MD    famotidine (PEPCID) tablet 10 mg, 10 mg, Oral, BID, Gopal Alvarado MD    insulin glargine (LANTUS) injection 10 Units, 10 Units, SubCUTAneous, QHS, Gopal Alvarado MD, 10 Units at 03/25/22 0121    nitroglycerin (NITROSTAT) tablet 0.4 mg, 0.4 mg, SubLINGual, PRN, Flora Alvarado MD    oxybutynin (DITROPAN) tablet 5 mg, 5 mg, Oral, TID, Flora Alvarado MD    albuterol (PROVENTIL HFA, VENTOLIN HFA, PROAIR HFA) inhaler 2 Puff, 2 Puff, Inhalation, BID PRN, Flora Alvarado MD    albuterol-ipratropium (DUO-NEB) 2.5 MG-0.5 MG/3 ML, 3 mL, Nebulization, Q6H PRN, Flora Alvarado MD    amiodarone (CORDARONE) tablet 400 mg, 400 mg, Oral, DAILY, Flora Alvarado MD    atorvastatin (LIPITOR) tablet 40 mg, 40 mg, Oral, QHS, Gopal Alvarado MD, 40 mg at 03/25/22 0121    sacubitriL-valsartan (ENTRESTO) 24-26 mg tablet 1 Tablet, 1 Tablet, Oral, BID, Flora Alvarado MD    metOLazone (ZAROXOLYN) tablet 5 mg, 5 mg, Oral, DAILY, Flora Alvarado MD    metoprolol succinate (TOPROL-XL) XL tablet 25 mg, 25 mg, Oral, DAILY, Flora Alvarado MD    budesonide-formoteroL (SYMBICORT) 160-4.5 mcg/actuation HFA inhaler 2 Puff, 2 Puff, Inhalation, BID RT, Flora Alvarado MD    potassium chloride SR (KLOR-CON 10) tablet 10 mEq, 10 mEq, Oral, DAILY, Flora Alvarado MD    QUEtiapine (SEROquel) tablet 25 mg, 25 mg, Oral, QHS, Gopal Alvarado MD, 25 mg at 03/25/22 0121    theophylline ER(12 hour) (THEOCHRON) tablet 300 mg, 300 mg, Oral, DAILY, Flora Alvarado MD    polyethylene glycol (MIRALAX) packet 17 g, 17 g, Oral, DAILY PRN, Flora Alvarado MD    ondansetron (ZOFRAN ODT) tablet 4 mg, 4 mg, Oral, Q8H PRN **OR** ondansetron (ZOFRAN) injection 4 mg, 4 mg, IntraVENous, Q6H PRN, Gopal Alvarado MD    furosemide (LASIX) injection 40 mg, 40 mg, IntraVENous, BID, Gopal Alvarado MD    Current Outpatient Medications:     acetaminophen-codeine (TYLENOL #4) 300-60 mg tab, Take 1 Tablet by mouth every six (6) hours as needed for Severe Pain., Disp: , Rfl:     Narcan 4 mg/actuation nasal spray, 1 [place] by IntraNASal route as needed for Overdose., Disp: , Rfl:     HYDROcodone-chlorpheniramine (TUSSIONEX) 10-8 mg/5 mL suspension, Take 5 mL by mouth every six (6) hours as needed for Cough. , Disp: , Rfl:     theophylline ER,12 hour, (THEOCHRON) 300 mg tablet, Take 300 mg by mouth daily. , Disp: , Rfl:     amiodarone (PACERONE) 400 mg tablet, Take 400 mg by mouth daily. , Disp: , Rfl:     atorvastatin (LIPITOR) 40 mg tablet, Take 40 mg by mouth nightly., Disp: , Rfl:     metoprolol succinate (TOPROL-XL) 25 mg XL tablet, Take 25 mg by mouth daily. , Disp: , Rfl:     Entresto 24-26 mg tablet, Take 1 Tablet by mouth two (2) times a day., Disp: , Rfl:     metOLazone (ZAROXOLYN) 5 mg tablet, Take 5 mg by mouth daily. , Disp: , Rfl:     torsemide (DEMADEX) 20 mg tablet, Take 20 mg by mouth two (2) times a day., Disp: , Rfl:     potassium chloride SA (MICRO-K) 10 mEq capsule, Take 20 mEq by mouth daily. , Disp: , Rfl:     famotidine (PEPCID) 10 mg tablet, Take 1 Tablet by mouth two (2) times a day., Disp: 30 Tablet, Rfl: 0    nitroglycerin (NITROSTAT) 0.4 mg SL tablet, Sit/Lay down then put one tab under the tongue every 5 minutes as needed for chest pain/neck or jaw pain for 3 doses. Seek immediate medical attention should you need more than one tab for pain to resolve., Disp: 1 Bottle, Rfl: 3    apixaban (ELIQUIS) 5 mg tablet, Take 1 Tab by mouth two (2) times a day., Disp: 30 Tab, Rfl: 0    oxybutynin (DITROPAN) 5 mg tablet, Take 1 Tab by mouth three (3) times daily. , Disp: 90 Tab, Rfl: 0    insulin glargine (LANTUS) 100 unit/mL injection, 10 Units by SubCUTAneous route nightly.  May give in pen  Indications: type 2 diabetes mellitus, Disp: 1 Vial, Rfl: 0    QUEtiapine (SEROqueL) 25 mg tablet, Take 25 mg by mouth nightly., Disp: , Rfl:     albuterol-ipratropium (DUO-NEB) 2.5 mg-0.5 mg/3 ml nebu, 3 mL by Nebulization route every six (6) hours as needed for Wheezing., Disp: , Rfl:     mometasone-formoterol (Dulera) 200-5 mcg/actuation HFA inhaler, Take 2 Puffs by inhalation two (2) times a day., Disp: , Rfl:     albuterol (Ventolin HFA) 90 mcg/actuation inhaler, Take 2 Puffs by inhalation two (2) times daily as needed for Wheezing., Disp: , Rfl:       Signed By: Carlos Shah     March 25, 2022

## 2022-03-25 NOTE — H&P
HISTORY & PHYSICAL      Patient: Kinsey Palacios MRN: [id number]  SSN: [id number]    YOB: 1950  Age: 80 y.o. Sex: female      Primary Care Provider: Theron Alcantar MD  Source of Information: Patient      Subjective     CC:   Chief Complaint   Patient presents with    Shortness of Breath       HPI: Kinsey Palacios is a(n) 80 y.o. female with PMH significant for COPD, MI with CABG, IFTIKHAR, CHF, CAD, DM, HLD, HTN, cholecystectomy. Presents for increased SOB, dyspnea on exertion and lower extremity edema. Denies CP and palpitations. Patient is poor historian and unable to give date of when sx started. She also notes productive cough worse at night. She wears 4L O2 nasal cannula at home and is currently on the same in the ED. CXR shows cardiomegaly and possible pericardial effusion, unchanged from previous. Cardiology seen pt in the ER and suggests admission and monitoring labs. Notes recent echo and cath last year. Labs remarkable for troponin > 100, BNP 5027, K 5.3, creatinine 1.3. Past Medical History:   Diagnosis Date    Chronic obstructive pulmonary disease ([address]     Congestive heart disease ([address]     Coronary artery disease     Diabetes [facility]     Hyperlipidemia     Hypertension     Myocardial infarct ([facility]     IFTIKHAR (obstructive sleep apnea)         Past Surgical History:   Procedure Laterality Date    HX BREAST BIOPSY      HX CHOLECYSTECTOMY      HX CORONARY ARTERY BYPASS GRAFT      HX HERNIA REPAIR      HX HYSTERECTOMY         Prior to Admission medications    Medication Sig Start Date End Date Taking? Authorizing Provider   acetaminophen-codeine (TYLENOL #4) 300-60 mg tab Take 1 Tablet by mouth every six (6) hours as needed for Severe Pain. 10/19/21   Provider, Historical   Narcan 4 mg/actuation nasal spray 1 [place] by IntraNASal route as needed for Overdose.  10/25/21   Provider, Historical   HYDROcodone-chlorpheniramine (TUSSIONEX) 10-8 mg/5 mL suspension Take 5 mL by mouth every six (6) hours as needed for Cough. 10/31/21   Provider, Historical   theophylline ER,12 hour, (THEOCHRON) 300 mg tablet Take 300 mg by mouth daily. 9/19/21   Provider, Historical   amiodarone (PACERONE) 400 mg tablet Take 400 mg by mouth daily. 9/25/21   Provider, Historical   atorvastatin (LIPITOR) 40 mg tablet Take 40 mg by mouth nightly. 11/13/21   Provider, Historical   metoprolol succinate (TOPROL-XL) 25 mg XL tablet Take 25 mg by mouth daily. 11/17/21   Provider, Historical   Entresto 24-26 mg tablet Take 1 Tablet by mouth two (2) times a day. 11/13/21   Provider, Historical   metOLazone (ZAROXOLYN) 5 mg tablet Take 5 mg by mouth daily. 11/17/21   Provider, Historical   torsemide (DEMADEX) 20 mg tablet Take 20 mg by mouth two (2) times a day. 11/13/21   Provider, Historical   potassium chloride SA (MICRO-K) 10 mEq capsule Take 20 mEq by mouth daily. 11/17/21   Provider, Historical   famotidine (PEPCID) 10 mg tablet Take 1 Tablet by mouth two (2) times a day. 6/29/21   Flora Alvarado MD   nitroglycerin (NITROSTAT) 0.4 mg SL tablet Sit/Lay down then put one tab under the tongue every 5 minutes as needed for chest pain/neck or jaw pain for 3 doses. Seek immediate medical attention should you need more than one tab for pain to resolve. 3/15/21   Kimberly Randhawa MD   apixaban (ELIQUIS) 5 mg tablet Take 1 Tab by mouth two (2) times a day. 3/14/21   Franklinia RiseMARIA ESTHER meier   oxybutynin (DITROPAN) 5 mg tablet Take 1 Tab by mouth three (3) times daily. 3/14/21   Liya RiseMARIA ESTHER meier   insulin glargine (LANTUS) 100 unit/mL injection 10 Units by SubCUTAneous route nightly. May give in pen  Indications: type 2 diabetes mellitus 3/14/21   Franklinia RiseMARIA ESTHER meier   QUEtiapine (SEROqueL) 25 mg tablet Take 25 mg by mouth nightly. Provider, Historical   albuterol-ipratropium (DUO-NEB) 2.5 mg-0.5 mg/3 ml nebu 3 mL by Nebulization route every six (6) hours as needed for Wheezing.     Provider, Historical mometasone-formoterol (Dulera) 200-5 mcg/actuation HFA inhaler Take 2 Puffs by inhalation two (2) times a day. Provider, Historical   albuterol (Ventolin HFA) 90 mcg/actuation inhaler Take 2 Puffs by inhalation two (2) times daily as needed for Wheezing. Provider, Historical       Allergies   Allergen Reactions    Penicillins Other (comments)    Tylox [Oxycodone-Acetaminophen] Hives        Family History   Problem Relation Age of Onset    Heart Disease Mother     Kidney Disease Mother     Cancer Maternal Grandmother         Social History     Socioeconomic History    Marital status:    Tobacco Use    Smoking status: Former Smoker    Smokeless tobacco: Never Used   Substance and Sexual Activity    Alcohol use: Not Currently    Drug use: Never    Sexual activity: Not Currently   Social History Narrative    Lives at home with children, need help with ADL's. ambulatory        Review of Systems   Constitutional: Negative for chills and fever. Eyes: Negative for blurred vision. Respiratory: Positive for cough, sputum production and shortness of breath. Negative for wheezing. Cardiovascular: Positive for leg swelling. Negative for chest pain and palpitations. Gastrointestinal: Negative for abdominal pain, diarrhea, nausea and vomiting. Genitourinary: Negative for dysuria. Neurological: Negative for dizziness, loss of consciousness and headaches. Objective     Visit Vitals  BP (!) 165/66   Pulse 66   Temp 98.2 °F (36.8 °C)   Resp 16   Ht 5' 3\" (1.6 m)   Wt 106.6 kg (235 lb)   SpO2 95%   BMI 41.63 kg/m²    O2 Flow Rate (L/min): 4 l/min O2 Device: Nasal cannula    Physical Exam:   Physical Exam  Constitutional:       General: She is not in acute distress. HENT:      Head: Normocephalic and atraumatic. Cardiovascular:      Rate and Rhythm: Normal rate and regular rhythm. Pulmonary:      Effort: Pulmonary effort is normal. No respiratory distress.       Breath sounds: Normal breath sounds. No wheezing, rhonchi or rales. Abdominal:      Palpations: Abdomen is soft. Tenderness: There is no abdominal tenderness. There is no guarding or rebound. Musculoskeletal:      Right lower leg: Edema present. Left lower leg: Edema present. Skin:     General: Skin is warm and dry. Neurological:      Mental Status: She is alert and oriented to person, place, and time. Intake & Output:  Current Shift: 03/25 0701 - 03/25 1900  In: -   Out: 2300 [ZSIGU:[id number]]  Last three shifts: 03/23 1901 - 03/25 0700  In: -   Out: 900 [Urine:900]    Lab/Data Review:   All lab data reviewed      24 Hour Results:    Recent Results (from the past 24 hour(s))   EKG, 12 LEAD, INITIAL    Collection Time: 03/24/22  5:56 PM   Result Value Ref Range    Ventricular Rate 87 BPM    Atrial Rate 87 BPM    P-R Interval 138 ms    QRS Duration 110 ms    Q-T Interval 394 ms    QTC Calculation (Bezet) 474 ms    Calculated P Axis 63 degrees    Calculated R Axis 105 degrees    Calculated T Axis -46 degrees    Diagnosis       Sinus rhythm with occasional Premature ventricular complexes  Possible Left atrial enlargement  Rightward axis  Cannot rule out Anterior infarct , age undetermined  T wave abnormality, consider inferolateral ischemia  Abnormal ECG  When compared with ECG of 19-NOV-2021 17:52,  Premature ventricular complexes are now Present  T wave inversion now evident in Inferior leads  T wave inversion now evident in Lateral leads  Confirmed by Demi Matthews MD, [facility] ([id number]) on 3/24/2022 9:55:07 PM     CBC WITH AUTOMATED DIFF    Collection Time: 03/24/22  6:31 PM   Result Value Ref Range    WBC 8.1 3.6 - 11.0 K/uL    RBC 4.65 3.80 - 5.20 M/uL    HGB 12.8 11.5 - 16.0 g/dL    HCT 41.9 35.0 - 47.0 %    MCV 90.1 80.0 - 99.0 FL    MCH 27.5 26.0 - 34.0 PG    MCHC 30.5 30.0 - 36.5 g/dL    RDW 16.1 (H) 11.5 - 14.5 %    PLATELET 092 591 - 797 K/uL    MPV 13.1 (H) 8.9 - 12.9 FL    NRBC 0.0 0.0  WBC    ABSOLUTE NRBC 0.00 0.00 - 0.01 K/uL    NEUTROPHILS 78 (H) 32 - 75 %    LYMPHOCYTES 12 12 - 49 %    MONOCYTES 9 5 - 13 %    EOSINOPHILS 1 0 - 7 %    BASOPHILS 0 0 - 1 %    IMMATURE GRANULOCYTES 0 0 - 0.5 %    ABS. NEUTROPHILS 6.3 1.8 - 8.0 K/UL    ABS. LYMPHOCYTES 1.0 0.8 - 3.5 K/UL    ABS. MONOCYTES 0.7 0.0 - 1.0 K/UL    ABS. EOSINOPHILS 0.1 0.0 - 0.4 K/UL    ABS. BASOPHILS 0.0 0.0 - 0.1 K/UL    ABS. IMM. GRANS. 0.0 0.00 - 0.04 K/UL    DF AUTOMATED     TROPONIN-HIGH SENSITIVITY    Collection Time: 03/24/22  6:31 PM   Result Value Ref Range    Troponin-High Sensitivity 104 (HH) 0 - 51 ng/L   METABOLIC PANEL, COMPREHENSIVE    Collection Time: 03/24/22  6:31 PM   Result Value Ref Range    Sodium 143 136 - 145 mmol/L    Potassium 5.3 (H) 3.5 - 5.1 mmol/L    Chloride 111 (H) 97 - 108 mmol/L    CO2 27 21 - 32 mmol/L    Anion gap 5 5 - 15 mmol/L    Glucose 122 (H) 65 - 100 mg/dL    BUN 25 (H) 6 - 20 mg/dL    Creatinine 1.30 (H) 0.55 - 1.02 mg/dL    BUN/Creatinine ratio 19 12 - 20      GFR est AA 49 (L) >60 ml/min/1.73m2    GFR est non-AA 40 (L) >60 ml/min/1.73m2    Calcium 8.1 (L) 8.5 - 10.1 mg/dL    Bilirubin, total 0.8 0.2 - 1.0 mg/dL    AST (SGOT) 48 (H) 15 - 37 U/L    ALT (SGPT) 30 12 - 78 U/L    Alk.  phosphatase 246 (H) 45 - 117 U/L    Protein, total 6.0 (L) 6.4 - 8.2 g/dL    Albumin 2.5 (L) 3.5 - 5.0 g/dL    Globulin 3.5 2.0 - 4.0 g/dL    A-G Ratio 0.7 (L) 1.1 - 2.2     NT-PRO BNP    Collection Time: 03/24/22  6:31 PM   Result Value Ref Range    NT pro-BNP 5,027 (H) <125 pg/mL   TROPONIN-HIGH SENSITIVITY    Collection Time: 03/24/22  9:04 PM   Result Value Ref Range    Troponin-High Sensitivity 103 (HH) 0 - 51 ng/L   GLUCOSE, POC    Collection Time: 03/25/22  1:18 AM   Result Value Ref Range    Glucose (POC) 122 (H) 65 - 117 mg/dL    Performed by Damir Osorio    TROPONIN-HIGH SENSITIVITY    Collection Time: 03/25/22  1:21 AM   Result Value Ref Range    Troponin-High Sensitivity 102 (HH) 0 - 51 ng/L   TROPONIN-HIGH SENSITIVITY Collection Time: 03/25/22  3:09 AM   Result Value Ref Range    Troponin-High Sensitivity 92 (HH) 0 - 51 ng/L   ECHO ADULT COMPLETE    Collection Time: 03/25/22  9:45 AM   Result Value Ref Range    LV EDV A2C 89 mL    LV EDV A4C 126 mL    LV ESV A2C 28 mL    LV ESV A4C 63 mL    IVSd 1.2 (A) 0.6 - 0.9 cm    LVIDd 4.1 3.9 - 5.3 cm    LVIDs 2.8 cm    LVOT Diameter 1.9 cm    LVOT Mean Gradient 2 mmHg    LVOT VTI 17.9 cm    LVOT Peak Velocity 0.9 m/s    LVOT Peak Gradient 3 mmHg    LVPWd 1.3 (A) 0.6 - 0.9 cm    LV E' Lateral Velocity 8 cm/s    LV E' Septal Velocity 8 cm/s    LV Ejection Fraction A2C 69 %    LV Ejection Fraction A4C 50 %    EF BP 60 55 - 100 %    LVOT Area 2.8 cm2    LVOT SV 50.7 ml    LA Major Axis 5.9 cm    LA Area 4C 24.5 cm2    LA Diameter 4.2 cm    AV Mean Gradient 5 mmHg    AV VTI 30.2 cm    AV Mean Velocity 1.1 m/s    AV Peak Velocity 1.6 m/s    AV Peak Gradient 10 mmHg    AV Area by VTI 1.7 cm2    AV Area by Peak Velocity 1.7 cm2    Aortic Root 2.4 cm    Ascending Aorta 1.9 cm    MR VTI 88.1 cm    MR Peak Velocity 2.7 m/s    MR Peak Gradient 29 mmHg    MV A Velocity 0.72 m/s    MV E Velocity 1.31 m/s    PV Mean Gradient 1 mmHg    PV VTI 16.9 cm    PV Mean Velocity 0.6 m/s    PV Max Velocity 0.8 m/s    PV Peak Gradient 3 mmHg    Pulm Vein Peak D Velocity 0.4 m/s    Pulm Vein Peak S Velocity 0.4 m/s    Pulm Vein S/D 1.0 no units    TR Max Velocity 2.81 m/s    TR Peak Gradient 32 mmHg    TAPSE 1.9 1.5 - 2.0 cm    Fractional Shortening 2D 32 28 - 44 %    LV ESV Index A4C 30 mL/m2    LV EDV Index A4C 61 mL/m2    LV ESV Index A2C 14 mL/m2    LV EDV Index A2C 43 mL/m2    LVIDd Index 1.98 cm/m2    LVIDs Index 1.35 cm/m2    LV RWT Ratio 0.63     LV Mass 2D 182.5 (A) 67 - 162 g    LV Mass 2D Index 88.1 43 - 95 g/m2    MV E/A 1.82     E/E' Ratio (Averaged) 16.38     E/E' Lateral 16.38     E/E' Septal 16.38     LVOT Stroke Volume Index 24.5 mL/m2    LA Size Index 2.03 cm/m2    LA/AO Root Ratio 1.75     Ao Root Index 1.16 cm/m2    Ascending Aorta Index 0.92 cm/m2    AV Velocity Ratio 0.56     LVOT:AV VTI Index 0.59     LUCIEN/BSA VTI 0.8 cm2/m2    LUCIEN/BSA Peak Velocity 0.8 cm2/m2    RV Basal Dimension 4.3 cm    RV Mid Dimension 3.0 cm    RV Free Wall Peak S' 7 cm/s    Est. RA Pressure 8 mmHg    RVSP 40 mmHg   EKG, 12 LEAD, SUBSEQUENT    Collection Time: 03/25/22 10:12 AM   Result Value Ref Range    Ventricular Rate 68 BPM    Atrial Rate 68 BPM    P-R Interval 176 ms    QRS Duration 106 ms    Q-T Interval 474 ms    QTC Calculation (Bezet) 504 ms    Calculated P Axis 74 degrees    Calculated R Axis 69 degrees    Calculated T Axis 119 degrees    Diagnosis       Normal sinus rhythm  Right atrial enlargement  Nonspecific T wave abnormality  Abnormal ECG  Confirmed by TAWNYA DOWNEY, [facility] ([id number]) on 3/25/2022 10:47:39 AM           Imaging:    XR CHEST PORT   Final Result   1. Unchanged cardiomegaly and/or pericardial effusion, vascular congestion,   interstitial edema.          EKG on admission  Component Ref Range & Units 1 d ago 4 mo ago   Ventricular Rate BPM 87  73    Atrial Rate BPM 87  73    P-R Interval ms 138  178    QRS Duration ms 110  102    Q-T Interval ms 394  462    QTC Calculation (Bezet) ms 474  508    Calculated P [place] degrees 63  78    Calculated R Axis degrees 105  98    Calculated T Axis degrees -46  77    Diagnosis  Sinus rhythm with occasional Premature ventricular complexes   Possible Left atrial enlargement   Rightward axis   Cannot rule out Anterior infarct , age undetermined   T wave abnormality, consider inferolateral ischemia   Abnormal ECG   When compared with ECG of 19-NOV-2021 17:52,   Premature ventricular complexes are now Present   T wave inversion now evident in Inferior leads   T wave inversion now evident in Lateral leads   Confirmed by Nyla DOWNEY, [address] [place] [address] ([id number]) on 3/24/2022 9:55:07 PM          Assessment     CHF exacerbation  Elevated troponin  Hyperkalemia  CAD  Hypertension  History of atrial flutter      Plan     Admit to medical telemetry floor  Consult cardiology  Consult pulmonology  Repeat CBC, CMP, BNP daily    Eliquis 5mg BID  Pepcid 10mg BID  Lantus 10 units QHS  Nitrostat 0.4mg PRN  Oxybutynin 5mg TID  Albuterol 2 puff BID PRN  Duo-neb Q6H PRN  Amiodarone 400mg daily  Lipitor 40mg QHS  Entresto 24-26mg BID  Zaroxolyn 5mg daily  Toprol 25mg daily  Potassium chloride 10meq daily  seroquel 25mg QHS  Theophylline ER 300mg daily  Lasix 40mg IV BID    Repeat the labs in the morning      Current Facility-Administered Medications:     apixaban (ELIQUIS) tablet 5 mg, 5 mg, Oral, BID, Gopal Alvarado MD, 5 mg at 03/25/22 9400    famotidine (PEPCID) tablet 10 mg, 10 mg, Oral, BID, Gopal Alvarado MD, 10 mg at 03/25/22 7949    insulin glargine (LANTUS) injection 10 Units, 10 Units, SubCUTAneous, QHS, Gopal Alvarado MD, 10 Units at 03/25/22 0121    nitroglycerin (NITROSTAT) tablet 0.4 mg, 0.4 mg, SubLINGual, PRN, Sp Alvarado MD    oxybutynin (DITROPAN) tablet 5 mg, 5 mg, Oral, TID, Valarie Boogie MD, 5 mg at 03/25/22 0833    albuterol (PROVENTIL HFA, VENTOLIN HFA, PROAIR HFA) inhaler 2 Puff, 2 Puff, Inhalation, BID PRN, Gopal Alvarado MD    albuterol-ipratropium (DUO-NEB) 2.5 MG-0.5 MG/3 ML, 3 mL, Nebulization, Q6H PRN, Gopal Alvarado MD    atorvastatin (LIPITOR) tablet 40 mg, 40 mg, Oral, QHS, Gopal Alvarado MD, 40 mg at 03/25/22 0121    sacubitriL-valsartan (ENTRESTO) 24-26 mg tablet 1 Tablet, 1 Tablet, Oral, BID, Gopal Alvarado MD, 1 Tablet at 03/25/22 7581    metOLazone (ZAROXOLYN) tablet 5 mg, 5 mg, Oral, DAILY, Gopal Alvarado MD, 5 mg at 03/25/22 1007    metoprolol succinate (TOPROL-XL) XL tablet 25 mg, 25 mg, Oral, DAILY, Gopal Alvarado MD, 25 mg at 03/25/22 9526    budesonide-formoteroL (SYMBICORT) 160-4.5 mcg/actuation HFA inhaler 2 Puff, 2 Puff, Inhalation, BID RT, Valarie Boogie MD, 2 Puff at 03/25/22 0836    QUEtiapine (SEROquel) tablet 25 mg, 25 mg, Oral, QHS, Gopal Alvarado MD, 25 mg at 03/25/22 0121    theophylline ER(12 hour) (THEOCHRON) tablet 300 mg, 300 mg, Oral, DAILY, Mago Alvarado MD, 300 mg at 03/25/22 0383    polyethylene glycol (MIRALAX) packet 17 g, 17 g, Oral, DAILY PRN, Mago Alvarado MD    ondansetron (ZOFRAN ODT) tablet 4 mg, 4 mg, Oral, Q8H PRN **OR** ondansetron (ZOFRAN) injection 4 mg, 4 mg, IntraVENous, Q6H PRN, Gopal Alvarado MD    furosemide (LASIX) injection 40 mg, 40 mg, IntraVENous, BID, Gopal Alvarado MD, 40 mg at 03/25/22 0832    [START ON 3/26/2022] amiodarone (CORDARONE) tablet 200 mg, 200 mg, Oral, DAILY, Blanca Torres MD    Current Outpatient Medications:     acetaminophen-codeine (TYLENOL #4) 300-60 mg tab, Take 1 Tablet by mouth every six (6) hours as needed for Severe Pain., Disp: , Rfl:     Narcan 4 mg/actuation nasal spray, 1 [place] by IntraNASal route as needed for Overdose., Disp: , Rfl:     HYDROcodone-chlorpheniramine (TUSSIONEX) 10-8 mg/5 mL suspension, Take 5 mL by mouth every six (6) hours as needed for Cough. , Disp: , Rfl:     theophylline ER,12 hour, (THEOCHRON) 300 mg tablet, Take 300 mg by mouth daily. , Disp: , Rfl:     amiodarone (PACERONE) 400 mg tablet, Take 400 mg by mouth daily. , Disp: , Rfl:     atorvastatin (LIPITOR) 40 mg tablet, Take 40 mg by mouth nightly., Disp: , Rfl:     metoprolol succinate (TOPROL-XL) 25 mg XL tablet, Take 25 mg by mouth daily. , Disp: , Rfl:     Entresto 24-26 mg tablet, Take 1 Tablet by mouth two (2) times a day., Disp: , Rfl:     metOLazone (ZAROXOLYN) 5 mg tablet, Take 5 mg by mouth daily. , Disp: , Rfl:     torsemide (DEMADEX) 20 mg tablet, Take 20 mg by mouth two (2) times a day., Disp: , Rfl:     potassium chloride SA (MICRO-K) 10 mEq capsule, Take 20 mEq by mouth daily. , Disp: , Rfl:     famotidine (PEPCID) 10 mg tablet, Take 1 Tablet by mouth two (2) times a day., Disp: 30 Tablet, Rfl: 0    nitroglycerin (NITROSTAT) 0.4 mg SL tablet, Sit/Lay down then put one tab under the tongue every 5 minutes as needed for chest pain/neck or jaw pain for 3 doses. Seek immediate medical attention should you need more than one tab for pain to resolve., Disp: 1 Bottle, Rfl: 3    apixaban (ELIQUIS) 5 mg tablet, Take 1 Tab by mouth two (2) times a day., Disp: 30 Tab, Rfl: 0    oxybutynin (DITROPAN) 5 mg tablet, Take 1 Tab by mouth three (3) times daily. , Disp: 90 Tab, Rfl: 0    insulin glargine (LANTUS) 100 unit/mL injection, 10 Units by SubCUTAneous route nightly.  May give in pen  Indications: type 2 diabetes mellitus, Disp: 1 Vial, Rfl: 0    QUEtiapine (SEROqueL) 25 mg tablet, Take 25 mg by mouth nightly., Disp: , Rfl:     albuterol-ipratropium (DUO-NEB) 2.5 mg-0.5 mg/3 ml nebu, 3 mL by Nebulization route every six (6) hours as needed for Wheezing., Disp: , Rfl:     mometasone-formoterol (Dulera) 200-5 mcg/actuation HFA inhaler, Take 2 Puffs by inhalation two (2) times a day., Disp: , Rfl:     albuterol (Ventolin HFA) 90 mcg/actuation inhaler, Take 2 Puffs by inhalation two (2) times daily as needed for Wheezing., Disp: , Rfl:       Signed By: Carli Moreno MD     March 25, 2022

## 2022-03-25 NOTE — CONSULTS
Cardiology Consult    NAME: Aura Humphries   :  1950   MRN:  [id number]     Date/Time:  3/25/2022 9:37 AM    Patient PCP: Russell Arriola MD  ________________________________________________________________________     Assessment/Plan:   Increasing leg edema and shortness of breath, fluid overload, history of heart failure with preserved LV function last, echo , continue vigorous diuresis, monitor electrolytes closely    Coronary artery disease, mild troponin leak,  patent coronaries including grafts and stent in mid LAD, continue to monitor EKG and troponins, without chest pain. Hypertension, well controlled    Obesity, patient has difficulty walking, requesting wheelchair. Patient will speak to /. History of atrial flutter, status post cardioversion, maintaining sinus rhythm, continues amiodarone, Eliquis. Will decrease dose of amiodarone. Check thyroid. Liver functions are normal.    Diabetes    COPD    Hyperkalemia, discontinue potassium supplements            []        High complexity decision making was performed        Subjective:   CHIEF COMPLAINT:   Increasing leg edema  REASON FOR CONSULT:  CHF, Mild troponin leak    HISTORY OF PRESENT ILLNESS:     Aura Humphries is a 80 y.o. BLACK/ female who presents with increasing leg edema that started bothering her maybe 2 or 3 days ago. 2 or 3 days ago she did have  a little chest discomfort. Used nitroglycerin denies any recurrence. Patient says she has been compliant with her fluid pills. She has been less mobile with increasing leg edema and her thighs rubbing and she has been unable to walk. She thinks a wheelchair will make it easier for her. Denies noncompliance with medications or diet. Denies palpitations.       Past Medical History:   Diagnosis Date    Chronic obstructive pulmonary disease (HCC)     Congestive heart disease ([address]     Coronary artery disease     [facility]     Hyperlipidemia     Hypertension     Myocardial infarct ([facility]     IFTIKHAR (obstructive sleep apnea)       Past Surgical History:   Procedure Laterality Date    HX BREAST BIOPSY      HX CHOLECYSTECTOMY      HX CORONARY ARTERY BYPASS GRAFT      HX HERNIA REPAIR      HX HYSTERECTOMY       Allergies   Allergen Reactions    Penicillins Other (comments)    Tylox [Oxycodone-Acetaminophen] Hives      Meds:  See below  Social History     Tobacco Use    Smoking status: Former Smoker    Smokeless tobacco: Never Used   Substance Use Topics    Alcohol use: Not Currently      Family History   Problem Relation Age of Onset    Heart Disease Mother     Kidney Disease Mother     Cancer Maternal Grandmother        REVIEW OF SYSTEMS:     []         Unable to obtain  ROS due to ---   [x]         Total of 12 systems reviewed as follows:    Constitutional: negative fever, negative chills, negative weight loss  Eyes:   negative visual changes  ENT:   negative sore throat, tongue or lip swelling  Respiratory:  negative cough, negative dyspnea  Cards:  See HPI   GI:   negative for nausea, vomiting, diarrhea, and abdominal pain  Genitourinary: negative for frequency, dysuria  Integument:  negative for rash   Hematologic:  negative for easy bruising and gum/nose bleeding  Musculoskel: negative for myalgias,  back pain  Neurological:  negative for headaches, dizziness, vertigo, weakness  Behavl/Psych: negative for feelings of anxiety, depression     Pertinent Positives include :    Objective:      Physical Exam:    Last 24hrs VS reviewed since prior progress note.  Most recent are:    Visit Vitals  /67   Pulse 73   Temp 98.2 °F (36.8 °C)   Resp 20   Ht 5' 3\" (1.6 m)   Wt 106.6 kg (235 lb)   SpO2 96%   BMI 41.63 kg/m²       Intake/Output Summary (Last 24 hours) at 3/25/2022 5110  Last data filed at 3/25/2022 2493  Gross per 24 hour   Intake --   Output 900 ml   Net -900 ml        General Appearance: Middle-aged overweight female, alert, no acute distress. Ears/Nose/Mouth/Throat: Pupils equal and round, Hearing grossly normal.  Neck: Supple. JVP is elevated. Carotids good upstrokes, with no bruit. Chest: Lungs clear to auscultation bilaterally. Cardiovascular: Regular rate and rhythm, S1S2 normal, no murmur, rubs, gallops. Abdomen: Soft, non-tender, bowel sounds are active. No organomegaly. Extremities: 2+ edema bilaterally. Femoral pulses +2, Distal Pulses +1. Skin: Warm and dry. Neuro: Alert and oriented x3, normal speech; follows simple commands  Psychiatric: Cooperative, normal affect and judgment    []         Post-cath site without hematoma, bruit, tenderness, or thrill. Distal pulses intact. Data:      Telemetry:    EKG:  []  No new EKG for review. Prior to Admission medications    Medication Sig Start Date End Date Taking? Authorizing Provider   acetaminophen-codeine (TYLENOL #4) 300-60 mg tab Take 1 Tablet by mouth every six (6) hours as needed for Severe Pain. 10/19/21   Provider, Historical   Narcan 4 mg/actuation nasal spray 1 [place] by IntraNASal route as needed for Overdose. 10/25/21   Provider, Historical   HYDROcodone-chlorpheniramine (TUSSIONEX) 10-8 mg/5 mL suspension Take 5 mL by mouth every six (6) hours as needed for Cough. 10/31/21   Provider, Historical   theophylline ER,12 hour, (THEOCHRON) 300 mg tablet Take 300 mg by mouth daily. 9/19/21   Provider, Historical   amiodarone (PACERONE) 400 mg tablet Take 400 mg by mouth daily. 9/25/21   Provider, Historical   atorvastatin (LIPITOR) 40 mg tablet Take 40 mg by mouth nightly. 11/13/21   Provider, Historical   metoprolol succinate (TOPROL-XL) 25 mg XL tablet Take 25 mg by mouth daily. 11/17/21   Provider, Historical   Entresto 24-26 mg tablet Take 1 Tablet by mouth two (2) times a day. 11/13/21   Provider, Historical   metOLazone (ZAROXOLYN) 5 mg tablet Take 5 mg by mouth daily.  11/17/21   Provider, Historical   torsemide (DEMADEX) 20 mg tablet Take 20 mg by mouth two (2) times a day. 11/13/21   Provider, Historical   potassium chloride SA (MICRO-K) 10 mEq capsule Take 20 mEq by mouth daily. 11/17/21   Provider, Historical   famotidine (PEPCID) 10 mg tablet Take 1 Tablet by mouth two (2) times a day. 6/29/21   Kamille Alvarado MD   nitroglycerin (NITROSTAT) 0.4 mg SL tablet Sit/Lay down then put one tab under the tongue every 5 minutes as needed for chest pain/neck or jaw pain for 3 doses. Seek immediate medical attention should you need more than one tab for pain to resolve. 3/15/21   Jolynn Chino MD   apixaban (ELIQUIS) 5 mg tablet Take 1 Tab by mouth two (2) times a day. 3/14/21   Shilo May NP   oxybutynin (DITROPAN) 5 mg tablet Take 1 Tab by mouth three (3) times daily. 3/14/21   Shilo May NP   insulin glargine (LANTUS) 100 unit/mL injection 10 Units by SubCUTAneous route nightly. May give in pen  Indications: type 2 diabetes mellitus 3/14/21   Shilo May NP   QUEtiapine (SEROqueL) 25 mg tablet Take 25 mg by mouth nightly. Provider, Historical   albuterol-ipratropium (DUO-NEB) 2.5 mg-0.5 mg/3 ml nebu 3 mL by Nebulization route every six (6) hours as needed for Wheezing. Provider, Historical   mometasone-formoterol (Dulera) 200-5 mcg/actuation HFA inhaler Take 2 Puffs by inhalation two (2) times a day. Provider, Historical   albuterol (Ventolin HFA) 90 mcg/actuation inhaler Take 2 Puffs by inhalation two (2) times daily as needed for Wheezing.     Provider, Historical       Recent Results (from the past 24 hour(s))   EKG, 12 LEAD, INITIAL    Collection Time: 03/24/22  5:56 PM   Result Value Ref Range    Ventricular Rate 87 BPM    Atrial Rate 87 BPM    P-R Interval 138 ms    QRS Duration 110 ms    Q-T Interval 394 ms    QTC Calculation (Bezet) 474 ms    Calculated P Axis 63 degrees    Calculated R Axis 105 degrees    Calculated T Axis -46 degrees    Diagnosis       Sinus rhythm with occasional Premature ventricular complexes  Possible Left atrial enlargement  Rightward axis  Cannot rule out Anterior infarct , age undetermined  T wave abnormality, consider inferolateral ischemia  Abnormal ECG  When compared with ECG of 19-NOV-2021 17:52,  Premature ventricular complexes are now Present  T wave inversion now evident in Inferior leads  T wave inversion now evident in Lateral leads  Confirmed by Jakub Claire MD, [facility] ([id number]) on 3/24/2022 9:55:07 PM     CBC WITH AUTOMATED DIFF    Collection Time: 03/24/22  6:31 PM   Result Value Ref Range    WBC 8.1 3.6 - 11.0 K/uL    RBC 4.65 3.80 - 5.20 M/uL    HGB 12.8 11.5 - 16.0 g/dL    HCT 41.9 35.0 - 47.0 %    MCV 90.1 80.0 - 99.0 FL    MCH 27.5 26.0 - 34.0 PG    MCHC 30.5 30.0 - 36.5 g/dL    RDW 16.1 (H) 11.5 - 14.5 %    PLATELET 763 212 - 199 K/uL    MPV 13.1 (H) 8.9 - 12.9 FL    NRBC 0.0 0.0  WBC    ABSOLUTE NRBC 0.00 0.00 - 0.01 K/uL    NEUTROPHILS 78 (H) 32 - 75 %    LYMPHOCYTES 12 12 - 49 %    MONOCYTES 9 5 - 13 %    EOSINOPHILS 1 0 - 7 %    BASOPHILS 0 0 - 1 %    IMMATURE GRANULOCYTES 0 0 - 0.5 %    ABS. NEUTROPHILS 6.3 1.8 - 8.0 K/UL    ABS. LYMPHOCYTES 1.0 0.8 - 3.5 K/UL    ABS. MONOCYTES 0.7 0.0 - 1.0 K/UL    ABS. EOSINOPHILS 0.1 0.0 - 0.4 K/UL    ABS. BASOPHILS 0.0 0.0 - 0.1 K/UL    ABS. IMM.  GRANS. 0.0 0.00 - 0.04 K/UL    DF AUTOMATED     TROPONIN-HIGH SENSITIVITY    Collection Time: 03/24/22  6:31 PM   Result Value Ref Range    Troponin-High Sensitivity 104 (HH) 0 - 51 ng/L   METABOLIC PANEL, COMPREHENSIVE    Collection Time: 03/24/22  6:31 PM   Result Value Ref Range    Sodium 143 136 - 145 mmol/L    Potassium 5.3 (H) 3.5 - 5.1 mmol/L    Chloride 111 (H) 97 - 108 mmol/L    CO2 27 21 - 32 mmol/L    Anion gap 5 5 - 15 mmol/L    Glucose 122 (H) 65 - 100 mg/dL    BUN 25 (H) 6 - 20 mg/dL    Creatinine 1.30 (H) 0.55 - 1.02 mg/dL    BUN/Creatinine ratio 19 12 - 20      GFR est AA 49 (L) >60 ml/min/1.73m2    GFR est non-AA 40 (L) >60 ml/min/1.73m2    Calcium 8.1 (L) 8.5 - 10.1 mg/dL    Bilirubin, total 0.8 0.2 - 1.0 mg/dL    AST (SGOT) 48 (H) 15 - 37 U/L    ALT (SGPT) 30 12 - 78 U/L    Alk.  phosphatase 246 (H) 45 - 117 U/L    Protein, total 6.0 (L) 6.4 - 8.2 g/dL    Albumin 2.5 (L) 3.5 - 5.0 g/dL    Globulin 3.5 2.0 - 4.0 g/dL    A-G Ratio 0.7 (L) 1.1 - 2.2     NT-PRO BNP    Collection Time: 03/24/22  6:31 PM   Result Value Ref Range    NT pro-BNP 5,027 (H) <125 pg/mL   TROPONIN-HIGH SENSITIVITY    Collection Time: 03/24/22  9:04 PM   Result Value Ref Range    Troponin-High Sensitivity 103 (HH) 0 - 51 ng/L   GLUCOSE, POC    Collection Time: 03/25/22  1:18 AM   Result Value Ref Range    Glucose (POC) 122 (H) 65 - 117 mg/dL    Performed by Richa Cornejo    TROPONIN-HIGH SENSITIVITY    Collection Time: 03/25/22  1:21 AM   Result Value Ref Range    Troponin-High Sensitivity 102 (HH) 0 - 51 ng/L   TROPONIN-HIGH SENSITIVITY    Collection Time: 03/25/22  3:09 AM   Result Value Ref Range    Troponin-High Sensitivity 92 (HH) 0 - 51 ng/L        Ba Wise MD

## 2022-03-25 NOTE — ACP (ADVANCE CARE PLANNING)
Advance Care Planning   Healthcare Decision Maker:       Primary Decision Maker: Silvana Nowak - Daughter - [phone]    Primary Decision Maker: Jonatan Trivedi - Daughter - [phone]    Primary Decision Maker: Romi Metz - Son - [phone]

## 2022-03-26 LAB
ALBUMIN SERPL-MCNC: 2.3 G/DL (ref 3.5–5)
ALBUMIN/GLOB SERPL: 0.7 {RATIO} (ref 1.1–2.2)
ALP SERPL-CCNC: 230 U/L (ref 45–117)
ALT SERPL-CCNC: 24 U/L (ref 12–78)
ANION GAP SERPL CALC-SCNC: 4 MMOL/L (ref 5–15)
AST SERPL W P-5'-P-CCNC: 20 U/L (ref 15–37)
BASOPHILS # BLD: 0 K/UL (ref 0–0.1)
BASOPHILS NFR BLD: 1 % (ref 0–1)
BILIRUB SERPL-MCNC: 0.7 MG/DL (ref 0.2–1)
BNP SERPL-MCNC: 2612 PG/ML
BUN SERPL-MCNC: 24 MG/DL (ref 6–20)
BUN/CREAT SERPL: 17 (ref 12–20)
CA-I BLD-MCNC: 8.1 MG/DL (ref 8.5–10.1)
CHLORIDE SERPL-SCNC: 108 MMOL/L (ref 97–108)
CO2 SERPL-SCNC: 32 MMOL/L (ref 21–32)
CREAT SERPL-MCNC: 1.38 MG/DL (ref 0.55–1.02)
DIFFERENTIAL METHOD BLD: ABNORMAL
EOSINOPHIL # BLD: 0.1 K/UL (ref 0–0.4)
EOSINOPHIL NFR BLD: 1 % (ref 0–7)
ERYTHROCYTE [DISTWIDTH] IN BLOOD BY AUTOMATED COUNT: 15.9 % (ref 11.5–14.5)
GLOBULIN SER CALC-MCNC: 3.2 G/DL (ref 2–4)
GLUCOSE BLD STRIP.AUTO-MCNC: 105 MG/DL (ref 65–117)
GLUCOSE BLD STRIP.AUTO-MCNC: 125 MG/DL (ref 65–117)
GLUCOSE BLD STRIP.AUTO-MCNC: 162 MG/DL (ref 65–117)
GLUCOSE BLD STRIP.AUTO-MCNC: 179 MG/DL (ref 65–117)
GLUCOSE SERPL-MCNC: 93 MG/DL (ref 65–100)
HCT VFR BLD AUTO: 42.8 % (ref 35–47)
HGB BLD-MCNC: 12.6 G/DL (ref 11.5–16)
IMM GRANULOCYTES # BLD AUTO: 0 K/UL (ref 0–0.04)
IMM GRANULOCYTES NFR BLD AUTO: 0 % (ref 0–0.5)
LYMPHOCYTES # BLD: 1.1 K/UL (ref 0.8–3.5)
LYMPHOCYTES NFR BLD: 19 % (ref 12–49)
MCH RBC QN AUTO: 27.3 PG (ref 26–34)
MCHC RBC AUTO-ENTMCNC: 29.4 G/DL (ref 30–36.5)
MCV RBC AUTO: 92.6 FL (ref 80–99)
MONOCYTES # BLD: 0.7 K/UL (ref 0–1)
MONOCYTES NFR BLD: 12 % (ref 5–13)
NEUTS SEG # BLD: 4 K/UL (ref 1.8–8)
NEUTS SEG NFR BLD: 67 % (ref 32–75)
NRBC # BLD: 0 K/UL (ref 0–0.01)
NRBC BLD-RTO: 0 PER 100 WBC
PERFORMED BY, TECHID: ABNORMAL
PERFORMED BY, TECHID: NORMAL
PLATELET # BLD AUTO: 191 K/UL (ref 150–400)
PMV BLD AUTO: 12.4 FL (ref 8.9–12.9)
POTASSIUM SERPL-SCNC: 4.2 MMOL/L (ref 3.5–5.1)
PROT SERPL-MCNC: 5.5 G/DL (ref 6.4–8.2)
RBC # BLD AUTO: 4.62 M/UL (ref 3.8–5.2)
SODIUM SERPL-SCNC: 144 MMOL/L (ref 136–145)
TSH SERPL DL<=0.05 MIU/L-ACNC: 0.54 UIU/ML (ref 0.36–3.74)
WBC # BLD AUTO: 6 K/UL (ref 3.6–11)

## 2022-03-26 PROCEDURE — 94761 N-INVAS EAR/PLS OXIMETRY MLT: CPT

## 2022-03-26 PROCEDURE — 36415 COLL VENOUS BLD VENIPUNCTURE: CPT

## 2022-03-26 PROCEDURE — 82962 GLUCOSE BLOOD TEST: CPT

## 2022-03-26 PROCEDURE — 74011636637 HC RX REV CODE- 636/637: Performed by: FAMILY MEDICINE

## 2022-03-26 PROCEDURE — 77010033678 HC OXYGEN DAILY

## 2022-03-26 PROCEDURE — 83880 ASSAY OF NATRIURETIC PEPTIDE: CPT

## 2022-03-26 PROCEDURE — 65270000029 HC RM PRIVATE

## 2022-03-26 PROCEDURE — 94640 AIRWAY INHALATION TREATMENT: CPT

## 2022-03-26 PROCEDURE — 74011250637 HC RX REV CODE- 250/637: Performed by: INTERNAL MEDICINE

## 2022-03-26 PROCEDURE — 74011250637 HC RX REV CODE- 250/637: Performed by: FAMILY MEDICINE

## 2022-03-26 PROCEDURE — 84443 ASSAY THYROID STIM HORMONE: CPT

## 2022-03-26 PROCEDURE — 85025 COMPLETE CBC W/AUTO DIFF WBC: CPT

## 2022-03-26 PROCEDURE — 74011250636 HC RX REV CODE- 250/636: Performed by: FAMILY MEDICINE

## 2022-03-26 PROCEDURE — 80053 COMPREHEN METABOLIC PANEL: CPT

## 2022-03-26 RX ADMIN — FUROSEMIDE 40 MG: 10 INJECTION, SOLUTION INTRAMUSCULAR; INTRAVENOUS at 08:56

## 2022-03-26 RX ADMIN — BUDESONIDE AND FORMOTEROL FUMARATE DIHYDRATE 2 PUFF: 160; 4.5 AEROSOL RESPIRATORY (INHALATION) at 19:54

## 2022-03-26 RX ADMIN — AMIODARONE HYDROCHLORIDE 200 MG: 200 TABLET ORAL at 08:56

## 2022-03-26 RX ADMIN — INSULIN GLARGINE 10 UNITS: 100 INJECTION, SOLUTION SUBCUTANEOUS at 21:14

## 2022-03-26 RX ADMIN — APIXABAN 5 MG: 5 TABLET, FILM COATED ORAL at 21:14

## 2022-03-26 RX ADMIN — FUROSEMIDE 40 MG: 10 INJECTION, SOLUTION INTRAMUSCULAR; INTRAVENOUS at 21:14

## 2022-03-26 RX ADMIN — ATORVASTATIN CALCIUM 40 MG: 40 TABLET, FILM COATED ORAL at 21:15

## 2022-03-26 RX ADMIN — BUDESONIDE AND FORMOTEROL FUMARATE DIHYDRATE 2 PUFF: 160; 4.5 AEROSOL RESPIRATORY (INHALATION) at 08:46

## 2022-03-26 RX ADMIN — OXYBUTYNIN CHLORIDE 5 MG: 5 TABLET ORAL at 21:15

## 2022-03-26 RX ADMIN — OXYBUTYNIN CHLORIDE 5 MG: 5 TABLET ORAL at 08:57

## 2022-03-26 RX ADMIN — METOPROLOL SUCCINATE 25 MG: 25 TABLET, EXTENDED RELEASE ORAL at 08:57

## 2022-03-26 RX ADMIN — SACUBITRIL AND VALSARTAN 1 TABLET: 24; 26 TABLET, FILM COATED ORAL at 09:02

## 2022-03-26 RX ADMIN — APIXABAN 5 MG: 5 TABLET, FILM COATED ORAL at 08:56

## 2022-03-26 RX ADMIN — METOLAZONE 5 MG: 5 TABLET ORAL at 08:56

## 2022-03-26 RX ADMIN — FAMOTIDINE 10 MG: 20 TABLET, FILM COATED ORAL at 08:56

## 2022-03-26 RX ADMIN — THEOPHYLLINE 300 MG: 300 TABLET, EXTENDED RELEASE ORAL at 08:56

## 2022-03-26 RX ADMIN — QUETIAPINE FUMARATE 25 MG: 25 TABLET ORAL at 21:15

## 2022-03-26 RX ADMIN — SACUBITRIL AND VALSARTAN 1 TABLET: 24; 26 TABLET, FILM COATED ORAL at 21:14

## 2022-03-26 RX ADMIN — OXYBUTYNIN CHLORIDE 5 MG: 5 TABLET ORAL at 16:50

## 2022-03-26 RX ADMIN — FAMOTIDINE 10 MG: 20 TABLET, FILM COATED ORAL at 21:15

## 2022-03-26 NOTE — PROGRESS NOTES
Problem: Falls - Risk of  Goal: *Absence of Falls  Description: Document Khai Harris Fall Risk and appropriate interventions in the flowsheet. Outcome: Progressing Towards Goal  Note: Fall Risk Interventions:            Medication Interventions: Patient to call before getting OOB    Elimination Interventions: Call light in reach    History of Falls Interventions: Door open when patient unattended,Room close to nurse's station         Problem: Patient Education: Go to Patient Education Activity  Goal: Patient/Family Education  Outcome: Progressing Towards Goal     Problem: Pressure Injury - Risk of  Goal: *Prevention of pressure injury  Description: Document Martin Scale and appropriate interventions in the flowsheet.   Outcome: Progressing Towards Goal  Note: Pressure Injury Interventions:  Sensory Interventions: Keep linens dry and wrinkle-free,Minimize linen layers    Moisture Interventions: Absorbent underpads,Minimize layers,Internal/External urinary devices    Activity Interventions: PT/OT evaluation    Mobility Interventions: Float heels    Nutrition Interventions: Offer support with meals,snacks and hydration                     Problem: Patient Education: Go to Patient Education Activity  Goal: Patient/Family Education  Outcome: Progressing Towards Goal

## 2022-03-26 NOTE — PROGRESS NOTES
Progress Note    Patient: Abraham Aly MRN: [id number]  SSN: [id number]    YOB: 1950  Age: 80 y.o. Sex: female      Admit Date: 3/24/2022    LOS: 2 days     Subjective:     80years old history of obstructive sleep apnea MI A. fib fluid overload CHF and hypertension presented with shortness of breath. Patient still complains of mild shortness of breath on mild exertion    Objective:     Vitals:    03/26/22 0800 03/26/22 0846 03/26/22 0856 03/26/22 1127   BP:       Pulse: 68  68 60   Resp:       Temp:       SpO2:  93%     Weight:       Height:            Intake and Output:  Current Shift: No intake/output data recorded. Last three shifts: 03/24 1901 - 03/26 0700  In: -   Out: 7400 [Urine:7400]    Physical Exam:   General:  Alert, cooperative, no distress, appears stated age. Eyes:  Conjunctivae/corneas clear. PERRL, EOMs intact. Fundi benign   Ears:  Normal TMs and external ear canals both ears. Nose: Nares normal. Septum midline. Mucosa normal. No drainage or sinus tenderness. Mouth/Throat: Lips, mucosa, and tongue normal. Teeth and gums normal.   Neck: Supple, symmetrical, trachea midline, no adenopathy, thyroid: no enlargment/tenderness/nodules, no carotid bruit and no JVD. Back:   Symmetric, no curvature. ROM normal. No CVA tenderness. Lungs:   Clear to auscultation bilaterally. Heart:  Regular rate and rhythm, S1, S2 normal, no murmur, click, rub or gallop. Abdomen:   Soft, non-tender. Bowel sounds normal. No masses,  No organomegaly. Extremities: Extremities normal, atraumatic, no cyanosis or edema. Pulses: 2+ and symmetric all extremities. Skin: Skin color, texture, turgor normal. No rashes or lesions   Lymph nodes: Cervical, supraclavicular, and axillary nodes normal.   Neurologic: CNII-XII intact. Normal strength, sensation and reflexes throughout. Lab/Data Review: All lab results for the last 24 hours reviewed.      Recent Results (from the past 24 hour(s))   GLUCOSE, POC    Collection Time: 03/25/22  9:18 PM   Result Value Ref Range    Glucose (POC) 156 (H) 65 - 117 mg/dL    Performed by Tania Estrada    TSH 3RD GENERATION    Collection Time: 03/26/22  7:14 AM   Result Value Ref Range    TSH 0.54 0.36 - 3.56 uIU/mL   METABOLIC PANEL, COMPREHENSIVE    Collection Time: 03/26/22  7:14 AM   Result Value Ref Range    Sodium 144 136 - 145 mmol/L    Potassium 4.2 3.5 - 5.1 mmol/L    Chloride 108 97 - 108 mmol/L    CO2 32 21 - 32 mmol/L    Anion gap 4 (L) 5 - 15 mmol/L    Glucose 93 65 - 100 mg/dL    BUN 24 (H) 6 - 20 mg/dL    Creatinine 1.38 (H) 0.55 - 1.02 mg/dL    BUN/Creatinine ratio 17 12 - 20      GFR est AA 46 (L) >60 ml/min/1.73m2    GFR est non-AA 38 (L) >60 ml/min/1.73m2    Calcium 8.1 (L) 8.5 - 10.1 mg/dL    Bilirubin, total 0.7 0.2 - 1.0 mg/dL    AST (SGOT) 20 15 - 37 U/L    ALT (SGPT) 24 12 - 78 U/L    Alk. phosphatase 230 (H) 45 - 117 U/L    Protein, total 5.5 (L) 6.4 - 8.2 g/dL    Albumin 2.3 (L) 3.5 - 5.0 g/dL    Globulin 3.2 2.0 - 4.0 g/dL    A-G Ratio 0.7 (L) 1.1 - 2.2     CBC WITH AUTOMATED DIFF    Collection Time: 03/26/22  7:14 AM   Result Value Ref Range    WBC 6.0 3.6 - 11.0 K/uL    RBC 4.62 3.80 - 5.20 M/uL    HGB 12.6 11.5 - 16.0 g/dL    HCT 42.8 35.0 - 47.0 %    MCV 92.6 80.0 - 99.0 FL    MCH 27.3 26.0 - 34.0 PG    MCHC 29.4 (L) 30.0 - 36.5 g/dL    RDW 15.9 (H) 11.5 - 14.5 %    PLATELET 084 937 - 291 K/uL    MPV 12.4 8.9 - 12.9 FL    NRBC 0.0 0.0  WBC    ABSOLUTE NRBC 0.00 0.00 - 0.01 K/uL    NEUTROPHILS 67 32 - 75 %    LYMPHOCYTES 19 12 - 49 %    MONOCYTES 12 5 - 13 %    EOSINOPHILS 1 0 - 7 %    BASOPHILS 1 0 - 1 %    IMMATURE GRANULOCYTES 0 0 - 0.5 %    ABS. NEUTROPHILS 4.0 1.8 - 8.0 K/UL    ABS. LYMPHOCYTES 1.1 0.8 - 3.5 K/UL    ABS. MONOCYTES 0.7 0.0 - 1.0 K/UL    ABS. EOSINOPHILS 0.1 0.0 - 0.4 K/UL    ABS. BASOPHILS 0.0 0.0 - 0.1 K/UL    ABS. IMM.  GRANS. 0.0 0.00 - 0.04 K/UL    DF AUTOMATED     NT-PRO BNP    Collection Time: 03/26/22  7:14 AM   Result Value Ref Range    NT pro-BNP 2,612 (H) <125 pg/mL   GLUCOSE, POC    Collection Time: 03/26/22  8:30 AM   Result Value Ref Range    Glucose (POC) 105 65 - 117 mg/dL    Performed by Mariela Woo (Trv)    GLUCOSE, POC    Collection Time: 03/26/22 10:49 AM   Result Value Ref Range    Glucose (POC) 179 (H) 65 - 117 mg/dL    Performed by Mariela Woo (Trv)          Assessment:     Active Problems:    CHF (congestive heart failure) ([facility] (3/24/2022)    A. fib  Fluid overload  CHF  Hypertension  It is mellitus type II  Plan:   IV diuretics  Lantus  Amiodarone    Signed By: Antoni Andrews MD     March 26, 2022

## 2022-03-26 NOTE — PROGRESS NOTES
Dual skin assessment completed on patient with Reymundo Herman RN. Skin is CDI. No open wounds noted. Large   Discoloration on right elbow.

## 2022-03-26 NOTE — PROGRESS NOTES
Progress Note      3/26/2022 10:31 AM  NAME: Gt Wynne   MRN:  [id number]   Admit Diagnosis: [facility]congest[facility] failure) ([facility] [I50.9]          Assessment/Plan:   Fluid overload, heart failure with preserved LV function,echo on this admission is normal LV function. Stable renal function and electrolytes. BNP is downtrending. Coronary artery disease. Mild troponin leak without peaking. Without chest pain? Type II MI    Hypertension, well controlled    History of atrial flutter, maintaining sinus rhythm, anticoagulated with Eliquis. Continues amiodarone to maintain sinus rhythm. Will TSH. Functions. []       High complexity decision making was performed in this patient at high risk for decompensation with multiple organ involvement. Subjective:     Gt Wynne denies chest pain, dyspnea. Discussed with RN events overnight. Review of Systems:    Symptom Y/N Comments  Symptom Y/N Comments   Fever/Chills N   Chest Pain N    Poor Appetite N   Edema N    Cough N   Abdominal Pain N    Sputum N   Joint Pain N    SOB/BUTCHER N   Pruritis/Rash N    Nausea/vomit N   Tolerating PT/OT Y    Diarrhea N   Tolerating Diet Y    Constipation N   Other       Could NOT obtain due to:      Objective:      Physical Exam:    Last 24hrs VS reviewed since prior progress note. Most recent are:    Visit Vitals  /71 (BP 1 Location: Left upper arm, BP Patient Position: At rest;Supine)   Pulse 68   Temp 97.6 °F (36.4 °C)   Resp 18   Ht 5' 3\" (1.6 m)   Wt 106.3 kg (234 lb 5.6 oz)   SpO2 93%   BMI 41.51 kg/m²       Intake/Output Summary (Last 24 hours) at 3/26/2022 1031  Last data filed at 3/26/2022 0449  Gross per 24 hour   Intake --   Output 4200 ml   Net -4200 ml        General Appearance: Overweight female well nourished, alert; no acute distress. Ears/Nose/Mouth/Throat: Hearing grossly normal; moist mucous membranes  Neck: Supple. Chest: Lungs clear to auscultation bilaterally.   Cardiovascular: Regular rate and rhythm, S1S2 normal, no murmur. Abdomen: Soft, non-tender, bowel sounds are active. Extremities: Mild edema bilaterally. Skin: Warm and dry. []         Post-cath site without hematoma, bruit, tenderness, or thrill. Distal pulses intact. PMH/SH reviewed - no change compared to H&P    Data Review    Telemetry:     EKG:   []  No new EKG for review    Lab Data Personally Reviewed:    Recent Labs     03/26/22  0714 03/24/22  1831   WBC 6.0 8.1   HGB 12.6 12.8   HCT 42.8 41.9    197     No results for input(s): INR, PTP, APTT, INREXT in the last 72 hours. Recent Labs     03/26/22  0714 03/24/22  1831    143   K 4.2 5.3*    111*   CO2 32 27   BUN 24* 25*   CREA 1.38* 1.30*   GLU 93 122*   CA 8.1* 8.1*     No results for input(s): CPK, CKNDX, TROIQ in the last 72 hours. No lab exists for component: CPKMB  Lab Results   Component Value Date/Time    Cholesterol, total 148 11/20/2021 08:08 AM    HDL Cholesterol 57 11/20/2021 08:08 AM    LDL, calculated 77 11/20/2021 08:08 AM    Triglyceride 70 11/20/2021 08:08 AM    CHOL/HDL Ratio 2.6 11/20/2021 08:08 AM       Recent Labs     03/26/22  0714 03/24/22  1831   * 246*   TP 5.5* 6.0*   ALB 2.3* 2.5*   GLOB 3.2 3.5     No results for input(s): PH, PCO2, PO2 in the last 72 hours.     Medications Personally Reviewed:    Current Facility-Administered Medications   Medication Dose Route Frequency    apixaban (ELIQUIS) tablet 5 mg  5 mg Oral BID    famotidine (PEPCID) tablet 10 mg  10 mg Oral BID    insulin glargine (LANTUS) injection 10 Units  10 Units SubCUTAneous QHS    nitroglycerin (NITROSTAT) tablet 0.4 mg  0.4 mg SubLINGual PRN    oxybutynin (DITROPAN) tablet 5 mg  5 mg Oral TID    albuterol (PROVENTIL HFA, VENTOLIN HFA, PROAIR HFA) inhaler 2 Puff  2 Puff Inhalation BID PRN    albuterol-ipratropium (DUO-NEB) 2.5 MG-0.5 MG/3 ML  3 mL Nebulization Q6H PRN    atorvastatin (LIPITOR) tablet 40 mg  40 mg Oral QHS    sacubitriL-valsartan (ENTRESTO) 24-26 mg tablet 1 Tablet  1 Tablet Oral BID    metOLazone (ZAROXOLYN) tablet 5 mg  5 mg Oral DAILY    metoprolol succinate (TOPROL-XL) XL tablet 25 mg  25 mg Oral DAILY    budesonide-formoteroL (SYMBICORT) 160-4.5 mcg/actuation HFA inhaler 2 Puff  2 Puff Inhalation BID RT    QUEtiapine (SEROquel) tablet 25 mg  25 mg Oral QHS    theophylline ER(12 hour) (THEOCHRON) tablet 300 mg  300 mg Oral DAILY    polyethylene glycol (MIRALAX) packet 17 g  17 g Oral DAILY PRN    ondansetron (ZOFRAN ODT) tablet 4 mg  4 mg Oral Q8H PRN    Or    ondansetron (ZOFRAN) injection 4 mg  4 mg IntraVENous Q6H PRN    furosemide (LASIX) injection 40 mg  40 mg IntraVENous BID    amiodarone (CORDARONE) tablet 200 mg  200 mg Oral DAILY    acetaminophen (TYLENOL) tablet 650 mg  650 mg Oral Q6H PRN         Sailaja Servin MD

## 2022-03-27 LAB
GLUCOSE BLD STRIP.AUTO-MCNC: 109 MG/DL (ref 65–117)
GLUCOSE BLD STRIP.AUTO-MCNC: 113 MG/DL (ref 65–117)
GLUCOSE BLD STRIP.AUTO-MCNC: 161 MG/DL (ref 65–117)
PERFORMED BY, TECHID: ABNORMAL
PERFORMED BY, TECHID: NORMAL
PERFORMED BY, TECHID: NORMAL

## 2022-03-27 PROCEDURE — 94761 N-INVAS EAR/PLS OXIMETRY MLT: CPT

## 2022-03-27 PROCEDURE — 77010033678 HC OXYGEN DAILY

## 2022-03-27 PROCEDURE — 94640 AIRWAY INHALATION TREATMENT: CPT

## 2022-03-27 PROCEDURE — 82962 GLUCOSE BLOOD TEST: CPT

## 2022-03-27 PROCEDURE — 74011250637 HC RX REV CODE- 250/637: Performed by: INTERNAL MEDICINE

## 2022-03-27 PROCEDURE — 74011250636 HC RX REV CODE- 250/636: Performed by: FAMILY MEDICINE

## 2022-03-27 PROCEDURE — 74011250637 HC RX REV CODE- 250/637: Performed by: FAMILY MEDICINE

## 2022-03-27 PROCEDURE — 65270000029 HC RM PRIVATE

## 2022-03-27 PROCEDURE — 74011636637 HC RX REV CODE- 636/637: Performed by: FAMILY MEDICINE

## 2022-03-27 RX ADMIN — INSULIN GLARGINE 10 UNITS: 100 INJECTION, SOLUTION SUBCUTANEOUS at 21:16

## 2022-03-27 RX ADMIN — OXYBUTYNIN CHLORIDE 5 MG: 5 TABLET ORAL at 16:52

## 2022-03-27 RX ADMIN — FUROSEMIDE 40 MG: 10 INJECTION, SOLUTION INTRAMUSCULAR; INTRAVENOUS at 21:16

## 2022-03-27 RX ADMIN — METOPROLOL SUCCINATE 25 MG: 25 TABLET, EXTENDED RELEASE ORAL at 09:02

## 2022-03-27 RX ADMIN — FAMOTIDINE 10 MG: 20 TABLET, FILM COATED ORAL at 21:16

## 2022-03-27 RX ADMIN — QUETIAPINE FUMARATE 25 MG: 25 TABLET ORAL at 21:17

## 2022-03-27 RX ADMIN — SACUBITRIL AND VALSARTAN 1 TABLET: 24; 26 TABLET, FILM COATED ORAL at 21:15

## 2022-03-27 RX ADMIN — FUROSEMIDE 40 MG: 10 INJECTION, SOLUTION INTRAMUSCULAR; INTRAVENOUS at 09:03

## 2022-03-27 RX ADMIN — BUDESONIDE AND FORMOTEROL FUMARATE DIHYDRATE 2 PUFF: 160; 4.5 AEROSOL RESPIRATORY (INHALATION) at 21:00

## 2022-03-27 RX ADMIN — METOLAZONE 5 MG: 5 TABLET ORAL at 09:03

## 2022-03-27 RX ADMIN — APIXABAN 5 MG: 5 TABLET, FILM COATED ORAL at 21:16

## 2022-03-27 RX ADMIN — OXYBUTYNIN CHLORIDE 5 MG: 5 TABLET ORAL at 21:16

## 2022-03-27 RX ADMIN — FAMOTIDINE 10 MG: 20 TABLET, FILM COATED ORAL at 09:02

## 2022-03-27 RX ADMIN — OXYBUTYNIN CHLORIDE 5 MG: 5 TABLET ORAL at 09:02

## 2022-03-27 RX ADMIN — BUDESONIDE AND FORMOTEROL FUMARATE DIHYDRATE 2 PUFF: 160; 4.5 AEROSOL RESPIRATORY (INHALATION) at 08:18

## 2022-03-27 RX ADMIN — SACUBITRIL AND VALSARTAN 1 TABLET: 24; 26 TABLET, FILM COATED ORAL at 09:05

## 2022-03-27 RX ADMIN — AMIODARONE HYDROCHLORIDE 200 MG: 200 TABLET ORAL at 09:02

## 2022-03-27 RX ADMIN — ATORVASTATIN CALCIUM 40 MG: 40 TABLET, FILM COATED ORAL at 21:17

## 2022-03-27 RX ADMIN — THEOPHYLLINE 300 MG: 300 TABLET, EXTENDED RELEASE ORAL at 09:03

## 2022-03-27 RX ADMIN — APIXABAN 5 MG: 5 TABLET, FILM COATED ORAL at 09:02

## 2022-03-27 NOTE — PROGRESS NOTES
Problem: Falls - Risk of  Goal: *Absence of Falls  Description: Document Ronny Booth Fall Risk and appropriate interventions in the flowsheet. Outcome: Progressing Towards Goal  Note: Fall Risk Interventions:  Mobility Interventions: PT Consult for mobility concerns,Patient to call before getting OOB         Medication Interventions: Patient to call before getting OOB,Teach patient to arise slowly    Elimination Interventions: Call light in reach    History of Falls Interventions: Door open when patient unattended,Room close to nurse's station         Problem: Pressure Injury - Risk of  Goal: *Prevention of pressure injury  Description: Document Martin Scale and appropriate interventions in the flowsheet.   Outcome: Progressing Towards Goal  Note: Pressure Injury Interventions:  Sensory Interventions: Keep linens dry and wrinkle-free,Minimize linen layers    Moisture Interventions: Absorbent underpads,Internal/External urinary devices,Minimize layers    Activity Interventions: PT/OT evaluation    Mobility Interventions: PT/OT evaluation    Nutrition Interventions: Offer support with meals,snacks and hydration

## 2022-03-27 NOTE — PROGRESS NOTES
3034 Assumed care of patient after receiving report from previous shift  0815 Assessment done. Patient seems sleepy and falls asleep while eating/drinking. 1030 Up to [facility] with assistance. Tolerated will. 1200 Dr. In to see and would like patient to be up sitting in a chair for meals. Lunch already here and informed patient of dr's wishes. 1700 Checking on patient she still eating while in bed. Reminded her of request to eat meals out of the bed. 1830 Up in chair for wash up and linen change.  Did very well ambulating to chair with minimal assist.  1900 Report given to oncoming shift

## 2022-03-27 NOTE — PROGRESS NOTES
Progress Note    Patient: Julio Ocampo MRN: [id number]  SSN: [id number]    YOB: 1950  Age: 80 y.o. Sex: female      Admit Date: 3/24/2022    LOS: 3 days     Subjective:     80years old with atrial fibrillation, congestive heart failure, fluid overload, hypertension diabetes mellitus type 2 on IV diuretics, amiodarone. She has been complains of chest pain    Objective:     Vitals:    03/27/22 0352 03/27/22 0400 03/27/22 0727 03/27/22 0820   BP: 124/66  121/62    Pulse: 69 66 63    Resp: 18  20    Temp: 98.6 °F (37 °C)  97.9 °F (36.6 °C)    SpO2: 96%  98% 96%   Weight:       Height:            Intake and Output:  Current Shift: 03/27 0701 - 03/27 1900  In: -   Out: 800 [Urine:800]  Last three shifts: 03/25 1901 - 03/27 0700  In: 960 [P.O.:960]  Out: 6150 [Urine:6150]    Physical Exam:   General:  Alert, cooperative, no distress, appears stated age. Eyes:  Conjunctivae/corneas clear. PERRL, EOMs intact. Fundi benign   Ears:  Normal TMs and external ear canals both ears. Nose: Nares normal. Septum midline. Mucosa normal. No drainage or sinus tenderness. Mouth/Throat: Lips, mucosa, and tongue normal. Teeth and gums normal.   Neck: Supple, symmetrical, trachea midline, no adenopathy, thyroid: no enlargment/tenderness/nodules, no carotid bruit and no JVD. Back:   Symmetric, no curvature. ROM normal. No CVA tenderness. Lungs:   Clear to auscultation bilaterally. Heart:  Regular rate and rhythm, S1, S2 normal, no murmur, click, rub or gallop. Abdomen:   Soft, non-tender. Bowel sounds normal. No masses,  No organomegaly. Extremities: Extremities normal, atraumatic, no cyanosis or edema. Pulses: 2+ and symmetric all extremities. Skin: Skin color, texture, turgor normal. No rashes or lesions   Lymph nodes: Cervical, supraclavicular, and axillary nodes normal.   Neurologic: CNII-XII intact. Normal strength, sensation and reflexes throughout. Lab/Data Review:   All lab results for the last 24 hours reviewed.      Recent Results (from the past 24 hour(s))   GLUCOSE, POC    Collection Time: 03/26/22  4:25 PM   Result Value Ref Range    Glucose (POC) 125 (H) 65 - 117 mg/dL    Performed by Ana Lawson (Trvlr)    GLUCOSE, POC    Collection Time: 03/26/22  9:01 PM   Result Value Ref Range    Glucose (POC) 162 (H) 65 - 117 mg/dL    Performed by Lizzie Segal          Assessment:     Active Problems:    CHF (congestive heart failure) ([facility] (3/24/2022)    Atrial fibrillation  Fluid overload  CHF  Hypertension  Diabetes mellitus type 2  Acute hypoxic respiratory failure on oxygen    Plan:     PT and OT monitor electrolytes    Signed By: Nirali Edmonds MD     March 27, 2022

## 2022-03-27 NOTE — PROGRESS NOTES
Progress Note      3/27/2022 10:31 AM  NAME: Sahil Saleh   MRN:  [id number]   Admit Diagnosis: CH[facility] (congestive [facility] failure) [facility] [I50.9]          Assessment/Plan:   Fluid overload, heart failure with preserved LV function,echo on this admission is normal LV function. Patient is feeling better. Wants to get out of bed. Coronary artery disease. Mild troponin leak without peaking. Without chest pain? Type II MI    Hypertension, well controlled    History of atrial flutter, maintaining sinus rhythm, anticoagulated with Eliquis. Continues amiodarone to maintain sinus rhythm. TSH normal.  Liver functions are also normal           []       High complexity decision making was performed in this patient at high risk for decompensation with multiple organ involvement. Subjective:     Sahil Saleh denies chest pain, dyspnea. Discussed with RN events overnight. Review of Systems:    Symptom Y/N Comments  Symptom Y/N Comments   Fever/Chills N   Chest Pain N    Poor Appetite N   Edema N    Cough N   Abdominal Pain N    Sputum N   Joint Pain N    SOB/BUTCHER N   Pruritis/Rash N    Nausea/vomit N   Tolerating PT/OT Y    Diarrhea N   Tolerating Diet Y    Constipation N   Other       Could NOT obtain due to:      Objective:      Physical Exam:    Last 24hrs VS reviewed since prior progress note. Most recent are:    Visit Vitals  /62 (BP 1 Location: Left upper arm, BP Patient Position: At rest)   Pulse 63   Temp 97.9 °F (36.6 °C)   Resp 20   Ht 5' 3\" (1.6 m)   Wt 104.8 kg (231 lb 0.7 oz)   SpO2 96%   BMI 40.93 kg/m²       Intake/Output Summary (Last 24 hours) at 3/27/2022 1006  Last data filed at 3/27/2022 1543  Gross per 24 hour   Intake 960 ml   Output 3850 ml   Net -2890 ml        General Appearance: Overweight female well nourished, alert; no acute distress. Ears/Nose/Mouth/Throat: Hearing grossly normal; moist mucous membranes  Neck: Supple.   Chest: Lungs clear to auscultation bilaterally. Cardiovascular: Regular rate and rhythm, S1S2 normal, no murmur. Abdomen: Soft, non-tender, bowel sounds are active. Extremities: Mild edema bilaterally. Skin: Warm and dry. []         Post-cath site without hematoma, bruit, tenderness, or thrill. Distal pulses intact. PMH/SH reviewed - no change compared to H&P    Data Review    Telemetry:     EKG:   []  No new EKG for review    Lab Data Personally Reviewed:    Recent Labs     03/26/22  0714 03/24/22 1831   WBC 6.0 8.1   HGB 12.6 12.8   HCT 42.8 41.9    197     No results for input(s): INR, PTP, APTT, INREXT, INREXT in the last 72 hours. Recent Labs     03/26/22  0714 03/24/22 1831    143   K 4.2 5.3*    111*   CO2 32 27   BUN 24* 25*   CREA 1.38* 1.30*   GLU 93 122*   CA 8.1* 8.1*     No results for input(s): CPK, CKNDX, TROIQ in the last 72 hours. No lab exists for component: CPKMB  Lab Results   Component Value Date/Time    Cholesterol, total 148 11/20/2021 08:08 AM    HDL Cholesterol 57 11/20/2021 08:08 AM    LDL, calculated 77 11/20/2021 08:08 AM    Triglyceride 70 11/20/2021 08:08 AM    CHOL/HDL Ratio 2.6 11/20/2021 08:08 AM       Recent Labs     03/26/22  0714 03/24/22  1831   * 246*   TP 5.5* 6.0*   ALB 2.3* 2.5*   GLOB 3.2 3.5     No results for input(s): PH, PCO2, PO2 in the last 72 hours.     Medications Personally Reviewed:    Current Facility-Administered Medications   Medication Dose Route Frequency    apixaban (ELIQUIS) tablet 5 mg  5 mg Oral BID    famotidine (PEPCID) tablet 10 mg  10 mg Oral BID    insulin glargine (LANTUS) injection 10 Units  10 Units SubCUTAneous QHS    nitroglycerin (NITROSTAT) tablet 0.4 mg  0.4 mg SubLINGual PRN    oxybutynin (DITROPAN) tablet 5 mg  5 mg Oral TID    albuterol (PROVENTIL HFA, VENTOLIN HFA, PROAIR HFA) inhaler 2 Puff  2 Puff Inhalation BID PRN    albuterol-ipratropium (DUO-NEB) 2.5 MG-0.5 MG/3 ML  3 mL Nebulization Q6H PRN    atorvastatin (LIPITOR) tablet 40 mg  40 mg Oral QHS    sacubitriL-valsartan (ENTRESTO) 24-26 mg tablet 1 Tablet  1 Tablet Oral BID    metOLazone (ZAROXOLYN) tablet 5 mg  5 mg Oral DAILY    metoprolol succinate (TOPROL-XL) XL tablet 25 mg  25 mg Oral DAILY    budesonide-formoteroL (SYMBICORT) 160-4.5 mcg/actuation HFA inhaler 2 Puff  2 Puff Inhalation BID RT    QUEtiapine (SEROquel) tablet 25 mg  25 mg Oral QHS    theophylline ER(12 hour) (THEOCHRON) tablet 300 mg  300 mg Oral DAILY    polyethylene glycol (MIRALAX) packet 17 g  17 g Oral DAILY PRN    ondansetron (ZOFRAN ODT) tablet 4 mg  4 mg Oral Q8H PRN    Or    ondansetron (ZOFRAN) injection 4 mg  4 mg IntraVENous Q6H PRN    furosemide (LASIX) injection 40 mg  40 mg IntraVENous BID    amiodarone (CORDARONE) tablet 200 mg  200 mg Oral DAILY    acetaminophen (TYLENOL) tablet 650 mg  650 mg Oral Q6H PRN         Kelli Bobo MD

## 2022-03-28 VITALS
HEIGHT: 63 IN | SYSTOLIC BLOOD PRESSURE: 120 MMHG | RESPIRATION RATE: 16 BRPM | DIASTOLIC BLOOD PRESSURE: 72 MMHG | HEART RATE: 72 BPM | OXYGEN SATURATION: 100 % | TEMPERATURE: 99 F | BODY MASS INDEX: 37.7 KG/M2 | WEIGHT: 212.74 LBS

## 2022-03-28 LAB
ANION GAP SERPL CALC-SCNC: 2 MMOL/L (ref 5–15)
BUN SERPL-MCNC: 30 MG/DL (ref 6–20)
BUN/CREAT SERPL: 22 (ref 12–20)
CA-I BLD-MCNC: 8.4 MG/DL (ref 8.5–10.1)
CHLORIDE SERPL-SCNC: 96 MMOL/L (ref 97–108)
CO2 SERPL-SCNC: 41 MMOL/L (ref 21–32)
CREAT SERPL-MCNC: 1.39 MG/DL (ref 0.55–1.02)
GLUCOSE BLD STRIP.AUTO-MCNC: 110 MG/DL (ref 65–117)
GLUCOSE BLD STRIP.AUTO-MCNC: 282 MG/DL (ref 65–117)
GLUCOSE BLD STRIP.AUTO-MCNC: 89 MG/DL (ref 65–117)
GLUCOSE SERPL-MCNC: 97 MG/DL (ref 65–100)
PERFORMED BY, TECHID: ABNORMAL
PERFORMED BY, TECHID: NORMAL
PERFORMED BY, TECHID: NORMAL
POTASSIUM SERPL-SCNC: 3.8 MMOL/L (ref 3.5–5.1)
SODIUM SERPL-SCNC: 139 MMOL/L (ref 136–145)

## 2022-03-28 PROCEDURE — 97161 PT EVAL LOW COMPLEX 20 MIN: CPT

## 2022-03-28 PROCEDURE — 77010033678 HC OXYGEN DAILY

## 2022-03-28 PROCEDURE — 80048 BASIC METABOLIC PNL TOTAL CA: CPT

## 2022-03-28 PROCEDURE — 36415 COLL VENOUS BLD VENIPUNCTURE: CPT

## 2022-03-28 PROCEDURE — 74011250637 HC RX REV CODE- 250/637: Performed by: FAMILY MEDICINE

## 2022-03-28 PROCEDURE — 74011250636 HC RX REV CODE- 250/636: Performed by: FAMILY MEDICINE

## 2022-03-28 PROCEDURE — 82962 GLUCOSE BLOOD TEST: CPT

## 2022-03-28 PROCEDURE — 97530 THERAPEUTIC ACTIVITIES: CPT

## 2022-03-28 PROCEDURE — 94761 N-INVAS EAR/PLS OXIMETRY MLT: CPT

## 2022-03-28 PROCEDURE — 74011250637 HC RX REV CODE- 250/637: Performed by: INTERNAL MEDICINE

## 2022-03-28 PROCEDURE — 94640 AIRWAY INHALATION TREATMENT: CPT

## 2022-03-28 RX ORDER — FAMOTIDINE 10 MG/1
10 TABLET ORAL 2 TIMES DAILY
Qty: 30 TABLET | Refills: 0 | Status: SHIPPED | OUTPATIENT
Start: 2022-03-28

## 2022-03-28 RX ORDER — AMIODARONE HYDROCHLORIDE 200 MG/1
200 TABLET ORAL DAILY
Qty: 30 TABLET | Refills: 0 | Status: SHIPPED | OUTPATIENT
Start: 2022-03-29

## 2022-03-28 RX ORDER — FUROSEMIDE 40 MG/1
TABLET ORAL
Qty: 60 TABLET | Refills: 0 | Status: SHIPPED | OUTPATIENT
Start: 2022-03-28

## 2022-03-28 RX ORDER — QUETIAPINE FUMARATE 25 MG/1
25 TABLET, FILM COATED ORAL
Qty: 30 TABLET | Refills: 0 | Status: SHIPPED | OUTPATIENT
Start: 2022-03-28

## 2022-03-28 RX ORDER — IPRATROPIUM BROMIDE AND ALBUTEROL SULFATE 2.5; .5 MG/3ML; MG/3ML
3 SOLUTION RESPIRATORY (INHALATION)
Qty: 30 NEBULE | Refills: 1 | Status: SHIPPED | OUTPATIENT
Start: 2022-03-28

## 2022-03-28 RX ADMIN — OXYBUTYNIN CHLORIDE 5 MG: 5 TABLET ORAL at 09:51

## 2022-03-28 RX ADMIN — BUDESONIDE AND FORMOTEROL FUMARATE DIHYDRATE 2 PUFF: 160; 4.5 AEROSOL RESPIRATORY (INHALATION) at 08:26

## 2022-03-28 RX ADMIN — FUROSEMIDE 40 MG: 10 INJECTION, SOLUTION INTRAMUSCULAR; INTRAVENOUS at 09:51

## 2022-03-28 RX ADMIN — AMIODARONE HYDROCHLORIDE 200 MG: 200 TABLET ORAL at 09:51

## 2022-03-28 RX ADMIN — METOPROLOL SUCCINATE 25 MG: 25 TABLET, EXTENDED RELEASE ORAL at 09:51

## 2022-03-28 RX ADMIN — FAMOTIDINE 10 MG: 20 TABLET, FILM COATED ORAL at 09:51

## 2022-03-28 RX ADMIN — SACUBITRIL AND VALSARTAN 1 TABLET: 24; 26 TABLET, FILM COATED ORAL at 09:55

## 2022-03-28 RX ADMIN — METOLAZONE 5 MG: 5 TABLET ORAL at 09:51

## 2022-03-28 RX ADMIN — THEOPHYLLINE 300 MG: 300 TABLET, EXTENDED RELEASE ORAL at 09:51

## 2022-03-28 RX ADMIN — APIXABAN 5 MG: 5 TABLET, FILM COATED ORAL at 09:51

## 2022-03-28 NOTE — DISCHARGE INSTRUCTIONS
Discharge Instructions       PATIENT ID: Young Paniagua  MRN: [id number]   YOB: 1950    DATE OF ADMISSION: 3/24/2022  6:02 PM    DATE OF DISCHARGE: 3/28/2022    PRIMARY CARE PROVIDER: Shahram Alvarez MD     ATTENDING PHYSICIAN: Sultana Lindquist MD  DISCHARGING PROVIDER: Theodore Kelly MD    To contact this individual call [phone] and ask the  to page. If unavailable ask to be transferred the Adult Hospitalist Department. DISCHARGE DIAGNOSES CHF    CONSULTATIONS: IP CONSULT TO CARDIOLOGY    PROCEDURES/SURGERIES: * No surgery found *    PENDING TEST RESULTS:   At the time of discharge the following test results are still pending: None    FOLLOW UP APPOINTMENTS:   Follow-up Information     Follow up With Specialties Details Why Contact Info    Shahram Alvarez MD Internal Medicine In 1 week  [address] [place] [id number] [phone]             ADDITIONAL CARE RECOMMENDATIONS: Follow-up with PCP    DIET: Diabetic Diet      ACTIVITY: Activity as tolerated    Wound care: Wound Care Order: submitted to [facility] Please view [email or web address]    EQUIPMENT needed: None      DISCHARGE MEDICATIONS:   See Medication Reconciliation Form    · It is important that you take the medication exactly as they are prescribed. · Keep your medication in the bottles provided by the pharmacist and keep a list of the medication names, dosages, and times to be taken in your wallet. · Do not take other medications without consulting your doctor. NOTIFY YOUR PHYSICIAN FOR ANY OF THE FOLLOWING:   Fever over 101 degrees for 24 hours. Chest pain, shortness of breath, fever, chills, nausea, vomiting, diarrhea, change in mentation, falling, weakness, bleeding. Severe pain or pain not relieved by medications. Or, any other signs or symptoms that you may have questions about.       DISPOSITION:    Home With:   OT  PT  [facility]  RN       SNF/Inpatient Rehab/LTAC Independent/assisted living    Hospice    Other:         PROBLEM LIST Updated:  Yes ***       Signed:   Carlee Seip, MD  3/28/2022  12:01 PM     Discharge Instructions       PATIENT ID: Sandhya Ventura  MRN: [id number]   YOB: 1950    DATE OF ADMISSION: 3/24/2022  6:02 PM    DATE OF DISCHARGE: 3/28/2022    PRIMARY CARE PROVIDER: Stacie Obrien MD     ATTENDING PHYSICIAN: Jamie Cotter MD  DISCHARGING PROVIDER: Carlee Seip, MD    To contact this individual call [phone] and ask the  to page. If unavailable ask to be transferred the Adult Hospitalist Department. DISCHARGE DIAGNOSES ***    CONSULTATIONS: IP CONSULT TO CARDIOLOGY    PROCEDURES/SURGERIES: * No surgery found *    PENDING TEST RESULTS:   At the time of discharge the following test results are still pending: ***    FOLLOW UP APPOINTMENTS:   Follow-up Information     Follow up With Specialties Details Why Contact Info    Stacie Obrien MD Internal Medicine In 1 week  [address] [place] [id number] [phone]             ADDITIONAL CARE RECOMMENDATIONS: ***    DIET: {diet:05561}      ACTIVITY: {discharge activity:12335}    Wound care: {[facility]ound [facility] Instructions:68597}    EQUIPMENT needed: ***      DISCHARGE MEDICATIONS:   See Medication Reconciliation Form    · It is important that you take the medication exactly as they are prescribed. · Keep your medication in the bottles provided by the pharmacist and keep a list of the medication names, dosages, and times to be taken in your wallet. · Do not take other medications without consulting your doctor. NOTIFY YOUR PHYSICIAN FOR ANY OF THE FOLLOWING:   Fever over 101 degrees for 24 hours. Chest pain, shortness of breath, fever, chills, nausea, vomiting, diarrhea, change in mentation, falling, weakness, bleeding. Severe pain or pain not relieved by medications.   Or, any other signs or symptoms that you may have questions about.      DISPOSITION:    Home With:   OT  PT  HH  RN       SNF/Inpatient Rehab/LTAC    Independent/assisted living    Hospice    Other:         PROBLEM LIST Updated:  Yes ***       Signed:   [id number] Dieudonne Hull MD  3/28/2022  12:02 PM

## 2022-03-28 NOTE — PROGRESS NOTES
PROGRESS NOTE    Patient: Adelita Gaston MRN: [id number]  SSN: [id number]    YOB: 1950  Age: 80 y.o. Sex: female      Admit Date: 3/24/2022    LOS: 4 days       Subjective     Chief Complaint   Patient presents with    Shortness of Breath       HPI: HPI: Adelita Gaston is a(n) 80 y.o. female with PMH significant for COPD, MI with CABG, IFTIKHAR, CHF, CAD, DM, HLD, HTN, cholecystectomy. Presents for increased SOB, dyspnea on exertion and lower extremity edema. Denies CP and palpitations. Patient is poor historian and unable to give date of when sx started. She also notes productive cough worse at night. She wears 4L O2 nasal cannula at home and is currently on the same in the ED.     CXR shows cardiomegaly and possible pericardial effusion, unchanged from previous. Cardiology seen pt in the ER and suggests admission and monitoring labs. Notes recent echo and cath last year.     Labs remarkable for troponin > 100, BNP 5027, K 5.3, creatinine 1.3.    3/28: Patient seen resting in bed on 4L nasal cannula. Notes SOB still present but has improved over the weekend. Denies CP, palpitations, abdominal pain, N/V/D, fever, chills. Also c/o painful swelling in the legs that has improved over the weekend. She is currently on Lasix 40mg BID. Labs: CO2 41. Most recent troponin trending down at 92. BNP trending down at 2,612  Repeat echo on this admission shows EF 50-65 with mild mitral regurgitation.       Past Medical History:   Diagnosis Date    Chronic obstructive pulmonary disease (HCC)      [facility]est[facility] ([facility]      Coronary artery disease      Diabetes ([facility]      Hyperlipidemia      Hypertension      Myocardial infarct (HCC)      IFTIKHAR (obstructive sleep apnea)                 Past Surgical History:   Procedure Laterality Date    HX BREAST BIOPSY        HX CHOLECYSTECTOMY        HX CORONARY ARTERY BYPASS GRAFT        HX HERNIA REPAIR        HX HYSTERECTOMY                     Prior to Admission medications    Medication Sig Start Date End Date Taking? Authorizing Provider   acetaminophen-codeine (TYLENOL #4) 300-60 mg tab Take 1 Tablet by mouth every six (6) hours as needed for Severe Pain. 10/19/21     Provider, Historical   Narcan 4 mg/actuation nasal spray 1 [place] by IntraNASal route as needed for Overdose. 10/25/21     Provider, Historical   HYDROcodone-chlorpheniramine (TUSSIONEX) 10-8 mg/5 mL suspension Take 5 mL by mouth every six (6) hours as needed for Cough. 10/31/21     Provider, Historical   theophylline ER,12 hour, (THEOCHRON) 300 mg tablet Take 300 mg by mouth daily. 9/19/21     Provider, Historical   amiodarone (PACERONE) 400 mg tablet Take 400 mg by mouth daily. 9/25/21     Provider, Historical   atorvastatin (LIPITOR) 40 mg tablet Take 40 mg by mouth nightly. 11/13/21     Provider, Historical   metoprolol succinate (TOPROL-XL) 25 mg XL tablet Take 25 mg by mouth daily. 11/17/21     Provider, Historical   Entresto 24-26 mg tablet Take 1 Tablet by mouth two (2) times a day. 11/13/21     Provider, Historical   metOLazone (ZAROXOLYN) 5 mg tablet Take 5 mg by mouth daily. 11/17/21     Provider, Historical   torsemide (DEMADEX) 20 mg tablet Take 20 mg by mouth two (2) times a day. 11/13/21     Provider, Historical   potassium chloride SA (MICRO-K) 10 mEq capsule Take 20 mEq by mouth daily. 11/17/21     Provider, Historical   famotidine (PEPCID) 10 mg tablet Take 1 Tablet by mouth two (2) times a day. 6/29/21     Efrain Alvarado MD   nitroglycerin (NITROSTAT) 0.4 mg SL tablet Sit/Lay down then put one tab under the tongue every 5 minutes as needed for chest pain/neck or jaw pain for 3 doses. Seek immediate medical attention should you need more than one tab for pain to resolve. 3/15/21     Marvina Moritz, MD   apixaban (ELIQUIS) 5 mg tablet Take 1 Tab by mouth two (2) times a day.  3/14/21     Denita Huitron NP   oxybutynin (DITROPAN) 5 mg tablet Take 1 Tab by mouth three (3) times daily. 3/14/21     Liya Napierr, MARIA ESTHER   insulin glargine (LANTUS) 100 unit/mL injection 10 Units by SubCUTAneous route nightly. May give in pen  Indications: type 2 diabetes mellitus 3/14/21     Franklinia Riser, NP   QUEtiapine (SEROqueL) 25 mg tablet Take 25 mg by mouth nightly.       Provider, Historical   albuterol-ipratropium (DUO-NEB) 2.5 mg-0.5 mg/3 ml nebu 3 mL by Nebulization route every six (6) hours as needed for Wheezing.       Provider, Historical   mometasone-formoterol (Dulera) 200-5 mcg/actuation HFA inhaler Take 2 Puffs by inhalation two (2) times a day.       Provider, Historical   albuterol (Ventolin HFA) 90 mcg/actuation inhaler Take 2 Puffs by inhalation two (2) times daily as needed for Wheezing.       Provider, Historical              Allergies   Allergen Reactions    Penicillins Other (comments)    Tylox [Oxycodone-Acetaminophen] Hives               Family History   Problem Relation Age of Onset    Heart Disease Mother      Kidney Disease Mother      Cancer Maternal Grandmother              Review of Systems   Constitutional: Negative for chills and fever. Eyes: Negative for blurred vision. Respiratory: Positive for cough, sputum production and shortness of breath. Negative for wheezing. Cardiovascular: Positive for leg swelling. Negative for chest pain and palpitations. Gastrointestinal: Negative for abdominal pain, diarrhea, nausea and vomiting. Genitourinary: Negative for dysuria. Neurological: Negative for dizziness, loss of consciousness and headaches. Objective     Visit Vitals  /67 (BP 1 Location: Left upper arm, BP Patient Position: At rest)   Pulse 61   Temp 98.2 °F (36.8 °C)   Resp 20   Ht 5' 3\" (1.6 m)   Wt 212 lb 11.9 oz (96.5 kg)   SpO2 94%   BMI 37.69 kg/m²    O2 Flow Rate (L/min): 4 l/min O2 Device: Nasal cannula    Physical Exam:   Physical Exam:   Physical Exam  Constitutional:       General: She is not in acute distress.   HENT:      Head: Normocephalic and atraumatic. Cardiovascular:      Rate and Rhythm: Normal rate and regular rhythm. Pulmonary:      Effort: Pulmonary effort is normal. No respiratory distress. Breath sounds: Normal breath sounds. No wheezing, rhonchi or rales. Abdominal:      Palpations: Abdomen is soft. Tenderness: There is no abdominal tenderness. There is no guarding or rebound. Musculoskeletal:      Right lower leg: Edema present. Left lower leg: Edema present. Skin:     General: Skin is warm and dry. Neurological:      Mental Status: She is alert and oriented to person, place, and time. Intake & Output:  Current Shift: No intake/output data recorded. Last three shifts: 03/26 1901 - 03/28 0700  In: 240 [P.O.:240]  Out: 4650 [Urine:4650]    Lab/Data Review:   All lab data reviewed      24 Hour Results:    Recent Results (from the past 24 hour(s))   GLUCOSE, POC    Collection Time: 03/27/22  1:41 PM   Result Value Ref Range    Glucose (POC) 161 (H) 65 - 117 mg/dL    Performed by Alexander Brown, POC    Collection Time: 03/27/22  5:25 PM   Result Value Ref Range    Glucose (POC) 113 65 - 117 mg/dL    Performed by Alexander Brown, POC    Collection Time: 03/27/22  8:29 PM   Result Value Ref Range    Glucose (POC) 109 65 - 117 mg/dL    Performed by Alexander Cassandra    METABOLIC PANEL, BASIC    Collection Time: 03/28/22  6:08 AM   Result Value Ref Range    Sodium 139 136 - 145 mmol/L    Potassium 3.8 3.5 - 5.1 mmol/L    Chloride 96 (L) 97 - 108 mmol/L    CO2 41 (HH) 21 - 32 mmol/L    Anion gap 2 (L) 5 - 15 mmol/L    Glucose 97 65 - 100 mg/dL    BUN 30 (H) 6 - 20 mg/dL    Creatinine 1.39 (H) 0.55 - 1.02 mg/dL    BUN/Creatinine ratio 22 (H) 12 - 20      GFR est AA 45 (L) >60 ml/min/1.73m2    GFR est non-AA 37 (L) >60 ml/min/1.73m2    Calcium 8.4 (L) 8.5 - 10.1 mg/dL   GLUCOSE, POC    Collection Time: 03/28/22  7:46 AM   Result Value Ref Range    Glucose (POC) 89 65 - 117 mg/dL Performed by Ranjana Longoria          Imaging:    XR CHEST PORT   Final Result   1. Unchanged cardiomegaly and/or pericardial effusion, vascular congestion,   interstitial edema. Echo 3/25:       Left Ventricle: Left ventricle size is normal. Increased wall thickness. Normal wall motion. Normal left ventricular systolic function with a visually estimated EF of 50 - 65%. Normal diastolic function.   Mitral Valve: Mild transvalvular regurgitation.   Left Atrium: Left atrium is mildly dilated.       Assessment     CHF exacerbation  Elevated troponin  Hyperkalemia  CAD  Hypertension  History of atrial flutter    Plan     Admit to medical telemetry floor  Consult cardiology  Consult pulmonology  Repeat CBC, CMP, BNP daily    PT OT     Eliquis 5mg BID  Pepcid 10mg BID  Lantus 10 units QHS  Nitrostat 0.4mg PRN  Oxybutynin 5mg TID  Albuterol 2 puff BID PRN  Duo-neb Q6H PRN  Amiodarone 400mg daily  Lipitor 40mg QHS  Entresto 24-26mg BID  Zaroxolyn 5mg daily  Toprol 25mg daily  Potassium chloride 10meq daily  seroquel 25mg QHS  Theophylline ER 300mg daily  Lasix 40mg IV BID      Current Facility-Administered Medications:     apixaban (ELIQUIS) tablet 5 mg, 5 mg, Oral, BID, Gopal Alvarado MD, 5 mg at 03/27/22 2116    famotidine (PEPCID) tablet 10 mg, 10 mg, Oral, BID, Gopal Alvarado MD, 10 mg at 03/27/22 2116    insulin glargine (LANTUS) injection 10 Units, 10 Units, SubCUTAneous, QHS, Gopal Alvarado MD, 10 Units at 03/27/22 2116    nitroglycerin (NITROSTAT) tablet 0.4 mg, 0.4 mg, SubLINGual, PRN, Gopal Alvarado MD    [facility]) tablet 5 mg, 5 mg, Oral, TID, Gopal Alvarado MD, 5 mg at 03/27/22 2116    albuterol (PROVENTIL HFA, VENTOLIN HFA, PROAIR HFA) inhaler 2 Puff, 2 Puff, Inhalation, BID PRN, Gopal Alvarado MD    albuterol-ipratropium (DUO-NEB) 2.5 MG-0.5 MG/3 ML, 3 mL, Nebulization, Q6H PRN, Gopal Alvarado MD    atorvastatin (LIPITOR) tablet 40 mg, 40 mg, Oral, QHS, Britt Lozoya MD, 40 mg at 03/27/22 2117    sacubitriL-valsartan (ENTRESTO) 24-26 mg tablet 1 Tablet, 1 Tablet, Oral, BID, Gopal Alvarado MD, 1 Tablet at 03/27/22 2115    metOLazone (ZAROXOLYN) tablet 5 mg, 5 mg, Oral, DAILY, Britt Lozoya MD, 5 mg at 03/27/22 2362    metoprolol succinate (TOPROL-XL) XL tablet 25 mg, 25 mg, Oral, DAILY, Gopal Alvarado MD, 25 mg at 03/27/22 0902    budesonide-formoteroL (SYMBICORT) 160-4.5 mcg/actuation HFA inhaler 2 Puff, 2 Puff, Inhalation, BID RT, Britt Lozoya MD, 2 Puff at 03/28/22 1408    QUEtiapine (SEROquel) tablet 25 mg, 25 mg, Oral, QHS, Gopal Alvarado MD, 25 mg at 03/27/22 2117    theophylline ER(12 hour) (THEOCHRON) tablet 300 mg, 300 mg, Oral, DAILY, Gopal Alvarado MD, 300 mg at 03/27/22 2298    polyethylene glycol (MIRALAX) packet 17 g, 17 g, Oral, DAILY PRN, Varsha Alvarado MD    ondansetron (ZOFRAN ODT) tablet 4 mg, 4 mg, Oral, Q8H PRN **OR** ondansetron (ZOFRAN) injection 4 mg, 4 mg, IntraVENous, Q6H PRN, Gopal Alvarado MD, 4 mg at 03/25/22 2343    furosemide (LASIX) injection 40 mg, 40 mg, IntraVENous, BID, Gopal Alvarado MD, 40 mg at 03/27/22 2116    amiodarone (CORDARONE) tablet 200 mg, 200 mg, Oral, DAILY, Elton Hall MD, 200 mg at 03/27/22 0902    acetaminophen (TYLENOL) tablet 650 mg, 650 mg, Oral, Q6H PRN, Camilla MOROCHO MD, 650 mg at 03/25/22 2337    Current Outpatient Medications   Medication Instructions    acetaminophen-codeine (TYLENOL #4) 300-60 mg tab 1 Tablet, Oral, EVERY 6 HOURS AS NEEDED    albuterol (Ventolin HFA) 90 mcg/actuation inhaler 2 Puffs, Inhalation, 2 TIMES DAILY AS NEEDED    apixaban (ELIQUIS) 5 mg, Oral, 2 TIMES DAILY    atorvastatin (LIPITOR) 40 mg, Oral, EVERY BEDTIME    bumetanide (BUMEX) 1 mg tablet 1 Tablet, Oral, DAILY    Entresto 24-26 mg tablet 1 Tablet, Oral, 2 TIMES DAILY    furosemide (LASIX) 40 mg, Oral, DAILY    HYDROcodone-chlorpheniramine (TUSSIONEX) 10-8 mg/5 mL suspension 5 mL, Oral, EVERY 6 HOURS AS NEEDED    insulin glargine (LANTUS) 10 Units, SubCUTAneous, EVERY BEDTIME, May give in pen    metOLazone (ZAROXOLYN) 5 mg, Oral, DAILY    metoprolol succinate (TOPROL-XL) 25 mg, Oral, DAILY    nitroglycerin (NITROSTAT) 0.4 mg SL tablet Sit/Lay down then put one tab under the tongue every 5 minutes as needed for chest pain/neck or jaw pain for 3 doses. <BR>Seek immediate medical attention should you need more than one tab for pain to resolve.     oxybutynin (DITROPAN) 5 mg, Oral, 3 TIMES DAILY    potassium chloride SA (MICRO-K) 10 mEq capsule 20 mEq, Oral, DAILY    theophylline ER(12 hour) (THEOCHRON) 300 mg, Oral, DAILY    torsemide (DEMADEX) 20 mg, Oral, 2 TIMES DAILY         Signed By: Navid Youssef     March 28, 2022

## 2022-03-28 NOTE — PROGRESS NOTES
Physician Progress Note      PATIENT:               Laura Mc  CSN #:                  [id number]  :                       1950  ADMIT DATE:       3/24/2022 6:02 PM  [address] [place] DATE:  RESPONDING  PROVIDER #:        Dee Vera MD          QUERY TEXT:    Pt admitted with ***. Pt noted to have ***. If possible, please document in the progress notes and discharge summary if you are evaluating and/or treating any of the following: The medical record reflects the following:  Risk Factors: COPD, OXYGEN DEPENDENCE, Old MI, Obesity, HTN, CHF, CAD  Clinical Indicators: H&P: \"She wears 4L O2 nasal cannula at home and is currently on the same in the ED. \"  Treatment: Cardiology consulted, Pulse Ox monitoring, Oxygen supplement, Lab monitoring. Thank you,  Sana Vogt BSN, RN, [place], [facility] Specialist  [phone] or [email or web address]  Options provided:  -- Chronic respiratory failure with hypoxia  -- Chronic respiratory failure with hypercapnia  -- Chronic respiratory failure with hypoxia and hypercapnia  -- Acute on chronic respiratory failure with hypoxia  -- Acute on chronic respiratory failure with hypercapnia  -- Acute on chronic respiratory failure with hypoxia and hypercapnia  -- Other - I will add my own diagnosis  -- Disagree - Not applicable / Not valid  -- Disagree - Clinically unable to determine / Unknown  -- Refer to Clinical Documentation Reviewer    PROVIDER RESPONSE TEXT:    This patient has chronic respiratory failure with hypoxia.     Query created by: Maciel Mcneil on 3/25/2022 2:21 PM      Electronically signed by:  Dee Vera MD 3/28/2022 8:26 AM

## 2022-03-28 NOTE — PROGRESS NOTES
I have reviewed discharge instructions with the patient. The patient verbalized understanding of discharge instruction. The patient was notified about the location of their prescriptions and follow up appointments. All medical equipment was removed from patient prior to discharge.

## 2022-03-28 NOTE — PROGRESS NOTES
qPHYSICAL THERAPY EVALUATION  Patient: Gt Wynne (89 y.o. female)  Date: 3/28/2022  Primary Diagnosis: CHF (congestive heart failure) ([facility] [I50.9]        Precautions: falls  ASSESSMENT  This is a 81 y/o female who came to  [facility]  ED with c/o increased SOB, dyspnea on exertion and lower extremity edema, admitted on 3/24/22 for CHF. Pt with PMH of COPD, MI with CABG, IFTIKHAR, CHF, CAD, DM, HLD, HTN, cholecystectomy, on 4L O2 at home. Pt received semi-supine in bed , agreeable for PT eval/tx . Pt is A& O x 4 ,  per pt report , she lives with daughter in a single story home with 2 steps to enter , no HR,  needed assist with ADL's and functional transfers/mobility with no AD prior to admission. Based on the objective data described below, the patient presents with decreased strength , 3+/5 grossly in  b/l LE , balance deficits , generalized weakness ,decreased activity tolerance and increased need for assist with functional mobility ( needs mod I for rolling from side to side  , SBA for supine <>sit transfers with additional time, intact  static sitting balance , CGA for sit <>stand and toilet transfers , IND with perineal care, good  static  standing balance with support , is able to ambulate - 13' with RW, CGA with slow lopez and decreased step length b/l Le ). Pt would benefit from continued skilled PT services to address current impairments and improve overall IND and safety with  functional transfers/mobility. Recommend d/c to home with  HHPT and prior level of care when medically appropriate . Current Level of Function Impacting Discharge (mobility/balance): Pt requires CGA for transfers/mobility. Functional Outcome Measure: The patient scored 19 on the AM PAC basic mobility outcome measure which is indicative of medium complexity.       Other factors to consider for discharge: family support, PLOF        PLAN :  Recommendations and Planned Interventions: bed mobility training, transfer training, gait training, therapeutic exercises, neuromuscular re-education, patient and family training/education and therapeutic activities      Frequency/Duration: Patient will be followed by physical therapy:  3-5x/week to address goals. Recommendation for discharge: (in order for the patient to meet his/her long term goals)  Home with [address] [place] [address] and prior level of family care    This discharge recommendation:  Has been made in collaboration with the attending provider and/or case management    IF patient discharges home will need the following DME: rolling walker         SUBJECTIVE:   Patient stated  My daughter used to help me with everything     OBJECTIVE DATA SUMMARY:   HISTORY:    Past Medical History:   Diagnosis Date    [facility] ([facility]     [facility] ([facility]     Coronary artery disease     [facility]     Hyperlipidemia     Hypertension     Myocardial infarct ([facility]     IFTIKHAR (obstructive sleep apnea)      Past Surgical History:   Procedure Laterality Date    HX BREAST BIOPSY      HX CHOLECYSTECTOMY      HX CORONARY ARTERY BYPASS GRAFT      HX HERNIA REPAIR      HX HYSTERECTOMY         Personal factors and/or comorbidities impacting plan of care:     Home Situation  Home Environment: Private residence  # Steps to Enter: 2  Rails to Enter: No  Wheelchair Ramp: No  One/Two Story Residence: One story  Living Alone: No  Support Systems: Child(carrillo)  Patient Expects to be Discharged to[de-identified] Home with home health  Current DME Used/Available at Home: Cane, straight    PLOF: Pt needed assist for ADLS/IADLS, needed assist with mobility prior to admission. EXAMINATION/PRESENTATION/DECISION MAKING:   Critical Behavior:  Neurologic State: Alert  Orientation Level: Oriented X4  Cognition: Follows commands     Hearing:   Auditory  Auditory Impairment: None  Skin:  Intact where exposed    Range Of Motion:  AROM: Generally decreased, functional  Strength: Strength: Generally decreased, functional (3+/5 grossly for b/l LE)     Tone & Sensation:   Tone: Normal  Functional Mobility:  Bed Mobility:  Rolling: Modified independent  Supine to Sit: Stand-by assistance; Additional time  Sit to Supine: Stand-by assistance; Additional time  Scooting: Modified independent  Transfers:  Sit to Stand: Contact guard assistance  Stand to Sit: Contact guard assistance  Balance:   Sitting: Intact; Without support  Standing: Impaired; Without support  Standing - Static: Good;Constant support  Standing - Dynamic : Fair;Constant support  Ambulation/Gait Training:  Distance (ft): 40 Feet (ft)  Assistive Device: Walker, rolling;Gait belt  Ambulation - Level of Assistance: Contact guard assistance  Speed/Ella: Slow  Step Length: Right shortened;Left shortened    Functional Measure:    [address] [place] [id number] AM-PAC 6 Clicks         Basic Mobility Inpatient Short Form  How much difficulty does the patient currently have. .. Unable A Lot A Little None   1. Turning over in bed (including adjusting bedclothes, sheets and blankets)? [] 1   [] 2   [] 3   [x] 4   2. Sitting down on and standing up from a chair with arms ( e.g., wheelchair, bedside commode, etc.)   [] 1   [] 2   [x] 3   [] 4   3. Moving from lying on back to sitting on the side of the bed? [] 1   [] 2   [x] 3   [] 4          How much help from another person does the patient currently need. .. Total A Lot A Little None   4. Moving to and from a bed to a chair (including a wheelchair)? [] 1   [] 2   [x] 3   [] 4   5. Need to walk in hospital room? [] 1   [] 2   [x] 3   [] 4   6. Climbing 3-5 steps with a railing? [] 1   [] 2   [x] 3   [] 4   © 2007, Trustees of [address] [place] [address] [address] [address], under license to [employer]. All rights reserved     Score:  Initial:19 Most Recent: X (Date: 3/28/22-- )   Interpretation of Tool:  Represents activities that are increasingly more difficult (i.e. Bed mobility, Transfers, Gait).   Score 24 23 22-20 19-15 14-10 9-7 6   Modifier CH CI CJ CK CL CM CN          Physical Therapy Evaluation Charge Determination   History Examination Presentation Decision-Making   MEDIUM  Complexity : 1-2 comorbidities / personal factors will impact the outcome/ POC  MEDIUM Complexity : 3 Standardized tests and measures addressing body structure, function, activity limitation and / or participation in recreation  LOW Complexity : Stable, uncomplicated  Other Functional Measure [facility] 6 medium complexity      Based on the above components, the patient evaluation is determined to be of the following complexity level: LOW     Pain Ratin/10    Activity Tolerance:   Fair  Please refer to the flowsheet for vital signs taken during this treatment. After treatment patient left in no apparent distress:   Supine in bed and Call bell within reach    COMMUNICATION/EDUCATION:   The patients plan of care was discussed with: Registered nurse. Fall prevention education was provided and the patient/caregiver indicated understanding. and Patient/family agree to work toward stated goals and plan of care. Problem: Mobility Impaired (Adult and Pediatric)  Goal: *Acute Goals and Plan of Care (Insert Text)  Description: Pt will be I with LE HEP in 7 days. Pt will perform bed mobility with mod I in 7 days. Pt will perform transfers with SBA in 7 days. Pt will amb -100 feet with LRAD safely with SBA in 7 days.    Outcome: Not Met       Thank you for this referral.  Hoda Peterson   Time Calculation: 44 mins

## 2022-03-28 NOTE — PROGRESS NOTES
CM met with patient in room to complete discuss DCP. Patient is recc for [facility] and a rolling walker, patient is agreeable, gave no preference for agencies. Patient is a [facility] Patient, CM contacted Linette Nicole [phone], to obtain auth for [facility] and rolling walker. Orders sent to her at [phone]. CM spoke with [employer] and [facility].  Patient has been approved for [facility] with [employer], [facility] 3/30/2022. Patient also accepted with [facility] for rolling walker, to be delivered to patient's home, likely tomorrow, patient made aware, patient agreeable. CM contacted patient's daughter, Karen Mayes [phone], via phone to notify of d/c this date. She is to transport patient home after she gets off work approx around 5:00 PM.    Medicare pt has received, reviewed, and signed 2nd IM letter informing them of their right to appeal the discharge. Signed copied has been placed on pt bedside chart.

## 2022-03-28 NOTE — DISCHARGE SUMMARY
Discharge Summary       PATIENT ID: Mike Dutta  MRN: [id number]   YOB: 1950    DATE OF ADMISSION: 3/24/2022  6:02 PM    DATE OF DISCHARGE:   PRIMARY CARE PROVIDER: Jovana Garcia MD     ATTENDING PHYSICIAN: Dino Valles  DISCHARGING PROVIDER: Rita Alvarado      CONSULTATIONS: IP CONSULT TO CARDIOLOGY    PROCEDURES/SURGERIES: * No surgery found *    ADMITTING DIAGNOSES:    Patient Active Problem List    Diagnosis Date Noted    CHF (congestive heart failure) ([facility] 03/24/2022    Heart failure ([facility] 11/19/2021    UTI (urinary tract infection) 06/30/2021    CHF exacerbation ([facility] 06/21/2021    Atrial flutter with rapid ventricular response ([facility] 03/09/2021    Atrial flutter ([facility] 02/22/2021    COPD exacerbation ([facility] 11/02/2020    [facility]       DISCHARGE DIAGNOSES / PLAN:      CHF exacerbation  Elevated troponin  Hyperkalemia  CAD  Hypertension  History of atrial flutter:         DISCHARGE MEDICATIONS:  Current Discharge Medication List      CONTINUE these medications which have CHANGED    Details   albuterol-ipratropium (DUO-NEB) 2.5 mg-0.5 mg/3 ml nebu 3 mL by Nebulization route every six (6) hours as needed for Wheezing. Qty: 30 Nebule, Refills: 1  Start date: 3/28/2022      amiodarone (CORDARONE) 200 mg tablet Take 1 Tablet by mouth daily. Qty: 30 Tablet, Refills: 0  Start date: 3/29/2022      famotidine (PEPCID) 10 mg tablet Take 1 Tablet by mouth two (2) times a day. Qty: 30 Tablet, Refills: 0  Start date: 3/28/2022      mometasone-formoterol (Dulera) 200-5 mcg/actuation HFA inhaler Take 2 Puffs by inhalation two (2) times a day. Qty: 13 g, Refills: 1  Start date: 3/28/2022      QUEtiapine (SEROqueL) 25 mg tablet Take 1 Tablet by mouth nightly.   Qty: 30 Tablet, Refills: 0  Start date: 3/28/2022      furosemide (Lasix) 40 mg tablet 1 tab twice daily  Qty: 60 Tablet, Refills: 0  Start date: 3/28/2022         CONTINUE these medications which have NOT CHANGED    Details   theophylline ER,12 hour, (THEOCHRON) 300 mg tablet Take 300 mg by mouth daily. atorvastatin (LIPITOR) 40 mg tablet Take 40 mg by mouth nightly. metoprolol succinate (TOPROL-XL) 25 mg XL tablet Take 25 mg by mouth daily. Entresto 24-26 mg tablet Take 1 Tablet by mouth two (2) times a day. metOLazone (ZAROXOLYN) 5 mg tablet Take 5 mg by mouth daily. nitroglycerin (NITROSTAT) 0.4 mg SL tablet Sit/Lay down then put one tab under the tongue every 5 minutes as needed for chest pain/neck or jaw pain for 3 doses. Seek immediate medical attention should you need more than one tab for pain to resolve. Qty: 1 Bottle, Refills: 3      apixaban (ELIQUIS) 5 mg tablet Take 1 Tab by mouth two (2) times a day. Qty: 30 Tab, Refills: 0      oxybutynin (DITROPAN) 5 mg tablet Take 1 Tab by mouth three (3) times daily. Qty: 90 Tab, Refills: 0      insulin glargine (LANTUS) 100 unit/mL injection 10 Units by SubCUTAneous route nightly. May give in pen  Indications: type 2 diabetes mellitus  Qty: 1 Vial, Refills: 0      albuterol (Ventolin HFA) 90 mcg/actuation inhaler Take 2 Puffs by inhalation two (2) times daily as needed for Wheezing. STOP taking these medications       bumetanide (BUMEX) 1 mg tablet Comments:   Reason for Stopping:         acetaminophen-codeine (TYLENOL #4) 300-60 mg tab Comments:   Reason for Stopping:         HYDROcodone-chlorpheniramine (TUSSIONEX) 10-8 mg/5 mL suspension Comments:   Reason for Stopping:         torsemide (DEMADEX) 20 mg tablet Comments:   Reason for Stopping:         potassium chloride SA (MICRO-K) 10 mEq capsule Comments:   Reason for Stopping:                 NOTIFY YOUR PHYSICIAN FOR ANY OF THE FOLLOWING:   Fever over 101 degrees for 24 hours. Chest pain, shortness of breath, fever, chills, nausea, vomiting, diarrhea, change in mentation, falling, weakness, bleeding.  Severe pain or pain not relieved by medications. Or, any other signs or symptoms that you may have questions about. DISPOSITION:  x  Home With:   OT  PT  HH  RN       Long term SNF/Inpatient Rehab    Independent/assisted living    Hospice    Other:       PATIENT CONDITION AT DISCHARGE: Stable      PHYSICAL EXAMINATION AT DISCHARGE:  General:          Alert, cooperative, no distress, appears stated age. HEENT:           Atraumatic, anicteric sclerae, pink conjunctivae                          No oral ulcers, mucosa moist, throat clear, dentition fair  Neck:               Supple, symmetrical  Lungs:             Clear to auscultation bilaterally. No Wheezing or Rhonchi. No rales. Chest wall:      No tenderness  No Accessory muscle use. Heart:              Regular  rhythm,  No  murmur   No edema  Abdomen:        Soft, non-tender. Not distended. Bowel sounds normal  Extremities:     No cyanosis. No clubbing,                            Skin turgor normal, Capillary refill normal  Skin:                Not pale. Not Jaundiced  No rashes   Psych:             Not anxious or agitated. Neurologic:      Alert, moves all extremities, answers questions appropriately and responds to commands     XR CHEST PORT   Final Result   1. Unchanged cardiomegaly and/or pericardial effusion, vascular congestion,   interstitial edema.            Recent Results (from the past 24 hour(s))   GLUCOSE, POC    Collection Time: 03/27/22  1:41 PM   Result Value Ref Range    Glucose (POC) 161 (H) 65 - 117 mg/dL    Performed by Usha Horan, POC    Collection Time: 03/27/22  5:25 PM   Result Value Ref Range    Glucose (POC) 113 65 - 117 mg/dL    Performed by Usha Horan, POC    Collection Time: 03/27/22  8:29 PM   Result Value Ref Range    Glucose (POC) 109 65 - 117 mg/dL    Performed by Qasim Greco, BASIC    Collection Time: 03/28/22  6:08 AM   Result Value Ref Range    Sodium 139 136 - 145 mmol/L    Potassium 3.8 3.5 - 5.1 mmol/L    Chloride 96 (L) 97 - 108 mmol/L    CO2 41 (HH) 21 - 32 mmol/L    Anion gap 2 (L) 5 - 15 mmol/L    Glucose 97 65 - 100 mg/dL    BUN 30 (H) 6 - 20 mg/dL    Creatinine 1.39 (H) 0.55 - 1.02 mg/dL    BUN/Creatinine ratio 22 (H) 12 - 20      GFR est AA 45 (L) >60 ml/min/1.73m2    GFR est non-AA 37 (L) >60 ml/min/1.73m2    Calcium 8.4 (L) 8.5 - 10.1 mg/dL   GLUCOSE, POC    Collection Time: 03/28/22  7:46 AM   Result Value Ref Range    Glucose (POC) 89 65 - 117 mg/dL    Performed by Fidenc[address], POC    Collection Time: 03/28/22 11:21 AM   Result Value Ref Range    Glucose (POC) 110 65 - 117 mg/dL    Performed by [facility] COURSE:    Kelsey roldan(n) 80 y.o. female with PMH significant for COPD, MI with CABG, IFTIKHAR, CHF, CAD, DM, HLD, HTN, cholecystectomy. Presents for increased SOB, dyspnea on exertion and lower extremity edema. Denies CP and palpitations. Patient is poor historian and unable to give date of when sx started. She also notes productive cough worse at night. She wears 4L O2 nasal cannula at home and is currently on the same in the ED.     CXR shows cardiomegaly and possible pericardial effusion, unchanged from previous. Cardiology seen pt in the ER and suggests admission and monitoring labs. Notes recent echo and cath last year.     Labs remarkable for troponin > 100, BNP 5027, K 5.3, creatinine 1.3.     3/28: Patient seen resting in bed on 4L nasal cannula. Notes SOB still present but has improved over the weekend. Denies CP, palpitations, abdominal pain, N/V/D, fever, chills. Also c/o painful swelling in the legs that has improved over the weekend. She is currently on Lasix 40mg BID.     Labs: CO2 41. Most recent troponin trending down at 92. BNP trending down at 2,612  Repeat echo on this admission shows EF 50-65 with mild mitral regurgitation.         Echo 3/25:       Left Ventricle: Left ventricle size is normal. Increased wall thickness. Normal wall motion. Normal left ventricular systolic function with a visually estimated EF of 50 - 65%. Normal diastolic function.   Mitral Valve: Mild transvalvular regurgitation.   Left Atrium: Left atrium is mildly dilated.     Patient was seen by the cardiology during this admission he recommend to continue with Eliquis amiodarone at this time no further work-up    Patient discharged on amiodarone 200 mg daily Eliquis 5 mg twice a day continue other home medication start on Lasix 40 p.o. twice daily    Follow with cardiology and PCP 1 to 2 weeks    Medication reconciled on time discharge patient 35-minute 50% time spent on this counseling coordination of care    Signed:   Miguel A Salazar MD  3/28/2022  12:04 PM

## 2022-03-28 NOTE — PROGRESS NOTES
Progress Note      3/28/2022 10:31 AM  NAME: Susanne Torres   MRN:  [id number]   Admit Diagnosis: [facility] [I50.9]          Assessment/Plan:   1. CHF with history of HFpEF due to fluid overload with diastolic dysfunction. Echo on this admission again shows well-preserved LV systolic function. Patient after diuresis has cleared from congestive heart failure and is feeling better. 2.  Mild troponin leak without anginal symptoms. Suspect type II non-STEMI     3. Hypertension, well controlled    4. Paroxysmal atrial flutter. Patient is in sinus rhythm on amiodarone. Continue Eliquis for anticoagulation. []       High complexity decision making was performed in this patient at high risk for decompensation with multiple organ involvement. Subjective:     Susanne Torres denies chest pain, dyspnea. Discussed with RN events overnight. Review of Systems:    Symptom Y/N Comments  Symptom Y/N Comments   Fever/Chills N   Chest Pain N    Poor Appetite N   Edema N    Cough N   Abdominal Pain N    Sputum N   Joint Pain N    SOB/BUTCHER N   Pruritis/Rash N    Nausea/vomit N   Tolerating PT/OT Y    Diarrhea N   Tolerating Diet Y    Constipation N   Other       Could NOT obtain due to:      Objective:      Physical Exam:    Last 24hrs VS reviewed since prior progress note. Most recent are:    Visit Vitals  BP (!) 145/85 (BP 1 Location: Left upper arm, BP Patient Position: Supine)   Pulse 78   Temp 98.3 °F (36.8 °C)   Resp 11   Ht 5' 3\" (1.6 m)   Wt 96.5 kg (212 lb 11.9 oz)   SpO2 99%   BMI 37.69 kg/m²       Intake/Output Summary (Last 24 hours) at 3/28/2022 1555  Last data filed at 3/28/2022 1137  Gross per 24 hour   Intake --   Output 3150 ml   Net -3150 ml        General Appearance: Overweight female well nourished, alert; no acute distress. Ears/Nose/Mouth/Throat: Hearing grossly normal; moist mucous membranes  Neck: Supple.   Chest: Lungs clear to auscultation bilaterally. Cardiovascular: Regular rate and rhythm, S1S2 normal, no murmur. Abdomen: Soft, non-tender, bowel sounds are active. Extremities: Mild edema bilaterally. Skin: Warm and dry. []         Post-cath site without hematoma, bruit, tenderness, or thrill. Distal pulses intact. PMH/SH reviewed - no change compared to H&P    Data Review    Telemetry:     EKG:   []  No new EKG for review    Lab Data Personally Reviewed:    Recent Labs     03/26/22  0714   WBC 6.0   HGB 12.6   HCT 42.8        No results for input(s): INR, PTP, APTT, INREXT, INREXT in the last 72 hours. Recent Labs     03/28/22  0608 03/26/22  0714    144   K 3.8 4.2   CL 96* 108   CO2 41* 32   BUN 30* 24*   CREA 1.39* 1.38*   GLU 97 93   CA 8.4* 8.1*     No results for input(s): CPK, CKNDX, TROIQ in the last 72 hours. No lab exists for component: CPKMB  Lab Results   Component Value Date/Time    Cholesterol, total 148 11/20/2021 08:08 AM    HDL Cholesterol 57 11/20/2021 08:08 AM    LDL, calculated 77 11/20/2021 08:08 AM    Triglyceride 70 11/20/2021 08:08 AM    CHOL/HDL Ratio 2.6 11/20/2021 08:08 AM       Recent Labs     03/26/22  0714   *   TP 5.5*   ALB 2.3*   GLOB 3.2     No results for input(s): PH, PCO2, PO2 in the last 72 hours.     Medications Personally Reviewed:    Current Facility-Administered Medications   Medication Dose Route Frequency    apixaban (ELIQUIS) tablet 5 mg  5 mg Oral BID    famotidine (PEPCID) tablet 10 mg  10 mg Oral BID    insulin glargine (LANTUS) injection 10 Units  10 Units SubCUTAneous QHS    nitroglycerin (NITROSTAT) tablet 0.4 mg  0.4 mg SubLINGual PRN    oxybutynin (DITROPAN) tablet 5 mg  5 mg Oral TID    albuterol (PROVENTIL HFA, VENTOLIN HFA, PROAIR HFA) inhaler 2 Puff  2 Puff Inhalation BID PRN    albuterol-ipratropium (DUO-NEB) 2.5 MG-0.5 MG/3 ML  3 mL Nebulization Q6H PRN    atorvastatin (LIPITOR) tablet 40 mg  40 mg Oral QHS    sacubitriL-valsartan (ENTRESTO) 24-26 mg tablet 1 Tablet  1 Tablet Oral BID    metOLazone (ZAROXOLYN) tablet 5 mg  5 mg Oral DAILY    metoprolol succinate (TOPROL-XL) XL tablet 25 mg  25 mg Oral DAILY    budesonide-formoteroL (SYMBICORT) 160-4.5 mcg/actuation HFA inhaler 2 Puff  2 Puff Inhalation BID RT    QUEtiapine (SEROquel) tablet 25 mg  25 mg Oral QHS    theophylline ER(12 hour) (THEOCHRON) tablet 300 mg  300 mg Oral DAILY    polyethylene glycol (MIRALAX) packet 17 g  17 g Oral DAILY PRN    ondansetron (ZOFRAN ODT) tablet 4 mg  4 mg Oral Q8H PRN    Or    ondansetron (ZOFRAN) injection 4 mg  4 mg IntraVENous Q6H PRN    furosemide (LASIX) injection 40 mg  40 mg IntraVENous BID    amiodarone (CORDARONE) tablet 200 mg  200 mg Oral DAILY    acetaminophen (TYLENOL) tablet 650 mg  650 mg Oral Q6H PRN         Rylee Talbot MD

## 2022-03-30 NOTE — ADT AUTH CERT NOTES
Heart Failure - Care Day 4 (3/27/2022) by Marge Felix       Review Entered Review Status   3/29/2022 14:21 Completed      Criteria Review      Care Day: 4 Care Date: 3/27/2022 Level of Care: Telemetry    Guideline Day 2    Clinical Status    (X) * Hemodynamic stability    3/29/2022 14:21:58 EDT by Yuniel Beckham      98.1 °F (36.7 °C) 59 Abnormal  18 128/72 96 % 4L NC    ( ) * Mental status at baseline    ( ) * No evidence of myocardial ischemia    ( ) * Cardiac rate and rhythm acceptable    ( ) * Oxygenation at baseline or improved    ( ) * Pulmonary edema absent or improved    Routes    (X) Taper parenteral medications    3/29/2022 14:21:58 EDT by Yuniel Beckham      amiodarone 200mg po daily x1  lipitor 40mg bedtime x1  eliquis 5mg po bid x2    Interventions    (X) * Pulmonary catheter absent    3/29/2022 14:21:58 EDT by Yuniel Beckham      absent    (X) Oxygen    3/29/2022 14:21:58 EDT by Yuniel Beckham      4L NC    Medications    (X) Diuretics    3/29/2022 14:21:58 EDT by Yuniel Beckham      lasix 40mg iv bid x2    (X) Beta-blocker    3/29/2022 14:21:58 EDT by Yuniel Beckham      metoprolol 25mg po daily x1    * Milestone   Additional Notes   3/27/22      Internal medicine progress note:   Subjective:       80years old with atrial fibrillation, congestive heart failure, fluid overload, hypertension diabetes mellitus type 2 on IV diuretics, amiodarone.  She has been complains of chest pain      Physical Exam:    General: Alert, cooperative, no distress, appears stated age. Eyes: Conjunctivae/corneas clear. PERRL, EOMs intact. Fundi benign   Ears: Normal TMs and external ear canals both ears. Nose: Nares normal. Septum midline. Mucosa normal. No drainage or sinus tenderness. Mouth/Throat: Lips, mucosa, and tongue normal. Teeth and gums normal.   Neck: Supple, symmetrical, trachea midline, no adenopathy, thyroid: no enlargment/tenderness/nodules, no carotid bruit and no JVD.    Back:   Symmetric, no curvature. ROM normal. No CVA tenderness. Lungs:   Clear to auscultation bilaterally. Heart: Regular rate and rhythm, S1, S2 normal, no murmur, click, rub or gallop. Abdomen:   Soft, non-tender. Bowel sounds normal. No masses,  No organomegaly. Extremities: Extremities normal, atraumatic, no cyanosis or edema. Pulses: 2+ and symmetric all extremities. Skin: Skin color, texture, turgor normal. No rashes or lesions   Lymph nodes: Cervical, supraclavicular, and axillary nodes normal.   Neurologic: CNII-XII intact. Normal strength, sensation and reflexes throughout.       Assessment:       Active Problems:     CHF (congestive heart failure) ([facility]) (3/24/2022)       Atrial fibrillation   Fluid overload   CHF   Hypertension   Diabetes mellitus type 2   Acute hypoxic respiratory failure on oxygen       Plan:       PT and OT monitor electrolytes      Cardiology progress note:   Assessment/Plan:        Fluid overload, heart failure with preserved LV function,echo on this admission is normal LV function.  Patient is feeling better.  Wants to get out of bed.       Coronary artery disease.  Mild troponin leak without peaking.  Without chest pain?  Type II MI       Hypertension, well controlled       History of atrial flutter, maintaining sinus rhythm, anticoagulated with Eliquis.  Continues amiodarone to maintain sinus rhythm.  TSH normal.  Liver functions are also normal      Review of Systems:       Symptom Y/N Comments Symptom Y/N Comments   Fever/Chills N Chest Pain N    Poor Appetite N Edema N    Cough N Abdominal Pain N    Sputum N Joint Pain N    SOB/BUTCHER N Pruritis/Rash N    Nausea/vomit N Tolerating PT/OT Y    Diarrhea N Tolerating Diet Y    Constipation N Other       Abnormal labs:   Glu 161        Heart Failure - Care Day 3 (3/26/2022) by hCris Warner       Review Entered Review Status   3/29/2022 14:13 Completed      Criteria Review      Care Day: 3 Care Date: 3/26/2022 Level of Care: Telemetry    Guideline Day 2    Clinical Status    (X) * Hemodynamic stability    3/29/2022 14:13:49 EDT by Harsh Lange      /71 (BP 1 Location: Left upper arm, BP Patient Position: At rest;Supine)  Pulse68  Temp97.6 °F (36.4 °C)  Resp18  Ht5' 3\" (1.6 m)  Wt106.3 kg (234 lb 5.6 oz)  FoP553% 4L NC  BMI41.51 kg/m²    ( ) * Mental status at baseline    ( ) * No evidence of myocardial ischemia    ( ) * Cardiac rate and rhythm acceptable    ( ) * Oxygenation at baseline or improved    ( ) * Pulmonary edema absent or improved    Routes    (X) Taper parenteral medications    3/29/2022 14:13:49 EDT by Harsh Lange      amiodarone 200mg po dialy x1  eliquis 5mg po bid x2  lipitor 40mg po bedtime x1    Interventions    (X) * Pulmonary catheter absent    3/29/2022 14:13:49 EDT by Harsh Lange      absent    (X) Possible CXR, ECG, BNP    3/29/2022 14:13:49 EDT by Harsh Lange      pro bnp [id number]    (X) Oxygen    3/29/2022 14:13:49 EDT by Harsh Lange      4L NC    Medications    (X) Diuretics    3/29/2022 14:13:49 EDT by Harsh Lange      lasix 40mg iv bid x2    (X) Beta-blocker    3/29/2022 14:13:49 EDT by Harsh Lange      metoprolol 25mg po daily x1    * Milestone   Additional Notes   3/26/22   Cardiology progress note:   Assessment/Plan:        Fluid overload, heart failure with preserved LV function,echo on this admission is normal LV function.  Stable renal function and electrolytes.  BNP is downtrending.       Coronary artery disease.  Mild troponin leak without peaking.  Without chest pain?  Type II MI       Hypertension, well controlled       History of atrial flutter, maintaining sinus rhythm, anticoagulated with Eliquis.  Continues amiodarone to maintain sinus rhythm.  Will TSH.  Functions.       Review of Systems:       Symptom Y/N Comments Symptom Y/N Comments   Fever/Chills N Chest Pain N    Poor Appetite N Edema N    Cough N Abdominal Pain N    Sputum N Joint Pain N    SOB/BUTCHER N Pruritis/Rash N    Nausea/vomit N Tolerating PT/OT Y    Diarrhea N Tolerating Diet Y    Constipation N Other       General Appearance: Overweight female well nourished, alert; no acute distress. Ears/Nose/Mouth/Throat: Hearing grossly normal; moist mucous membranes   Neck: Supple. Chest: Lungs clear to auscultation bilaterally. Cardiovascular: Regular rate and rhythm, S1S2 normal, no murmur. Abdomen: Soft, non-tender, bowel sounds are active. Extremities: Mild edema bilaterally. Skin: Warm and dry. Internal medicine progress note:   Assessment:       Active Problems:     CHF (congestive heart failure) ([facility]) (3/24/2022)       A. fib   Fluid overload   CHF   Hypertension   It is mellitus type II   Plan:   IV diuretics   Lantus   Amiodarone       Abnormal labs:   Glucose (POC)65 - 117 mg/dL179 High    Alk.  [id number] - 117 U/L230 High    Calcium8.5 - 10.1 mg/dL8.1   Creatinine0.55 - 1.02 mg/dL1.38    BUN6 - 20 mg/dL24 High    Anion gap5 - 15 mmol/L4 Low

## 2022-04-04 NOTE — PROGRESS NOTES
Physician Progress Note      PATIENT:               Jennifer Brady  CSN #:                  [id number]  :                       1950  ADMIT DATE:       3/24/2022 6:02 PM  Brooks Kidd DATE:        3/28/2022 5:51 PM  RESPONDING  PROVIDER #:        Ted Hewitt MD          QUERY TEXT:    Pt admitted with CHF Exacerbation. Noted documentation of NSTEMI type 2 by Cardiology Consultant. If possible, please document in progress notes and discharge summary:    The medical record reflects the following:  Risk Factors: CHF, Resp Failure w/ Hypoxia, CAD, Old MI, COPD  Clinical Indicators: 3/26, 3/27 Cardiology PN: \"Mild troponin leak without peaking. Without chest pain Type II MI.\"  Treatment: Cardiology Consulted, EKG, CXR, Lab monitoring. Thank you,  Elroy Boast, BSN, RN, [facility], [address] [place] [address]  [phone] or [email or web address]  Options provided:  -- NSTEMI type 2 confirmed present on admission  -- NSTEMI type 2 ruled out  -- Other - I will add my own diagnosis  -- Disagree - Not applicable / Not valid  -- Disagree - Clinically unable to determine / Unknown  -- Refer to Clinical Documentation Reviewer    PROVIDER RESPONSE TEXT:    The diagnosis of NSTEMI type 2 was confirmed as present on admission.     Query created by: Mirna Tyler on 2022 10:55 AM      Electronically signed by:  Ted Hewitt MD 2022 5:22 PM

## 2022-09-25 NOTE — Clinical Note
Occupational Therapy  Visit Type: initial evaluation  Treatment diagnosis: Aphasia, confusion  Precautions:  Medical precautions:  fall risk; standard precautions.  Aphasia, confusion  Oral motor dyskinesia and choreiform movements in the arms    History of schizophrenia  Lines:     Basic: telemetry and capped IV      Lines in chart and on patient reviewed, precautions maintained throughout session.  Hearing: no hearing deficits  Vision:     Current vision: wears glasses all the time  Safety Measures: bed alarm and bed rails    SUBJECTIVE  Patient agreed to participate in therapy this date.  \" I am having muscle spasms right now\"  \" I'm scared because I can't remember my son was here\"  Prior Living Situation  Information Provided By:: patient  Type of Home: [facility]  Home Layout: Elevator  Lives With: Alone  Receives Help From:  (has caregiver assist for homemaking tasks)  OBS Patients Only: Current Skilled Home Care?: No  Bathroom Shower/Tub: Tub/Shower unit  Bathroom Toilet: Standard  Bathroom Equipment: Grab bars in shower  Home Equipment: Two-wheeled walker    Prior Communication/Cognition  Prior Cognition: Intact  Prior Auditory Comprehension: Intact  Prior Verbal Expression: Intact  Prior Reading: Intact  Prior Writing: Intact  Prior Memory: Intact  Prior Money Management: Intact  Prior Medication Management: Intact    Prior Function  Bathing: Supervision (Supv)  Upper Body Dressing: Independent  Lower Body Dressing: Independent  Toileting: Independent  Bed Mobility: Modified Independent (uses step stool)  Transfers: Independent  Toilet Transfers: Independent  Tub/Shower Transfers: Supervision (Supv)  Ambulation in the Home: Modified  Independent  Ambulation in the Community: Modified Independent  Locomotion Equipment: Two wheeled walker  History of Falls in past year: Yes  Number of falls in past year: 3  Prior Homemaking/IADLs: Needs assistance    Prior treatment in the past year for same condition: no  Single view of the saphenous vein graft to the obtuse marginal obtained using hand injection. therapiesPatient has not been hospitalized, in a skilled nursing facility, or seen by home health in the last 30 days.  Patient / Family Goal: return home and return to previous functional status    Pain     Location: back    At onset of session (out of 10): 10  RN informed on pain level    OBJECTIVE   Level of consciousness: alert    Oriented to person, place, time and situation     Arousal alertness: appropriate responses to stimuli  Patient activity tolerance: 2 to 1 activity to rest  Range of Motion (measured in degrees unless otherwise indicated)  WFL: LUE RUE  Strength (out of 5 unless otherwise indicated)  WFL: LUE, RUE    Transfers:    Assistive devices: gait belt and 2-wheeled walker    Sit to stand: supervision    Stand to sit: supervision   Chair to bed: supervision, type:    Activities of Daily Living (ADLs):  Eating:     Assist: independent    Position: upright in bed  Upper Body Dressing:    Assist: independent    Position: edge of bed  Lower Body Dressing:     Assist: contact guard/touching/steadying assist and set up    Position: edge of bed    Footwear assistance: set up    Footwear position: edge of bed  Toilet transfer:     Assist: stand by assist    Device: gait belt and 2-wheeled walker    Equipment: grab bar use  Toileting:     Assist: independent             ASSESSMENT  Impairments: activity tolerance and balance  Functional Limitations: functional mobility, showering and IADLs   Patient alert, oriented, able to give prior level of function history accurately as confirmed with patient's daughter in law who was presented at end of evaluation  Patient has caregiver assist for homemaking and showering and recommend this continue along with adding Home OT     Discharge Recommendations:  Recommendations for Discharge: OT IL: Patient requires  intermittent assistance to perform mobility and/or ADLs safely, Patient is appropriate for Occupational Therapy 1-3 times per week  Therapy Diagnosis:    Decreased ability to perform self care tasks and functional transfers.       • Skilled therapy is not required due to patient is at supervision level for transfers, recommend home OT at D/C     • Personal Occupations Profile Affected: bathing/showering, home establishment/managements, meal preparation/cleanup, community mobility     • Clinical decision making: Low - Patient has few limitations (1-3), comorbidities and/or complexities, as noted in problem focused assessment noted above, that impact their occupational profile.  Resulting in few treatment options and no task modification consistent with low clinical decision making complexity.    Education Provided:   Learning Assessment:  - Primary learner: patient  - Are they ready to learn: yes  - Preferred learning style: verbal and demonstration  - Barriers to learning: no barriers apparent at this time  Education provided during session:  - Receiving Education: patient  - Results of above outlined education: Verbalizes understanding and Demonstrates understanding    Patient at End of Session:   Location: in bed  Safety measures: alarm system in place/re-engaged, bed rails x3, equipment intact and call light within reach  Handoff to: nurse assistant and family/caregiver    PLAN  Suggestions for next session as indicated: Frequency Comments: 9/25 eval    Will discharge patient from OT, recommend Home OT at discharge  Agreement to plan and goals: patient agrees with goals and treatment plan and family/significant other/caregiver agrees      Documented in the chart in the following areas: Prior Level of Function. Assessment. Plan.      Admitting diagnosis: Expressive aphasia (R47.01)     Co-morbidities and problem list:   Patient Active Problem List:   ADD (attention deficit disorder) without hyperactivity   JENNIFER (generalized anxiety disorder)   Insomnia due to stress   Bipolar II disorder (CMS/HCC)   Abnormal EKG   Preoperative cardiovascular examination   Secondary  hypertension   Heart murmur   CFS (chronic fatigue syndrome)   Dysphagia   Angio-edema   Sepsis due to undetermined organism with metabolic encephalopathy (CMS/HCC)   Iron deficiency anemia   Expressive aphasia    Treatment Diagnosis: Aphasia, confusion    The referring provider's electronic signature on the evaluation authorizes the therapy plan of care and certifies the need for these services, furnished under this plan of care while under their care.      Therapy procedure time and total treatment time can be found documented on the Time Entry flowsheet

## 2023-04-11 ENCOUNTER — APPOINTMENT (OUTPATIENT)
Dept: GENERAL RADIOLOGY | Age: 73
DRG: 292 | End: 2023-04-11
Attending: EMERGENCY MEDICINE
Payer: MEDICARE

## 2023-04-11 ENCOUNTER — HOSPITAL ENCOUNTER (INPATIENT)
Age: 73
LOS: 8 days | Discharge: HOME HEALTH | DRG: 292 | End: 2023-04-20
Attending: EMERGENCY MEDICINE | Admitting: HOSPITALIST
Payer: MEDICARE

## 2023-04-11 DIAGNOSIS — E87.70 HYPERVOLEMIA, UNSPECIFIED HYPERVOLEMIA TYPE: ICD-10-CM

## 2023-04-11 DIAGNOSIS — R77.8 ELEVATED TROPONIN: ICD-10-CM

## 2023-04-11 DIAGNOSIS — R06.02 SOB (SHORTNESS OF BREATH): ICD-10-CM

## 2023-04-11 DIAGNOSIS — G89.4 CHRONIC PAIN DISORDER: Primary | ICD-10-CM

## 2023-04-11 LAB
ALBUMIN SERPL-MCNC: 3 G/DL (ref 3.5–5)
ALBUMIN/GLOB SERPL: 0.8 (ref 1.1–2.2)
ALP SERPL-CCNC: 237 U/L (ref 45–117)
ALT SERPL-CCNC: 25 U/L (ref 12–78)
ANION GAP SERPL CALC-SCNC: 4 MMOL/L (ref 5–15)
AST SERPL W P-5'-P-CCNC: 25 U/L (ref 15–37)
BILIRUB SERPL-MCNC: 0.6 MG/DL (ref 0.2–1)
BNP SERPL-MCNC: 6815 PG/ML
BUN SERPL-MCNC: 26 MG/DL (ref 6–20)
BUN/CREAT SERPL: 16 (ref 12–20)
CA-I BLD-MCNC: 8.4 MG/DL (ref 8.5–10.1)
CHLORIDE SERPL-SCNC: 113 MMOL/L (ref 97–108)
CO2 SERPL-SCNC: 27 MMOL/L (ref 21–32)
CREAT SERPL-MCNC: 1.58 MG/DL (ref 0.55–1.02)
ERYTHROCYTE [DISTWIDTH] IN BLOOD BY AUTOMATED COUNT: 17 % (ref 11.5–14.5)
GLOBULIN SER CALC-MCNC: 4 G/DL (ref 2–4)
GLUCOSE SERPL-MCNC: 134 MG/DL (ref 65–100)
HCT VFR BLD AUTO: 43.9 % (ref 35–47)
HGB BLD-MCNC: 12.9 G/DL (ref 11.5–16)
MCH RBC QN AUTO: 27.6 PG (ref 26–34)
MCHC RBC AUTO-ENTMCNC: 29.4 G/DL (ref 30–36.5)
MCV RBC AUTO: 93.8 FL (ref 80–99)
NRBC # BLD: 0 K/UL (ref 0–0.01)
NRBC BLD-RTO: 0 PER 100 WBC
PLATELET # BLD AUTO: 176 K/UL (ref 150–400)
PMV BLD AUTO: 12.8 FL (ref 8.9–12.9)
POTASSIUM SERPL-SCNC: 4.1 MMOL/L (ref 3.5–5.1)
PROT SERPL-MCNC: 7 G/DL (ref 6.4–8.2)
RBC # BLD AUTO: 4.68 M/UL (ref 3.8–5.2)
SODIUM SERPL-SCNC: 144 MMOL/L (ref 136–145)
TROPONIN I SERPL HS-MCNC: 181 NG/L (ref 0–51)
WBC # BLD AUTO: 6.8 K/UL (ref 3.6–11)

## 2023-04-11 PROCEDURE — 99285 EMERGENCY DEPT VISIT HI MDM: CPT

## 2023-04-11 PROCEDURE — 93005 ELECTROCARDIOGRAM TRACING: CPT

## 2023-04-11 PROCEDURE — 74011000250 HC RX REV CODE- 250: Performed by: EMERGENCY MEDICINE

## 2023-04-11 PROCEDURE — 83880 ASSAY OF NATRIURETIC PEPTIDE: CPT

## 2023-04-11 PROCEDURE — 94640 AIRWAY INHALATION TREATMENT: CPT

## 2023-04-11 PROCEDURE — 96374 THER/PROPH/DIAG INJ IV PUSH: CPT

## 2023-04-11 PROCEDURE — 84484 ASSAY OF TROPONIN QUANT: CPT

## 2023-04-11 PROCEDURE — 85027 COMPLETE CBC AUTOMATED: CPT

## 2023-04-11 PROCEDURE — 36415 COLL VENOUS BLD VENIPUNCTURE: CPT

## 2023-04-11 PROCEDURE — 71045 X-RAY EXAM CHEST 1 VIEW: CPT

## 2023-04-11 PROCEDURE — 80053 COMPREHEN METABOLIC PANEL: CPT

## 2023-04-11 RX ORDER — ACETAMINOPHEN AND CODEINE PHOSPHATE 300; 60 MG/1; MG/1
1 TABLET ORAL
COMMUNITY
Start: 2023-04-01

## 2023-04-11 RX ORDER — SACUBITRIL AND VALSARTAN 97; 103 MG/1; MG/1
1 TABLET, FILM COATED ORAL 2 TIMES DAILY
COMMUNITY
Start: 2023-03-09

## 2023-04-11 RX ORDER — HYDROCODONE POLISTIREX AND CHLORPHENIRAMINE POLISTIREX 10; 8 MG/5ML; MG/5ML
7.5 SUSPENSION, EXTENDED RELEASE ORAL
COMMUNITY
Start: 2023-03-24 | End: 2023-04-20 | Stop reason: ALTCHOICE

## 2023-04-11 RX ORDER — BUMETANIDE 0.25 MG/ML
2 INJECTION INTRAMUSCULAR; INTRAVENOUS
Status: COMPLETED | OUTPATIENT
Start: 2023-04-11 | End: 2023-04-11

## 2023-04-11 RX ORDER — IPRATROPIUM BROMIDE AND ALBUTEROL SULFATE 2.5; .5 MG/3ML; MG/3ML
3 SOLUTION RESPIRATORY (INHALATION)
Status: COMPLETED | OUTPATIENT
Start: 2023-04-11 | End: 2023-04-11

## 2023-04-11 RX ADMIN — IPRATROPIUM BROMIDE AND ALBUTEROL SULFATE 3 ML: .5; 2.5 SOLUTION RESPIRATORY (INHALATION) at 20:46

## 2023-04-11 RX ADMIN — IPRATROPIUM BROMIDE AND ALBUTEROL SULFATE 3 ML: .5; 2.5 SOLUTION RESPIRATORY (INHALATION) at 20:49

## 2023-04-11 RX ADMIN — BUMETANIDE 2 MG: 0.25 INJECTION, SOLUTION INTRAMUSCULAR; INTRAVENOUS at 20:50

## 2023-04-11 RX ADMIN — IPRATROPIUM BROMIDE AND ALBUTEROL SULFATE 3 ML: .5; 2.5 SOLUTION RESPIRATORY (INHALATION) at 20:47

## 2023-04-12 ENCOUNTER — APPOINTMENT (OUTPATIENT)
Dept: NON INVASIVE DIAGNOSTICS | Age: 73
DRG: 292 | End: 2023-04-12
Attending: HOSPITALIST
Payer: MEDICARE

## 2023-04-12 PROBLEM — R06.02 SHORTNESS OF BREATH: Status: ACTIVE | Noted: 2023-04-12

## 2023-04-12 PROBLEM — R50.9 FEVER: Status: ACTIVE | Noted: 2023-04-12

## 2023-04-12 PROBLEM — R77.8 ELEVATED TROPONIN: Status: ACTIVE | Noted: 2023-04-12

## 2023-04-12 PROBLEM — R79.89 ELEVATED TROPONIN: Status: ACTIVE | Noted: 2023-04-12

## 2023-04-12 PROBLEM — I50.9 ACUTE EXACERBATION OF CHF (CONGESTIVE HEART FAILURE) (HCC): Status: ACTIVE | Noted: 2023-04-12

## 2023-04-12 LAB
25(OH)D3 SERPL-MCNC: 17.8 NG/ML (ref 30–100)
ALBUMIN SERPL-MCNC: 2.6 G/DL (ref 3.5–5)
ANION GAP SERPL CALC-SCNC: 2 MMOL/L (ref 5–15)
ATRIAL RATE: 71 BPM
BUN SERPL-MCNC: 24 MG/DL (ref 6–20)
BUN/CREAT SERPL: 16 (ref 12–20)
CA-I BLD-MCNC: 8.4 MG/DL (ref 8.5–10.1)
CALCULATED P AXIS, ECG09: 60 DEGREES
CALCULATED R AXIS, ECG10: 45 DEGREES
CALCULATED T AXIS, ECG11: 110 DEGREES
CHLORIDE SERPL-SCNC: 110 MMOL/L (ref 97–108)
CO2 SERPL-SCNC: 31 MMOL/L (ref 21–32)
CREAT SERPL-MCNC: 1.46 MG/DL (ref 0.55–1.02)
DIAGNOSIS, 93000: NORMAL
ECHO AO ROOT DIAM: 3 CM
ECHO AO ROOT INDEX: 1.44 CM/M2
ECHO AV PEAK GRADIENT: 10 MMHG
ECHO AV PEAK VELOCITY: 1.6 M/S
ECHO AV VELOCITY RATIO: 0.69
ECHO EST RA PRESSURE: 15 MMHG
ECHO LA DIAMETER INDEX: 2.15 CM/M2
ECHO LA DIAMETER: 4.5 CM
ECHO LA TO AORTIC ROOT RATIO: 1.5
ECHO LV E' LATERAL VELOCITY: 8 CM/S
ECHO LV E' SEPTAL VELOCITY: 4 CM/S
ECHO LV EF PHYSICIAN: 50 %
ECHO LV EJECTION FRACTION BIPLANE: 48 % (ref 55–100)
ECHO LV FRACTIONAL SHORTENING: 23 % (ref 28–44)
ECHO LV INTERNAL DIMENSION DIASTOLE INDEX: 2.25 CM/M2
ECHO LV INTERNAL DIMENSION DIASTOLIC: 4.7 CM (ref 3.9–5.3)
ECHO LV INTERNAL DIMENSION SYSTOLIC INDEX: 1.72 CM/M2
ECHO LV INTERNAL DIMENSION SYSTOLIC: 3.6 CM
ECHO LV IVSD: 1.7 CM (ref 0.6–0.9)
ECHO LV MASS 2D: 265.2 G (ref 67–162)
ECHO LV MASS INDEX 2D: 126.9 G/M2 (ref 43–95)
ECHO LV POSTERIOR WALL DIASTOLIC: 1.1 CM (ref 0.6–0.9)
ECHO LV RELATIVE WALL THICKNESS RATIO: 0.47
ECHO LVOT PEAK GRADIENT: 5 MMHG
ECHO LVOT PEAK VELOCITY: 1.1 M/S
ECHO MV A VELOCITY: 0.97 M/S
ECHO MV E DECELERATION TIME (DT): 201 MS
ECHO MV E VELOCITY: 1.02 M/S
ECHO MV E/A RATIO: 1.05
ECHO MV E/E' LATERAL: 12.75
ECHO MV E/E' RATIO (AVERAGED): 19.13
ECHO MV E/E' SEPTAL: 25.5
ECHO PULMONARY ARTERY END DIASTOLIC PRESSURE: 5 MMHG
ECHO PV MAX VELOCITY: 0.9 M/S
ECHO PV PEAK GRADIENT: 3 MMHG
ECHO PV REGURGITANT MAX VELOCITY: 1.2 M/S
ECHO RIGHT VENTRICULAR SYSTOLIC PRESSURE (RVSP): 58 MMHG
ECHO RV INTERNAL DIMENSION: 4.2 CM
ECHO TV REGURGITANT MAX VELOCITY: 3.26 M/S
ECHO TV REGURGITANT PEAK GRADIENT: 43 MMHG
GLUCOSE SERPL-MCNC: 116 MG/DL (ref 65–100)
P-R INTERVAL, ECG05: 142 MS
PHOSPHATE SERPL-MCNC: 3.6 MG/DL (ref 2.6–4.7)
POTASSIUM SERPL-SCNC: 4 MMOL/L (ref 3.5–5.1)
Q-T INTERVAL, ECG07: 406 MS
QRS DURATION, ECG06: 88 MS
QTC CALCULATION (BEZET), ECG08: 441 MS
SODIUM SERPL-SCNC: 143 MMOL/L (ref 136–145)
TSH SERPL DL<=0.05 MIU/L-ACNC: 2.12 UIU/ML (ref 0.36–3.74)
VENTRICULAR RATE, ECG03: 71 BPM

## 2023-04-12 PROCEDURE — 74011250637 HC RX REV CODE- 250/637: Performed by: INTERNAL MEDICINE

## 2023-04-12 PROCEDURE — 74011250636 HC RX REV CODE- 250/636: Performed by: HOSPITALIST

## 2023-04-12 PROCEDURE — 74011250636 HC RX REV CODE- 250/636: Performed by: INTERNAL MEDICINE

## 2023-04-12 PROCEDURE — 84443 ASSAY THYROID STIM HORMONE: CPT

## 2023-04-12 PROCEDURE — 94640 AIRWAY INHALATION TREATMENT: CPT

## 2023-04-12 PROCEDURE — 82306 VITAMIN D 25 HYDROXY: CPT

## 2023-04-12 PROCEDURE — 94761 N-INVAS EAR/PLS OXIMETRY MLT: CPT

## 2023-04-12 PROCEDURE — 93306 TTE W/DOPPLER COMPLETE: CPT

## 2023-04-12 PROCEDURE — 74011250637 HC RX REV CODE- 250/637: Performed by: HOSPITALIST

## 2023-04-12 PROCEDURE — 74011000250 HC RX REV CODE- 250: Performed by: HOSPITALIST

## 2023-04-12 PROCEDURE — 77010033678 HC OXYGEN DAILY

## 2023-04-12 PROCEDURE — 36415 COLL VENOUS BLD VENIPUNCTURE: CPT

## 2023-04-12 PROCEDURE — 80069 RENAL FUNCTION PANEL: CPT

## 2023-04-12 PROCEDURE — 65270000029 HC RM PRIVATE

## 2023-04-12 RX ORDER — FUROSEMIDE 40 MG/1
40 TABLET ORAL 2 TIMES DAILY
Status: DISCONTINUED | OUTPATIENT
Start: 2023-04-12 | End: 2023-04-12

## 2023-04-12 RX ORDER — NITROGLYCERIN 0.4 MG/1
0.4 TABLET SUBLINGUAL AS NEEDED
Status: DISCONTINUED | OUTPATIENT
Start: 2023-04-12 | End: 2023-04-20 | Stop reason: HOSPADM

## 2023-04-12 RX ORDER — FUROSEMIDE 10 MG/ML
40 INJECTION INTRAMUSCULAR; INTRAVENOUS 2 TIMES DAILY
Status: DISCONTINUED | OUTPATIENT
Start: 2023-04-12 | End: 2023-04-12

## 2023-04-12 RX ORDER — ACETAMINOPHEN AND CODEINE PHOSPHATE 300; 30 MG/1; MG/1
1 TABLET ORAL
Status: DISCONTINUED | OUTPATIENT
Start: 2023-04-12 | End: 2023-04-19

## 2023-04-12 RX ORDER — ASPIRIN 81 MG/1
81 TABLET ORAL DAILY
Status: DISCONTINUED | OUTPATIENT
Start: 2023-04-12 | End: 2023-04-20 | Stop reason: HOSPADM

## 2023-04-12 RX ORDER — AMIODARONE HYDROCHLORIDE 200 MG/1
200 TABLET ORAL DAILY
Status: DISCONTINUED | OUTPATIENT
Start: 2023-04-12 | End: 2023-04-20 | Stop reason: HOSPADM

## 2023-04-12 RX ORDER — OXYBUTYNIN CHLORIDE 5 MG/1
5 TABLET ORAL 2 TIMES DAILY
COMMUNITY
End: 2023-04-20 | Stop reason: ALTCHOICE

## 2023-04-12 RX ORDER — FUROSEMIDE 10 MG/ML
80 INJECTION INTRAMUSCULAR; INTRAVENOUS 2 TIMES DAILY
Status: COMPLETED | OUTPATIENT
Start: 2023-04-12 | End: 2023-04-12

## 2023-04-12 RX ORDER — ONDANSETRON 2 MG/ML
4 INJECTION INTRAMUSCULAR; INTRAVENOUS
Status: DISCONTINUED | OUTPATIENT
Start: 2023-04-12 | End: 2023-04-20 | Stop reason: HOSPADM

## 2023-04-12 RX ORDER — BUDESONIDE AND FORMOTEROL FUMARATE DIHYDRATE 160; 4.5 UG/1; UG/1
2 AEROSOL RESPIRATORY (INHALATION)
Status: DISCONTINUED | OUTPATIENT
Start: 2023-04-12 | End: 2023-04-20 | Stop reason: HOSPADM

## 2023-04-12 RX ORDER — POTASSIUM CHLORIDE 750 MG/1
10 TABLET, FILM COATED, EXTENDED RELEASE ORAL 2 TIMES DAILY
Status: DISCONTINUED | OUTPATIENT
Start: 2023-04-12 | End: 2023-04-19

## 2023-04-12 RX ORDER — FUROSEMIDE 40 MG/1
40 TABLET ORAL 2 TIMES DAILY
Status: DISCONTINUED | OUTPATIENT
Start: 2023-04-13 | End: 2023-04-12

## 2023-04-12 RX ORDER — ATORVASTATIN CALCIUM 40 MG/1
40 TABLET, FILM COATED ORAL
Status: DISCONTINUED | OUTPATIENT
Start: 2023-04-12 | End: 2023-04-20 | Stop reason: HOSPADM

## 2023-04-12 RX ORDER — FUROSEMIDE 40 MG/1
40 TABLET ORAL DAILY
Status: DISCONTINUED | OUTPATIENT
Start: 2023-04-13 | End: 2023-04-12

## 2023-04-12 RX ORDER — SPIRONOLACTONE 25 MG/1
25 TABLET ORAL DAILY
Status: DISCONTINUED | OUTPATIENT
Start: 2023-04-12 | End: 2023-04-20 | Stop reason: HOSPADM

## 2023-04-12 RX ORDER — BUMETANIDE 1 MG/1
1 TABLET ORAL 2 TIMES DAILY
Status: ON HOLD | COMMUNITY
End: 2023-04-20 | Stop reason: SDUPTHER

## 2023-04-12 RX ORDER — QUETIAPINE FUMARATE 25 MG/1
25 TABLET, FILM COATED ORAL
Status: DISCONTINUED | OUTPATIENT
Start: 2023-04-12 | End: 2023-04-20 | Stop reason: HOSPADM

## 2023-04-12 RX ORDER — IPRATROPIUM BROMIDE AND ALBUTEROL SULFATE 2.5; .5 MG/3ML; MG/3ML
3 SOLUTION RESPIRATORY (INHALATION)
Status: DISCONTINUED | OUTPATIENT
Start: 2023-04-12 | End: 2023-04-15

## 2023-04-12 RX ORDER — METOPROLOL SUCCINATE 25 MG/1
25 TABLET, EXTENDED RELEASE ORAL DAILY
Status: DISCONTINUED | OUTPATIENT
Start: 2023-04-12 | End: 2023-04-20 | Stop reason: HOSPADM

## 2023-04-12 RX ORDER — ALBUTEROL SULFATE 90 UG/1
2 AEROSOL, METERED RESPIRATORY (INHALATION)
Status: DISCONTINUED | OUTPATIENT
Start: 2023-04-12 | End: 2023-04-20 | Stop reason: HOSPADM

## 2023-04-12 RX ADMIN — POTASSIUM CHLORIDE 10 MEQ: 750 TABLET, EXTENDED RELEASE ORAL at 20:53

## 2023-04-12 RX ADMIN — IPRATROPIUM BROMIDE AND ALBUTEROL SULFATE 3 ML: 2.5; .5 SOLUTION RESPIRATORY (INHALATION) at 01:48

## 2023-04-12 RX ADMIN — IPRATROPIUM BROMIDE AND ALBUTEROL SULFATE 3 ML: 2.5; .5 SOLUTION RESPIRATORY (INHALATION) at 22:02

## 2023-04-12 RX ADMIN — METOPROLOL SUCCINATE 25 MG: 25 TABLET, EXTENDED RELEASE ORAL at 09:26

## 2023-04-12 RX ADMIN — FUROSEMIDE 40 MG: 10 INJECTION, SOLUTION INTRAMUSCULAR; INTRAVENOUS at 06:17

## 2023-04-12 RX ADMIN — ATORVASTATIN CALCIUM 40 MG: 40 TABLET, FILM COATED ORAL at 20:52

## 2023-04-12 RX ADMIN — QUETIAPINE FUMARATE 25 MG: 25 TABLET ORAL at 20:55

## 2023-04-12 RX ADMIN — APIXABAN 5 MG: 5 TABLET, FILM COATED ORAL at 09:26

## 2023-04-12 RX ADMIN — IPRATROPIUM BROMIDE AND ALBUTEROL SULFATE 3 ML: 2.5; .5 SOLUTION RESPIRATORY (INHALATION) at 08:24

## 2023-04-12 RX ADMIN — AMIODARONE HYDROCHLORIDE 200 MG: 200 TABLET ORAL at 09:26

## 2023-04-12 RX ADMIN — POTASSIUM CHLORIDE 10 MEQ: 750 TABLET, EXTENDED RELEASE ORAL at 13:47

## 2023-04-12 RX ADMIN — SPIRONOLACTONE 25 MG: 25 TABLET ORAL at 09:26

## 2023-04-12 RX ADMIN — BUDESONIDE AND FORMOTEROL FUMARATE DIHYDRATE 2 PUFF: 160; 4.5 AEROSOL RESPIRATORY (INHALATION) at 08:29

## 2023-04-12 RX ADMIN — ATORVASTATIN CALCIUM 40 MG: 40 TABLET, FILM COATED ORAL at 02:38

## 2023-04-12 RX ADMIN — BUDESONIDE AND FORMOTEROL FUMARATE DIHYDRATE 2 PUFF: 160; 4.5 AEROSOL RESPIRATORY (INHALATION) at 22:12

## 2023-04-12 RX ADMIN — IPRATROPIUM BROMIDE AND ALBUTEROL SULFATE 3 ML: 2.5; .5 SOLUTION RESPIRATORY (INHALATION) at 13:36

## 2023-04-12 RX ADMIN — APIXABAN 5 MG: 5 TABLET, FILM COATED ORAL at 20:53

## 2023-04-12 RX ADMIN — SACUBITRIL AND VALSARTAN 1 TABLET: 97; 103 TABLET, FILM COATED ORAL at 20:53

## 2023-04-12 RX ADMIN — ASPIRIN 81 MG: 81 TABLET, COATED ORAL at 09:26

## 2023-04-12 RX ADMIN — FUROSEMIDE 80 MG: 10 INJECTION, SOLUTION INTRAMUSCULAR; INTRAVENOUS at 13:48

## 2023-04-12 RX ADMIN — QUETIAPINE FUMARATE 25 MG: 25 TABLET ORAL at 02:38

## 2023-04-13 LAB
ALBUMIN SERPL-MCNC: 2.6 G/DL (ref 3.5–5)
ALBUMIN SERPL-MCNC: 2.6 G/DL (ref 3.5–5)
ALBUMIN/GLOB SERPL: 0.7 (ref 1.1–2.2)
ALP SERPL-CCNC: 180 U/L (ref 45–117)
ALT SERPL-CCNC: 17 U/L (ref 12–78)
ANION GAP SERPL CALC-SCNC: 3 MMOL/L (ref 5–15)
ANION GAP SERPL CALC-SCNC: 3 MMOL/L (ref 5–15)
AST SERPL W P-5'-P-CCNC: 13 U/L (ref 15–37)
BASOPHILS # BLD: 0.1 K/UL (ref 0–0.1)
BASOPHILS NFR BLD: 1 % (ref 0–1)
BILIRUB SERPL-MCNC: 1 MG/DL (ref 0.2–1)
BUN SERPL-MCNC: 25 MG/DL (ref 6–20)
BUN SERPL-MCNC: 25 MG/DL (ref 6–20)
BUN/CREAT SERPL: 18 (ref 12–20)
BUN/CREAT SERPL: 19 (ref 12–20)
CA-I BLD-MCNC: 8.4 MG/DL (ref 8.5–10.1)
CA-I BLD-MCNC: 8.5 MG/DL (ref 8.5–10.1)
CHLORIDE SERPL-SCNC: 106 MMOL/L (ref 97–108)
CHLORIDE SERPL-SCNC: 108 MMOL/L (ref 97–108)
CHOLEST SERPL-MCNC: 116 MG/DL
CO2 SERPL-SCNC: 30 MMOL/L (ref 21–32)
CO2 SERPL-SCNC: 31 MMOL/L (ref 21–32)
CREAT SERPL-MCNC: 1.35 MG/DL (ref 0.55–1.02)
CREAT SERPL-MCNC: 1.36 MG/DL (ref 0.55–1.02)
DIFFERENTIAL METHOD BLD: ABNORMAL
EOSINOPHIL # BLD: 0.2 K/UL (ref 0–0.4)
EOSINOPHIL NFR BLD: 3 % (ref 0–7)
ERYTHROCYTE [DISTWIDTH] IN BLOOD BY AUTOMATED COUNT: 16.8 % (ref 11.5–14.5)
GLOBULIN SER CALC-MCNC: 3.8 G/DL (ref 2–4)
GLUCOSE SERPL-MCNC: 109 MG/DL (ref 65–100)
GLUCOSE SERPL-MCNC: 111 MG/DL (ref 65–100)
HCT VFR BLD AUTO: 39.7 % (ref 35–47)
HDLC SERPL-MCNC: 53 MG/DL
HDLC SERPL: 2.2 (ref 0–5)
HGB BLD-MCNC: 11.8 G/DL (ref 11.5–16)
IMM GRANULOCYTES # BLD AUTO: 0 K/UL (ref 0–0.04)
IMM GRANULOCYTES NFR BLD AUTO: 0 % (ref 0–0.5)
LDLC SERPL CALC-MCNC: 50.8 MG/DL (ref 0–100)
LIPID PROFILE,FLP: NORMAL
LYMPHOCYTES # BLD: 1.5 K/UL (ref 0.8–3.5)
LYMPHOCYTES NFR BLD: 23 % (ref 12–49)
MAGNESIUM SERPL-MCNC: 1.8 MG/DL (ref 1.6–2.4)
MCH RBC QN AUTO: 27.1 PG (ref 26–34)
MCHC RBC AUTO-ENTMCNC: 29.7 G/DL (ref 30–36.5)
MCV RBC AUTO: 91.3 FL (ref 80–99)
MONOCYTES # BLD: 0.5 K/UL (ref 0–1)
MONOCYTES NFR BLD: 8 % (ref 5–13)
NEUTS SEG # BLD: 4.4 K/UL (ref 1.8–8)
NEUTS SEG NFR BLD: 65 % (ref 32–75)
NRBC # BLD: 0 K/UL (ref 0–0.01)
NRBC BLD-RTO: 0 PER 100 WBC
PHOSPHATE SERPL-MCNC: 4.1 MG/DL (ref 2.6–4.7)
PLATELET # BLD AUTO: 184 K/UL (ref 150–400)
PMV BLD AUTO: 12.9 FL (ref 8.9–12.9)
POTASSIUM SERPL-SCNC: 4 MMOL/L (ref 3.5–5.1)
POTASSIUM SERPL-SCNC: 4.1 MMOL/L (ref 3.5–5.1)
PROT SERPL-MCNC: 6.4 G/DL (ref 6.4–8.2)
RBC # BLD AUTO: 4.35 M/UL (ref 3.8–5.2)
SODIUM SERPL-SCNC: 140 MMOL/L (ref 136–145)
SODIUM SERPL-SCNC: 141 MMOL/L (ref 136–145)
TRIGL SERPL-MCNC: 61 MG/DL (ref ?–150)
VLDLC SERPL CALC-MCNC: 12.2 MG/DL
WBC # BLD AUTO: 6.7 K/UL (ref 3.6–11)

## 2023-04-13 PROCEDURE — 74011250637 HC RX REV CODE- 250/637: Performed by: HOSPITALIST

## 2023-04-13 PROCEDURE — 83735 ASSAY OF MAGNESIUM: CPT

## 2023-04-13 PROCEDURE — 87077 CULTURE AEROBIC IDENTIFY: CPT

## 2023-04-13 PROCEDURE — 97165 OT EVAL LOW COMPLEX 30 MIN: CPT

## 2023-04-13 PROCEDURE — 80069 RENAL FUNCTION PANEL: CPT

## 2023-04-13 PROCEDURE — 74011250637 HC RX REV CODE- 250/637: Performed by: INTERNAL MEDICINE

## 2023-04-13 PROCEDURE — 87186 SC STD MICRODIL/AGAR DIL: CPT

## 2023-04-13 PROCEDURE — 80053 COMPREHEN METABOLIC PANEL: CPT

## 2023-04-13 PROCEDURE — 94640 AIRWAY INHALATION TREATMENT: CPT

## 2023-04-13 PROCEDURE — 36415 COLL VENOUS BLD VENIPUNCTURE: CPT

## 2023-04-13 PROCEDURE — 97530 THERAPEUTIC ACTIVITIES: CPT

## 2023-04-13 PROCEDURE — 77010033678 HC OXYGEN DAILY

## 2023-04-13 PROCEDURE — 85025 COMPLETE CBC W/AUTO DIFF WBC: CPT

## 2023-04-13 PROCEDURE — 74011250637 HC RX REV CODE- 250/637: Performed by: NURSE PRACTITIONER

## 2023-04-13 PROCEDURE — 94761 N-INVAS EAR/PLS OXIMETRY MLT: CPT

## 2023-04-13 PROCEDURE — 65270000029 HC RM PRIVATE

## 2023-04-13 PROCEDURE — 80061 LIPID PANEL: CPT

## 2023-04-13 PROCEDURE — 74011000250 HC RX REV CODE- 250: Performed by: HOSPITALIST

## 2023-04-13 PROCEDURE — 87086 URINE CULTURE/COLONY COUNT: CPT

## 2023-04-13 PROCEDURE — 97161 PT EVAL LOW COMPLEX 20 MIN: CPT

## 2023-04-13 RX ORDER — SULFAMETHOXAZOLE AND TRIMETHOPRIM 800; 160 MG/1; MG/1
1 TABLET ORAL EVERY 12 HOURS
Status: COMPLETED | OUTPATIENT
Start: 2023-04-13 | End: 2023-04-17

## 2023-04-13 RX ORDER — NYSTATIN 100000 [USP'U]/G
POWDER TOPICAL 2 TIMES DAILY
Status: DISCONTINUED | OUTPATIENT
Start: 2023-04-13 | End: 2023-04-13 | Stop reason: CLARIF

## 2023-04-13 RX ORDER — MICONAZOLE NITRATE 2 %
POWDER (GRAM) TOPICAL 2 TIMES DAILY
Status: DISCONTINUED | OUTPATIENT
Start: 2023-04-13 | End: 2023-04-20 | Stop reason: HOSPADM

## 2023-04-13 RX ADMIN — SULFAMETHOXAZOLE AND TRIMETHOPRIM 1 TABLET: 800; 160 TABLET ORAL at 08:40

## 2023-04-13 RX ADMIN — SULFAMETHOXAZOLE AND TRIMETHOPRIM 1 TABLET: 800; 160 TABLET ORAL at 20:40

## 2023-04-13 RX ADMIN — BUDESONIDE AND FORMOTEROL FUMARATE DIHYDRATE 2 PUFF: 160; 4.5 AEROSOL RESPIRATORY (INHALATION) at 20:07

## 2023-04-13 RX ADMIN — IPRATROPIUM BROMIDE AND ALBUTEROL SULFATE 3 ML: 2.5; .5 SOLUTION RESPIRATORY (INHALATION) at 13:04

## 2023-04-13 RX ADMIN — POTASSIUM CHLORIDE 10 MEQ: 750 TABLET, EXTENDED RELEASE ORAL at 20:39

## 2023-04-13 RX ADMIN — ASPIRIN 81 MG: 81 TABLET, COATED ORAL at 08:40

## 2023-04-13 RX ADMIN — POTASSIUM CHLORIDE 10 MEQ: 750 TABLET, EXTENDED RELEASE ORAL at 08:40

## 2023-04-13 RX ADMIN — ACETAMINOPHEN AND CODEINE PHOSPHATE 1 TABLET: 300; 30 TABLET ORAL at 11:40

## 2023-04-13 RX ADMIN — MICONAZOLE NITRATE: 20 POWDER TOPICAL at 20:44

## 2023-04-13 RX ADMIN — APIXABAN 5 MG: 5 TABLET, FILM COATED ORAL at 20:39

## 2023-04-13 RX ADMIN — SACUBITRIL AND VALSARTAN 1 TABLET: 97; 103 TABLET, FILM COATED ORAL at 08:40

## 2023-04-13 RX ADMIN — METOPROLOL SUCCINATE 25 MG: 25 TABLET, EXTENDED RELEASE ORAL at 08:40

## 2023-04-13 RX ADMIN — APIXABAN 5 MG: 5 TABLET, FILM COATED ORAL at 08:40

## 2023-04-13 RX ADMIN — IPRATROPIUM BROMIDE AND ALBUTEROL SULFATE 3 ML: 2.5; .5 SOLUTION RESPIRATORY (INHALATION) at 08:53

## 2023-04-13 RX ADMIN — SACUBITRIL AND VALSARTAN 1 TABLET: 97; 103 TABLET, FILM COATED ORAL at 20:40

## 2023-04-13 RX ADMIN — SPIRONOLACTONE 25 MG: 25 TABLET ORAL at 08:40

## 2023-04-13 RX ADMIN — AMIODARONE HYDROCHLORIDE 200 MG: 200 TABLET ORAL at 08:41

## 2023-04-13 RX ADMIN — ATORVASTATIN CALCIUM 40 MG: 40 TABLET, FILM COATED ORAL at 20:39

## 2023-04-13 RX ADMIN — BUDESONIDE AND FORMOTEROL FUMARATE DIHYDRATE 2 PUFF: 160; 4.5 AEROSOL RESPIRATORY (INHALATION) at 08:53

## 2023-04-13 RX ADMIN — IPRATROPIUM BROMIDE AND ALBUTEROL SULFATE 3 ML: 2.5; .5 SOLUTION RESPIRATORY (INHALATION) at 19:56

## 2023-04-13 RX ADMIN — IPRATROPIUM BROMIDE AND ALBUTEROL SULFATE 3 ML: 2.5; .5 SOLUTION RESPIRATORY (INHALATION) at 02:54

## 2023-04-13 RX ADMIN — QUETIAPINE FUMARATE 25 MG: 25 TABLET ORAL at 20:39

## 2023-04-14 LAB
ALBUMIN SERPL-MCNC: 2.4 G/DL (ref 3.5–5)
ALBUMIN/GLOB SERPL: 0.6 (ref 1.1–2.2)
ALP SERPL-CCNC: 163 U/L (ref 45–117)
ALT SERPL-CCNC: 13 U/L (ref 12–78)
ANION GAP SERPL CALC-SCNC: 2 MMOL/L (ref 5–15)
AST SERPL W P-5'-P-CCNC: 8 U/L (ref 15–37)
BASOPHILS # BLD: 0 K/UL (ref 0–0.1)
BASOPHILS NFR BLD: 1 % (ref 0–1)
BILIRUB SERPL-MCNC: 0.7 MG/DL (ref 0.2–1)
BUN SERPL-MCNC: 24 MG/DL (ref 6–20)
BUN/CREAT SERPL: 16 (ref 12–20)
CA-I BLD-MCNC: 8.1 MG/DL (ref 8.5–10.1)
CHLORIDE SERPL-SCNC: 105 MMOL/L (ref 97–108)
CO2 SERPL-SCNC: 34 MMOL/L (ref 21–32)
CREAT SERPL-MCNC: 1.46 MG/DL (ref 0.55–1.02)
DIFFERENTIAL METHOD BLD: ABNORMAL
EOSINOPHIL # BLD: 0.1 K/UL (ref 0–0.4)
EOSINOPHIL NFR BLD: 2 % (ref 0–7)
ERYTHROCYTE [DISTWIDTH] IN BLOOD BY AUTOMATED COUNT: 16.7 % (ref 11.5–14.5)
GLOBULIN SER CALC-MCNC: 3.8 G/DL (ref 2–4)
GLUCOSE SERPL-MCNC: 99 MG/DL (ref 65–100)
HCT VFR BLD AUTO: 39.9 % (ref 35–47)
HGB BLD-MCNC: 11.8 G/DL (ref 11.5–16)
IMM GRANULOCYTES # BLD AUTO: 0 K/UL (ref 0–0.04)
IMM GRANULOCYTES NFR BLD AUTO: 0 % (ref 0–0.5)
LYMPHOCYTES # BLD: 1.5 K/UL (ref 0.8–3.5)
LYMPHOCYTES NFR BLD: 26 % (ref 12–49)
MCH RBC QN AUTO: 27.4 PG (ref 26–34)
MCHC RBC AUTO-ENTMCNC: 29.6 G/DL (ref 30–36.5)
MCV RBC AUTO: 92.8 FL (ref 80–99)
MONOCYTES # BLD: 0.6 K/UL (ref 0–1)
MONOCYTES NFR BLD: 11 % (ref 5–13)
NEUTS SEG # BLD: 3.6 K/UL (ref 1.8–8)
NEUTS SEG NFR BLD: 60 % (ref 32–75)
NRBC # BLD: 0 K/UL (ref 0–0.01)
NRBC BLD-RTO: 0 PER 100 WBC
PLATELET # BLD AUTO: 172 K/UL (ref 150–400)
PMV BLD AUTO: 12.8 FL (ref 8.9–12.9)
POTASSIUM SERPL-SCNC: 4.4 MMOL/L (ref 3.5–5.1)
PROT SERPL-MCNC: 6.2 G/DL (ref 6.4–8.2)
RBC # BLD AUTO: 4.3 M/UL (ref 3.8–5.2)
SODIUM SERPL-SCNC: 141 MMOL/L (ref 136–145)
WBC # BLD AUTO: 5.9 K/UL (ref 3.6–11)

## 2023-04-14 PROCEDURE — 77010033678 HC OXYGEN DAILY

## 2023-04-14 PROCEDURE — 74011000250 HC RX REV CODE- 250: Performed by: HOSPITALIST

## 2023-04-14 PROCEDURE — 80053 COMPREHEN METABOLIC PANEL: CPT

## 2023-04-14 PROCEDURE — 65270000029 HC RM PRIVATE

## 2023-04-14 PROCEDURE — 97530 THERAPEUTIC ACTIVITIES: CPT

## 2023-04-14 PROCEDURE — 74011250637 HC RX REV CODE- 250/637: Performed by: NURSE PRACTITIONER

## 2023-04-14 PROCEDURE — 74011250636 HC RX REV CODE- 250/636: Performed by: INTERNAL MEDICINE

## 2023-04-14 PROCEDURE — 94640 AIRWAY INHALATION TREATMENT: CPT

## 2023-04-14 PROCEDURE — 94761 N-INVAS EAR/PLS OXIMETRY MLT: CPT

## 2023-04-14 PROCEDURE — 85025 COMPLETE CBC W/AUTO DIFF WBC: CPT

## 2023-04-14 PROCEDURE — 36415 COLL VENOUS BLD VENIPUNCTURE: CPT

## 2023-04-14 PROCEDURE — 74011250637 HC RX REV CODE- 250/637: Performed by: INTERNAL MEDICINE

## 2023-04-14 PROCEDURE — 74011250637 HC RX REV CODE- 250/637: Performed by: HOSPITALIST

## 2023-04-14 RX ORDER — DOBUTAMINE HYDROCHLORIDE 200 MG/100ML
5 INJECTION INTRAVENOUS CONTINUOUS
Status: DISCONTINUED | OUTPATIENT
Start: 2023-04-14 | End: 2023-04-14

## 2023-04-14 RX ORDER — FUROSEMIDE 10 MG/ML
80 INJECTION INTRAMUSCULAR; INTRAVENOUS DAILY
Status: DISCONTINUED | OUTPATIENT
Start: 2023-04-15 | End: 2023-04-18

## 2023-04-14 RX ORDER — DOBUTAMINE HYDROCHLORIDE 200 MG/100ML
2.5 INJECTION INTRAVENOUS CONTINUOUS
Status: DISCONTINUED | OUTPATIENT
Start: 2023-04-14 | End: 2023-04-18

## 2023-04-14 RX ADMIN — METOPROLOL SUCCINATE 25 MG: 25 TABLET, EXTENDED RELEASE ORAL at 09:21

## 2023-04-14 RX ADMIN — SULFAMETHOXAZOLE AND TRIMETHOPRIM 1 TABLET: 800; 160 TABLET ORAL at 09:21

## 2023-04-14 RX ADMIN — IPRATROPIUM BROMIDE AND ALBUTEROL SULFATE 3 ML: 2.5; .5 SOLUTION RESPIRATORY (INHALATION) at 02:06

## 2023-04-14 RX ADMIN — ACETAMINOPHEN AND CODEINE PHOSPHATE 1 TABLET: 300; 30 TABLET ORAL at 23:13

## 2023-04-14 RX ADMIN — SACUBITRIL AND VALSARTAN 1 TABLET: 97; 103 TABLET, FILM COATED ORAL at 21:02

## 2023-04-14 RX ADMIN — APIXABAN 5 MG: 5 TABLET, FILM COATED ORAL at 21:02

## 2023-04-14 RX ADMIN — BUDESONIDE AND FORMOTEROL FUMARATE DIHYDRATE 2 PUFF: 160; 4.5 AEROSOL RESPIRATORY (INHALATION) at 07:24

## 2023-04-14 RX ADMIN — SACUBITRIL AND VALSARTAN 1 TABLET: 97; 103 TABLET, FILM COATED ORAL at 09:21

## 2023-04-14 RX ADMIN — SPIRONOLACTONE 25 MG: 25 TABLET ORAL at 09:21

## 2023-04-14 RX ADMIN — AMIODARONE HYDROCHLORIDE 200 MG: 200 TABLET ORAL at 09:21

## 2023-04-14 RX ADMIN — POTASSIUM CHLORIDE 10 MEQ: 750 TABLET, EXTENDED RELEASE ORAL at 21:02

## 2023-04-14 RX ADMIN — IPRATROPIUM BROMIDE AND ALBUTEROL SULFATE 3 ML: 2.5; .5 SOLUTION RESPIRATORY (INHALATION) at 07:24

## 2023-04-14 RX ADMIN — QUETIAPINE FUMARATE 25 MG: 25 TABLET ORAL at 21:02

## 2023-04-14 RX ADMIN — IPRATROPIUM BROMIDE AND ALBUTEROL SULFATE 3 ML: 2.5; .5 SOLUTION RESPIRATORY (INHALATION) at 19:44

## 2023-04-14 RX ADMIN — POTASSIUM CHLORIDE 10 MEQ: 750 TABLET, EXTENDED RELEASE ORAL at 09:21

## 2023-04-14 RX ADMIN — IPRATROPIUM BROMIDE AND ALBUTEROL SULFATE 3 ML: 2.5; .5 SOLUTION RESPIRATORY (INHALATION) at 13:40

## 2023-04-14 RX ADMIN — ASPIRIN 81 MG: 81 TABLET, COATED ORAL at 09:21

## 2023-04-14 RX ADMIN — SULFAMETHOXAZOLE AND TRIMETHOPRIM 1 TABLET: 800; 160 TABLET ORAL at 21:02

## 2023-04-14 RX ADMIN — BUDESONIDE AND FORMOTEROL FUMARATE DIHYDRATE 2 PUFF: 160; 4.5 AEROSOL RESPIRATORY (INHALATION) at 19:56

## 2023-04-14 RX ADMIN — MICONAZOLE NITRATE: 20 POWDER TOPICAL at 21:05

## 2023-04-14 RX ADMIN — APIXABAN 5 MG: 5 TABLET, FILM COATED ORAL at 09:22

## 2023-04-14 RX ADMIN — ATORVASTATIN CALCIUM 40 MG: 40 TABLET, FILM COATED ORAL at 21:02

## 2023-04-14 RX ADMIN — DOBUTAMINE HYDROCHLORIDE 5 MCG/KG/MIN: 200 INJECTION INTRAVENOUS at 17:24

## 2023-04-14 RX ADMIN — MICONAZOLE NITRATE: 20 POWDER TOPICAL at 09:29

## 2023-04-15 LAB
ALBUMIN SERPL-MCNC: 2.3 G/DL (ref 3.5–5)
ALBUMIN/GLOB SERPL: 0.6 (ref 1.1–2.2)
ALP SERPL-CCNC: 156 U/L (ref 45–117)
ALT SERPL-CCNC: 13 U/L (ref 12–78)
ANION GAP SERPL CALC-SCNC: 3 MMOL/L (ref 5–15)
AST SERPL W P-5'-P-CCNC: 7 U/L (ref 15–37)
ATRIAL RATE: 70 BPM
BACTERIA SPEC CULT: ABNORMAL
BACTERIA SPEC CULT: ABNORMAL
BASOPHILS # BLD: 0 K/UL (ref 0–0.1)
BASOPHILS NFR BLD: 0 % (ref 0–1)
BILIRUB SERPL-MCNC: 0.6 MG/DL (ref 0.2–1)
BUN SERPL-MCNC: 26 MG/DL (ref 6–20)
BUN/CREAT SERPL: 19 (ref 12–20)
CA-I BLD-MCNC: 7.8 MG/DL (ref 8.5–10.1)
CALCULATED P AXIS, ECG09: 75 DEGREES
CALCULATED R AXIS, ECG10: 51 DEGREES
CALCULATED T AXIS, ECG11: 122 DEGREES
CHLORIDE SERPL-SCNC: 106 MMOL/L (ref 97–108)
CO2 SERPL-SCNC: 32 MMOL/L (ref 21–32)
COLONY COUNT,CNT: ABNORMAL
COLONY COUNT,CNT: ABNORMAL
CREAT SERPL-MCNC: 1.34 MG/DL (ref 0.55–1.02)
DIAGNOSIS, 93000: NORMAL
DIFFERENTIAL METHOD BLD: ABNORMAL
EOSINOPHIL # BLD: 0.1 K/UL (ref 0–0.4)
EOSINOPHIL NFR BLD: 2 % (ref 0–7)
ERYTHROCYTE [DISTWIDTH] IN BLOOD BY AUTOMATED COUNT: 16.8 % (ref 11.5–14.5)
GLOBULIN SER CALC-MCNC: 3.7 G/DL (ref 2–4)
GLUCOSE SERPL-MCNC: 107 MG/DL (ref 65–100)
HCT VFR BLD AUTO: 37 % (ref 35–47)
HGB BLD-MCNC: 11.2 G/DL (ref 11.5–16)
IMM GRANULOCYTES # BLD AUTO: 0 K/UL
IMM GRANULOCYTES NFR BLD AUTO: 0 %
LYMPHOCYTES # BLD: 1.6 K/UL (ref 0.8–3.5)
LYMPHOCYTES NFR BLD: 23 % (ref 12–49)
MCH RBC QN AUTO: 27.9 PG (ref 26–34)
MCHC RBC AUTO-ENTMCNC: 30.3 G/DL (ref 30–36.5)
MCV RBC AUTO: 92 FL (ref 80–99)
MONOCYTES # BLD: 0.8 K/UL (ref 0–1)
MONOCYTES NFR BLD: 11 % (ref 5–13)
NEUTS SEG # BLD: 4.4 K/UL (ref 1.8–8)
NEUTS SEG NFR BLD: 64 % (ref 32–75)
NRBC # BLD: 0 K/UL (ref 0–0.01)
NRBC BLD-RTO: 0 PER 100 WBC
P-R INTERVAL, ECG05: 172 MS
PLATELET # BLD AUTO: 169 K/UL (ref 150–400)
PMV BLD AUTO: 13.4 FL (ref 8.9–12.9)
POTASSIUM SERPL-SCNC: 4.2 MMOL/L (ref 3.5–5.1)
PROT SERPL-MCNC: 6 G/DL (ref 6.4–8.2)
Q-T INTERVAL, ECG07: 454 MS
QRS DURATION, ECG06: 104 MS
QTC CALCULATION (BEZET), ECG08: 490 MS
RBC # BLD AUTO: 4.02 M/UL (ref 3.8–5.2)
RBC MORPH BLD: ABNORMAL
SODIUM SERPL-SCNC: 141 MMOL/L (ref 136–145)
SPECIAL REQUESTS,SREQ: ABNORMAL
TROPONIN I SERPL HS-MCNC: 124 NG/L (ref 0–51)
VENTRICULAR RATE, ECG03: 70 BPM
WBC # BLD AUTO: 6.9 K/UL (ref 3.6–11)

## 2023-04-15 PROCEDURE — 36415 COLL VENOUS BLD VENIPUNCTURE: CPT

## 2023-04-15 PROCEDURE — 74011250637 HC RX REV CODE- 250/637: Performed by: INTERNAL MEDICINE

## 2023-04-15 PROCEDURE — 94640 AIRWAY INHALATION TREATMENT: CPT

## 2023-04-15 PROCEDURE — 80053 COMPREHEN METABOLIC PANEL: CPT

## 2023-04-15 PROCEDURE — 74011250637 HC RX REV CODE- 250/637: Performed by: NURSE PRACTITIONER

## 2023-04-15 PROCEDURE — 74011000250 HC RX REV CODE- 250: Performed by: HOSPITALIST

## 2023-04-15 PROCEDURE — 77010033678 HC OXYGEN DAILY

## 2023-04-15 PROCEDURE — 74011250637 HC RX REV CODE- 250/637: Performed by: HOSPITALIST

## 2023-04-15 PROCEDURE — 65270000029 HC RM PRIVATE

## 2023-04-15 PROCEDURE — 94761 N-INVAS EAR/PLS OXIMETRY MLT: CPT

## 2023-04-15 PROCEDURE — 85025 COMPLETE CBC W/AUTO DIFF WBC: CPT

## 2023-04-15 PROCEDURE — 93005 ELECTROCARDIOGRAM TRACING: CPT

## 2023-04-15 PROCEDURE — 84484 ASSAY OF TROPONIN QUANT: CPT

## 2023-04-15 PROCEDURE — 74011250636 HC RX REV CODE- 250/636: Performed by: INTERNAL MEDICINE

## 2023-04-15 RX ORDER — IPRATROPIUM BROMIDE AND ALBUTEROL SULFATE 2.5; .5 MG/3ML; MG/3ML
3 SOLUTION RESPIRATORY (INHALATION)
Status: DISCONTINUED | OUTPATIENT
Start: 2023-04-15 | End: 2023-04-20 | Stop reason: HOSPADM

## 2023-04-15 RX ORDER — COLCHICINE 0.6 MG/1
1.2 TABLET ORAL ONCE
Status: DISCONTINUED | OUTPATIENT
Start: 2023-04-15 | End: 2023-04-15

## 2023-04-15 RX ORDER — COLCHICINE 0.6 MG/1
0.6 TABLET ORAL ONCE
Status: COMPLETED | OUTPATIENT
Start: 2023-04-15 | End: 2023-04-15

## 2023-04-15 RX ADMIN — ACETAMINOPHEN AND CODEINE PHOSPHATE 1 TABLET: 300; 30 TABLET ORAL at 04:14

## 2023-04-15 RX ADMIN — APIXABAN 5 MG: 5 TABLET, FILM COATED ORAL at 08:35

## 2023-04-15 RX ADMIN — ASPIRIN 81 MG: 81 TABLET, COATED ORAL at 08:35

## 2023-04-15 RX ADMIN — APIXABAN 5 MG: 5 TABLET, FILM COATED ORAL at 22:58

## 2023-04-15 RX ADMIN — MICONAZOLE NITRATE: 20 POWDER TOPICAL at 22:59

## 2023-04-15 RX ADMIN — BUDESONIDE AND FORMOTEROL FUMARATE DIHYDRATE 2 PUFF: 160; 4.5 AEROSOL RESPIRATORY (INHALATION) at 21:03

## 2023-04-15 RX ADMIN — BUDESONIDE AND FORMOTEROL FUMARATE DIHYDRATE 2 PUFF: 160; 4.5 AEROSOL RESPIRATORY (INHALATION) at 07:26

## 2023-04-15 RX ADMIN — ACETAMINOPHEN AND CODEINE PHOSPHATE 1 TABLET: 300; 30 TABLET ORAL at 22:58

## 2023-04-15 RX ADMIN — FUROSEMIDE 80 MG: 10 INJECTION, SOLUTION INTRAMUSCULAR; INTRAVENOUS at 08:34

## 2023-04-15 RX ADMIN — MICONAZOLE NITRATE: 20 POWDER TOPICAL at 08:36

## 2023-04-15 RX ADMIN — AMIODARONE HYDROCHLORIDE 200 MG: 200 TABLET ORAL at 08:35

## 2023-04-15 RX ADMIN — SACUBITRIL AND VALSARTAN 1 TABLET: 97; 103 TABLET, FILM COATED ORAL at 08:35

## 2023-04-15 RX ADMIN — SPIRONOLACTONE 25 MG: 25 TABLET ORAL at 08:35

## 2023-04-15 RX ADMIN — COLCHICINE 0.6 MG: 0.6 TABLET, FILM COATED ORAL at 10:53

## 2023-04-15 RX ADMIN — POTASSIUM CHLORIDE 10 MEQ: 750 TABLET, EXTENDED RELEASE ORAL at 08:35

## 2023-04-15 RX ADMIN — SULFAMETHOXAZOLE AND TRIMETHOPRIM 1 TABLET: 800; 160 TABLET ORAL at 23:02

## 2023-04-15 RX ADMIN — ATORVASTATIN CALCIUM 40 MG: 40 TABLET, FILM COATED ORAL at 22:58

## 2023-04-15 RX ADMIN — SACUBITRIL AND VALSARTAN 1 TABLET: 97; 103 TABLET, FILM COATED ORAL at 22:58

## 2023-04-15 RX ADMIN — IPRATROPIUM BROMIDE AND ALBUTEROL SULFATE 3 ML: 2.5; .5 SOLUTION RESPIRATORY (INHALATION) at 01:28

## 2023-04-15 RX ADMIN — DOBUTAMINE HYDROCHLORIDE 5 MCG/KG/MIN: 200 INJECTION INTRAVENOUS at 08:35

## 2023-04-15 RX ADMIN — IPRATROPIUM BROMIDE AND ALBUTEROL SULFATE 3 ML: 2.5; .5 SOLUTION RESPIRATORY (INHALATION) at 07:26

## 2023-04-15 RX ADMIN — QUETIAPINE FUMARATE 25 MG: 25 TABLET ORAL at 22:58

## 2023-04-15 RX ADMIN — METOPROLOL SUCCINATE 25 MG: 25 TABLET, EXTENDED RELEASE ORAL at 08:35

## 2023-04-15 RX ADMIN — SULFAMETHOXAZOLE AND TRIMETHOPRIM 1 TABLET: 800; 160 TABLET ORAL at 08:35

## 2023-04-15 RX ADMIN — POTASSIUM CHLORIDE 10 MEQ: 750 TABLET, EXTENDED RELEASE ORAL at 22:58

## 2023-04-16 LAB
ALBUMIN SERPL-MCNC: 2.5 G/DL (ref 3.5–5)
ALBUMIN/GLOB SERPL: 0.6 (ref 1.1–2.2)
ALP SERPL-CCNC: 184 U/L (ref 45–117)
ALT SERPL-CCNC: 12 U/L (ref 12–78)
ANION GAP SERPL CALC-SCNC: 1 MMOL/L (ref 5–15)
AST SERPL W P-5'-P-CCNC: 13 U/L (ref 15–37)
ATRIAL RATE: 66 BPM
BASOPHILS # BLD: 0 K/UL (ref 0–0.1)
BASOPHILS NFR BLD: 1 % (ref 0–1)
BILIRUB SERPL-MCNC: 0.6 MG/DL (ref 0.2–1)
BUN SERPL-MCNC: 23 MG/DL (ref 6–20)
BUN/CREAT SERPL: 14 (ref 12–20)
CA-I BLD-MCNC: 8.5 MG/DL (ref 8.5–10.1)
CALCULATED P AXIS, ECG09: 71 DEGREES
CALCULATED R AXIS, ECG10: 39 DEGREES
CALCULATED T AXIS, ECG11: 149 DEGREES
CHLORIDE SERPL-SCNC: 101 MMOL/L (ref 97–108)
CO2 SERPL-SCNC: 35 MMOL/L (ref 21–32)
CREAT SERPL-MCNC: 1.59 MG/DL (ref 0.55–1.02)
DIAGNOSIS, 93000: NORMAL
DIFFERENTIAL METHOD BLD: ABNORMAL
EOSINOPHIL # BLD: 0.2 K/UL (ref 0–0.4)
EOSINOPHIL NFR BLD: 3 % (ref 0–7)
ERYTHROCYTE [DISTWIDTH] IN BLOOD BY AUTOMATED COUNT: 16.3 % (ref 11.5–14.5)
GLOBULIN SER CALC-MCNC: 4.1 G/DL (ref 2–4)
GLUCOSE BLD STRIP.AUTO-MCNC: 90 MG/DL (ref 65–100)
GLUCOSE BLD STRIP.AUTO-MCNC: 93 MG/DL (ref 65–100)
GLUCOSE SERPL-MCNC: 87 MG/DL (ref 65–100)
HCT VFR BLD AUTO: 41.2 % (ref 35–47)
HGB BLD-MCNC: 12.5 G/DL (ref 11.5–16)
IMM GRANULOCYTES # BLD AUTO: 0 K/UL (ref 0–0.04)
IMM GRANULOCYTES NFR BLD AUTO: 0 % (ref 0–0.5)
LYMPHOCYTES # BLD: 1.3 K/UL (ref 0.8–3.5)
LYMPHOCYTES NFR BLD: 22 % (ref 12–49)
MCH RBC QN AUTO: 27.5 PG (ref 26–34)
MCHC RBC AUTO-ENTMCNC: 30.3 G/DL (ref 30–36.5)
MCV RBC AUTO: 90.7 FL (ref 80–99)
MONOCYTES # BLD: 0.7 K/UL (ref 0–1)
MONOCYTES NFR BLD: 11 % (ref 5–13)
NEUTS SEG # BLD: 4 K/UL (ref 1.8–8)
NEUTS SEG NFR BLD: 63 % (ref 32–75)
NRBC # BLD: 0 K/UL (ref 0–0.01)
NRBC BLD-RTO: 0 PER 100 WBC
P-R INTERVAL, ECG05: 180 MS
PERFORMED BY, TECHID: NORMAL
PERFORMED BY, TECHID: NORMAL
PLATELET # BLD AUTO: 209 K/UL (ref 150–400)
PMV BLD AUTO: 12.5 FL (ref 8.9–12.9)
POTASSIUM SERPL-SCNC: 5.1 MMOL/L (ref 3.5–5.1)
PROT SERPL-MCNC: 6.6 G/DL (ref 6.4–8.2)
Q-T INTERVAL, ECG07: 442 MS
QRS DURATION, ECG06: 102 MS
QTC CALCULATION (BEZET), ECG08: 463 MS
RBC # BLD AUTO: 4.54 M/UL (ref 3.8–5.2)
SODIUM SERPL-SCNC: 137 MMOL/L (ref 136–145)
TROPONIN I SERPL HS-MCNC: 132 NG/L (ref 0–51)
URATE SERPL-MCNC: 5.7 MG/DL (ref 2.6–6)
VENTRICULAR RATE, ECG03: 66 BPM
WBC # BLD AUTO: 6.2 K/UL (ref 3.6–11)

## 2023-04-16 PROCEDURE — 83036 HEMOGLOBIN GLYCOSYLATED A1C: CPT

## 2023-04-16 PROCEDURE — 74011250636 HC RX REV CODE- 250/636: Performed by: INTERNAL MEDICINE

## 2023-04-16 PROCEDURE — 93005 ELECTROCARDIOGRAM TRACING: CPT

## 2023-04-16 PROCEDURE — 94640 AIRWAY INHALATION TREATMENT: CPT

## 2023-04-16 PROCEDURE — 85025 COMPLETE CBC W/AUTO DIFF WBC: CPT

## 2023-04-16 PROCEDURE — 84484 ASSAY OF TROPONIN QUANT: CPT

## 2023-04-16 PROCEDURE — 74011250637 HC RX REV CODE- 250/637: Performed by: HOSPITALIST

## 2023-04-16 PROCEDURE — 80053 COMPREHEN METABOLIC PANEL: CPT

## 2023-04-16 PROCEDURE — 77010033678 HC OXYGEN DAILY

## 2023-04-16 PROCEDURE — 74011250637 HC RX REV CODE- 250/637: Performed by: NURSE PRACTITIONER

## 2023-04-16 PROCEDURE — 36415 COLL VENOUS BLD VENIPUNCTURE: CPT

## 2023-04-16 PROCEDURE — 82962 GLUCOSE BLOOD TEST: CPT

## 2023-04-16 PROCEDURE — 94761 N-INVAS EAR/PLS OXIMETRY MLT: CPT

## 2023-04-16 PROCEDURE — 65270000029 HC RM PRIVATE

## 2023-04-16 PROCEDURE — 84550 ASSAY OF BLOOD/URIC ACID: CPT

## 2023-04-16 PROCEDURE — 74011250637 HC RX REV CODE- 250/637: Performed by: INTERNAL MEDICINE

## 2023-04-16 RX ORDER — ALLOPURINOL 100 MG/1
100 TABLET ORAL DAILY
Status: DISCONTINUED | OUTPATIENT
Start: 2023-04-16 | End: 2023-04-20 | Stop reason: HOSPADM

## 2023-04-16 RX ADMIN — BUDESONIDE AND FORMOTEROL FUMARATE DIHYDRATE 2 PUFF: 160; 4.5 AEROSOL RESPIRATORY (INHALATION) at 20:18

## 2023-04-16 RX ADMIN — ALLOPURINOL 100 MG: 100 TABLET ORAL at 11:28

## 2023-04-16 RX ADMIN — ACETAMINOPHEN AND CODEINE PHOSPHATE 1 TABLET: 300; 30 TABLET ORAL at 19:29

## 2023-04-16 RX ADMIN — DOBUTAMINE HYDROCHLORIDE 5 MCG/KG/MIN: 200 INJECTION INTRAVENOUS at 19:21

## 2023-04-16 RX ADMIN — FUROSEMIDE 80 MG: 10 INJECTION, SOLUTION INTRAMUSCULAR; INTRAVENOUS at 08:48

## 2023-04-16 RX ADMIN — SACUBITRIL AND VALSARTAN 1 TABLET: 97; 103 TABLET, FILM COATED ORAL at 08:47

## 2023-04-16 RX ADMIN — SPIRONOLACTONE 25 MG: 25 TABLET ORAL at 08:48

## 2023-04-16 RX ADMIN — POTASSIUM CHLORIDE 10 MEQ: 750 TABLET, EXTENDED RELEASE ORAL at 08:48

## 2023-04-16 RX ADMIN — POTASSIUM CHLORIDE 10 MEQ: 750 TABLET, EXTENDED RELEASE ORAL at 20:01

## 2023-04-16 RX ADMIN — ACETAMINOPHEN AND CODEINE PHOSPHATE 1 TABLET: 300; 30 TABLET ORAL at 11:28

## 2023-04-16 RX ADMIN — METOPROLOL SUCCINATE 25 MG: 25 TABLET, EXTENDED RELEASE ORAL at 08:48

## 2023-04-16 RX ADMIN — SULFAMETHOXAZOLE AND TRIMETHOPRIM 1 TABLET: 800; 160 TABLET ORAL at 20:01

## 2023-04-16 RX ADMIN — DOBUTAMINE HYDROCHLORIDE 5 MCG/KG/MIN: 200 INJECTION INTRAVENOUS at 01:34

## 2023-04-16 RX ADMIN — SULFAMETHOXAZOLE AND TRIMETHOPRIM 1 TABLET: 800; 160 TABLET ORAL at 08:47

## 2023-04-16 RX ADMIN — APIXABAN 5 MG: 5 TABLET, FILM COATED ORAL at 08:48

## 2023-04-16 RX ADMIN — QUETIAPINE FUMARATE 25 MG: 25 TABLET ORAL at 21:00

## 2023-04-16 RX ADMIN — MICONAZOLE NITRATE: 20 POWDER TOPICAL at 16:54

## 2023-04-16 RX ADMIN — SACUBITRIL AND VALSARTAN 1 TABLET: 97; 103 TABLET, FILM COATED ORAL at 20:01

## 2023-04-16 RX ADMIN — ASPIRIN 81 MG: 81 TABLET, COATED ORAL at 08:48

## 2023-04-16 RX ADMIN — MICONAZOLE NITRATE: 20 POWDER TOPICAL at 20:07

## 2023-04-16 RX ADMIN — BUDESONIDE AND FORMOTEROL FUMARATE DIHYDRATE 2 PUFF: 160; 4.5 AEROSOL RESPIRATORY (INHALATION) at 08:38

## 2023-04-16 RX ADMIN — AMIODARONE HYDROCHLORIDE 200 MG: 200 TABLET ORAL at 08:48

## 2023-04-16 RX ADMIN — ATORVASTATIN CALCIUM 40 MG: 40 TABLET, FILM COATED ORAL at 20:01

## 2023-04-16 RX ADMIN — APIXABAN 5 MG: 5 TABLET, FILM COATED ORAL at 20:01

## 2023-04-17 LAB
ALBUMIN SERPL-MCNC: 2.4 G/DL (ref 3.5–5)
ALBUMIN/GLOB SERPL: 0.5 (ref 1.1–2.2)
ALP SERPL-CCNC: 155 U/L (ref 45–117)
ALT SERPL-CCNC: 10 U/L (ref 12–78)
ANION GAP SERPL CALC-SCNC: 4 MMOL/L (ref 5–15)
AST SERPL W P-5'-P-CCNC: 14 U/L (ref 15–37)
BASOPHILS # BLD: 0 K/UL (ref 0–0.1)
BASOPHILS NFR BLD: 1 % (ref 0–1)
BILIRUB SERPL-MCNC: 0.6 MG/DL (ref 0.2–1)
BUN SERPL-MCNC: 26 MG/DL (ref 6–20)
BUN/CREAT SERPL: 16 (ref 12–20)
CA-I BLD-MCNC: 8.6 MG/DL (ref 8.5–10.1)
CHLORIDE SERPL-SCNC: 103 MMOL/L (ref 97–108)
CO2 SERPL-SCNC: 26 MMOL/L (ref 21–32)
CREAT SERPL-MCNC: 1.58 MG/DL (ref 0.55–1.02)
DIFFERENTIAL METHOD BLD: ABNORMAL
EOSINOPHIL # BLD: 0.2 K/UL (ref 0–0.4)
EOSINOPHIL NFR BLD: 3 % (ref 0–7)
ERYTHROCYTE [DISTWIDTH] IN BLOOD BY AUTOMATED COUNT: 16 % (ref 11.5–14.5)
EST. AVERAGE GLUCOSE BLD GHB EST-MCNC: 143 MG/DL
GLOBULIN SER CALC-MCNC: 4.4 G/DL (ref 2–4)
GLUCOSE BLD STRIP.AUTO-MCNC: 145 MG/DL (ref 65–100)
GLUCOSE BLD STRIP.AUTO-MCNC: 87 MG/DL (ref 65–100)
GLUCOSE SERPL-MCNC: 73 MG/DL (ref 65–100)
HBA1C MFR BLD: 6.6 % (ref 4–5.6)
HCT VFR BLD AUTO: 40.9 % (ref 35–47)
HGB BLD-MCNC: 12.7 G/DL (ref 11.5–16)
IMM GRANULOCYTES # BLD AUTO: 0 K/UL (ref 0–0.04)
IMM GRANULOCYTES NFR BLD AUTO: 0 % (ref 0–0.5)
LYMPHOCYTES # BLD: 1.8 K/UL (ref 0.8–3.5)
LYMPHOCYTES NFR BLD: 31 % (ref 12–49)
MCH RBC QN AUTO: 27.8 PG (ref 26–34)
MCHC RBC AUTO-ENTMCNC: 31.1 G/DL (ref 30–36.5)
MCV RBC AUTO: 89.5 FL (ref 80–99)
MONOCYTES # BLD: 0.6 K/UL (ref 0–1)
MONOCYTES NFR BLD: 11 % (ref 5–13)
NEUTS SEG # BLD: 3.2 K/UL (ref 1.8–8)
NEUTS SEG NFR BLD: 54 % (ref 32–75)
NRBC # BLD: 0 K/UL (ref 0–0.01)
NRBC BLD-RTO: 0 PER 100 WBC
PERFORMED BY, TECHID: ABNORMAL
PERFORMED BY, TECHID: NORMAL
PLATELET # BLD AUTO: 200 K/UL (ref 150–400)
PMV BLD AUTO: 12.8 FL (ref 8.9–12.9)
POTASSIUM SERPL-SCNC: 5.2 MMOL/L (ref 3.5–5.1)
PROT SERPL-MCNC: 6.8 G/DL (ref 6.4–8.2)
RBC # BLD AUTO: 4.57 M/UL (ref 3.8–5.2)
SODIUM SERPL-SCNC: 133 MMOL/L (ref 136–145)
WBC # BLD AUTO: 5.8 K/UL (ref 3.6–11)

## 2023-04-17 PROCEDURE — 97530 THERAPEUTIC ACTIVITIES: CPT

## 2023-04-17 PROCEDURE — 36415 COLL VENOUS BLD VENIPUNCTURE: CPT

## 2023-04-17 PROCEDURE — 94640 AIRWAY INHALATION TREATMENT: CPT

## 2023-04-17 PROCEDURE — 80053 COMPREHEN METABOLIC PANEL: CPT

## 2023-04-17 PROCEDURE — 74011250637 HC RX REV CODE- 250/637: Performed by: NURSE PRACTITIONER

## 2023-04-17 PROCEDURE — 82962 GLUCOSE BLOOD TEST: CPT

## 2023-04-17 PROCEDURE — 74011250637 HC RX REV CODE- 250/637: Performed by: HOSPITALIST

## 2023-04-17 PROCEDURE — 94761 N-INVAS EAR/PLS OXIMETRY MLT: CPT

## 2023-04-17 PROCEDURE — 77010033678 HC OXYGEN DAILY

## 2023-04-17 PROCEDURE — 85025 COMPLETE CBC W/AUTO DIFF WBC: CPT

## 2023-04-17 PROCEDURE — 74011250637 HC RX REV CODE- 250/637: Performed by: INTERNAL MEDICINE

## 2023-04-17 PROCEDURE — 74011250636 HC RX REV CODE- 250/636: Performed by: INTERNAL MEDICINE

## 2023-04-17 PROCEDURE — 65270000029 HC RM PRIVATE

## 2023-04-17 RX ORDER — COLCHICINE 0.6 MG/1
0.6 TABLET ORAL EVERY 12 HOURS
Status: DISCONTINUED | OUTPATIENT
Start: 2023-04-17 | End: 2023-04-20 | Stop reason: HOSPADM

## 2023-04-17 RX ORDER — DIPHENHYDRAMINE HCL 25 MG
25 CAPSULE ORAL
Status: DISCONTINUED | OUTPATIENT
Start: 2023-04-17 | End: 2023-04-20 | Stop reason: HOSPADM

## 2023-04-17 RX ORDER — TRAMADOL HYDROCHLORIDE 50 MG/1
100 TABLET ORAL ONCE
Status: COMPLETED | OUTPATIENT
Start: 2023-04-17 | End: 2023-04-17

## 2023-04-17 RX ADMIN — SULFAMETHOXAZOLE AND TRIMETHOPRIM 1 TABLET: 800; 160 TABLET ORAL at 20:50

## 2023-04-17 RX ADMIN — AMIODARONE HYDROCHLORIDE 200 MG: 200 TABLET ORAL at 08:28

## 2023-04-17 RX ADMIN — ALLOPURINOL 100 MG: 100 TABLET ORAL at 08:28

## 2023-04-17 RX ADMIN — DIPHENHYDRAMINE HYDROCHLORIDE 25 MG: 25 CAPSULE ORAL at 21:53

## 2023-04-17 RX ADMIN — POTASSIUM CHLORIDE 10 MEQ: 750 TABLET, EXTENDED RELEASE ORAL at 20:50

## 2023-04-17 RX ADMIN — TRAMADOL HYDROCHLORIDE 100 MG: 50 TABLET, COATED ORAL at 21:53

## 2023-04-17 RX ADMIN — SULFAMETHOXAZOLE AND TRIMETHOPRIM 1 TABLET: 800; 160 TABLET ORAL at 08:32

## 2023-04-17 RX ADMIN — ACETAMINOPHEN AND CODEINE PHOSPHATE 1 TABLET: 300; 30 TABLET ORAL at 20:57

## 2023-04-17 RX ADMIN — COLCHICINE 0.6 MG: 0.6 TABLET, FILM COATED ORAL at 20:50

## 2023-04-17 RX ADMIN — QUETIAPINE FUMARATE 25 MG: 25 TABLET ORAL at 21:06

## 2023-04-17 RX ADMIN — SPIRONOLACTONE 25 MG: 25 TABLET ORAL at 08:28

## 2023-04-17 RX ADMIN — ACETAMINOPHEN AND CODEINE PHOSPHATE 1 TABLET: 300; 30 TABLET ORAL at 10:54

## 2023-04-17 RX ADMIN — SACUBITRIL AND VALSARTAN 1 TABLET: 97; 103 TABLET, FILM COATED ORAL at 20:50

## 2023-04-17 RX ADMIN — ASPIRIN 81 MG: 81 TABLET, COATED ORAL at 08:28

## 2023-04-17 RX ADMIN — APIXABAN 5 MG: 5 TABLET, FILM COATED ORAL at 20:50

## 2023-04-17 RX ADMIN — FUROSEMIDE 80 MG: 10 INJECTION, SOLUTION INTRAMUSCULAR; INTRAVENOUS at 08:27

## 2023-04-17 RX ADMIN — DOBUTAMINE HYDROCHLORIDE 5 MCG/KG/MIN: 200 INJECTION INTRAVENOUS at 10:55

## 2023-04-17 RX ADMIN — BUDESONIDE AND FORMOTEROL FUMARATE DIHYDRATE 2 PUFF: 160; 4.5 AEROSOL RESPIRATORY (INHALATION) at 19:19

## 2023-04-17 RX ADMIN — BUDESONIDE AND FORMOTEROL FUMARATE DIHYDRATE 2 PUFF: 160; 4.5 AEROSOL RESPIRATORY (INHALATION) at 08:50

## 2023-04-17 RX ADMIN — METOPROLOL SUCCINATE 25 MG: 25 TABLET, EXTENDED RELEASE ORAL at 08:28

## 2023-04-17 RX ADMIN — ATORVASTATIN CALCIUM 40 MG: 40 TABLET, FILM COATED ORAL at 20:50

## 2023-04-17 RX ADMIN — MICONAZOLE NITRATE: 20 POWDER TOPICAL at 08:29

## 2023-04-17 RX ADMIN — APIXABAN 5 MG: 5 TABLET, FILM COATED ORAL at 08:28

## 2023-04-17 RX ADMIN — MICONAZOLE NITRATE: 20 POWDER TOPICAL at 22:36

## 2023-04-17 RX ADMIN — SACUBITRIL AND VALSARTAN 1 TABLET: 97; 103 TABLET, FILM COATED ORAL at 08:28

## 2023-04-17 RX ADMIN — POTASSIUM CHLORIDE 10 MEQ: 750 TABLET, EXTENDED RELEASE ORAL at 08:28

## 2023-04-17 NOTE — PROGRESS NOTES
Progress Note      4/17/2023 10:50 AM  NAME: Jose Butler   MRN:  [id number]   Admit Diagnosis: Shortness of breath [R06.02]  Acute exacerbation of CHF (congestive heart failure) [facility] [I50.9]          Assessment/Plan:     1. Acute on chronic HFrEF with baseline LV ejection fraction of 30-35%. Patient has been on IV dobutamine for more than 3 days, will start weaning her off from dobutamine. 2.  Pulmonary hypertension which appears to be postcapillary etiology. RV systolic pressure 72 mmHg. 3.  History of CABG and PCI in past    4. Chronic lower extremity edema    5. Paroxysmal atrial fibrillation on Eliquis. Maintaining sinus rhythm on amiodarone. 6.  CKD    7. Left ankle pain, appears to be a gouty flareup. We will try colchicine. []       High complexity decision making was performed in this patient at high risk for decompensation with multiple organ involvement. Subjective:     Jose Butler denies chest pain, dyspnea. Leg edema is unchanged. Complains of left ankle pain like gout in the past.  Discussed with RN events overnight. Review of Systems:    Symptom Y/N Comments  Symptom Y/N Comments   Fever/Chills N   Chest Pain N    Poor Appetite N   Edema N    Cough N   Abdominal Pain N    Sputum N   Joint Pain N    SOB/BUTCHER N   Pruritis/Rash N    Nausea/vomit N   Tolerating PT/OT Y    Diarrhea N   Tolerating Diet Y    Constipation N   Other       Could NOT obtain due to:      Objective:      Physical Exam:    Last 24hrs VS reviewed since prior progress note.  Most recent are:    Visit Vitals  /61 (BP 1 Location: Right upper arm, BP Patient Position: Semi fowlers)   Pulse 71   Temp 98 °F (36.7 °C)   Resp 20   Ht 5' 3\" (1.6 m)   Wt 95.5 kg (210 lb 8.6 oz)   SpO2 96%   BMI 37.30 kg/m²       Intake/Output Summary (Last 24 hours) at 4/17/2023 1451  Last data filed at 4/17/2023 1057  Gross per 24 hour   Intake 350 ml   Output 3600 ml   Net -3250 ml          General Appearance: Overweight female, alert; no acute distress. Ears/Nose/Mouth/Throat: Hearing grossly normal; moist mucous membranes  Neck: Supple. Chest: Lungs clear to auscultation bilaterally. Cardiovascular: Regular rate and rhythm, S1S2 normal, no murmur. Abdomen: Soft, non-tender, bowel sounds are active. Extremities: 2+ edema bilaterally. Skin: Warm and dry. []         Post-cath site without hematoma, bruit, tenderness, or thrill. Distal pulses intact. PMH/ reviewed - no change compared to H&P    Data Review    Telemetry:     EKG:   []  No new EKG for review    Lab Data Personally Reviewed:    Recent Labs     04/17/23  0757 04/16/23  1503   WBC 5.8 6.2   HGB 12.7 12.5   HCT 40.9 41.2    209       No results for input(s): INR, PTP, APTT, INREXT, INREXT in the last 72 hours. Recent Labs     04/17/23 0757 04/16/23  1503 04/15/23  0802   * 137 141   K 5.2* 5.1 4.2    101 106   CO2 26 35* 32   BUN 26* 23* 26*   CREA 1.58* 1.59* 1.34*   GLU 73 87 107*   CA 8.6 8.5 7.8*       No results for input(s): CPK, CKNDX, TROIQ in the last 72 hours. No lab exists for component: CPKMB  Lab Results   Component Value Date/Time    Cholesterol, total 116 04/13/2023 04:35 AM    HDL Cholesterol 53 04/13/2023 04:35 AM    LDL, calculated 50.8 04/13/2023 04:35 AM    Triglyceride 61 04/13/2023 04:35 AM    CHOL/HDL Ratio 2.2 04/13/2023 04:35 AM       Recent Labs     04/17/23  0757 04/16/23  1503 04/15/23  0802   * 184* 156*   TP 6.8 6.6 6.0*   ALB 2.4* 2.5* 2.3*   GLOB 4.4* 4.1* 3.7       No results for input(s): PH, PCO2, PO2 in the last 72 hours.     Medications Personally Reviewed:    Current Facility-Administered Medications   Medication Dose Route Frequency    allopurinoL (ZYLOPRIM) tablet 100 mg  100 mg Oral DAILY    albuterol-ipratropium (DUO-NEB) 2.5 MG-0.5 MG/3 ML  3 mL Nebulization Q6H PRN    furosemide (LASIX) injection 80 mg  80 mg IntraVENous DAILY    DOBUTamine (DOBUTREX) 500 mg/250 mL (2,000 mcg/mL) infusion  5 mcg/kg/min IntraVENous CONTINUOUS    trimethoprim-sulfamethoxazole (BACTRIM DS, SEPTRA DS) 160-800 mg per tablet 1 Tablet  1 Tablet Oral Q12H    miconazole (MICOTIN) 2 % powder   Topical BID    albuterol (PROVENTIL HFA, VENTOLIN HFA, PROAIR HFA) inhaler 2 Puff  2 Puff Inhalation BID PRN    apixaban (ELIQUIS) tablet 5 mg  5 mg Oral BID    nitroglycerin (NITROSTAT) tablet 0.4 mg  0.4 mg SubLINGual PRN    atorvastatin (LIPITOR) tablet 40 mg  40 mg Oral QHS    metoprolol succinate (TOPROL-XL) XL tablet 25 mg  25 mg Oral DAILY    amiodarone (CORDARONE) tablet 200 mg  200 mg Oral DAILY    QUEtiapine (SEROquel) tablet 25 mg  25 mg Oral QHS    sacubitriL-valsartan (ENTRESTO)  mg tablet 1 Tablet  1 Tablet Oral BID    budesonide-formoteroL (SYMBICORT) 160-4.5 mcg/actuation HFA inhaler 2 Puff  2 Puff Inhalation BID RT    ondansetron (ZOFRAN) injection 4 mg  4 mg IntraVENous Q6H PRN    aspirin delayed-release tablet 81 mg  81 mg Oral DAILY    spironolactone (ALDACTONE) tablet 25 mg  25 mg Oral DAILY    acetaminophen-codeine (TYLENOL #3) per tablet 1 Tablet  1 Tablet Oral Q4H PRN    potassium chloride SR (KLOR-CON 10) tablet 10 mEq  10 mEq Oral BID         Wendi Castellano MD

## 2023-04-17 NOTE — PROGRESS NOTES
Hospitalist Progress Note         MARIBELL Bauer, FNP-C    Daily Progress Note: 4/17/2023      Subjective:   Subjective   Patient examined alert and oriented sitting in bed. She is currently on 4L NC baseline at home. No concerns offered on examination. Review of Systems:   Review of Systems   Constitutional:  Negative for chills and fever. Respiratory:  Negative for cough. Cardiovascular:  Negative for chest pain. Gastrointestinal:  Negative for nausea and vomiting. Genitourinary:  Negative for dysuria. Musculoskeletal:  Negative for myalgias. Psychiatric/Behavioral:  Negative for depression. Objective:   Objective      Vitals:  Patient Vitals for the past 12 hrs:   Temp Pulse Resp BP SpO2   04/17/23 0834 -- 71 -- -- --   04/17/23 0809 98 °F (36.7 °C) (!) 59 20 -- 96 %   04/17/23 0400 -- 61 -- -- --   04/17/23 0332 97.6 °F (36.4 °C) 60 18 137/61 95 %        Physical Exam  Vitals and nursing note reviewed. Constitutional:       Appearance: Normal appearance. Eyes:      Extraocular Movements: Extraocular movements intact. Cardiovascular:      Rate and Rhythm: Normal rate. Pulmonary:      Breath sounds: Normal breath sounds. Abdominal:      General: Bowel sounds are normal.   Skin:     General: Skin is warm and dry. Neurological:      Mental Status: She is alert and oriented to person, place, and time.         Lab Results:  Recent Results (from the past 24 hour(s))   TROPONIN-HIGH SENSITIVITY    Collection Time: 04/16/23  3:03 PM   Result Value Ref Range    Troponin-High Sensitivity 132 (HH) 0 - 51 ng/L   METABOLIC PANEL, COMPREHENSIVE    Collection Time: 04/16/23  3:03 PM   Result Value Ref Range    Sodium 137 136 - 145 mmol/L    Potassium 5.1 3.5 - 5.1 mmol/L    Chloride 101 97 - 108 mmol/L    CO2 35 (H) 21 - 32 mmol/L    Anion gap 1 (L) 5 - 15 mmol/L    Glucose 87 65 - 100 mg/dL    BUN 23 (H) 6 - 20 mg/dL    Creatinine 1.59 (H) 0.55 - 1.02 mg/dL    BUN/Creatinine ratio 14 12 - 20      eGFR 35 (L) >60 ml/min/1.73m2    Calcium 8.5 8.5 - 10.1 mg/dL    Bilirubin, total 0.6 0.2 - 1.0 mg/dL    AST (SGOT) 13 (L) 15 - 37 U/L    ALT (SGPT) 12 12 - 78 U/L    Alk. phosphatase 184 (H) 45 - 117 U/L    Protein, total 6.6 6.4 - 8.2 g/dL    Albumin 2.5 (L) 3.5 - 5.0 g/dL    Globulin 4.1 (H) 2.0 - 4.0 g/dL    A-G Ratio 0.6 (L) 1.1 - 2.2     URIC ACID    Collection Time: 04/16/23  3:03 PM   Result Value Ref Range    Uric acid 5.7 2.6 - 6.0 mg/dL   CBC WITH AUTOMATED DIFF    Collection Time: 04/16/23  3:03 PM   Result Value Ref Range    WBC 6.2 3.6 - 11.0 K/uL    RBC 4.54 3.80 - 5.20 M/uL    HGB 12.5 11.5 - 16.0 g/dL    HCT 41.2 35.0 - 47.0 %    MCV 90.7 80.0 - 99.0 FL    MCH 27.5 26.0 - 34.0 PG    MCHC 30.3 30.0 - 36.5 g/dL    RDW 16.3 (H) 11.5 - 14.5 %    PLATELET 184 749 - 397 K/uL    MPV 12.5 8.9 - 12.9 FL    NRBC 0.0 0.0  WBC    ABSOLUTE NRBC 0.00 0.00 - 0.01 K/uL    NEUTROPHILS 63 32 - 75 %    LYMPHOCYTES 22 12 - 49 %    MONOCYTES 11 5 - 13 %    EOSINOPHILS 3 0 - 7 %    BASOPHILS 1 0 - 1 %    IMMATURE GRANULOCYTES 0 0 - 0.5 %    ABS. NEUTROPHILS 4.0 1.8 - 8.0 K/UL    ABS. LYMPHOCYTES 1.3 0.8 - 3.5 K/UL    ABS. MONOCYTES 0.7 0.0 - 1.0 K/UL    ABS. EOSINOPHILS 0.2 0.0 - 0.4 K/UL    ABS. BASOPHILS 0.0 0.0 - 0.1 K/UL    ABS. IMM.  GRANS. 0.0 0.00 - 0.04 K/UL    DF AUTOMATED     GLUCOSE, POC    Collection Time: 04/16/23  8:04 PM   Result Value Ref Range    Glucose (POC) 90 65 - 100 mg/dL    Performed by Sebastian Cortez    CBC WITH AUTOMATED DIFF    Collection Time: 04/17/23  7:57 AM   Result Value Ref Range    WBC 5.8 3.6 - 11.0 K/uL    RBC 4.57 3.80 - 5.20 M/uL    HGB 12.7 11.5 - 16.0 g/dL    HCT 40.9 35.0 - 47.0 %    MCV 89.5 80.0 - 99.0 FL    MCH 27.8 26.0 - 34.0 PG    MCHC 31.1 30.0 - 36.5 g/dL    RDW 16.0 (H) 11.5 - 14.5 %    PLATELET 333 612 - 477 K/uL    MPV 12.8 8.9 - 12.9 FL    NRBC 0.0 0.0  WBC    ABSOLUTE NRBC 0.00 0.00 - 0.01 K/uL    NEUTROPHILS 54 32 - 75 %    LYMPHOCYTES 31 12 - 49 %    MONOCYTES 11 5 - 13 %    EOSINOPHILS 3 0 - 7 %    BASOPHILS 1 0 - 1 %    IMMATURE GRANULOCYTES 0 0 - 0.5 %    ABS. NEUTROPHILS 3.2 1.8 - 8.0 K/UL    ABS. LYMPHOCYTES 1.8 0.8 - 3.5 K/UL    ABS. MONOCYTES 0.6 0.0 - 1.0 K/UL    ABS. EOSINOPHILS 0.2 0.0 - 0.4 K/UL    ABS. BASOPHILS 0.0 0.0 - 0.1 K/UL    ABS. IMM. GRANS. 0.0 0.00 - 0.04 K/UL    DF AUTOMATED     METABOLIC PANEL, COMPREHENSIVE    Collection Time: 04/17/23  7:57 AM   Result Value Ref Range    Sodium 133 (L) 136 - 145 mmol/L    Potassium 5.2 (H) 3.5 - 5.1 mmol/L    Chloride 103 97 - 108 mmol/L    CO2 26 21 - 32 mmol/L    Anion gap 4 (L) 5 - 15 mmol/L    Glucose 73 65 - 100 mg/dL    BUN 26 (H) 6 - 20 mg/dL    Creatinine 1.58 (H) 0.55 - 1.02 mg/dL    BUN/Creatinine ratio 16 12 - 20      eGFR 35 (L) >60 ml/min/1.73m2    Calcium 8.6 8.5 - 10.1 mg/dL    Bilirubin, total 0.6 0.2 - 1.0 mg/dL    AST (SGOT) 14 (L) 15 - 37 U/L    ALT (SGPT) 10 (L) 12 - 78 U/L    Alk.  phosphatase 155 (H) 45 - 117 U/L    Protein, total 6.8 6.4 - 8.2 g/dL    Albumin 2.4 (L) 3.5 - 5.0 g/dL    Globulin 4.4 (H) 2.0 - 4.0 g/dL    A-G Ratio 0.5 (L) 1.1 - 2.2     GLUCOSE, POC    Collection Time: 04/17/23  8:06 AM   Result Value Ref Range    Glucose (POC) 87 65 - 100 mg/dL    Performed by Lincoln Roudejan    GLUCOSE, POC    Collection Time: 04/17/23 11:55 AM   Result Value Ref Range    Glucose (POC) 145 (H) 65 - 100 mg/dL    Performed by Lincoln Roup       Results       Procedure Component Value Units Date/Time    CULTURE, URINE [[id number]]  (Abnormal)  (Susceptibility) Collected: 04/13/23 0837    Order Status: Completed Specimen: Urine Updated: 04/15/23 3211     Special Requests: No Special Requests        Fraser Count 30,000        Fraser Count colonies/ml        Culture result: Proteus mirabilis         Klebsiella pneumoniae       Susceptibility        Proteus mirabilis      SHAWNA      Amikacin ($) Susceptible      Ampicillin ($) Susceptible      Ampicillin/sulbactam ($) Susceptible Cefazolin ($) Susceptible      Cefepime ($$) Susceptible      Cefoxitin Susceptible      Ceftazidime ($) Susceptible      Ceftriaxone ($) Susceptible      Ciprofloxacin ($) Susceptible      Gentamicin ($) Susceptible      Levofloxacin ($) Susceptible      Meropenem ($$) Susceptible      Nitrofurantoin Resistant      Piperacillin/Tazobac ($) Susceptible      Tobramycin ($) Susceptible      Trimeth/Sulfa Susceptible                       Susceptibility        Klebsiella pneumoniae      SHAWNA      Amikacin ($) Susceptible      Ampicillin ($) Resistant      Ampicillin/sulbactam ($) Susceptible      Cefazolin ($) Susceptible      Cefepime ($$) Susceptible      Cefoxitin Susceptible      Ceftazidime ($) Susceptible      Ceftriaxone ($) Susceptible      Ciprofloxacin ($) Susceptible      Gentamicin ($) Susceptible      Levofloxacin ($) Susceptible      Meropenem ($$) Susceptible      Nitrofurantoin Susceptible      Piperacillin/Tazobac ($) Susceptible      Tobramycin ($) Susceptible      Trimeth/Sulfa Susceptible                                    Diagnostic Images:  CT Results  (Last 48 hours)      None             XR CHEST PORT   Final Result   Stable cardiomegaly. Otherwise, no acute findings.                     Current Medications:    Current Facility-Administered Medications:     allopurinoL (ZYLOPRIM) tablet 100 mg, 100 mg, Oral, DAILY, Michelle Samson NP, 100 mg at 04/17/23 5240    albuterol-ipratropium (DUO-NEB) 2.5 MG-0.5 MG/3 ML, 3 mL, Nebulization, Q6H PRN, Emilee VALDEZ NP    furosemide (LASIX) injection 80 mg, 80 mg, IntraVENous, DAILY, Mark Taylor MD, 80 mg at 04/17/23 0827    DOBUTamine (DOBUTREX) 500 mg/250 mL (2,000 mcg/mL) infusion, 5 mcg/kg/min, IntraVENous, CONTINUOUS, Mark Taylor MD, Last Rate: 15.6 mL/hr at 04/17/23 1055, 5 mcg/kg/min at 04/17/23 1055    trimethoprim-sulfamethoxazole (BACTRIM DS, SEPTRA DS) 160-800 mg per tablet 1 Tablet, 1 Tablet, Oral, Q12H, Emilee Edward F, NP, 1 Tablet at 04/17/23 0832    miconazole (MICOTIN) 2 % powder, , Topical, BID, Eriberto Gutierrez, NP, Given at 04/17/23 0829    albuterol (PROVENTIL HFA, VENTOLIN HFA, PROAIR HFA) inhaler 2 Puff, 2 Puff, Inhalation, BID PRN, Papito Villatoro MD    apixaban (ELIQUIS) tablet 5 mg, 5 mg, Oral, BID, Papito Villatoro MD, 5 mg at 04/17/23 0685    nitroglycerin (NITROSTAT) tablet 0.4 mg, 0.4 mg, SubLINGual, PRN, Papito Villatoro MD    atorvastatin (LIPITOR) tablet 40 mg, 40 mg, Oral, QHS, Papito Villatoro MD, 40 mg at 04/16/23 2001    metoprolol succinate (TOPROL-XL) XL tablet 25 mg, 25 mg, Oral, DAILY, Papito Villatoro MD, 25 mg at 04/17/23 9676    amiodarone (CORDARONE) tablet 200 mg, 200 mg, Oral, DAILY, Papito Villatoro MD, 200 mg at 04/17/23 8872    QUEtiapine (SEROquel) tablet 25 mg, 25 mg, Oral, QHS, Papito Villatoro MD, 25 mg at 04/16/23 2100    sacubitriL-valsartan (ENTRESTO)  mg tablet 1 Tablet, 1 Tablet, Oral, BID, Papito Villatoro MD, 1 Tablet at 04/17/23 0828    budesonide-formoteroL (SYMBICORT) 160-4.5 mcg/actuation HFA inhaler 2 Puff, 2 Puff, Inhalation, BID RT, Papito Villatoro MD, 2 Puff at 04/17/23 0850    ondansetron (ZOFRAN) injection 4 mg, 4 mg, IntraVENous, Q6H PRN, Papito Villatoro MD    aspirin delayed-release tablet 81 mg, 81 mg, Oral, DAILY, Papito Villatoro MD, 81 mg at 04/17/23 7481    spironolactone (ALDACTONE) tablet 25 mg, 25 mg, Oral, DAILY, Papito Villatoro MD, 25 mg at 04/17/23 5609    acetaminophen-codeine (TYLENOL #3) per tablet 1 Tablet, 1 Tablet, Oral, Q4H PRN, Eriberto Gutierrez, MARIA ESTHER, 1 Tablet at 04/17/23 1054    potassium chloride SR (KLOR-CON 10) tablet 10 mEq, 10 mEq, Oral, BID, Emilee Young MD, 10 mEq at 04/17/23 0851       ASSESSMENT:  Susan Montoya is  71 y.o. female presents complaining of  fluid overload with shortness of breath.   States she seen her cardiologist 1 week ago was doing fairly well, due to lower extremity edema her diuretic was increased in dose. Suddenly started having shortness of breath this morning woke up with, continued to get worse and feels like fluid overloaded. Unable to lay down flat, denies any chest pain, palpitations. No fever or chills. She has a history of hypertension, diabetes, heart failure with preserved ejection fraction, history of coronary artery disease and chronic obstructive pulmonary disease on regular follow-up with physicians. Clinically she looks short of breath, laboratory data with not much change. Laboratory data with chronic kidney disease, slightly elevated troponin, likely leak. BNP is not significantly elevated, last year echocardiogram with preserved ejection fraction     On amiodarone, apixaban due to atrial fibrillation history, monitor still with atrial fibrillation even though heart rate is well controlled. 4/13: Cardiology recommending EP evaluation for possible ICDD after heart failure is properly treated. 4/14: started on Lasix and dobutamine for improved diuresis. Patient on dobutamine drip per Cardiology  4/15: Gout treatment colchicine 0.6 mg one time due to CKD may repeat in 72 hours, discussed with pharmacy. 4/16: allopurinol 100 mg oral daily. Uric acid pending. 4 liters oxygen at base line. Cardiology following      CHF exacerbation with reduced EF  -ECHO 4/12 shows EF 30-35%, last ECHO on 3/25/25 shows EF of 50-60%  -Cardiology following  -Continue Entresto  -Continue Metoprolol  -BNP 6,815 onn 4/12  -Troponin 181 x 2   -Spironolactone 25 mg oral daily  -Dobutamine per Cardiology for better diuresis     2. COPD ( not in exacerbation)  -continue maintenance inhalers  -Continue Nebulizer treatments as needed  -4 liters oxygen at base line     3. Chronic AFIB (rate controlled)  -Continue Apixaban  -Amiodarone 200 mg oral daily  -Telemetry monitoring     4. CKD stage IIIb  -Creatinine stable     5.  Morbid Obesity  -Excess calories  -Possible sleep apnea     6. Gout    -s/p colchicine 0.6 mg oral x 1    -allopurinol 100 mg daily oral  -Uric acid stable      Full Code  Dvt Prophylaxis eliquis  GI Prophylaxis none  Discharge barriers: dobutamine gtt, cardiology clearance      Medical Decision Making:     Labs reviewed by myself: CBC, BMP      Diagnostic data reviewed by myself: George     [facility] Discussion: Patient with multiple medical comorbidities, each with high likelihood for morbidity and mortality if left untreated. I have reviewed patient's presenting subjective and objective findings, as well as all laboratory studies, imaging studies, and vital signs to date as well as treatment rendered and patient's response to those treatments. In addition, prior medical, surgical and relevant social and family histories were reviewed. I have discussed management plan with patient/family and with nursing staff. Above treatment plan reviewed and discussed with patient in detail at bedside, all questions answered. Care Plan discussed with: Patient/Family    Total time spent with patient: 45 minutes.     Maik Sweeney NP

## 2023-04-17 NOTE — PROGRESS NOTES
Problem: Falls - Risk of  Goal: *Absence of Falls  Description: Document Danell Holter Fall Risk and appropriate interventions in the flowsheet.   Outcome: Progressing Towards Goal  Note: Fall Risk Interventions:       Problem: Patient Education: Go to Patient Education Activity  Goal: Patient/Family Education  Outcome: Progressing Towards Goal

## 2023-04-17 NOTE — PROGRESS NOTES
Problem: Mobility Impaired (Adult and Pediatric)  Goal: *Acute Goals and Plan of Care (Insert Text)  Description: I with LE HEP x7 days  Supine to sit EOB with SBAx1 x7 days  Sit to stand with CGAx1 x7 days  Stand pivot from bed to chair with CGAx1 x7 days  Amb 25-50ft with RW and CGAx1 x7 days    Pt stated goal: to get my strength back  Outcome: Progressing Towards Goal   PHYSICAL THERAPY TREATMENT  Patient: Julio November (89 y.o. female)  Date: 4/17/2023  Diagnosis: Shortness of breath [R06.02]  Acute exacerbation of CHF (congestive heart failure) ([facility] [address] [I50.9] Acute exacerbation of CHF (congestive heart failure) ([facility] [address]      Precautions: In place during session: Peripheral IV, Nasal Cannula 4L, and EKG/telemetry     Chart, physical therapy assessment, plan of care and goals were reviewed. ASSESSMENT  Patient continues with skilled PT services and is progressing towards goals. Pt received semi supine upon PT arrival, agreeable to session. (See below for objective details and assist levels). Overall pt tolerated session poor  today with Tfs and ambulation. Pt. Limited today due to bilat foot pain. Pt. Stated she has gout in L foot. Pt. Required more assist than usual for Tfs and ambulation. Pt. Had difficulty with weightbearing through L foot due to pain. Pt. Only able to ambulate 2' with RW and Min A X1  due to pain and decreased WB. Pt. Then needed to sit in chair and elevate LEs. Pt. Required vcs for proper hand placement and safety with Tfs. Will continue to benefit from skilled PT services, and will continue to progress as tolerated. Current Level of Function Impacting Discharge (mobility/balance): Min A X 1 for Mobility    Other factors to consider for discharge: safety/decreased mobility/weakness/limited by pain         PLAN :  Patient continues to benefit from skilled intervention to address the above impairments. Continue treatment per established plan of care to address goals.     Recommend with staff: Out of bed to chair for meals, Encourage HEP in prep for ADLs/mobility, and LE elevation for management of edema    Recommendation for discharge: (in order for the patient to meet his/her long term goals)  Phillip Manny    This discharge recommendation:  Has been made in collaboration with the attending provider and/or case management    IF patient discharges home will need the following DME: to be determined (TBD)       SUBJECTIVE:   Patient stated I can't do much, I hurt so bad in my feet. \"    OBJECTIVE DATA SUMMARY:   Critical Behavior:  Neurologic State: Alert  Orientation Level: Oriented X4  Cognition: Follows commands, Poor safety awareness  Safety/Judgement: Awareness of environment, Insight into deficits  Functional Mobility Training:  Bed Mobility:  Rolling: Stand-by assistance  Supine to Sit: Minimum assistance     Scooting: Contact guard assistance        Transfers:  Sit to Stand: Minimum assistance;Contact guard assistance  Stand to Sit: Minimum assistance;Contact guard assistance        Bed to Chair: Minimum assistance;Assist x1 (for balance and decreased weight bearing through L.)              Balance:  Sitting: Impaired; Without support  Sitting - Static: Fair (occasional)  Sitting - Dynamic: Fair (occasional)  Standing: Impaired; With support  Standing - Static: Constant support; Fair  Standing - Dynamic : Constant support; Fair  Ambulation/Gait Training:  Distance (ft): 2 Feet (ft)  Assistive Device: Walker, rolling;Gait belt  Ambulation - Level of Assistance: Minimal assistance     Gait Description (WDL): Exceptions to WDL           Base of Support: Narrowed     Speed/Ella: Slow;Shuffled  Step Length: Left shortened;Right shortened                Therapeutic Exercises:       EXERCISE   Sets   Reps   Active Active Assist   Passive Self ROM   Comments   Ankle Pumps  20 [x] [] [] []    Quad Sets/Glut Sets   [] [] [] []    Hamstring Sets   [] [] [] []    Short Arc Quads   [] [] [] []    Heel Slides  12 [x] [] [] []    Straight Leg Raises   [] [] [] []    Hip abd/add  12 [x] [] [] []    Long Arc Quads  10 [x] [] [] []    Marching   [] [] [] []       [] [] [] []         Pain Ratin/10 feet      Activity Tolerance:   Poor and requires frequent rest breaks    After treatment patient left in no apparent distress:   Bed locked and returned to lowest position, Sitting in chair, Call bell within reach, and Notified RN of session and pain in feet. COMMUNICATION/COLLABORATION:   The patients plan of care was discussed with: Registered nurse.      Alex Pressley, PT   Time Calculation: 27 mins

## 2023-04-17 NOTE — PROGRESS NOTES
CM called Pt daughter, Domenic Mike, she asked CM to send a referral to [employer]. Choice letter signed, will send a referral to [employer], will place choice letter on Pt chart. It [employer] can accept, they will need to get auth.

## 2023-04-18 LAB
ALBUMIN SERPL-MCNC: 2.7 G/DL (ref 3.5–5)
ALBUMIN/GLOB SERPL: 0.5 (ref 1.1–2.2)
ALP SERPL-CCNC: 180 U/L (ref 45–117)
ALT SERPL-CCNC: 12 U/L (ref 12–78)
ANION GAP SERPL CALC-SCNC: 1 MMOL/L (ref 5–15)
AST SERPL W P-5'-P-CCNC: 14 U/L (ref 15–37)
BASOPHILS # BLD: 0.1 K/UL (ref 0–0.1)
BASOPHILS NFR BLD: 1 % (ref 0–1)
BILIRUB SERPL-MCNC: 0.5 MG/DL (ref 0.2–1)
BUN SERPL-MCNC: 29 MG/DL (ref 6–20)
BUN/CREAT SERPL: 16 (ref 12–20)
CA-I BLD-MCNC: 8.4 MG/DL (ref 8.5–10.1)
CHLORIDE SERPL-SCNC: 98 MMOL/L (ref 97–108)
CO2 SERPL-SCNC: 33 MMOL/L (ref 21–32)
CREAT SERPL-MCNC: 1.85 MG/DL (ref 0.55–1.02)
DIFFERENTIAL METHOD BLD: ABNORMAL
EOSINOPHIL # BLD: 0.2 K/UL (ref 0–0.4)
EOSINOPHIL NFR BLD: 3 % (ref 0–7)
ERYTHROCYTE [DISTWIDTH] IN BLOOD BY AUTOMATED COUNT: 15.7 % (ref 11.5–14.5)
GLOBULIN SER CALC-MCNC: 5.2 G/DL (ref 2–4)
GLUCOSE BLD STRIP.AUTO-MCNC: 106 MG/DL (ref 65–100)
GLUCOSE BLD STRIP.AUTO-MCNC: 118 MG/DL (ref 65–100)
GLUCOSE BLD STRIP.AUTO-MCNC: 126 MG/DL (ref 65–100)
GLUCOSE BLD STRIP.AUTO-MCNC: 128 MG/DL (ref 65–100)
GLUCOSE SERPL-MCNC: 112 MG/DL (ref 65–100)
HCT VFR BLD AUTO: 45.6 % (ref 35–47)
HGB BLD-MCNC: 13.6 G/DL (ref 11.5–16)
IMM GRANULOCYTES # BLD AUTO: 0 K/UL (ref 0–0.04)
IMM GRANULOCYTES NFR BLD AUTO: 1 % (ref 0–0.5)
LYMPHOCYTES # BLD: 2.1 K/UL (ref 0.8–3.5)
LYMPHOCYTES NFR BLD: 35 % (ref 12–49)
MCH RBC QN AUTO: 26.8 PG (ref 26–34)
MCHC RBC AUTO-ENTMCNC: 29.8 G/DL (ref 30–36.5)
MCV RBC AUTO: 89.9 FL (ref 80–99)
MONOCYTES # BLD: 0.6 K/UL (ref 0–1)
MONOCYTES NFR BLD: 10 % (ref 5–13)
NEUTS SEG # BLD: 3 K/UL (ref 1.8–8)
NEUTS SEG NFR BLD: 50 % (ref 32–75)
NRBC # BLD: 0 K/UL (ref 0–0.01)
NRBC BLD-RTO: 0 PER 100 WBC
PERFORMED BY, TECHID: ABNORMAL
PLATELET # BLD AUTO: 251 K/UL (ref 150–400)
PMV BLD AUTO: 12.3 FL (ref 8.9–12.9)
POTASSIUM SERPL-SCNC: 5.4 MMOL/L (ref 3.5–5.1)
PROT SERPL-MCNC: 7.9 G/DL (ref 6.4–8.2)
RBC # BLD AUTO: 5.07 M/UL (ref 3.8–5.2)
SODIUM SERPL-SCNC: 132 MMOL/L (ref 136–145)
WBC # BLD AUTO: 5.9 K/UL (ref 3.6–11)

## 2023-04-18 PROCEDURE — 85025 COMPLETE CBC W/AUTO DIFF WBC: CPT

## 2023-04-18 PROCEDURE — 74011250637 HC RX REV CODE- 250/637: Performed by: NURSE PRACTITIONER

## 2023-04-18 PROCEDURE — 94640 AIRWAY INHALATION TREATMENT: CPT

## 2023-04-18 PROCEDURE — 36415 COLL VENOUS BLD VENIPUNCTURE: CPT

## 2023-04-18 PROCEDURE — 97530 THERAPEUTIC ACTIVITIES: CPT

## 2023-04-18 PROCEDURE — 80053 COMPREHEN METABOLIC PANEL: CPT

## 2023-04-18 PROCEDURE — 74011250636 HC RX REV CODE- 250/636: Performed by: INTERNAL MEDICINE

## 2023-04-18 PROCEDURE — 65270000029 HC RM PRIVATE

## 2023-04-18 PROCEDURE — 74011250636 HC RX REV CODE- 250/636: Performed by: HOSPITALIST

## 2023-04-18 PROCEDURE — 74011250637 HC RX REV CODE- 250/637: Performed by: HOSPITALIST

## 2023-04-18 PROCEDURE — 74011250637 HC RX REV CODE- 250/637: Performed by: INTERNAL MEDICINE

## 2023-04-18 PROCEDURE — 82962 GLUCOSE BLOOD TEST: CPT

## 2023-04-18 RX ORDER — FUROSEMIDE 40 MG/1
80 TABLET ORAL
Status: DISCONTINUED | OUTPATIENT
Start: 2023-04-18 | End: 2023-04-19

## 2023-04-18 RX ADMIN — ATORVASTATIN CALCIUM 40 MG: 40 TABLET, FILM COATED ORAL at 20:49

## 2023-04-18 RX ADMIN — MICONAZOLE NITRATE: 20 POWDER TOPICAL at 20:49

## 2023-04-18 RX ADMIN — METOPROLOL SUCCINATE 25 MG: 25 TABLET, EXTENDED RELEASE ORAL at 08:09

## 2023-04-18 RX ADMIN — ONDANSETRON 4 MG: 2 INJECTION INTRAMUSCULAR; INTRAVENOUS at 18:00

## 2023-04-18 RX ADMIN — ONDANSETRON 4 MG: 2 INJECTION INTRAMUSCULAR; INTRAVENOUS at 11:03

## 2023-04-18 RX ADMIN — SACUBITRIL AND VALSARTAN 1 TABLET: 97; 103 TABLET, FILM COATED ORAL at 08:09

## 2023-04-18 RX ADMIN — FUROSEMIDE 80 MG: 10 INJECTION, SOLUTION INTRAMUSCULAR; INTRAVENOUS at 08:09

## 2023-04-18 RX ADMIN — BUDESONIDE AND FORMOTEROL FUMARATE DIHYDRATE 2 PUFF: 160; 4.5 AEROSOL RESPIRATORY (INHALATION) at 18:16

## 2023-04-18 RX ADMIN — BUDESONIDE AND FORMOTEROL FUMARATE DIHYDRATE 2 PUFF: 160; 4.5 AEROSOL RESPIRATORY (INHALATION) at 08:39

## 2023-04-18 RX ADMIN — ASPIRIN 81 MG: 81 TABLET, COATED ORAL at 08:09

## 2023-04-18 RX ADMIN — AMIODARONE HYDROCHLORIDE 200 MG: 200 TABLET ORAL at 08:09

## 2023-04-18 RX ADMIN — FUROSEMIDE 80 MG: 40 TABLET ORAL at 15:59

## 2023-04-18 RX ADMIN — APIXABAN 5 MG: 5 TABLET, FILM COATED ORAL at 08:09

## 2023-04-18 RX ADMIN — SACUBITRIL AND VALSARTAN 1 TABLET: 97; 103 TABLET, FILM COATED ORAL at 20:48

## 2023-04-18 RX ADMIN — APIXABAN 5 MG: 5 TABLET, FILM COATED ORAL at 20:49

## 2023-04-18 RX ADMIN — SPIRONOLACTONE 25 MG: 25 TABLET ORAL at 08:09

## 2023-04-18 RX ADMIN — COLCHICINE 0.6 MG: 0.6 TABLET, FILM COATED ORAL at 20:49

## 2023-04-18 RX ADMIN — MICONAZOLE NITRATE: 20 POWDER TOPICAL at 09:00

## 2023-04-18 RX ADMIN — POTASSIUM CHLORIDE 10 MEQ: 750 TABLET, EXTENDED RELEASE ORAL at 08:09

## 2023-04-18 RX ADMIN — ALLOPURINOL 100 MG: 100 TABLET ORAL at 08:09

## 2023-04-18 RX ADMIN — QUETIAPINE FUMARATE 25 MG: 25 TABLET ORAL at 20:49

## 2023-04-18 RX ADMIN — COLCHICINE 0.6 MG: 0.6 TABLET, FILM COATED ORAL at 08:09

## 2023-04-18 NOTE — DISCHARGE INSTRUCTIONS
Per heart failure protocol:  Follow-up with cardiology within 3-5 days of discharge.  ______________________________________________________________________________________    _______________________________________________________________________________________________________

## 2023-04-18 NOTE — PROGRESS NOTES
Hospitalist Progress Note               Daily Progress Note: 4/18/2023      Subjective:   Hospital course to date:    20-year-old female with history of systolic heart failure, COPD and A-fib admitted on 4/11 with shortness of breath due to fluid overload. Was admitted and started on IV furosemide. Patient was seen by cardiology who recommended EP evaluation for possible ICD    Echo obtained this admission showed an EF of 30% with severe pulmonary hypertension    Dobutamine was added on 4/14  --------  Patient is seen today for follow-up. She is awake and alert. She continues on dobutamine 2.5.   Urine output past 24 hours 2.4 L          Problem List:  Problem List as of 4/18/2023 Date Reviewed: 4/12/2023            Codes Class Noted - Resolved    Elevated troponin ICD-10-CM: R77.8  ICD-9-CM: 790.6  4/12/2023 - Present        Shortness of breath ICD-10-CM: R06.02  ICD-9-CM: 786.05  4/12/2023 - Present        * (Principal) Acute exacerbation of CHF (congestive heart failure) ([facility] ICD-10-CM: I50.9  ICD-9-CM: 428.0  4/12/2023 - Present        CHF (congestive heart failure) ([facility] ICD-10-CM: I50.9  ICD-9-CM: 428.0  3/24/2022 - Present        Heart failure ([facility] ICD-10-CM: I50.9  ICD-9-CM: 428.9  11/19/2021 - Present        UTI (urinary tract infection) ICD-10-CM: N39.0  ICD-9-CM: 599.0  6/30/2021 - Present        CHF exacerbation ([facility] ICD-10-CM: I50.9  ICD-9-CM: 428.0  6/21/2021 - Present        Atrial flutter with rapid ventricular response ([facility] ICD-10-CM: I48.92  ICD-9-CM: 427.32  3/9/2021 - Present        Atrial flutter ([facility] ICD-10-CM: I50.14  ICD-9-CM: 427.32  2/22/2021 - Present        COPD exacerbation ([facility] ICD-10-CM: J44.1  ICD-9-CM: 491.21  11/2/2020 - Present        COPD (chronic obstructive pulmonary disease) ([facility] ICD-10-CM: J44.9  ICD-9-CM: 065  11/2/2020 - Present        RESOLVED: CHF (congestive heart failure) ([facility] ICD-10-CM: I50.9  ICD-9-CM: 428.0  2/22/2021 - 8/3/2021 Medications reviewed  Current Facility-Administered Medications   Medication Dose Route Frequency    colchicine tablet 0.6 mg  0.6 mg Oral Q12H    diphenhydrAMINE (BENADRYL) capsule 25 mg  25 mg Oral Q6H PRN    allopurinoL (ZYLOPRIM) tablet 100 mg  100 mg Oral DAILY    albuterol-ipratropium (DUO-NEB) 2.5 MG-0.5 MG/3 ML  3 mL Nebulization Q6H PRN    furosemide (LASIX) injection 80 mg  80 mg IntraVENous DAILY    DOBUTamine (DOBUTREX) 500 mg/250 mL (2,000 mcg/mL) infusion  2.5 mcg/kg/min IntraVENous CONTINUOUS    miconazole (MICOTIN) 2 % powder   Topical BID    albuterol (PROVENTIL HFA, VENTOLIN HFA, PROAIR HFA) inhaler 2 Puff  2 Puff Inhalation BID PRN    apixaban (ELIQUIS) tablet 5 mg  5 mg Oral BID    nitroglycerin (NITROSTAT) tablet 0.4 mg  0.4 mg SubLINGual PRN    atorvastatin (LIPITOR) tablet 40 mg  40 mg Oral QHS    metoprolol succinate (TOPROL-XL) XL tablet 25 mg  25 mg Oral DAILY    amiodarone (CORDARONE) tablet 200 mg  200 mg Oral DAILY    QUEtiapine (SEROquel) tablet 25 mg  25 mg Oral QHS    sacubitriL-valsartan (ENTRESTO)  mg tablet 1 Tablet  1 Tablet Oral BID    budesonide-formoteroL (SYMBICORT) 160-4.5 mcg/actuation HFA inhaler 2 Puff  2 Puff Inhalation BID RT    ondansetron (ZOFRAN) injection 4 mg  4 mg IntraVENous Q6H PRN    aspirin delayed-release tablet 81 mg  81 mg Oral DAILY    spironolactone (ALDACTONE) tablet 25 mg  25 mg Oral DAILY    acetaminophen-codeine (TYLENOL #3) per tablet 1 Tablet  1 Tablet Oral Q4H PRN    potassium chloride SR (KLOR-CON 10) tablet 10 mEq  10 mEq Oral BID       Review of Systems:   A comprehensive review of systems was negative except for that written in the HPI.     Objective:   Physical Exam:     Visit Vitals  BP (!) 165/106 (BP 1 Location: Right upper arm, BP Patient Position: Semi fowlers)   Pulse 67   Temp 98 °F (36.7 °C)   Resp 22   Ht 5' 3\" (1.6 m)   Wt 95.2 kg (209 lb 14.1 oz)   SpO2 97%   BMI 37.18 kg/m²    O2 Flow Rate (L/min): 4 l/min O2 Device: Nasal cannula    Temp (24hrs), Av.7 °F (36.5 °C), Min:97.4 °F (36.3 °C), Max:98 °F (36.7 °C)    No intake/output data recorded.  1901 -  0700  In: 350 [P.O.:350]  Out: 3200 [Urine:3200]    General:   Awake and alert   Lungs:   Clear to auscultation bilaterally. Chest wall:  No tenderness or deformity. Heart:  Regular rate and rhythm, S1, S2 normal, no murmur, click, rub or gallop. Abdomen:   Soft, non-tender. Bowel sounds normal. No masses,  No organomegaly. Extremities: Extremities normal, atraumatic, no cyanosis or edema. Pulses: 2+ and symmetric all extremities. Skin: Skin color, texture, turgor normal. No rashes or lesions   Neurologic: CNII-XII intact. No gross focal deficits         Data Review:       Recent Days:  Recent Labs     23  0757 23  1503   WBC 5.8 6.2   HGB 12.7 12.5   HCT 40.9 41.2    209     Recent Labs     23  0757 23  1503   * 137   K 5.2* 5.1    101   CO2 26 35*   GLU 73 87   BUN 26* 23*   CREA 1.58* 1.59*   CA 8.6 8.5   ALB 2.4* 2.5*   TBILI 0.6 0.6   ALT 10* 12     No results for input(s): PH, PCO2, PO2, HCO3, FIO2 in the last 72 hours. 24 Hour Results:  Recent Results (from the past 24 hour(s))   GLUCOSE, POC    Collection Time: 23 11:55 AM   Result Value Ref Range    Glucose (POC) 145 (H) 65 - 100 mg/dL    Performed by [address], POC    Collection Time: 23  8:16 AM   Result Value Ref Range    Glucose (POC) 106 (H) 65 - 100 mg/dL    Performed by SARAI FOFANA        XR CHEST PORT   Final Result   Stable cardiomegaly. Otherwise, no acute findings.                Assessment:  Acute on chronic systolic heart failure EF 30%   -Clinically improved, appears euvolemic    Paroxysmal atrial fibrillation/flutter    Chronic kidney disease stage III    Secondary hypercoagulable state due to A-fib    Chronic respiratory failure, on 4 L nasal cannula    Obstructive sleep apnea, on CPAP    Essential hypertension    Diabetes mellitus type 2    Coronary artery disease with history of PCI and CABG    COPD without exacerbation    Severe pulmonary hypertension      Discussion/MDM: Patient with multiple medical comorbidities, each with high likelihood for morbidity and mortality if left untreated. I have reviewed patient's presenting subjective and objective findings, as well as all laboratory studies, imaging studies, and vital signs to date as well as treatment rendered and patient's response to those treatments. In addition, prior medical, surgical and relevant social and family histories were reviewed. I have discussed management plan with patient/family and with nursing staff. Plan:  Discontinue dobutamine    SNF referral underway.   Patient will need [address] discussed with: Patient/Family    Code status: Full code    Social determinants of health: None known    Disposition/barriers to discharge: SNF    Total time spent with patient: 28 minutes    Rene Rosario MD

## 2023-04-18 NOTE — PROGRESS NOTES
Attempted to see patient for PT treatment,patient reported she just got back in bed and also she can't put weight on the feet due to gout flare up. We will try tomorrow.

## 2023-04-18 NOTE — PROGRESS NOTES
Progress Note      2023 11:59 AM  NAME: Sandy Maria   MRN:  [id number]   Admit Diagnosis: Shortness of breath [R06.02]  Acute exacerbation of CHF (congestive heart failure) ([facility] [I50.9]      Problem List:   Acute on chronic CHF  Dilated cardiomyopathy with EF 30-35%  Severe pulmonary hypertension with RVSP 72 mmHg  CKD  Paroxysmal atrial fibrillation  IFTIKHAR     Assessment/Plan:   Discontinue IV dobutamine  Clinically, she appears euvolemic  Continue to follow renal function closely  Guideline directed medical therapy for CHF         []       High complexity decision making was performed in this patient at high risk for decompensation with multiple organ involvement. Subjective:     Sandy Mraia denies chest pain, dyspnea. Discussed with RN events overnight. Review of Systems:   Negative except for as noted above. Objective:      Physical Exam:    Last 24hrs VS reviewed since prior progress note. Most recent are:    Visit Vitals  BP (!) 109/55 (BP 1 Location: Right upper arm, BP Patient Position: Semi fowlers)   Pulse 62   Temp 97.9 °F (36.6 °C)   Resp 16   Ht 5' 3\" (1.6 m)   Wt 95.2 kg (209 lb 14.1 oz)   SpO2 94%   BMI 37.18 kg/m²       Intake/Output Summary (Last 24 hours) at 2023 1159  Last data filed at 2023 1107  Gross per 24 hour   Intake --   Output 1500 ml   Net -1500 ml        General Appearance: Alert; no acute distress. Ears/Nose/Mouth/Throat: moist mucous membranes  Neck: Supple. Chest: Lungs clear to auscultation bilaterally. Cardiovascular: Regular rate and rhythm, S1S2 normal  Abdomen: Soft, non-tender, bowel sounds are active. Extremities: 1+ edema bilaterally. Skin: Warm and dry. PMH/SH reviewed - no change compared to H&P    Telemetry: NSR    EK/11/23    ECHO ADULT COMPLETE 2023    Interpretation Summary    Left Ventricle: Moderately reduced left ventricular systolic function with a visually estimated EF of 30 - 35%.  Not well visualized. Left ventricle size is normal. Moderately increased wall thickness. Findings consistent with concentric hypertrophy. Moderate global hypokinesis present. Abnormal diastolic function. Right Ventricle: Right ventricle is mildly dilated. Normal wall thickness. Mitral Valve: Mildly thickened leaflet. Mild regurgitation. Tricuspid Valve: Moderate regurgitation. The estimated RVSP is 72 mmHg. Left Atrium: Left atrium is moderately dilated. Right Atrium: Right atrium is moderately dilated. Technical qualifiers: Echo study was technically difficult due to patient's body habitus. Signed by: Doi Cervantes MD on 4/12/2023  1:18 PM      []  No new EKG for review    Lab Data Personally Reviewed:    Recent Labs     04/18/23 0827 04/17/23 0757   WBC 5.9 5.8   HGB 13.6 12.7   HCT 45.6 40.9    200     No results for input(s): INR, PTP, APTT, INREXT in the last 72 hours. Recent Labs     04/18/23 0827 04/17/23 0757 04/16/23  1503   * 133* 137   K 5.4* 5.2* 5.1   CL 98 103 101   CO2 33* 26 35*   BUN 29* 26* 23*   CREA 1.85* 1.58* 1.59*   * 73 87   CA 8.4* 8.6 8.5     No results for input(s): CPK, CKNDX, TROIQ in the last 72 hours. No lab exists for component: CPKMB  Lab Results   Component Value Date/Time    Cholesterol, total 116 04/13/2023 04:35 AM    HDL Cholesterol 53 04/13/2023 04:35 AM    LDL, calculated 50.8 04/13/2023 04:35 AM    Triglyceride 61 04/13/2023 04:35 AM    CHOL/HDL Ratio 2.2 04/13/2023 04:35 AM       Recent Labs     04/18/23 0827 04/17/23 0757 04/16/23  1503   * 155* 184*   TP 7.9 6.8 6.6   ALB 2.7* 2.4* 2.5*   GLOB 5.2* 4.4* 4.1*     No results for input(s): PH, PCO2, PO2 in the last 72 hours.     Medications Personally Reviewed:    Current Facility-Administered Medications   Medication Dose Route Frequency    colchicine tablet 0.6 mg  0.6 mg Oral Q12H    diphenhydrAMINE (BENADRYL) capsule 25 mg  25 mg Oral Q6H PRN    allopurinoL (ZYLOPRIM) tablet 100 mg  100 mg Oral DAILY    albuterol-ipratropium (DUO-NEB) 2.5 MG-0.5 MG/3 ML  3 mL Nebulization Q6H PRN    furosemide (LASIX) injection 80 mg  80 mg IntraVENous DAILY    miconazole (MICOTIN) 2 % powder   Topical BID    albuterol (PROVENTIL HFA, VENTOLIN HFA, PROAIR HFA) inhaler 2 Puff  2 Puff Inhalation BID PRN    apixaban (ELIQUIS) tablet 5 mg  5 mg Oral BID    nitroglycerin (NITROSTAT) tablet 0.4 mg  0.4 mg SubLINGual PRN    atorvastatin (LIPITOR) tablet 40 mg  40 mg Oral QHS    metoprolol succinate (TOPROL-XL) XL tablet 25 mg  25 mg Oral DAILY    amiodarone (CORDARONE) tablet 200 mg  200 mg Oral DAILY    QUEtiapine (SEROquel) tablet 25 mg  25 mg Oral QHS    sacubitriL-valsartan (ENTRESTO)  mg tablet 1 Tablet  1 Tablet Oral BID    budesonide-formoteroL (SYMBICORT) 160-4.5 mcg/actuation HFA inhaler 2 Puff  2 Puff Inhalation BID RT    ondansetron (ZOFRAN) injection 4 mg  4 mg IntraVENous Q6H PRN    aspirin delayed-release tablet 81 mg  81 mg Oral DAILY    spironolactone (ALDACTONE) tablet 25 mg  25 mg Oral DAILY    acetaminophen-codeine (TYLENOL #3) per tablet 1 Tablet  1 Tablet Oral Q4H PRN    potassium chloride SR (KLOR-CON 10) tablet 10 mEq  10 mEq Oral BID         Sunil Foss MD

## 2023-04-18 NOTE — NURSE NAVIGATOR
Heart Failure Nurse Navigator: Heart Failure Education    RN performs hand hygiene. RN Introduces herself to the patient and informs the patient of the reasoning for the visit. Patient Verbalizes Understanding for visit, is accepting of discussion    Persons present for Education: Patient    Time Spent: At least 60minutes    Method of teaching:  Teach-back, demonstration, verbal, visual    Education Packets Given: Living with Heart Failure book, Heart Failure symptom management calendar/tracker, Heart Failure Self Check plan, Heart Failure: Partnering Your Treatment Questionnaire, Sodium tracker, and Eat Smart with Nutrition labels.    -Confirmation of working scales & that the patient can read numbers  -Confirmation of support to assist with daily weights if patient unable to perform independently. **Patient's daughter cooks and abides by low sodium diet. Patient does state she drinks a large amount of fluid. At this time RN-NN does not see that the patient is on a fluid restriction. Patient was taken off of Dobutamine drip today. Patient is on Entresto and Eliquis. RN-NN placed patient assistance forms in packet just in case it is needed. Patient is not on SGLT2 at this time. (Per Doctor discretion, Patient may benefit from starting a SGLT2 if able)    RN-NN provided education on Daily weights to include same time daily, on same scale, and documenting weights on calendar. RN-NN provided education trigger management/ signs and symptoms of Heart Failure. Patient is advised on Yellow Days to call MD office and on Red Days to come to the ER or call 911. **Patient states her cardiologist Dr. Tori Wilson told her to not to call, just come in to the hospital. RN-NN educates the patient on the importance of early symptom detection and prevention of hospitalization. RN-NN assures patient the providers appreciate catching the symptoms early, so they can prevent the hospitalization.  However, if the symptoms are severe the patient should go directly to the hospital or call 911.-- Patient verbalizes understanding      RN provides Nutritional education that includes how to calculate and restrict sodium and fluid intake. Encouraged fresh vegetable choice over canned/processed goods. Demonstrated how to log meals/intake. RN discusses lifestyle changes including cessation of smoking and increasing activity level. In addition, RN discusses the importance of keeping/scheduling appointments and adherence to guideline directed medical therapies. Patient was able to teach back to the RN-NN the above information. Patient will need reinforcement of education provided. Patient advised about the HF helper [employer]uro and [employer].        --------  Discussed with Dr. Tricia Ulrich about SGLT2. Per Dr. Tricia Ulrich patients usually do not start SGLT2 while on Dobutamine and active symptoms of heart failure. Usually start the patient while stable and out patient. They may consider after discharge.

## 2023-04-18 NOTE — PROGRESS NOTES
OCCUPATIONAL THERAPY TREATMENT  Patient: Terrell Robles (36 y.o. female)  Date: 4/18/2023  Diagnosis: Shortness of breath [R06.02]  Acute exacerbation of CHF (congestive heart failure) ([facility]) [I50.9] Acute exacerbation of CHF (congestive heart failure) ([facility]      Precautions: In place during session: External Catheter  Chart, occupational therapy assessment, plan of care, and goals were reviewed. ASSESSMENT  Pt continues with skilled OT services and is progressing towards goals. Pt received semi-supine in bed upon arrival, AXO x4 and agreeable to OT tx at this time. Overall, pt continues to present with deficits in generalized strength/AROM, coordination, bed mobility, static/dynamic sitting and standing balance and functional activity tolerance during performance of ADLs/mobility (see below for objective details and assist levels). Pt tolerated session fair, limited by nausea and left foot pain. Pt agreeable to therex semi supine in bed. See grid below for details, completed to maintain/ increase strength and endurance to aid in adl performance. Pt given yellow theraband for shoulder exercises. Education provided on HEP w/ pt return demonstrating good technique. Pt encouraged to completed HEP on own 2-3 times per day. Pt declined OOB and EOB activities this date. Will continue to progress. Recommend d/c to [facility] once medically appropriate. Other factors to consider for discharge: PLOF, time since onset, severity of deficits and decline from functional baseline. PLAN :  Patient continues to benefit from skilled intervention to address the above impairments. Continue treatment per established plan of care. to address goals. Recommend with staff: Out of bed to chair for meals and Encourage HEP in prep for ADLs/mobility    Recommend next session:  Toileting and Standing grooming    Recommendation for discharge: (in order for the patient to meet his/her long term goals)  Skilled Nursing Facility    This discharge recommendation:  Has been made in collaboration with the attending provider and/or case management    IF patient discharges home will need the following DME: TBD       SUBJECTIVE:   Patient stated I will try.     OBJECTIVE DATA SUMMARY:   Cognitive/Behavioral Status:  Neurologic State: Alert  Orientation Level: Oriented X4  Cognition: Follows commands; Appropriate safety awareness; Appropriate for age attention/concentration; Appropriate decision making       Therapeutic Exercises:   Exercise Sets Reps AROM AAROM PROM Self PROM Comments   Shoulder flex/ext 1 12 [x] [] [] [] W/ yellow theraband   Shoulder ab/adduc 1 12 [x] [] [] [] W/ yellow theraband   Elbow flex/ext 1 12 [x] [] [] []    Wrist flex/ ext 1 12 [x] [] [] []        Pain:  6/10 on FACES scale    Activity Tolerance:   Fair and requires frequent rest breaks    After treatment patient left in no apparent distress:   Supine in bed, bed locked and in lowest position    COMMUNICATION/COLLABORATION:   The patients plan of care was discussed with: Physical therapist and Registered nurse. The supervising occupational therapist and treating occupational therapist assistant have met to review this patients progress and plan of care. ALIE Madrid  Time Calculation: 35 mins     Problem: Self Care Deficits Care Plan (Adult)  Goal: *Acute Goals and Plan of Care (Insert Text)  Description:   FUNCTIONAL STATUS PRIOR TO ADMISSION: Patient was modified independent using a single point cane for functional mobility. Patient was mostly modified independent for basic ADLs taking rest breaks and slowing down activities as needed. Patient reports req'ing assistance with dressing recently d/t swelling in her BLE. Most recently pt has been sponge bathing at the sink d/t her shower chair not fitting in her tub and inability to lift BLE to get in.      HOME SUPPORT: The patient lived with her daughter and required assistance for dressing and IADLs. Occupational Therapy Goals  Initiated 4/13/2023  Patient Goal: Go to rehab and be IND again. 1.  Patient will perform lower body dressing with modified independence within 7 day(s). 2.  Patient will perform grooming with modified independence within 7 day(s). 3.  Patient will perform bathing with modified independence within 7 day(s). 4.  Patient will perform toilet transfers with modified independence within 7 day(s). 5.  Patient will perform all aspects of toileting with modified independence within 7 day(s). 6.  Patient will participate in upper extremity therapeutic exercise/activities with independence within 7 day(s). 7.  Patient will utilize energy conservation techniques during functional activities with verbal cues within 7 day(s).   4/18/2023 1548 by ALIE Carrington  Outcome: Progressing Towards Goal

## 2023-04-18 NOTE — PROGRESS NOTES
Problem: Falls - Risk of  Goal: *Absence of Falls  Description: Document Devora Amabile Fall Risk and appropriate interventions in the flowsheet. Outcome: Progressing Towards Goal  Note: Fall Risk Interventions:     Problem: Patient Education: Go to Patient Education Activity  Goal: Patient/Family Education  Outcome: Progressing Towards Goal     Problem: Pressure Injury - Risk of  Goal: *Prevention of pressure injury  Description: Document Martin Scale and appropriate interventions in the flowsheet.   Outcome: Progressing Towards Goal  Note: Pressure Injury Interventions:  Sensory Interventions: Discuss PT/OT consult with provider, Float heels, Maintain/enhance activity level, Minimize linen layers    Moisture Interventions: Absorbent underpads, Internal/External urinary devices, Minimize layers    Activity Interventions: Increase time out of bed, PT/OT evaluation    Mobility Interventions: Float heels, PT/OT evaluation    Nutrition Interventions: Document food/fluid/supplement intake, Offer support with meals,snacks and hydration

## 2023-04-19 LAB
GLUCOSE BLD STRIP.AUTO-MCNC: 109 MG/DL (ref 65–100)
GLUCOSE BLD STRIP.AUTO-MCNC: 110 MG/DL (ref 65–100)
GLUCOSE BLD STRIP.AUTO-MCNC: 127 MG/DL (ref 65–100)
GLUCOSE BLD STRIP.AUTO-MCNC: 153 MG/DL (ref 65–100)
PERFORMED BY, TECHID: ABNORMAL

## 2023-04-19 PROCEDURE — 77010033678 HC OXYGEN DAILY

## 2023-04-19 PROCEDURE — 74011250637 HC RX REV CODE- 250/637: Performed by: NURSE PRACTITIONER

## 2023-04-19 PROCEDURE — 74011250637 HC RX REV CODE- 250/637: Performed by: INTERNAL MEDICINE

## 2023-04-19 PROCEDURE — 97530 THERAPEUTIC ACTIVITIES: CPT

## 2023-04-19 PROCEDURE — 94640 AIRWAY INHALATION TREATMENT: CPT

## 2023-04-19 PROCEDURE — 82962 GLUCOSE BLOOD TEST: CPT

## 2023-04-19 PROCEDURE — 74011250636 HC RX REV CODE- 250/636: Performed by: HOSPITALIST

## 2023-04-19 PROCEDURE — 65270000029 HC RM PRIVATE

## 2023-04-19 PROCEDURE — 74011250637 HC RX REV CODE- 250/637: Performed by: HOSPITALIST

## 2023-04-19 PROCEDURE — 94761 N-INVAS EAR/PLS OXIMETRY MLT: CPT

## 2023-04-19 RX ORDER — CHOLESTYRAMINE 4 G/4.8G
4 POWDER, FOR SUSPENSION ORAL DAILY
Status: DISCONTINUED | OUTPATIENT
Start: 2023-04-20 | End: 2023-04-20 | Stop reason: HOSPADM

## 2023-04-19 RX ORDER — ACETAMINOPHEN AND CODEINE PHOSPHATE 300; 30 MG/1; MG/1
1 TABLET ORAL
Status: DISCONTINUED | OUTPATIENT
Start: 2023-04-19 | End: 2023-04-19

## 2023-04-19 RX ORDER — BUMETANIDE 1 MG/1
1 TABLET ORAL 2 TIMES DAILY
Status: DISCONTINUED | OUTPATIENT
Start: 2023-04-20 | End: 2023-04-20

## 2023-04-19 RX ORDER — ACETAMINOPHEN AND CODEINE PHOSPHATE 300; 30 MG/1; MG/1
1 TABLET ORAL
Status: DISCONTINUED | OUTPATIENT
Start: 2023-04-19 | End: 2023-04-20 | Stop reason: HOSPADM

## 2023-04-19 RX ORDER — CHOLESTYRAMINE 4 G/4.8G
4 POWDER, FOR SUSPENSION ORAL
Status: DISCONTINUED | OUTPATIENT
Start: 2023-04-19 | End: 2023-04-19

## 2023-04-19 RX ORDER — BUMETANIDE 1 MG/1
1 TABLET ORAL 2 TIMES DAILY
Status: DISCONTINUED | OUTPATIENT
Start: 2023-04-19 | End: 2023-04-19

## 2023-04-19 RX ADMIN — SPIRONOLACTONE 25 MG: 25 TABLET ORAL at 09:11

## 2023-04-19 RX ADMIN — MICONAZOLE NITRATE: 20 POWDER TOPICAL at 09:12

## 2023-04-19 RX ADMIN — BUDESONIDE AND FORMOTEROL FUMARATE DIHYDRATE 2 PUFF: 160; 4.5 AEROSOL RESPIRATORY (INHALATION) at 21:59

## 2023-04-19 RX ADMIN — POTASSIUM CHLORIDE 10 MEQ: 750 TABLET, EXTENDED RELEASE ORAL at 09:10

## 2023-04-19 RX ADMIN — IPRATROPIUM BROMIDE AND ALBUTEROL SULFATE 3 ML: 2.5; .5 SOLUTION RESPIRATORY (INHALATION) at 21:59

## 2023-04-19 RX ADMIN — ONDANSETRON 4 MG: 2 INJECTION INTRAMUSCULAR; INTRAVENOUS at 09:49

## 2023-04-19 RX ADMIN — SACUBITRIL AND VALSARTAN 1 TABLET: 97; 103 TABLET, FILM COATED ORAL at 20:46

## 2023-04-19 RX ADMIN — BUDESONIDE AND FORMOTEROL FUMARATE DIHYDRATE 2 PUFF: 160; 4.5 AEROSOL RESPIRATORY (INHALATION) at 07:39

## 2023-04-19 RX ADMIN — FUROSEMIDE 80 MG: 40 TABLET ORAL at 09:10

## 2023-04-19 RX ADMIN — ATORVASTATIN CALCIUM 40 MG: 40 TABLET, FILM COATED ORAL at 20:46

## 2023-04-19 RX ADMIN — CHOLESTYRAMINE 4 G: 4 POWDER, FOR SUSPENSION ORAL at 12:25

## 2023-04-19 RX ADMIN — QUETIAPINE FUMARATE 25 MG: 25 TABLET ORAL at 20:46

## 2023-04-19 RX ADMIN — MICONAZOLE NITRATE: 20 POWDER TOPICAL at 21:05

## 2023-04-19 RX ADMIN — APIXABAN 5 MG: 5 TABLET, FILM COATED ORAL at 20:46

## 2023-04-19 NOTE — PROGRESS NOTES
Comprehensive Nutrition Assessment    Type and Reason for Visit: Initial, Positive nutrition screen (LOS)    Nutrition Recommendations/Plan:   Continue current diet, add soup and crackers to trays to assist w/ intake. Add Ensure Clear x2/day. (480kcal, 16g pro)  Consider Metamucil regimen. Monitor I/Os, Bms, and weights while admitted. Malnutrition Assessment:  Malnutrition Status: Moderate malnutrition (04/19/23 1057)    Context:  Acute illness     Findings of the 6 clinical characteristics of malnutrition:   Energy Intake:  50% or less of est energy requirements for 5 or more days (Nausea, 25-50% intake x5 days per pt)  Weight Loss:  No significant weight loss     Body Fat Loss:  No significant body fat loss,     Muscle Mass Loss:  No significant muscle mass loss,    Fluid Accumulation:  Mild, Generalized, Extremities   Strength:  Not performed     Nutrition Assessment:    Admitted for CHF exacerbation and SOB. Stable wts x1/yr - 13# wt loss while admitted. Poor appetite and intake x5/days per pt d/t nausea and x1/episode of vomiting. Intakes variable per pt - 25-50%. Pt endorses broth and crackers have helped w/ nausea. RN administered zofran, pt reported improvement. Continue current diet, add soup and crackers to trays. Add Ensure Clear x2/day to assist w/ intake. Labs: AST 14, Alk Phos 180, Na 132, K 5.4, CO2 33, BUN 29, Creat 1.85, CA 8.4, Alb 2.7 Meds: allopurinol, amiodrone, apixaban, atorvastatin, colchine, odansetron, spironolactone    Nutrition Related Findings:    NFPE deferred, pt appears visibly nourished. Nausea x5/days, vomiting x/1day. Last BM 4/19, watery - watery stools started 4/18 per pt. No c/s conerns. BLE +1 pitting edema, non-pitting generalized edema. Wound Type: Venous stasis (Right and left pretibial venous)    Current Nutrition Intake & Therapies:  Average Meal Intake: 26-50%  Average Supplement Intake: None ordered  ADULT DIET Regular; 4 carb choices (60 gm/meal);  Low Fat/Low Chol/High Fiber/DONIS    Anthropometric Measures:  Height: 5' 3\" (160 cm)  Ideal Body Weight (IBW): 115 lbs (52 kg)  Admission Body Weight: 209 lb 7 oz  Current Body Wt:  89.3 kg (196 lb 13.9 oz) (Taken by RD Intern), 171.2 % IBW. Bed scale  Current BMI (kg/m2): 34.9  Usual Body Weight: 96.2 kg (212 lb)  % Weight Change (Calculated): -7.1  Weight Adjustment: No adjustment  BMI Category: Obese class 2 (BMI 35.0-39. 9)    Wt Readings from Last 8 Encounters:   04/19/23 95 kg (209 lb 7 oz)   03/28/22 96.5 kg (212 lb 11.9 oz)   11/21/21 102.6 kg (226 lb 4.8 oz)   06/30/21 108.9 kg (240 lb)   06/27/21 101.6 kg (223 lb 15.8 oz)   06/07/21 108 kg (238 lb)   05/27/21 107.5 kg (237 lb)   04/12/21 107.5 kg (237 lb)       Estimated Daily Nutrient Needs:  Energy Requirements Based On: Formula  Weight Used for Energy Requirements: Current  Energy (kcal/day): 6718-3194 kcal/d (MSJ x 1.2AF, 1.0SF)  Weight Used for Protein Requirements: Adjusted  Protein (g/day): 98-125g/d (1.1-1.4 g/kg, HF)  Method Used for Fluid Requirements: 1 ml/kcal  Fluid (ml/day): 1654 ml/d    Nutrition Diagnosis:   Inadequate protein-energy intake related to cardiac dysfunction as evidenced by intake 26-50%, nausea, vomiting  Moderate malnutrition, In context of acute illness or injury related to cardiac dysfunction as evidenced by Criteria as identified in malnutrition assessment    Nutrition Interventions:   Food and/or Nutrient Delivery: Continue current diet, Start oral nutrition supplement, Snacks (specify) (Add broth and crackers to trays)  Nutrition Education/Counseling: No recommendations at this time  Coordination of Nutrition Care: Continue to monitor while inpatient  Plan of Care discussed with: RN, Pt    Goals:   Goals: Meet at least 75% of estimated needs, PO intake 50% or greater, by next RD assessment       Nutrition Monitoring and Evaluation:   Behavioral-Environmental Outcomes: None identified  Food/Nutrient Intake Outcomes: Food and nutrient intake, Supplement intake  Physical Signs/Symptoms Outcomes: Weight, Biochemical data, Diarrhea, Nausea/vomiting, Meal time behavior    Discharge Planning:     Too soon to determine    Juana Rae  Contact: [employer]

## 2023-04-19 NOTE — PROGRESS NOTES
Hospitalist Progress Note               Daily Progress Note: 4/19/2023      Subjective:   Hospital course to date:    58-year-old female with history of systolic heart failure, COPD and A-fib admitted on 4/11 with shortness of breath due to fluid overload. Was admitted and started on IV furosemide that was discontinued and started dobutamine on 04/14 due to intravascular depletion. Patient was seen by cardiology who recommended EP evaluation for possible ICD. May need to follow up out patient. Echo obtained this admission showed an EF of 30% with severe pulmonary hypertension,. Previously she had preserved EF. Dobutamine was added on 4/14, discontinued 04/17/2023.   --------  Patient is seen today for follow-up. She is awake and alert. She was on dobutamine that was discontinued yesterday. Extremity edema improved, denies chest pain palpitations. She has blood pressure running low, held  medications today. Lab with increased potassium.        Problem List:  Problem List as of 4/19/2023 Date Reviewed: 4/12/2023            Codes Class Noted - Resolved    Elevated troponin ICD-10-CM: R77.8  ICD-9-CM: 790.6  4/12/2023 - Present        Shortness of breath ICD-10-CM: R06.02  ICD-9-CM: 786.05  4/12/2023 - Present        * (Principal) Acute exacerbation of CHF (congestive heart failure) ([facility] ICD-10-CM: I50.9  ICD-9-CM: 428.0  4/12/2023 - Present        CHF (congestive heart failure) ([facility] ICD-10-CM: I50.9  ICD-9-CM: 428.0  3/24/2022 - Present        Heart failure ([facility] ICD-10-CM: I50.9  ICD-9-CM: 428.9  11/19/2021 - Present        UTI (urinary tract infection) ICD-10-CM: N39.0  ICD-9-CM: 599.0  6/30/2021 - Present        CHF exacerbation ([facility] ICD-10-CM: I50.9  ICD-9-CM: 428.0  6/21/2021 - Present        Atrial flutter with rapid ventricular response ([facility] ICD-10-CM: I48.92  ICD-9-CM: 427.32  3/9/2021 - Present        Atrial flutter ([facility] ICD-10-CM: F78.49  ICD-9-CM: 427.32  2/22/2021 - Present COPD exacerbation ([facility] ICD-10-CM: J44.1  ICD-9-CM: 491.21  11/2/2020 - Present        COPD (chronic obstructive pulmonary disease) ([facility] ICD-10-CM: J44.9  ICD-9-CM: 325  11/2/2020 - Present        RESOLVED: CHF (congestive heart failure) ([facility] ICD-10-CM: I50.9  ICD-9-CM: 428.0  2/22/2021 - 8/3/2021         Medications reviewed  Current Facility-Administered Medications   Medication Dose Route Frequency    [START ON 4/20/2023] bumetanide (BUMEX) tablet 1 mg  1 mg Oral BID    acetaminophen-codeine (TYLENOL #3) per tablet 1 Tablet  1 Tablet Oral Q8H PRN    [Held by provider] colchicine tablet 0.6 mg  0.6 mg Oral Q12H    [Held by provider] diphenhydrAMINE (BENADRYL) capsule 25 mg  25 mg Oral Q6H PRN    allopurinoL (ZYLOPRIM) tablet 100 mg  100 mg Oral DAILY    albuterol-ipratropium (DUO-NEB) 2.5 MG-0.5 MG/3 ML  3 mL Nebulization Q6H PRN    miconazole (MICOTIN) 2 % powder   Topical BID    albuterol (PROVENTIL HFA, VENTOLIN HFA, PROAIR HFA) inhaler 2 Puff  2 Puff Inhalation BID PRN    apixaban (ELIQUIS) tablet 5 mg  5 mg Oral BID    nitroglycerin (NITROSTAT) tablet 0.4 mg  0.4 mg SubLINGual PRN    atorvastatin (LIPITOR) tablet 40 mg  40 mg Oral QHS    metoprolol succinate (TOPROL-XL) XL tablet 25 mg  25 mg Oral DAILY    amiodarone (CORDARONE) tablet 200 mg  200 mg Oral DAILY    QUEtiapine (SEROquel) tablet 25 mg  25 mg Oral QHS    sacubitriL-valsartan (ENTRESTO)  mg tablet 1 Tablet  1 Tablet Oral BID    budesonide-formoteroL (SYMBICORT) 160-4.5 mcg/actuation HFA inhaler 2 Puff  2 Puff Inhalation BID RT    ondansetron (ZOFRAN) injection 4 mg  4 mg IntraVENous Q6H PRN    aspirin delayed-release tablet 81 mg  81 mg Oral DAILY    [Held by provider] spironolactone (ALDACTONE) tablet 25 mg  25 mg Oral DAILY       Review of Systems:   States feeling weak and tired. Denies any chest pain or palpitations. Breathing is better. Complains of nausea and dry heaving. Diarrhea per nursing staff.     Objective:   Physical Exam: Visit Vitals  BP (!) 114/53 (BP 1 Location: Right upper arm, BP Patient Position: Semi fowlers)   Pulse 60   Temp 97.6 °F (36.4 °C)   Resp 18   Ht 5' 3\" (1.6 m)   Wt 95 kg (209 lb 7 oz)   SpO2 96%   BMI 37.10 kg/m²    O2 Flow Rate (L/min): 4 l/min (L/min) O2 Device: Nasal cannula    Temp (24hrs), Av.6 °F (36.4 °C), Min:97.4 °F (36.3 °C), Max:97.9 °F (36.6 °C)    No intake/output data recorded.  1901 -  0700  In: 680 [P.O.:680]  Out: 3550 [Urine:3550]    General:   Awake and alert   Lungs:   Clear to auscultation bilaterally. Decreased breath sounds at base. Chest wall:  No tenderness or deformity. Heart:  Regular rate and rhythm,    Abdomen:   Soft, non-tender. Bowel sounds active   Extremities: Extremities normal, Has wound dressing both lower extremities over the shin. Has difficult time ambulating, needing walker and human assistance. She is bending over as if she has kyphosis. Data Review:       Recent Days:  Recent Labs     23  0827 23  0757 23  1503   WBC 5.9 5.8 6.2   HGB 13.6 12.7 12.5   HCT 45.6 40.9 41.2    200 209       Recent Labs     23  0827 23  0757 23  1503   * 133* 137   K 5.4* 5.2* 5.1   CL 98 103 101   CO2 33* 26 35*   * 73 87   BUN 29* 26* 23*   CREA 1.85* 1.58* 1.59*   CA 8.4* 8.6 8.5   ALB 2.7* 2.4* 2.5*   TBILI 0.5 0.6 0.6   ALT 12 10* 12       No results for input(s): PH, PCO2, PO2, HCO3, FIO2 in the last 72 hours.     24 Hour Results:  Recent Results (from the past 24 hour(s))   GLUCOSE, POC    Collection Time: 23 11:09 AM   Result Value Ref Range    Glucose (POC) 128 (H) 65 - 100 mg/dL    Performed by [address], POC    Collection Time: 23  3:33 PM   Result Value Ref Range    Glucose (POC) 126 (H) 65 - 100 mg/dL    Performed by [email or web address], POC    Collection Time: 23  8:41 PM   Result Value Ref Range    Glucose (POC) 118 (H) 65 - 100 mg/dL Performed by Navi Ndiaye, POC    Collection Time: 04/19/23  7:58 AM   Result Value Ref Range    Glucose (POC) 110 (H) 65 - 100 mg/dL    Performed by Dorian Nguyen        XR CHEST PORT   Final Result   Stable cardiomegaly. Otherwise, no acute findings. Assessment:  Acute on chronic systolic heart failure EF 30%   -Clinically improved, appears euvolemic. Held diuretics today she is weak and hypotensive, will restart home medication Bumex tomorrow. Paroxysmal atrial fibrillation/flutter: On amiodarone and apixaban    Chronic kidney disease stage III/IV: Stable renal functions, will monitor closely. Secondary hypercoagulable state due to A-fib, on apixaban     Chronic respiratory failure, on 4 L nasal cannula which is continued    Obstructive sleep apnea, on CPAP : Continue to use at night    Essential hypertension : Hypotensive today, she is on metoprolol XL 25 mg and Entresto 97/103. If she continues to have issues with hypotension we may have to decrease the dose of the Entresto. Diabetes mellitus type 2 : Not on any medications, well-controlled blood sugars with diet    Coronary artery disease with history of PCI and CABG : Stable with no evidence of exacerbation    COPD without exacerbation : On Symbicort    Severe pulmonary hypertension : Chronic, stable. Discussion/MDM: Patient with multiple medical comorbidities, each with high likelihood for morbidity and mortality if left untreated. prior medical, surgical and relevant social and family histories were reviewed. I have discussed management plan with  with nursing staff. Plan:    SNF referral underway. Patient preference choice is not covered with her insurance. Plan is to discharge home with home health as per the patient daughter.     Code status: Full code    Social determinants of health: None known has good home support,     Disposition/barriers to discharge: Home with home health    Total time spent with patient: 30 minutes    Norma Bartholomew MD

## 2023-04-19 NOTE — PROGRESS NOTES
Progress Note      2023 2:17 PM  NAME: Terrell Robles   MRN:  [id number]   Admit Diagnosis: Shortness of breath [R06.02]  Acute exacerbation of CHF (congestive heart failure) ([facility] [I50.9]      Problem List:   Acute on chronic CHF  Dilated cardiomyopathy with EF 30-35%  Severe pulmonary hypertension with RVSP 72 mmHg  CKD  Paroxysmal atrial fibrillation  IFTIKHAR     Assessment/Plan:   Continue with high-dose diuretics p.o. Clinically, she appears to be euvolemic  Follow renal function  Guideline directed medical therapy for CHF  Add Farxiga         []       High complexity decision making was performed in this patient at high risk for decompensation with multiple organ involvement. Subjective:     Terrell Robles denies chest pain, dyspnea. Discussed with RN events overnight. Review of Systems:   Negative except for as noted above. Objective:      Physical Exam:    Last 24hrs VS reviewed since prior progress note. Most recent are:    Visit Vitals  BP (!) 107/50 (BP 1 Location: Right upper arm, BP Patient Position: Semi fowlers)   Pulse 68   Temp 98.2 °F (36.8 °C)   Resp 17   Ht 5' 3\" (1.6 m)   Wt 95 kg (209 lb 7 oz)   SpO2 95%   BMI 37.10 kg/m²       Intake/Output Summary (Last 24 hours) at 2023 1417  Last data filed at 2023 0422  Gross per 24 hour   Intake 680 ml   Output 2050 ml   Net -1370 ml        General Appearance: Alert; no acute distress. Ears/Nose/Mouth/Throat: moist mucous membranes  Neck: Supple. Chest: Lungs clear to auscultation bilaterally. Cardiovascular: Regular rate and rhythm, S1S2 normal  Abdomen: Soft, non-tender, bowel sounds are active. Extremities: No edema bilaterally. Skin: Warm and dry. PMH/SH reviewed - no change compared to H&P    Telemetry: NSR    EK/11/23    ECHO ADULT COMPLETE 2023    Interpretation Summary    Left Ventricle: Moderately reduced left ventricular systolic function with a visually estimated EF of 30 - 35%. Not well visualized. Left ventricle size is normal. Moderately increased wall thickness. Findings consistent with concentric hypertrophy. Moderate global hypokinesis present. Abnormal diastolic function. Right Ventricle: Right ventricle is mildly dilated. Normal wall thickness. Mitral Valve: Mildly thickened leaflet. Mild regurgitation. Tricuspid Valve: Moderate regurgitation. The estimated RVSP is 72 mmHg. Left Atrium: Left atrium is moderately dilated. Right Atrium: Right atrium is moderately dilated. Technical qualifiers: Echo study was technically difficult due to patient's body habitus. Signed by: Marcus Mcclure MD on 4/12/2023  1:18 PM      []  No new EKG for review    Lab Data Personally Reviewed:    Recent Labs     04/18/23 0827 04/17/23 0757   WBC 5.9 5.8   HGB 13.6 12.7   HCT 45.6 40.9    200     No results for input(s): INR, PTP, APTT, INREXT in the last 72 hours. Recent Labs     04/18/23 0827 04/17/23 0757 04/16/23  1503   * 133* 137   K 5.4* 5.2* 5.1   CL 98 103 101   CO2 33* 26 35*   BUN 29* 26* 23*   CREA 1.85* 1.58* 1.59*   * 73 87   CA 8.4* 8.6 8.5     No results for input(s): CPK, CKNDX, TROIQ in the last 72 hours. No lab exists for component: CPKMB  Lab Results   Component Value Date/Time    Cholesterol, total 116 04/13/2023 04:35 AM    HDL Cholesterol 53 04/13/2023 04:35 AM    LDL, calculated 50.8 04/13/2023 04:35 AM    Triglyceride 61 04/13/2023 04:35 AM    CHOL/HDL Ratio 2.2 04/13/2023 04:35 AM       Recent Labs     04/18/23 0827 04/17/23 0757 04/16/23  1503   * 155* 184*   TP 7.9 6.8 6.6   ALB 2.7* 2.4* 2.5*   GLOB 5.2* 4.4* 4.1*     No results for input(s): PH, PCO2, PO2 in the last 72 hours.     Medications Personally Reviewed:    Current Facility-Administered Medications   Medication Dose Route Frequency    [START ON 4/20/2023] bumetanide (BUMEX) tablet 1 mg  1 mg Oral BID    acetaminophen-codeine (TYLENOL #3) per tablet 1 Tablet 1 Tablet Oral Q8H PRN    cholestyramine-aspartame (QUESTRAN LIGHT) packet 4 g  4 g Oral 7am    [Held by provider] colchicine tablet 0.6 mg  0.6 mg Oral Q12H    [Held by provider] diphenhydrAMINE (BENADRYL) capsule 25 mg  25 mg Oral Q6H PRN    allopurinoL (ZYLOPRIM) tablet 100 mg  100 mg Oral DAILY    albuterol-ipratropium (DUO-NEB) 2.5 MG-0.5 MG/3 ML  3 mL Nebulization Q6H PRN    miconazole (MICOTIN) 2 % powder   Topical BID    albuterol (PROVENTIL HFA, VENTOLIN HFA, PROAIR HFA) inhaler 2 Puff  2 Puff Inhalation BID PRN    apixaban (ELIQUIS) tablet 5 mg  5 mg Oral BID    nitroglycerin (NITROSTAT) tablet 0.4 mg  0.4 mg SubLINGual PRN    atorvastatin (LIPITOR) tablet 40 mg  40 mg Oral QHS    metoprolol succinate (TOPROL-XL) XL tablet 25 mg  25 mg Oral DAILY    amiodarone (CORDARONE) tablet 200 mg  200 mg Oral DAILY    QUEtiapine (SEROquel) tablet 25 mg  25 mg Oral QHS    sacubitriL-valsartan (ENTRESTO)  mg tablet 1 Tablet  1 Tablet Oral BID    budesonide-formoteroL (SYMBICORT) 160-4.5 mcg/actuation HFA inhaler 2 Puff  2 Puff Inhalation BID RT    ondansetron (ZOFRAN) injection 4 mg  4 mg IntraVENous Q6H PRN    aspirin delayed-release tablet 81 mg  81 mg Oral DAILY    [Held by provider] spironolactone (ALDACTONE) tablet 25 mg  25 mg Oral DAILY         Tavon Quezada MD

## 2023-04-19 NOTE — PROGRESS NOTES
Patient DCP is home with family and oxygen at 4 LPM provided by [facility]. Patient set up with [facility]. [facility] declined and patient not interested in going to any other facility per daughter.

## 2023-04-19 NOTE — PROGRESS NOTES
OCCUPATIONAL THERAPY TREATMENT  Patient: Jyothi Underwood (38 y.o. female)  Date: 4/19/2023  Diagnosis: Shortness of breath [R06.02]  Acute exacerbation of CHF (congestive heart failure) ([facility]) [I50.9] Acute exacerbation of CHF (congestive heart failure) ([facility]      Precautions: FALL  In place during session: EKG/telemetry   Chart, occupational therapy assessment, plan of care, and goals were reviewed. ASSESSMENT  Pt continues with skilled OT services and is progressing towards goals. Pt received semi-supine in bed upon arrival, AXO x4 and agreeable to LUDWIG tx at this time. Pt cooperative and demonstrated good effort during activities. Pt tolerated therapy session fairly with c/o pain in stomach area. Pt very pleasant and follows commands well. Pt declined toileting or chair tf d/t stomach and diarrhea issues. Pt much improved with bed mobility<>EOB using bed rail with decreased assistance however continues to need physical assistance sit>supine with armin LE d/t fatigue and decreased strength. Pt unable to to gilberto armin LE socks d/t decreased reach, decreased flexibility and strength. Pt sat EOB for ~15 minutes while performing simple grooming, engaged in unilateral UE exercises to maintain/improve strength/ROM needed to perform ADl'S with increased repetition and vc's for proper technique, see grid below. Pt presented much improve dynamic sitting balance this visit. Pt completed STS and side stepping towards HOB using RW/gait belt with CGA for steadying with no LOB noted and 1 vc for correct hand placement with education as to why hand placement while using RW was important, pt verbalizing understanding. Overall, pt continues to present with deficits in generalized strength/AROM, dynamic standing balance and functional activity tolerance during performance of ADLs/mobility (see below for objective details and assist levels). Will continue to progress. Recommend d/c to SNF once medically appropriate.      Other factors to consider for discharge: Time of onset, medical prognosis/diagnosis, severity of deficits, PLOF, functional baseline, home environment, and family support          PLAN :  Patient continues to benefit from skilled intervention to address the above impairments. Continue treatment per established plan of care. to address goals. Recommend with staff: Out of bed to chair for meals, Encourage HEP in prep for ADLs/mobility, and Frequent repositioning to prevent skin breakdown    Recommend next session: Toileting    Recommendation for discharge: (in order for the patient to meet his/her long term goals)  SNF    This discharge recommendation:  Has been made in collaboration with the attending provider and/or case management       IF patient discharges home will need the following DME: RW       SUBJECTIVE:   Patient stated My stomach hurts.     OBJECTIVE DATA SUMMARY:   Cognitive/Behavioral Status:  Neurologic State: Alert  Orientation Level: Oriented X4  Cognition: Follows commands             Functional Mobility and Transfers for ADLs:  Bed Mobility:  Rolling: Modified independent  Supine to Sit: Modified independent  Sit to Supine: Moderate assistance (armin LE)  Scooting: Stand-by assistance    Transfers:  Sit to Stand: Contact guard assistance          Balance:  Sitting: Intact  Sitting - Static: Good (unsupported)  Sitting - Dynamic: Good (unsupported)  Standing: Impaired; With support  Standing - Static: Good;Constant support  Standing - Dynamic : Constant support; Fair    ADL Intervention:       Grooming  Grooming Assistance: Set-up  Position Performed: Seated edge of bed  Washing Hands: Set-up     Lower Body Dressing Assistance  Dressing Assistance: Total assistance(dependent)  Socks:  Total assistance (dependent)  Leg Crossed Method Used: No  Position Performed: Seated edge of bed  Cues: Verbal cues provided       Therapeutic Exercises:   Exercise Sets Reps AROM AAROM PROM Self PROM Comments   Shoulder horizontal abd/add 2 10 [x]  []  []  []  Seated EOB, yellow theraband with rest breaks   Shoulder ab/adduc 2 12 [x]  []  []  []     Elbow flex/ext 2 10 [x]  []  []  []           Pain:  8/10    Activity Tolerance:   Fair and requires rest breaks    After treatment patient left in no apparent distress:   Supine in bed, Call bell within reach, Bed / chair alarm activated, and Side rails x 3, bed locked and in lowest position    COMMUNICATION/COLLABORATION:   The patients plan of care was discussed with: Registered nurse. The supervising occupational therapist and treating occupational therapist assistant have met to review this patients progress and plan of care. VANI Lopez  Time Calculation: 32 mins     Problem: Self Care Deficits Care Plan (Adult)  Goal: *Acute Goals and Plan of Care (Insert Text)  Description:   FUNCTIONAL STATUS PRIOR TO ADMISSION: Patient was modified independent using a single point cane for functional mobility. Patient was mostly modified independent for basic ADLs taking rest breaks and slowing down activities as needed. Patient reports req'ing assistance with dressing recently d/t swelling in her BLE. Most recently pt has been sponge bathing at the sink d/t her shower chair not fitting in her tub and inability to lift BLE to get in. HOME SUPPORT: The patient lived with her daughter and required assistance for dressing and IADLs. Occupational Therapy Goals  Initiated 4/13/2023  Patient Goal: Go to rehab and be IND again. 1.  Patient will perform lower body dressing with modified independence within 7 day(s). 2.  Patient will perform grooming with modified independence within 7 day(s). 3.  Patient will perform bathing with modified independence within 7 day(s). 4.  Patient will perform toilet transfers with modified independence within 7 day(s). 5.  Patient will perform all aspects of toileting with modified independence within 7 day(s).   6.  Patient will participate in upper extremity therapeutic exercise/activities with independence within 7 day(s). 7.  Patient will utilize energy conservation techniques during functional activities with verbal cues within 7 day(s).   Outcome: Progressing Towards Goal

## 2023-04-19 NOTE — PROGRESS NOTES
Problem: Falls - Risk of  Goal: *Absence of Falls  Description: Document Lovely Sheikh Fall Risk and appropriate interventions in the flowsheet. Outcome: Progressing Towards Goal  Note: Fall Risk Interventions:                                Problem: Heart Failure: Day 1  Goal: Medications  Outcome: Progressing Towards Goal     Problem: Diabetes Self-Management  Goal: *Disease process and treatment process  Description: Define diabetes and identify own type of diabetes; list 3 options for treating diabetes. Outcome: Progressing Towards Goal     Problem: Diabetes Self-Management  Goal: *Using medications safely  Description: State effect of diabetes medications on diabetes; name diabetes medication taking, action and side effects. Outcome: Progressing Towards Goal     Problem: Pressure Injury - Risk of  Goal: *Prevention of pressure injury  Description: Document Martin Scale and appropriate interventions in the flowsheet.   Outcome: Progressing Towards Goal  Note: Pressure Injury Interventions:  Sensory Interventions: Assess changes in LOC, Keep linens dry and wrinkle-free    Moisture Interventions: Apply protective barrier, creams and emollients, Internal/External urinary devices, Minimize layers, Maintain skin hydration (lotion/cream)    Activity Interventions: Increase time out of bed, PT/OT evaluation    Mobility Interventions: Float heels, PT/OT evaluation    Nutrition Interventions: Document food/fluid/supplement intake, Offer support with meals,snacks and hydration    Friction and Shear Interventions: Apply protective barrier, creams and emollients, Minimize layers, Feet elevated on foot rest

## 2023-04-20 VITALS
HEIGHT: 63 IN | WEIGHT: 192.02 LBS | SYSTOLIC BLOOD PRESSURE: 129 MMHG | RESPIRATION RATE: 18 BRPM | TEMPERATURE: 98.4 F | OXYGEN SATURATION: 97 % | HEART RATE: 62 BPM | DIASTOLIC BLOOD PRESSURE: 72 MMHG | BODY MASS INDEX: 34.02 KG/M2

## 2023-04-20 LAB
ALBUMIN SERPL-MCNC: 2.7 G/DL (ref 3.5–5)
ALBUMIN/GLOB SERPL: 0.6 (ref 1.1–2.2)
ALP SERPL-CCNC: 168 U/L (ref 45–117)
ALT SERPL-CCNC: 12 U/L (ref 12–78)
ANION GAP SERPL CALC-SCNC: 1 MMOL/L (ref 5–15)
AST SERPL W P-5'-P-CCNC: 18 U/L (ref 15–37)
BILIRUB SERPL-MCNC: 0.5 MG/DL (ref 0.2–1)
BUN SERPL-MCNC: 41 MG/DL (ref 6–20)
BUN/CREAT SERPL: 21 (ref 12–20)
CA-I BLD-MCNC: 8.3 MG/DL (ref 8.5–10.1)
CHLORIDE SERPL-SCNC: 102 MMOL/L (ref 97–108)
CK SERPL-CCNC: 91 U/L (ref 26–192)
CO2 SERPL-SCNC: 31 MMOL/L (ref 21–32)
CREAT SERPL-MCNC: 1.98 MG/DL (ref 0.55–1.02)
CRP SERPL-MCNC: 1.42 MG/DL (ref 0–0.6)
ERYTHROCYTE [DISTWIDTH] IN BLOOD BY AUTOMATED COUNT: 15.4 % (ref 11.5–14.5)
ERYTHROCYTE [SEDIMENTATION RATE] IN BLOOD: 52 MM/HR (ref 0–30)
GLOBULIN SER CALC-MCNC: 4.9 G/DL (ref 2–4)
GLUCOSE BLD STRIP.AUTO-MCNC: 105 MG/DL (ref 65–100)
GLUCOSE BLD STRIP.AUTO-MCNC: 98 MG/DL (ref 65–100)
GLUCOSE SERPL-MCNC: 128 MG/DL (ref 65–100)
HCT VFR BLD AUTO: 45.6 % (ref 35–47)
HGB BLD-MCNC: 13.8 G/DL (ref 11.5–16)
MAGNESIUM SERPL-MCNC: 2.7 MG/DL (ref 1.6–2.4)
MCH RBC QN AUTO: 27.4 PG (ref 26–34)
MCHC RBC AUTO-ENTMCNC: 30.3 G/DL (ref 30–36.5)
MCV RBC AUTO: 90.7 FL (ref 80–99)
NRBC # BLD: 0 K/UL (ref 0–0.01)
NRBC BLD-RTO: 0 PER 100 WBC
PERFORMED BY, TECHID: ABNORMAL
PERFORMED BY, TECHID: NORMAL
PHOSPHATE SERPL-MCNC: 3.3 MG/DL (ref 2.6–4.7)
PLATELET # BLD AUTO: 229 K/UL (ref 150–400)
PMV BLD AUTO: 11.9 FL (ref 8.9–12.9)
POTASSIUM SERPL-SCNC: 5.2 MMOL/L (ref 3.5–5.1)
PROT SERPL-MCNC: 7.6 G/DL (ref 6.4–8.2)
RBC # BLD AUTO: 5.03 M/UL (ref 3.8–5.2)
SODIUM SERPL-SCNC: 134 MMOL/L (ref 136–145)
WBC # BLD AUTO: 4.9 K/UL (ref 3.6–11)

## 2023-04-20 PROCEDURE — 85027 COMPLETE CBC AUTOMATED: CPT

## 2023-04-20 PROCEDURE — 84100 ASSAY OF PHOSPHORUS: CPT

## 2023-04-20 PROCEDURE — 83735 ASSAY OF MAGNESIUM: CPT

## 2023-04-20 PROCEDURE — 77010033678 HC OXYGEN DAILY

## 2023-04-20 PROCEDURE — 85652 RBC SED RATE AUTOMATED: CPT

## 2023-04-20 PROCEDURE — 36415 COLL VENOUS BLD VENIPUNCTURE: CPT

## 2023-04-20 PROCEDURE — 74011250637 HC RX REV CODE- 250/637: Performed by: HOSPITALIST

## 2023-04-20 PROCEDURE — 82550 ASSAY OF CK (CPK): CPT

## 2023-04-20 PROCEDURE — 82962 GLUCOSE BLOOD TEST: CPT

## 2023-04-20 PROCEDURE — 86140 C-REACTIVE PROTEIN: CPT

## 2023-04-20 PROCEDURE — 74011250637 HC RX REV CODE- 250/637: Performed by: NURSE PRACTITIONER

## 2023-04-20 PROCEDURE — 74011250637 HC RX REV CODE- 250/637: Performed by: INTERNAL MEDICINE

## 2023-04-20 PROCEDURE — 80053 COMPREHEN METABOLIC PANEL: CPT

## 2023-04-20 PROCEDURE — 74011250636 HC RX REV CODE- 250/636: Performed by: HOSPITALIST

## 2023-04-20 PROCEDURE — 94640 AIRWAY INHALATION TREATMENT: CPT

## 2023-04-20 RX ORDER — ALLOPURINOL 100 MG/1
100 TABLET ORAL DAILY
Qty: 90 TABLET | Refills: 1 | Status: SHIPPED | OUTPATIENT
Start: 2023-04-21

## 2023-04-20 RX ORDER — ASPIRIN 81 MG/1
81 TABLET ORAL DAILY
Qty: 90 TABLET | Refills: 1 | Status: SHIPPED
Start: 2023-04-21

## 2023-04-20 RX ORDER — BUMETANIDE 1 MG/1
1 TABLET ORAL DAILY
Qty: 30 TABLET | Refills: 0 | Status: SHIPPED | OUTPATIENT
Start: 2023-04-20 | End: 2023-05-20

## 2023-04-20 RX ORDER — BUMETANIDE 1 MG/1
2 TABLET ORAL 2 TIMES DAILY
Status: DISCONTINUED | OUTPATIENT
Start: 2023-04-20 | End: 2023-04-20 | Stop reason: HOSPADM

## 2023-04-20 RX ADMIN — ASPIRIN 81 MG: 81 TABLET, COATED ORAL at 08:26

## 2023-04-20 RX ADMIN — ONDANSETRON 4 MG: 2 INJECTION INTRAMUSCULAR; INTRAVENOUS at 08:25

## 2023-04-20 RX ADMIN — ALLOPURINOL 100 MG: 100 TABLET ORAL at 08:25

## 2023-04-20 RX ADMIN — BUMETANIDE 1 MG: 1 TABLET ORAL at 08:26

## 2023-04-20 RX ADMIN — METOPROLOL SUCCINATE 25 MG: 25 TABLET, EXTENDED RELEASE ORAL at 08:25

## 2023-04-20 RX ADMIN — APIXABAN 5 MG: 5 TABLET, FILM COATED ORAL at 08:26

## 2023-04-20 RX ADMIN — MICONAZOLE NITRATE: 20 POWDER TOPICAL at 08:36

## 2023-04-20 RX ADMIN — BUMETANIDE 2 MG: 1 TABLET ORAL at 17:13

## 2023-04-20 RX ADMIN — AMIODARONE HYDROCHLORIDE 200 MG: 200 TABLET ORAL at 08:25

## 2023-04-20 RX ADMIN — BUDESONIDE AND FORMOTEROL FUMARATE DIHYDRATE 2 PUFF: 160; 4.5 AEROSOL RESPIRATORY (INHALATION) at 07:44

## 2023-04-20 RX ADMIN — SACUBITRIL AND VALSARTAN 1 TABLET: 97; 103 TABLET, FILM COATED ORAL at 08:25

## 2023-04-20 NOTE — DISCHARGE SUMMARY
Hospitalist Discharge Summary     Patient ID:    Linda Bermudez  [id number]  71 y.o.  1950    Admit date: 4/11/2023    Discharge date : 4/20/2023      Final Diagnoses:   Acute on chronic systolic heart failure with reduced ejection fraction and severe pulmonary hypertension  Paroxysmal atrial fibrillation/flutter  Chronic respiratory failure  Obstructive sleep apnea  Essential hypertension  Diabetes mellitus type 2  Coronary artery disease with history of PCI and CABG  COPD without exacerbation    Reason for Hospitalization  71-year-old female with history of multiple medical problems as mentioned above presents to the emergency room complaining of severe shortness of breath with significant volume overload and lower extremity edema. Admitted for management of heart failure. Hospital Course:     Admitted on April 11, initially started on IV diuretics, but did not get much better except intravascular volume depletion. So held and started on dobutamine on 4/14 with improvement, that was discontinued on 4/17/2023 and started back on oral diuretics. She continued to do well. In good spirits today, denies any significant complaints. She is having some generalized debility due to current hospitalization, recommended for skilled nursing facility but patient and family are not happy about it, they wanted to specific location as it is not available they wanted her to come home with home health, discussed with the patient and daughter by case management. She has history of heart failure with preserved ejection fraction but this admission she noted to have EF of 30% with severe pulmonary hypertension, tachycardia so medications are adjusted. She is on narcotic pain medications chronically, recommended to decrease the dose. As it can affect her generalized condition.     Chronic kidney disease stage III/IV: Increased BUN/creatinine compared to baseline, it seems its natural progression. On presentation the numbers may have been due to volume dilution. Patient clinically looking good, recommended to follow-up with nephrology as an outpatient    Obstructive sleep apnea syndrome on CPAP, recommended to use it on a regular basis without failure    Benign essential hypertension: Had episodes of hypotension, now better controlled including heart rate on Entresto and metoprolol XL which we will continue    Atrial fibrillation/flutter: On amiodarone and apixaban which will be continued    Chronic respiratory failure on home oxygen, uses CPAP at night. COPD but no exacerbation. Discharge Medications:   Current Discharge Medication List        START taking these medications    Details   allopurinoL (ZYLOPRIM) 100 mg tablet Take 1 Tablet by mouth daily. Qty: 90 Tablet, Refills: 1  Start date: 4/21/2023      aspirin delayed-release 81 mg tablet Take 1 Tablet by mouth daily. Qty: 90 Tablet, Refills: 1  Start date: 4/21/2023      empagliflozin (JARDIANCE) 10 mg tablet Take 1 Tablet by mouth daily. Qty: 90 Tablet, Refills: 1  Start date: 4/21/2023           CONTINUE these medications which have CHANGED    Details   bumetanide (BUMEX) 1 mg tablet Take 1 Tablet by mouth daily for 30 days. Qty: 30 Tablet, Refills: 0  Start date: 4/20/2023, End date: 5/20/2023           CONTINUE these medications which have NOT CHANGED    Details   acetaminophen-codeine (TYLENOL #4) 300-60 mg tab Take 1 Tablet by mouth every six (6) hours as needed for Severe Pain. Entresto  mg tablet Take 1 Tablet by mouth two (2) times a day. amiodarone (CORDARONE) 200 mg tablet Take 1 Tablet by mouth daily. Qty: 30 Tablet, Refills: 0      QUEtiapine (SEROqueL) 25 mg tablet Take 1 Tablet by mouth nightly. Qty: 30 Tablet, Refills: 0      atorvastatin (LIPITOR) 40 mg tablet Take 1 Tablet by mouth nightly. metoprolol succinate (TOPROL-XL) 25 mg XL tablet Take 1 Tablet by mouth daily.       apixaban (ELIQUIS) 5 mg tablet Take 1 Tab by mouth two (2) times a day. Qty: 30 Tab, Refills: 0      albuterol (PROVENTIL HFA, VENTOLIN HFA, PROAIR HFA) 90 mcg/actuation inhaler Take 2 Puffs by inhalation every four (4) hours as needed for Wheezing. STOP taking these medications       oxybutynin (DITROPAN) 5 mg tablet Comments:   Reason for Stopping:         HYDROcodone-chlorpheniramine (TUSSIONEX) 10-8 mg/5 mL suspension Comments:   Reason for Stopping:         albuterol-ipratropium (DUO-NEB) 2.5 mg-0.5 mg/3 ml nebu Comments:   Reason for Stopping:         mometasone-formoterol (Dulera) 200-5 mcg/actuation HFA inhaler Comments:   Reason for Stopping:         Entresto 24-26 mg tablet Comments:   Reason for Stopping:         nitroglycerin (NITROSTAT) 0.4 mg SL tablet Comments:   Reason for Stopping: Follow up Care:    1. Magno Servin MD in 1-2 weeks. Follow-up Information       Follow up With Specialties Details Why Contact Info    Magno Servin MD Internal Medicine Physician   [address] [place] [id number] [phone]                Patient Follow Up Instructions: Activity: Activity as tolerated  Diet:  Regular Diet  Wound Care: None needed     Condition at Discharge:  Stable  __________________________________________________________________    [place] [facility] [place]    Code Status:  Full Code  ___________________________________________________________________    Discharge Exam:  Patient seen and examined by me on discharge day. Pertinent Findings:  Gen:    Not in distress  Chest: Clear lungs  CVS:   Regular rhythm.   No edema  Abd:  Soft, not distended, not tender  Neuro:  Alert        CONSULTATIONS: Cardiology    Significant Diagnostic Studies:   Recent Results (from the past 24 hour(s))   GLUCOSE, POC    Collection Time: 04/19/23  3:57 PM   Result Value Ref Range    Glucose (POC) 127 (H) 65 - 100 mg/dL    Performed by Kathleen Teague, POC    Collection Time: 04/19/23  8:15 PM   Result Value Ref Range    Glucose (POC) 109 (H) 65 - 100 mg/dL    Performed by [address] [place] [address], POC    Collection Time: 04/20/23  7:49 AM   Result Value Ref Range    Glucose (POC) 98 65 - 100 mg/dL    Performed by [facility]    METABOLIC PANEL, COMPREHENSIVE    Collection Time: 04/20/23 10:55 AM   Result Value Ref Range    Sodium 134 (L) 136 - 145 mmol/L    Potassium 5.2 (H) 3.5 - 5.1 mmol/L    Chloride 102 97 - 108 mmol/L    CO2 31 21 - 32 mmol/L    Anion gap 1 (L) 5 - 15 mmol/L    Glucose 128 (H) 65 - 100 mg/dL    BUN 41 (H) 6 - 20 mg/dL    Creatinine 1.98 (H) 0.55 - 1.02 mg/dL    BUN/Creatinine ratio 21 (H) 12 - 20      eGFR 27 (L) >60 ml/min/1.73m2    Calcium 8.3 (L) 8.5 - 10.1 mg/dL    Bilirubin, total 0.5 0.2 - 1.0 mg/dL    AST (SGOT) 18 15 - 37 U/L    ALT (SGPT) 12 12 - 78 U/L    Alk.  phosphatase 168 (H) 45 - 117 U/L    Protein, total 7.6 6.4 - 8.2 g/dL    Albumin 2.7 (L) 3.5 - 5.0 g/dL    Globulin 4.9 (H) 2.0 - 4.0 g/dL    A-G Ratio 0.6 (L) 1.1 - 2.2     MAGNESIUM    Collection Time: 04/20/23 10:55 AM   Result Value Ref Range    Magnesium 2.7 (H) 1.6 - 2.4 mg/dL   PHOSPHORUS    Collection Time: 04/20/23 10:55 AM   Result Value Ref Range    Phosphorus 3.3 2.6 - 4.7 mg/dL   C REACTIVE PROTEIN, QT    Collection Time: 04/20/23 10:55 AM   Result Value Ref Range    C-Reactive protein 1.42 (H) 0.00 - 0.60 mg/dL   CBC W/O DIFF    Collection Time: 04/20/23 10:55 AM   Result Value Ref Range    WBC 4.9 3.6 - 11.0 K/uL    RBC 5.03 3.80 - 5.20 M/uL    HGB 13.8 11.5 - 16.0 g/dL    HCT 45.6 35.0 - 47.0 %    MCV 90.7 80.0 - 99.0 FL    MCH 27.4 26.0 - 34.0 PG    MCHC 30.3 30.0 - 36.5 g/dL    RDW 15.4 (H) 11.5 - 14.5 %    PLATELET 224 946 - 942 K/uL    MPV 11.9 8.9 - 12.9 FL    NRBC 0.0 0.0  WBC    ABSOLUTE NRBC 0.00 0.00 - 0.01 K/uL   SED RATE (ESR)    Collection Time: 04/20/23 10:55 AM   Result Value Ref Range Sed rate, automated 52 (H) 0 - 30 mm/hr   CK    Collection Time: 04/20/23 10:55 AM   Result Value Ref Range    CK 91 26 - 192 U/L   GLUCOSE, POC    Collection Time: 04/20/23 11:16 AM   Result Value Ref Range    Glucose (POC) 105 (H) 65 - 100 mg/dL    Performed by SARAI LADD CHEST PORT   Final Result   Stable cardiomegaly. Otherwise, no acute findings.               Time spent in direct and indirect care including coordination of services:  45  minutes    Signed:  Long Meneses MD  4/20/2023  12:26 PM  .

## 2023-04-20 NOTE — PROGRESS NOTES
Myself and Sheree Arechiga RN performed the weekly skin assessment. Patient has mild excoriation under both breasts and venous stasis ulcers on her bilateral lower extremities. Rest of skin is clean, dry, and intact.

## 2023-04-20 NOTE — PROGRESS NOTES
Progress Note      2023 2:17 PM  NAME: Scott Patel   MRN:  [id number]   Admit Diagnosis: Shortness of breath [R06.02]  Acute exacerbation of CHF (congestive heart failure) ([facility] [I50.9]      Problem List:   Acute on chronic CHF  Dilated cardiomyopathy with EF 30-35%  Severe pulmonary hypertension with RVSP 72 mmHg  CKD  Paroxysmal atrial fibrillation  IFTIKHAR     Assessment/Plan:   Continue Bumex p.o. Clinically, she appears to be euvolemic  Follow renal function  Guideline directed medical therapy for CHF  Add Farxiga  Stable for discharge from my viewpoint, cardiology team will follow as needed         []       High complexity decision making was performed in this patient at high risk for decompensation with multiple organ involvement. Subjective:     Scott Patel denies chest pain, dyspnea. Discussed with RN events overnight. Review of Systems:   Negative except for as noted above. Objective:      Physical Exam:    Last 24hrs VS reviewed since prior progress note. Most recent are:    Visit Vitals  /66 (BP 1 Location: Right upper arm, BP Patient Position: At rest)   Pulse 63   Temp 97.9 °F (36.6 °C)   Resp 20   Ht 5' 3\" (1.6 m)   Wt 87.1 kg (192 lb 0.3 oz)   SpO2 96%   BMI 34.01 kg/m²       Intake/Output Summary (Last 24 hours) at 2023 1106  Last data filed at 2023 2655  Gross per 24 hour   Intake 1400 ml   Output 1600 ml   Net -200 ml          General Appearance: Alert; no acute distress. Ears/Nose/Mouth/Throat: moist mucous membranes  Neck: Supple. Chest: Lungs clear to auscultation bilaterally. Cardiovascular: Regular rate and rhythm, S1S2 normal  Abdomen: Soft, non-tender, bowel sounds are active. Extremities: No edema bilaterally. Skin: Warm and dry. PMH/SH reviewed - no change compared to H&P    Telemetry: NSR    EK/11/23    ECHO ADULT COMPLETE 2023    Interpretation Summary    Left Ventricle:  Moderately reduced left ventricular systolic function with a visually estimated EF of 30 - 35%. Not well visualized. Left ventricle size is normal. Moderately increased wall thickness. Findings consistent with concentric hypertrophy. Moderate global hypokinesis present. Abnormal diastolic function. Right Ventricle: Right ventricle is mildly dilated. Normal wall thickness. Mitral Valve: Mildly thickened leaflet. Mild regurgitation. Tricuspid Valve: Moderate regurgitation. The estimated RVSP is 72 mmHg. Left Atrium: Left atrium is moderately dilated. Right Atrium: Right atrium is moderately dilated. Technical qualifiers: Echo study was technically difficult due to patient's body habitus. Signed by: Cristopher Freeman MD on 4/12/2023  1:18 PM      []  No new EKG for review    Lab Data Personally Reviewed:    Recent Labs     04/18/23 0827   WBC 5.9   HGB 13.6   HCT 45.6          No results for input(s): INR, PTP, APTT, INREXT, INREXT in the last 72 hours. Recent Labs     04/18/23 0827   *   K 5.4*   CL 98   CO2 33*   BUN 29*   CREA 1.85*   *   CA 8.4*       No results for input(s): CPK, CKNDX, TROIQ in the last 72 hours. No lab exists for component: CPKMB  Lab Results   Component Value Date/Time    Cholesterol, total 116 04/13/2023 04:35 AM    HDL Cholesterol 53 04/13/2023 04:35 AM    LDL, calculated 50.8 04/13/2023 04:35 AM    Triglyceride 61 04/13/2023 04:35 AM    CHOL/HDL Ratio 2.2 04/13/2023 04:35 AM       Recent Labs     04/18/23 0827   *   TP 7.9   ALB 2.7*   GLOB 5.2*       No results for input(s): PH, PCO2, PO2 in the last 72 hours.     Medications Personally Reviewed:    Current Facility-Administered Medications   Medication Dose Route Frequency    bumetanide (BUMEX) tablet 1 mg  1 mg Oral BID    acetaminophen-codeine (TYLENOL #3) per tablet 1 Tablet  1 Tablet Oral Q8H PRN    cholestyramine-aspartame (QUESTRAN LIGHT) packet 4 g  4 g Oral DAILY    [Held by provider] colchicine tablet 0.6 mg 0.6 mg Oral Q12H    [Held by provider] diphenhydrAMINE (BENADRYL) capsule 25 mg  25 mg Oral Q6H PRN    allopurinoL (ZYLOPRIM) tablet 100 mg  100 mg Oral DAILY    albuterol-ipratropium (DUO-NEB) 2.5 MG-0.5 MG/3 ML  3 mL Nebulization Q6H PRN    miconazole (MICOTIN) 2 % powder   Topical BID    albuterol (PROVENTIL HFA, VENTOLIN HFA, PROAIR HFA) inhaler 2 Puff  2 Puff Inhalation BID PRN    apixaban (ELIQUIS) tablet 5 mg  5 mg Oral BID    nitroglycerin (NITROSTAT) tablet 0.4 mg  0.4 mg SubLINGual PRN    atorvastatin (LIPITOR) tablet 40 mg  40 mg Oral QHS    metoprolol succinate (TOPROL-XL) XL tablet 25 mg  25 mg Oral DAILY    amiodarone (CORDARONE) tablet 200 mg  200 mg Oral DAILY    QUEtiapine (SEROquel) tablet 25 mg  25 mg Oral QHS    sacubitriL-valsartan (ENTRESTO)  mg tablet 1 Tablet  1 Tablet Oral BID    budesonide-formoteroL (SYMBICORT) 160-4.5 mcg/actuation HFA inhaler 2 Puff  2 Puff Inhalation BID RT    ondansetron (ZOFRAN) injection 4 mg  4 mg IntraVENous Q6H PRN    aspirin delayed-release tablet 81 mg  81 mg Oral DAILY    [Held by provider] spironolactone (ALDACTONE) tablet 25 mg  25 mg Oral DAILY         Nicole Juarez MD

## 2023-04-20 NOTE — PROGRESS NOTES
Spiritual Care Assessment/Progress Note  [facility]      NAME: Linda Bermudez      MRN: [id number]  AGE: 71 y.o.  SEX: female  Tenriism Affiliation: Jainism   Language: English     4/20/2023     Total Time (in minutes): 22     Spiritual Assessment begun in [address] through conversation with:         [x]Patient        [] Family    [] Friend(s)        Reason for Consult: Initial/Spiritual assessment, patient floor     Spiritual beliefs: (Please include comment if needed)     [x] Identifies with a luis tradition:         [x] Supported by a luis community:            [] Claims no spiritual orientation:           [] Seeking spiritual identity:                [] Adheres to an individual form of spirituality:           [] Not able to assess:                           Identified resources for coping:      [x] Prayer                               [] Music                  [] Guided Imagery     [x] Family/friends                 [] Pet visits     [] Devotional reading                         [] Unknown     [] Other:                                               Interventions offered during this visit: (See comments for more details)    Patient Interventions: Affirmation of emotions/emotional suffering, Affirmation of luis, Catharsis/review of pertinent events in supportive environment, Coping skills reviewed/reinforced, Guidance concerning next steps/process to be expected, Iconic (affirming the presence of God/Higher Power), Initial/Spiritual assessment, patient floor, Normalization of emotional/spiritual concerns, Prayer (assurance of), Tenriism beliefs/image of God discussed           Plan of Care:     [] Support spiritual and/or cultural needs    [] Support AMD and/or advance care planning process      [] Support grieving process   [] Coordinate Rites and/or Rituals    [] Coordination with community clergy   [] No spiritual needs identified at this time   [] Detailed Plan of Care below (See Comments)  [] Make referral to Music Therapy  [] Make referral to Pet Therapy     [] Make referral to Addiction services  [] Make referral to [facility]  [] Make referral to Spiritual Care Partner  [] No future visits requested        [x] Contact Spiritual Care for further referrals     Comments: The purpose of the visit was to do a spiritual assessment on the patient. The patient appeared to be resting in bed, however she welcomed the visit. The patient shared that she hopes to be discharged today, but she mentioned being worried if she totally ready to go home. She mentioned that she does have a daughter who takes care of her, and she does feel loved and supported. Yet she mentioned that she trust that she will receive the help needed to get her to a fair state of health and functioning. She shared that she does have relationship with God, and that she finds strength in prayer. She shared that though she does not attend [employer] regular, she still feels loved and comforted by Gods love. The  listened empathically, facilitated story-telling, explored painful feelings, provided the ministry of presence and the comfort of pastoral care. [address] [facility] D.Min, M.Div.  [address] can be reached by calling the  at [facility]  [phone]

## 2023-04-20 NOTE — PROGRESS NOTES
Patient will discharge home with family and oxygen at 4 LPM by [facility]. Advance care [facility] declined patient as they do not take her insurance. Permission obtained from Erazo, daughter, and referrals will be sent to other [facility] agencies. Patient accepted with [facility]. Anticipated start of care is 4/25/23. Discharge plan of care/case management plan validated with provider discharge order. Medicare pt has received, reviewed, and signed 2nd IM letter informing them of their right to appeal the discharge. Signed copied has been placed on pt bedside chart.

## 2023-04-20 NOTE — PROGRESS NOTES
Problem: Falls - Risk of  Goal: *Absence of Falls  Description: Document Katy Vaughan Fall Risk and appropriate interventions in the flowsheet.   Outcome: Progressing Towards Goal  Note: Fall Risk Interventions:                                Problem: Heart Failure: Day 1  Goal: Activity/Safety  Outcome: Progressing Towards Goal     Problem: Heart Failure: Day 1  Goal: Medications  Outcome: Progressing Towards Goal

## 2023-04-20 NOTE — PROGRESS NOTES
VSS. Patient given discharge instructions. Medications and follow-up appointments reviewed. IV and Telemetry remain. Case management, provider, and primary nurse aware of discharge. Discharge plan of care/case management plan validated with provider discharge order. Primary nurse is aware the IV and Tele are still on.  Patient to discharge at about 5pm.

## 2023-04-21 NOTE — PROGRESS NOTES
Physician Progress Note      PATIENT:               Winnie Sever  CSN #:                  [id number]  :                       1950  ADMIT DATE:       2023 7:44 PM  [address] DATE:        2023 8:11 PM  RESPONDING  PROVIDER #:        Toby Vieira MD [facility] MD          QUERY TEXT:    Pt admitted with CHF exacerbation. Noted documentation of Type II MI on 4/15 Cardiology progress note. If possible, please document in progress notes and discharge summary:      The medical record reflects the following:  Risk Factors: 71year old female, shortness of breath, lower extremity edema, CAD s/p PCI/CABG  Clinical Indicators: 4/15 Cardiology note - Mildly elevated troponin, likely type II MI, history of coronary artery disease, CABG, PCI. Denies chest pain. Will repeat troponin and EKG.   Cardiology note - Mildly elevated troponin, likely type II MI, history of coronary artery disease, CABG, PCI. Denies chest pain. Repeat troponin also similar. EKG without acute changes. Troponin: [phone]   EKG: Sinus rhythm with Premature atrial complexes  4/15 EKG: Sinus rhythm with Premature atrial complexes  T wave abnormality, consider lateral ischemia   EKG: Normal sinus rhythm  T wave abnormality, consider lateral ischemia   Echo - Left? Ventricle: Moderately reduced left ventricular systolic function with a visually estimated EF of 30 - 35%. Not well visualized. Left ventricle size is normal. Moderately increased wall thickness. Findings consistent with concentric hypertrophy. Moderate global hypokinesis present. Abnormal diastolic function.  -  Right? Ventricle: Right ventricle is mildly dilated. Normal wall thickness. -  Mitral?Valve: Mildly thickened leaflet. Mild regurgitation.  -  Tricuspid? Valve: Moderate regurgitation. The estimated RVSP is 72 mmHg. -  Left? Atrium: Left atrium is moderately dilated. -  Right? Atrium: Right atrium is moderately dilated.   -  Technical qualifiers: Echo study was technically difficult due to patient's body habitus. Treatment: Telemetry monitoring, BP management, A. Fib management      Please email [email or web address] with any questions  Options provided:  -- type II MI confirmed present on admission  -- type II MI confirmed not present on admission  -- type II MI ruled out  -- Other - I will add my own diagnosis  -- Disagree - Not applicable / Not valid  -- Disagree - Clinically unable to determine / Unknown  -- Refer to Clinical Documentation Reviewer    PROVIDER RESPONSE TEXT:    The diagnosis of type II MI was ruled out. Query created by:  Caterina Estrada on 4/18/2023 7:06 AM      Electronically signed by:  Chanda Castellano MD [facility] MD 4/21/2023 4:21 PM

## 2023-04-24 ENCOUNTER — TELEPHONE (OUTPATIENT)
Dept: CASE MANAGEMENT | Age: 73
End: 2023-04-24

## 2023-04-24 NOTE — TELEPHONE ENCOUNTER
Discharge follow-up phone call       Patient Discharged on: 04/20/2023    Patient Discharge with: Services [facility])    RN-NN introduces herself and informs the patient reason for calling. Patient verbalizes understanding. RN-NN calls the patient within 3 days of discharge. Patient confirms medications are affordable and has picked up medications at the pharmacy. Patient advised if any questions or concerns to notify RN or the doctors office.       Patient verbalizes understanding

## 2023-04-24 NOTE — PROGRESS NOTES
Physician Progress Note      PATIENT:               Serjio Beard  CSN #:                  [id number]  :                       1950  ADMIT DATE:       2023 7:44 PM  [address] [place] DATE:        2023 8:11 PM  RESPONDING  PROVIDER #:        Melody Arellano MD          QUERY TEXT:    Dear Attending,    Pt admitted with CHF. Pt noted to also have HTN, CAD, and cardiomyopathy. If possible, please document in progress notes and discharge summary the etiology of CHF, if able to be determined. The medical record reflects the following:  Risk Factors: 71year old female, shortness of breath, edema to lower extremities, HTN  Clinical Indicators:  H&P - presents complaining of  fluid overload with shortness of breath.  Attending PN - CHF exacerbation with reduced EF  -ECHO  shows EF 30-35%, last ECHO on 3/25/25 shows EF of 50-60%   Cardiology consult - HFrEF  CAD status post PCI/CABG   Cardiac PN - Dilated cardiomyopathy with EF 30-35%  Treatment: IV Bumex, IV Lasix, Amiodarone    Please email [email or web address] with any questions  Options provided:  -- CHF due to Hypertensive Heart Disease  -- CHF due to Hypertensive Heart Disease and CAD  -- CHF not due to Hypertension but due to CAD  -- CHF due to Hypertensive Heart Disease and ICMP  -- CHF not due to Hypertension but due to ICMP  -- CHF due to Hypertensive Heart Disease and Valvular Heart Disease  -- CHF not due to Hypertension but due to valvular heart disease  -- CHF due to HTN, CAD, ICMP and valvular disease  -- Other - I will add my own diagnosis  -- Disagree - Not applicable / Not valid  -- Disagree - Clinically unable to determine / Unknown  -- Refer to Clinical Documentation Reviewer    PROVIDER RESPONSE TEXT:    This patient has CHF not due to hypertensive heart disease, CHF is due to CAD. Query created by:  Mak Sanford on 2023 6:34 AM      Electronically signed by:  Pravin Ortiz MD 4/24/2023 7:18 PM

## 2023-05-12 PROBLEM — R79.89 ELEVATED TROPONIN: Status: RESOLVED | Noted: 2023-04-12 | Resolved: 2023-05-12

## 2023-05-12 PROBLEM — R77.8 ELEVATED TROPONIN: Status: RESOLVED | Noted: 2023-04-12 | Resolved: 2023-05-12

## 2023-05-25 RX ORDER — QUETIAPINE FUMARATE 25 MG/1
1 TABLET, FILM COATED ORAL NIGHTLY
Status: ON HOLD | COMMUNITY
Start: 2022-03-28 | End: 2023-07-10 | Stop reason: HOSPADM

## 2023-05-25 RX ORDER — ALBUTEROL SULFATE 90 UG/1
2 AEROSOL, METERED RESPIRATORY (INHALATION) EVERY 4 HOURS PRN
COMMUNITY

## 2023-05-25 RX ORDER — METOPROLOL SUCCINATE 25 MG/1
25 TABLET, EXTENDED RELEASE ORAL DAILY
Status: ON HOLD | COMMUNITY
Start: 2021-11-17 | End: 2023-07-10 | Stop reason: HOSPADM

## 2023-05-25 RX ORDER — ALLOPURINOL 100 MG/1
100 TABLET ORAL DAILY
COMMUNITY
Start: 2023-04-21

## 2023-05-25 RX ORDER — ACETAMINOPHEN AND CODEINE PHOSPHATE 300; 60 MG/1; MG/1
1 TABLET ORAL EVERY 6 HOURS PRN
COMMUNITY
Start: 2023-04-01 | End: 2023-05-29 | Stop reason: CLARIF

## 2023-05-25 RX ORDER — AMIODARONE HYDROCHLORIDE 200 MG/1
200 TABLET ORAL DAILY
COMMUNITY
Start: 2022-03-29

## 2023-05-25 RX ORDER — ATORVASTATIN CALCIUM 40 MG/1
1 TABLET, FILM COATED ORAL NIGHTLY
COMMUNITY
Start: 2021-11-13

## 2023-05-25 RX ORDER — ASPIRIN 81 MG/1
81 TABLET ORAL DAILY
COMMUNITY
Start: 2023-04-21

## 2023-05-29 ENCOUNTER — HOSPITAL ENCOUNTER (INPATIENT)
Facility: HOSPITAL | Age: 73
LOS: 4 days | Discharge: HOME HEALTH | End: 2023-06-02
Attending: EMERGENCY MEDICINE | Admitting: INTERNAL MEDICINE
Payer: MEDICARE

## 2023-05-29 ENCOUNTER — APPOINTMENT (OUTPATIENT)
Facility: HOSPITAL | Age: 73
End: 2023-05-29
Payer: MEDICARE

## 2023-05-29 DIAGNOSIS — I50.9 ACUTE ON CHRONIC CONGESTIVE HEART FAILURE, UNSPECIFIED HEART FAILURE TYPE (HCC): ICD-10-CM

## 2023-05-29 DIAGNOSIS — R09.02 HYPOXIA: Primary | ICD-10-CM

## 2023-05-29 DIAGNOSIS — R77.8 ELEVATED TROPONIN: ICD-10-CM

## 2023-05-29 PROBLEM — I50.43 CHF (CONGESTIVE HEART FAILURE), NYHA CLASS I, ACUTE ON CHRONIC, COMBINED (HCC): Status: ACTIVE | Noted: 2023-05-29

## 2023-05-29 LAB
ALBUMIN SERPL-MCNC: 2.9 G/DL (ref 3.5–5)
ALBUMIN/GLOB SERPL: 0.8 (ref 1.1–2.2)
ALP SERPL-CCNC: 179 U/L (ref 45–117)
ALT SERPL-CCNC: 25 U/L (ref 12–78)
ANION GAP SERPL CALC-SCNC: 4 MMOL/L (ref 5–15)
AST SERPL W P-5'-P-CCNC: ABNORMAL U/L (ref 15–37)
BASOPHILS # BLD: 0 K/UL (ref 0–0.1)
BASOPHILS NFR BLD: 1 % (ref 0–1)
BILIRUB SERPL-MCNC: 1 MG/DL (ref 0.2–1)
BNP SERPL-MCNC: 7123 PG/ML
BUN SERPL-MCNC: 27 MG/DL (ref 6–20)
BUN/CREAT SERPL: 17 (ref 12–20)
CA-I BLD-MCNC: 8.3 MG/DL (ref 8.5–10.1)
CHLORIDE SERPL-SCNC: 112 MMOL/L (ref 97–108)
CO2 SERPL-SCNC: 27 MMOL/L (ref 21–32)
CREAT SERPL-MCNC: 1.56 MG/DL (ref 0.55–1.02)
DIFFERENTIAL METHOD BLD: ABNORMAL
EOSINOPHIL # BLD: 0.1 K/UL (ref 0–0.4)
EOSINOPHIL NFR BLD: 1 % (ref 0–7)
ERYTHROCYTE [DISTWIDTH] IN BLOOD BY AUTOMATED COUNT: 16.5 % (ref 11.5–14.5)
GLOBULIN SER CALC-MCNC: 3.7 G/DL (ref 2–4)
GLUCOSE BLD STRIP.AUTO-MCNC: 130 MG/DL (ref 65–100)
GLUCOSE BLD STRIP.AUTO-MCNC: 74 MG/DL (ref 65–100)
GLUCOSE SERPL-MCNC: 124 MG/DL (ref 65–100)
HCT VFR BLD AUTO: 39.3 % (ref 35–47)
HGB BLD-MCNC: 11.9 G/DL (ref 11.5–16)
IMM GRANULOCYTES # BLD AUTO: 0 K/UL (ref 0–0.04)
IMM GRANULOCYTES NFR BLD AUTO: 1 % (ref 0–0.5)
LYMPHOCYTES # BLD: 1.2 K/UL (ref 0.8–3.5)
LYMPHOCYTES NFR BLD: 19 % (ref 12–49)
MCH RBC QN AUTO: 27.6 PG (ref 26–34)
MCHC RBC AUTO-ENTMCNC: 30.3 G/DL (ref 30–36.5)
MCV RBC AUTO: 91.2 FL (ref 80–99)
MONOCYTES # BLD: 0.4 K/UL (ref 0–1)
MONOCYTES NFR BLD: 7 % (ref 5–13)
NEUTS SEG # BLD: 4.5 K/UL (ref 1.8–8)
NEUTS SEG NFR BLD: 71 % (ref 32–75)
NRBC # BLD: 0 K/UL (ref 0–0.01)
NRBC BLD-RTO: 0 PER 100 WBC
PERFORMED BY:: ABNORMAL
PERFORMED BY:: NORMAL
PLATELET # BLD AUTO: 193 K/UL (ref 150–400)
POTASSIUM SERPL-SCNC: ABNORMAL MMOL/L (ref 3.5–5.1)
PROT SERPL-MCNC: 6.6 G/DL (ref 6.4–8.2)
RBC # BLD AUTO: 4.31 M/UL (ref 3.8–5.2)
SODIUM SERPL-SCNC: 143 MMOL/L (ref 136–145)
TROPONIN I SERPL HS-MCNC: 107 NG/L (ref 0–51)
WBC # BLD AUTO: 6.3 K/UL (ref 3.6–11)

## 2023-05-29 PROCEDURE — 83880 ASSAY OF NATRIURETIC PEPTIDE: CPT

## 2023-05-29 PROCEDURE — 85025 COMPLETE CBC W/AUTO DIFF WBC: CPT

## 2023-05-29 PROCEDURE — 6370000000 HC RX 637 (ALT 250 FOR IP): Performed by: INTERNAL MEDICINE

## 2023-05-29 PROCEDURE — 6360000002 HC RX W HCPCS: Performed by: INTERNAL MEDICINE

## 2023-05-29 PROCEDURE — 6360000002 HC RX W HCPCS: Performed by: EMERGENCY MEDICINE

## 2023-05-29 PROCEDURE — 2580000003 HC RX 258: Performed by: INTERNAL MEDICINE

## 2023-05-29 PROCEDURE — 80053 COMPREHEN METABOLIC PANEL: CPT

## 2023-05-29 PROCEDURE — 82962 GLUCOSE BLOOD TEST: CPT

## 2023-05-29 PROCEDURE — 2060000000 HC ICU INTERMEDIATE R&B

## 2023-05-29 PROCEDURE — 99285 EMERGENCY DEPT VISIT HI MDM: CPT

## 2023-05-29 PROCEDURE — 36415 COLL VENOUS BLD VENIPUNCTURE: CPT

## 2023-05-29 PROCEDURE — 84484 ASSAY OF TROPONIN QUANT: CPT

## 2023-05-29 PROCEDURE — 94640 AIRWAY INHALATION TREATMENT: CPT

## 2023-05-29 PROCEDURE — 93005 ELECTROCARDIOGRAM TRACING: CPT | Performed by: INTERNAL MEDICINE

## 2023-05-29 PROCEDURE — 71045 X-RAY EXAM CHEST 1 VIEW: CPT

## 2023-05-29 PROCEDURE — 93005 ELECTROCARDIOGRAM TRACING: CPT | Performed by: EMERGENCY MEDICINE

## 2023-05-29 RX ORDER — ALBUTEROL SULFATE 90 UG/1
2 AEROSOL, METERED RESPIRATORY (INHALATION) EVERY 4 HOURS PRN
Status: DISCONTINUED | OUTPATIENT
Start: 2023-05-29 | End: 2023-06-02 | Stop reason: HOSPADM

## 2023-05-29 RX ORDER — FUROSEMIDE 10 MG/ML
40 INJECTION INTRAMUSCULAR; INTRAVENOUS
Status: COMPLETED | OUTPATIENT
Start: 2023-05-29 | End: 2023-05-29

## 2023-05-29 RX ORDER — FUROSEMIDE 10 MG/ML
40 INJECTION INTRAMUSCULAR; INTRAVENOUS 2 TIMES DAILY
Status: DISCONTINUED | OUTPATIENT
Start: 2023-05-29 | End: 2023-05-29

## 2023-05-29 RX ORDER — THEOPHYLLINE 300 MG/1
300 TABLET, EXTENDED RELEASE ORAL DAILY
Status: DISCONTINUED | OUTPATIENT
Start: 2023-05-29 | End: 2023-06-02 | Stop reason: HOSPADM

## 2023-05-29 RX ORDER — ONDANSETRON 4 MG/1
4 TABLET, ORALLY DISINTEGRATING ORAL EVERY 8 HOURS PRN
Status: DISCONTINUED | OUTPATIENT
Start: 2023-05-29 | End: 2023-06-02 | Stop reason: HOSPADM

## 2023-05-29 RX ORDER — INSULIN LISPRO 100 [IU]/ML
0-4 INJECTION, SOLUTION INTRAVENOUS; SUBCUTANEOUS NIGHTLY
Status: DISCONTINUED | OUTPATIENT
Start: 2023-05-29 | End: 2023-06-02 | Stop reason: HOSPADM

## 2023-05-29 RX ORDER — POLYETHYLENE GLYCOL 3350 17 G/17G
17 POWDER, FOR SOLUTION ORAL DAILY PRN
Status: DISCONTINUED | OUTPATIENT
Start: 2023-05-29 | End: 2023-06-02 | Stop reason: HOSPADM

## 2023-05-29 RX ORDER — SODIUM CHLORIDE 0.9 % (FLUSH) 0.9 %
5-40 SYRINGE (ML) INJECTION EVERY 12 HOURS SCHEDULED
Status: DISCONTINUED | OUTPATIENT
Start: 2023-05-29 | End: 2023-06-02 | Stop reason: HOSPADM

## 2023-05-29 RX ORDER — ONDANSETRON 2 MG/ML
4 INJECTION INTRAMUSCULAR; INTRAVENOUS EVERY 6 HOURS PRN
Status: DISCONTINUED | OUTPATIENT
Start: 2023-05-29 | End: 2023-06-02 | Stop reason: HOSPADM

## 2023-05-29 RX ORDER — QUETIAPINE FUMARATE 25 MG/1
25 TABLET, FILM COATED ORAL NIGHTLY
Status: DISCONTINUED | OUTPATIENT
Start: 2023-05-29 | End: 2023-06-02 | Stop reason: HOSPADM

## 2023-05-29 RX ORDER — AMIODARONE HYDROCHLORIDE 200 MG/1
200 TABLET ORAL DAILY
Status: DISCONTINUED | OUTPATIENT
Start: 2023-05-29 | End: 2023-06-02 | Stop reason: HOSPADM

## 2023-05-29 RX ORDER — ATORVASTATIN CALCIUM 40 MG/1
40 TABLET, FILM COATED ORAL NIGHTLY
Status: DISCONTINUED | OUTPATIENT
Start: 2023-05-29 | End: 2023-06-02 | Stop reason: HOSPADM

## 2023-05-29 RX ORDER — BUDESONIDE AND FORMOTEROL FUMARATE DIHYDRATE 160; 4.5 UG/1; UG/1
2 AEROSOL RESPIRATORY (INHALATION) 2 TIMES DAILY
Status: DISCONTINUED | OUTPATIENT
Start: 2023-05-29 | End: 2023-06-02 | Stop reason: HOSPADM

## 2023-05-29 RX ORDER — DEXTROSE MONOHYDRATE 100 MG/ML
INJECTION, SOLUTION INTRAVENOUS CONTINUOUS PRN
Status: DISCONTINUED | OUTPATIENT
Start: 2023-05-29 | End: 2023-06-02 | Stop reason: HOSPADM

## 2023-05-29 RX ORDER — FUROSEMIDE 40 MG/1
40 TABLET ORAL 2 TIMES DAILY
Status: DISCONTINUED | OUTPATIENT
Start: 2023-05-29 | End: 2023-05-29

## 2023-05-29 RX ORDER — SODIUM CHLORIDE 9 MG/ML
INJECTION, SOLUTION INTRAVENOUS PRN
Status: DISCONTINUED | OUTPATIENT
Start: 2023-05-29 | End: 2023-06-02 | Stop reason: HOSPADM

## 2023-05-29 RX ORDER — OXYBUTYNIN CHLORIDE 5 MG/1
5 TABLET ORAL 3 TIMES DAILY
Status: DISCONTINUED | OUTPATIENT
Start: 2023-05-29 | End: 2023-06-02 | Stop reason: HOSPADM

## 2023-05-29 RX ORDER — OXYBUTYNIN CHLORIDE 5 MG/1
5 TABLET ORAL 3 TIMES DAILY
COMMUNITY

## 2023-05-29 RX ORDER — ASPIRIN 81 MG/1
81 TABLET ORAL DAILY
Status: DISCONTINUED | OUTPATIENT
Start: 2023-05-30 | End: 2023-06-02 | Stop reason: HOSPADM

## 2023-05-29 RX ORDER — METOLAZONE 5 MG/1
5 TABLET ORAL DAILY
Qty: 30 TABLET | Refills: 11 | COMMUNITY
Start: 2022-11-01 | End: 2023-11-01

## 2023-05-29 RX ORDER — ALLOPURINOL 100 MG/1
100 TABLET ORAL DAILY
Status: DISCONTINUED | OUTPATIENT
Start: 2023-05-29 | End: 2023-06-02 | Stop reason: HOSPADM

## 2023-05-29 RX ORDER — INSULIN LISPRO 100 [IU]/ML
0-8 INJECTION, SOLUTION INTRAVENOUS; SUBCUTANEOUS
Status: DISCONTINUED | OUTPATIENT
Start: 2023-05-29 | End: 2023-06-02 | Stop reason: HOSPADM

## 2023-05-29 RX ORDER — METOPROLOL SUCCINATE 25 MG/1
25 TABLET, EXTENDED RELEASE ORAL DAILY
Status: DISCONTINUED | OUTPATIENT
Start: 2023-05-30 | End: 2023-06-02 | Stop reason: HOSPADM

## 2023-05-29 RX ORDER — SODIUM CHLORIDE 0.9 % (FLUSH) 0.9 %
5-40 SYRINGE (ML) INJECTION PRN
Status: DISCONTINUED | OUTPATIENT
Start: 2023-05-29 | End: 2023-06-02 | Stop reason: HOSPADM

## 2023-05-29 RX ORDER — METOLAZONE 5 MG/1
5 TABLET ORAL DAILY
Status: DISCONTINUED | OUTPATIENT
Start: 2023-05-30 | End: 2023-06-02 | Stop reason: HOSPADM

## 2023-05-29 RX ORDER — FUROSEMIDE 10 MG/ML
40 INJECTION INTRAMUSCULAR; INTRAVENOUS 2 TIMES DAILY
Status: DISCONTINUED | OUTPATIENT
Start: 2023-05-29 | End: 2023-05-31

## 2023-05-29 RX ADMIN — BUDESONIDE AND FORMOTEROL FUMARATE DIHYDRATE 2 PUFF: 160; 4.5 AEROSOL RESPIRATORY (INHALATION) at 19:41

## 2023-05-29 RX ADMIN — SACUBITRIL AND VALSARTAN 1 TABLET: 97; 103 TABLET, FILM COATED ORAL at 20:54

## 2023-05-29 RX ADMIN — ATORVASTATIN CALCIUM 40 MG: 40 TABLET, FILM COATED ORAL at 20:54

## 2023-05-29 RX ADMIN — FUROSEMIDE 40 MG: 10 INJECTION, SOLUTION INTRAMUSCULAR; INTRAVENOUS at 14:50

## 2023-05-29 RX ADMIN — FUROSEMIDE 40 MG: 10 INJECTION, SOLUTION INTRAMUSCULAR; INTRAVENOUS at 20:53

## 2023-05-29 RX ADMIN — QUETIAPINE FUMARATE 25 MG: 25 TABLET ORAL at 20:54

## 2023-05-29 RX ADMIN — ALLOPURINOL 100 MG: 100 TABLET ORAL at 14:47

## 2023-05-29 RX ADMIN — OXYBUTYNIN CHLORIDE 5 MG: 5 TABLET ORAL at 20:53

## 2023-05-29 RX ADMIN — AMIODARONE HYDROCHLORIDE 200 MG: 200 TABLET ORAL at 14:47

## 2023-05-29 RX ADMIN — APIXABAN 5 MG: 5 TABLET, FILM COATED ORAL at 20:53

## 2023-05-29 RX ADMIN — SODIUM CHLORIDE, PRESERVATIVE FREE 10 ML: 5 INJECTION INTRAVENOUS at 20:54

## 2023-05-29 ASSESSMENT — LIFESTYLE VARIABLES
HOW MANY STANDARD DRINKS CONTAINING ALCOHOL DO YOU HAVE ON A TYPICAL DAY: PATIENT DOES NOT DRINK
HOW OFTEN DO YOU HAVE A DRINK CONTAINING ALCOHOL: NEVER

## 2023-05-29 NOTE — ED PROVIDER NOTES
[facility] EMERGENCY DEPT  EMERGENCY DEPARTMENT HISTORY AND PHYSICAL EXAM      Date: 5/29/2023  Patient Name: Skylar Uribe  MRN: [id number]  [place] 1950  Date of evaluation: 5/29/2023  Provider: Emil Maloney MD   Note Started: 1:15 PM EDT 5/29/23    HISTORY OF PRESENT ILLNESS     Chief Complaint   Patient presents with    Shortness of Breath       History Provided By: Patient    HPI: Skylar Uribe is a 71 y.o. female past medical history of COPD, CHF, CAD, diabetes, hypertension, hyperlipidemia, MICHELLE presents for evaluation of shortness of breath. Patient states that for the last few days she has noticed increasing swelling in her lower extremities as well as shortness of breath. Compliant with meds at home. No cough or fever. Patient states her shortness of breath is worse with exertion. PAST MEDICAL HISTORY   Past Medical History:  Past Medical History:   Diagnosis Date    Chronic obstructive pulmonary disease ([address]     Congestive heart disease ([address]     with preserved ejection fraction    Coronary artery disease     Diabetes (HCC)     Hyperlipidemia     Hypertension     Myocardial infarct (HCC)     MICHELLE (obstructive sleep apnea)        Past Surgical History:  Past Surgical History:   Procedure Laterality Date    BREAST BIOPSY      CHOLECYSTECTOMY      CORONARY ARTERY BYPASS GRAFT      HERNIA REPAIR      HYSTERECTOMY (CERVIX STATUS UNKNOWN)         Family History:  Family History   Problem Relation Age of Onset    Cancer Maternal Grandmother     Heart Disease Mother     Kidney Disease Mother        Social History:  Social History     Tobacco Use    Smoking status: Former    Smokeless tobacco: Never   Substance Use Topics    Alcohol use: Not Currently    Drug use: Never       Allergies:   Allergies   Allergen Reactions    Oxycodone-Acetaminophen Hives    Penicillins Other (See Comments)       PCP: Clarence Martinez MD    Current Meds:   Current Facility-Administered Medications   Medication Dose Route

## 2023-05-29 NOTE — ED TRIAGE NOTES
Patient presents to the Ed with increasing SOB and fluid build up. Patient starting having symptoms yesterday. Patient wears 4L NC at home, bumped herself up to 5L to help with no relief.

## 2023-05-29 NOTE — H&P
History and Physical    Patient: Camilo Montoya MRN: [id number]  SSN: [id number]    YOB: 1950  Age: 71 y.o. Sex: female      Subjective:      Camilo Montoya is a 71 y.o. female with HFrEF, COPD, MICHELLE, CAD, on chronic home oxygen 4LNC. 4/11/23 admitted to [facility], discharged 4/20/23, from discharge summary:  71-year-old female with history of multiple medical problems as mentioned above presents to the emergency room complaining of severe shortness of breath with significant volume overload and lower extremity  edema. Admitted for management of heart failure. Admitted on April 11, initially started on IV diuretics, but did not get much better except intravascular volume depletion. So held and started on dobutamine on 4/14 with improvement, that was discontinued on 4/17/2023 and started back on oral diuretics. She continued to do well. She has history of heart failure with preserved ejection fraction but this admission she noted to have EF of 30% with severe pulmonary hypertension, tachycardia so medications are adjusted. Chronic kidney disease stage III/IV: Increased BUN/creatinine compared to baseline, it seems its natural progression. On presentation the numbers may have been due to volume dilution. Patient clinically looking good, recommended to follow-up with nephrology  as an outpatient   Obstructive sleep apnea syndrome on CPAP, recommended to use it on a regular basis without failure    Benign essential hypertension: Had episodes of hypotension, now better controlled including heart rate on Entresto and metoprolol XL which we will continue    Atrial fibrillation/flutter: On amiodarone and apixaban which will be continued    Chronic respiratory failure on home oxygen, uses CPAP at night. COPD but no exacerbation. 4/12/23 echocardiogram    Left Ventricle:  Moderately reduced left ventricular systolic

## 2023-05-29 NOTE — CONSULTS
Cardiology Consult    NAME: Ziyad Mar   :  1950   MRN:  [id number]     Date/Time:  2023 4:58 PM    Patient PCP: Anjali Belcher MD  ________________________________________________________________________     Assessment/Plan:   Decompensated CHF, known heart failure with reduced ejection fraction. Diuresing well. Patient denies noncompliance with medications or diet.  echo with ejection fraction of 30 to 35%. Monitor electrolytes closely. Coronary artery disease,  cardiac catheterization without progression of coronary artery disease, patent coronaries and grafts. Mild troponin leak, likely type II MI, will repeat EKG and troponin. Today's EKG without significant acute changes. Hypertension, elevated blood pressure, will monitor. May need to increase metoprolol if blood pressure stays high. Obesity, will need to lose weight. History of atrial flutter, status post cardioversion, in sinus rhythm, anticoagulated with Eliquis, continues amiodarone. Will check TSH and liver functions. Chest x-ray on this admission without acute cardiopulmonary process. []        High complexity decision making was performed        Subjective:   CHIEF COMPLAINT:     Increasing leg edema and shortness of breath  REASON FOR CONSULT:  Heart failure with reduced ejection fraction    HISTORY OF PRESENT ILLNESS:     Ziyad Mar is a 71 y.o. Black / [place] American female who presents with complaints of increasing shortness of breath and leg edema. Patient says she has been compliant with medications and diet. Says she eats her food her daughter cooks and has not had any salt excess. Does not eat out or take out food. Weight has been increasing as well as with increasing leg edema. Zaroxolyn was added this did not help. Usually heart failure with preserved LV function, but at last admission left ventricular function was seen to deteriorate.   Known coronary artery disease, last

## 2023-05-29 NOTE — CONSULTS
Consult  Pulmonary, Critical Care    Name: Flavia Mcdonald MRN: [id number]   : 1950 Hospital: [facility]   Date: 2023  Admission date: 2023 Hospital Day: 1       Subjective/Interval History:   Patient seen in the emergency room complaining of shortness of breath currently no distress no accessory muscle use. History is severe cardiomyopathy ejection fraction 30% with chronic pulmonary edema. Initially at home she had good diuresis with her twice daily Lasix and once a day Zaroxolyn but over the last week or more she has had less urine output worsening edema and shortness of breath. Some cough nonproductive no fever chills or sweats    Patient Active Problem List   Diagnosis    Atrial flutter with rapid ventricular response (HCC)    Heart failure ([facility])    COPD exacerbation ([facility])    COPD (chronic obstructive pulmonary disease) ([facility])    Atrial flutter ([facility])    CHF exacerbation ([facility])    UTI (urinary tract infection)    CHF (congestive heart failure) ([facility])    Shortness of breath    Acute exacerbation of CHF (congestive heart failure) ([facility])    CHF (congestive heart failure), NYHA class I, acute on chronic, combined ([facility]        IMPRESSION:   Acute hypoxic respiratory failure now on 5 L nasal oxygen  Chronic hypoxic respiratory failure normally on 4 L nasal oxygen  Acute pulmonary edema  Severe cardiomyopathy  Underlying COPD  Pulmonary hypertension  Diastolic left ventricular dysfunction  Edema  Body mass index is 38.79 kg/m². RECOMMENDATIONS/PLAN:   Continue Lasix IV for the next 48 hours  Continue her home DuoNeb we will substitute Symbicort for Dulera  Continue home metolazone  Taper oxygen as she improved          [x] High complexity decision making was performed  [x] See my orders for details      Subjective/Initial History:     I was asked by Nakia Lela MD to see Flavia Mcdonald  a 71 y.o.    female in consultation for a chief complaint of acute hypoxic

## 2023-05-30 LAB
ALBUMIN SERPL-MCNC: 2.8 G/DL (ref 3.5–5)
ALBUMIN/GLOB SERPL: 0.8 (ref 1.1–2.2)
ALP SERPL-CCNC: 159 U/L (ref 45–117)
ALT SERPL-CCNC: 20 U/L (ref 12–78)
ANION GAP SERPL CALC-SCNC: 4 MMOL/L (ref 5–15)
AST SERPL W P-5'-P-CCNC: 15 U/L (ref 15–37)
BASOPHILS # BLD: 0 K/UL (ref 0–0.1)
BASOPHILS NFR BLD: 1 % (ref 0–1)
BILIRUB SERPL-MCNC: 1 MG/DL (ref 0.2–1)
BNP SERPL-MCNC: 6283 PG/ML
BUN SERPL-MCNC: 25 MG/DL (ref 6–20)
BUN/CREAT SERPL: 17 (ref 12–20)
CA-I BLD-MCNC: 8.5 MG/DL (ref 8.5–10.1)
CHLORIDE SERPL-SCNC: 110 MMOL/L (ref 97–108)
CHOLEST SERPL-MCNC: 143 MG/DL
CO2 SERPL-SCNC: 30 MMOL/L (ref 21–32)
CREAT SERPL-MCNC: 1.5 MG/DL (ref 0.55–1.02)
CRP SERPL-MCNC: 1.67 MG/DL (ref 0–0.6)
DIFFERENTIAL METHOD BLD: ABNORMAL
EKG ATRIAL RATE: 70 BPM
EKG DIAGNOSIS: NORMAL
EKG P AXIS: 61 DEGREES
EKG P-R INTERVAL: 172 MS
EKG Q-T INTERVAL: 426 MS
EKG QRS DURATION: 82 MS
EKG QTC CALCULATION (BAZETT): 460 MS
EKG R AXIS: 97 DEGREES
EKG T AXIS: 99 DEGREES
EKG VENTRICULAR RATE: 70 BPM
EOSINOPHIL # BLD: 0.1 K/UL (ref 0–0.4)
EOSINOPHIL NFR BLD: 2 % (ref 0–7)
ERYTHROCYTE [DISTWIDTH] IN BLOOD BY AUTOMATED COUNT: 16.5 % (ref 11.5–14.5)
EST. AVERAGE GLUCOSE BLD GHB EST-MCNC: 137 MG/DL
GLOBULIN SER CALC-MCNC: 3.7 G/DL (ref 2–4)
GLUCOSE BLD STRIP.AUTO-MCNC: 122 MG/DL (ref 65–100)
GLUCOSE BLD STRIP.AUTO-MCNC: 137 MG/DL (ref 65–100)
GLUCOSE BLD STRIP.AUTO-MCNC: 94 MG/DL (ref 65–100)
GLUCOSE BLD STRIP.AUTO-MCNC: 97 MG/DL (ref 65–100)
GLUCOSE SERPL-MCNC: 113 MG/DL (ref 65–100)
HBA1C MFR BLD: 6.4 % (ref 4–5.6)
HCT VFR BLD AUTO: 40.4 % (ref 35–47)
HDLC SERPL-MCNC: 54 MG/DL
HDLC SERPL: 2.6 (ref 0–5)
HGB BLD-MCNC: 12.2 G/DL (ref 11.5–16)
IMM GRANULOCYTES # BLD AUTO: 0 K/UL (ref 0–0.04)
IMM GRANULOCYTES NFR BLD AUTO: 0 % (ref 0–0.5)
LDLC SERPL CALC-MCNC: 72 MG/DL (ref 0–100)
LIPID PANEL: NORMAL
LYMPHOCYTES # BLD: 1.3 K/UL (ref 0.8–3.5)
LYMPHOCYTES NFR BLD: 25 % (ref 12–49)
MAGNESIUM SERPL-MCNC: 1.8 MG/DL (ref 1.6–2.4)
MCH RBC QN AUTO: 27.7 PG (ref 26–34)
MCHC RBC AUTO-ENTMCNC: 30.2 G/DL (ref 30–36.5)
MCV RBC AUTO: 91.8 FL (ref 80–99)
MONOCYTES # BLD: 0.5 K/UL (ref 0–1)
MONOCYTES NFR BLD: 9 % (ref 5–13)
NEUTS SEG # BLD: 3.4 K/UL (ref 1.8–8)
NEUTS SEG NFR BLD: 63 % (ref 32–75)
NRBC # BLD: 0 K/UL (ref 0–0.01)
NRBC BLD-RTO: 0 PER 100 WBC
PERFORMED BY:: ABNORMAL
PERFORMED BY:: ABNORMAL
PERFORMED BY:: NORMAL
PERFORMED BY:: NORMAL
PLATELET # BLD AUTO: 159 K/UL (ref 150–400)
PMV BLD AUTO: 12.8 FL (ref 8.9–12.9)
POTASSIUM SERPL-SCNC: 3.8 MMOL/L (ref 3.5–5.1)
PROCALCITONIN SERPL-MCNC: 0.05 NG/ML
PROT SERPL-MCNC: 6.5 G/DL (ref 6.4–8.2)
RBC # BLD AUTO: 4.4 M/UL (ref 3.8–5.2)
SODIUM SERPL-SCNC: 144 MMOL/L (ref 136–145)
THEOPHYLLINE SERPL-MCNC: <2 UG/ML (ref 10–20)
TRIGL SERPL-MCNC: 85 MG/DL
TROPONIN I SERPL HS-MCNC: 91 NG/L (ref 0–51)
TSH SERPL DL<=0.05 MIU/L-ACNC: 0.88 UIU/ML (ref 0.36–3.74)
VLDLC SERPL CALC-MCNC: 17 MG/DL
WBC # BLD AUTO: 5.3 K/UL (ref 3.6–11)

## 2023-05-30 PROCEDURE — 82962 GLUCOSE BLOOD TEST: CPT

## 2023-05-30 PROCEDURE — 6370000000 HC RX 637 (ALT 250 FOR IP): Performed by: HOSPITALIST

## 2023-05-30 PROCEDURE — 84145 PROCALCITONIN (PCT): CPT

## 2023-05-30 PROCEDURE — 84484 ASSAY OF TROPONIN QUANT: CPT

## 2023-05-30 PROCEDURE — 80061 LIPID PANEL: CPT

## 2023-05-30 PROCEDURE — 83880 ASSAY OF NATRIURETIC PEPTIDE: CPT

## 2023-05-30 PROCEDURE — 83735 ASSAY OF MAGNESIUM: CPT

## 2023-05-30 PROCEDURE — 93010 ELECTROCARDIOGRAM REPORT: CPT | Performed by: INTERNAL MEDICINE

## 2023-05-30 PROCEDURE — 85025 COMPLETE CBC W/AUTO DIFF WBC: CPT

## 2023-05-30 PROCEDURE — 6360000002 HC RX W HCPCS: Performed by: INTERNAL MEDICINE

## 2023-05-30 PROCEDURE — 6370000000 HC RX 637 (ALT 250 FOR IP): Performed by: INTERNAL MEDICINE

## 2023-05-30 PROCEDURE — 2700000000 HC OXYGEN THERAPY PER DAY

## 2023-05-30 PROCEDURE — 80053 COMPREHEN METABOLIC PANEL: CPT

## 2023-05-30 PROCEDURE — 2580000003 HC RX 258: Performed by: INTERNAL MEDICINE

## 2023-05-30 PROCEDURE — 2060000000 HC ICU INTERMEDIATE R&B

## 2023-05-30 PROCEDURE — 83036 HEMOGLOBIN GLYCOSYLATED A1C: CPT

## 2023-05-30 PROCEDURE — 36415 COLL VENOUS BLD VENIPUNCTURE: CPT

## 2023-05-30 PROCEDURE — 84443 ASSAY THYROID STIM HORMONE: CPT

## 2023-05-30 PROCEDURE — 80198 ASSAY OF THEOPHYLLINE: CPT

## 2023-05-30 PROCEDURE — 86140 C-REACTIVE PROTEIN: CPT

## 2023-05-30 PROCEDURE — 94640 AIRWAY INHALATION TREATMENT: CPT

## 2023-05-30 RX ORDER — BENZONATATE 100 MG/1
200 CAPSULE ORAL 3 TIMES DAILY PRN
Status: DISCONTINUED | OUTPATIENT
Start: 2023-05-30 | End: 2023-06-02 | Stop reason: HOSPADM

## 2023-05-30 RX ADMIN — SODIUM CHLORIDE, PRESERVATIVE FREE 10 ML: 5 INJECTION INTRAVENOUS at 21:07

## 2023-05-30 RX ADMIN — METOLAZONE 5 MG: 5 TABLET ORAL at 09:00

## 2023-05-30 RX ADMIN — OXYBUTYNIN CHLORIDE 5 MG: 5 TABLET ORAL at 15:00

## 2023-05-30 RX ADMIN — APIXABAN 5 MG: 5 TABLET, FILM COATED ORAL at 09:00

## 2023-05-30 RX ADMIN — THEOPHYLLINE 300 MG: 300 TABLET, EXTENDED RELEASE ORAL at 10:46

## 2023-05-30 RX ADMIN — EMPAGLIFLOZIN 10 MG: 10 TABLET, FILM COATED ORAL at 09:00

## 2023-05-30 RX ADMIN — ATORVASTATIN CALCIUM 40 MG: 40 TABLET, FILM COATED ORAL at 21:00

## 2023-05-30 RX ADMIN — QUETIAPINE FUMARATE 25 MG: 25 TABLET ORAL at 21:00

## 2023-05-30 RX ADMIN — FUROSEMIDE 40 MG: 10 INJECTION, SOLUTION INTRAMUSCULAR; INTRAVENOUS at 09:00

## 2023-05-30 RX ADMIN — SODIUM CHLORIDE, PRESERVATIVE FREE 10 ML: 5 INJECTION INTRAVENOUS at 09:00

## 2023-05-30 RX ADMIN — OXYBUTYNIN CHLORIDE 5 MG: 5 TABLET ORAL at 09:00

## 2023-05-30 RX ADMIN — BUDESONIDE AND FORMOTEROL FUMARATE DIHYDRATE 2 PUFF: 160; 4.5 AEROSOL RESPIRATORY (INHALATION) at 07:29

## 2023-05-30 RX ADMIN — FUROSEMIDE 40 MG: 10 INJECTION, SOLUTION INTRAMUSCULAR; INTRAVENOUS at 16:51

## 2023-05-30 RX ADMIN — ALLOPURINOL 100 MG: 100 TABLET ORAL at 09:00

## 2023-05-30 RX ADMIN — SACUBITRIL AND VALSARTAN 1 TABLET: 97; 103 TABLET, FILM COATED ORAL at 09:00

## 2023-05-30 RX ADMIN — OXYBUTYNIN CHLORIDE 5 MG: 5 TABLET ORAL at 21:00

## 2023-05-30 RX ADMIN — BUDESONIDE AND FORMOTEROL FUMARATE DIHYDRATE 2 PUFF: 160; 4.5 AEROSOL RESPIRATORY (INHALATION) at 20:30

## 2023-05-30 RX ADMIN — APIXABAN 5 MG: 5 TABLET, FILM COATED ORAL at 21:00

## 2023-05-30 RX ADMIN — ASPIRIN 81 MG: 81 TABLET, COATED ORAL at 09:00

## 2023-05-30 RX ADMIN — BENZONATATE 200 MG: 100 CAPSULE ORAL at 22:57

## 2023-05-30 RX ADMIN — SACUBITRIL AND VALSARTAN 1 TABLET: 97; 103 TABLET, FILM COATED ORAL at 21:00

## 2023-05-30 NOTE — CARE COORDINATION
05/30/23 1550   Service Assessment   Patient Orientation Alert and Oriented   Cognition Alert   History Provided By Patient   Primary [address] [place]   Patient's Healthcare Decision Maker is: Legal Next of Kin   PCP Verified by CM Yes   Last Visit to PCP Within last 3 months   Prior Functional Level Independent in ADLs/IADLs   Current Functional Level Independent in ADLs/IADLs   Can patient return to prior living arrangement Yes   Ability to make needs known: Good   Family able to assist with home care needs: Yes   Would you like for me to discuss the discharge plan with any other family members/significant others, and if so, who? No   Financial Resources Medicare   Social/Functional History   Lives With Daughter   Type of [address] One level   [address] Help From Family   ADL Assistance Needs assistance   Bath Minimal assistance   Dressing Minimal assistance   Grooming Minimal assistance   Feeding Minimal assistance   Toileting Needs assistance   [address] Needs assistance   Meal Prep Unable to assess (comment)   Laundry Unable to assess (comment)   Vacuuming Unable to assess (comment)   Cleaning Unable to assess (comment)   Gardening Unable to assess (comment)   Yard Work Unable to assess (comment)   Driving Unable to assess (comment)   Shopping Unable to assess (comment)    Unable to assess (comment)   Other (Comment) Unable to assess (comment)   Ambulation Assistance Needs assistance   Transfer Assistance Needs assistance   Active  No   Discharge Planning   Type of [address]   Current Services Prior To Admission Oxygen Therapy   Potential Assistance Needed N/A   DME Ordered? Other (comment)   Potential Assistance Purchasing Medications No   Patient expects to be discharged to: House   One/Two Story Residence One story   History of falls?  0   Services At/After Discharge   Transition

## 2023-05-31 LAB
ALBUMIN SERPL-MCNC: 2.6 G/DL (ref 3.5–5)
ANION GAP SERPL CALC-SCNC: 3 MMOL/L (ref 5–15)
BUN SERPL-MCNC: 22 MG/DL (ref 6–20)
BUN/CREAT SERPL: 14 (ref 12–20)
CA-I BLD-MCNC: 8.8 MG/DL (ref 8.5–10.1)
CHLORIDE SERPL-SCNC: 101 MMOL/L (ref 97–108)
CO2 SERPL-SCNC: 36 MMOL/L (ref 21–32)
CREAT SERPL-MCNC: 1.55 MG/DL (ref 0.55–1.02)
EKG ATRIAL RATE: 143 BPM
EKG DIAGNOSIS: NORMAL
EKG P AXIS: 88 DEGREES
EKG P-R INTERVAL: 122 MS
EKG Q-T INTERVAL: 286 MS
EKG QRS DURATION: 82 MS
EKG QTC CALCULATION (BAZETT): 441 MS
EKG R AXIS: 73 DEGREES
EKG T AXIS: -44 DEGREES
EKG VENTRICULAR RATE: 143 BPM
GLUCOSE BLD STRIP.AUTO-MCNC: 107 MG/DL (ref 65–100)
GLUCOSE BLD STRIP.AUTO-MCNC: 121 MG/DL (ref 65–100)
GLUCOSE BLD STRIP.AUTO-MCNC: 123 MG/DL (ref 65–100)
GLUCOSE BLD STRIP.AUTO-MCNC: 124 MG/DL (ref 65–100)
GLUCOSE SERPL-MCNC: 122 MG/DL (ref 65–100)
MAGNESIUM SERPL-MCNC: 1.7 MG/DL (ref 1.6–2.4)
PERFORMED BY:: ABNORMAL
PHOSPHATE SERPL-MCNC: 4.6 MG/DL (ref 2.6–4.7)
POTASSIUM SERPL-SCNC: 3.7 MMOL/L (ref 3.5–5.1)
SODIUM SERPL-SCNC: 140 MMOL/L (ref 136–145)

## 2023-05-31 PROCEDURE — 2580000003 HC RX 258: Performed by: INTERNAL MEDICINE

## 2023-05-31 PROCEDURE — 94640 AIRWAY INHALATION TREATMENT: CPT

## 2023-05-31 PROCEDURE — 6370000000 HC RX 637 (ALT 250 FOR IP): Performed by: INTERNAL MEDICINE

## 2023-05-31 PROCEDURE — 6360000002 HC RX W HCPCS: Performed by: INTERNAL MEDICINE

## 2023-05-31 PROCEDURE — 2060000000 HC ICU INTERMEDIATE R&B

## 2023-05-31 PROCEDURE — 82962 GLUCOSE BLOOD TEST: CPT

## 2023-05-31 PROCEDURE — 83735 ASSAY OF MAGNESIUM: CPT

## 2023-05-31 PROCEDURE — 36415 COLL VENOUS BLD VENIPUNCTURE: CPT

## 2023-05-31 PROCEDURE — 80069 RENAL FUNCTION PANEL: CPT

## 2023-05-31 PROCEDURE — 6370000000 HC RX 637 (ALT 250 FOR IP): Performed by: HOSPITALIST

## 2023-05-31 PROCEDURE — 2700000000 HC OXYGEN THERAPY PER DAY

## 2023-05-31 RX ORDER — FUROSEMIDE 40 MG/1
60 TABLET ORAL 2 TIMES DAILY
Status: DISCONTINUED | OUTPATIENT
Start: 2023-06-01 | End: 2023-06-01

## 2023-05-31 RX ORDER — MAGNESIUM HYDROXIDE/ALUMINUM HYDROXICE/SIMETHICONE 120; 1200; 1200 MG/30ML; MG/30ML; MG/30ML
30 SUSPENSION ORAL EVERY 6 HOURS PRN
Status: DISCONTINUED | OUTPATIENT
Start: 2023-05-31 | End: 2023-06-02 | Stop reason: HOSPADM

## 2023-05-31 RX ORDER — TRAMADOL HYDROCHLORIDE 50 MG/1
50 TABLET ORAL ONCE
Status: COMPLETED | OUTPATIENT
Start: 2023-05-31 | End: 2023-05-31

## 2023-05-31 RX ORDER — FUROSEMIDE 10 MG/ML
40 INJECTION INTRAMUSCULAR; INTRAVENOUS 2 TIMES DAILY
Status: COMPLETED | OUTPATIENT
Start: 2023-05-31 | End: 2023-05-31

## 2023-05-31 RX ORDER — ACETAMINOPHEN 325 MG/1
650 TABLET ORAL
Status: DISPENSED | OUTPATIENT
Start: 2023-05-31 | End: 2023-06-01

## 2023-05-31 RX ADMIN — SODIUM CHLORIDE, PRESERVATIVE FREE 10 ML: 5 INJECTION INTRAVENOUS at 08:34

## 2023-05-31 RX ADMIN — OXYBUTYNIN CHLORIDE 5 MG: 5 TABLET ORAL at 21:18

## 2023-05-31 RX ADMIN — ALUMINUM HYDROXIDE, MAGNESIUM HYDROXIDE, AND SIMETHICONE 30 ML: 200; 200; 20 SUSPENSION ORAL at 12:50

## 2023-05-31 RX ADMIN — METOLAZONE 5 MG: 5 TABLET ORAL at 08:29

## 2023-05-31 RX ADMIN — TRAMADOL HYDROCHLORIDE 50 MG: 50 TABLET, FILM COATED ORAL at 18:29

## 2023-05-31 RX ADMIN — ATORVASTATIN CALCIUM 40 MG: 40 TABLET, FILM COATED ORAL at 21:18

## 2023-05-31 RX ADMIN — METOPROLOL SUCCINATE 25 MG: 25 TABLET, EXTENDED RELEASE ORAL at 08:30

## 2023-05-31 RX ADMIN — ONDANSETRON 4 MG: 4 TABLET, ORALLY DISINTEGRATING ORAL at 02:27

## 2023-05-31 RX ADMIN — OXYBUTYNIN CHLORIDE 5 MG: 5 TABLET ORAL at 13:11

## 2023-05-31 RX ADMIN — APIXABAN 5 MG: 5 TABLET, FILM COATED ORAL at 08:30

## 2023-05-31 RX ADMIN — AMIODARONE HYDROCHLORIDE 200 MG: 200 TABLET ORAL at 08:29

## 2023-05-31 RX ADMIN — EMPAGLIFLOZIN 10 MG: 10 TABLET, FILM COATED ORAL at 08:29

## 2023-05-31 RX ADMIN — THEOPHYLLINE 300 MG: 300 TABLET, EXTENDED RELEASE ORAL at 09:30

## 2023-05-31 RX ADMIN — QUETIAPINE FUMARATE 25 MG: 25 TABLET ORAL at 21:18

## 2023-05-31 RX ADMIN — ALLOPURINOL 100 MG: 100 TABLET ORAL at 08:30

## 2023-05-31 RX ADMIN — SACUBITRIL AND VALSARTAN 1 TABLET: 97; 103 TABLET, FILM COATED ORAL at 08:29

## 2023-05-31 RX ADMIN — ASPIRIN 81 MG: 81 TABLET, COATED ORAL at 08:30

## 2023-05-31 RX ADMIN — BUDESONIDE AND FORMOTEROL FUMARATE DIHYDRATE 2 PUFF: 160; 4.5 AEROSOL RESPIRATORY (INHALATION) at 21:53

## 2023-05-31 RX ADMIN — FUROSEMIDE 40 MG: 10 INJECTION, SOLUTION INTRAMUSCULAR; INTRAVENOUS at 08:30

## 2023-05-31 RX ADMIN — APIXABAN 5 MG: 5 TABLET, FILM COATED ORAL at 21:19

## 2023-05-31 RX ADMIN — BUDESONIDE AND FORMOTEROL FUMARATE DIHYDRATE 2 PUFF: 160; 4.5 AEROSOL RESPIRATORY (INHALATION) at 08:08

## 2023-05-31 RX ADMIN — ACETAMINOPHEN 650 MG: 325 TABLET ORAL at 16:18

## 2023-05-31 RX ADMIN — FUROSEMIDE 40 MG: 10 INJECTION, SOLUTION INTRAMUSCULAR; INTRAVENOUS at 18:30

## 2023-05-31 RX ADMIN — SODIUM CHLORIDE, PRESERVATIVE FREE 10 ML: 5 INJECTION INTRAVENOUS at 09:00

## 2023-05-31 RX ADMIN — OXYBUTYNIN CHLORIDE 5 MG: 5 TABLET ORAL at 08:30

## 2023-05-31 RX ADMIN — SODIUM CHLORIDE, PRESERVATIVE FREE 10 ML: 5 INJECTION INTRAVENOUS at 21:21

## 2023-05-31 RX ADMIN — SACUBITRIL AND VALSARTAN 1 TABLET: 97; 103 TABLET, FILM COATED ORAL at 21:18

## 2023-05-31 ASSESSMENT — PAIN SCALES - GENERAL
PAINLEVEL_OUTOF10: 6
PAINLEVEL_OUTOF10: 7
PAINLEVEL_OUTOF10: 6

## 2023-05-31 ASSESSMENT — PAIN DESCRIPTION - LOCATION
LOCATION: ABDOMEN
LOCATION: ABDOMEN
LOCATION: TOE (COMMENT WHICH ONE)

## 2023-05-31 ASSESSMENT — PAIN DESCRIPTION - ORIENTATION
ORIENTATION: ANTERIOR

## 2023-05-31 ASSESSMENT — PAIN DESCRIPTION - DESCRIPTORS
DESCRIPTORS: ACHING
DESCRIPTORS: CRAMPING
DESCRIPTORS: SORE

## 2023-05-31 NOTE — DISCHARGE INSTRUCTIONS
Heart Failure Follow-up protocol:  -Please schedule an appointment with your cardiologist(heart doctor) to be seen within 3-5 days of discharge.    -Please remember to call your cardiologist with any new, returning, or worsening symptoms. Please refer to your symptom chart. **Weight yourself Daily in the morning, after using the bathroom, but before eating or drinking anything.    -Please follow your sodium restrictions to reduce the intake of salt/sodium as discussed.  Remember when we take in too much salt/sodium, we hold on to fluid.  ___________________________________________________________________________________________

## 2023-05-31 NOTE — NURSE NAVIGATOR
Heart Failure Nurse Navigator: Heart Failure Education    RN performs hand hygiene. RN Introduces herself to the patient and informs the patient of the reasoning for the visit. Patient Verbalizes Understanding for visit, is accepting of discussion    Persons present for Education: Patient    Time Spent: At least 60minutes    Method of teaching:  Teach-back, demonstration, verbal, visual    Education Packets Given: Living with Heart Failure book, Heart Failure symptom management calendar/tracker, Heart Failure Self Check plan, Heart Failure: Partnering Your Treatment Questionnaire, Sodium tracker, and Eat Smart with Nutrition labels.    -Confirmation of working scales & that the patient can read numbers  -Confirmation of support to assist with daily weights if patient unable to perform independently. RN-NN provided education on Daily weights to include same time daily, on same scale, and documenting weights on calendar. RN-NN provided education trigger management/ signs and symptoms of Heart Failure. Patient is advised on Yellow Days to call MD office and on Red Days to come to the ER or call 911. RN provides Nutritional education that includes how to calculate and restrict sodium and fluid intake. Encouraged fresh vegetable choice over canned/processed goods. Demonstrated how to log meals/intake. RN discusses lifestyle changes including cessation of smoking and increasing activity level. In addition, RN discusses the importance of keeping/scheduling appointments and adherence to guideline directed medical therapies. Patient was able to teach back to the RN-NN the above information. Patient will need reinforcement of education provided. Patient advised about the HF helper Luisito and Sarah Metz.

## 2023-06-01 LAB
ALBUMIN SERPL-MCNC: 2.9 G/DL (ref 3.5–5)
ANION GAP SERPL CALC-SCNC: 1 MMOL/L (ref 5–15)
BUN SERPL-MCNC: 29 MG/DL (ref 6–20)
BUN/CREAT SERPL: 14 (ref 12–20)
CA-I BLD-MCNC: 8.4 MG/DL (ref 8.5–10.1)
CHLORIDE SERPL-SCNC: 98 MMOL/L (ref 97–108)
CO2 SERPL-SCNC: 38 MMOL/L (ref 21–32)
CREAT SERPL-MCNC: 2.07 MG/DL (ref 0.55–1.02)
GLUCOSE BLD STRIP.AUTO-MCNC: 125 MG/DL (ref 65–100)
GLUCOSE BLD STRIP.AUTO-MCNC: 137 MG/DL (ref 65–100)
GLUCOSE BLD STRIP.AUTO-MCNC: 145 MG/DL (ref 65–100)
GLUCOSE BLD STRIP.AUTO-MCNC: 302 MG/DL (ref 65–100)
GLUCOSE SERPL-MCNC: 115 MG/DL (ref 65–100)
PERFORMED BY:: ABNORMAL
PHOSPHATE SERPL-MCNC: 5.3 MG/DL (ref 2.6–4.7)
POTASSIUM SERPL-SCNC: 4.2 MMOL/L (ref 3.5–5.1)
SODIUM SERPL-SCNC: 137 MMOL/L (ref 136–145)

## 2023-06-01 PROCEDURE — 6370000000 HC RX 637 (ALT 250 FOR IP): Performed by: INTERNAL MEDICINE

## 2023-06-01 PROCEDURE — 2060000000 HC ICU INTERMEDIATE R&B

## 2023-06-01 PROCEDURE — 36415 COLL VENOUS BLD VENIPUNCTURE: CPT

## 2023-06-01 PROCEDURE — 2580000003 HC RX 258: Performed by: INTERNAL MEDICINE

## 2023-06-01 PROCEDURE — 82962 GLUCOSE BLOOD TEST: CPT

## 2023-06-01 PROCEDURE — 6370000000 HC RX 637 (ALT 250 FOR IP): Performed by: HOSPITALIST

## 2023-06-01 PROCEDURE — 80069 RENAL FUNCTION PANEL: CPT

## 2023-06-01 PROCEDURE — 94640 AIRWAY INHALATION TREATMENT: CPT

## 2023-06-01 PROCEDURE — 2700000000 HC OXYGEN THERAPY PER DAY

## 2023-06-01 PROCEDURE — 94761 N-INVAS EAR/PLS OXIMETRY MLT: CPT

## 2023-06-01 RX ORDER — PREDNISONE 5 MG/1
10 TABLET ORAL DAILY
Status: DISCONTINUED | OUTPATIENT
Start: 2023-06-01 | End: 2023-06-02 | Stop reason: HOSPADM

## 2023-06-01 RX ORDER — FUROSEMIDE 40 MG/1
60 TABLET ORAL DAILY
Status: DISCONTINUED | OUTPATIENT
Start: 2023-06-02 | End: 2023-06-02 | Stop reason: HOSPADM

## 2023-06-01 RX ORDER — TRAMADOL HYDROCHLORIDE 50 MG/1
100 TABLET ORAL ONCE
Status: COMPLETED | OUTPATIENT
Start: 2023-06-01 | End: 2023-06-01

## 2023-06-01 RX ORDER — COLCHICINE 0.6 MG/1
0.6 TABLET ORAL EVERY 8 HOURS
Status: COMPLETED | OUTPATIENT
Start: 2023-06-01 | End: 2023-06-01

## 2023-06-01 RX ADMIN — ASPIRIN 81 MG: 81 TABLET, COATED ORAL at 08:54

## 2023-06-01 RX ADMIN — METOLAZONE 5 MG: 5 TABLET ORAL at 08:55

## 2023-06-01 RX ADMIN — OXYBUTYNIN CHLORIDE 5 MG: 5 TABLET ORAL at 14:47

## 2023-06-01 RX ADMIN — THEOPHYLLINE 300 MG: 300 TABLET, EXTENDED RELEASE ORAL at 09:12

## 2023-06-01 RX ADMIN — COLCHICINE 0.6 MG: 0.6 TABLET, FILM COATED ORAL at 14:47

## 2023-06-01 RX ADMIN — SACUBITRIL AND VALSARTAN 1 TABLET: 97; 103 TABLET, FILM COATED ORAL at 21:23

## 2023-06-01 RX ADMIN — PREDNISONE 10 MG: 5 TABLET ORAL at 11:42

## 2023-06-01 RX ADMIN — METOPROLOL SUCCINATE 25 MG: 25 TABLET, EXTENDED RELEASE ORAL at 08:54

## 2023-06-01 RX ADMIN — FUROSEMIDE 60 MG: 40 TABLET ORAL at 08:54

## 2023-06-01 RX ADMIN — APIXABAN 5 MG: 5 TABLET, FILM COATED ORAL at 08:54

## 2023-06-01 RX ADMIN — BUDESONIDE AND FORMOTEROL FUMARATE DIHYDRATE 2 PUFF: 160; 4.5 AEROSOL RESPIRATORY (INHALATION) at 20:56

## 2023-06-01 RX ADMIN — SODIUM CHLORIDE, PRESERVATIVE FREE 10 ML: 5 INJECTION INTRAVENOUS at 09:02

## 2023-06-01 RX ADMIN — SACUBITRIL AND VALSARTAN 1 TABLET: 97; 103 TABLET, FILM COATED ORAL at 08:54

## 2023-06-01 RX ADMIN — ATORVASTATIN CALCIUM 40 MG: 40 TABLET, FILM COATED ORAL at 21:23

## 2023-06-01 RX ADMIN — OXYBUTYNIN CHLORIDE 5 MG: 5 TABLET ORAL at 21:28

## 2023-06-01 RX ADMIN — INSULIN LISPRO 4 UNITS: 100 INJECTION, SOLUTION INTRAVENOUS; SUBCUTANEOUS at 21:24

## 2023-06-01 RX ADMIN — QUETIAPINE FUMARATE 25 MG: 25 TABLET ORAL at 21:24

## 2023-06-01 RX ADMIN — ALLOPURINOL 100 MG: 100 TABLET ORAL at 08:54

## 2023-06-01 RX ADMIN — COLCHICINE 0.6 MG: 0.6 TABLET, FILM COATED ORAL at 21:28

## 2023-06-01 RX ADMIN — EMPAGLIFLOZIN 10 MG: 10 TABLET, FILM COATED ORAL at 09:12

## 2023-06-01 RX ADMIN — TRAMADOL HYDROCHLORIDE 100 MG: 50 TABLET, FILM COATED ORAL at 00:41

## 2023-06-01 RX ADMIN — APIXABAN 5 MG: 5 TABLET, FILM COATED ORAL at 21:24

## 2023-06-01 RX ADMIN — SODIUM CHLORIDE, PRESERVATIVE FREE 10 ML: 5 INJECTION INTRAVENOUS at 21:30

## 2023-06-01 RX ADMIN — BUDESONIDE AND FORMOTEROL FUMARATE DIHYDRATE 2 PUFF: 160; 4.5 AEROSOL RESPIRATORY (INHALATION) at 08:34

## 2023-06-01 RX ADMIN — OXYBUTYNIN CHLORIDE 5 MG: 5 TABLET ORAL at 08:54

## 2023-06-01 RX ADMIN — AMIODARONE HYDROCHLORIDE 200 MG: 200 TABLET ORAL at 08:54

## 2023-06-01 ASSESSMENT — PAIN DESCRIPTION - DESCRIPTORS
DESCRIPTORS: ACHING
DESCRIPTORS: THROBBING

## 2023-06-01 ASSESSMENT — PAIN - FUNCTIONAL ASSESSMENT
PAIN_FUNCTIONAL_ASSESSMENT: PREVENTS OR INTERFERES SOME ACTIVE ACTIVITIES AND ADLS
PAIN_FUNCTIONAL_ASSESSMENT: ACTIVITIES ARE NOT PREVENTED

## 2023-06-01 ASSESSMENT — PAIN SCALES - GENERAL
PAINLEVEL_OUTOF10: 0
PAINLEVEL_OUTOF10: 7
PAINLEVEL_OUTOF10: 8

## 2023-06-01 ASSESSMENT — PAIN DESCRIPTION - LOCATION
LOCATION: TOE (COMMENT WHICH ONE)
LOCATION: ANKLE

## 2023-06-01 ASSESSMENT — PAIN DESCRIPTION - ORIENTATION
ORIENTATION: LEFT
ORIENTATION: LOWER

## 2023-06-01 NOTE — CARE COORDINATION
0740: Chart reviewed. Per notes; patient followed via cardiology and pulmonology. Podiatry consulted. Home oxygen already in place.     CM will continue to follow patient and recs of medical team.

## 2023-06-02 VITALS
RESPIRATION RATE: 14 BRPM | WEIGHT: 197.75 LBS | HEIGHT: 63 IN | OXYGEN SATURATION: 92 % | HEART RATE: 63 BPM | TEMPERATURE: 97 F | SYSTOLIC BLOOD PRESSURE: 102 MMHG | DIASTOLIC BLOOD PRESSURE: 62 MMHG | BODY MASS INDEX: 35.04 KG/M2

## 2023-06-02 LAB
ALBUMIN SERPL-MCNC: 2.6 G/DL (ref 3.5–5)
ANION GAP SERPL CALC-SCNC: 4 MMOL/L (ref 5–15)
BUN SERPL-MCNC: 36 MG/DL (ref 6–20)
BUN/CREAT SERPL: 19 (ref 12–20)
CA-I BLD-MCNC: 8 MG/DL (ref 8.5–10.1)
CHLORIDE SERPL-SCNC: 97 MMOL/L (ref 97–108)
CO2 SERPL-SCNC: 35 MMOL/L (ref 21–32)
CREAT SERPL-MCNC: 1.9 MG/DL (ref 0.55–1.02)
GLUCOSE BLD STRIP.AUTO-MCNC: 141 MG/DL (ref 65–100)
GLUCOSE BLD STRIP.AUTO-MCNC: 97 MG/DL (ref 65–100)
GLUCOSE SERPL-MCNC: 126 MG/DL (ref 65–100)
PERFORMED BY:: ABNORMAL
PERFORMED BY:: NORMAL
PHOSPHATE SERPL-MCNC: 4.3 MG/DL (ref 2.6–4.7)
POTASSIUM SERPL-SCNC: 3.4 MMOL/L (ref 3.5–5.1)
SODIUM SERPL-SCNC: 136 MMOL/L (ref 136–145)
URATE SERPL-MCNC: 7.7 MG/DL (ref 2.6–6)

## 2023-06-02 PROCEDURE — 94640 AIRWAY INHALATION TREATMENT: CPT

## 2023-06-02 PROCEDURE — 84550 ASSAY OF BLOOD/URIC ACID: CPT

## 2023-06-02 PROCEDURE — 2700000000 HC OXYGEN THERAPY PER DAY

## 2023-06-02 PROCEDURE — 94761 N-INVAS EAR/PLS OXIMETRY MLT: CPT

## 2023-06-02 PROCEDURE — 82962 GLUCOSE BLOOD TEST: CPT

## 2023-06-02 PROCEDURE — 6370000000 HC RX 637 (ALT 250 FOR IP): Performed by: INTERNAL MEDICINE

## 2023-06-02 PROCEDURE — 80069 RENAL FUNCTION PANEL: CPT

## 2023-06-02 PROCEDURE — 36415 COLL VENOUS BLD VENIPUNCTURE: CPT

## 2023-06-02 PROCEDURE — 2580000003 HC RX 258: Performed by: INTERNAL MEDICINE

## 2023-06-02 RX ORDER — BUDESONIDE AND FORMOTEROL FUMARATE DIHYDRATE 160; 4.5 UG/1; UG/1
2 AEROSOL RESPIRATORY (INHALATION) 2 TIMES DAILY
Qty: 10.2 G | Refills: 3 | Status: SHIPPED | OUTPATIENT
Start: 2023-06-02

## 2023-06-02 RX ORDER — FUROSEMIDE 20 MG/1
60 TABLET ORAL DAILY
Qty: 60 TABLET | Refills: 3 | Status: SHIPPED | OUTPATIENT
Start: 2023-06-03

## 2023-06-02 RX ADMIN — METOPROLOL SUCCINATE 25 MG: 25 TABLET, EXTENDED RELEASE ORAL at 08:53

## 2023-06-02 RX ADMIN — AMIODARONE HYDROCHLORIDE 200 MG: 200 TABLET ORAL at 08:54

## 2023-06-02 RX ADMIN — ASPIRIN 81 MG: 81 TABLET, COATED ORAL at 08:53

## 2023-06-02 RX ADMIN — THEOPHYLLINE 300 MG: 300 TABLET, EXTENDED RELEASE ORAL at 08:53

## 2023-06-02 RX ADMIN — PREDNISONE 10 MG: 5 TABLET ORAL at 08:54

## 2023-06-02 RX ADMIN — BUDESONIDE AND FORMOTEROL FUMARATE DIHYDRATE 2 PUFF: 160; 4.5 AEROSOL RESPIRATORY (INHALATION) at 09:08

## 2023-06-02 RX ADMIN — OXYBUTYNIN CHLORIDE 5 MG: 5 TABLET ORAL at 08:54

## 2023-06-02 RX ADMIN — SACUBITRIL AND VALSARTAN 1 TABLET: 97; 103 TABLET, FILM COATED ORAL at 08:53

## 2023-06-02 RX ADMIN — APIXABAN 5 MG: 5 TABLET, FILM COATED ORAL at 08:54

## 2023-06-02 RX ADMIN — EMPAGLIFLOZIN 10 MG: 10 TABLET, FILM COATED ORAL at 08:53

## 2023-06-02 RX ADMIN — METOLAZONE 5 MG: 5 TABLET ORAL at 08:54

## 2023-06-02 RX ADMIN — SODIUM CHLORIDE, PRESERVATIVE FREE 10 ML: 5 INJECTION INTRAVENOUS at 08:54

## 2023-06-02 RX ADMIN — FUROSEMIDE 60 MG: 40 TABLET ORAL at 08:53

## 2023-06-02 NOTE — CARE COORDINATION
9708: Chart reviewed. Per notes; patient followed via cardiology and pulmonology. IV Lasix switched to PO. Podiatry consulted. Home oxygen already in place. CM will continue to follow patient and recs of medical team.    [id number]: CM met with patient at bedside for discharge planning. Patient's home oxygen in room. Patient would like home health at discharge stating she recently had [place] services: company unknown. Choice letter signed, placed on chart and referral sent to [facility] listed as accepting company in April. 1154: [facility] has accepted patient with anticipated [facility] 06/07/2023.    1400: Discharge summary/order home noted and attached in [address] Revised order to include home health. When transportation available, patient to discharge home with home health and oxygen. 1435:  revised anticipated SOC to 06/05/2023. Discharge Checklist Completed. Transition of Care Plan:    RUR: 15%  Prior Level of Functioning: Independent  Disposition: Home with Home Health  If SNF or IPR: Date FOC offered: N/A  Date FOC received: N/A  Accepting facility: N/A  Date authorization started with reference number: N/A  Date authorization received and expires: N/A  Follow up appointments: Discharge Summary  DME needed: Oxygen already in place  Transportation at discharge: Family  IM/IMM Medicare/ letter given:   Is patient a  and connected with VA? No  If yes, was [employer] transfer form completed and VA notified? N/A  Caregiver Contact: Patient  Discharge Caregiver contacted prior to discharge? No  Care Conference needed?    Barriers to discharge:

## 2023-06-02 NOTE — DISCHARGE SUMMARY
Discharge Summary    Name: Bo Elise  [id number]  YOB: 1950 (Age: 71 y.o.)   Date of Admission: 5/29/2023  Date of Discharge: 6/2/2023  Attending Physician: Rodri Rodriguez MD    Discharge Diagnosis:   Acute on chronic heart failure with reduced EF  Gout flare left great toe  COPD  Paroxysmal atrial fibrillation  Coronary artery disease  Hypertension  Diabetes mellitus type 2  Chronic kidney disease stage IIIa  Consultations:  IP CONSULT TO CARDIOLOGY  IP CONSULT TO PULMONOLOGY  IP Jackie Marquez Course     Mrs. Sander Juarez is a 78-year-old woman with systolic heart failure, COPD, proximal A-fib, diabetes hypertension who presented with an exacerbation of heart failure. She was treated with aggressive diuretics with improvement in her volume status she will continue on Lasix as well as metolazone on discharge as well as directed medical management. Renal function was stable despite aggressive diuresis  Heart rate remained controlled on metoprolol amiodarone and discontinued on exam for stroke risk reduction. She is also seen by pulmonology for COPD at the time of discharge she was experiencing bronchospasm. Dasie Cost was switched to Symbicort  Additionally she felt safe for discharge home with follow-up with her PCP    Discharge Exam:  Patient seen and examined by me on discharge day.   Pertinent Findings:  Patient Vitals for the past 24 hrs:   BP Temp Temp src Pulse Resp SpO2 Weight   06/02/23 1100 102/62 -- -- 63 14 -- --   06/02/23 0908 -- -- -- 60 18 92 % --   06/02/23 0853 -- -- -- 62 -- -- --   06/02/23 0730 (!) 107/57 97 °F (36.1 °C) Axillary 53 16 96 % --   06/02/23 0630 110/65 97.8 °F (36.6 °C) Oral 52 15 -- --   06/02/23 0600 -- -- -- -- -- -- 89.7 kg (197 lb 12 oz)   06/01/23 2151 120/72 98 °F (36.7 °C) Oral 67 18 -- --   06/01/23 2123 120/72 -- -- -- --

## 2023-06-02 NOTE — DISCHARGE INSTR - DIET
Good nutrition is important when healing from an illness, injury, or surgery. Follow any nutrition recommendations given to you during your hospital stay. If you were given an oral nutrition supplement while in the hospital, continue to take this supplement at home. You can take it with meals, in-between meals, and/or before bedtime. These supplements can be purchased at most local grocery stores, pharmacies, and chain Prescription [employer]-stores. If you have any questions about your diet or nutrition, call the hospital and ask for the dietitian.   Low salt diet

## 2023-06-06 ENCOUNTER — TELEPHONE (OUTPATIENT)
Facility: HOSPITAL | Age: 73
End: 2023-06-06

## 2023-06-06 NOTE — TELEPHONE ENCOUNTER
Discharge follow-up phone call       Patient Discharged on: 06/02/2023     Patient Discharge with: Services [facility])    RN-NN introduces herself and informs the patient reason for calling. Patient verbalizes understanding. RN-NN calls the patient within 3 days of discharge. Patient confirms medications are affordable and has picked up medications at the pharmacy. Patient is scheduled for follow-up with Doctor within 1 week. Patient advised if any questions or concerns to notify RN or the doctors office.       Patient verbalizes understanding

## 2023-06-08 NOTE — PROGRESS NOTES
CLEVE gave Handover report to CAMILA Easley for patient transfer to room 205 per order. NOD also called the patient's daughter Amanda Christensen regarding the transfer.
Consult  Pulmonary, Critical Care    Name: Autumn Arellano MRN: [id number]   : 1950 Hospital: [facility]   Date: 2023  Admission date: 2023 Hospital Day: 2       Subjective/Interval History:   Patient seen in the emergency room complaining of shortness of breath currently no distress no accessory muscle use. History is severe cardiomyopathy ejection fraction 30% with chronic pulmonary edema. Initially at home she had good diuresis with her twice daily Lasix and once a day Zaroxolyn but over the last week or more she has had less urine output worsening edema and shortness of breath. Some cough nonproductive no fever chills or sweats    Patient Active Problem List   Diagnosis    Atrial flutter with rapid ventricular response ([facility])    Heart failure ([facility])    COPD exacerbation ([facility])    COPD (chronic obstructive pulmonary disease) ([facility])    Atrial flutter ([facility])    CHF exacerbation ([facility])    UTI (urinary tract infection)    CHF (congestive heart failure) ([facility])    Shortness of breath    Acute exacerbation of CHF (congestive heart failure) ([facility])    CHF (congestive heart failure), NYHA class I, acute on chronic, combined ([facility] [address]        IMPRESSION:   Acute hypoxic respiratory failure now on 5 L nasal oxygen  Chronic hypoxic respiratory failure normally on 4 L nasal oxygen  Acute pulmonary edema  Severe cardiomyopathy  Underlying COPD  Pulmonary hypertension  Diastolic left ventricular dysfunction  Edema  Body mass index is 40.69 kg/m². RECOMMENDATIONS/PLAN:   Continue Lasix IV for the next 48 hours  Continue her home DuoNeb we will substitute Symbicort for Dulera  Continue home metolazone  Taper oxygen as she improved          [x] High complexity decision making was performed  [x] See my orders for details      Subjective/Initial History:     I was asked by Ji Kat MD to see Autumn Arellano  a 71 y.o.    female in consultation for a chief complaint of acute hypoxic
Consult  Pulmonary, Critical Care    Name: Debora Corona MRN: [id number]   : 1950 Hospital: [facility]   Date: 2023  Admission date: 2023 Hospital Day: 3       Subjective/Interval History:   Patient seen in the emergency room complaining of shortness of breath currently no distress no accessory muscle use. History is severe cardiomyopathy ejection fraction 30% with chronic pulmonary edema. Initially at home she had good diuresis with her twice daily Lasix and once a day Zaroxolyn but over the last week or more she has had less urine output worsening edema and shortness of breath. Some cough nonproductive no fever chills or sweats    Patient Active Problem List   Diagnosis    Atrial flutter with rapid ventricular response (HCC)    Heart failure (HCC)    COPD exacerbation (HCC)    COPD (chronic obstructive pulmonary disease) ([facility])    Atrial flutter (HCC)    CHF exacerbation ([facility])    UTI (urinary tract infection)    CHF (congestive heart failure) ([facility])    Shortness of breath    Acute exacerbation of CHF (congestive heart failure) ([facility])    CHF (congestive heart failure), NYHA class I, acute on chronic, combined ([facility] [address]        IMPRESSION:   Acute hypoxic respiratory failure now on 4 L nasal oxygen  Chronic hypoxic respiratory failure normally on 4 L nasal oxygen  Acute pulmonary edema  Severe cardiomyopathy  Underlying COPD  Pulmonary hypertension  Diastolic left ventricular dysfunction  Edema  Body mass index is 36.9 kg/m². RECOMMENDATIONS/PLAN:   Continue Lasix IV today switch to oral tomorrow  Continue her home DuoNeb we will substitute Symbicort for [facility]  Continue home metolazone            [x] High complexity decision making was performed  [x] See my orders for details      Subjective/Initial History:     I was asked by Gerald Kingston MD to see Debora Corona  a 71 y.o.    female in consultation for a chief complaint of acute hypoxic respiratory failure
Consult  Pulmonary, Critical Care    Name: Shaun Macedo MRN: [id number]   : 1950 Hospital: [facility]   Date: 2023  Admission date: 2023 Hospital Day: 4       Subjective/Interval History:   Patient seen in the emergency room complaining of shortness of breath currently no distress no accessory muscle use. History is severe cardiomyopathy ejection fraction 30% with chronic pulmonary edema. Initially at home she had good diuresis with her twice daily Lasix and once a day Zaroxolyn but over the last week or more she has had less urine output worsening edema and shortness of breath. Some cough nonproductive no fever chills or sweats   awake alert complaining of gouty pain in her left foot breathing is doing better  Patient Active Problem List   Diagnosis    Atrial flutter with rapid ventricular response (HCC)    Heart failure (HCC)    COPD exacerbation (HCC)    COPD (chronic obstructive pulmonary disease) ([facility])    Atrial flutter (HCC)    CHF exacerbation ([facility])    UTI (urinary tract infection)    CHF (congestive heart failure) ([facility])    Shortness of breath    Acute exacerbation of CHF (congestive heart failure) ([facility])    CHF (congestive heart failure), NYHA class I, acute on chronic, combined ([facility] [address]        IMPRESSION:   Acute hypoxic respiratory failure now on 4 L nasal oxygen  Chronic hypoxic respiratory failure normally on 4 L nasal oxygen  Acute pulmonary edema  Severe cardiomyopathy  Underlying COPD  Pulmonary hypertension  Diastolic left ventricular dysfunction  Edema  Acute gout left toe  Body mass index is 36.9 kg/m².         RECOMMENDATIONS/PLAN:   Switch to oral Lasix 60 mg today no urine output recorded yet  Continue her home DuoNeb we will substitute Symbicort for Dulera  Continue home metolazone  Add colchicine for acute gout          [x] High complexity decision making was performed  [x] See my orders for details      Subjective/Initial History:     I was asked by Jean-Paul
Consult  Pulmonary, Critical Care    Name: Terry Diaz MRN: [id number]   : 1950 Hospital: [facility]   Date: 2023  Admission date: 2023 Hospital Day: 5       Subjective/Interval History:   Patient seen in the emergency room complaining of shortness of breath currently no distress no accessory muscle use. History is severe cardiomyopathy ejection fraction 30% with chronic pulmonary edema. Initially at home she had good diuresis with her twice daily Lasix and once a day Zaroxolyn but over the last week or more she has had less urine output worsening edema and shortness of breath. Some cough nonproductive no fever chills or sweats   awake alert complaining of gouty pain in her left foot breathing is doing better   received colchicine yesterday she states her foot is much better respirations nonlabored  Patient Active Problem List   Diagnosis    Atrial flutter with rapid ventricular response ([facility])    Heart failure ([facility])    COPD exacerbation ([facility])    COPD (chronic obstructive pulmonary disease) ([facility])    Atrial flutter ([facility])    CHF exacerbation ([facility])    UTI (urinary tract infection)    CHF (congestive heart failure) ([facility])    Shortness of breath    Acute exacerbation of CHF (congestive heart failure) ([facility])    CHF (congestive heart failure), NYHA class I, acute on chronic, combined ([facility] [address]        IMPRESSION:   Acute hypoxic respiratory failure now on 4 L nasal oxygen  Chronic hypoxic respiratory failure normally on 4 L nasal oxygen  Acute pulmonary edema  Severe cardiomyopathy  Underlying COPD  Pulmonary hypertension  Diastolic left ventricular dysfunction  Edema  Acute gout left toe improved  Body mass index is 35.03 kg/m².         RECOMMENDATIONS/PLAN:   Stable on Lasix 60 mg p.o. daily  Continue her home DuoNeb we will substitute Symbicort for Dulera  Continue home metolazone  Received 2 doses colchicine for acute gout asymptomatic today          [x] High complexity decision
Discharge plan of care/case management plan validated with provider discharge order. Discharge order noted and discharge summary in. Discharge instructions and medications reviewed with patient. Patient verbalized understanding and expressed no concerns. Education provided about lifestyle modifications for CHF including low sodium diet, fluid restriction, BP and edema monitoring, and home oxygen. IV access and telemetry box removed. Patient hooked up to home oxygen on 4L. Taken to front entrance in wheel chair and transported home by daughter with all belongings.
Dual RN skin assessment completed with Justo Guillen RN. Skin clean, dry, intact without abnormality. No pressure injuries noted.
Hospitalist Progress Note    NAME:   Dayanna Colin   : 1950   MRN: [id number]     Subjective:   Daily Progress Note: 2023     Hospital course to date/HPI from H&P:     69-year-old female with history of multiple medical problems as mentioned above presents to the emergency room complaining of severe shortness of breath with significant volume overload and lower extremity  edema. 23 presents to hospital with shortness of breath, chest tightness, nonproductive cough, associated with increased lower extremity edema despite compliance with her medication regimen. During the ED course she was found in fluid overload and managed with iv furosemide. I have been asked to admit to hospital for acute exacerbation of HFrEF for further stabilization and management of her condition. Chief complaint:  23-patient seen and examined, chart was reviewed. Patient still complaining of leg edema. Appreciate cardiology and pulmonary input. She denies any dizziness/shortness of breath to me at this time.     Current Facility-Administered Medications   Medication Dose Route Frequency    sodium chloride flush 0.9 % injection 5-40 mL  5-40 mL IntraVENous 2 times per day    sodium chloride flush 0.9 % injection 5-40 mL  5-40 mL IntraVENous PRN    0.9 % sodium chloride infusion   IntraVENous PRN    ondansetron (ZOFRAN-ODT) disintegrating tablet 4 mg  4 mg Oral Q8H PRN    Or    ondansetron (ZOFRAN) injection 4 mg  4 mg IntraVENous Q6H PRN    polyethylene glycol (GLYCOLAX) packet 17 g  17 g Oral Daily PRN    albuterol sulfate HFA (PROVENTIL;VENTOLIN;PROAIR) 108 (90 Base) MCG/ACT inhaler 2 puff  2 puff Inhalation Q4H PRN    allopurinol (ZYLOPRIM) tablet 100 mg  100 mg Oral Daily    amiodarone (CORDARONE) tablet 200 mg  200 mg Oral Daily    apixaban (ELIQUIS) tablet 5 mg  5 mg Oral BID    aspirin EC tablet 81 mg  81 mg Oral Daily    atorvastatin (LIPITOR) tablet 40 mg  40 mg Oral Nightly    empagliflozin
Progress Note      5/30/2023 9:37 AM  NAME: Temitope Wseton   MRN:  [id number]   Admit Diagnosis: Hypoxia [R09.02]  Elevated troponin [R77.8]  CHF (congestive heart failure), NYHA class I, acute on chronic, combined ([facility] [address] [I50.43]  Acute on chronic congestive heart failure, unspecified heart failure type ([facility] [address] [I50.9]      Problem List:   Acute CHF decompensation  History of HFrEF, EF 30-35%  CAD status post remote CABG  Essential hypertension  Paroxysmal atrial fibrillation/flutter     Assessment/Plan:   Continue with IV diuresis  Follow electrolytes and renal function  Continue on Eliquis for stroke risk  Follow EKG on amiodarone         []       High complexity decision making was performed in this patient at high risk for decompensation with multiple organ involvement. Subjective:     Temitope Weston reports somnolence,  Discussed with RN events overnight. Review of Systems:   Negative except for as noted above. Objective:      Physical Exam:    Last 24hrs VS reviewed since prior progress note. Most recent are:    BP (!) 146/78   Pulse 57   Temp 97.9 °F (36.6 °C) (Oral)   Resp 18   Ht 1.6 m (5' 3\")   Wt 104.2 kg (229 lb 11.5 oz)   SpO2 97%   BMI 40.69 kg/m²     Intake/Output Summary (Last 24 hours) at 5/30/2023 0587  Last data filed at 5/30/2023 9560  Gross per 24 hour   Intake --   Output 2700 ml   Net -2700 ml        General Appearance: no acute distress. Ears/Nose/Mouth/Throat: moist mucous membranes  Neck: Supple. Chest: Lungs clear to auscultation bilaterally. Cardiovascular: Regular rate and rhythm, S1S2 normal  Abdomen: Soft, non-tender, bowel sounds are active. Extremities: 2+edema bilaterally. Skin: Warm and dry. PMH/SH reviewed - no change compared to H&P    Telemetry: Sinus rhythm    EKG: Sinus rhythm    No valid procedures specified. No results found for this or any previous visit.     []  No new EKG for review    Lab Data Personally Reviewed:    Recent Labs
Progress Note      5/31/2023 3:12 PM  NAME: Deirdre Green   MRN:  [id number]   Admit Diagnosis: Hypoxia [R09.02]  Elevated troponin [R77.8]  CHF (congestive heart failure), NYHA class I, acute on chronic, combined ([facility] [address] [I50.43]  Acute on chronic congestive heart failure, unspecified heart failure type ([facility] [address] [I50.9]      Problem List:   Acute CHF decompensation  History of HFrEF, EF 30-35%  CAD status post remote CABG  Essential hypertension  Paroxysmal atrial fibrillation/flutter     Assessment/Plan:   Continue with IV diuresis  Follow electrolytes and renal function  Continue on Eliquis for stroke risk  Follow EKG on amiodarone         []       High complexity decision making was performed in this patient at high risk for decompensation with multiple organ involvement. Subjective:     Deirdre Green denies chest pain, mild dyspnea. Discussed with RN events overnight. Review of Systems:   Negative except for as noted above. Objective:      Physical Exam:    Last 24hrs VS reviewed since prior progress note. Most recent are:    BP (!) 99/59   Pulse 69   Temp 98.5 °F (36.9 °C) (Oral)   Resp 18   Ht 1.6 m (5' 3\")   Wt 94.5 kg (208 lb 5.4 oz)   SpO2 91%   BMI 36.90 kg/m²     Intake/Output Summary (Last 24 hours) at 5/31/2023 1512  Last data filed at 5/31/2023 1212  Gross per 24 hour   Intake 1272 ml   Output 5100 ml   Net -3828 ml        General Appearance: Alert; no acute distress. Ears/Nose/Mouth/Throat: moist mucous membranes  Neck: Supple. Chest: Lungs clear to auscultation bilaterally. Cardiovascular: Regular rate and rhythm, S1S2 normal  Abdomen: Soft, non-tender, bowel sounds are active. Extremities: 2+ edema bilaterally. Skin: Warm and dry. PMH/SH reviewed - no change compared to H&P    Telemetry: NSR    EKG:     No valid procedures specified. No results found for this or any previous visit.     []  No new EKG for review    Lab Data Personally Reviewed:    Recent Labs
Rian Horowitz RN and Lizbeth Thurston RN performed a dual skin assessment on this patient. No Impaiment noted. Skin is clean, dry, and intact.
5 MI)  - Clinical evidence of ischemia, as evidenced in an electrocardiogram (EKG)   showing new ischemic changes (Type 1, Type 2, Type 3, or Type 4a MI)  - Development of pathological Q waves (Types 1 - 5 MI)  - Imaging evidence of new loss of viable myocardium or new regional wall   motion abnormality in a pattern consistent with an ischemic etiology (Types 1   - 5 MI)    Thank you,  PORFIRIO Harkins, RN, CCDS, CRCR  Clinical   [email or web address] or contact via [employer]  Options provided:  -- MI type II present as evidenced by, Please document indicators of   myocardial infarction. -- MI type II ruled out after study and demand ischemia confirmed  -- MI type II ruled out after study and non-ischemic myocardial injury   confirmed  -- Other - I will add my own diagnosis  -- Disagree - Not applicable / Not valid  -- Disagree - Clinically unable to determine / Unknown  -- Refer to Clinical Documentation Reviewer    PROVIDER RESPONSE TEXT:    Type II MI was ruled out after study and demand ischemia confirmed.     Query created by: Irene Gomez on 6/8/2023 4:57 AM      Electronically signed by:  Aby Davey MD 6/8/2023 7:11 AM
Normal Affect  Skin no visible Rash        Data Review:    Recent Labs     05/30/23  0654 05/30/23  0654 05/31/23  0549 06/01/23  0840     --  140 137   K 3.8  --  3.7 4.2   *  --  101 98   CO2 30  --  36* 38*   GLUCOSE 113*  --  122* 115*   BUN 25*  --  22* 29*   CREATININE 1.50*  --  1.55* 2.07*   CALCIUM 8.5  --  8.8 8.4*   MG 1.8  --  1.7  --    PHOS  --    < > 4.6 5.3*   LABALBU 2.8*  --  2.6* 2.9*   BILITOT 1.0  --   --   --    AST 15  --   --   --    ALT 20  --   --   --     < > = values in this interval not displayed. Recent Results (from the past 18 hour(s))   POCT Glucose    Collection Time: 06/01/23  8:22 AM   Result Value Ref Range    POC Glucose 125 (H) 65 - 100 mg/dL    Performed by: Anthony Flor    Renal Function Panel    Collection Time: 06/01/23  8:40 AM   Result Value Ref Range    Sodium 137 136 - 145 mmol/L    Potassium 4.2 3.5 - 5.1 mmol/L    Chloride 98 97 - 108 mmol/L    CO2 38 (H) 21 - 32 mmol/L    Anion Gap 1 (L) 5 - 15 mmol/L    Glucose 115 (H) 65 - 100 mg/dL    BUN 29 (H) 6 - 20 mg/dL    Creatinine 2.07 (H) 0.55 - 1.02 mg/dL    Bun/Cre Ratio 14 12 - 20      Est, Glom Filt Rate 25 (L) >60 ml/min/1.73m2    Calcium 8.4 (L) 8.5 - 10.1 mg/dL    Phosphorus 5.3 (H) 2.6 - 4.7 mg/dL    Albumin 2.9 (L) 3.5 - 5.0 g/dL   POCT Glucose    Collection Time: 06/01/23 11:40 AM   Result Value Ref Range    POC Glucose 145 (H) 65 - 100 mg/dL    Performed by: Faye Selby      No results found for this or any previous visit. Results       ** No results found for the last 336 hours. **             XR CHEST PORTABLE    Result Date: 5/29/2023  INDICATION:  CP COMPARISON: April 11 FINDINGS: Single AP portable view of the chest obtained at 1308 demonstrates a stable cardiomediastinal silhouette. There is chronic cardiomegaly. Median sternotomy wires are noted. The lungs are clear bilaterally. No osseous abnormalities are seen. No evidence of acute cardiopulmonary process.
size is normal. Moderately increased wall thickness. Findings consistent with concentric hypertrophy. Moderate global hypokinesis present. Abnormal diastolic function. Right Ventricle: Right ventricle is mildly dilated. Normal wall thickness. Mitral Valve: Mildly thickened leaflet. Mild regurgitation. Tricuspid Valve: Moderate regurgitation. The estimated RVSP is 72 mmHg. Left Atrium: Left atrium is moderately dilated. Right Atrium: Right atrium is moderately dilated. Technical qualifiers: Echo study was technically difficult due to patient's body habitus. Assessment/Plan:     Acute on chronic systolic heart failure exacerbation-  Continue IV diuretics 40 mg Lasix twice daily  Monitor urine output  Continue other GDMT medications per cardiology    COPD-continue to wean oxygen, follow pulmonary recommendations,cont home inhalers  PA-fib  -continue Eliquis, metoprolol, Eliquis and amio. Cardiology  on  case  CAD/CABG-continue home medications  HTN-monitor BP on home meds  CKD stage III-creatinine at baseline, monitor on IV diuretics  DMT2-continue ISS monitor BG    Discussion/MDM:- Discussion/MDM: Patient with multiple medical comorbidities, each with high likelihood for morbidity and mortality if left untreated. I have reviewed patient's presenting subjective and objective findings, as well as all laboratory studies, imaging studies, and vital signs to date as well as treatment rendered and patient's response to those treatments. In addition, prior medical, surgical and relevant social and family histories were reviewed. I have discussed management plan with patient/family and with nursing staff. AD FC  DVT Prx -Eliquis  NOK -did not specify  Social Determinants of Health-none    Disposition-Home 24 hours    Barriers to discharge-pending medical stability        This is dictation was done by dragon, computer voice recognition software.   Quite often unanticipated grammatical, syntax,

## 2023-07-05 ENCOUNTER — APPOINTMENT (OUTPATIENT)
Facility: HOSPITAL | Age: 73
DRG: 280 | End: 2023-07-05
Payer: MEDICARE

## 2023-07-05 ENCOUNTER — HOSPITAL ENCOUNTER (INPATIENT)
Facility: HOSPITAL | Age: 73
LOS: 5 days | Discharge: HOME HEALTH | DRG: 280 | End: 2023-07-10
Attending: EMERGENCY MEDICINE | Admitting: FAMILY MEDICINE
Payer: MEDICARE

## 2023-07-05 DIAGNOSIS — I50.23 ACUTE ON CHRONIC SYSTOLIC CONGESTIVE HEART FAILURE (HCC): Primary | ICD-10-CM

## 2023-07-05 DIAGNOSIS — R77.8 ELEVATED TROPONIN: ICD-10-CM

## 2023-07-05 PROBLEM — J96.90 RESPIRATORY FAILURE (HCC): Status: ACTIVE | Noted: 2023-07-05

## 2023-07-05 LAB
ALBUMIN SERPL-MCNC: 3.1 G/DL (ref 3.5–5)
ALBUMIN/GLOB SERPL: 0.9 (ref 1.1–2.2)
ALP SERPL-CCNC: 221 U/L (ref 45–117)
ALT SERPL-CCNC: 39 U/L (ref 12–78)
ANION GAP SERPL CALC-SCNC: 8 MMOL/L (ref 5–15)
AST SERPL W P-5'-P-CCNC: 45 U/L (ref 15–37)
BASE DEFICIT BLD-SCNC: 0.2 MMOL/L
BASOPHILS # BLD: 0 K/UL (ref 0–0.1)
BASOPHILS NFR BLD: 1 % (ref 0–1)
BILIRUB SERPL-MCNC: 0.8 MG/DL (ref 0.2–1)
BNP SERPL-MCNC: ABNORMAL PG/ML
BUN SERPL-MCNC: 29 MG/DL (ref 6–20)
BUN/CREAT SERPL: 19 (ref 12–20)
CA-I BLD-MCNC: 1.21 MMOL/L (ref 1.12–1.32)
CA-I BLD-MCNC: 8.6 MG/DL (ref 8.5–10.1)
CHLORIDE BLD-SCNC: 114 MMOL/L (ref 98–107)
CHLORIDE SERPL-SCNC: 116 MMOL/L (ref 97–108)
CO2 BLD-SCNC: 25 MMOL/L
CO2 SERPL-SCNC: 25 MMOL/L (ref 21–32)
CREAT SERPL-MCNC: 1.51 MG/DL (ref 0.55–1.02)
CREAT UR-MCNC: 1.54 MG/DL (ref 0.6–1.3)
DIFFERENTIAL METHOD BLD: ABNORMAL
EOSINOPHIL # BLD: 0.1 K/UL (ref 0–0.4)
EOSINOPHIL NFR BLD: 1 % (ref 0–7)
ERYTHROCYTE [DISTWIDTH] IN BLOOD BY AUTOMATED COUNT: 17.2 % (ref 11.5–14.5)
GLOBULIN SER CALC-MCNC: 3.4 G/DL (ref 2–4)
GLUCOSE BLD STRIP.AUTO-MCNC: 141 MG/DL (ref 65–100)
GLUCOSE SERPL-MCNC: 119 MG/DL (ref 65–100)
HCO3 BLD-SCNC: 24.9 MMOL/L (ref 19–28)
HCT VFR BLD AUTO: 40.9 % (ref 35–47)
HGB BLD-MCNC: 12.4 G/DL (ref 11.5–16)
IMM GRANULOCYTES # BLD AUTO: 0 K/UL (ref 0–0.04)
IMM GRANULOCYTES NFR BLD AUTO: 0 % (ref 0–0.5)
LACTATE BLD-SCNC: 2.3 MMOL/L (ref 0.4–2)
LYMPHOCYTES # BLD: 1.2 K/UL (ref 0.8–3.5)
LYMPHOCYTES NFR BLD: 20 % (ref 12–49)
MCH RBC QN AUTO: 27.4 PG (ref 26–34)
MCHC RBC AUTO-ENTMCNC: 30.3 G/DL (ref 30–36.5)
MCV RBC AUTO: 90.5 FL (ref 80–99)
MONOCYTES # BLD: 0.5 K/UL (ref 0–1)
MONOCYTES NFR BLD: 8 % (ref 5–13)
NEUTS SEG # BLD: 4.2 K/UL (ref 1.8–8)
NEUTS SEG NFR BLD: 70 % (ref 32–75)
NRBC # BLD: 0 K/UL (ref 0–0.01)
NRBC BLD-RTO: 0 PER 100 WBC
PCO2 BLD: 41 MMHG (ref 35–45)
PERFORMED BY:: ABNORMAL
PH BLD: 7.39 (ref 7.35–7.45)
PLATELET # BLD AUTO: 157 K/UL (ref 150–400)
PO2 BLD: 68 MMHG (ref 75–100)
POTASSIUM BLD-SCNC: 4 MMOL/L (ref 3.5–5.5)
POTASSIUM SERPL-SCNC: 4.3 MMOL/L (ref 3.5–5.1)
PROT SERPL-MCNC: 6.5 G/DL (ref 6.4–8.2)
RBC # BLD AUTO: 4.52 M/UL (ref 3.8–5.2)
SAO2 % BLD: 93 %
SODIUM BLD-SCNC: 151 MMOL/L (ref 136–145)
SODIUM SERPL-SCNC: 149 MMOL/L (ref 136–145)
SPECIMEN SITE: ABNORMAL
TROPONIN I SERPL HS-MCNC: 166 NG/L (ref 0–51)
TROPONIN I SERPL HS-MCNC: 214 NG/L (ref 0–51)
WBC # BLD AUTO: 6 K/UL (ref 3.6–11)

## 2023-07-05 PROCEDURE — 94640 AIRWAY INHALATION TREATMENT: CPT

## 2023-07-05 PROCEDURE — 6370000000 HC RX 637 (ALT 250 FOR IP): Performed by: EMERGENCY MEDICINE

## 2023-07-05 PROCEDURE — 2700000000 HC OXYGEN THERAPY PER DAY

## 2023-07-05 PROCEDURE — 84295 ASSAY OF SERUM SODIUM: CPT

## 2023-07-05 PROCEDURE — 84132 ASSAY OF SERUM POTASSIUM: CPT

## 2023-07-05 PROCEDURE — 83605 ASSAY OF LACTIC ACID: CPT

## 2023-07-05 PROCEDURE — 82803 BLOOD GASES ANY COMBINATION: CPT

## 2023-07-05 PROCEDURE — 99285 EMERGENCY DEPT VISIT HI MDM: CPT

## 2023-07-05 PROCEDURE — 80053 COMPREHEN METABOLIC PANEL: CPT

## 2023-07-05 PROCEDURE — 96374 THER/PROPH/DIAG INJ IV PUSH: CPT

## 2023-07-05 PROCEDURE — 36415 COLL VENOUS BLD VENIPUNCTURE: CPT

## 2023-07-05 PROCEDURE — 2000000000 HC ICU R&B

## 2023-07-05 PROCEDURE — 6360000002 HC RX W HCPCS: Performed by: EMERGENCY MEDICINE

## 2023-07-05 PROCEDURE — 84484 ASSAY OF TROPONIN QUANT: CPT

## 2023-07-05 PROCEDURE — 83880 ASSAY OF NATRIURETIC PEPTIDE: CPT

## 2023-07-05 PROCEDURE — 93005 ELECTROCARDIOGRAM TRACING: CPT | Performed by: EMERGENCY MEDICINE

## 2023-07-05 PROCEDURE — 87040 BLOOD CULTURE FOR BACTERIA: CPT

## 2023-07-05 PROCEDURE — 82947 ASSAY GLUCOSE BLOOD QUANT: CPT

## 2023-07-05 PROCEDURE — 71045 X-RAY EXAM CHEST 1 VIEW: CPT

## 2023-07-05 PROCEDURE — 82330 ASSAY OF CALCIUM: CPT

## 2023-07-05 PROCEDURE — 85025 COMPLETE CBC W/AUTO DIFF WBC: CPT

## 2023-07-05 RX ORDER — IPRATROPIUM BROMIDE AND ALBUTEROL SULFATE 2.5; .5 MG/3ML; MG/3ML
1 SOLUTION RESPIRATORY (INHALATION)
Status: COMPLETED | OUTPATIENT
Start: 2023-07-05 | End: 2023-07-05

## 2023-07-05 RX ORDER — ACETAMINOPHEN 325 MG/1
650 TABLET ORAL
Status: COMPLETED | OUTPATIENT
Start: 2023-07-05 | End: 2023-07-05

## 2023-07-05 RX ORDER — ONDANSETRON 2 MG/ML
4 INJECTION INTRAMUSCULAR; INTRAVENOUS EVERY 6 HOURS PRN
Status: DISCONTINUED | OUTPATIENT
Start: 2023-07-05 | End: 2023-07-10 | Stop reason: HOSPADM

## 2023-07-05 RX ORDER — ALLOPURINOL 100 MG/1
100 TABLET ORAL DAILY
Status: DISCONTINUED | OUTPATIENT
Start: 2023-07-06 | End: 2023-07-10 | Stop reason: HOSPADM

## 2023-07-05 RX ORDER — SODIUM CHLORIDE 0.9 % (FLUSH) 0.9 %
5-40 SYRINGE (ML) INJECTION PRN
Status: DISCONTINUED | OUTPATIENT
Start: 2023-07-05 | End: 2023-07-10 | Stop reason: HOSPADM

## 2023-07-05 RX ORDER — ALBUTEROL SULFATE 90 UG/1
2 AEROSOL, METERED RESPIRATORY (INHALATION) EVERY 4 HOURS PRN
Status: DISCONTINUED | OUTPATIENT
Start: 2023-07-05 | End: 2023-07-10 | Stop reason: HOSPADM

## 2023-07-05 RX ORDER — METOPROLOL SUCCINATE 25 MG/1
25 TABLET, EXTENDED RELEASE ORAL DAILY
Status: DISCONTINUED | OUTPATIENT
Start: 2023-07-06 | End: 2023-07-07

## 2023-07-05 RX ORDER — IPRATROPIUM BROMIDE AND ALBUTEROL SULFATE 2.5; .5 MG/3ML; MG/3ML
1 SOLUTION RESPIRATORY (INHALATION)
Status: DISCONTINUED | OUTPATIENT
Start: 2023-07-06 | End: 2023-07-06

## 2023-07-05 RX ORDER — OXYBUTYNIN CHLORIDE 5 MG/1
5 TABLET ORAL 3 TIMES DAILY
Status: DISCONTINUED | OUTPATIENT
Start: 2023-07-05 | End: 2023-07-10 | Stop reason: HOSPADM

## 2023-07-05 RX ORDER — ASPIRIN 81 MG/1
81 TABLET ORAL DAILY
Status: DISCONTINUED | OUTPATIENT
Start: 2023-07-06 | End: 2023-07-10 | Stop reason: HOSPADM

## 2023-07-05 RX ORDER — INSULIN LISPRO 100 [IU]/ML
0-16 INJECTION, SOLUTION INTRAVENOUS; SUBCUTANEOUS
Status: DISCONTINUED | OUTPATIENT
Start: 2023-07-06 | End: 2023-07-10 | Stop reason: HOSPADM

## 2023-07-05 RX ORDER — ONDANSETRON 4 MG/1
4 TABLET, ORALLY DISINTEGRATING ORAL EVERY 8 HOURS PRN
Status: DISCONTINUED | OUTPATIENT
Start: 2023-07-05 | End: 2023-07-10 | Stop reason: HOSPADM

## 2023-07-05 RX ORDER — DEXTROSE MONOHYDRATE 100 MG/ML
INJECTION, SOLUTION INTRAVENOUS CONTINUOUS PRN
Status: DISCONTINUED | OUTPATIENT
Start: 2023-07-05 | End: 2023-07-10 | Stop reason: HOSPADM

## 2023-07-05 RX ORDER — ASPIRIN 81 MG/1
324 TABLET, CHEWABLE ORAL ONCE
Status: COMPLETED | OUTPATIENT
Start: 2023-07-05 | End: 2023-07-05

## 2023-07-05 RX ORDER — BUDESONIDE AND FORMOTEROL FUMARATE DIHYDRATE 160; 4.5 UG/1; UG/1
2 AEROSOL RESPIRATORY (INHALATION) 2 TIMES DAILY
Status: DISCONTINUED | OUTPATIENT
Start: 2023-07-05 | End: 2023-07-10 | Stop reason: HOSPADM

## 2023-07-05 RX ORDER — FUROSEMIDE 10 MG/ML
40 INJECTION INTRAMUSCULAR; INTRAVENOUS DAILY
Status: DISCONTINUED | OUTPATIENT
Start: 2023-07-06 | End: 2023-07-07

## 2023-07-05 RX ORDER — METOLAZONE 5 MG/1
5 TABLET ORAL DAILY
Status: DISCONTINUED | OUTPATIENT
Start: 2023-07-06 | End: 2023-07-10 | Stop reason: HOSPADM

## 2023-07-05 RX ORDER — FUROSEMIDE 10 MG/ML
60 INJECTION INTRAMUSCULAR; INTRAVENOUS
Status: COMPLETED | OUTPATIENT
Start: 2023-07-05 | End: 2023-07-05

## 2023-07-05 RX ORDER — POLYETHYLENE GLYCOL 3350 17 G/17G
17 POWDER, FOR SOLUTION ORAL DAILY PRN
Status: DISCONTINUED | OUTPATIENT
Start: 2023-07-05 | End: 2023-07-10 | Stop reason: HOSPADM

## 2023-07-05 RX ORDER — AMIODARONE HYDROCHLORIDE 200 MG/1
200 TABLET ORAL DAILY
Status: DISCONTINUED | OUTPATIENT
Start: 2023-07-06 | End: 2023-07-10 | Stop reason: HOSPADM

## 2023-07-05 RX ORDER — ATORVASTATIN CALCIUM 40 MG/1
40 TABLET, FILM COATED ORAL NIGHTLY
Status: DISCONTINUED | OUTPATIENT
Start: 2023-07-05 | End: 2023-07-10 | Stop reason: HOSPADM

## 2023-07-05 RX ORDER — SODIUM CHLORIDE 9 MG/ML
INJECTION, SOLUTION INTRAVENOUS PRN
Status: DISCONTINUED | OUTPATIENT
Start: 2023-07-05 | End: 2023-07-10 | Stop reason: HOSPADM

## 2023-07-05 RX ORDER — INSULIN LISPRO 100 [IU]/ML
0-4 INJECTION, SOLUTION INTRAVENOUS; SUBCUTANEOUS NIGHTLY
Status: DISCONTINUED | OUTPATIENT
Start: 2023-07-05 | End: 2023-07-10 | Stop reason: HOSPADM

## 2023-07-05 RX ORDER — SODIUM CHLORIDE 0.9 % (FLUSH) 0.9 %
5-40 SYRINGE (ML) INJECTION EVERY 12 HOURS SCHEDULED
Status: DISCONTINUED | OUTPATIENT
Start: 2023-07-05 | End: 2023-07-10 | Stop reason: HOSPADM

## 2023-07-05 RX ADMIN — IPRATROPIUM BROMIDE AND ALBUTEROL SULFATE 1 DOSE: .5; 3 SOLUTION RESPIRATORY (INHALATION) at 19:42

## 2023-07-05 RX ADMIN — ASPIRIN 324 MG: 81 TABLET, CHEWABLE ORAL at 22:43

## 2023-07-05 RX ADMIN — ACETAMINOPHEN 650 MG: 325 TABLET ORAL at 21:34

## 2023-07-05 RX ADMIN — FUROSEMIDE 60 MG: 10 INJECTION, SOLUTION INTRAMUSCULAR; INTRAVENOUS at 22:33

## 2023-07-05 ASSESSMENT — PAIN SCALES - GENERAL: PAINLEVEL_OUTOF10: 8

## 2023-07-05 ASSESSMENT — PAIN - FUNCTIONAL ASSESSMENT: PAIN_FUNCTIONAL_ASSESSMENT: NONE - DENIES PAIN

## 2023-07-05 ASSESSMENT — PAIN DESCRIPTION - LOCATION: LOCATION: HEAD

## 2023-07-06 LAB
ALBUMIN SERPL-MCNC: 3.1 G/DL (ref 3.5–5)
ALBUMIN/GLOB SERPL: 0.8 (ref 1.1–2.2)
ALP SERPL-CCNC: 185 U/L (ref 45–117)
ALT SERPL-CCNC: 35 U/L (ref 12–78)
ANION GAP SERPL CALC-SCNC: 4 MMOL/L (ref 5–15)
AST SERPL W P-5'-P-CCNC: 38 U/L (ref 15–37)
BASOPHILS # BLD: 0 K/UL (ref 0–0.1)
BASOPHILS NFR BLD: 1 % (ref 0–1)
BILIRUB SERPL-MCNC: 1 MG/DL (ref 0.2–1)
BUN SERPL-MCNC: 28 MG/DL (ref 6–20)
BUN/CREAT SERPL: 18 (ref 12–20)
CA-I BLD-MCNC: 8.6 MG/DL (ref 8.5–10.1)
CHLORIDE SERPL-SCNC: 113 MMOL/L (ref 97–108)
CO2 SERPL-SCNC: 30 MMOL/L (ref 21–32)
CREAT SERPL-MCNC: 1.57 MG/DL (ref 0.55–1.02)
DIFFERENTIAL METHOD BLD: ABNORMAL
EKG ATRIAL RATE: 85 BPM
EKG DIAGNOSIS: NORMAL
EKG P AXIS: 77 DEGREES
EKG P-R INTERVAL: 160 MS
EKG Q-T INTERVAL: 414 MS
EKG QRS DURATION: 98 MS
EKG QTC CALCULATION (BAZETT): 492 MS
EKG R AXIS: 46 DEGREES
EKG T AXIS: 136 DEGREES
EKG VENTRICULAR RATE: 85 BPM
EOSINOPHIL # BLD: 0.1 K/UL (ref 0–0.4)
EOSINOPHIL NFR BLD: 2 % (ref 0–7)
ERYTHROCYTE [DISTWIDTH] IN BLOOD BY AUTOMATED COUNT: 18.5 % (ref 11.5–14.5)
EST. AVERAGE GLUCOSE BLD GHB EST-MCNC: 128 MG/DL
GLOBULIN SER CALC-MCNC: 3.7 G/DL (ref 2–4)
GLUCOSE BLD STRIP.AUTO-MCNC: 109 MG/DL (ref 65–100)
GLUCOSE BLD STRIP.AUTO-MCNC: 110 MG/DL (ref 65–100)
GLUCOSE BLD STRIP.AUTO-MCNC: 111 MG/DL (ref 65–100)
GLUCOSE BLD STRIP.AUTO-MCNC: 143 MG/DL (ref 65–100)
GLUCOSE BLD STRIP.AUTO-MCNC: 98 MG/DL (ref 65–100)
GLUCOSE SERPL-MCNC: 101 MG/DL (ref 65–100)
HBA1C MFR BLD: 6.1 % (ref 4–5.6)
HCT VFR BLD AUTO: 53.6 % (ref 35–47)
HGB BLD-MCNC: 16.2 G/DL (ref 11.5–16)
IMM GRANULOCYTES # BLD AUTO: 0 K/UL (ref 0–0.04)
IMM GRANULOCYTES NFR BLD AUTO: 0 % (ref 0–0.5)
LYMPHOCYTES # BLD: 0.9 K/UL (ref 0.8–3.5)
LYMPHOCYTES NFR BLD: 24 % (ref 12–49)
MCH RBC QN AUTO: 27.3 PG (ref 26–34)
MCHC RBC AUTO-ENTMCNC: 30.2 G/DL (ref 30–36.5)
MCV RBC AUTO: 90.4 FL (ref 80–99)
MONOCYTES # BLD: 0.3 K/UL (ref 0–1)
MONOCYTES NFR BLD: 7 % (ref 5–13)
MRSA DNA SPEC QL NAA+PROBE: NOT DETECTED
NEUTS SEG # BLD: 2.4 K/UL (ref 1.8–8)
NEUTS SEG NFR BLD: 66 % (ref 32–75)
NRBC # BLD: 0 K/UL (ref 0–0.01)
NRBC BLD-RTO: 0 PER 100 WBC
PERFORMED BY:: ABNORMAL
PERFORMED BY:: NORMAL
PLATELET # BLD AUTO: 80 K/UL (ref 150–400)
POTASSIUM SERPL-SCNC: 3.6 MMOL/L (ref 3.5–5.1)
POTASSIUM SERPL-SCNC: 4.2 MMOL/L (ref 3.5–5.1)
PROT SERPL-MCNC: 6.8 G/DL (ref 6.4–8.2)
RBC # BLD AUTO: 5.93 M/UL (ref 3.8–5.2)
SODIUM SERPL-SCNC: 147 MMOL/L (ref 136–145)
TROPONIN I SERPL HS-MCNC: 276 NG/L (ref 0–51)
TROPONIN I SERPL HS-MCNC: 351 NG/L (ref 0–51)
TROPONIN I SERPL HS-MCNC: 435 NG/L (ref 0–51)
WBC # BLD AUTO: 3.6 K/UL (ref 3.6–11)

## 2023-07-06 PROCEDURE — 6360000002 HC RX W HCPCS: Performed by: FAMILY MEDICINE

## 2023-07-06 PROCEDURE — 84484 ASSAY OF TROPONIN QUANT: CPT

## 2023-07-06 PROCEDURE — 82962 GLUCOSE BLOOD TEST: CPT

## 2023-07-06 PROCEDURE — 80053 COMPREHEN METABOLIC PANEL: CPT

## 2023-07-06 PROCEDURE — 83036 HEMOGLOBIN GLYCOSYLATED A1C: CPT

## 2023-07-06 PROCEDURE — 1100000000 HC RM PRIVATE

## 2023-07-06 PROCEDURE — 2700000000 HC OXYGEN THERAPY PER DAY

## 2023-07-06 PROCEDURE — 87641 MR-STAPH DNA AMP PROBE: CPT

## 2023-07-06 PROCEDURE — 6370000000 HC RX 637 (ALT 250 FOR IP): Performed by: FAMILY MEDICINE

## 2023-07-06 PROCEDURE — 6370000000 HC RX 637 (ALT 250 FOR IP): Performed by: INTERNAL MEDICINE

## 2023-07-06 PROCEDURE — 84132 ASSAY OF SERUM POTASSIUM: CPT

## 2023-07-06 PROCEDURE — 94761 N-INVAS EAR/PLS OXIMETRY MLT: CPT

## 2023-07-06 PROCEDURE — 94640 AIRWAY INHALATION TREATMENT: CPT

## 2023-07-06 PROCEDURE — 85025 COMPLETE CBC W/AUTO DIFF WBC: CPT

## 2023-07-06 PROCEDURE — 2580000003 HC RX 258: Performed by: FAMILY MEDICINE

## 2023-07-06 RX ORDER — IPRATROPIUM BROMIDE AND ALBUTEROL SULFATE 2.5; .5 MG/3ML; MG/3ML
1 SOLUTION RESPIRATORY (INHALATION)
Status: DISCONTINUED | OUTPATIENT
Start: 2023-07-06 | End: 2023-07-10 | Stop reason: HOSPADM

## 2023-07-06 RX ORDER — HYDRALAZINE HYDROCHLORIDE 50 MG/1
50 TABLET, FILM COATED ORAL EVERY 6 HOURS PRN
Status: DISCONTINUED | OUTPATIENT
Start: 2023-07-06 | End: 2023-07-10 | Stop reason: HOSPADM

## 2023-07-06 RX ORDER — FUROSEMIDE 40 MG/1
60 TABLET ORAL DAILY
Status: DISCONTINUED | OUTPATIENT
Start: 2023-07-07 | End: 2023-07-10 | Stop reason: HOSPADM

## 2023-07-06 RX ADMIN — IPRATROPIUM BROMIDE AND ALBUTEROL SULFATE 1 DOSE: 2.5; .5 SOLUTION RESPIRATORY (INHALATION) at 20:37

## 2023-07-06 RX ADMIN — SODIUM CHLORIDE, PRESERVATIVE FREE 20 ML: 5 INJECTION INTRAVENOUS at 02:37

## 2023-07-06 RX ADMIN — ATORVASTATIN CALCIUM 40 MG: 40 TABLET, FILM COATED ORAL at 02:37

## 2023-07-06 RX ADMIN — AMIODARONE HYDROCHLORIDE 200 MG: 200 TABLET ORAL at 08:06

## 2023-07-06 RX ADMIN — OXYBUTYNIN CHLORIDE 5 MG: 5 TABLET ORAL at 20:27

## 2023-07-06 RX ADMIN — METOPROLOL SUCCINATE 25 MG: 50 TABLET, EXTENDED RELEASE ORAL at 08:06

## 2023-07-06 RX ADMIN — SODIUM CHLORIDE, PRESERVATIVE FREE 10 ML: 5 INJECTION INTRAVENOUS at 20:31

## 2023-07-06 RX ADMIN — APIXABAN 5 MG: 5 TABLET, FILM COATED ORAL at 08:06

## 2023-07-06 RX ADMIN — SODIUM CHLORIDE, PRESERVATIVE FREE 10 ML: 5 INJECTION INTRAVENOUS at 09:00

## 2023-07-06 RX ADMIN — APIXABAN 5 MG: 5 TABLET, FILM COATED ORAL at 02:37

## 2023-07-06 RX ADMIN — ALLOPURINOL 100 MG: 100 TABLET ORAL at 08:06

## 2023-07-06 RX ADMIN — OXYBUTYNIN CHLORIDE 5 MG: 5 TABLET ORAL at 11:14

## 2023-07-06 RX ADMIN — BUDESONIDE AND FORMOTEROL FUMARATE DIHYDRATE 2 PUFF: 160; 4.5 AEROSOL RESPIRATORY (INHALATION) at 20:56

## 2023-07-06 RX ADMIN — SACUBITRIL AND VALSARTAN 1 TABLET: 97; 103 TABLET, FILM COATED ORAL at 22:00

## 2023-07-06 RX ADMIN — SACUBITRIL AND VALSARTAN 1 TABLET: 97; 103 TABLET, FILM COATED ORAL at 11:14

## 2023-07-06 RX ADMIN — ATORVASTATIN CALCIUM 40 MG: 40 TABLET, FILM COATED ORAL at 20:27

## 2023-07-06 RX ADMIN — IPRATROPIUM BROMIDE AND ALBUTEROL SULFATE 1 DOSE: .5; 3 SOLUTION RESPIRATORY (INHALATION) at 07:42

## 2023-07-06 RX ADMIN — BUDESONIDE AND FORMOTEROL FUMARATE DIHYDRATE 2 PUFF: 160; 4.5 AEROSOL RESPIRATORY (INHALATION) at 00:17

## 2023-07-06 RX ADMIN — IPRATROPIUM BROMIDE AND ALBUTEROL SULFATE 1 DOSE: 2.5; .5 SOLUTION RESPIRATORY (INHALATION) at 13:17

## 2023-07-06 RX ADMIN — BUDESONIDE AND FORMOTEROL FUMARATE DIHYDRATE 2 PUFF: 160; 4.5 AEROSOL RESPIRATORY (INHALATION) at 07:42

## 2023-07-06 RX ADMIN — FUROSEMIDE 40 MG: 10 INJECTION, SOLUTION INTRAMUSCULAR; INTRAVENOUS at 08:06

## 2023-07-06 RX ADMIN — METOLAZONE 5 MG: 5 TABLET ORAL at 11:14

## 2023-07-06 RX ADMIN — EMPAGLIFLOZIN 10 MG: 10 TABLET, FILM COATED ORAL at 08:06

## 2023-07-06 RX ADMIN — APIXABAN 5 MG: 5 TABLET, FILM COATED ORAL at 20:27

## 2023-07-06 RX ADMIN — ASPIRIN 81 MG: 81 TABLET, COATED ORAL at 08:06

## 2023-07-06 ASSESSMENT — PAIN SCALES - GENERAL: PAINLEVEL_OUTOF10: 3

## 2023-07-06 ASSESSMENT — PAIN DESCRIPTION - LOCATION: LOCATION: HEAD

## 2023-07-07 ENCOUNTER — APPOINTMENT (OUTPATIENT)
Facility: HOSPITAL | Age: 73
DRG: 280 | End: 2023-07-07
Payer: MEDICARE

## 2023-07-07 LAB
ALBUMIN SERPL-MCNC: 2.7 G/DL (ref 3.5–5)
ALBUMIN SERPL-MCNC: 2.7 G/DL (ref 3.5–5)
ALBUMIN/GLOB SERPL: 0.8 (ref 1.1–2.2)
ALP SERPL-CCNC: 176 U/L (ref 45–117)
ALT SERPL-CCNC: 29 U/L (ref 12–78)
ANION GAP SERPL CALC-SCNC: 2 MMOL/L (ref 5–15)
ANION GAP SERPL CALC-SCNC: 4 MMOL/L (ref 5–15)
AST SERPL W P-5'-P-CCNC: 21 U/L (ref 15–37)
BASOPHILS # BLD: 0.1 K/UL (ref 0–0.1)
BASOPHILS NFR BLD: 1 % (ref 0–1)
BILIRUB SERPL-MCNC: 0.9 MG/DL (ref 0.2–1)
BNP SERPL-MCNC: 7001 PG/ML
BNP SERPL-MCNC: 7164 PG/ML
BUN SERPL-MCNC: 24 MG/DL (ref 6–20)
BUN SERPL-MCNC: 25 MG/DL (ref 6–20)
BUN/CREAT SERPL: 16 (ref 12–20)
BUN/CREAT SERPL: 17 (ref 12–20)
CA-I BLD-MCNC: 8.5 MG/DL (ref 8.5–10.1)
CA-I BLD-MCNC: 8.7 MG/DL (ref 8.5–10.1)
CHLORIDE SERPL-SCNC: 107 MMOL/L (ref 97–108)
CHLORIDE SERPL-SCNC: 108 MMOL/L (ref 97–108)
CO2 SERPL-SCNC: 32 MMOL/L (ref 21–32)
CO2 SERPL-SCNC: 33 MMOL/L (ref 21–32)
CREAT SERPL-MCNC: 1.46 MG/DL (ref 0.55–1.02)
CREAT SERPL-MCNC: 1.48 MG/DL (ref 0.55–1.02)
DIFFERENTIAL METHOD BLD: ABNORMAL
EOSINOPHIL # BLD: 0.1 K/UL (ref 0–0.4)
EOSINOPHIL NFR BLD: 2 % (ref 0–7)
ERYTHROCYTE [DISTWIDTH] IN BLOOD BY AUTOMATED COUNT: 16.5 % (ref 11.5–14.5)
GLOBULIN SER CALC-MCNC: 3.4 G/DL (ref 2–4)
GLUCOSE BLD STRIP.AUTO-MCNC: 119 MG/DL (ref 65–100)
GLUCOSE BLD STRIP.AUTO-MCNC: 129 MG/DL (ref 65–100)
GLUCOSE BLD STRIP.AUTO-MCNC: 142 MG/DL (ref 65–100)
GLUCOSE BLD STRIP.AUTO-MCNC: 161 MG/DL (ref 65–100)
GLUCOSE SERPL-MCNC: 127 MG/DL (ref 65–100)
GLUCOSE SERPL-MCNC: 129 MG/DL (ref 65–100)
HCT VFR BLD AUTO: 41 % (ref 35–47)
HGB BLD-MCNC: 12.4 G/DL (ref 11.5–16)
IMM GRANULOCYTES # BLD AUTO: 0 K/UL (ref 0–0.04)
IMM GRANULOCYTES NFR BLD AUTO: 0 % (ref 0–0.5)
LYMPHOCYTES # BLD: 1.3 K/UL (ref 0.8–3.5)
LYMPHOCYTES NFR BLD: 24 % (ref 12–49)
MCH RBC QN AUTO: 27.8 PG (ref 26–34)
MCHC RBC AUTO-ENTMCNC: 30.2 G/DL (ref 30–36.5)
MCV RBC AUTO: 91.9 FL (ref 80–99)
MONOCYTES # BLD: 0.6 K/UL (ref 0–1)
MONOCYTES NFR BLD: 10 % (ref 5–13)
NEUTS SEG # BLD: 3.6 K/UL (ref 1.8–8)
NEUTS SEG NFR BLD: 63 % (ref 32–75)
NRBC # BLD: 0 K/UL (ref 0–0.01)
NRBC BLD-RTO: 0 PER 100 WBC
PERFORMED BY:: ABNORMAL
PHOSPHATE SERPL-MCNC: 4.6 MG/DL (ref 2.6–4.7)
PLATELET # BLD AUTO: 163 K/UL (ref 150–400)
PMV BLD AUTO: 12.8 FL (ref 8.9–12.9)
POTASSIUM SERPL-SCNC: 3.7 MMOL/L (ref 3.5–5.1)
POTASSIUM SERPL-SCNC: 3.7 MMOL/L (ref 3.5–5.1)
PROT SERPL-MCNC: 6.1 G/DL (ref 6.4–8.2)
RBC # BLD AUTO: 4.46 M/UL (ref 3.8–5.2)
SODIUM SERPL-SCNC: 143 MMOL/L (ref 136–145)
SODIUM SERPL-SCNC: 143 MMOL/L (ref 136–145)
TROPONIN I SERPL HS-MCNC: 290 NG/L (ref 0–51)
WBC # BLD AUTO: 5.6 K/UL (ref 3.6–11)

## 2023-07-07 PROCEDURE — 6370000000 HC RX 637 (ALT 250 FOR IP): Performed by: INTERNAL MEDICINE

## 2023-07-07 PROCEDURE — 71045 X-RAY EXAM CHEST 1 VIEW: CPT

## 2023-07-07 PROCEDURE — 97530 THERAPEUTIC ACTIVITIES: CPT

## 2023-07-07 PROCEDURE — 84484 ASSAY OF TROPONIN QUANT: CPT

## 2023-07-07 PROCEDURE — 6370000000 HC RX 637 (ALT 250 FOR IP): Performed by: FAMILY MEDICINE

## 2023-07-07 PROCEDURE — 85025 COMPLETE CBC W/AUTO DIFF WBC: CPT

## 2023-07-07 PROCEDURE — 2700000000 HC OXYGEN THERAPY PER DAY

## 2023-07-07 PROCEDURE — 82962 GLUCOSE BLOOD TEST: CPT

## 2023-07-07 PROCEDURE — 2580000003 HC RX 258: Performed by: FAMILY MEDICINE

## 2023-07-07 PROCEDURE — 2060000000 HC ICU INTERMEDIATE R&B

## 2023-07-07 PROCEDURE — 83880 ASSAY OF NATRIURETIC PEPTIDE: CPT

## 2023-07-07 PROCEDURE — 80053 COMPREHEN METABOLIC PANEL: CPT

## 2023-07-07 PROCEDURE — 36415 COLL VENOUS BLD VENIPUNCTURE: CPT

## 2023-07-07 PROCEDURE — 80069 RENAL FUNCTION PANEL: CPT

## 2023-07-07 PROCEDURE — 94761 N-INVAS EAR/PLS OXIMETRY MLT: CPT

## 2023-07-07 PROCEDURE — 97161 PT EVAL LOW COMPLEX 20 MIN: CPT

## 2023-07-07 PROCEDURE — 94640 AIRWAY INHALATION TREATMENT: CPT

## 2023-07-07 RX ORDER — METOPROLOL SUCCINATE 25 MG/1
50 TABLET, EXTENDED RELEASE ORAL DAILY
Status: DISCONTINUED | OUTPATIENT
Start: 2023-07-08 | End: 2023-07-10 | Stop reason: HOSPADM

## 2023-07-07 RX ORDER — CETIRIZINE HYDROCHLORIDE 10 MG/1
10 TABLET ORAL DAILY
Status: DISCONTINUED | OUTPATIENT
Start: 2023-07-07 | End: 2023-07-10 | Stop reason: HOSPADM

## 2023-07-07 RX ORDER — CHOLECALCIFEROL (VITAMIN D3) 125 MCG
5 CAPSULE ORAL NIGHTLY PRN
Status: DISCONTINUED | OUTPATIENT
Start: 2023-07-07 | End: 2023-07-10 | Stop reason: HOSPADM

## 2023-07-07 RX ADMIN — SACUBITRIL AND VALSARTAN 1 TABLET: 97; 103 TABLET, FILM COATED ORAL at 09:30

## 2023-07-07 RX ADMIN — FUROSEMIDE 60 MG: 40 TABLET ORAL at 12:41

## 2023-07-07 RX ADMIN — IPRATROPIUM BROMIDE AND ALBUTEROL SULFATE 1 DOSE: 2.5; .5 SOLUTION RESPIRATORY (INHALATION) at 20:12

## 2023-07-07 RX ADMIN — SODIUM CHLORIDE, PRESERVATIVE FREE 10 ML: 5 INJECTION INTRAVENOUS at 21:52

## 2023-07-07 RX ADMIN — METOLAZONE 5 MG: 5 TABLET ORAL at 09:30

## 2023-07-07 RX ADMIN — IPRATROPIUM BROMIDE AND ALBUTEROL SULFATE 1 DOSE: 2.5; .5 SOLUTION RESPIRATORY (INHALATION) at 14:04

## 2023-07-07 RX ADMIN — SACUBITRIL AND VALSARTAN 1 TABLET: 97; 103 TABLET, FILM COATED ORAL at 21:50

## 2023-07-07 RX ADMIN — CETIRIZINE HYDROCHLORIDE 10 MG: 10 TABLET, FILM COATED ORAL at 22:51

## 2023-07-07 RX ADMIN — EMPAGLIFLOZIN 10 MG: 10 TABLET, FILM COATED ORAL at 09:30

## 2023-07-07 RX ADMIN — OXYBUTYNIN CHLORIDE 5 MG: 5 TABLET ORAL at 09:30

## 2023-07-07 RX ADMIN — Medication 5 MG: at 22:51

## 2023-07-07 RX ADMIN — APIXABAN 5 MG: 5 TABLET, FILM COATED ORAL at 21:50

## 2023-07-07 RX ADMIN — APIXABAN 5 MG: 5 TABLET, FILM COATED ORAL at 09:30

## 2023-07-07 RX ADMIN — ALLOPURINOL 100 MG: 100 TABLET ORAL at 09:30

## 2023-07-07 RX ADMIN — ASPIRIN 81 MG: 81 TABLET, COATED ORAL at 09:30

## 2023-07-07 RX ADMIN — OXYBUTYNIN CHLORIDE 5 MG: 5 TABLET ORAL at 21:50

## 2023-07-07 RX ADMIN — ATORVASTATIN CALCIUM 40 MG: 40 TABLET, FILM COATED ORAL at 21:50

## 2023-07-07 RX ADMIN — NITROGLYCERIN 1 INCH: 20 OINTMENT TOPICAL at 02:00

## 2023-07-07 RX ADMIN — NITROGLYCERIN 1 INCH: 20 OINTMENT TOPICAL at 12:41

## 2023-07-07 RX ADMIN — NITROGLYCERIN 1 INCH: 20 OINTMENT TOPICAL at 21:50

## 2023-07-07 RX ADMIN — BUDESONIDE AND FORMOTEROL FUMARATE DIHYDRATE 2 PUFF: 160; 4.5 AEROSOL RESPIRATORY (INHALATION) at 20:12

## 2023-07-07 RX ADMIN — NITROGLYCERIN 1 INCH: 20 OINTMENT TOPICAL at 05:47

## 2023-07-07 RX ADMIN — SODIUM CHLORIDE, PRESERVATIVE FREE 5 ML: 5 INJECTION INTRAVENOUS at 10:05

## 2023-07-07 RX ADMIN — NITROGLYCERIN 1 INCH: 20 OINTMENT TOPICAL at 09:30

## 2023-07-07 RX ADMIN — OXYBUTYNIN CHLORIDE 5 MG: 5 TABLET ORAL at 15:51

## 2023-07-07 RX ADMIN — AMIODARONE HYDROCHLORIDE 200 MG: 200 TABLET ORAL at 09:30

## 2023-07-07 RX ADMIN — NITROGLYCERIN 1 INCH: 20 OINTMENT TOPICAL at 18:16

## 2023-07-08 LAB
GLUCOSE BLD STRIP.AUTO-MCNC: 103 MG/DL (ref 65–100)
GLUCOSE BLD STRIP.AUTO-MCNC: 106 MG/DL (ref 65–100)
GLUCOSE BLD STRIP.AUTO-MCNC: 129 MG/DL (ref 65–100)
GLUCOSE BLD STRIP.AUTO-MCNC: 129 MG/DL (ref 65–100)
PERFORMED BY:: ABNORMAL

## 2023-07-08 PROCEDURE — 82962 GLUCOSE BLOOD TEST: CPT

## 2023-07-08 PROCEDURE — 6370000000 HC RX 637 (ALT 250 FOR IP): Performed by: FAMILY MEDICINE

## 2023-07-08 PROCEDURE — 6370000000 HC RX 637 (ALT 250 FOR IP): Performed by: INTERNAL MEDICINE

## 2023-07-08 PROCEDURE — 94640 AIRWAY INHALATION TREATMENT: CPT

## 2023-07-08 PROCEDURE — 2700000000 HC OXYGEN THERAPY PER DAY

## 2023-07-08 PROCEDURE — 2580000003 HC RX 258: Performed by: FAMILY MEDICINE

## 2023-07-08 PROCEDURE — 94761 N-INVAS EAR/PLS OXIMETRY MLT: CPT

## 2023-07-08 PROCEDURE — 2060000000 HC ICU INTERMEDIATE R&B

## 2023-07-08 RX ORDER — TRAMADOL HYDROCHLORIDE 50 MG/1
50 TABLET ORAL EVERY 6 HOURS PRN
Status: DISCONTINUED | OUTPATIENT
Start: 2023-07-08 | End: 2023-07-10 | Stop reason: HOSPADM

## 2023-07-08 RX ADMIN — IPRATROPIUM BROMIDE AND ALBUTEROL SULFATE 1 DOSE: 2.5; .5 SOLUTION RESPIRATORY (INHALATION) at 09:27

## 2023-07-08 RX ADMIN — METOLAZONE 5 MG: 5 TABLET ORAL at 09:15

## 2023-07-08 RX ADMIN — FUROSEMIDE 60 MG: 40 TABLET ORAL at 09:07

## 2023-07-08 RX ADMIN — ATORVASTATIN CALCIUM 40 MG: 40 TABLET, FILM COATED ORAL at 21:04

## 2023-07-08 RX ADMIN — NITROGLYCERIN 1 INCH: 20 OINTMENT TOPICAL at 15:50

## 2023-07-08 RX ADMIN — SODIUM CHLORIDE, PRESERVATIVE FREE 10 ML: 5 INJECTION INTRAVENOUS at 21:04

## 2023-07-08 RX ADMIN — IPRATROPIUM BROMIDE AND ALBUTEROL SULFATE 1 DOSE: 2.5; .5 SOLUTION RESPIRATORY (INHALATION) at 14:26

## 2023-07-08 RX ADMIN — CETIRIZINE HYDROCHLORIDE 10 MG: 10 TABLET, FILM COATED ORAL at 09:07

## 2023-07-08 RX ADMIN — SODIUM CHLORIDE, PRESERVATIVE FREE 5 ML: 5 INJECTION INTRAVENOUS at 09:08

## 2023-07-08 RX ADMIN — NITROGLYCERIN 1 INCH: 20 OINTMENT TOPICAL at 23:00

## 2023-07-08 RX ADMIN — APIXABAN 5 MG: 5 TABLET, FILM COATED ORAL at 09:09

## 2023-07-08 RX ADMIN — OXYBUTYNIN CHLORIDE 5 MG: 5 TABLET ORAL at 09:08

## 2023-07-08 RX ADMIN — OXYBUTYNIN CHLORIDE 5 MG: 5 TABLET ORAL at 15:50

## 2023-07-08 RX ADMIN — SACUBITRIL AND VALSARTAN 1 TABLET: 97; 103 TABLET, FILM COATED ORAL at 21:04

## 2023-07-08 RX ADMIN — BUDESONIDE AND FORMOTEROL FUMARATE DIHYDRATE 2 PUFF: 160; 4.5 AEROSOL RESPIRATORY (INHALATION) at 09:37

## 2023-07-08 RX ADMIN — APIXABAN 5 MG: 5 TABLET, FILM COATED ORAL at 21:04

## 2023-07-08 RX ADMIN — NITROGLYCERIN 1 INCH: 20 OINTMENT TOPICAL at 12:56

## 2023-07-08 RX ADMIN — POLYETHYLENE GLYCOL 3350 17 G: 17 POWDER, FOR SOLUTION ORAL at 10:31

## 2023-07-08 RX ADMIN — NITROGLYCERIN 1 INCH: 20 OINTMENT TOPICAL at 19:23

## 2023-07-08 RX ADMIN — AMIODARONE HYDROCHLORIDE 200 MG: 200 TABLET ORAL at 09:08

## 2023-07-08 RX ADMIN — TRAMADOL HYDROCHLORIDE 50 MG: 50 TABLET ORAL at 04:24

## 2023-07-08 RX ADMIN — BUDESONIDE AND FORMOTEROL FUMARATE DIHYDRATE 2 PUFF: 160; 4.5 AEROSOL RESPIRATORY (INHALATION) at 19:12

## 2023-07-08 RX ADMIN — NITROGLYCERIN 1 INCH: 20 OINTMENT TOPICAL at 09:05

## 2023-07-08 RX ADMIN — IPRATROPIUM BROMIDE AND ALBUTEROL SULFATE 1 DOSE: 2.5; .5 SOLUTION RESPIRATORY (INHALATION) at 19:12

## 2023-07-08 RX ADMIN — ASPIRIN 81 MG: 81 TABLET, COATED ORAL at 09:06

## 2023-07-08 RX ADMIN — Medication 5 MG: at 21:04

## 2023-07-08 RX ADMIN — ALLOPURINOL 100 MG: 100 TABLET ORAL at 09:06

## 2023-07-08 RX ADMIN — EMPAGLIFLOZIN 10 MG: 10 TABLET, FILM COATED ORAL at 09:08

## 2023-07-08 RX ADMIN — OXYBUTYNIN CHLORIDE 5 MG: 5 TABLET ORAL at 21:04

## 2023-07-08 RX ADMIN — NITROGLYCERIN 1 INCH: 20 OINTMENT TOPICAL at 03:25

## 2023-07-08 RX ADMIN — SACUBITRIL AND VALSARTAN 1 TABLET: 97; 103 TABLET, FILM COATED ORAL at 09:08

## 2023-07-08 ASSESSMENT — PAIN SCALES - GENERAL
PAINLEVEL_OUTOF10: 7
PAINLEVEL_OUTOF10: 8

## 2023-07-08 ASSESSMENT — PAIN DESCRIPTION - LOCATION: LOCATION: BACK

## 2023-07-09 LAB
ALBUMIN SERPL-MCNC: 2.8 G/DL (ref 3.5–5)
ALBUMIN/GLOB SERPL: 0.7 (ref 1.1–2.2)
ALP SERPL-CCNC: 173 U/L (ref 45–117)
ALT SERPL-CCNC: 18 U/L (ref 12–78)
ANION GAP SERPL CALC-SCNC: 5 MMOL/L (ref 5–15)
AST SERPL W P-5'-P-CCNC: 12 U/L (ref 15–37)
BILIRUB SERPL-MCNC: 1.1 MG/DL (ref 0.2–1)
BUN SERPL-MCNC: 31 MG/DL (ref 6–20)
BUN/CREAT SERPL: 19 (ref 12–20)
CA-I BLD-MCNC: 8.4 MG/DL (ref 8.5–10.1)
CHLORIDE SERPL-SCNC: 98 MMOL/L (ref 97–108)
CO2 SERPL-SCNC: 35 MMOL/L (ref 21–32)
CREAT SERPL-MCNC: 1.63 MG/DL (ref 0.55–1.02)
ERYTHROCYTE [DISTWIDTH] IN BLOOD BY AUTOMATED COUNT: 16.5 % (ref 11.5–14.5)
GLOBULIN SER CALC-MCNC: 3.8 G/DL (ref 2–4)
GLUCOSE BLD STRIP.AUTO-MCNC: 104 MG/DL (ref 65–100)
GLUCOSE BLD STRIP.AUTO-MCNC: 114 MG/DL (ref 65–100)
GLUCOSE BLD STRIP.AUTO-MCNC: 139 MG/DL (ref 65–100)
GLUCOSE BLD STRIP.AUTO-MCNC: 149 MG/DL (ref 65–100)
GLUCOSE SERPL-MCNC: 132 MG/DL (ref 65–100)
HCT VFR BLD AUTO: 49.1 % (ref 35–47)
HGB BLD-MCNC: 14.8 G/DL (ref 11.5–16)
MCH RBC QN AUTO: 27.8 PG (ref 26–34)
MCHC RBC AUTO-ENTMCNC: 30.1 G/DL (ref 30–36.5)
MCV RBC AUTO: 92.1 FL (ref 80–99)
NRBC # BLD: 0 K/UL (ref 0–0.01)
NRBC BLD-RTO: 0 PER 100 WBC
PERFORMED BY:: ABNORMAL
PLATELET # BLD AUTO: 177 K/UL (ref 150–400)
PMV BLD AUTO: 13.2 FL (ref 8.9–12.9)
POTASSIUM SERPL-SCNC: 4 MMOL/L (ref 3.5–5.1)
PROT SERPL-MCNC: 6.6 G/DL (ref 6.4–8.2)
RBC # BLD AUTO: 5.33 M/UL (ref 3.8–5.2)
SODIUM SERPL-SCNC: 138 MMOL/L (ref 136–145)
WBC # BLD AUTO: 8.5 K/UL (ref 3.6–11)

## 2023-07-09 PROCEDURE — 82962 GLUCOSE BLOOD TEST: CPT

## 2023-07-09 PROCEDURE — 2580000003 HC RX 258: Performed by: FAMILY MEDICINE

## 2023-07-09 PROCEDURE — 6370000000 HC RX 637 (ALT 250 FOR IP): Performed by: FAMILY MEDICINE

## 2023-07-09 PROCEDURE — 94761 N-INVAS EAR/PLS OXIMETRY MLT: CPT

## 2023-07-09 PROCEDURE — 6370000000 HC RX 637 (ALT 250 FOR IP): Performed by: INTERNAL MEDICINE

## 2023-07-09 PROCEDURE — 94640 AIRWAY INHALATION TREATMENT: CPT

## 2023-07-09 PROCEDURE — 85027 COMPLETE CBC AUTOMATED: CPT

## 2023-07-09 PROCEDURE — 2700000000 HC OXYGEN THERAPY PER DAY

## 2023-07-09 PROCEDURE — 80053 COMPREHEN METABOLIC PANEL: CPT

## 2023-07-09 PROCEDURE — 36415 COLL VENOUS BLD VENIPUNCTURE: CPT

## 2023-07-09 PROCEDURE — 2060000000 HC ICU INTERMEDIATE R&B

## 2023-07-09 RX ORDER — GUAIFENESIN 600 MG/1
600 TABLET, EXTENDED RELEASE ORAL 2 TIMES DAILY
Status: DISCONTINUED | OUTPATIENT
Start: 2023-07-09 | End: 2023-07-10 | Stop reason: HOSPADM

## 2023-07-09 RX ADMIN — SODIUM CHLORIDE, PRESERVATIVE FREE 10 ML: 5 INJECTION INTRAVENOUS at 20:47

## 2023-07-09 RX ADMIN — APIXABAN 5 MG: 5 TABLET, FILM COATED ORAL at 20:45

## 2023-07-09 RX ADMIN — ASPIRIN 81 MG: 81 TABLET, COATED ORAL at 08:00

## 2023-07-09 RX ADMIN — TRAMADOL HYDROCHLORIDE 50 MG: 50 TABLET ORAL at 20:45

## 2023-07-09 RX ADMIN — APIXABAN 5 MG: 5 TABLET, FILM COATED ORAL at 08:00

## 2023-07-09 RX ADMIN — CETIRIZINE HYDROCHLORIDE 10 MG: 10 TABLET, FILM COATED ORAL at 08:00

## 2023-07-09 RX ADMIN — AMIODARONE HYDROCHLORIDE 200 MG: 200 TABLET ORAL at 08:00

## 2023-07-09 RX ADMIN — FUROSEMIDE 60 MG: 40 TABLET ORAL at 08:00

## 2023-07-09 RX ADMIN — EMPAGLIFLOZIN 10 MG: 10 TABLET, FILM COATED ORAL at 08:00

## 2023-07-09 RX ADMIN — ALLOPURINOL 100 MG: 100 TABLET ORAL at 08:00

## 2023-07-09 RX ADMIN — IPRATROPIUM BROMIDE AND ALBUTEROL SULFATE 1 DOSE: 2.5; .5 SOLUTION RESPIRATORY (INHALATION) at 20:28

## 2023-07-09 RX ADMIN — IPRATROPIUM BROMIDE AND ALBUTEROL SULFATE 1 DOSE: 2.5; .5 SOLUTION RESPIRATORY (INHALATION) at 13:40

## 2023-07-09 RX ADMIN — METOPROLOL SUCCINATE 50 MG: 25 TABLET, EXTENDED RELEASE ORAL at 08:00

## 2023-07-09 RX ADMIN — GUAIFENESIN 600 MG: 600 TABLET, EXTENDED RELEASE ORAL at 08:00

## 2023-07-09 RX ADMIN — Medication 5 MG: at 22:03

## 2023-07-09 RX ADMIN — NITROGLYCERIN 1 INCH: 20 OINTMENT TOPICAL at 22:03

## 2023-07-09 RX ADMIN — OXYBUTYNIN CHLORIDE 5 MG: 5 TABLET ORAL at 20:45

## 2023-07-09 RX ADMIN — NITROGLYCERIN 1 INCH: 20 OINTMENT TOPICAL at 07:30

## 2023-07-09 RX ADMIN — BUDESONIDE AND FORMOTEROL FUMARATE DIHYDRATE 2 PUFF: 160; 4.5 AEROSOL RESPIRATORY (INHALATION) at 20:28

## 2023-07-09 RX ADMIN — SACUBITRIL AND VALSARTAN 1 TABLET: 97; 103 TABLET, FILM COATED ORAL at 08:00

## 2023-07-09 RX ADMIN — NITROGLYCERIN 1 INCH: 20 OINTMENT TOPICAL at 04:03

## 2023-07-09 RX ADMIN — ATORVASTATIN CALCIUM 40 MG: 40 TABLET, FILM COATED ORAL at 20:45

## 2023-07-09 RX ADMIN — OXYBUTYNIN CHLORIDE 5 MG: 5 TABLET ORAL at 08:00

## 2023-07-09 RX ADMIN — METOLAZONE 5 MG: 5 TABLET ORAL at 08:00

## 2023-07-09 RX ADMIN — NITROGLYCERIN 1 INCH: 20 OINTMENT TOPICAL at 11:29

## 2023-07-09 RX ADMIN — NITROGLYCERIN 1 INCH: 20 OINTMENT TOPICAL at 14:48

## 2023-07-09 RX ADMIN — GUAIFENESIN 600 MG: 600 TABLET, EXTENDED RELEASE ORAL at 20:45

## 2023-07-09 RX ADMIN — NITROGLYCERIN 1 INCH: 20 OINTMENT TOPICAL at 18:00

## 2023-07-09 RX ADMIN — OXYBUTYNIN CHLORIDE 5 MG: 5 TABLET ORAL at 14:49

## 2023-07-09 RX ADMIN — BUDESONIDE AND FORMOTEROL FUMARATE DIHYDRATE 2 PUFF: 160; 4.5 AEROSOL RESPIRATORY (INHALATION) at 07:33

## 2023-07-09 RX ADMIN — IPRATROPIUM BROMIDE AND ALBUTEROL SULFATE 1 DOSE: 2.5; .5 SOLUTION RESPIRATORY (INHALATION) at 07:33

## 2023-07-09 RX ADMIN — SODIUM CHLORIDE, PRESERVATIVE FREE 10 ML: 5 INJECTION INTRAVENOUS at 08:57

## 2023-07-10 VITALS
SYSTOLIC BLOOD PRESSURE: 118 MMHG | HEART RATE: 71 BPM | BODY MASS INDEX: 37.42 KG/M2 | TEMPERATURE: 98.8 F | DIASTOLIC BLOOD PRESSURE: 71 MMHG | WEIGHT: 211.2 LBS | RESPIRATION RATE: 22 BRPM | HEIGHT: 63 IN | OXYGEN SATURATION: 97 %

## 2023-07-10 PROBLEM — J96.90 RESPIRATORY FAILURE (HCC): Status: RESOLVED | Noted: 2023-07-05 | Resolved: 2023-07-10

## 2023-07-10 PROBLEM — I50.23 ACUTE ON CHRONIC SYSTOLIC CHF (CONGESTIVE HEART FAILURE), NYHA CLASS 1 (HCC): Status: RESOLVED | Noted: 2023-07-05 | Resolved: 2023-07-10

## 2023-07-10 LAB
ALBUMIN SERPL-MCNC: 3 G/DL (ref 3.5–5)
ANION GAP SERPL CALC-SCNC: 6 MMOL/L (ref 5–15)
BUN SERPL-MCNC: 45 MG/DL (ref 6–20)
BUN/CREAT SERPL: 23 (ref 12–20)
CA-I BLD-MCNC: 8.4 MG/DL (ref 8.5–10.1)
CHLORIDE SERPL-SCNC: 99 MMOL/L (ref 97–108)
CO2 SERPL-SCNC: 34 MMOL/L (ref 21–32)
CREAT SERPL-MCNC: 1.98 MG/DL (ref 0.55–1.02)
GLUCOSE BLD STRIP.AUTO-MCNC: 115 MG/DL (ref 65–100)
GLUCOSE BLD STRIP.AUTO-MCNC: 130 MG/DL (ref 65–100)
GLUCOSE SERPL-MCNC: 116 MG/DL (ref 65–100)
PERFORMED BY:: ABNORMAL
PERFORMED BY:: ABNORMAL
PHOSPHATE SERPL-MCNC: 4.5 MG/DL (ref 2.6–4.7)
POTASSIUM SERPL-SCNC: 4.3 MMOL/L (ref 3.5–5.1)
SODIUM SERPL-SCNC: 139 MMOL/L (ref 136–145)

## 2023-07-10 PROCEDURE — 36415 COLL VENOUS BLD VENIPUNCTURE: CPT

## 2023-07-10 PROCEDURE — 97165 OT EVAL LOW COMPLEX 30 MIN: CPT

## 2023-07-10 PROCEDURE — 6370000000 HC RX 637 (ALT 250 FOR IP): Performed by: INTERNAL MEDICINE

## 2023-07-10 PROCEDURE — 97530 THERAPEUTIC ACTIVITIES: CPT

## 2023-07-10 PROCEDURE — 80069 RENAL FUNCTION PANEL: CPT

## 2023-07-10 PROCEDURE — 6370000000 HC RX 637 (ALT 250 FOR IP): Performed by: FAMILY MEDICINE

## 2023-07-10 PROCEDURE — 82962 GLUCOSE BLOOD TEST: CPT

## 2023-07-10 PROCEDURE — 94761 N-INVAS EAR/PLS OXIMETRY MLT: CPT

## 2023-07-10 PROCEDURE — 2580000003 HC RX 258: Performed by: FAMILY MEDICINE

## 2023-07-10 PROCEDURE — 2700000000 HC OXYGEN THERAPY PER DAY

## 2023-07-10 PROCEDURE — 94640 AIRWAY INHALATION TREATMENT: CPT

## 2023-07-10 RX ORDER — METOPROLOL SUCCINATE 50 MG/1
50 TABLET, EXTENDED RELEASE ORAL DAILY
Qty: 30 TABLET | Refills: 3 | Status: SHIPPED | OUTPATIENT
Start: 2023-07-11

## 2023-07-10 RX ORDER — IPRATROPIUM BROMIDE AND ALBUTEROL SULFATE 2.5; .5 MG/3ML; MG/3ML
3 SOLUTION RESPIRATORY (INHALATION)
Qty: 360 ML | Refills: 1 | Status: SHIPPED | OUTPATIENT
Start: 2023-07-10

## 2023-07-10 RX ORDER — FUROSEMIDE 20 MG/1
60 TABLET ORAL DAILY
Qty: 60 TABLET | Refills: 3 | Status: SHIPPED | OUTPATIENT
Start: 2023-07-10

## 2023-07-10 RX ORDER — HYDRALAZINE HYDROCHLORIDE 50 MG/1
50 TABLET, FILM COATED ORAL EVERY 6 HOURS PRN
Qty: 90 TABLET | Refills: 3 | Status: SHIPPED | OUTPATIENT
Start: 2023-07-10

## 2023-07-10 RX ORDER — CETIRIZINE HYDROCHLORIDE 10 MG/1
10 TABLET ORAL DAILY
Qty: 30 TABLET | Refills: 0 | Status: SHIPPED | OUTPATIENT
Start: 2023-07-11

## 2023-07-10 RX ORDER — METOLAZONE 5 MG/1
5 TABLET ORAL DAILY
Qty: 30 TABLET | Refills: 11 | Status: SHIPPED | OUTPATIENT
Start: 2023-07-10 | End: 2024-07-09

## 2023-07-10 RX ORDER — GUAIFENESIN 600 MG/1
600 TABLET, EXTENDED RELEASE ORAL 2 TIMES DAILY
Qty: 30 TABLET | Refills: 0 | Status: SHIPPED | OUTPATIENT
Start: 2023-07-10

## 2023-07-10 RX ORDER — COLCHICINE 0.6 MG/1
1.2 TABLET ORAL ONCE
Status: COMPLETED | OUTPATIENT
Start: 2023-07-10 | End: 2023-07-10

## 2023-07-10 RX ADMIN — NITROGLYCERIN 1 INCH: 20 OINTMENT TOPICAL at 07:04

## 2023-07-10 RX ADMIN — METOPROLOL SUCCINATE 50 MG: 25 TABLET, EXTENDED RELEASE ORAL at 08:29

## 2023-07-10 RX ADMIN — NITROGLYCERIN 1 INCH: 20 OINTMENT TOPICAL at 03:10

## 2023-07-10 RX ADMIN — OXYBUTYNIN CHLORIDE 5 MG: 5 TABLET ORAL at 08:28

## 2023-07-10 RX ADMIN — ASPIRIN 81 MG: 81 TABLET, COATED ORAL at 08:29

## 2023-07-10 RX ADMIN — APIXABAN 5 MG: 5 TABLET, FILM COATED ORAL at 08:29

## 2023-07-10 RX ADMIN — EMPAGLIFLOZIN 10 MG: 10 TABLET, FILM COATED ORAL at 08:28

## 2023-07-10 RX ADMIN — SACUBITRIL AND VALSARTAN 1 TABLET: 97; 103 TABLET, FILM COATED ORAL at 08:29

## 2023-07-10 RX ADMIN — SODIUM CHLORIDE, PRESERVATIVE FREE 10 ML: 5 INJECTION INTRAVENOUS at 08:29

## 2023-07-10 RX ADMIN — CETIRIZINE HYDROCHLORIDE 10 MG: 10 TABLET, FILM COATED ORAL at 08:28

## 2023-07-10 RX ADMIN — ALLOPURINOL 100 MG: 100 TABLET ORAL at 08:29

## 2023-07-10 RX ADMIN — GUAIFENESIN 600 MG: 600 TABLET, EXTENDED RELEASE ORAL at 08:29

## 2023-07-10 RX ADMIN — METOLAZONE 5 MG: 5 TABLET ORAL at 08:29

## 2023-07-10 RX ADMIN — TRAMADOL HYDROCHLORIDE 50 MG: 50 TABLET ORAL at 07:26

## 2023-07-10 RX ADMIN — FUROSEMIDE 60 MG: 40 TABLET ORAL at 08:29

## 2023-07-10 RX ADMIN — COLCHICINE 1.2 MG: 0.6 TABLET, FILM COATED ORAL at 11:34

## 2023-07-10 RX ADMIN — BUDESONIDE AND FORMOTEROL FUMARATE DIHYDRATE 2 PUFF: 160; 4.5 AEROSOL RESPIRATORY (INHALATION) at 07:56

## 2023-07-10 RX ADMIN — AMIODARONE HYDROCHLORIDE 200 MG: 200 TABLET ORAL at 08:29

## 2023-07-10 ASSESSMENT — PAIN DESCRIPTION - LOCATION: LOCATION: TOE (COMMENT WHICH ONE)

## 2023-07-10 ASSESSMENT — PAIN SCALES - GENERAL
PAINLEVEL_OUTOF10: 9
PAINLEVEL_OUTOF10: 6

## 2023-07-10 NOTE — PROGRESS NOTES
Patient received discharge orders to go home with home health care. Patient and family both agree with discharge planning. Patients IV access removed along with telemetry monitor discontinued. Patient verbalized understanding of all discharge instructions, prescription information, and follow-up instructions. She was able to get herself dressed and ambulate to wheelchair with standby assist. I wheeled the patient down to her daughters vehicle and she stated she had all of her belongings with her at this time including her own home 02 tank.

## 2023-07-10 NOTE — PLAN OF CARE
OCCUPATIONAL THERAPY EVALUATION  Patient: Vance Weeks (89 y.o. female)  Date: 7/10/2023  Primary Diagnosis: Respiratory failure (HCC) [J96.90]  Elevated troponin [R77.8]  Acute on chronic systolic congestive heart failure (HCC) [I50.23]  Acute on chronic systolic CHF (congestive heart failure), NYHA class 1 (HCC) [I50.23]       Precautions:Fall Risk, General Precautions                In place during session:Peripheral IV, Nasal Cannula 4L, External Catheter, and EKG/telemetry     ASSESSMENT  Pt is a 71 y.o. female presenting to [facility] with c/o SOB, admitted 7/5 and currently being treated for respiratory failure, elevated troponin, and CHF. Pt received semi-supine in bed upon arrival (family entered room in the middle of the session; remained w/ permission), AXO x4, and agreeable to OT evaluation. Based on current observations, pt presents with decreased  functional mobility, independence in ADLs, ROM, strength, activity tolerance, endurance, balance, increased pain levels (see below for objective details and assist levels). Overall, pt tolerates session fair with c/o L shoulder pain at rest and during movement. Pt denies recent fall or injury that could have impacted pain in L; she reports pain started ~2 days ago. Pt reports pain in anterior deltoid and area just medial to glenohumeral joint; pt reports pain w/ AROM and PROM when reaching ~90 degrees of shoulder flexion. Pt also reports pain w/ elbow flexion at end range. OT lightly palpated and noted to have slight inflammation in area. Pt completed 4 reps of elbow flexion and 3 reps of AAROM of L shoulder flexion; ROM slightly increased without pain. OT educated pt on completing above exs within range of pain tolerance to help reduce pain and improve ROM; pt verbalized understanding. OT also educated pt on how to properly position LUE at EOB and in supine to help relieve stress on shoulder; pt/family verbalized understanding.  Pt req'd min A for STS
PHYSICAL THERAPY TREATMENT     Patient: Kenney Norton (13 y.o. female)  Date: 7/10/2023  Diagnosis: Respiratory failure (HCC) [J96.90]  Elevated troponin [R77.8]  Acute on chronic systolic congestive heart failure (HCC) [I50.23]  Acute on chronic systolic CHF (congestive heart failure), NYHA class 1 (HCC) [I50.23] Acute on chronic systolic CHF (congestive heart failure), NYHA class 1 ([address])      Precautions:  Fall Risk, General Precautions  In place during session: External Catheter and EKG/telemetry   Chart, physical therapy assessment, plan of care and goals were reviewed. ASSESSMENT  Patient continues with skilled PT services and is progressing towards goals. Pt seated in the bed upon PT arrival, agreeable to session. Patient cont to req CGA-min A overall for all mobility however limited OOB mobility today due to L great toe pain and L shoulder pain. Nursing reported gout flare up in the L toe. Patient educated on positioning and mobility for the RUE and notified OT to address as well for specific exercises. Pt demos decreased step length BLE and shuffled gait, slightly antalgic due to toe pain. Will cont to progress mobility pending pain. OT present for eval at end of tx. (See below for objective details and assist levels). Overall pt tolerated session fair today. Will continue to benefit from skilled PT services, and will continue to progress as tolerated. Start of Session End of Session   SPO2 (%) 96 94   Heart Rate (BPM) 68 72       Current Level of Function Impacting Discharge (mobility/balance): safety, min A    Other factors to consider for discharge:  pain and assist at home        PLAN :  Patient continues to benefit from skilled intervention to address impaired functional mobility, decreased ROM, impaired strength, and impaired balance. Continue treatment per established plan of care to address goals.     Recommend with staff: Out of bed to chair for meals and Amb to bathroom for
therapy assess nares and determine need for appliance change or resting period.   7/9/2023 2303 by Quiana Goldberg RN  Outcome: Progressing  7/9/2023 1301 by Yanni Méndez RN  Outcome: Progressing     Problem: ABCDS Injury Assessment  Goal: Absence of physical injury  7/9/2023 2303 by Quiana Goldberg RN  Outcome: Progressing  7/9/2023 1301 by Yanni Méndez RN  Outcome: Progressing     Problem: Neurosensory - Adult  Goal: Achieves stable or improved neurological status  Outcome: Progressing  Goal: Achieves maximal functionality and self care  Outcome: Progressing

## 2023-07-10 NOTE — PROGRESS NOTES
Consult  Pulmonary, Critical Care    Name: Kal Maya MRN: [id number]   : 1950 Hospital: [address]   Date: 7/10/2023  Admission date: 2023 Hospital Day: 6       Subjective/Interval History:   Seen in the ICU on nasal high flow oxygen O2 36%. She is normally on 4 L nasal oxygen at home. She has known severe cardiomyopathy chronic congestive heart failure was just in the hospital at the end of May. Had been doing fairly well but ran out of some of her medications she is not sure which ones. Her daughter ordered them but she had not gotten them in yet. She became very short of breath yesterday could not catch her breath came to the emergency room had mild pulmonary edema hypoxia. Was attempted to be placed on BiPAP which she could not tolerate and then was placed on nasal high flow FiO2 of 36%. She was given IV Lasix overnight had good diuresis and is feeling better today she denies any fever chills or sweats no significant cough. States she did not have any worsening edema of her ankles but had more abdominal bloating.  her family brought in her medications. Her Lasix bottle is empty. Her metolazone which should have been used up May 31 still has almost a full bottle. And she has multiple duplicate bottles of Entresto Eliquis metoprolol   feels fine today walked to the bathroom and back without difficulty   respirations nonlabored oxygen saturation 96% on her baseline 4 L  7/10 oxygen saturation well-maintained on her baseline 4 L nasal oxygen.   She does complain of gout acting up in her left foot today    Patient Active Problem List   Diagnosis    Atrial flutter with rapid ventricular response (HCC)    Heart failure (HCC)    COPD exacerbation (HCC)    COPD (chronic obstructive pulmonary disease) (HCC)    Atrial flutter (HCC)    CHF exacerbation (HCC)    UTI (urinary tract infection)    CHF (congestive heart failure) (HCC)    Shortness of breath    Acute exacerbation

## 2023-07-10 NOTE — CARE COORDINATION
Patient has discharge orders to go home with home health. CM met with the patient at the bedside. She has no preference for home health choice. Multiple referrals sent, awaiting acceptance at this time. Patient has been accepted with [facility].

## 2023-07-10 NOTE — DISCHARGE INSTRUCTIONS
Discharge Instructions       PATIENT ID: Elicia Genao  MRN: [id number]   YOB: 1950    DATE OF ADMISSION: 7/5/2023  DATE OF DISCHARGE: 7/10/2023    PRIMARY CARE PROVIDER: [email or web address]     ATTENDING PHYSICIAN: Juan R López MD   DISCHARGING PROVIDER: Juan R López MD    To contact this individual call [phone] and ask the  to page. If unavailable, ask to be transferred the Adult Hospitalist Department. DISCHARGE DIAGNOSES CHF chronic respiratory failure    CONSULTATIONS: [unfilled]      PROCEDURES/SURGERIES: * No surgery found *    PENDING TEST RESULTS:   At the time of discharge the following test results are still pending: None    FOLLOW UP APPOINTMENTS:   [address] [place] [id number]  [phone]    Schedule an appointment as soon as possible for a visit in 1 week(s)         ADDITIONAL CARE RECOMMENDATIONS: Follow-up with PCP and compliance with the medications    DIET: cardiac diet      ACTIVITY: activity as tolerated    Wound care: Wound Care Order:  submitted to Case Management. Please view [email or web address]     EQUIPMENT needed: ***      DISCHARGE MEDICATIONS:   See Medication Reconciliation Form    It is important that you take the medication exactly as they are prescribed. Keep your medication in the bottles provided by the pharmacist and keep a list of the medication names, dosages, and times to be taken in your wallet. Do not take other medications without consulting your doctor. NOTIFY YOUR PHYSICIAN FOR ANY OF THE FOLLOWING:   Fever over 101 degrees for 24 hours. Chest pain, shortness of breath, fever, chills, nausea, vomiting, diarrhea, change in mentation, falling, weakness, bleeding. Severe pain or pain not relieved by medications. Or, any other signs or symptoms that you may have questions about.       DISPOSITION:    Home With:   OT  PT  HH  RN       SNF/Inpatient Rehab/LTAC    Independent/assisted living

## 2023-07-10 NOTE — DISCHARGE SUMMARY
400 K/uL    MPV 13.2 (H) 8.9 - 12.9 FL    Nucleated RBCs 0.0 0.0  WBC    nRBC 0.00 0.00 - 0.01 K/uL   Comprehensive Metabolic Panel    Collection Time: 07/09/23 10:43 AM   Result Value Ref Range    Sodium 138 136 - 145 mmol/L    Potassium 4.0 3.5 - 5.1 mmol/L    Chloride 98 97 - 108 mmol/L    CO2 35 (H) 21 - 32 mmol/L    Anion Gap 5 5 - 15 mmol/L    Glucose 132 (H) 65 - 100 mg/dL    BUN 31 (H) 6 - 20 mg/dL    Creatinine 1.63 (H) 0.55 - 1.02 mg/dL    Bun/Cre Ratio 19 12 - 20      Est, Glom Filt Rate 34 (L) >60 ml/min/1.73m2    Calcium 8.4 (L) 8.5 - 10.1 mg/dL    Total Bilirubin 1.1 (H) 0.2 - 1.0 mg/dL    AST 12 (L) 15 - 37 U/L    ALT 18 12 - 78 U/L    Alk Phosphatase 173 (H) 45 - 117 U/L    Total Protein 6.6 6.4 - 8.2 g/dL    Albumin 2.8 (L) 3.5 - 5.0 g/dL    Globulin 3.8 2.0 - 4.0 g/dL    Albumin/Globulin Ratio 0.7 (L) 1.1 - 2.2     POCT Glucose    Collection Time: 07/09/23 11:35 AM   Result Value Ref Range    POC Glucose 114 (H) 65 - 100 mg/dL    Performed by: [facility]    POCT Glucose    Collection Time: 07/09/23  4:42 PM   Result Value Ref Range    POC Glucose 149 (H) 65 - 100 mg/dL    Performed by: [facility]    POCT Glucose    Collection Time: 07/09/23  8:43 PM   Result Value Ref Range    POC Glucose 139 (H) 65 - 100 mg/dL    Performed by: Olesya De La O    POCT Glucose    Collection Time: 07/10/23  7:22 AM   Result Value Ref Range    POC Glucose 130 (H) 65 - 100 mg/dL    Performed by: Natalia Farias       No results found.        HOSPITAL COURSE:    Patient is a 71y.o. year old female with past medical history of chronic CHF hypertension COPD paroxysmal A-fib chronic leg edema type 2 diabetes chronic respiratory failure on 3 L oxygen at home history of TIA chronic kidney disease  Emergency room complaining of shortness of breath patient having shortness of breath for almost 1 week for last few days getting more worse patient not on any diuretic at home seen by the ER physician work-up done in

## 2023-07-11 LAB
BACTERIA SPEC CULT: NORMAL
BACTERIA SPEC CULT: NORMAL
Lab: NORMAL
Lab: NORMAL

## 2023-10-15 ENCOUNTER — APPOINTMENT (OUTPATIENT)
Facility: HOSPITAL | Age: 73
DRG: 291 | End: 2023-10-15
Payer: MEDICARE

## 2023-10-15 ENCOUNTER — HOSPITAL ENCOUNTER (INPATIENT)
Facility: HOSPITAL | Age: 73
LOS: 4 days | Discharge: HOME HEALTH | DRG: 291 | End: 2023-10-19
Attending: EMERGENCY MEDICINE | Admitting: INTERNAL MEDICINE
Payer: MEDICARE

## 2023-10-15 DIAGNOSIS — I50.9 ACUTE ON CHRONIC CONGESTIVE HEART FAILURE, UNSPECIFIED HEART FAILURE TYPE (HCC): Primary | ICD-10-CM

## 2023-10-15 DIAGNOSIS — R60.9 SWELLING: ICD-10-CM

## 2023-10-15 PROBLEM — I50.31 ACUTE CONGESTIVE HEART FAILURE WITH LEFT VENTRICULAR DIASTOLIC DYSFUNCTION (HCC): Status: ACTIVE | Noted: 2023-10-15

## 2023-10-15 LAB
ALBUMIN SERPL-MCNC: 3.1 G/DL (ref 3.5–5)
ALBUMIN/GLOB SERPL: 0.9 (ref 1.1–2.2)
ALP SERPL-CCNC: 202 U/L (ref 45–117)
ALT SERPL-CCNC: 20 U/L (ref 12–78)
ANION GAP SERPL CALC-SCNC: 2 MMOL/L (ref 5–15)
AST SERPL W P-5'-P-CCNC: 21 U/L (ref 15–37)
BASOPHILS # BLD: 0 K/UL (ref 0–0.1)
BASOPHILS NFR BLD: 1 % (ref 0–1)
BILIRUB SERPL-MCNC: 0.7 MG/DL (ref 0.2–1)
BNP SERPL-MCNC: 5040 PG/ML
BUN SERPL-MCNC: 27 MG/DL (ref 6–20)
BUN/CREAT SERPL: 20 (ref 12–20)
CA-I BLD-MCNC: 8.6 MG/DL (ref 8.5–10.1)
CHLORIDE SERPL-SCNC: 110 MMOL/L (ref 97–108)
CO2 SERPL-SCNC: 33 MMOL/L (ref 21–32)
CREAT SERPL-MCNC: 1.34 MG/DL (ref 0.55–1.02)
DIFFERENTIAL METHOD BLD: ABNORMAL
EOSINOPHIL # BLD: 0.2 K/UL (ref 0–0.4)
EOSINOPHIL NFR BLD: 2 % (ref 0–7)
ERYTHROCYTE [DISTWIDTH] IN BLOOD BY AUTOMATED COUNT: 15.6 % (ref 11.5–14.5)
GLOBULIN SER CALC-MCNC: 3.6 G/DL (ref 2–4)
GLUCOSE SERPL-MCNC: 71 MG/DL (ref 65–100)
HCT VFR BLD AUTO: 41.6 % (ref 35–47)
HGB BLD-MCNC: 12.7 G/DL (ref 11.5–16)
IMM GRANULOCYTES # BLD AUTO: 0 K/UL (ref 0–0.04)
IMM GRANULOCYTES NFR BLD AUTO: 0 % (ref 0–0.5)
LYMPHOCYTES # BLD: 1.5 K/UL (ref 0.8–3.5)
LYMPHOCYTES NFR BLD: 18 % (ref 12–49)
MCH RBC QN AUTO: 27.8 PG (ref 26–34)
MCHC RBC AUTO-ENTMCNC: 30.5 G/DL (ref 30–36.5)
MCV RBC AUTO: 91 FL (ref 80–99)
MONOCYTES # BLD: 0.7 K/UL (ref 0–1)
MONOCYTES NFR BLD: 8 % (ref 5–13)
NEUTS SEG # BLD: 5.7 K/UL (ref 1.8–8)
NEUTS SEG NFR BLD: 71 % (ref 32–75)
NRBC # BLD: 0 K/UL (ref 0–0.01)
NRBC BLD-RTO: 0 PER 100 WBC
PLATELET # BLD AUTO: 158 K/UL (ref 150–400)
PMV BLD AUTO: 11.9 FL (ref 8.9–12.9)
POTASSIUM SERPL-SCNC: 3.9 MMOL/L (ref 3.5–5.1)
PROT SERPL-MCNC: 6.7 G/DL (ref 6.4–8.2)
RBC # BLD AUTO: 4.57 M/UL (ref 3.8–5.2)
SODIUM SERPL-SCNC: 145 MMOL/L (ref 136–145)
TROPONIN I SERPL HS-MCNC: 135 NG/L (ref 0–51)
TROPONIN I SERPL HS-MCNC: 145 NG/L (ref 0–51)
WBC # BLD AUTO: 8.1 K/UL (ref 3.6–11)

## 2023-10-15 PROCEDURE — 96374 THER/PROPH/DIAG INJ IV PUSH: CPT

## 2023-10-15 PROCEDURE — 93005 ELECTROCARDIOGRAM TRACING: CPT | Performed by: EMERGENCY MEDICINE

## 2023-10-15 PROCEDURE — 36415 COLL VENOUS BLD VENIPUNCTURE: CPT

## 2023-10-15 PROCEDURE — 6360000002 HC RX W HCPCS: Performed by: EMERGENCY MEDICINE

## 2023-10-15 PROCEDURE — 2580000003 HC RX 258: Performed by: PHYSICIAN ASSISTANT

## 2023-10-15 PROCEDURE — 6370000000 HC RX 637 (ALT 250 FOR IP): Performed by: PHYSICIAN ASSISTANT

## 2023-10-15 PROCEDURE — 2060000000 HC ICU INTERMEDIATE R&B

## 2023-10-15 PROCEDURE — 71045 X-RAY EXAM CHEST 1 VIEW: CPT

## 2023-10-15 PROCEDURE — 83880 ASSAY OF NATRIURETIC PEPTIDE: CPT

## 2023-10-15 PROCEDURE — 6360000002 HC RX W HCPCS: Performed by: PHYSICIAN ASSISTANT

## 2023-10-15 PROCEDURE — 80053 COMPREHEN METABOLIC PANEL: CPT

## 2023-10-15 PROCEDURE — 85025 COMPLETE CBC W/AUTO DIFF WBC: CPT

## 2023-10-15 PROCEDURE — 94640 AIRWAY INHALATION TREATMENT: CPT

## 2023-10-15 PROCEDURE — 84484 ASSAY OF TROPONIN QUANT: CPT

## 2023-10-15 PROCEDURE — 99285 EMERGENCY DEPT VISIT HI MDM: CPT

## 2023-10-15 RX ORDER — ALBUTEROL SULFATE 90 UG/1
2 AEROSOL, METERED RESPIRATORY (INHALATION) EVERY 4 HOURS PRN
Status: DISCONTINUED | OUTPATIENT
Start: 2023-10-15 | End: 2023-10-19 | Stop reason: HOSPADM

## 2023-10-15 RX ORDER — SPIRONOLACTONE 25 MG/1
25 TABLET ORAL DAILY
Status: DISCONTINUED | OUTPATIENT
Start: 2023-10-15 | End: 2023-10-19 | Stop reason: HOSPADM

## 2023-10-15 RX ORDER — ALLOPURINOL 100 MG/1
100 TABLET ORAL DAILY
Status: DISCONTINUED | OUTPATIENT
Start: 2023-10-15 | End: 2023-10-19 | Stop reason: HOSPADM

## 2023-10-15 RX ORDER — SODIUM CHLORIDE 0.9 % (FLUSH) 0.9 %
5-40 SYRINGE (ML) INJECTION PRN
Status: DISCONTINUED | OUTPATIENT
Start: 2023-10-15 | End: 2023-10-19 | Stop reason: HOSPADM

## 2023-10-15 RX ORDER — ONDANSETRON 4 MG/1
4 TABLET, ORALLY DISINTEGRATING ORAL EVERY 8 HOURS PRN
Status: DISCONTINUED | OUTPATIENT
Start: 2023-10-15 | End: 2023-10-19 | Stop reason: HOSPADM

## 2023-10-15 RX ORDER — SODIUM CHLORIDE 0.9 % (FLUSH) 0.9 %
5-40 SYRINGE (ML) INJECTION EVERY 12 HOURS SCHEDULED
Status: DISCONTINUED | OUTPATIENT
Start: 2023-10-15 | End: 2023-10-19 | Stop reason: HOSPADM

## 2023-10-15 RX ORDER — POLYETHYLENE GLYCOL 3350 17 G/17G
17 POWDER, FOR SOLUTION ORAL DAILY PRN
Status: DISCONTINUED | OUTPATIENT
Start: 2023-10-15 | End: 2023-10-19 | Stop reason: HOSPADM

## 2023-10-15 RX ORDER — OXYBUTYNIN CHLORIDE 5 MG/1
5 TABLET ORAL 2 TIMES DAILY
Status: DISCONTINUED | OUTPATIENT
Start: 2023-10-15 | End: 2023-10-19 | Stop reason: HOSPADM

## 2023-10-15 RX ORDER — BUDESONIDE AND FORMOTEROL FUMARATE DIHYDRATE 160; 4.5 UG/1; UG/1
2 AEROSOL RESPIRATORY (INHALATION)
Status: DISCONTINUED | OUTPATIENT
Start: 2023-10-15 | End: 2023-10-19 | Stop reason: HOSPADM

## 2023-10-15 RX ORDER — SPIRONOLACTONE 25 MG/1
25 TABLET ORAL DAILY
COMMUNITY

## 2023-10-15 RX ORDER — METOPROLOL SUCCINATE 50 MG/1
50 TABLET, EXTENDED RELEASE ORAL DAILY
Status: DISCONTINUED | OUTPATIENT
Start: 2023-10-15 | End: 2023-10-19 | Stop reason: HOSPADM

## 2023-10-15 RX ORDER — FUROSEMIDE 40 MG/1
80 TABLET ORAL DAILY
Status: DISCONTINUED | OUTPATIENT
Start: 2023-10-15 | End: 2023-10-15

## 2023-10-15 RX ORDER — IPRATROPIUM BROMIDE AND ALBUTEROL SULFATE 2.5; .5 MG/3ML; MG/3ML
1 SOLUTION RESPIRATORY (INHALATION) EVERY 4 HOURS PRN
Status: DISCONTINUED | OUTPATIENT
Start: 2023-10-15 | End: 2023-10-19 | Stop reason: HOSPADM

## 2023-10-15 RX ORDER — SODIUM CHLORIDE 9 MG/ML
INJECTION, SOLUTION INTRAVENOUS PRN
Status: DISCONTINUED | OUTPATIENT
Start: 2023-10-15 | End: 2023-10-19 | Stop reason: HOSPADM

## 2023-10-15 RX ORDER — AMIODARONE HYDROCHLORIDE 200 MG/1
200 TABLET ORAL DAILY
Status: DISCONTINUED | OUTPATIENT
Start: 2023-10-15 | End: 2023-10-19 | Stop reason: HOSPADM

## 2023-10-15 RX ORDER — HYDRALAZINE HYDROCHLORIDE 20 MG/ML
10 INJECTION INTRAMUSCULAR; INTRAVENOUS EVERY 6 HOURS PRN
Status: DISCONTINUED | OUTPATIENT
Start: 2023-10-15 | End: 2023-10-19 | Stop reason: HOSPADM

## 2023-10-15 RX ORDER — ONDANSETRON 2 MG/ML
4 INJECTION INTRAMUSCULAR; INTRAVENOUS EVERY 6 HOURS PRN
Status: DISCONTINUED | OUTPATIENT
Start: 2023-10-15 | End: 2023-10-19 | Stop reason: HOSPADM

## 2023-10-15 RX ORDER — FUROSEMIDE 10 MG/ML
80 INJECTION INTRAMUSCULAR; INTRAVENOUS 2 TIMES DAILY
Status: DISCONTINUED | OUTPATIENT
Start: 2023-10-15 | End: 2023-10-17

## 2023-10-15 RX ORDER — FUROSEMIDE 10 MG/ML
40 INJECTION INTRAMUSCULAR; INTRAVENOUS
Status: COMPLETED | OUTPATIENT
Start: 2023-10-15 | End: 2023-10-15

## 2023-10-15 RX ADMIN — SODIUM CHLORIDE, PRESERVATIVE FREE 10 ML: 5 INJECTION INTRAVENOUS at 22:03

## 2023-10-15 RX ADMIN — FUROSEMIDE 40 MG: 10 INJECTION, SOLUTION INTRAMUSCULAR; INTRAVENOUS at 16:26

## 2023-10-15 RX ADMIN — AMIODARONE HYDROCHLORIDE 200 MG: 200 TABLET ORAL at 22:00

## 2023-10-15 RX ADMIN — Medication 2 PUFF: at 20:57

## 2023-10-15 RX ADMIN — EMPAGLIFLOZIN 10 MG: 10 TABLET, FILM COATED ORAL at 23:31

## 2023-10-15 RX ADMIN — SACUBITRIL AND VALSARTAN 1 TABLET: 24; 26 TABLET, FILM COATED ORAL at 21:58

## 2023-10-15 RX ADMIN — SPIRONOLACTONE 25 MG: 25 TABLET ORAL at 22:01

## 2023-10-15 RX ADMIN — FUROSEMIDE 80 MG: 10 INJECTION, SOLUTION INTRAMUSCULAR; INTRAVENOUS at 23:30

## 2023-10-15 RX ADMIN — THEOPHYLLINE ANHYDROUS 300 MG: 300 CAPSULE, EXTENDED RELEASE ORAL at 21:57

## 2023-10-15 RX ADMIN — OXYBUTYNIN CHLORIDE 5 MG: 5 TABLET ORAL at 22:01

## 2023-10-15 RX ADMIN — APIXABAN 5 MG: 5 TABLET, FILM COATED ORAL at 21:59

## 2023-10-15 RX ADMIN — METOPROLOL SUCCINATE 50 MG: 50 TABLET, EXTENDED RELEASE ORAL at 21:58

## 2023-10-15 RX ADMIN — ALLOPURINOL 100 MG: 100 TABLET ORAL at 21:59

## 2023-10-15 ASSESSMENT — PAIN - FUNCTIONAL ASSESSMENT: PAIN_FUNCTIONAL_ASSESSMENT: NONE - DENIES PAIN

## 2023-10-15 ASSESSMENT — ENCOUNTER SYMPTOMS
COUGH: 1
SHORTNESS OF BREATH: 1
GASTROINTESTINAL NEGATIVE: 1

## 2023-10-15 ASSESSMENT — PAIN SCALES - GENERAL
PAINLEVEL_OUTOF10: 0

## 2023-10-15 NOTE — ED PROVIDER NOTES
[facility] [address] CARDIAC TELEMETRY  EMERGENCY DEPARTMENT HISTORY AND PHYSICAL EXAM      Date: 10/15/2023  Patient Name: Charito Carrera  MRN: [id number]  [address] [facility] 1950  Date of evaluation: 10/15/2023  Provider: Harley Gannon DO   Note Started: 2:28 PM EDT 10/15/23    HISTORY OF PRESENT ILLNESS     Chief Complaint   Patient presents with    Shortness of Breath     History Provided By: Patient    HPI: Charito Carrera is a 71 y.o. female with past medical history of COPD, CHF, diabetes, CAD, hyperlipidemia, and hypertension who presents with shortness of breath and leg swelling. Symptoms have been present for 4 to 6 weeks and have been progressive. She does take Lasix 40 mg daily. No chest pain. She does have mild cough on review of systems. PAST MEDICAL HISTORY   Past Medical History:  Past Medical History:   Diagnosis Date    Chronic obstructive pulmonary disease ([address])     Congestive heart disease ([address])     with preserved ejection fraction    Coronary artery disease     Diabetes (HCC)     HFrEF (heart failure with reduced ejection fraction) (HCC)     Hyperlipidemia     Hypertension     Myocardial infarct (HCC)     MICHELLE (obstructive sleep apnea)      Past Surgical History:  Past Surgical History:   Procedure Laterality Date    BREAST BIOPSY      CHOLECYSTECTOMY      CORONARY ARTERY BYPASS GRAFT      HERNIA REPAIR      HYSTERECTOMY (CERVIX STATUS UNKNOWN)       Family History:  Family History   Problem Relation Age of Onset    Cancer Maternal Grandmother     Heart Disease Mother     Kidney Disease Mother      Social History:  Social History     Tobacco Use    Smoking status: Former    Smokeless tobacco: Never   Substance Use Topics    Alcohol use: Not Currently    Drug use: Never     Allergies:   Allergies   Allergen Reactions    Oxycodone-Acetaminophen Hives    Penicillins Other (See Comments)     PCP: Katrina Segovia MD    Current Meds:   Current Facility-Administered Medications   Medication Dose Route

## 2023-10-16 ENCOUNTER — APPOINTMENT (OUTPATIENT)
Facility: HOSPITAL | Age: 73
DRG: 291 | End: 2023-10-16
Payer: MEDICARE

## 2023-10-16 LAB
ALBUMIN SERPL-MCNC: 2.9 G/DL (ref 3.5–5)
ALBUMIN/GLOB SERPL: 0.7 (ref 1.1–2.2)
ALP SERPL-CCNC: 188 U/L (ref 45–117)
ALT SERPL-CCNC: 17 U/L (ref 12–78)
ANION GAP SERPL CALC-SCNC: 3 MMOL/L (ref 5–15)
AST SERPL W P-5'-P-CCNC: 19 U/L (ref 15–37)
BASOPHILS # BLD: 0 K/UL (ref 0–0.1)
BASOPHILS NFR BLD: 1 % (ref 0–1)
BILIRUB SERPL-MCNC: 0.9 MG/DL (ref 0.2–1)
BUN SERPL-MCNC: 22 MG/DL (ref 6–20)
BUN/CREAT SERPL: 16 (ref 12–20)
CA-I BLD-MCNC: 9.1 MG/DL (ref 8.5–10.1)
CHLORIDE SERPL-SCNC: 105 MMOL/L (ref 97–108)
CO2 SERPL-SCNC: 33 MMOL/L (ref 21–32)
CREAT SERPL-MCNC: 1.39 MG/DL (ref 0.55–1.02)
DIFFERENTIAL METHOD BLD: ABNORMAL
ECHO BSA: 2.09 M2
EKG ATRIAL RATE: 68 BPM
EKG DIAGNOSIS: NORMAL
EKG P AXIS: 83 DEGREES
EKG P-R INTERVAL: 170 MS
EKG Q-T INTERVAL: 470 MS
EKG QRS DURATION: 104 MS
EKG QTC CALCULATION (BAZETT): 499 MS
EKG R AXIS: 49 DEGREES
EKG T AXIS: 111 DEGREES
EKG VENTRICULAR RATE: 68 BPM
EOSINOPHIL # BLD: 0.2 K/UL (ref 0–0.4)
EOSINOPHIL NFR BLD: 3 % (ref 0–7)
ERYTHROCYTE [DISTWIDTH] IN BLOOD BY AUTOMATED COUNT: 15.5 % (ref 11.5–14.5)
GLOBULIN SER CALC-MCNC: 4.1 G/DL (ref 2–4)
GLUCOSE BLD STRIP.AUTO-MCNC: 117 MG/DL (ref 65–100)
GLUCOSE BLD STRIP.AUTO-MCNC: 176 MG/DL (ref 65–100)
GLUCOSE SERPL-MCNC: 112 MG/DL (ref 65–100)
HCT VFR BLD AUTO: 43.2 % (ref 35–47)
HGB BLD-MCNC: 13.3 G/DL (ref 11.5–16)
IMM GRANULOCYTES # BLD AUTO: 0 K/UL (ref 0–0.04)
IMM GRANULOCYTES NFR BLD AUTO: 0 % (ref 0–0.5)
LYMPHOCYTES # BLD: 1.4 K/UL (ref 0.8–3.5)
LYMPHOCYTES NFR BLD: 18 % (ref 12–49)
MCH RBC QN AUTO: 28.2 PG (ref 26–34)
MCHC RBC AUTO-ENTMCNC: 30.8 G/DL (ref 30–36.5)
MCV RBC AUTO: 91.5 FL (ref 80–99)
MONOCYTES # BLD: 0.7 K/UL (ref 0–1)
MONOCYTES NFR BLD: 9 % (ref 5–13)
NEUTS SEG # BLD: 5.5 K/UL (ref 1.8–8)
NEUTS SEG NFR BLD: 69 % (ref 32–75)
NRBC # BLD: 0 K/UL (ref 0–0.01)
NRBC BLD-RTO: 0 PER 100 WBC
PERFORMED BY:: ABNORMAL
PERFORMED BY:: ABNORMAL
PLATELET # BLD AUTO: 170 K/UL (ref 150–400)
PMV BLD AUTO: 12.5 FL (ref 8.9–12.9)
POTASSIUM SERPL-SCNC: 3.9 MMOL/L (ref 3.5–5.1)
PROT SERPL-MCNC: 7 G/DL (ref 6.4–8.2)
RBC # BLD AUTO: 4.72 M/UL (ref 3.8–5.2)
SODIUM SERPL-SCNC: 141 MMOL/L (ref 136–145)
TROPONIN I SERPL HS-MCNC: 143 NG/L (ref 0–51)
WBC # BLD AUTO: 7.9 K/UL (ref 3.6–11)

## 2023-10-16 PROCEDURE — 2580000003 HC RX 258: Performed by: PHYSICIAN ASSISTANT

## 2023-10-16 PROCEDURE — 6370000000 HC RX 637 (ALT 250 FOR IP): Performed by: PHYSICIAN ASSISTANT

## 2023-10-16 PROCEDURE — 84484 ASSAY OF TROPONIN QUANT: CPT

## 2023-10-16 PROCEDURE — 85025 COMPLETE CBC W/AUTO DIFF WBC: CPT

## 2023-10-16 PROCEDURE — 2700000000 HC OXYGEN THERAPY PER DAY

## 2023-10-16 PROCEDURE — 6360000002 HC RX W HCPCS: Performed by: PHYSICIAN ASSISTANT

## 2023-10-16 PROCEDURE — 80053 COMPREHEN METABOLIC PANEL: CPT

## 2023-10-16 PROCEDURE — 93970 EXTREMITY STUDY: CPT

## 2023-10-16 PROCEDURE — 82962 GLUCOSE BLOOD TEST: CPT

## 2023-10-16 PROCEDURE — 36415 COLL VENOUS BLD VENIPUNCTURE: CPT

## 2023-10-16 PROCEDURE — 94761 N-INVAS EAR/PLS OXIMETRY MLT: CPT

## 2023-10-16 PROCEDURE — 2060000000 HC ICU INTERMEDIATE R&B

## 2023-10-16 PROCEDURE — 94640 AIRWAY INHALATION TREATMENT: CPT

## 2023-10-16 RX ADMIN — METOPROLOL SUCCINATE 50 MG: 50 TABLET, EXTENDED RELEASE ORAL at 09:53

## 2023-10-16 RX ADMIN — SODIUM CHLORIDE, PRESERVATIVE FREE 10 ML: 5 INJECTION INTRAVENOUS at 09:54

## 2023-10-16 RX ADMIN — Medication 2 PUFF: at 22:09

## 2023-10-16 RX ADMIN — APIXABAN 5 MG: 5 TABLET, FILM COATED ORAL at 09:53

## 2023-10-16 RX ADMIN — EMPAGLIFLOZIN 10 MG: 10 TABLET, FILM COATED ORAL at 09:53

## 2023-10-16 RX ADMIN — APIXABAN 5 MG: 5 TABLET, FILM COATED ORAL at 20:40

## 2023-10-16 RX ADMIN — SODIUM CHLORIDE, PRESERVATIVE FREE 10 ML: 5 INJECTION INTRAVENOUS at 20:40

## 2023-10-16 RX ADMIN — SACUBITRIL AND VALSARTAN 1 TABLET: 24; 26 TABLET, FILM COATED ORAL at 09:53

## 2023-10-16 RX ADMIN — ALLOPURINOL 100 MG: 100 TABLET ORAL at 09:53

## 2023-10-16 RX ADMIN — FUROSEMIDE 80 MG: 10 INJECTION, SOLUTION INTRAMUSCULAR; INTRAVENOUS at 09:53

## 2023-10-16 RX ADMIN — OXYBUTYNIN CHLORIDE 5 MG: 5 TABLET ORAL at 20:39

## 2023-10-16 RX ADMIN — SACUBITRIL AND VALSARTAN 1 TABLET: 24; 26 TABLET, FILM COATED ORAL at 20:39

## 2023-10-16 RX ADMIN — Medication 2 PUFF: at 08:27

## 2023-10-16 RX ADMIN — FUROSEMIDE 80 MG: 10 INJECTION, SOLUTION INTRAMUSCULAR; INTRAVENOUS at 17:11

## 2023-10-16 RX ADMIN — SPIRONOLACTONE 25 MG: 25 TABLET ORAL at 09:53

## 2023-10-16 RX ADMIN — AMIODARONE HYDROCHLORIDE 200 MG: 200 TABLET ORAL at 09:53

## 2023-10-16 RX ADMIN — OXYBUTYNIN CHLORIDE 5 MG: 5 TABLET ORAL at 09:53

## 2023-10-16 ASSESSMENT — PAIN SCALES - GENERAL
PAINLEVEL_OUTOF10: 0

## 2023-10-16 ASSESSMENT — HEART SCORE: ECG: 1

## 2023-10-16 NOTE — CONSULTS
auscultation bilaterally, rales R base, L base   Abnominal soft, non-tender, without masses or organomegaly, normal bowel sounds, and no masses palpated   Genitourinary Deferred   Muskuloskeletal No obvious joint deformities. No clubbing   Neuro Alert and oriented, Non focal neurologic exam   Psychiatric Demonstrates good judgement and insight into his or her medical condition   Extremities warm, well perfused   Skin Warm and dry         Labs     Lab Results   Component Value Date/Time    WBC 7.9 10/16/2023 05:34 AM    HGB 13.3 10/16/2023 05:34 AM    HCT 43.2 10/16/2023 05:34 AM     10/16/2023 05:34 AM    MCV 91.5 10/16/2023 05:34 AM     Lab Results   Component Value Date/Time     10/16/2023 05:34 AM    K 3.9 10/16/2023 05:34 AM     10/16/2023 05:34 AM    CO2 33 10/16/2023 05:34 AM    BUN 22 10/16/2023 05:34 AM    GFRAA 45 03/28/2022 06:08 AM      Last Lipid:    Lab Results   Component Value Date/Time    CHOL 143 05/30/2023 06:54 AM    HDL 54 05/30/2023 06:54 AM     Lab Results   Component Value Date/Time    BNP 6,815 04/11/2023 07:57 PM      Lab Results   Component Value Date/Time    TSH 2.12 04/12/2023 02:06 AM          Imaging & Acillary Studies     EKG:    Normal sinus rhythm   Possible Left atrial enlargement   Nonspecific T wave abnormality   Abnormal ECG   When compared with ECG of 05-JUL-2023 18:15,   Premature ventricular complexes are no longer Present   Confirmed by Tiera Castle ([id number]) on 10/16/2023 9:50:52 AM     XR CHEST 1 VIEW   Final Result   Mild interstitial pulmonary edema. Vascular duplex lower extremity venous bilateral    (Results Pending)       Cardiac cath:    No results found for this or any previous visit. Echo:     04/11/23    TRANSTHORACIC ECHOCARDIOGRAM (TTE) COMPLETE (CONTRAST/BUBBLE/3D PRN) 04/12/2023  1:12 PM (Final)    Narrative  This is a summary report. The complete report is available in the patient's medical record.  If you cannot access the medical

## 2023-10-16 NOTE — CARE COORDINATION
10/16/23 1617   Service Assessment   Patient Orientation Alert and Oriented   Cognition Alert   History Provided By Patient   Primary [facility]   Patient's Healthcare Decision Maker is: Legal Next of Kin   PCP Verified by CM Yes   Last Visit to PCP Within last 3 months   Prior Functional Level Independent in ADLs/IADLs   Current Functional Level Independent in ADLs/IADLs   Can patient return to prior living arrangement Yes   Ability to make needs known: Good   Family able to assist with home care needs: Yes   Would you like for me to discuss the discharge plan with any other family members/significant others, and if so, who? Yes   Financial Resources Medicare   Social/Functional History   Lives With Daughter   Type of [address] [place] [address]  One level   [address]   Homemaking Responsibilities Yes   Ambulation Assistance Independent   Transfer Assistance Independent   Active  Yes   Mode of Transportation Car   Discharge Planning   Type of [address] [place] [address] Dr Prior To Admission None   Potential Assistance Needed N/A   Patient expects to be discharged to: House   One/Two [address] One story   History of falls?  0   Services At/After Discharge   Transition of Care Consult (CM Consult) N/A   Confirm Follow Up Transport Family

## 2023-10-17 ENCOUNTER — APPOINTMENT (OUTPATIENT)
Facility: HOSPITAL | Age: 73
DRG: 291 | End: 2023-10-17
Payer: MEDICARE

## 2023-10-17 LAB
ALBUMIN SERPL-MCNC: 2.6 G/DL (ref 3.5–5)
ALBUMIN/GLOB SERPL: 0.6 (ref 1.1–2.2)
ALP SERPL-CCNC: 186 U/L (ref 45–117)
ALT SERPL-CCNC: 17 U/L (ref 12–78)
ANION GAP SERPL CALC-SCNC: 7 MMOL/L (ref 5–15)
AST SERPL W P-5'-P-CCNC: 21 U/L (ref 15–37)
BASOPHILS # BLD: 0.1 K/UL (ref 0–0.1)
BASOPHILS NFR BLD: 1 % (ref 0–1)
BILIRUB SERPL-MCNC: 0.8 MG/DL (ref 0.2–1)
BUN SERPL-MCNC: 32 MG/DL (ref 6–20)
BUN/CREAT SERPL: 13 (ref 12–20)
CA-I BLD-MCNC: 8.5 MG/DL (ref 8.5–10.1)
CHLORIDE SERPL-SCNC: 99 MMOL/L (ref 97–108)
CO2 SERPL-SCNC: 33 MMOL/L (ref 21–32)
CREAT SERPL-MCNC: 2.38 MG/DL (ref 0.55–1.02)
DIFFERENTIAL METHOD BLD: ABNORMAL
EOSINOPHIL # BLD: 0.3 K/UL (ref 0–0.4)
EOSINOPHIL NFR BLD: 3 % (ref 0–7)
ERYTHROCYTE [DISTWIDTH] IN BLOOD BY AUTOMATED COUNT: 15.2 % (ref 11.5–14.5)
GLOBULIN SER CALC-MCNC: 4.1 G/DL (ref 2–4)
GLUCOSE SERPL-MCNC: 144 MG/DL (ref 65–100)
HCT VFR BLD AUTO: 44.9 % (ref 35–47)
HGB BLD-MCNC: 13.8 G/DL (ref 11.5–16)
IMM GRANULOCYTES # BLD AUTO: 0 K/UL (ref 0–0.04)
IMM GRANULOCYTES NFR BLD AUTO: 0 % (ref 0–0.5)
LYMPHOCYTES # BLD: 2 K/UL (ref 0.8–3.5)
LYMPHOCYTES NFR BLD: 21 % (ref 12–49)
MAGNESIUM SERPL-MCNC: 1.8 MG/DL (ref 1.6–2.4)
MCH RBC QN AUTO: 28.1 PG (ref 26–34)
MCHC RBC AUTO-ENTMCNC: 30.7 G/DL (ref 30–36.5)
MCV RBC AUTO: 91.4 FL (ref 80–99)
MONOCYTES # BLD: 0.9 K/UL (ref 0–1)
MONOCYTES NFR BLD: 10 % (ref 5–13)
NEUTS SEG # BLD: 6.4 K/UL (ref 1.8–8)
NEUTS SEG NFR BLD: 65 % (ref 32–75)
NRBC # BLD: 0 K/UL (ref 0–0.01)
NRBC BLD-RTO: 0 PER 100 WBC
PLATELET # BLD AUTO: 172 K/UL (ref 150–400)
PMV BLD AUTO: 12.7 FL (ref 8.9–12.9)
POTASSIUM SERPL-SCNC: 3.7 MMOL/L (ref 3.5–5.1)
PROT SERPL-MCNC: 6.7 G/DL (ref 6.4–8.2)
RBC # BLD AUTO: 4.91 M/UL (ref 3.8–5.2)
SODIUM SERPL-SCNC: 139 MMOL/L (ref 136–145)
WBC # BLD AUTO: 9.7 K/UL (ref 3.6–11)

## 2023-10-17 PROCEDURE — 6360000002 HC RX W HCPCS: Performed by: INTERNAL MEDICINE

## 2023-10-17 PROCEDURE — 6370000000 HC RX 637 (ALT 250 FOR IP): Performed by: PHYSICIAN ASSISTANT

## 2023-10-17 PROCEDURE — 6370000000 HC RX 637 (ALT 250 FOR IP): Performed by: STUDENT IN AN ORGANIZED HEALTH CARE EDUCATION/TRAINING PROGRAM

## 2023-10-17 PROCEDURE — 2060000000 HC ICU INTERMEDIATE R&B

## 2023-10-17 PROCEDURE — 2580000003 HC RX 258: Performed by: PHYSICIAN ASSISTANT

## 2023-10-17 PROCEDURE — 2700000000 HC OXYGEN THERAPY PER DAY

## 2023-10-17 PROCEDURE — 94640 AIRWAY INHALATION TREATMENT: CPT

## 2023-10-17 PROCEDURE — 85025 COMPLETE CBC W/AUTO DIFF WBC: CPT

## 2023-10-17 PROCEDURE — 83735 ASSAY OF MAGNESIUM: CPT

## 2023-10-17 PROCEDURE — 94761 N-INVAS EAR/PLS OXIMETRY MLT: CPT

## 2023-10-17 PROCEDURE — 71045 X-RAY EXAM CHEST 1 VIEW: CPT

## 2023-10-17 PROCEDURE — 36415 COLL VENOUS BLD VENIPUNCTURE: CPT

## 2023-10-17 PROCEDURE — 6370000000 HC RX 637 (ALT 250 FOR IP): Performed by: HOSPITALIST

## 2023-10-17 PROCEDURE — 80053 COMPREHEN METABOLIC PANEL: CPT

## 2023-10-17 RX ORDER — FUROSEMIDE 40 MG/1
40 TABLET ORAL 2 TIMES DAILY
Status: DISCONTINUED | OUTPATIENT
Start: 2023-10-17 | End: 2023-10-19 | Stop reason: HOSPADM

## 2023-10-17 RX ORDER — FUROSEMIDE 10 MG/ML
80 INJECTION INTRAMUSCULAR; INTRAVENOUS 2 TIMES DAILY
Status: DISCONTINUED | OUTPATIENT
Start: 2023-10-17 | End: 2023-10-17

## 2023-10-17 RX ORDER — FUROSEMIDE 40 MG/1
80 TABLET ORAL 2 TIMES DAILY
Status: DISCONTINUED | OUTPATIENT
Start: 2023-10-17 | End: 2023-10-17

## 2023-10-17 RX ADMIN — SODIUM CHLORIDE, PRESERVATIVE FREE 10 ML: 5 INJECTION INTRAVENOUS at 08:59

## 2023-10-17 RX ADMIN — AMIODARONE HYDROCHLORIDE 200 MG: 200 TABLET ORAL at 08:58

## 2023-10-17 RX ADMIN — EMPAGLIFLOZIN 10 MG: 10 TABLET, FILM COATED ORAL at 08:59

## 2023-10-17 RX ADMIN — OXYBUTYNIN CHLORIDE 5 MG: 5 TABLET ORAL at 21:01

## 2023-10-17 RX ADMIN — Medication 2 PUFF: at 20:47

## 2023-10-17 RX ADMIN — SACUBITRIL AND VALSARTAN 1 TABLET: 24; 26 TABLET, FILM COATED ORAL at 21:01

## 2023-10-17 RX ADMIN — Medication 2 PUFF: at 07:41

## 2023-10-17 RX ADMIN — SODIUM CHLORIDE, PRESERVATIVE FREE 10 ML: 5 INJECTION INTRAVENOUS at 22:51

## 2023-10-17 RX ADMIN — APIXABAN 5 MG: 5 TABLET, FILM COATED ORAL at 08:58

## 2023-10-17 RX ADMIN — FUROSEMIDE 80 MG: 10 INJECTION, SOLUTION INTRAMUSCULAR; INTRAVENOUS at 18:16

## 2023-10-17 RX ADMIN — FUROSEMIDE 80 MG: 40 TABLET ORAL at 10:32

## 2023-10-17 RX ADMIN — THEOPHYLLINE ANHYDROUS 300 MG: 300 CAPSULE, EXTENDED RELEASE ORAL at 08:58

## 2023-10-17 RX ADMIN — APIXABAN 5 MG: 5 TABLET, FILM COATED ORAL at 21:01

## 2023-10-17 RX ADMIN — ALLOPURINOL 100 MG: 100 TABLET ORAL at 08:58

## 2023-10-17 RX ADMIN — OXYBUTYNIN CHLORIDE 5 MG: 5 TABLET ORAL at 08:58

## 2023-10-17 RX ADMIN — FUROSEMIDE 40 MG: 40 TABLET ORAL at 21:01

## 2023-10-17 RX ADMIN — SACUBITRIL AND VALSARTAN 1 TABLET: 24; 26 TABLET, FILM COATED ORAL at 08:58

## 2023-10-17 RX ADMIN — SPIRONOLACTONE 25 MG: 25 TABLET ORAL at 08:58

## 2023-10-18 LAB
ALBUMIN SERPL-MCNC: 2.8 G/DL (ref 3.5–5)
ALBUMIN/GLOB SERPL: 0.6 (ref 1.1–2.2)
ALP SERPL-CCNC: 187 U/L (ref 45–117)
ALT SERPL-CCNC: 17 U/L (ref 12–78)
ANION GAP SERPL CALC-SCNC: 8 MMOL/L (ref 5–15)
AST SERPL W P-5'-P-CCNC: 14 U/L (ref 15–37)
BASOPHILS # BLD: 0.1 K/UL (ref 0–0.1)
BASOPHILS NFR BLD: 1 % (ref 0–1)
BILIRUB SERPL-MCNC: 0.8 MG/DL (ref 0.2–1)
BUN SERPL-MCNC: 33 MG/DL (ref 6–20)
BUN/CREAT SERPL: 16 (ref 12–20)
CA-I BLD-MCNC: 8.9 MG/DL (ref 8.5–10.1)
CHLORIDE SERPL-SCNC: 96 MMOL/L (ref 97–108)
CO2 SERPL-SCNC: 34 MMOL/L (ref 21–32)
CREAT SERPL-MCNC: 2.04 MG/DL (ref 0.55–1.02)
DIFFERENTIAL METHOD BLD: ABNORMAL
EOSINOPHIL # BLD: 0.2 K/UL (ref 0–0.4)
EOSINOPHIL NFR BLD: 3 % (ref 0–7)
ERYTHROCYTE [DISTWIDTH] IN BLOOD BY AUTOMATED COUNT: 15 % (ref 11.5–14.5)
GLOBULIN SER CALC-MCNC: 4.4 G/DL (ref 2–4)
GLUCOSE SERPL-MCNC: 114 MG/DL (ref 65–100)
HCT VFR BLD AUTO: 49.7 % (ref 35–47)
HGB BLD-MCNC: 15.6 G/DL (ref 11.5–16)
IMM GRANULOCYTES # BLD AUTO: 0 K/UL (ref 0–0.04)
IMM GRANULOCYTES NFR BLD AUTO: 0 % (ref 0–0.5)
LYMPHOCYTES # BLD: 2 K/UL (ref 0.8–3.5)
LYMPHOCYTES NFR BLD: 21 % (ref 12–49)
MCH RBC QN AUTO: 27.9 PG (ref 26–34)
MCHC RBC AUTO-ENTMCNC: 31.4 G/DL (ref 30–36.5)
MCV RBC AUTO: 88.9 FL (ref 80–99)
MONOCYTES # BLD: 0.9 K/UL (ref 0–1)
MONOCYTES NFR BLD: 9 % (ref 5–13)
NEUTS SEG # BLD: 6 K/UL (ref 1.8–8)
NEUTS SEG NFR BLD: 66 % (ref 32–75)
NRBC # BLD: 0 K/UL (ref 0–0.01)
NRBC BLD-RTO: 0 PER 100 WBC
PLATELET # BLD AUTO: 178 K/UL (ref 150–400)
PMV BLD AUTO: 12.5 FL (ref 8.9–12.9)
POTASSIUM SERPL-SCNC: 3.6 MMOL/L (ref 3.5–5.1)
PROT SERPL-MCNC: 7.2 G/DL (ref 6.4–8.2)
RBC # BLD AUTO: 5.59 M/UL (ref 3.8–5.2)
SODIUM SERPL-SCNC: 138 MMOL/L (ref 136–145)
WBC # BLD AUTO: 9.2 K/UL (ref 3.6–11)

## 2023-10-18 PROCEDURE — 6370000000 HC RX 637 (ALT 250 FOR IP): Performed by: PHYSICIAN ASSISTANT

## 2023-10-18 PROCEDURE — 6360000002 HC RX W HCPCS: Performed by: PHYSICIAN ASSISTANT

## 2023-10-18 PROCEDURE — 6370000000 HC RX 637 (ALT 250 FOR IP): Performed by: HOSPITALIST

## 2023-10-18 PROCEDURE — 94640 AIRWAY INHALATION TREATMENT: CPT

## 2023-10-18 PROCEDURE — 94761 N-INVAS EAR/PLS OXIMETRY MLT: CPT

## 2023-10-18 PROCEDURE — 2700000000 HC OXYGEN THERAPY PER DAY

## 2023-10-18 PROCEDURE — 2580000003 HC RX 258: Performed by: PHYSICIAN ASSISTANT

## 2023-10-18 PROCEDURE — 36415 COLL VENOUS BLD VENIPUNCTURE: CPT

## 2023-10-18 PROCEDURE — 85025 COMPLETE CBC W/AUTO DIFF WBC: CPT

## 2023-10-18 PROCEDURE — 2060000000 HC ICU INTERMEDIATE R&B

## 2023-10-18 PROCEDURE — 80053 COMPREHEN METABOLIC PANEL: CPT

## 2023-10-18 RX ADMIN — AMIODARONE HYDROCHLORIDE 200 MG: 200 TABLET ORAL at 08:14

## 2023-10-18 RX ADMIN — OXYBUTYNIN CHLORIDE 5 MG: 5 TABLET ORAL at 08:14

## 2023-10-18 RX ADMIN — SODIUM CHLORIDE, PRESERVATIVE FREE 10 ML: 5 INJECTION INTRAVENOUS at 08:19

## 2023-10-18 RX ADMIN — SACUBITRIL AND VALSARTAN 1 TABLET: 24; 26 TABLET, FILM COATED ORAL at 20:18

## 2023-10-18 RX ADMIN — Medication 2 PUFF: at 07:57

## 2023-10-18 RX ADMIN — EMPAGLIFLOZIN 10 MG: 10 TABLET, FILM COATED ORAL at 08:14

## 2023-10-18 RX ADMIN — ONDANSETRON 4 MG: 2 INJECTION INTRAMUSCULAR; INTRAVENOUS at 04:17

## 2023-10-18 RX ADMIN — THEOPHYLLINE ANHYDROUS 300 MG: 300 CAPSULE, EXTENDED RELEASE ORAL at 08:14

## 2023-10-18 RX ADMIN — APIXABAN 5 MG: 5 TABLET, FILM COATED ORAL at 08:14

## 2023-10-18 RX ADMIN — SODIUM CHLORIDE, PRESERVATIVE FREE 10 ML: 5 INJECTION INTRAVENOUS at 20:18

## 2023-10-18 RX ADMIN — Medication 2 PUFF: at 20:58

## 2023-10-18 RX ADMIN — METOPROLOL SUCCINATE 50 MG: 50 TABLET, EXTENDED RELEASE ORAL at 08:14

## 2023-10-18 RX ADMIN — FUROSEMIDE 40 MG: 40 TABLET ORAL at 16:00

## 2023-10-18 RX ADMIN — OXYBUTYNIN CHLORIDE 5 MG: 5 TABLET ORAL at 20:18

## 2023-10-18 RX ADMIN — FUROSEMIDE 40 MG: 40 TABLET ORAL at 08:14

## 2023-10-18 RX ADMIN — APIXABAN 5 MG: 5 TABLET, FILM COATED ORAL at 20:18

## 2023-10-18 RX ADMIN — ALLOPURINOL 100 MG: 100 TABLET ORAL at 08:14

## 2023-10-18 RX ADMIN — SPIRONOLACTONE 25 MG: 25 TABLET ORAL at 08:14

## 2023-10-18 RX ADMIN — SACUBITRIL AND VALSARTAN 1 TABLET: 24; 26 TABLET, FILM COATED ORAL at 08:14

## 2023-10-18 ASSESSMENT — PAIN SCALES - GENERAL
PAINLEVEL_OUTOF10: 8
PAINLEVEL_OUTOF10: 5

## 2023-10-18 ASSESSMENT — PAIN DESCRIPTION - LOCATION
LOCATION: ABDOMEN
LOCATION: ABDOMEN

## 2023-10-18 NOTE — DISCHARGE INSTRUCTIONS
Heart Failure Follow-up protocol:  -Please schedule an appointment with your cardiologist(heart doctor) to be seen within 3-5 days of discharge.    -Please remember to call your cardiologist with any new, returning, or worsening symptoms. Please refer to your symptom chart. **Weigh yourself Daily in the morning, after using the bathroom, but before eating or drinking anything.    -Please follow your sodium restrictions to reduce the intake of salt/sodium as discussed.  Remember when we take in too much salt/sodium, we hold on to fluid.  __________________________________________________________________

## 2023-10-19 VITALS
WEIGHT: 207.23 LBS | BODY MASS INDEX: 36.72 KG/M2 | RESPIRATION RATE: 22 BRPM | SYSTOLIC BLOOD PRESSURE: 107 MMHG | OXYGEN SATURATION: 93 % | TEMPERATURE: 97.7 F | DIASTOLIC BLOOD PRESSURE: 70 MMHG | HEIGHT: 63 IN | HEART RATE: 57 BPM

## 2023-10-19 LAB
ALBUMIN SERPL-MCNC: 2.6 G/DL (ref 3.5–5)
ALBUMIN/GLOB SERPL: 0.7 (ref 1.1–2.2)
ALP SERPL-CCNC: 156 U/L (ref 45–117)
ALT SERPL-CCNC: 16 U/L (ref 12–78)
ANION GAP SERPL CALC-SCNC: 5 MMOL/L (ref 5–15)
AST SERPL W P-5'-P-CCNC: 11 U/L (ref 15–37)
BASOPHILS # BLD: 0.1 K/UL (ref 0–0.1)
BASOPHILS NFR BLD: 1 % (ref 0–1)
BILIRUB SERPL-MCNC: 0.6 MG/DL (ref 0.2–1)
BUN SERPL-MCNC: 35 MG/DL (ref 6–20)
BUN/CREAT SERPL: 20 (ref 12–20)
CA-I BLD-MCNC: 8.6 MG/DL (ref 8.5–10.1)
CHLORIDE SERPL-SCNC: 101 MMOL/L (ref 97–108)
CO2 SERPL-SCNC: 33 MMOL/L (ref 21–32)
CREAT SERPL-MCNC: 1.79 MG/DL (ref 0.55–1.02)
DIFFERENTIAL METHOD BLD: ABNORMAL
EOSINOPHIL # BLD: 0.2 K/UL (ref 0–0.4)
EOSINOPHIL NFR BLD: 3 % (ref 0–7)
ERYTHROCYTE [DISTWIDTH] IN BLOOD BY AUTOMATED COUNT: 15.1 % (ref 11.5–14.5)
GLOBULIN SER CALC-MCNC: 4 G/DL (ref 2–4)
GLUCOSE SERPL-MCNC: 100 MG/DL (ref 65–100)
HCT VFR BLD AUTO: 47.8 % (ref 35–47)
HGB BLD-MCNC: 14.6 G/DL (ref 11.5–16)
IMM GRANULOCYTES # BLD AUTO: 0 K/UL (ref 0–0.04)
IMM GRANULOCYTES NFR BLD AUTO: 0 % (ref 0–0.5)
LYMPHOCYTES # BLD: 2 K/UL (ref 0.8–3.5)
LYMPHOCYTES NFR BLD: 27 % (ref 12–49)
MCH RBC QN AUTO: 27.8 PG (ref 26–34)
MCHC RBC AUTO-ENTMCNC: 30.5 G/DL (ref 30–36.5)
MCV RBC AUTO: 90.9 FL (ref 80–99)
MONOCYTES # BLD: 0.8 K/UL (ref 0–1)
MONOCYTES NFR BLD: 10 % (ref 5–13)
NEUTS SEG # BLD: 4.4 K/UL (ref 1.8–8)
NEUTS SEG NFR BLD: 59 % (ref 32–75)
NRBC # BLD: 0 K/UL (ref 0–0.01)
NRBC BLD-RTO: 0 PER 100 WBC
PLATELET # BLD AUTO: 164 K/UL (ref 150–400)
PMV BLD AUTO: 12 FL (ref 8.9–12.9)
POTASSIUM SERPL-SCNC: 4.1 MMOL/L (ref 3.5–5.1)
PROT SERPL-MCNC: 6.6 G/DL (ref 6.4–8.2)
RBC # BLD AUTO: 5.26 M/UL (ref 3.8–5.2)
SODIUM SERPL-SCNC: 139 MMOL/L (ref 136–145)
WBC # BLD AUTO: 7.5 K/UL (ref 3.6–11)

## 2023-10-19 PROCEDURE — 6370000000 HC RX 637 (ALT 250 FOR IP): Performed by: HOSPITALIST

## 2023-10-19 PROCEDURE — 94761 N-INVAS EAR/PLS OXIMETRY MLT: CPT

## 2023-10-19 PROCEDURE — 97530 THERAPEUTIC ACTIVITIES: CPT

## 2023-10-19 PROCEDURE — 97161 PT EVAL LOW COMPLEX 20 MIN: CPT

## 2023-10-19 PROCEDURE — 2580000003 HC RX 258: Performed by: PHYSICIAN ASSISTANT

## 2023-10-19 PROCEDURE — 80053 COMPREHEN METABOLIC PANEL: CPT

## 2023-10-19 PROCEDURE — 36415 COLL VENOUS BLD VENIPUNCTURE: CPT

## 2023-10-19 PROCEDURE — 2700000000 HC OXYGEN THERAPY PER DAY

## 2023-10-19 PROCEDURE — 6370000000 HC RX 637 (ALT 250 FOR IP): Performed by: PHYSICIAN ASSISTANT

## 2023-10-19 PROCEDURE — 85025 COMPLETE CBC W/AUTO DIFF WBC: CPT

## 2023-10-19 PROCEDURE — 94640 AIRWAY INHALATION TREATMENT: CPT

## 2023-10-19 RX ORDER — FUROSEMIDE 40 MG/1
40 TABLET ORAL 2 TIMES DAILY
Qty: 60 TABLET | Refills: 3 | Status: SHIPPED | OUTPATIENT
Start: 2023-10-19

## 2023-10-19 RX ADMIN — Medication 2 PUFF: at 08:26

## 2023-10-19 RX ADMIN — FUROSEMIDE 40 MG: 40 TABLET ORAL at 16:16

## 2023-10-19 RX ADMIN — ALLOPURINOL 100 MG: 100 TABLET ORAL at 08:33

## 2023-10-19 RX ADMIN — FUROSEMIDE 40 MG: 40 TABLET ORAL at 08:33

## 2023-10-19 RX ADMIN — OXYBUTYNIN CHLORIDE 5 MG: 5 TABLET ORAL at 08:33

## 2023-10-19 RX ADMIN — METOPROLOL SUCCINATE 50 MG: 50 TABLET, EXTENDED RELEASE ORAL at 08:32

## 2023-10-19 RX ADMIN — SACUBITRIL AND VALSARTAN 1 TABLET: 24; 26 TABLET, FILM COATED ORAL at 08:33

## 2023-10-19 RX ADMIN — EMPAGLIFLOZIN 10 MG: 10 TABLET, FILM COATED ORAL at 08:33

## 2023-10-19 RX ADMIN — THEOPHYLLINE ANHYDROUS 300 MG: 300 CAPSULE, EXTENDED RELEASE ORAL at 08:33

## 2023-10-19 RX ADMIN — AMIODARONE HYDROCHLORIDE 200 MG: 200 TABLET ORAL at 08:33

## 2023-10-19 RX ADMIN — SPIRONOLACTONE 25 MG: 25 TABLET ORAL at 08:32

## 2023-10-19 RX ADMIN — APIXABAN 5 MG: 5 TABLET, FILM COATED ORAL at 08:33

## 2023-10-19 RX ADMIN — SODIUM CHLORIDE, PRESERVATIVE FREE 10 ML: 5 INJECTION INTRAVENOUS at 08:34

## 2023-10-19 NOTE — CARE COORDINATION
Transition of Care Plan: Home with home health. RUR: 17%  Prior Level of Functioning: Independent  Disposition: Home Health   If SNF or IPR: Date FOC offered: N/A  Date FOC received: N/A  Accepting facility: [facility]   Date authorization started with reference number: N/A  Date authorization received and expires: Follow up appointments: Attempted  DME needed: None  Transportation at discharge: Daughter to   IM/IMM Medicare/ letter given: Yes  Is patient a [employer] and connected with [facility]? If yes, was [employer] transfer form completed and VA notified? Caregiver Contact:   Discharge Caregiver contacted prior to discharge? Care Conference needed? Barriers to discharge:       Patient has been given IMM and set up with [facility].   Patients daughter will  from the hospital.

## 2023-10-19 NOTE — DISCHARGE SUMMARY
these medications      albuterol sulfate  (90 Base) MCG/ACT inhaler  Commonly known as: PROVENTIL;VENTOLIN;PROAIR     guaiFENesin 600 MG extended release tablet  Commonly known as: MUCINEX     ipratropium 0.5 mg-albuterol 2.5 mg 0.5-2.5 (3) MG/3ML Soln nebulizer solution  Commonly known as: DUONEB               Where to Get Your Medications        These medications were sent to [facility]/pharmacy [address]ong, [address] [place] [address]      Phone: [phone]   furosemide 40 MG tablet          Code Status: Full Code     Consults:   IP CONSULT TO CARDIOLOGY  IP CONSULT TO ADVANCED HEART FAILURE NURSE NAVIGATOR    Diet: cardiac diet    Activity: activity as tolerated   Work:    Discharged Condition: good    Prognosis: Good    Disposition: home      Follow-up with   PCP within   5-7    Days    Follow up labs:        Discharge Physician Signed:   Hospitalist, Internal medicine  10/19/2023 at 2:06 PM    The patient was seen and examined on day of discharge and this discharge summary is in conjunction with any daily progress note from day of discharge.   Time spent on discharge in the examination, evaluation, counseling and review of medications and discharge plan: 35 minutes    Please forward this discharge summary to patient's PCP

## 2023-10-19 NOTE — CARE COORDINATION
Cm met with patient at bedside to discuss recommendations for home health. Patient has no preference in company at this time. CM sent multiple referrals, pending acceptance at this time.  Patient already has 4LPM O2 at home supplied through [employer].  Her daughter will be picking her up when she is discharged from the hospital.

## 2023-10-19 NOTE — PLAN OF CARE
Problem: Physical Therapy - Adult  Goal: By Discharge: Performs mobility at highest level of function for planned discharge setting. See evaluation for individualized goals. Description: FUNCTIONAL STATUS PRIOR TO ADMISSION: Patient was modified independent using a single point cane for functional mobility. and The patient  was modified independent using adaptive equipment for basic and instrumental ADLs. HOME SUPPORT PRIOR TO ADMISSION: The patient lived with daughter but did not require assistance. Physical Therapy Goals  Initiated 10/19/2023  Pt stated goal: to go home  Pt will be I with [facility] in 7 days. Pt will perform bed mobility with Modified [place] in 7 days. Pt will perform transfers with Modified [place] in 7 days. Pt will amb  feet with LRAD safely with Modified [place] in 7 days. Pt will demonstrate improvement in standing balance from Contact Guard Assist to Modified [place] in 7 days.       10/19/2023 1037 by Inna Christian PT  Outcome: Progressing

## 2023-10-19 NOTE — PLAN OF CARE
Problem: Discharge Planning  Goal: Discharge to home or other facility with appropriate resources  10/18/2023 2235 by Carolee Brandon RN  Outcome: Progressing  10/18/2023 1001 by Tayla Schwartz RN  Outcome: Progressing     Problem: Skin/Tissue Integrity  Goal: Absence of new skin breakdown  Description: 1. Monitor for areas of redness and/or skin breakdown  2. Assess vascular access sites hourly  3. Every 4-6 hours minimum:  Change oxygen saturation probe site  4. Every 4-6 hours:  If on nasal continuous positive airway pressure, respiratory therapy assess nares and determine need for appliance change or resting period.   10/18/2023 2235 by Carolee Brandon RN  Outcome: Progressing  10/18/2023 1001 by Tayla Schwartz RN  Outcome: Progressing     Problem: Safety - Adult  Goal: Free from fall injury  10/18/2023 2235 by Carolee Brandon RN  Outcome: Progressing  10/18/2023 1001 by Tayla Schwartz RN  Outcome: Progressing     Problem: ABCDS Injury Assessment  Goal: Absence of physical injury  10/18/2023 2235 by Carolee Brandon RN  Outcome: Progressing  10/18/2023 1001 by Tayla Schwartz RN  Outcome: Progressing     Problem: Chronic Conditions and Co-morbidities  Goal: Patient's chronic conditions and co-morbidity symptoms are monitored and maintained or improved  10/18/2023 2235 by Carolee Brandon RN  Outcome: Progressing  10/18/2023 1001 by Tayla Schwartz RN  Outcome: Progressing     Problem: Pain  Goal: Verbalizes/displays adequate comfort level or baseline comfort level  10/18/2023 2235 by Carolee Brandon RN  Outcome: Progressing  10/18/2023 1001 by Tayla Schwartz RN  Outcome: Progressing

## 2023-10-26 ENCOUNTER — FOLLOWUP TELEPHONE ENCOUNTER (OUTPATIENT)
Facility: HOSPITAL | Age: 73
End: 2023-10-26

## 2024-01-04 ENCOUNTER — HOSPITAL ENCOUNTER (INPATIENT)
Facility: HOSPITAL | Age: 74
LOS: 5 days | Discharge: HOME HEALTH | DRG: 291 | End: 2024-01-10
Attending: STUDENT IN AN ORGANIZED HEALTH CARE EDUCATION/TRAINING PROGRAM | Admitting: INTERNAL MEDICINE
Payer: MEDICARE

## 2024-01-04 ENCOUNTER — APPOINTMENT (OUTPATIENT)
Facility: HOSPITAL | Age: 74
DRG: 291 | End: 2024-01-04
Payer: MEDICARE

## 2024-01-04 DIAGNOSIS — R06.02 SHORTNESS OF BREATH: ICD-10-CM

## 2024-01-04 DIAGNOSIS — I50.23 ACUTE ON CHRONIC SYSTOLIC CONGESTIVE HEART FAILURE (HCC): Primary | ICD-10-CM

## 2024-01-04 DIAGNOSIS — I50.31 ACUTE CONGESTIVE HEART FAILURE WITH LEFT VENTRICULAR DIASTOLIC DYSFUNCTION (HCC): ICD-10-CM

## 2024-01-04 LAB
ALBUMIN SERPL-MCNC: 3.5 G/DL (ref 3.5–5)
ALBUMIN/GLOB SERPL: 1 (ref 1.1–2.2)
ALP SERPL-CCNC: 175 U/L (ref 45–117)
ALT SERPL-CCNC: 30 U/L (ref 12–78)
ANION GAP SERPL CALC-SCNC: 3 MMOL/L (ref 5–15)
AST SERPL W P-5'-P-CCNC: 23 U/L (ref 15–37)
BASOPHILS # BLD: 0.1 K/UL (ref 0–0.1)
BASOPHILS NFR BLD: 1 % (ref 0–1)
BILIRUB SERPL-MCNC: 1 MG/DL (ref 0.2–1)
BNP SERPL-MCNC: 8425 PG/ML
BUN SERPL-MCNC: 34 MG/DL (ref 6–20)
BUN/CREAT SERPL: 22 (ref 12–20)
CA-I BLD-MCNC: 8.9 MG/DL (ref 8.5–10.1)
CHLORIDE SERPL-SCNC: 113 MMOL/L (ref 97–108)
CO2 SERPL-SCNC: 27 MMOL/L (ref 21–32)
CREAT SERPL-MCNC: 1.56 MG/DL (ref 0.55–1.02)
DIFFERENTIAL METHOD BLD: ABNORMAL
EOSINOPHIL # BLD: 0.1 K/UL (ref 0–0.4)
EOSINOPHIL NFR BLD: 2 % (ref 0–7)
ERYTHROCYTE [DISTWIDTH] IN BLOOD BY AUTOMATED COUNT: 15.7 % (ref 11.5–14.5)
GLOBULIN SER CALC-MCNC: 3.4 G/DL (ref 2–4)
GLUCOSE SERPL-MCNC: 103 MG/DL (ref 65–100)
HCT VFR BLD AUTO: 45 % (ref 35–47)
HGB BLD-MCNC: 13.8 G/DL (ref 11.5–16)
IMM GRANULOCYTES # BLD AUTO: 0 K/UL (ref 0–0.04)
IMM GRANULOCYTES NFR BLD AUTO: 0 % (ref 0–0.5)
LYMPHOCYTES # BLD: 1.9 K/UL (ref 0.8–3.5)
LYMPHOCYTES NFR BLD: 20 % (ref 12–49)
MAGNESIUM SERPL-MCNC: 2.1 MG/DL (ref 1.6–2.4)
MCH RBC QN AUTO: 28 PG (ref 26–34)
MCHC RBC AUTO-ENTMCNC: 30.7 G/DL (ref 30–36.5)
MCV RBC AUTO: 91.3 FL (ref 80–99)
MONOCYTES # BLD: 0.8 K/UL (ref 0–1)
MONOCYTES NFR BLD: 9 % (ref 5–13)
NEUTS SEG # BLD: 6.5 K/UL (ref 1.8–8)
NEUTS SEG NFR BLD: 68 % (ref 32–75)
NRBC # BLD: 0 K/UL (ref 0–0.01)
NRBC BLD-RTO: 0 PER 100 WBC
PLATELET # BLD AUTO: 202 K/UL (ref 150–400)
PMV BLD AUTO: 13 FL (ref 8.9–12.9)
POTASSIUM SERPL-SCNC: 4.5 MMOL/L (ref 3.5–5.1)
PROT SERPL-MCNC: 6.9 G/DL (ref 6.4–8.2)
RBC # BLD AUTO: 4.93 M/UL (ref 3.8–5.2)
SODIUM SERPL-SCNC: 143 MMOL/L (ref 136–145)
TROPONIN I SERPL HS-MCNC: 161 NG/L (ref 0–51)
TROPONIN I SERPL HS-MCNC: 163 NG/L (ref 0–51)
WBC # BLD AUTO: 9.5 K/UL (ref 3.6–11)

## 2024-01-04 PROCEDURE — 6370000000 HC RX 637 (ALT 250 FOR IP): Performed by: STUDENT IN AN ORGANIZED HEALTH CARE EDUCATION/TRAINING PROGRAM

## 2024-01-04 PROCEDURE — 83735 ASSAY OF MAGNESIUM: CPT

## 2024-01-04 PROCEDURE — 36415 COLL VENOUS BLD VENIPUNCTURE: CPT

## 2024-01-04 PROCEDURE — 71045 X-RAY EXAM CHEST 1 VIEW: CPT

## 2024-01-04 PROCEDURE — 93005 ELECTROCARDIOGRAM TRACING: CPT | Performed by: EMERGENCY MEDICINE

## 2024-01-04 PROCEDURE — 83880 ASSAY OF NATRIURETIC PEPTIDE: CPT

## 2024-01-04 PROCEDURE — 94761 N-INVAS EAR/PLS OXIMETRY MLT: CPT

## 2024-01-04 PROCEDURE — 80053 COMPREHEN METABOLIC PANEL: CPT

## 2024-01-04 PROCEDURE — 6360000002 HC RX W HCPCS: Performed by: STUDENT IN AN ORGANIZED HEALTH CARE EDUCATION/TRAINING PROGRAM

## 2024-01-04 PROCEDURE — 96374 THER/PROPH/DIAG INJ IV PUSH: CPT

## 2024-01-04 PROCEDURE — 84484 ASSAY OF TROPONIN QUANT: CPT

## 2024-01-04 PROCEDURE — 85025 COMPLETE CBC W/AUTO DIFF WBC: CPT

## 2024-01-04 PROCEDURE — 99285 EMERGENCY DEPT VISIT HI MDM: CPT

## 2024-01-04 RX ORDER — HYDRALAZINE HYDROCHLORIDE 25 MG/1
50 TABLET, FILM COATED ORAL
Status: COMPLETED | OUTPATIENT
Start: 2024-01-05 | End: 2024-01-05

## 2024-01-04 RX ORDER — FUROSEMIDE 10 MG/ML
40 INJECTION INTRAMUSCULAR; INTRAVENOUS ONCE
Status: COMPLETED | OUTPATIENT
Start: 2024-01-04 | End: 2024-01-04

## 2024-01-04 RX ORDER — ACETAMINOPHEN 325 MG/1
650 TABLET ORAL
Status: COMPLETED | OUTPATIENT
Start: 2024-01-04 | End: 2024-01-04

## 2024-01-04 RX ADMIN — ACETAMINOPHEN 650 MG: 325 TABLET ORAL at 23:44

## 2024-01-04 RX ADMIN — FUROSEMIDE 40 MG: 10 INJECTION, SOLUTION INTRAMUSCULAR; INTRAVENOUS at 22:26

## 2024-01-04 ASSESSMENT — PAIN DESCRIPTION - LOCATION: LOCATION: SHOULDER

## 2024-01-04 ASSESSMENT — PAIN - FUNCTIONAL ASSESSMENT: PAIN_FUNCTIONAL_ASSESSMENT: 0-10

## 2024-01-04 ASSESSMENT — PAIN SCALES - GENERAL: PAINLEVEL_OUTOF10: 7

## 2024-01-04 ASSESSMENT — PAIN DESCRIPTION - ORIENTATION: ORIENTATION: RIGHT

## 2024-01-05 ENCOUNTER — APPOINTMENT (OUTPATIENT)
Facility: HOSPITAL | Age: 74
DRG: 291 | End: 2024-01-05
Payer: MEDICARE

## 2024-01-05 LAB
ANION GAP SERPL CALC-SCNC: 5 MMOL/L (ref 5–15)
BUN SERPL-MCNC: 30 MG/DL (ref 6–20)
BUN/CREAT SERPL: 21 (ref 12–20)
CA-I BLD-MCNC: 8.8 MG/DL (ref 8.5–10.1)
CHLORIDE SERPL-SCNC: 113 MMOL/L (ref 97–108)
CO2 SERPL-SCNC: 26 MMOL/L (ref 21–32)
CREAT SERPL-MCNC: 1.42 MG/DL (ref 0.55–1.02)
CRP SERPL-MCNC: 0.76 MG/DL (ref 0–0.6)
EKG ATRIAL RATE: 67 BPM
EKG DIAGNOSIS: NORMAL
EKG P AXIS: 85 DEGREES
EKG P-R INTERVAL: 166 MS
EKG Q-T INTERVAL: 452 MS
EKG QRS DURATION: 86 MS
EKG QTC CALCULATION (BAZETT): 477 MS
EKG R AXIS: 48 DEGREES
EKG T AXIS: 99 DEGREES
EKG VENTRICULAR RATE: 67 BPM
GLUCOSE BLD STRIP.AUTO-MCNC: 106 MG/DL (ref 65–100)
GLUCOSE BLD STRIP.AUTO-MCNC: 133 MG/DL (ref 65–100)
GLUCOSE BLD STRIP.AUTO-MCNC: 158 MG/DL (ref 65–100)
GLUCOSE BLD STRIP.AUTO-MCNC: 179 MG/DL (ref 65–100)
GLUCOSE SERPL-MCNC: 121 MG/DL (ref 65–100)
MAGNESIUM SERPL-MCNC: 2 MG/DL (ref 1.6–2.4)
PERFORMED BY:: ABNORMAL
POTASSIUM SERPL-SCNC: 3.8 MMOL/L (ref 3.5–5.1)
PROCALCITONIN SERPL-MCNC: 0.07 NG/ML
SODIUM SERPL-SCNC: 144 MMOL/L (ref 136–145)

## 2024-01-05 PROCEDURE — 6370000000 HC RX 637 (ALT 250 FOR IP): Performed by: INTERNAL MEDICINE

## 2024-01-05 PROCEDURE — 94640 AIRWAY INHALATION TREATMENT: CPT

## 2024-01-05 PROCEDURE — 36415 COLL VENOUS BLD VENIPUNCTURE: CPT

## 2024-01-05 PROCEDURE — 86140 C-REACTIVE PROTEIN: CPT

## 2024-01-05 PROCEDURE — 71046 X-RAY EXAM CHEST 2 VIEWS: CPT

## 2024-01-05 PROCEDURE — 6370000000 HC RX 637 (ALT 250 FOR IP): Performed by: STUDENT IN AN ORGANIZED HEALTH CARE EDUCATION/TRAINING PROGRAM

## 2024-01-05 PROCEDURE — 83735 ASSAY OF MAGNESIUM: CPT

## 2024-01-05 PROCEDURE — 2580000003 HC RX 258: Performed by: INTERNAL MEDICINE

## 2024-01-05 PROCEDURE — 84145 PROCALCITONIN (PCT): CPT

## 2024-01-05 PROCEDURE — 2060000000 HC ICU INTERMEDIATE R&B

## 2024-01-05 PROCEDURE — 80048 BASIC METABOLIC PNL TOTAL CA: CPT

## 2024-01-05 PROCEDURE — 82962 GLUCOSE BLOOD TEST: CPT

## 2024-01-05 PROCEDURE — 2700000000 HC OXYGEN THERAPY PER DAY

## 2024-01-05 PROCEDURE — 94761 N-INVAS EAR/PLS OXIMETRY MLT: CPT

## 2024-01-05 PROCEDURE — 6360000002 HC RX W HCPCS: Performed by: INTERNAL MEDICINE

## 2024-01-05 RX ORDER — AMIODARONE HYDROCHLORIDE 200 MG/1
200 TABLET ORAL DAILY
Status: DISCONTINUED | OUTPATIENT
Start: 2024-01-05 | End: 2024-01-10 | Stop reason: HOSPADM

## 2024-01-05 RX ORDER — ATORVASTATIN CALCIUM 40 MG/1
40 TABLET, FILM COATED ORAL NIGHTLY
Status: DISCONTINUED | OUTPATIENT
Start: 2024-01-05 | End: 2024-01-10 | Stop reason: HOSPADM

## 2024-01-05 RX ORDER — METOLAZONE 5 MG/1
2.5 TABLET ORAL DAILY
Status: DISCONTINUED | OUTPATIENT
Start: 2024-01-05 | End: 2024-01-10 | Stop reason: HOSPADM

## 2024-01-05 RX ORDER — METOPROLOL SUCCINATE 50 MG/1
50 TABLET, EXTENDED RELEASE ORAL DAILY
Status: DISCONTINUED | OUTPATIENT
Start: 2024-01-05 | End: 2024-01-10 | Stop reason: HOSPADM

## 2024-01-05 RX ORDER — ONDANSETRON 4 MG/1
4 TABLET, ORALLY DISINTEGRATING ORAL EVERY 8 HOURS PRN
Status: DISCONTINUED | OUTPATIENT
Start: 2024-01-05 | End: 2024-01-10 | Stop reason: HOSPADM

## 2024-01-05 RX ORDER — INSULIN LISPRO 100 [IU]/ML
0-4 INJECTION, SOLUTION INTRAVENOUS; SUBCUTANEOUS
Status: DISCONTINUED | OUTPATIENT
Start: 2024-01-05 | End: 2024-01-10 | Stop reason: HOSPADM

## 2024-01-05 RX ORDER — BUDESONIDE AND FORMOTEROL FUMARATE DIHYDRATE 160; 4.5 UG/1; UG/1
2 AEROSOL RESPIRATORY (INHALATION)
Status: DISCONTINUED | OUTPATIENT
Start: 2024-01-05 | End: 2024-01-10 | Stop reason: HOSPADM

## 2024-01-05 RX ORDER — ONDANSETRON 2 MG/ML
4 INJECTION INTRAMUSCULAR; INTRAVENOUS EVERY 6 HOURS PRN
Status: DISCONTINUED | OUTPATIENT
Start: 2024-01-05 | End: 2024-01-10 | Stop reason: HOSPADM

## 2024-01-05 RX ORDER — DEXTROSE MONOHYDRATE 100 MG/ML
INJECTION, SOLUTION INTRAVENOUS CONTINUOUS PRN
Status: DISCONTINUED | OUTPATIENT
Start: 2024-01-05 | End: 2024-01-10 | Stop reason: HOSPADM

## 2024-01-05 RX ORDER — SODIUM CHLORIDE 0.9 % (FLUSH) 0.9 %
5-40 SYRINGE (ML) INJECTION EVERY 12 HOURS SCHEDULED
Status: DISCONTINUED | OUTPATIENT
Start: 2024-01-05 | End: 2024-01-10 | Stop reason: HOSPADM

## 2024-01-05 RX ORDER — SODIUM CHLORIDE 9 MG/ML
INJECTION, SOLUTION INTRAVENOUS PRN
Status: DISCONTINUED | OUTPATIENT
Start: 2024-01-05 | End: 2024-01-10 | Stop reason: HOSPADM

## 2024-01-05 RX ORDER — ACETAMINOPHEN 325 MG/1
650 TABLET ORAL EVERY 6 HOURS PRN
Status: DISCONTINUED | OUTPATIENT
Start: 2024-01-05 | End: 2024-01-10 | Stop reason: HOSPADM

## 2024-01-05 RX ORDER — INSULIN LISPRO 100 [IU]/ML
0-4 INJECTION, SOLUTION INTRAVENOUS; SUBCUTANEOUS NIGHTLY
Status: DISCONTINUED | OUTPATIENT
Start: 2024-01-05 | End: 2024-01-10 | Stop reason: HOSPADM

## 2024-01-05 RX ORDER — POLYETHYLENE GLYCOL 3350 17 G/17G
17 POWDER, FOR SOLUTION ORAL DAILY PRN
Status: DISCONTINUED | OUTPATIENT
Start: 2024-01-05 | End: 2024-01-10 | Stop reason: HOSPADM

## 2024-01-05 RX ORDER — SODIUM CHLORIDE 0.9 % (FLUSH) 0.9 %
5-40 SYRINGE (ML) INJECTION PRN
Status: DISCONTINUED | OUTPATIENT
Start: 2024-01-05 | End: 2024-01-10 | Stop reason: HOSPADM

## 2024-01-05 RX ORDER — HYDRALAZINE HYDROCHLORIDE 25 MG/1
50 TABLET, FILM COATED ORAL EVERY 6 HOURS PRN
Status: DISCONTINUED | OUTPATIENT
Start: 2024-01-05 | End: 2024-01-05

## 2024-01-05 RX ORDER — CETIRIZINE HYDROCHLORIDE 10 MG/1
10 TABLET ORAL DAILY
Status: DISCONTINUED | OUTPATIENT
Start: 2024-01-05 | End: 2024-01-10 | Stop reason: HOSPADM

## 2024-01-05 RX ORDER — TRAMADOL HYDROCHLORIDE 50 MG/1
50 TABLET ORAL EVERY 8 HOURS PRN
Status: DISCONTINUED | OUTPATIENT
Start: 2024-01-05 | End: 2024-01-10 | Stop reason: HOSPADM

## 2024-01-05 RX ORDER — OXYBUTYNIN CHLORIDE 5 MG/1
5 TABLET ORAL 3 TIMES DAILY
Status: DISCONTINUED | OUTPATIENT
Start: 2024-01-05 | End: 2024-01-10 | Stop reason: HOSPADM

## 2024-01-05 RX ORDER — ASPIRIN 81 MG/1
81 TABLET ORAL DAILY
Status: DISCONTINUED | OUTPATIENT
Start: 2024-01-05 | End: 2024-01-10 | Stop reason: HOSPADM

## 2024-01-05 RX ORDER — HYDRALAZINE HYDROCHLORIDE 50 MG/1
100 TABLET, FILM COATED ORAL EVERY 8 HOURS SCHEDULED
Status: DISCONTINUED | OUTPATIENT
Start: 2024-01-05 | End: 2024-01-10 | Stop reason: HOSPADM

## 2024-01-05 RX ORDER — SPIRONOLACTONE 25 MG/1
25 TABLET ORAL DAILY
Status: DISCONTINUED | OUTPATIENT
Start: 2024-01-05 | End: 2024-01-10 | Stop reason: HOSPADM

## 2024-01-05 RX ORDER — ACETAMINOPHEN 650 MG/1
650 SUPPOSITORY RECTAL EVERY 6 HOURS PRN
Status: DISCONTINUED | OUTPATIENT
Start: 2024-01-05 | End: 2024-01-10 | Stop reason: HOSPADM

## 2024-01-05 RX ORDER — FUROSEMIDE 10 MG/ML
40 INJECTION INTRAMUSCULAR; INTRAVENOUS 2 TIMES DAILY
Status: DISCONTINUED | OUTPATIENT
Start: 2024-01-05 | End: 2024-01-10 | Stop reason: HOSPADM

## 2024-01-05 RX ORDER — ALLOPURINOL 100 MG/1
100 TABLET ORAL DAILY
Status: DISCONTINUED | OUTPATIENT
Start: 2024-01-05 | End: 2024-01-10 | Stop reason: HOSPADM

## 2024-01-05 RX ADMIN — SPIRONOLACTONE 25 MG: 25 TABLET ORAL at 10:30

## 2024-01-05 RX ADMIN — APIXABAN 5 MG: 5 TABLET, FILM COATED ORAL at 10:31

## 2024-01-05 RX ADMIN — APIXABAN 5 MG: 5 TABLET, FILM COATED ORAL at 21:57

## 2024-01-05 RX ADMIN — HYDRALAZINE HYDROCHLORIDE 50 MG: 25 TABLET, FILM COATED ORAL at 00:02

## 2024-01-05 RX ADMIN — SACUBITRIL AND VALSARTAN 1 TABLET: 97; 103 TABLET, FILM COATED ORAL at 10:31

## 2024-01-05 RX ADMIN — OXYBUTYNIN CHLORIDE 5 MG: 5 TABLET ORAL at 10:31

## 2024-01-05 RX ADMIN — Medication 2 PUFF: at 09:18

## 2024-01-05 RX ADMIN — OXYBUTYNIN CHLORIDE 5 MG: 5 TABLET ORAL at 14:32

## 2024-01-05 RX ADMIN — OXYBUTYNIN CHLORIDE 5 MG: 5 TABLET ORAL at 21:57

## 2024-01-05 RX ADMIN — CETIRIZINE HYDROCHLORIDE 10 MG: 10 TABLET, FILM COATED ORAL at 10:32

## 2024-01-05 RX ADMIN — AMIODARONE HYDROCHLORIDE 200 MG: 200 TABLET ORAL at 10:31

## 2024-01-05 RX ADMIN — FUROSEMIDE 40 MG: 10 INJECTION, SOLUTION INTRAMUSCULAR; INTRAVENOUS at 10:32

## 2024-01-05 RX ADMIN — ALLOPURINOL 100 MG: 100 TABLET ORAL at 10:31

## 2024-01-05 RX ADMIN — ATORVASTATIN CALCIUM 40 MG: 40 TABLET, FILM COATED ORAL at 21:57

## 2024-01-05 RX ADMIN — FUROSEMIDE 40 MG: 10 INJECTION, SOLUTION INTRAMUSCULAR; INTRAVENOUS at 17:50

## 2024-01-05 RX ADMIN — TRAMADOL HYDROCHLORIDE 50 MG: 50 TABLET ORAL at 17:50

## 2024-01-05 RX ADMIN — SODIUM CHLORIDE, PRESERVATIVE FREE 10 ML: 5 INJECTION INTRAVENOUS at 10:32

## 2024-01-05 RX ADMIN — ACETAMINOPHEN 650 MG: 325 TABLET ORAL at 21:57

## 2024-01-05 RX ADMIN — THEOPHYLLINE ANHYDROUS 300 MG: 300 CAPSULE, EXTENDED RELEASE ORAL at 10:32

## 2024-01-05 RX ADMIN — ASPIRIN 81 MG: 81 TABLET, COATED ORAL at 10:31

## 2024-01-05 RX ADMIN — METOLAZONE 2.5 MG: 2.5 TABLET ORAL at 10:45

## 2024-01-05 RX ADMIN — METOPROLOL SUCCINATE 50 MG: 50 TABLET, EXTENDED RELEASE ORAL at 10:31

## 2024-01-05 RX ADMIN — EMPAGLIFLOZIN 10 MG: 10 TABLET, FILM COATED ORAL at 10:32

## 2024-01-05 RX ADMIN — SODIUM CHLORIDE, PRESERVATIVE FREE 10 ML: 5 INJECTION INTRAVENOUS at 21:58

## 2024-01-05 RX ADMIN — Medication 2 PUFF: at 19:53

## 2024-01-05 RX ADMIN — HYDRALAZINE HYDROCHLORIDE 100 MG: 50 TABLET, FILM COATED ORAL at 10:45

## 2024-01-05 ASSESSMENT — ENCOUNTER SYMPTOMS
BACK PAIN: 1
DIARRHEA: 0
VOMITING: 0
SORE THROAT: 0
ABDOMINAL PAIN: 0
PHOTOPHOBIA: 0
BLOOD IN STOOL: 0
NAUSEA: 0
TROUBLE SWALLOWING: 0
SHORTNESS OF BREATH: 1
EYE REDNESS: 0
CONSTIPATION: 0
COUGH: 1
WHEEZING: 0

## 2024-01-05 ASSESSMENT — PAIN SCALES - GENERAL
PAINLEVEL_OUTOF10: 7

## 2024-01-05 ASSESSMENT — PAIN SCALES - WONG BAKER: WONGBAKER_NUMERICALRESPONSE: 6

## 2024-01-05 ASSESSMENT — PAIN DESCRIPTION - LOCATION
LOCATION: NECK;SHOULDER
LOCATION: NECK;SHOULDER

## 2024-01-05 NOTE — ED TRIAGE NOTES
Pt states she is experiencing fluid overload x 2 weeks    Pt states that the md has upped her lasix lately

## 2024-01-05 NOTE — NURSE NAVIGATOR
verbalizes understanding.        --Currently pt's cardiologist is not consulted. RN-NN recommends pts cardiologist to be consulted.

## 2024-01-05 NOTE — ED PROVIDER NOTES
reassessed, states that she still feels short of breath.  Has diuresed approximately 250 mL of clear urine.  Patient complaining of pain to the base of her right neck and upper shoulder, consistent with muscle cramp.  Will give  tylenol [PZ]   1824 Spoke with Dr. Potts, will admit for chf exacerbation.  Given persistent HTN with give patient home dose of hydralazine 50mg PO.  [PZ]      ED Course User Index  [PZ] Jorge Neil MD       Sepsis Reassessment: Sepsis reassessment not applicable    Disposition Considerations (Tests not done, Shared Decision Making, Pt Expectation of Test or Treatment.): Not Applicable    Patient was given the following medications:  Medications   hydrALAZINE (APRESOLINE) tablet 50 mg (has no administration in time range)   furosemide (LASIX) injection 40 mg (40 mg IntraVENous Given 1/4/24 2226)   acetaminophen (TYLENOL) tablet 650 mg (650 mg Oral Given 1/4/24 2344)       CONSULTS: (Who and What was discussed)  None     Social Determinants affecting Dx or Tx: None    Smoking Cessation: Not Applicable    PROCEDURES   Unless otherwise noted above, none.  Procedures      CRITICAL CARE TIME   Patient does not meet Critical Care Time, 0 minutes    FINAL IMPRESSION     1. Acute on chronic systolic congestive heart failure (HCC)    2. Shortness of breath          DISPOSITION/PLAN   DISPOSITION Decision To Admit 01/04/2024 11:59:23 PM    Admit Note: Pt is being admitted by hospitalist team. The results of their tests and reason(s) for their admission have been discussed with pt and/or available family. They convey agreement and understanding for the need to be admitted and for the admission diagnosis.     PATIENT REFERRED TO:  No follow-up provider specified.      DISCHARGE MEDICATIONS:     Medication List        ASK your doctor about these medications      allopurinol 100 MG tablet  Commonly known as: ZYLOPRIM     amiodarone 200 MG tablet  Commonly known as: CORDARONE     apixaban 5 MG Tabs

## 2024-01-05 NOTE — ED NOTES
ED TO INPATIENT SBAR HANDOFF    Patient Name: Nubia Lion   Preferred Name: Nubia  : 1950  73 y.o.   Family/Caregiver Present: no   Code Status Order: Full Code  PO Status: Regular  Telemetry Order:   C-SSRS: Risk of Suicide: No Risk  Sitter no   Restraints:   no  Sepsis Risk Score Sepsis Risk Score: 0.56    Situation  Chief Complaint   Patient presents with    Respiratory Distress     Brief Description of Patient's Condition: Alert and oriented x4 with no signs of respiratory distress. Pt is on 4L NC at home and is on 4L NC for admission as well. Pt can walk with assistance but gets tired easily.    Mental Status: oriented and alert  Arrived from:Home  Imaging:   XR CHEST PORTABLE   Final Result   Stable cardiomegaly. There is LEFT lower lung zone consolidation concerning for   infection.            XR CHEST (2 VW)    (Results Pending)     Abnormal labs:   Abnormal Labs Reviewed   CBC WITH AUTO DIFFERENTIAL - Abnormal; Notable for the following components:       Result Value    RDW 15.7 (*)     MPV 13.0 (*)     All other components within normal limits   COMPREHENSIVE METABOLIC PANEL - Abnormal; Notable for the following components:    Chloride 113 (*)     Anion Gap 3 (*)     Glucose 103 (*)     BUN 34 (*)     Creatinine 1.56 (*)     Bun/Cre Ratio 22 (*)     Est, Glom Filt Rate 35 (*)     Alk Phosphatase 175 (*)     Albumin/Globulin Ratio 1.0 (*)     All other components within normal limits   BRAIN NATRIURETIC PEPTIDE - Abnormal; Notable for the following components:    NT Pro-BNP 8,425 (*)     All other components within normal limits   TROPONIN - Abnormal; Notable for the following components:    Troponin, High Sensitivity 163 (*)     All other components within normal limits   TROPONIN - Abnormal; Notable for the following components:    Troponin, High Sensitivity 161 (*)     All other components within normal limits       Background  Allergies:   Allergies   Allergen Reactions

## 2024-01-05 NOTE — WOUND CARE
Wound Care Note:      Wound Care into see patient because of wound to right leg    Patient resting in bed, states that she has been having issues with her legs swelling on and off for a while now. States that she will often get a sore on her right leg and it was weep. Bilateral lower legs are edematous with slight erythema noted. Patient to be transferred to gel mattress with air pump for increased pressure reduction. Patient is at risk for pressure injury due to decreased mobility.     Ruptured blister noted to posterior right lower leg, skin is not open at this time and area is dry. Believe this may have a vascular etiology. Area cleansed with NS and doubled over xeroform applied and covered with 4x4 gentle lite foam dressing. Dressing to be changed daily and PRN for soiling.     Wound Care Documentation:  Wound 01/05/24 Tibial Posterior;Right (Active)   Wound Image   01/05/24 0920   Wound Etiology Venous 01/05/24 0920   Dressing Status New dressing applied 01/05/24 0920   Wound Cleansed Cleansed with saline 01/05/24 0920   Dressing/Treatment Xeroform;Foam 01/05/24 0920   Wound Assessment Ruptured blister 01/05/24 0920   Drainage Amount None (dry) 01/05/24 0920   Odor None 01/05/24 0920   Vidya-wound Assessment Fragile;Edematous 01/05/24 0920   Margins Attached edges 01/05/24 0920   Number of days: 0         Patient would benefit from follow up with Podiatry outpatient for thick diabetic toenail care.       Recommend applying sacral foam dressing to sacrum for protection. Dressing to be changed every 3 days and PRN for soiling, nurse to peel back dressing daily for skin assessment.     Float heels on pillow at all times while in bed, place pillow long ways under patient leg so knee is supported and heel is hanging off edge of pillow.    Boot are to be applied daily and kept on at all times while in bed for offloading and to prevent pressure

## 2024-01-05 NOTE — DISCHARGE INSTRUCTIONS
Heart Failure Follow-up protocol:  -Please schedule an appointment with your cardiologist(heart doctor) to be seen within 3-5 days of discharge.    -Please remember to call your cardiologist with any new, returning, or worsening symptoms. Please refer to your symptom chart. **Weigh yourself Daily in the morning, after using the bathroom, but before eating or drinking anything.    -Please follow your sodium restrictions to reduce the intake of salt/sodium as discussed. Remember when we take in too much salt/sodium, we hold on to fluid.    ____________________________________________________________

## 2024-01-05 NOTE — CARE COORDINATION
01/05/24 1630   Service Assessment   Patient Orientation Alert and Oriented   Cognition Alert   History Provided By Patient   Primary Caregiver Self   Accompanied By/Relationship alone in room   Support Systems Parent   Patient's Healthcare Decision Maker is: Legal Next of Kin   PCP Verified by CM Yes  (2 weeks ago.)   Last Visit to PCP Within last 3 months   Prior Functional Level Independent in ADLs/IADLs   Current Functional Level Independent in ADLs/IADLs   Can patient return to prior living arrangement Yes   Ability to make needs known: Good   Family able to assist with home care needs: Yes   Would you like for me to discuss the discharge plan with any other family members/significant others, and if so, who? Yes   Financial Resources Medicaid   Social/Functional History   Lives With Daughter   Type of Home House   Home Layout One level   Home Equipment Oxygen   Receives Help From Family   ADL Assistance Independent   Homemaking Assistance Independent   Ambulation Assistance Independent   Transfer Assistance Independent   Active  No   Discharge Planning   Living Arrangements Children   Current Services Prior To Admission Oxygen Therapy   DME Ordered? Cane;Oxygen therapy (comment)   Potential Assistance Purchasing Medications No   Type of Home Care Services None   Patient expects to be discharged to: House   History of falls? 0   Services At/After Discharge   Confirm Follow Up Transport Family     Patient lives with her daughter in a one story with 2 steps to entrance. Patient has a cane and oxygen 4 LPM from [facility] and has a concentrator and a portable tank. Patient has previously had HH when she had a stroke and now has right sided weakness. Patient uses the [facility] in [place] and is requesting a grabber from OT prior to discharge.

## 2024-01-05 NOTE — H&P
Value Ref Range    Magnesium 2.1 1.6 - 2.4 mg/dL   Brain Natriuretic Peptide    Collection Time: 01/04/24  9:47 PM   Result Value Ref Range    NT Pro-BNP 8,425 (H) <125 pg/mL   Troponin    Collection Time: 01/04/24  9:47 PM   Result Value Ref Range    Troponin, High Sensitivity 163 (HH) 0 - 51 ng/L   Troponin    Collection Time: 01/04/24 11:18 PM   Result Value Ref Range    Troponin, High Sensitivity 161 (HH) 0 - 51 ng/L         Imaging:   XR CHEST PORTABLE   Final Result   Stable cardiomegaly. There is LEFT lower lung zone consolidation concerning for   infection.                   Discussion/Medical Decision Making: Patient with numerous medical comorbidities, each with increased risk for mortality and morbidity if left untreated.   I have reviewed patient's presenting subjective and objective findings, as well as all laboratory studies, imaging studies, and vital signs to date as well as treatment rendered  and patient's response to those treatments.  In addition, prior medical, surgical and relevant social and family histories were reviewed.    I have discussed patient's presentation/findings and clinical course to date with ED provider. Given the patient's current clinical presentation, I have a high level of concern for decompensation if discharged from the emergency department and that patient warrants admission to hospital.     Assessment/Plan     Acute on chronic heart failure reduced EF - volume overloaded.   Change to IV diuretics, continue metolazone  Monitor lytes and renal function  Continue entresto, BB, spirolactone and jardiance    Chronic hypoxic respiratory failure with severe pulmonary hypertension (RVSP 72 on recent echo)- continue on 4 liters NC, titrate as needed    Abn cxr - ? Focal edema vs infection. But no fever/leukocytosis and symptoms progressive over several weeks so holding off abx  Repeat PA/lateral cxr tomorrow to reassess post diuresis  Add abx if fever, clinical decline despite

## 2024-01-06 LAB
ANION GAP SERPL CALC-SCNC: 4 MMOL/L (ref 5–15)
BASOPHILS # BLD: 0.1 K/UL (ref 0–0.1)
BASOPHILS NFR BLD: 1 % (ref 0–1)
BUN SERPL-MCNC: 31 MG/DL (ref 6–20)
BUN/CREAT SERPL: 18 (ref 12–20)
CA-I BLD-MCNC: 8.6 MG/DL (ref 8.5–10.1)
CHLORIDE SERPL-SCNC: 107 MMOL/L (ref 97–108)
CO2 SERPL-SCNC: 31 MMOL/L (ref 21–32)
CREAT SERPL-MCNC: 1.72 MG/DL (ref 0.55–1.02)
DIFFERENTIAL METHOD BLD: ABNORMAL
EOSINOPHIL # BLD: 0.2 K/UL (ref 0–0.4)
EOSINOPHIL NFR BLD: 2 % (ref 0–7)
ERYTHROCYTE [DISTWIDTH] IN BLOOD BY AUTOMATED COUNT: 15.9 % (ref 11.5–14.5)
GLUCOSE BLD STRIP.AUTO-MCNC: 111 MG/DL (ref 65–100)
GLUCOSE BLD STRIP.AUTO-MCNC: 114 MG/DL (ref 65–100)
GLUCOSE BLD STRIP.AUTO-MCNC: 118 MG/DL (ref 65–100)
GLUCOSE BLD STRIP.AUTO-MCNC: 158 MG/DL (ref 65–100)
GLUCOSE SERPL-MCNC: 115 MG/DL (ref 65–100)
HCT VFR BLD AUTO: 45 % (ref 35–47)
HGB BLD-MCNC: 14.1 G/DL (ref 11.5–16)
IMM GRANULOCYTES # BLD AUTO: 0 K/UL (ref 0–0.04)
IMM GRANULOCYTES NFR BLD AUTO: 0 % (ref 0–0.5)
LYMPHOCYTES # BLD: 1.7 K/UL (ref 0.8–3.5)
LYMPHOCYTES NFR BLD: 18 % (ref 12–49)
MAGNESIUM SERPL-MCNC: 1.8 MG/DL (ref 1.6–2.4)
MCH RBC QN AUTO: 27.9 PG (ref 26–34)
MCHC RBC AUTO-ENTMCNC: 31.3 G/DL (ref 30–36.5)
MCV RBC AUTO: 89.1 FL (ref 80–99)
MONOCYTES # BLD: 0.9 K/UL (ref 0–1)
MONOCYTES NFR BLD: 10 % (ref 5–13)
NEUTS SEG # BLD: 6.6 K/UL (ref 1.8–8)
NEUTS SEG NFR BLD: 69 % (ref 32–75)
NRBC # BLD: 0 K/UL (ref 0–0.01)
NRBC BLD-RTO: 0 PER 100 WBC
PERFORMED BY:: ABNORMAL
PLATELET # BLD AUTO: 227 K/UL (ref 150–400)
PMV BLD AUTO: 12.5 FL (ref 8.9–12.9)
POTASSIUM SERPL-SCNC: 3.6 MMOL/L (ref 3.5–5.1)
RBC # BLD AUTO: 5.05 M/UL (ref 3.8–5.2)
SODIUM SERPL-SCNC: 142 MMOL/L (ref 136–145)
WBC # BLD AUTO: 9.5 K/UL (ref 3.6–11)

## 2024-01-06 PROCEDURE — 85025 COMPLETE CBC W/AUTO DIFF WBC: CPT

## 2024-01-06 PROCEDURE — 6370000000 HC RX 637 (ALT 250 FOR IP): Performed by: INTERNAL MEDICINE

## 2024-01-06 PROCEDURE — 2060000000 HC ICU INTERMEDIATE R&B

## 2024-01-06 PROCEDURE — 97161 PT EVAL LOW COMPLEX 20 MIN: CPT

## 2024-01-06 PROCEDURE — 36415 COLL VENOUS BLD VENIPUNCTURE: CPT

## 2024-01-06 PROCEDURE — 6360000002 HC RX W HCPCS: Performed by: INTERNAL MEDICINE

## 2024-01-06 PROCEDURE — 2580000003 HC RX 258: Performed by: INTERNAL MEDICINE

## 2024-01-06 PROCEDURE — 82962 GLUCOSE BLOOD TEST: CPT

## 2024-01-06 PROCEDURE — 94761 N-INVAS EAR/PLS OXIMETRY MLT: CPT

## 2024-01-06 PROCEDURE — 94640 AIRWAY INHALATION TREATMENT: CPT

## 2024-01-06 PROCEDURE — 2700000000 HC OXYGEN THERAPY PER DAY

## 2024-01-06 PROCEDURE — 97116 GAIT TRAINING THERAPY: CPT

## 2024-01-06 PROCEDURE — 83735 ASSAY OF MAGNESIUM: CPT

## 2024-01-06 PROCEDURE — 80048 BASIC METABOLIC PNL TOTAL CA: CPT

## 2024-01-06 RX ADMIN — METOPROLOL SUCCINATE 50 MG: 50 TABLET, EXTENDED RELEASE ORAL at 09:19

## 2024-01-06 RX ADMIN — APIXABAN 5 MG: 5 TABLET, FILM COATED ORAL at 09:19

## 2024-01-06 RX ADMIN — ATORVASTATIN CALCIUM 40 MG: 40 TABLET, FILM COATED ORAL at 20:45

## 2024-01-06 RX ADMIN — FUROSEMIDE 40 MG: 10 INJECTION, SOLUTION INTRAMUSCULAR; INTRAVENOUS at 09:18

## 2024-01-06 RX ADMIN — THEOPHYLLINE ANHYDROUS 300 MG: 300 CAPSULE, EXTENDED RELEASE ORAL at 09:18

## 2024-01-06 RX ADMIN — EMPAGLIFLOZIN 10 MG: 10 TABLET, FILM COATED ORAL at 09:18

## 2024-01-06 RX ADMIN — SACUBITRIL AND VALSARTAN 1 TABLET: 97; 103 TABLET, FILM COATED ORAL at 09:19

## 2024-01-06 RX ADMIN — OXYBUTYNIN CHLORIDE 5 MG: 5 TABLET ORAL at 14:25

## 2024-01-06 RX ADMIN — SODIUM CHLORIDE, PRESERVATIVE FREE 10 ML: 5 INJECTION INTRAVENOUS at 09:18

## 2024-01-06 RX ADMIN — METOLAZONE 2.5 MG: 2.5 TABLET ORAL at 09:22

## 2024-01-06 RX ADMIN — APIXABAN 5 MG: 5 TABLET, FILM COATED ORAL at 20:45

## 2024-01-06 RX ADMIN — Medication 2 PUFF: at 08:11

## 2024-01-06 RX ADMIN — ALLOPURINOL 100 MG: 100 TABLET ORAL at 09:18

## 2024-01-06 RX ADMIN — CETIRIZINE HYDROCHLORIDE 10 MG: 10 TABLET, FILM COATED ORAL at 09:19

## 2024-01-06 RX ADMIN — ASPIRIN 81 MG: 81 TABLET, COATED ORAL at 09:19

## 2024-01-06 RX ADMIN — SACUBITRIL AND VALSARTAN 1 TABLET: 97; 103 TABLET, FILM COATED ORAL at 20:46

## 2024-01-06 RX ADMIN — Medication 2 PUFF: at 22:38

## 2024-01-06 RX ADMIN — TRAMADOL HYDROCHLORIDE 50 MG: 50 TABLET ORAL at 03:03

## 2024-01-06 RX ADMIN — FUROSEMIDE 40 MG: 10 INJECTION, SOLUTION INTRAMUSCULAR; INTRAVENOUS at 20:46

## 2024-01-06 RX ADMIN — AMIODARONE HYDROCHLORIDE 200 MG: 200 TABLET ORAL at 09:18

## 2024-01-06 RX ADMIN — OXYBUTYNIN CHLORIDE 5 MG: 5 TABLET ORAL at 20:45

## 2024-01-06 RX ADMIN — SODIUM CHLORIDE, PRESERVATIVE FREE 10 ML: 5 INJECTION INTRAVENOUS at 20:46

## 2024-01-06 RX ADMIN — HYDRALAZINE HYDROCHLORIDE 100 MG: 50 TABLET, FILM COATED ORAL at 07:22

## 2024-01-06 RX ADMIN — OXYBUTYNIN CHLORIDE 5 MG: 5 TABLET ORAL at 09:19

## 2024-01-06 ASSESSMENT — ENCOUNTER SYMPTOMS
BACK PAIN: 0
EYE DISCHARGE: 0
FACIAL SWELLING: 0
PHOTOPHOBIA: 0
TROUBLE SWALLOWING: 0
CONSTIPATION: 0
EYE PAIN: 0
CHEST TIGHTNESS: 0
COUGH: 0
SHORTNESS OF BREATH: 1
ABDOMINAL DISTENTION: 0
SORE THROAT: 0
ABDOMINAL PAIN: 0

## 2024-01-06 ASSESSMENT — PULMONARY FUNCTION TESTS: PEFR_L/MIN: 18

## 2024-01-06 ASSESSMENT — PAIN SCALES - GENERAL: PAINLEVEL_OUTOF10: 0

## 2024-01-07 ENCOUNTER — APPOINTMENT (OUTPATIENT)
Facility: HOSPITAL | Age: 74
DRG: 291 | End: 2024-01-07
Payer: MEDICARE

## 2024-01-07 ENCOUNTER — APPOINTMENT (OUTPATIENT)
Facility: HOSPITAL | Age: 74
DRG: 291 | End: 2024-01-07
Attending: INTERNAL MEDICINE
Payer: MEDICARE

## 2024-01-07 LAB
ANION GAP SERPL CALC-SCNC: 7 MMOL/L (ref 5–15)
BUN SERPL-MCNC: 36 MG/DL (ref 6–20)
BUN/CREAT SERPL: 19 (ref 12–20)
CA-I BLD-MCNC: 8.4 MG/DL (ref 8.5–10.1)
CHLORIDE SERPL-SCNC: 102 MMOL/L (ref 97–108)
CO2 SERPL-SCNC: 31 MMOL/L (ref 21–32)
CREAT SERPL-MCNC: 1.93 MG/DL (ref 0.55–1.02)
ECHO AO ASC DIAM: 3.2 CM
ECHO AO ASCENDING AORTA INDEX: 1.58 CM/M2
ECHO AO ROOT DIAM: 2.4 CM
ECHO AO ROOT INDEX: 1.19 CM/M2
ECHO AV AREA PEAK VELOCITY: 2.2 CM2
ECHO AV AREA VTI: 2.2 CM2
ECHO AV AREA/BSA PEAK VELOCITY: 1.1 CM2/M2
ECHO AV AREA/BSA VTI: 1.1 CM2/M2
ECHO AV CUSP MM: 1.8 CM
ECHO AV MEAN GRADIENT: 6 MMHG
ECHO AV MEAN VELOCITY: 1.1 M/S
ECHO AV PEAK GRADIENT: 12 MMHG
ECHO AV PEAK VELOCITY: 1.7 M/S
ECHO AV VELOCITY RATIO: 0.71
ECHO AV VTI: 33.6 CM
ECHO BSA: 2.13 M2
ECHO EST RA PRESSURE: 5 MMHG
ECHO LA AREA 2C: 19.2 CM2
ECHO LA AREA 4C: 19 CM2
ECHO LA DIAMETER INDEX: 2.18 CM/M2
ECHO LA DIAMETER: 4.4 CM
ECHO LA MAJOR AXIS: 6.5 CM
ECHO LA MINOR AXIS: 5.8 CM
ECHO LA TO AORTIC ROOT RATIO: 1.83
ECHO LA VOL BP: 54 ML (ref 22–52)
ECHO LA VOL MOD A2C: 58 ML (ref 22–52)
ECHO LA VOL MOD A4C: 46 ML (ref 22–52)
ECHO LA VOL/BSA BIPLANE: 27 ML/M2 (ref 16–34)
ECHO LA VOLUME INDEX MOD A2C: 29 ML/M2 (ref 16–34)
ECHO LA VOLUME INDEX MOD A4C: 23 ML/M2 (ref 16–34)
ECHO LV E' LATERAL VELOCITY: 5 CM/S
ECHO LV E' SEPTAL VELOCITY: 3 CM/S
ECHO LV EDV A2C: 88 ML
ECHO LV EDV A4C: 81 ML
ECHO LV EDV INDEX A4C: 40 ML/M2
ECHO LV EDV NDEX A2C: 44 ML/M2
ECHO LV EJECTION FRACTION A2C: 69 %
ECHO LV EJECTION FRACTION A4C: 69 %
ECHO LV EJECTION FRACTION BIPLANE: 70 % (ref 55–100)
ECHO LV ESV A2C: 27 ML
ECHO LV ESV A4C: 25 ML
ECHO LV ESV INDEX A2C: 13 ML/M2
ECHO LV ESV INDEX A4C: 12 ML/M2
ECHO LV FRACTIONAL SHORTENING: 36 % (ref 28–44)
ECHO LV INTERNAL DIMENSION DIASTOLE INDEX: 2.33 CM/M2
ECHO LV INTERNAL DIMENSION DIASTOLIC MMODE: 5.1 CM (ref 3.9–5.3)
ECHO LV INTERNAL DIMENSION DIASTOLIC: 4.7 CM (ref 3.9–5.3)
ECHO LV INTERNAL DIMENSION SYSTOLIC INDEX: 1.49 CM/M2
ECHO LV INTERNAL DIMENSION SYSTOLIC MMODE: 3.5 CM
ECHO LV INTERNAL DIMENSION SYSTOLIC: 3 CM
ECHO LV IVSD MMODE: 1 CM (ref 0.6–0.9)
ECHO LV IVSD: 1.2 CM (ref 0.6–0.9)
ECHO LV MASS 2D: 187.5 G (ref 67–162)
ECHO LV MASS INDEX 2D: 92.8 G/M2 (ref 43–95)
ECHO LV POSTERIOR WALL DIASTOLIC MMODE: 1 CM (ref 0.6–0.9)
ECHO LV POSTERIOR WALL DIASTOLIC: 1 CM (ref 0.6–0.9)
ECHO LV RELATIVE WALL THICKNESS RATIO: 0.43
ECHO LVOT AREA: 3.1 CM2
ECHO LVOT AV VTI INDEX: 0.71
ECHO LVOT DIAM: 2 CM
ECHO LVOT MEAN GRADIENT: 3 MMHG
ECHO LVOT PEAK GRADIENT: 6 MMHG
ECHO LVOT PEAK VELOCITY: 1.2 M/S
ECHO LVOT STROKE VOLUME INDEX: 37 ML/M2
ECHO LVOT SV: 74.7 ML
ECHO LVOT VTI: 23.8 CM
ECHO MV A VELOCITY: 0.89 M/S
ECHO MV E DECELERATION TIME (DT): 288 MS
ECHO MV E VELOCITY: 0.69 M/S
ECHO MV E/A RATIO: 0.78
ECHO MV E/E' LATERAL: 13.8
ECHO MV E/E' RATIO (AVERAGED): 18.4
ECHO PULMONARY ARTERY END DIASTOLIC PRESSURE: 4 MMHG
ECHO PV MAX VELOCITY: 1 M/S
ECHO PV PEAK GRADIENT: 4 MMHG
ECHO PV REGURGITANT MAX VELOCITY: 1 M/S
ECHO RA AREA 4C: 22.5 CM2
ECHO RA END SYSTOLIC VOLUME APICAL 4 CHAMBER INDEX BSA: 34 ML/M2
ECHO RA VOLUME: 69 ML
ECHO RIGHT VENTRICULAR SYSTOLIC PRESSURE (RVSP): 27 MMHG
ECHO RV BASAL DIMENSION: 4.8 CM
ECHO RV LONGITUDINAL DIMENSION: 8.2 CM
ECHO RV MID DIMENSION: 3.9 CM
ECHO RV TAPSE: 1.4 CM (ref 1.7–?)
ECHO RVOT MEAN GRADIENT: 1 MMHG
ECHO RVOT PEAK GRADIENT: 2 MMHG
ECHO RVOT PEAK VELOCITY: 0.7 M/S
ECHO RVOT VTI: 14.4 CM
ECHO TV REGURGITANT MAX VELOCITY: 2.34 M/S
ECHO TV REGURGITANT PEAK GRADIENT: 22 MMHG
GLUCOSE BLD STRIP.AUTO-MCNC: 105 MG/DL (ref 65–100)
GLUCOSE BLD STRIP.AUTO-MCNC: 120 MG/DL (ref 65–100)
GLUCOSE BLD STRIP.AUTO-MCNC: 165 MG/DL (ref 65–100)
GLUCOSE BLD STRIP.AUTO-MCNC: 88 MG/DL (ref 65–100)
GLUCOSE SERPL-MCNC: 166 MG/DL (ref 65–100)
MAGNESIUM SERPL-MCNC: 1.8 MG/DL (ref 1.6–2.4)
PERFORMED BY:: ABNORMAL
PERFORMED BY:: NORMAL
POTASSIUM SERPL-SCNC: 3.5 MMOL/L (ref 3.5–5.1)
SODIUM SERPL-SCNC: 140 MMOL/L (ref 136–145)

## 2024-01-07 PROCEDURE — 97530 THERAPEUTIC ACTIVITIES: CPT

## 2024-01-07 PROCEDURE — 6370000000 HC RX 637 (ALT 250 FOR IP): Performed by: INTERNAL MEDICINE

## 2024-01-07 PROCEDURE — 80048 BASIC METABOLIC PNL TOTAL CA: CPT

## 2024-01-07 PROCEDURE — 36415 COLL VENOUS BLD VENIPUNCTURE: CPT

## 2024-01-07 PROCEDURE — 83735 ASSAY OF MAGNESIUM: CPT

## 2024-01-07 PROCEDURE — B24BZZZ ULTRASONOGRAPHY OF HEART WITH AORTA: ICD-10-PCS | Performed by: INTERNAL MEDICINE

## 2024-01-07 PROCEDURE — 2060000000 HC ICU INTERMEDIATE R&B

## 2024-01-07 PROCEDURE — 2700000000 HC OXYGEN THERAPY PER DAY

## 2024-01-07 PROCEDURE — 72125 CT NECK SPINE W/O DYE: CPT

## 2024-01-07 PROCEDURE — 6360000002 HC RX W HCPCS: Performed by: INTERNAL MEDICINE

## 2024-01-07 PROCEDURE — 93308 TTE F-UP OR LMTD: CPT

## 2024-01-07 PROCEDURE — 82962 GLUCOSE BLOOD TEST: CPT

## 2024-01-07 PROCEDURE — 94640 AIRWAY INHALATION TREATMENT: CPT

## 2024-01-07 PROCEDURE — 2580000003 HC RX 258: Performed by: INTERNAL MEDICINE

## 2024-01-07 PROCEDURE — 97110 THERAPEUTIC EXERCISES: CPT

## 2024-01-07 PROCEDURE — 99232 SBSQ HOSP IP/OBS MODERATE 35: CPT | Performed by: PSYCHIATRY & NEUROLOGY

## 2024-01-07 RX ORDER — ISOSORBIDE MONONITRATE 30 MG/1
30 TABLET, EXTENDED RELEASE ORAL DAILY
Status: DISCONTINUED | OUTPATIENT
Start: 2024-01-07 | End: 2024-01-10 | Stop reason: HOSPADM

## 2024-01-07 RX ADMIN — SACUBITRIL AND VALSARTAN 1 TABLET: 97; 103 TABLET, FILM COATED ORAL at 22:00

## 2024-01-07 RX ADMIN — Medication 2 PUFF: at 20:09

## 2024-01-07 RX ADMIN — ASPIRIN 81 MG: 81 TABLET, COATED ORAL at 09:29

## 2024-01-07 RX ADMIN — OXYBUTYNIN CHLORIDE 5 MG: 5 TABLET ORAL at 13:52

## 2024-01-07 RX ADMIN — ALLOPURINOL 100 MG: 100 TABLET ORAL at 09:29

## 2024-01-07 RX ADMIN — METOPROLOL SUCCINATE 50 MG: 50 TABLET, EXTENDED RELEASE ORAL at 09:29

## 2024-01-07 RX ADMIN — TRAMADOL HYDROCHLORIDE 50 MG: 50 TABLET ORAL at 09:28

## 2024-01-07 RX ADMIN — METOLAZONE 2.5 MG: 2.5 TABLET ORAL at 09:30

## 2024-01-07 RX ADMIN — ISOSORBIDE MONONITRATE 30 MG: 30 TABLET, EXTENDED RELEASE ORAL at 13:52

## 2024-01-07 RX ADMIN — APIXABAN 5 MG: 5 TABLET, FILM COATED ORAL at 09:29

## 2024-01-07 RX ADMIN — CETIRIZINE HYDROCHLORIDE 10 MG: 10 TABLET, FILM COATED ORAL at 09:29

## 2024-01-07 RX ADMIN — SODIUM CHLORIDE, PRESERVATIVE FREE 10 ML: 5 INJECTION INTRAVENOUS at 09:30

## 2024-01-07 RX ADMIN — ATORVASTATIN CALCIUM 40 MG: 40 TABLET, FILM COATED ORAL at 22:00

## 2024-01-07 RX ADMIN — FUROSEMIDE 40 MG: 10 INJECTION, SOLUTION INTRAMUSCULAR; INTRAVENOUS at 09:30

## 2024-01-07 RX ADMIN — AMIODARONE HYDROCHLORIDE 200 MG: 200 TABLET ORAL at 09:29

## 2024-01-07 RX ADMIN — OXYBUTYNIN CHLORIDE 5 MG: 5 TABLET ORAL at 09:29

## 2024-01-07 RX ADMIN — EMPAGLIFLOZIN 10 MG: 10 TABLET, FILM COATED ORAL at 09:29

## 2024-01-07 RX ADMIN — OXYBUTYNIN CHLORIDE 5 MG: 5 TABLET ORAL at 22:00

## 2024-01-07 RX ADMIN — SACUBITRIL AND VALSARTAN 1 TABLET: 97; 103 TABLET, FILM COATED ORAL at 09:29

## 2024-01-07 RX ADMIN — HYDRALAZINE HYDROCHLORIDE 100 MG: 50 TABLET, FILM COATED ORAL at 06:25

## 2024-01-07 RX ADMIN — Medication 2 PUFF: at 11:16

## 2024-01-07 RX ADMIN — THEOPHYLLINE ANHYDROUS 300 MG: 300 CAPSULE, EXTENDED RELEASE ORAL at 09:29

## 2024-01-07 RX ADMIN — APIXABAN 5 MG: 5 TABLET, FILM COATED ORAL at 22:00

## 2024-01-07 RX ADMIN — SODIUM CHLORIDE, PRESERVATIVE FREE 10 ML: 5 INJECTION INTRAVENOUS at 22:01

## 2024-01-07 ASSESSMENT — PAIN DESCRIPTION - ORIENTATION: ORIENTATION: RIGHT;LEFT

## 2024-01-07 ASSESSMENT — PAIN DESCRIPTION - LOCATION: LOCATION: HAND

## 2024-01-07 ASSESSMENT — PAIN SCALES - GENERAL: PAINLEVEL_OUTOF10: 8

## 2024-01-07 NOTE — CONSULTS
Nutrition Education    Educated on heart healthy diet.  Learners: Patient and Family  Readiness: Acceptance  Method: Explanation and Handout  Response: Demonstrated Understanding  Contact name and number provided.    Sophie Siegel RD  Contact Number: [phone]    
Range    Magnesium 1.8 1.6 - 2.4 mg/dL   POCT Glucose    Collection Time: 01/07/24  8:09 AM   Result Value Ref Range    POC Glucose 88 65 - 100 mg/dL    Performed by: JASSI CATES    POCT Glucose    Collection Time: 01/07/24 11:23 AM   Result Value Ref Range    POC Glucose 165 (H) 65 - 100 mg/dL    Performed by: JASSI CATES        Neuroimaging: reviewed & directly visualized by me: see notable findings below.    CT C- C4-6 REVERSAL OF CURVE, DRCS, DISC HEIGHT LOSS, AGREE BELOW    Other imaging:    No results found.    Shortness of breath [R06.02]  Acute on chronic systolic congestive heart failure (HCC) [I50.23]  CHF (congestive heart failure), NYHA class I, acute on chronic, combined (HCC) [I50.43]-     ASSESSMENT/PLAN:     Bilateral distal UE pain/numb/weak/swollen/red, mild RLE numb, DJD r/o central cord syndrome, r/o proximal vascular etiology, Possible [place] with DM, thoracic outlet, cervical radiculopathy, CTS most likely consistent with hx, neuropathy less likely hands not feet    MR brain pending for RLE numb, unlikely CVA adherent on DOAC for AFIB  NSG consult re C4-6 DJD findings, MR C/T spine w/ w/o pending  Vascular/chest imaging per IM MD  MR brachial plexuses w/ w/o if no etiology found  EMG/NCV in 2wks if no etiology found  PT and PMR consult ASAP  Consider B12, SPEP, folate, HIV if no etiology found    - - Sign off, reconsult PRN or abnormal finding or no resolution or etiology found, f/u with otpt Neuro asap in coming weeks     Time spent was approximately 35 minutes.  All questions were answered.  Discussed with Faustino Kim MD.     Romeo Amaro MD  Teleneurology Consultant  1/7/2024  11:31 AM    All examination and evaluation of the patient and information garnered from the patient and/or the family was done jointly by me via licensed & encrypted tele-video communication equipment, in consultation for Requesting Provider Faustino Kim MD, and with the assistance of a 
CERVICAL SPINE WO CONTRAST    (Results Pending)       Cardiac cath:    No results found for this or any previous visit.     Echo:     04/11/23    TRANSTHORACIC ECHOCARDIOGRAM (TTE) COMPLETE (CONTRAST/BUBBLE/3D PRN) 04/12/2023  1:12 PM (Final)    Narrative  This is a summary report. The complete report is available in the patient's medical record. If you cannot access the medical record, please contact the sending organization for a detailed fax or copy.      Left Ventricle: Moderately reduced left ventricular systolic function with a visually estimated EF of 30 - 35%. Not well visualized. Left ventricle size is normal. Moderately increased wall thickness. Findings consistent with concentric hypertrophy. Moderate global hypokinesis present. Abnormal diastolic function.    Right Ventricle: Right ventricle is mildly dilated. Normal wall thickness.    Mitral Valve: Mildly thickened leaflet. Mild regurgitation.    Tricuspid Valve: Moderate regurgitation. The estimated RVSP is 72 mmHg.    Left Atrium: Left atrium is moderately dilated.    Right Atrium: Right atrium is moderately dilated.    Technical qualifiers: Echo study was technically difficult due to patient's body habitus.    Signed by: Katarina Barker MD on 4/12/2023  1:12 PM         Total time spent:  60 minutes.      Dr. Kuldeep Taylor MD.PhD. [facility]

## 2024-01-08 ENCOUNTER — HOSPITAL ENCOUNTER (INPATIENT)
Facility: HOSPITAL | Age: 74
Discharge: HOME | DRG: 291 | End: 2024-01-11
Attending: PSYCHIATRY & NEUROLOGY
Payer: MEDICARE

## 2024-01-08 LAB
ANION GAP SERPL CALC-SCNC: 5 MMOL/L (ref 5–15)
BNP SERPL-MCNC: 1197 PG/ML
BUN SERPL-MCNC: 35 MG/DL (ref 6–20)
BUN/CREAT SERPL: 18 (ref 12–20)
CA-I BLD-MCNC: 8.6 MG/DL (ref 8.5–10.1)
CHLORIDE SERPL-SCNC: 102 MMOL/L (ref 97–108)
CO2 SERPL-SCNC: 32 MMOL/L (ref 21–32)
CREAT SERPL-MCNC: 1.91 MG/DL (ref 0.55–1.02)
GLUCOSE BLD STRIP.AUTO-MCNC: 113 MG/DL (ref 65–100)
GLUCOSE BLD STRIP.AUTO-MCNC: 136 MG/DL (ref 65–100)
GLUCOSE BLD STRIP.AUTO-MCNC: 138 MG/DL (ref 65–100)
GLUCOSE SERPL-MCNC: 134 MG/DL (ref 65–100)
MAGNESIUM SERPL-MCNC: 1.9 MG/DL (ref 1.6–2.4)
PERFORMED BY:: ABNORMAL
POTASSIUM SERPL-SCNC: 3.8 MMOL/L (ref 3.5–5.1)
SODIUM SERPL-SCNC: 139 MMOL/L (ref 136–145)

## 2024-01-08 PROCEDURE — 36415 COLL VENOUS BLD VENIPUNCTURE: CPT

## 2024-01-08 PROCEDURE — 72141 MRI NECK SPINE W/O DYE: CPT

## 2024-01-08 PROCEDURE — 83735 ASSAY OF MAGNESIUM: CPT

## 2024-01-08 PROCEDURE — 6360000002 HC RX W HCPCS: Performed by: INTERNAL MEDICINE

## 2024-01-08 PROCEDURE — 97530 THERAPEUTIC ACTIVITIES: CPT

## 2024-01-08 PROCEDURE — 80048 BASIC METABOLIC PNL TOTAL CA: CPT

## 2024-01-08 PROCEDURE — 83880 ASSAY OF NATRIURETIC PEPTIDE: CPT

## 2024-01-08 PROCEDURE — 82962 GLUCOSE BLOOD TEST: CPT

## 2024-01-08 PROCEDURE — 6370000000 HC RX 637 (ALT 250 FOR IP): Performed by: INTERNAL MEDICINE

## 2024-01-08 PROCEDURE — 94640 AIRWAY INHALATION TREATMENT: CPT

## 2024-01-08 PROCEDURE — 2580000003 HC RX 258: Performed by: INTERNAL MEDICINE

## 2024-01-08 PROCEDURE — 70551 MRI BRAIN STEM W/O DYE: CPT

## 2024-01-08 PROCEDURE — 2700000000 HC OXYGEN THERAPY PER DAY

## 2024-01-08 PROCEDURE — 97165 OT EVAL LOW COMPLEX 30 MIN: CPT

## 2024-01-08 PROCEDURE — 2060000000 HC ICU INTERMEDIATE R&B

## 2024-01-08 RX ORDER — LORAZEPAM 1 MG/1
1 TABLET ORAL
Status: COMPLETED | OUTPATIENT
Start: 2024-01-08 | End: 2024-01-08

## 2024-01-08 RX ADMIN — FUROSEMIDE 40 MG: 10 INJECTION, SOLUTION INTRAMUSCULAR; INTRAVENOUS at 17:31

## 2024-01-08 RX ADMIN — ALLOPURINOL 100 MG: 100 TABLET ORAL at 09:07

## 2024-01-08 RX ADMIN — THEOPHYLLINE ANHYDROUS 300 MG: 300 CAPSULE, EXTENDED RELEASE ORAL at 09:11

## 2024-01-08 RX ADMIN — SACUBITRIL AND VALSARTAN 1 TABLET: 97; 103 TABLET, FILM COATED ORAL at 20:35

## 2024-01-08 RX ADMIN — SACUBITRIL AND VALSARTAN 1 TABLET: 97; 103 TABLET, FILM COATED ORAL at 09:12

## 2024-01-08 RX ADMIN — APIXABAN 5 MG: 5 TABLET, FILM COATED ORAL at 09:07

## 2024-01-08 RX ADMIN — ASPIRIN 81 MG: 81 TABLET, COATED ORAL at 09:07

## 2024-01-08 RX ADMIN — LORAZEPAM 1 MG: 1 TABLET ORAL at 11:28

## 2024-01-08 RX ADMIN — SODIUM CHLORIDE, PRESERVATIVE FREE 10 ML: 5 INJECTION INTRAVENOUS at 20:39

## 2024-01-08 RX ADMIN — FUROSEMIDE 40 MG: 10 INJECTION, SOLUTION INTRAMUSCULAR; INTRAVENOUS at 09:07

## 2024-01-08 RX ADMIN — METOLAZONE 2.5 MG: 2.5 TABLET ORAL at 09:07

## 2024-01-08 RX ADMIN — METOPROLOL SUCCINATE 50 MG: 50 TABLET, EXTENDED RELEASE ORAL at 09:06

## 2024-01-08 RX ADMIN — ISOSORBIDE MONONITRATE 30 MG: 30 TABLET, EXTENDED RELEASE ORAL at 09:07

## 2024-01-08 RX ADMIN — EMPAGLIFLOZIN 10 MG: 10 TABLET, FILM COATED ORAL at 09:07

## 2024-01-08 RX ADMIN — SODIUM CHLORIDE, PRESERVATIVE FREE 10 ML: 5 INJECTION INTRAVENOUS at 09:13

## 2024-01-08 RX ADMIN — HYDRALAZINE HYDROCHLORIDE 100 MG: 50 TABLET, FILM COATED ORAL at 20:35

## 2024-01-08 RX ADMIN — Medication 2 PUFF: at 08:38

## 2024-01-08 RX ADMIN — OXYBUTYNIN CHLORIDE 5 MG: 5 TABLET ORAL at 13:41

## 2024-01-08 RX ADMIN — OXYBUTYNIN CHLORIDE 5 MG: 5 TABLET ORAL at 20:35

## 2024-01-08 RX ADMIN — ATORVASTATIN CALCIUM 40 MG: 40 TABLET, FILM COATED ORAL at 20:35

## 2024-01-08 RX ADMIN — OXYBUTYNIN CHLORIDE 5 MG: 5 TABLET ORAL at 09:11

## 2024-01-08 RX ADMIN — Medication 2 PUFF: at 21:34

## 2024-01-08 RX ADMIN — APIXABAN 5 MG: 5 TABLET, FILM COATED ORAL at 20:35

## 2024-01-08 RX ADMIN — AMIODARONE HYDROCHLORIDE 200 MG: 200 TABLET ORAL at 09:07

## 2024-01-08 RX ADMIN — CETIRIZINE HYDROCHLORIDE 10 MG: 10 TABLET, FILM COATED ORAL at 09:07

## 2024-01-08 NOTE — CARE COORDINATION
CM reviewed chart. Choice letter signed, placed on chart, and referral sent to [facility]. CM explain recommendation is for SNF. Patient choice is [facility] and refused to give 2nd or 3rd choice. CM will continue to follow.

## 2024-01-09 LAB
ANION GAP SERPL CALC-SCNC: 5 MMOL/L (ref 5–15)
BUN SERPL-MCNC: 40 MG/DL (ref 6–20)
BUN/CREAT SERPL: 17 (ref 12–20)
CA-I BLD-MCNC: 8.5 MG/DL (ref 8.5–10.1)
CHLORIDE SERPL-SCNC: 99 MMOL/L (ref 97–108)
CO2 SERPL-SCNC: 35 MMOL/L (ref 21–32)
CREAT SERPL-MCNC: 2.29 MG/DL (ref 0.55–1.02)
GLUCOSE BLD STRIP.AUTO-MCNC: 120 MG/DL (ref 65–100)
GLUCOSE BLD STRIP.AUTO-MCNC: 121 MG/DL (ref 65–100)
GLUCOSE BLD STRIP.AUTO-MCNC: 129 MG/DL (ref 65–100)
GLUCOSE BLD STRIP.AUTO-MCNC: 155 MG/DL (ref 65–100)
GLUCOSE SERPL-MCNC: 120 MG/DL (ref 65–100)
MAGNESIUM SERPL-MCNC: 2 MG/DL (ref 1.6–2.4)
PERFORMED BY:: ABNORMAL
POTASSIUM SERPL-SCNC: 3.6 MMOL/L (ref 3.5–5.1)
SODIUM SERPL-SCNC: 139 MMOL/L (ref 136–145)

## 2024-01-09 PROCEDURE — 94761 N-INVAS EAR/PLS OXIMETRY MLT: CPT

## 2024-01-09 PROCEDURE — 6370000000 HC RX 637 (ALT 250 FOR IP): Performed by: INTERNAL MEDICINE

## 2024-01-09 PROCEDURE — 83735 ASSAY OF MAGNESIUM: CPT

## 2024-01-09 PROCEDURE — 2580000003 HC RX 258: Performed by: INTERNAL MEDICINE

## 2024-01-09 PROCEDURE — 82962 GLUCOSE BLOOD TEST: CPT

## 2024-01-09 PROCEDURE — 94640 AIRWAY INHALATION TREATMENT: CPT

## 2024-01-09 PROCEDURE — 36415 COLL VENOUS BLD VENIPUNCTURE: CPT

## 2024-01-09 PROCEDURE — 6360000002 HC RX W HCPCS: Performed by: INTERNAL MEDICINE

## 2024-01-09 PROCEDURE — 2700000000 HC OXYGEN THERAPY PER DAY

## 2024-01-09 PROCEDURE — 1100000000 HC RM PRIVATE

## 2024-01-09 PROCEDURE — 80048 BASIC METABOLIC PNL TOTAL CA: CPT

## 2024-01-09 RX ADMIN — HYDRALAZINE HYDROCHLORIDE 100 MG: 50 TABLET, FILM COATED ORAL at 05:36

## 2024-01-09 RX ADMIN — TRAMADOL HYDROCHLORIDE 50 MG: 50 TABLET ORAL at 03:23

## 2024-01-09 RX ADMIN — OXYBUTYNIN CHLORIDE 5 MG: 5 TABLET ORAL at 09:17

## 2024-01-09 RX ADMIN — ISOSORBIDE MONONITRATE 30 MG: 30 TABLET, EXTENDED RELEASE ORAL at 09:17

## 2024-01-09 RX ADMIN — Medication 2 PUFF: at 21:03

## 2024-01-09 RX ADMIN — THEOPHYLLINE ANHYDROUS 300 MG: 300 CAPSULE, EXTENDED RELEASE ORAL at 09:17

## 2024-01-09 RX ADMIN — SODIUM CHLORIDE, PRESERVATIVE FREE 10 ML: 5 INJECTION INTRAVENOUS at 09:18

## 2024-01-09 RX ADMIN — ALLOPURINOL 100 MG: 100 TABLET ORAL at 09:18

## 2024-01-09 RX ADMIN — ASPIRIN 81 MG: 81 TABLET, COATED ORAL at 09:17

## 2024-01-09 RX ADMIN — CETIRIZINE HYDROCHLORIDE 10 MG: 10 TABLET, FILM COATED ORAL at 09:17

## 2024-01-09 RX ADMIN — AMIODARONE HYDROCHLORIDE 200 MG: 200 TABLET ORAL at 09:17

## 2024-01-09 RX ADMIN — METOLAZONE 2.5 MG: 2.5 TABLET ORAL at 09:17

## 2024-01-09 RX ADMIN — ATORVASTATIN CALCIUM 40 MG: 40 TABLET, FILM COATED ORAL at 21:25

## 2024-01-09 RX ADMIN — SACUBITRIL AND VALSARTAN 1 TABLET: 97; 103 TABLET, FILM COATED ORAL at 23:22

## 2024-01-09 RX ADMIN — EMPAGLIFLOZIN 10 MG: 10 TABLET, FILM COATED ORAL at 09:17

## 2024-01-09 RX ADMIN — FUROSEMIDE 40 MG: 10 INJECTION, SOLUTION INTRAMUSCULAR; INTRAVENOUS at 09:18

## 2024-01-09 RX ADMIN — OXYBUTYNIN CHLORIDE 5 MG: 5 TABLET ORAL at 21:25

## 2024-01-09 RX ADMIN — OXYBUTYNIN CHLORIDE 5 MG: 5 TABLET ORAL at 14:00

## 2024-01-09 RX ADMIN — SACUBITRIL AND VALSARTAN 1 TABLET: 97; 103 TABLET, FILM COATED ORAL at 09:17

## 2024-01-09 RX ADMIN — SODIUM CHLORIDE, PRESERVATIVE FREE 10 ML: 5 INJECTION INTRAVENOUS at 21:26

## 2024-01-09 RX ADMIN — APIXABAN 5 MG: 5 TABLET, FILM COATED ORAL at 09:18

## 2024-01-09 RX ADMIN — Medication 2 PUFF: at 08:43

## 2024-01-09 RX ADMIN — METOPROLOL SUCCINATE 50 MG: 50 TABLET, EXTENDED RELEASE ORAL at 09:17

## 2024-01-09 RX ADMIN — APIXABAN 5 MG: 5 TABLET, FILM COATED ORAL at 21:26

## 2024-01-09 ASSESSMENT — PAIN DESCRIPTION - DESCRIPTORS: DESCRIPTORS: ACHING

## 2024-01-09 ASSESSMENT — PAIN SCALES - GENERAL: PAINLEVEL_OUTOF10: 0

## 2024-01-09 ASSESSMENT — PAIN DESCRIPTION - LOCATION: LOCATION: NECK

## 2024-01-09 NOTE — CARE COORDINATION
CM reviewed chart. Per provider patient is agreeable to home health discharge disposition. CM spoke with patient to confirm discharge choice. Patient stated she want to wait on facility and insurance decision. Referral sent to [facility] yesterday, pending decision. CM will continue to follow.

## 2024-01-09 NOTE — DISCHARGE INSTR - DIET

## 2024-01-09 NOTE — DISCHARGE SUMMARY
stenosis that is significant at C3-4, C4-5, C5-6, severe neural foraminal stenosis of left C3-C4, bilateral C5-6, and bilateral C6-7. There is also moderate right C3-4, left C4-5 neural foraminal stenosis.     MRI of brain shows no acute infarction, chronic white matter disease. Recommended following up with PCP and potential referral to neurosurgery.     She was felt stable for discharge home on 1/8        Discharge Medications:      Medication List        CONTINUE taking these medications      allopurinol 100 MG tablet  Commonly known as: ZYLOPRIM     amiodarone 200 MG tablet  Commonly known as: CORDARONE     apixaban 5 MG Tabs tablet  Commonly known as: ELIQUIS     aspirin 81 MG EC tablet     atorvastatin 40 MG tablet  Commonly known as: LIPITOR     budesonide-formoterol 160-4.5 MCG/ACT Aero  Commonly known as: SYMBICORT  Inhale 2 puffs into the lungs in the morning and 2 puffs in the evening.     cetirizine 10 MG tablet  Commonly known as: ZYRTEC  Take 1 tablet by mouth daily     empagliflozin 10 MG tablet  Commonly known as: JARDIANCE     furosemide 40 MG tablet  Commonly known as: LASIX  Take 1 tablet by mouth in the morning and 1 tablet in the evening.     hydrALAZINE 50 MG tablet  Commonly known as: APRESOLINE  Take 1 tablet by mouth every 6 hours as needed (SBP >170)     metOLazone 5 MG tablet  Commonly known as: ZAROXOLYN  Take 1 tablet by mouth daily     metoprolol succinate 50 MG extended release tablet  Commonly known as: TOPROL XL  Take 1 tablet by mouth daily     oxyBUTYnin 5 MG tablet  Commonly known as: DITROPAN     sacubitril-valsartan  MG per tablet  Commonly known as: ENTRESTO     spironolactone 25 MG tablet  Commonly known as: ALDACTONE     theophylline 200 MG extended release capsule  Commonly known as: JEREL-24                Follow up Care:    Follow-up Information       Follow up With Specialties Details Why Contact Info    Ricardo Calle MD Internal Medicine Follow up in 1 week(s) 
mmol/L (H; Ref range: 97 - 108 mmol/L); CO2 26 mmol/L (Ref range: 21 - 32 mmol/L); Creatinine 1.42 mg/dL (H; Ref range: 0.55 - 1.02 mg/dL); Glucose 121 mg/dL (H; Ref range: 65 - 100 mg/dL); Potassium 3.8 mmol/L (Ref range: 3.5 - 5.1 mmol/L); Sodium 144 mmol/L (Ref range: 136 - 145 mmol/L)  No results for input(s): \"WBC\", \"HGB\", \"HCT\", \"PLT\" in the last 72 hours.    Recent Labs     01/07/24  0342 01/08/24  0817 01/09/24  0500    139 139   K 3.5 3.8 3.6    102 99   CO2 31 32 35*   BUN 36* 35* 40*   CREATININE 1.93* 1.91* 2.29*   GLUCOSE 166* 134* 120*   CALCIUM 8.4* 8.6 8.5   MG 1.8 1.9 2.0       No results for input(s): \"AST\", \"ALT\", \"ALKPHOS\", \"BILITOT\", \"PROT\", \"LABALBU\", \"GLOB\", \"GGT\", \"AMYLASE\", \"LIPASE\" in the last 72 hours.    Invalid input(s): \"GPT\"  No results for input(s): \"INR\", \"PROTIME\", \"APTT\" in the last 72 hours.   No results for input(s): \"IRON\", \"TIBC\", \"FERR\" in the last 72 hours.    Invalid input(s): \"PSAT\"   No results for input(s): \"PH\", \"PCO2\", \"PO2\" in the last 72 hours.  No results for input(s): \"CKTOTAL\", \"CKMB\", \"TROPONINI\" in the last 72 hours.  Lab Results   Component Value Date/Time    POCGLU 121 01/09/2024 08:11 AM    POCGLU 136 01/08/2024 08:06 PM    POCGLU 138 01/08/2024 04:13 PM    POCGLU 113 01/08/2024 03:43 PM    POCGLU 120 01/07/2024 08:50 PM         Discharge time spent 35 minutes    Signed:  Faustino Herrera MD  1/9/2024  8:14 AM

## 2024-01-09 NOTE — DISCHARGE INSTR - ACTIVITY
Staying active is an important part of your overall health. Please follow the safety recommendations based on your evaluation during hour hospital stay.   The patient requires minimal assistance and a walker.

## 2024-01-10 VITALS
SYSTOLIC BLOOD PRESSURE: 112 MMHG | DIASTOLIC BLOOD PRESSURE: 51 MMHG | WEIGHT: 227.2 LBS | OXYGEN SATURATION: 18 % | RESPIRATION RATE: 18 BRPM | HEART RATE: 62 BPM | HEIGHT: 63 IN | BODY MASS INDEX: 40.26 KG/M2 | TEMPERATURE: 98.2 F

## 2024-01-10 LAB
GLUCOSE BLD STRIP.AUTO-MCNC: 107 MG/DL (ref 65–100)
GLUCOSE BLD STRIP.AUTO-MCNC: 198 MG/DL (ref 65–100)
GLUCOSE BLD STRIP.AUTO-MCNC: 455 MG/DL (ref 65–100)
GLUCOSE BLD STRIP.AUTO-MCNC: 98 MG/DL (ref 65–100)
PERFORMED BY:: ABNORMAL
PERFORMED BY:: NORMAL

## 2024-01-10 PROCEDURE — 94640 AIRWAY INHALATION TREATMENT: CPT

## 2024-01-10 PROCEDURE — 6370000000 HC RX 637 (ALT 250 FOR IP): Performed by: INTERNAL MEDICINE

## 2024-01-10 PROCEDURE — 97530 THERAPEUTIC ACTIVITIES: CPT

## 2024-01-10 PROCEDURE — 2700000000 HC OXYGEN THERAPY PER DAY

## 2024-01-10 PROCEDURE — 2580000003 HC RX 258: Performed by: INTERNAL MEDICINE

## 2024-01-10 PROCEDURE — 94761 N-INVAS EAR/PLS OXIMETRY MLT: CPT

## 2024-01-10 PROCEDURE — 82962 GLUCOSE BLOOD TEST: CPT

## 2024-01-10 RX ADMIN — APIXABAN 5 MG: 5 TABLET, FILM COATED ORAL at 08:34

## 2024-01-10 RX ADMIN — SODIUM CHLORIDE, PRESERVATIVE FREE 10 ML: 5 INJECTION INTRAVENOUS at 08:37

## 2024-01-10 RX ADMIN — OXYBUTYNIN CHLORIDE 5 MG: 5 TABLET ORAL at 08:33

## 2024-01-10 RX ADMIN — THEOPHYLLINE ANHYDROUS 300 MG: 300 CAPSULE, EXTENDED RELEASE ORAL at 11:56

## 2024-01-10 RX ADMIN — Medication 2 PUFF: at 08:19

## 2024-01-10 RX ADMIN — EMPAGLIFLOZIN 10 MG: 10 TABLET, FILM COATED ORAL at 08:34

## 2024-01-10 RX ADMIN — ASPIRIN 81 MG: 81 TABLET, COATED ORAL at 08:34

## 2024-01-10 RX ADMIN — CETIRIZINE HYDROCHLORIDE 10 MG: 10 TABLET, FILM COATED ORAL at 08:34

## 2024-01-10 RX ADMIN — ALLOPURINOL 100 MG: 100 TABLET ORAL at 08:33

## 2024-01-10 RX ADMIN — OXYBUTYNIN CHLORIDE 5 MG: 5 TABLET ORAL at 16:09

## 2024-01-10 RX ADMIN — TRAMADOL HYDROCHLORIDE 50 MG: 50 TABLET ORAL at 06:11

## 2024-01-10 ASSESSMENT — PAIN SCALES - WONG BAKER: WONGBAKER_NUMERICALRESPONSE: 2

## 2024-01-10 ASSESSMENT — PAIN SCALES - GENERAL
PAINLEVEL_OUTOF10: 3
PAINLEVEL_OUTOF10: 8

## 2024-01-10 ASSESSMENT — PAIN DESCRIPTION - ORIENTATION: ORIENTATION: RIGHT

## 2024-01-10 ASSESSMENT — PAIN DESCRIPTION - DESCRIPTORS: DESCRIPTORS: ACHING

## 2024-01-10 ASSESSMENT — PAIN DESCRIPTION - LOCATION: LOCATION: BACK

## 2024-01-10 NOTE — CARE COORDINATION
Transition of Care Plan:    RUR: 20%  Prior Level of Functioning:   Disposition: Home Health  If SNF or IPR: Date FOC offered: 1/8/24  Date FOC received: 1/8/24  Accepting facility:   Date authorization started with reference number:   Date authorization received and expires:   Follow up appointments:   DME needed:   Transportation at discharge: Daughter  IM/IMM Medicare/ letter given: Yes  Is patient a [place] and connected with [facility]?    If yes, was [place] transfer form completed and [facility] notified?   Caregiver Contact: Wen Jose [phone]  Discharge Caregiver contacted prior to discharge? No, patient.  Care Conference needed?   Barriers to discharge:       Patient is agreeable to discharge with home health.  Patient has been accepted with [facility] health when medically stable for discharge,.

## 2024-01-10 NOTE — PROGRESS NOTES
Hospitalist Progress Note               Daily Progress Note: 1/8/2024      Chief complaint:   Chief Complaint   Patient presents with    Respiratory Distress        Subjective:   Hospital course to date:    73-year-old female with history of heart failure with reduced ejection fraction, COPD, MICHELLE on CPAP, hypoxemic respiratory failure on 4 L oxygen, type 2 diabetes mellitus hypertension presented with progressive shortness of breath and dyspnea on exertion with CHF exacerbation.     On IV diuretics with metolazone.  Monitor labs.  Continue with cardiac medication Entresto beta-blocker continue Bactrim and Jardiance.  Strict I's and O's.     Chronic A-fib.  Rate control on anticoagulation with apixaban.    In the ED, she was found to be tachypneic and satting at 91% on 2L. She has also found to have lower extremity edema, was fluid overloaded. Oxygen was increased to 6 L o2 nasal cannula and IV Lasix 40 mg was ordered. Nitroglycerin was not needed at the time.     --------  Patient is seen today for follow-up. She is awake and alert and satting well on 4 L oxygen NC. She still reports hand numbness and tingling that was bothering her during sleep. She has MRI of brain and spine planned for 1300 today. Tele neuro was consulted yesterday. She denies history of carpal tunnel syndrome. Labs show elevated creatinine, but otherwise normal. CT cervical spine shows multilevel cervical degenerative changes. Echo shows EF of 60-65%. PT has been consulted. Cardiology has been consulted.         Medications reviewed  Current Facility-Administered Medications   Medication Dose Route Frequency    isosorbide mononitrate (IMDUR) extended release tablet 30 mg  30 mg Oral Daily    sodium chloride flush 0.9 % injection 5-40 mL  5-40 mL IntraVENous 2 times per day    sodium chloride flush 0.9 % injection 5-40 mL  5-40 mL IntraVENous PRN    0.9 % sodium chloride infusion   IntraVENous PRN    ondansetron (ZOFRAN-ODT) disintegrating 
.Progress Note (Hospitalist, Internal Medicine)  IDENTIFYING INFORMATION   PATIENT:  Nubia Lion  MRN:  [id number]  ADMIT DATE: 1/4/2024  TIME OF EVALUATION: 1/10/2024 6:13 PM      HISTORY OF PRESENT ILLNESS   Nubia Lion is a 73 y.o. female who presents with       SUBJECTIVE         MEDICATIONS   Medications Prior to Admission  No medications prior to admission.    Current Medications  Current Facility-Administered Medications   Medication Dose Route Frequency Provider Last Rate Last Admin    isosorbide mononitrate (IMDUR) extended release tablet 30 mg  30 mg Oral Daily Kuldeep Taylor MD   30 mg at 01/09/24 0917    sodium chloride flush 0.9 % injection 5-40 mL  5-40 mL IntraVENous 2 times per day Angie Potts MD   10 mL at 01/10/24 0837    sodium chloride flush 0.9 % injection 5-40 mL  5-40 mL IntraVENous PRN Angie Potts MD        0.9 % sodium chloride infusion   IntraVENous PRN Angie Potts MD        ondansetron (ZOFRAN-ODT) disintegrating tablet 4 mg  4 mg Oral Q8H PRN Angie Potts MD        Or    ondansetron (ZOFRAN) injection 4 mg  4 mg IntraVENous Q6H PRN Angie Potts MD        polyethylene glycol (GLYCOLAX) packet 17 g  17 g Oral Daily PRN Angie Potts MD        acetaminophen (TYLENOL) tablet 650 mg  650 mg Oral Q6H PRN Angie Potts MD   650 mg at 01/05/24 2157    Or    acetaminophen (TYLENOL) suppository 650 mg  650 mg Rectal Q6H PRN Angie Potts MD        furosemide (LASIX) injection 40 mg  40 mg IntraVENous BID Angie Potts MD   40 mg at 01/09/24 0918    metOLazone (ZAROXOLYN) tablet 2.5 mg  2.5 mg Oral Daily Angie Potts MD   2.5 mg at 01/09/24 0917    allopurinol (ZYLOPRIM) tablet 100 mg  100 mg Oral Daily Angie Potts MD   100 mg at 01/10/24 0833    amiodarone (CORDARONE) tablet 200 mg  200 mg Oral Daily Angie Potts MD   200 mg at 01/09/24 0917    apixaban (ELIQUIS) tablet 5 mg  5 mg Oral BID Angie Potts MD   5 mg at 01/10/24 0833 
4 Eyes Skin Assessment     NAME:  Nubia Lion  YOB: 1950  MEDICAL RECORD NUMBER:  [id number]    The patient is being assessed for  Other 2 Person skin assessment     I agree that at least one RN has performed a thorough Head to Toe Skin Assessment on the patient. ALL assessment sites listed below have been assessed.      Areas assessed by both nurses:    Head, Face, Ears, Shoulders, Back, Chest, Arms, Elbows, Hands, Sacrum. Buttock, Coccyx, Ischium, Legs. Feet and Heels, and Under Medical Devices         Does the Patient have a Wound? Yes wound(s) were present on assessment. LDA wound assessment was Initiated and completed by RN. Patient has a wound to her right posterior leg.       Adan Prevention initiated by RN: Yes  Wound Care Orders initiated by RN: Yes    Pressure Injury (Stage 3,4, Unstageable, DTI, NWPT, and Complex wounds) if present, place Wound referral order by RN under : No    New Ostomies, if present place, Ostomy referral order under : No     Nurse 1 eSignature: Electronically signed by Fay Horner RN on 1/10/24 at 1:33 AM EST    **SHARE this note so that the co-signing nurse can place an eSignature**    Nurse 2 eSignature: Electronically signed by Marie Bates RN on 1/10/24 at 1:46 AM EST   
Bedside shift report was given to CAMILA Jasso.  
Dual Skin assessment completed with CAMILA Luque. Area of redness and pain noted to the underside of the right lower leg with discoloration, and redness in the lower leg. Left lower leg has redness noted but no points of pain/tenderness. No other areas of note.Skin intact in the back/sacral areas. Will place wound consult.  
SBAR Report    Verbal report given to Louisa JENSEN on Nubia Lion : 1950  Patient is being transferred to [facility].    Report consisted of SBAR, Medications, Recent labs/diagnostics, and LDAs.     Diagnosis:  CHF (congestive heart failure), NYHA class I, acute on chronic, combined (HCC)   Chief Complaint   Patient presents with    Respiratory Distress      Past Medical History:   Diagnosis Date    Chronic obstructive pulmonary disease (HCC)     Congestive heart disease (HCC)     with preserved ejection fraction    Coronary artery disease     Diabetes (HCC)     HFrEF (heart failure with reduced ejection fraction) (HCC)     Hyperlipidemia     Hypertension     Myocardial infarct (HCC)     MICHELLE (obstructive sleep apnea)       Vitals  BP: (!) 94/55  Temp: 98.1 °F (36.7 °C)  Temp Source: Oral  Pulse: 58  Respirations: 20  SpO2: 93 %  A&O x 4     Peripheral IV 24 Right Antecubital (Active)   Site Assessment Clean, dry & intact 24 1600   Line Status Flushed;Capped 24 1600   Line Care Cap changed;Ports disinfected;Connections checked and tightened 24 1600   Phlebitis Assessment No symptoms 24 1600   Infiltration Assessment 0 24 1600   Alcohol Cap Used Yes 24 1600   Dressing Status Clean, dry & intact 24 1600   Dressing Type Transparent 24 1600       Information was reviewed with the receiving nurse. Opportunity for questions and clarification was provided.      Patient transported Via hospital bed   
SOC Tele Neuro Consult     [phone] called to initiate consult at 10:41 AM     Patient Information provided:   Name: Nubia Lion   : 1950   MRN: [id number]   Consulting Provider: Faustino Kim MD  Callback Number: [phone]  Reason for Consult: Numbness/tingling and pain in hands.         
mg Oral Q8H PRN    Or    ondansetron (ZOFRAN) injection 4 mg  4 mg IntraVENous Q6H PRN    polyethylene glycol (GLYCOLAX) packet 17 g  17 g Oral Daily PRN    acetaminophen (TYLENOL) tablet 650 mg  650 mg Oral Q6H PRN    Or    acetaminophen (TYLENOL) suppository 650 mg  650 mg Rectal Q6H PRN    furosemide (LASIX) injection 40 mg  40 mg IntraVENous BID    metOLazone (ZAROXOLYN) tablet 2.5 mg  2.5 mg Oral Daily    allopurinol (ZYLOPRIM) tablet 100 mg  100 mg Oral Daily    amiodarone (CORDARONE) tablet 200 mg  200 mg Oral Daily    apixaban (ELIQUIS) tablet 5 mg  5 mg Oral BID    aspirin EC tablet 81 mg  81 mg Oral Daily    atorvastatin (LIPITOR) tablet 40 mg  40 mg Oral Nightly    budesonide-formoterol (SYMBICORT) 160-4.5 MCG/ACT inhaler 2 puff  2 puff Inhalation BID RT    cetirizine (ZYRTEC) tablet 10 mg  10 mg Oral Daily    empagliflozin (JARDIANCE) tablet 10 mg  10 mg Oral Daily    metoprolol succinate (TOPROL XL) extended release tablet 50 mg  50 mg Oral Daily    oxyBUTYnin (DITROPAN) tablet 5 mg  5 mg Oral TID    sacubitril-valsartan (ENTRESTO)  MG per tablet 1 tablet  1 tablet Oral BID    [Held by provider] spironolactone (ALDACTONE) tablet 25 mg  25 mg Oral Daily    theophylline (JEREL-24) extended release capsule 300 mg  300 mg Oral Daily    nitroglycerin (NITRO-BID) 2 % ointment 0.5 inch  0.5 inch Topical Once    insulin lispro (HUMALOG) injection vial 0-4 Units  0-4 Units SubCUTAneous TID WC    insulin lispro (HUMALOG) injection vial 0-4 Units  0-4 Units SubCUTAneous Nightly    glucose chewable tablet 16 g  4 tablet Oral PRN    dextrose bolus 10% 125 mL  125 mL IntraVENous PRN    Or    dextrose bolus 10% 250 mL  250 mL IntraVENous PRN    glucagon injection 1 mg  1 mg SubCUTAneous PRN    dextrose 10 % infusion   IntraVENous Continuous PRN    hydrALAZINE (APRESOLINE) tablet 100 mg  100 mg Oral 3 times per day    traMADol (ULTRAM) tablet 50 mg  50 mg Oral Q8H PRN       Review of Systems:   Pertinent items are 
(SYMBICORT) 160-4.5 MCG/ACT inhaler 2 puff  2 puff Inhalation BID RT    cetirizine (ZYRTEC) tablet 10 mg  10 mg Oral Daily    empagliflozin (JARDIANCE) tablet 10 mg  10 mg Oral Daily    metoprolol succinate (TOPROL XL) extended release tablet 50 mg  50 mg Oral Daily    oxyBUTYnin (DITROPAN) tablet 5 mg  5 mg Oral TID    sacubitril-valsartan (ENTRESTO)  MG per tablet 1 tablet  1 tablet Oral BID    [Held by provider] spironolactone (ALDACTONE) tablet 25 mg  25 mg Oral Daily    theophylline (JEREL-24) extended release capsule 300 mg  300 mg Oral Daily    insulin lispro (HUMALOG) injection vial 0-4 Units  0-4 Units SubCUTAneous TID WC    insulin lispro (HUMALOG) injection vial 0-4 Units  0-4 Units SubCUTAneous Nightly    glucose chewable tablet 16 g  4 tablet Oral PRN    dextrose bolus 10% 125 mL  125 mL IntraVENous PRN    Or    dextrose bolus 10% 250 mL  250 mL IntraVENous PRN    glucagon injection 1 mg  1 mg SubCUTAneous PRN    dextrose 10 % infusion   IntraVENous Continuous PRN    hydrALAZINE (APRESOLINE) tablet 100 mg  100 mg Oral 3 times per day    traMADol (ULTRAM) tablet 50 mg  50 mg Oral Q8H PRN         Katarina Barker MD     
noted in HPI.    Objective:   Physical Exam:     /63   Pulse 57   Temp 98.6 °F (37 °C) (Oral)   Resp 18   Ht 1.6 m (5' 3\")   Wt 100 kg (220 lb 7.4 oz)   SpO2 95%   BMI 39.06 kg/m²  O2 Flow Rate (L/min): 4 L/min O2 Device: Nasal cannula    Temp (24hrs), Av.3 °F (36.8 °C), Min:97.9 °F (36.6 °C), Max:98.8 °F (37.1 °C)    No intake/output data recorded.    1901 -  0700  In: 900 [P.O.:900]  Out: 4525 [Urine:4525]    Mode Rate TV Press PEEP FiO2 PIP Min. Vent                              General:   Awake and alert   Lungs:   Scattered crackles bilaterally.   Chest wall:  No tenderness or deformity.   Heart:  Regular rate and rhythm, S1, S2 normal, no murmur, click, rub or gallop.   Abdomen:   Soft, non-tender. Bowel sounds normal. No masses,  No organomegaly.   Extremities: Extremities normal, atraumatic, no cyanosis or edema.   Pulses: 2+ and symmetric all extremities.   Skin:  Circular skin lesion on right lower leg   Neurologic: CNII-XII intact.   Her hands showed mild generalized swelling, left greater than right.  Mild palmar erythema.  Diminished hand  strength and significantly diminished sensation to sharp and light touch in both hands.  Proximal upper extremity strength is intact  Extremities: 2+ LE edema         Data Review:       Recent Days:  Recent Labs     24  0410   WBC 9.5   HGB 14.1   HCT 45.0          Recent Labs     24  0410 24  0342 24  0817    140 139   K 3.6 3.5 3.8    102 102   CO2 31 31 32   GLUCOSE 115* 166* 134*   BUN 31* 36* 35*   CREATININE 1.72* 1.93* 1.91*   CALCIUM 8.6 8.4* 8.6   MG 1.8 1.8 1.9       No results for input(s): \"PHART\", \"OIH1VXR\", \"PO2ART\", \"IGI9XCQ\", \"BEART\", \"EZF8MITA\", \"HGBART\", \"XS1QKUICT\", \"FIO2A\", \"M1ILSEIE\", \"OXYHEM\", \"CARBOXHGBART\", \"METHGBART\", \"K0QZDRQSD\", \"PHCORART\", \"TEMP\" in the last 72 hours.    Invalid input(s): \"ZGT4ZMPCB\"    24 Hour Results:  Recent Results (from the past 24 hour(s)) 
sensory or motor deficits             Significant Diagnostic Studies:   1/4/2024: BUN 34 mg/dL (H; Ref range: 6 - 20 mg/dL); Calcium 8.9 mg/dL (Ref range: 8.5 - 10.1 mg/dL); Chloride 113 mmol/L (H; Ref range: 97 - 108 mmol/L); CO2 27 mmol/L (Ref range: 21 - 32 mmol/L); Creatinine 1.56 mg/dL (H; Ref range: 0.55 - 1.02 mg/dL); Glucose 103 mg/dL (H; Ref range: 65 - 100 mg/dL); Hematocrit 45.0 % (Ref range: 35.0 - 47.0 %); Hemoglobin 13.8 g/dL (Ref range: 11.5 - 16.0 g/dL); Potassium 4.5 mmol/L (Ref range: 3.5 - 5.1 mmol/L); Sodium 143 mmol/L (Ref range: 136 - 145 mmol/L)  1/5/2024: BUN 30 mg/dL (H; Ref range: 6 - 20 mg/dL); Calcium 8.8 mg/dL (Ref range: 8.5 - 10.1 mg/dL); Chloride 113 mmol/L (H; Ref range: 97 - 108 mmol/L); CO2 26 mmol/L (Ref range: 21 - 32 mmol/L); Creatinine 1.42 mg/dL (H; Ref range: 0.55 - 1.02 mg/dL); Glucose 121 mg/dL (H; Ref range: 65 - 100 mg/dL); Potassium 3.8 mmol/L (Ref range: 3.5 - 5.1 mmol/L); Sodium 144 mmol/L (Ref range: 136 - 145 mmol/L)  Recent Labs     01/06/24  0410   WBC 9.5   HGB 14.1   HCT 45.0        Recent Labs     01/06/24  0410 01/07/24  0342 01/08/24  0817    140 139   K 3.6 3.5 3.8    102 102   CO2 31 31 32   BUN 31* 36* 35*   CREATININE 1.72* 1.93* 1.91*   GLUCOSE 115* 166* 134*   CALCIUM 8.6 8.4* 8.6   MG 1.8 1.8 1.9     No results for input(s): \"AST\", \"ALT\", \"ALKPHOS\", \"BILITOT\", \"PROT\", \"LABALBU\", \"GLOB\", \"GGT\", \"AMYLASE\", \"LIPASE\" in the last 72 hours.    Invalid input(s): \"GPT\"  No results for input(s): \"INR\", \"PROTIME\", \"APTT\" in the last 72 hours.   No results for input(s): \"IRON\", \"TIBC\", \"FERR\" in the last 72 hours.    Invalid input(s): \"PSAT\"   No results for input(s): \"PH\", \"PCO2\", \"PO2\" in the last 72 hours.  No results for input(s): \"CKTOTAL\", \"CKMB\", \"TROPONINI\" in the last 72 hours.  Lab Results   Component Value Date/Time    POCGLU 120 01/07/2024 08:50 PM    POCGLU 105 01/07/2024 03:45 PM    POCGLU 165 01/07/2024 11:23 AM    POCGLU 88 
JASSI CATES    POCT Glucose    Collection Time: 01/06/24  8:13 PM   Result Value Ref Range    POC Glucose 114 (H) 65 - 100 mg/dL    Performed by: Amparo Grey    Basic Metabolic Panel    Collection Time: 01/07/24  3:42 AM   Result Value Ref Range    Sodium 140 136 - 145 mmol/L    Potassium 3.5 3.5 - 5.1 mmol/L    Chloride 102 97 - 108 mmol/L    CO2 31 21 - 32 mmol/L    Anion Gap 7 5 - 15 mmol/L    Glucose 166 (H) 65 - 100 mg/dL    BUN 36 (H) 6 - 20 mg/dL    Creatinine 1.93 (H) 0.55 - 1.02 mg/dL    Bun/Cre Ratio 19 12 - 20      Est, Glom Filt Rate 27 (L) >60 ml/min/1.73m2    Calcium 8.4 (L) 8.5 - 10.1 mg/dL   Magnesium    Collection Time: 01/07/24  3:42 AM   Result Value Ref Range    Magnesium 1.8 1.6 - 2.4 mg/dL   POCT Glucose    Collection Time: 01/07/24  8:09 AM   Result Value Ref Range    POC Glucose 88 65 - 100 mg/dL    Performed by: JASSI CATES        XR CHEST (2 VW)   Final Result   1. Lungs demonstrate improved aeration. No pneumonia   2. Small pleural effusions are present.      XR CHEST PORTABLE   Final Result   Stable cardiomegaly. There is LEFT lower lung zone consolidation concerning for   infection.                   Discussion/MDM:   Patient with multiple medical comorbidities, each with high likelihood for morbidity and mortality if left untreated.   I have reviewed patient's presenting subjective and objective findings, as well as all laboratory studies, imaging studies, and vital signs to date as well as treatment rendered and patient's response to those treatments.      In addition, prior medical, surgical and relevant social and family histories were reviewed. I have discussed management plan with patient/family and with nursing staff.     I personally reviewed labs: all labs past 24 hours  I personally reviewed imaging/EKG:  Toxic drug monitoring:   Discussed case with:         Assessment:    Acute on chronic HFrEF  -prior echo 4/12/23 shows EF 30-35%    Stable COPD    Chronic 
assistance;Additional time  Toilet Transfer: Contact-guard assistance;Stand-by assistance;Additional time (cues for hand placement on grab bar)      Balance:  Balance  Sitting: Intact  Standing: Impaired  Standing - Static: Good;Constant support  Standing - Dynamic: Fair;Constant support      ADL Assessment:                  Grooming: Contact guard assistance;Stand by assistance  Grooming Skilled Clinical Factors: hand hygiene at sinktop                            LE Dressing: Independent  LE Dressing Skilled Clinical Factors: adjusting B socks                  Functional Measure:    [facility] AM-PAC \"6 Clicks\"                                                       Daily Activity Inpatient Short Form  How much help from another person does the patient currently need... Total; A Lot A Little None   1.  Putting on and taking off regular lower body clothing? []  1 []  2 [x]  3 []  4   2.  Bathing (including washing, rinsing, drying)? []  1 []  2 [x]  3 []  4   3.  Toileting, which includes using toilet, bedpan or urinal? [] 1 []  2 [x]  3 []  4   4.  Putting on and taking off regular upper body clothing? []  1 []  2 [x]  3 []  4   5.  Taking care of personal grooming such as brushing teeth? []  1 []  2 [x]  3 []  4   6.  Eating meals? []  1 []  2 []  3 [x]  4   © 2007, Trustees of [facility], under license to [employer]. All rights reserved     Score: 19/24     Interpretation of Tool:  Represents clinically-significant functional categories (i.e. Activities of daily living).  Percentage of Impairment CH    0%   CI    1-19% CJ    20-39% CK    40-59% CL    60-79% CM    80-99% CN     100%   [facility]  Score 6-24 24 23 20-22 15-19 10-14 7-9 6     Occupational Therapy Evaluation Charge Determination   History Examination Decision-Making   LOW Complexity : Brief history review  LOW Complexity: 1-3 Performance deficits relating to physical, cognitive, or psychosocial skills that result in activity limitations 
nasal cannula titrate as needed    Abnormal chest x-ray-focal edema versus infection  -No fever or leukocytosis symptoms progressive over several weeks  -Will hold off antibiotics for now  -Check repeat PA and lateral to reassess post diuresis  -Chest x-ray portable noted a left lower lobe lung zone consolidation concerning for infection  -2 view chest x-ray showed lungs demonstrate improved variation no pneumonia small pleural effusions are present    COPD  Not in exacerbation  -Continue Symbicort as needed albuterol as well as theophylline    Chronic A-fib  -Rate is controlled  -Continue metoprolol and amiodarone and apixaban    MICHELLE  -Sleep nocturia on CPAP    Non-insulin-dependent diabetes type 2  -On Jardiance continue  -Monitor blood glucose with sliding scale insulin      Hyperlipidemia   -continue statin    Medical Decision Making:    Labs reviewed by myself   Cbc, bmp, mag, proca, crp    Diagnostic data reviewed by myself   CXR  2 view     Toxic drug monitoring    Renal fxn    Discussed case with   Pt , nurse    MDM Discussion   Patient with numerous medical comorbidities, each with increased risk for mortality and morbidity if left untreated.  I have reviewed patient's presenting subjective and objective findings, as well as all laboratory studies, imaging studies, and vital signs to date as well as treatment rendered and patient's response to those treatments.  In  addition, prior medical, surgical and relevant social and family histories were reviewed      Disposition: home 2 days  DVT Prophylaxis: Eliquis   Code Status:  Full Code No additional code details  POA:    Time Spent: 55 minutes    _______________________________________________________________    SHANA Aguirre NP

## 2024-01-10 NOTE — PLAN OF CARE
PHYSICAL THERAPY TREATMENT     Patient: Nubia Lion (73 y.o. female)  Date: 1/10/2024  Diagnosis: Shortness of breath [R06.02]  Acute on chronic systolic congestive heart failure (HCC) [I50.23]  CHF (congestive heart failure), NYHA class I, acute on chronic, combined (HCC) [I50.43] CHF (congestive heart failure), NYHA class I, acute on chronic, combined (HCC)      Precautions: Fall Risk, Bed Alarm                Recommendations for nursing mobility: Out of bed to chair for meals, Encourage HEP in prep for ADLs/mobility; see handout for details, Frequent repositioning to prevent skin breakdown, AD and gt belt for bed to chair , Amb to bathroom with AD and gait belt, and Assist x1    In place during session: Nasal Cannula  L and EKG/telemetry   Chart, physical therapy assessment, plan of care and goals were reviewed.  ASSESSMENT  Patient continues with skilled PT services and is progressing towards goals. Pt sitting up in recliner chair upon PT arrival, agreeable to session. (See below for objective details and assist levels).     Overall pt tolerated session well/fair today. Pt stood from chair and ambulated ~70 ft with RW and a slow and steady gait with no lob or knee buckling noted. Demonstrated decreased step clearance. Pt performed seated therex with no complaints of pain or discomfort. Pt left sitting up in recliner chair with all needs met. Will continue to benefit from skilled PT services, and will continue to progress as tolerated. Potential barriers for safe discharge:. Current PT DC recommendation Inpatient Rehabilitation Facility  once medically appropriate.    GOALS:  Problem: Physical Therapy - Adult  Goal: By Discharge: Performs mobility at highest level of function for planned discharge setting.  See evaluation for individualized goals.  Description: FUNCTIONAL STATUS PRIOR TO ADMISSION: Patient was modified independent using a single point cane for functional mobility.    HOME SUPPORT 
  Problem: Discharge Planning  Goal: Discharge to home or other facility with appropriate resources  1/5/2024 0741 by Indira Gastelum RN  Outcome: Progressing  1/5/2024 0644 by Daniel Rodgers RN  Outcome: Progressing  Flowsheets (Taken 1/5/2024 0415)  Discharge to home or other facility with appropriate resources:   Identify barriers to discharge with patient and caregiver   Arrange for needed discharge resources and transportation as appropriate   Identify discharge learning needs (meds, wound care, etc)     Problem: Pain  Goal: Verbalizes/displays adequate comfort level or baseline comfort level  1/5/2024 0741 by Indira Gastelum RN  Outcome: Progressing  1/5/2024 0644 by Daniel Rodgers RN  Outcome: Progressing     Problem: Skin/Tissue Integrity  Goal: Absence of new skin breakdown  Description: 1.  Monitor for areas of redness and/or skin breakdown  2.  Assess vascular access sites hourly  3.  Every 4-6 hours minimum:  Change oxygen saturation probe site  4.  Every 4-6 hours:  If on nasal continuous positive airway pressure, respiratory therapy assess nares and determine need for appliance change or resting period.  Outcome: Progressing     Problem: Safety - Adult  Goal: Free from fall injury  Outcome: Progressing     Problem: ABCDS Injury Assessment  Goal: Absence of physical injury  Outcome: Progressing     
Patient seen and examined at the bedside.  For detailed procedure H&P dictated in the morning.    73-year-old female with history of heart failure with reduced ejection fraction, COPD, MICHELLE on CPAP, hypoxemic respiratory failure on 4 L oxygen, type 2 diabetes mellitus hypertension presented with progressive shortness of breath and dyspnea on exertion with CHF exacerbation.    On IV diuretics with metolazone.  Monitor labs.  Continue with cardiac medication Entresto beta-blocker continue Bactrim and Jardiance.  Strict I's and O's.    Chronic A-fib.  Rate control on anticoagulation with apixaban.    CPAP for obstructive sleep apnea.  CKD stage IIIb at baseline.  Non-insulin-dependent diabetes mellitus.  Monitor blood sugar with sliding scale.    Miguel case MD   
Problem: Physical Therapy - Adult  Goal: By Discharge: Performs mobility at highest level of function for planned discharge setting.  See evaluation for individualized goals.  Description: FUNCTIONAL STATUS PRIOR TO ADMISSION: Patient was modified independent using a single point cane for functional mobility.    HOME SUPPORT PRIOR TO ADMISSION: The patient lived with daughter but did not require assistance.    Physical Therapy Goals  Initiated 1/6/2024  Pt stated goal: to get strength back    I with LE HEP x7 days  Mod I with bed mob x7 days  SBA with all transfers x7 days  Amb 25-50ft with LRAD and sBAx1 x7 days    Outcome: Progressing            PHYSICAL THERAPY TREATMENT     Patient: Nubia Lion (73 y.o. female)  Date: 1/7/2024  Diagnosis: Shortness of breath [R06.02]  Acute on chronic systolic congestive heart failure (HCC) [I50.23]  CHF (congestive heart failure), NYHA class I, acute on chronic, combined (HCC) [I50.43] CHF (congestive heart failure), NYHA class I, acute on chronic, combined (HCC)      Precautions: Fall Risk                Recommendations for nursing mobility: Out of bed to chair for meals, Encourage HEP in prep for ADLs/mobility; see handout for details, Frequent repositioning to prevent skin breakdown, LE elevation for management of edema, AD and gt belt for bed to chair , Amb to bathroom with AD and gait belt, and Assist x1    In place during session: Nasal Cannula 3-4L, External Catheter, and EKG/telemetry   Chart, physical therapy assessment, plan of care and goals were reviewed.  ASSESSMENT  Patient continues with skilled PT services and is progressing towards goals. Pt received semi supine in bed upon PTA arrival, agreeable to session. Pt A&O x 4. Pt found on 3L O2.  Pt rolled to R and came to sit on EOB needing additional time for each aspect of task. RT arrived in room and observed session. Pt stood and amb using the FWW and sat in chair. O2 WNL however RT asked to increase O2 to 4L 
acute care plan with appropriate goals if chronic or comorbid symptoms are exacerbated and prevent overall improvement and discharge     Problem: Physical Therapy - Adult  Goal: By Discharge: Performs mobility at highest level of function for planned discharge setting.  See evaluation for individualized goals.  Description: FUNCTIONAL STATUS PRIOR TO ADMISSION: Patient was modified independent using a single point cane for functional mobility.    HOME SUPPORT PRIOR TO ADMISSION: The patient lived with daughter but did not require assistance.    Physical Therapy Goals  Initiated 1/6/2024  Pt stated goal: to get strength back    I with LE HEP x7 days  Mod I with bed mob x7 days  SBA with all transfers x7 days  Amb 25-50ft with LRAD and sBAx1 x7 days      1/6/2024 1124 by Lana Vines, PT  Outcome: Progressing  1/6/2024 1123 by Lana Vines W, PT  Outcome: Progressing     
[] []    Hamstring Sets   [] [] [] []    Short Arc Quads   [] [] [] []    Heel Slides   [] [] [] []    Straight Leg Raises   [] [] [] []    Hip abd/add 1 15 [x] [] [] []    Long Arc Quads 1 15 [x] [] [] []    Marching 1 15 [x] [] [] []       [] [] [] []       Pain Ratin/10 neck and shoulder pain reported  Pain Intervention(s):   nursing notified    Activity Tolerance:   Fair  and requires frequent rest breaks    After treatment patient left in no apparent distress:   Bed locked and returned to lowest position,  sitting on commode with OT , and nsg updated     COMMUNICATION/COLLABORATION:   The patient’s plan of care was discussed with: Occupational therapist, Registered nurse, and Case management    Patient Education  Education Given To: Patient  Education Provided: Plan of Care;Home Exercise Program;Precautions;Transfer Training;Energy Conservation  Education Method: Verbal  Barriers to Learning: Cognition  Education Outcome: Verbalized understanding;Demonstrated understanding;Continued education needed    Bhavya Ocasio PTA  Minutes: 24           
updated.    COMMUNICATION/EDUCATION:   The patient’s plan of care was discussed with: Registered nurse    Patient Education  Education Given To: Patient  Education Provided: Role of Therapy;Plan of Care  Education Method: Verbal  Education Outcome: Verbalized understanding;Continued education needed         Thank you for this referral.  Lana Vines, PT  Minutes: 27

## 2024-01-16 ENCOUNTER — FOLLOWUP TELEPHONE ENCOUNTER (OUTPATIENT)
Facility: HOSPITAL | Age: 74
End: 2024-01-16

## 2024-04-06 NOTE — PROGRESS NOTES
Dual skin assessment performed with Yvon Oliver RN. Skin clean, dry and intact. Incision site noted to left groin, dressing intact. [facility]

## 2024-05-27 ENCOUNTER — APPOINTMENT (OUTPATIENT)
Facility: HOSPITAL | Age: 74
DRG: 291 | End: 2024-05-27
Payer: MEDICARE

## 2024-05-27 ENCOUNTER — HOSPITAL ENCOUNTER (INPATIENT)
Facility: HOSPITAL | Age: 74
LOS: 3 days | Discharge: HOME HEALTH | DRG: 291 | End: 2024-05-30
Attending: STUDENT IN AN ORGANIZED HEALTH CARE EDUCATION/TRAINING PROGRAM | Admitting: FAMILY MEDICINE
Payer: MEDICARE

## 2024-05-27 DIAGNOSIS — R60.0 LEG EDEMA: Primary | ICD-10-CM

## 2024-05-27 DIAGNOSIS — I50.9 ACUTE ON CHRONIC CONGESTIVE HEART FAILURE, UNSPECIFIED HEART FAILURE TYPE (HCC): ICD-10-CM

## 2024-05-27 LAB
ALBUMIN SERPL-MCNC: 3.2 G/DL (ref 3.5–5)
ALBUMIN/GLOB SERPL: 1 (ref 1.1–2.2)
ALP SERPL-CCNC: 176 U/L (ref 45–117)
ALT SERPL-CCNC: 24 U/L (ref 12–78)
ANION GAP SERPL CALC-SCNC: 5 MMOL/L (ref 5–15)
AST SERPL W P-5'-P-CCNC: 26 U/L (ref 15–37)
BASOPHILS # BLD: 0 K/UL (ref 0–0.1)
BASOPHILS NFR BLD: 1 % (ref 0–1)
BILIRUB SERPL-MCNC: 1.2 MG/DL (ref 0.2–1)
BNP SERPL-MCNC: 6755 PG/ML
BUN SERPL-MCNC: 27 MG/DL (ref 6–20)
BUN/CREAT SERPL: 19 (ref 12–20)
CA-I BLD-MCNC: 8.7 MG/DL (ref 8.5–10.1)
CHLORIDE SERPL-SCNC: 113 MMOL/L (ref 97–108)
CO2 SERPL-SCNC: 27 MMOL/L (ref 21–32)
CREAT SERPL-MCNC: 1.41 MG/DL (ref 0.55–1.02)
DIFFERENTIAL METHOD BLD: ABNORMAL
EKG ATRIAL RATE: 77 BPM
EKG DIAGNOSIS: NORMAL
EKG P AXIS: 79 DEGREES
EKG P-R INTERVAL: 166 MS
EKG Q-T INTERVAL: 454 MS
EKG QRS DURATION: 102 MS
EKG QTC CALCULATION (BAZETT): 513 MS
EKG R AXIS: 45 DEGREES
EKG T AXIS: 127 DEGREES
EKG VENTRICULAR RATE: 77 BPM
EOSINOPHIL # BLD: 0.1 K/UL (ref 0–0.4)
EOSINOPHIL NFR BLD: 1 % (ref 0–7)
ERYTHROCYTE [DISTWIDTH] IN BLOOD BY AUTOMATED COUNT: 15.7 % (ref 11.5–14.5)
GLOBULIN SER CALC-MCNC: 3.2 G/DL (ref 2–4)
GLUCOSE BLD STRIP.AUTO-MCNC: 101 MG/DL (ref 65–100)
GLUCOSE BLD STRIP.AUTO-MCNC: 80 MG/DL (ref 65–100)
GLUCOSE SERPL-MCNC: 113 MG/DL (ref 65–100)
HCT VFR BLD AUTO: 42.7 % (ref 35–47)
HGB BLD-MCNC: 13.4 G/DL (ref 11.5–16)
IMM GRANULOCYTES # BLD AUTO: 0 K/UL (ref 0–0.04)
IMM GRANULOCYTES NFR BLD AUTO: 0 % (ref 0–0.5)
LYMPHOCYTES # BLD: 1.1 K/UL (ref 0.8–3.5)
LYMPHOCYTES NFR BLD: 21 % (ref 12–49)
MCH RBC QN AUTO: 28.9 PG (ref 26–34)
MCHC RBC AUTO-ENTMCNC: 31.4 G/DL (ref 30–36.5)
MCV RBC AUTO: 92 FL (ref 80–99)
MONOCYTES # BLD: 0.4 K/UL (ref 0–1)
MONOCYTES NFR BLD: 8 % (ref 5–13)
NEUTS SEG # BLD: 3.8 K/UL (ref 1.8–8)
NEUTS SEG NFR BLD: 69 % (ref 32–75)
NRBC # BLD: 0 K/UL (ref 0–0.01)
NRBC BLD-RTO: 0 PER 100 WBC
PERFORMED BY:: ABNORMAL
PERFORMED BY:: NORMAL
PLATELET # BLD AUTO: 147 K/UL (ref 150–400)
PMV BLD AUTO: 12.4 FL (ref 8.9–12.9)
POTASSIUM SERPL-SCNC: 4.5 MMOL/L (ref 3.5–5.1)
PROT SERPL-MCNC: 6.4 G/DL (ref 6.4–8.2)
RBC # BLD AUTO: 4.64 M/UL (ref 3.8–5.2)
SODIUM SERPL-SCNC: 145 MMOL/L (ref 136–145)
TROPONIN I SERPL HS-MCNC: 114 NG/L (ref 0–51)
TROPONIN I SERPL HS-MCNC: 114 NG/L (ref 0–51)
WBC # BLD AUTO: 5.4 K/UL (ref 3.6–11)

## 2024-05-27 PROCEDURE — 36415 COLL VENOUS BLD VENIPUNCTURE: CPT

## 2024-05-27 PROCEDURE — 94761 N-INVAS EAR/PLS OXIMETRY MLT: CPT

## 2024-05-27 PROCEDURE — 6370000000 HC RX 637 (ALT 250 FOR IP): Performed by: STUDENT IN AN ORGANIZED HEALTH CARE EDUCATION/TRAINING PROGRAM

## 2024-05-27 PROCEDURE — 82962 GLUCOSE BLOOD TEST: CPT

## 2024-05-27 PROCEDURE — 2060000000 HC ICU INTERMEDIATE R&B

## 2024-05-27 PROCEDURE — 93005 ELECTROCARDIOGRAM TRACING: CPT | Performed by: STUDENT IN AN ORGANIZED HEALTH CARE EDUCATION/TRAINING PROGRAM

## 2024-05-27 PROCEDURE — 94640 AIRWAY INHALATION TREATMENT: CPT

## 2024-05-27 PROCEDURE — 83880 ASSAY OF NATRIURETIC PEPTIDE: CPT

## 2024-05-27 PROCEDURE — 99285 EMERGENCY DEPT VISIT HI MDM: CPT

## 2024-05-27 PROCEDURE — 6370000000 HC RX 637 (ALT 250 FOR IP): Performed by: FAMILY MEDICINE

## 2024-05-27 PROCEDURE — 2580000003 HC RX 258: Performed by: FAMILY MEDICINE

## 2024-05-27 PROCEDURE — 6360000002 HC RX W HCPCS: Performed by: FAMILY MEDICINE

## 2024-05-27 PROCEDURE — 87040 BLOOD CULTURE FOR BACTERIA: CPT

## 2024-05-27 PROCEDURE — 80053 COMPREHEN METABOLIC PANEL: CPT

## 2024-05-27 PROCEDURE — 84484 ASSAY OF TROPONIN QUANT: CPT

## 2024-05-27 PROCEDURE — 71045 X-RAY EXAM CHEST 1 VIEW: CPT

## 2024-05-27 PROCEDURE — 85025 COMPLETE CBC W/AUTO DIFF WBC: CPT

## 2024-05-27 RX ORDER — ONDANSETRON 2 MG/ML
4 INJECTION INTRAMUSCULAR; INTRAVENOUS EVERY 6 HOURS PRN
Status: DISCONTINUED | OUTPATIENT
Start: 2024-05-27 | End: 2024-05-30 | Stop reason: HOSPADM

## 2024-05-27 RX ORDER — SODIUM CHLORIDE 0.9 % (FLUSH) 0.9 %
5-40 SYRINGE (ML) INJECTION PRN
Status: DISCONTINUED | OUTPATIENT
Start: 2024-05-27 | End: 2024-05-30 | Stop reason: HOSPADM

## 2024-05-27 RX ORDER — ONDANSETRON 4 MG/1
4 TABLET, ORALLY DISINTEGRATING ORAL EVERY 8 HOURS PRN
Status: DISCONTINUED | OUTPATIENT
Start: 2024-05-27 | End: 2024-05-30 | Stop reason: HOSPADM

## 2024-05-27 RX ORDER — HYDRALAZINE HYDROCHLORIDE 20 MG/ML
10 INJECTION INTRAMUSCULAR; INTRAVENOUS ONCE
Status: COMPLETED | OUTPATIENT
Start: 2024-05-27 | End: 2024-05-27

## 2024-05-27 RX ORDER — SPIRONOLACTONE 25 MG/1
25 TABLET ORAL DAILY
Status: DISCONTINUED | OUTPATIENT
Start: 2024-05-27 | End: 2024-05-30 | Stop reason: HOSPADM

## 2024-05-27 RX ORDER — SODIUM CHLORIDE 9 MG/ML
INJECTION, SOLUTION INTRAVENOUS PRN
Status: DISCONTINUED | OUTPATIENT
Start: 2024-05-27 | End: 2024-05-30 | Stop reason: HOSPADM

## 2024-05-27 RX ORDER — THEOPHYLLINE 300 MG/1
300 TABLET, EXTENDED RELEASE ORAL DAILY
Status: DISCONTINUED | OUTPATIENT
Start: 2024-05-27 | End: 2024-05-30 | Stop reason: HOSPADM

## 2024-05-27 RX ORDER — MAGNESIUM SULFATE IN WATER 40 MG/ML
2000 INJECTION, SOLUTION INTRAVENOUS PRN
Status: DISCONTINUED | OUTPATIENT
Start: 2024-05-27 | End: 2024-05-30 | Stop reason: HOSPADM

## 2024-05-27 RX ORDER — POLYETHYLENE GLYCOL 3350 17 G/17G
17 POWDER, FOR SOLUTION ORAL DAILY PRN
Status: DISCONTINUED | OUTPATIENT
Start: 2024-05-27 | End: 2024-05-30 | Stop reason: HOSPADM

## 2024-05-27 RX ORDER — POTASSIUM CHLORIDE 7.45 MG/ML
10 INJECTION INTRAVENOUS PRN
Status: DISCONTINUED | OUTPATIENT
Start: 2024-05-27 | End: 2024-05-30 | Stop reason: HOSPADM

## 2024-05-27 RX ORDER — CETIRIZINE HYDROCHLORIDE 10 MG/1
10 TABLET ORAL DAILY
Status: DISCONTINUED | OUTPATIENT
Start: 2024-05-27 | End: 2024-05-30 | Stop reason: HOSPADM

## 2024-05-27 RX ORDER — DOXYCYCLINE HYCLATE 100 MG/1
100 CAPSULE ORAL
Status: COMPLETED | OUTPATIENT
Start: 2024-05-27 | End: 2024-05-27

## 2024-05-27 RX ORDER — FUROSEMIDE 10 MG/ML
40 INJECTION INTRAMUSCULAR; INTRAVENOUS 2 TIMES DAILY
Status: DISCONTINUED | OUTPATIENT
Start: 2024-05-27 | End: 2024-05-30

## 2024-05-27 RX ORDER — HYDRALAZINE HYDROCHLORIDE 50 MG/1
50 TABLET, FILM COATED ORAL EVERY 6 HOURS PRN
Status: DISCONTINUED | OUTPATIENT
Start: 2024-05-27 | End: 2024-05-30 | Stop reason: HOSPADM

## 2024-05-27 RX ORDER — AMIODARONE HYDROCHLORIDE 200 MG/1
200 TABLET ORAL DAILY
Status: DISCONTINUED | OUTPATIENT
Start: 2024-05-27 | End: 2024-05-30 | Stop reason: HOSPADM

## 2024-05-27 RX ORDER — ATORVASTATIN CALCIUM 40 MG/1
40 TABLET, FILM COATED ORAL NIGHTLY
Status: DISCONTINUED | OUTPATIENT
Start: 2024-05-27 | End: 2024-05-30 | Stop reason: HOSPADM

## 2024-05-27 RX ORDER — POTASSIUM CHLORIDE 20 MEQ/1
40 TABLET, EXTENDED RELEASE ORAL PRN
Status: DISCONTINUED | OUTPATIENT
Start: 2024-05-27 | End: 2024-05-30 | Stop reason: HOSPADM

## 2024-05-27 RX ORDER — METOPROLOL SUCCINATE 25 MG/1
50 TABLET, EXTENDED RELEASE ORAL DAILY
Status: DISCONTINUED | OUTPATIENT
Start: 2024-05-27 | End: 2024-05-28

## 2024-05-27 RX ORDER — SODIUM CHLORIDE 0.9 % (FLUSH) 0.9 %
5-40 SYRINGE (ML) INJECTION EVERY 12 HOURS SCHEDULED
Status: DISCONTINUED | OUTPATIENT
Start: 2024-05-27 | End: 2024-05-30 | Stop reason: HOSPADM

## 2024-05-27 RX ORDER — ALLOPURINOL 100 MG/1
100 TABLET ORAL DAILY
Status: DISCONTINUED | OUTPATIENT
Start: 2024-05-27 | End: 2024-05-30 | Stop reason: HOSPADM

## 2024-05-27 RX ORDER — METOLAZONE 5 MG/1
5 TABLET ORAL DAILY
Status: DISCONTINUED | OUTPATIENT
Start: 2024-05-27 | End: 2024-05-30 | Stop reason: HOSPADM

## 2024-05-27 RX ORDER — OXYBUTYNIN CHLORIDE 5 MG/1
5 TABLET ORAL 3 TIMES DAILY
Status: DISCONTINUED | OUTPATIENT
Start: 2024-05-27 | End: 2024-05-30 | Stop reason: HOSPADM

## 2024-05-27 RX ORDER — BUDESONIDE AND FORMOTEROL FUMARATE DIHYDRATE 160; 4.5 UG/1; UG/1
2 AEROSOL RESPIRATORY (INHALATION)
Status: DISCONTINUED | OUTPATIENT
Start: 2024-05-27 | End: 2024-05-30 | Stop reason: HOSPADM

## 2024-05-27 RX ORDER — ASPIRIN 81 MG/1
81 TABLET ORAL DAILY
Status: DISCONTINUED | OUTPATIENT
Start: 2024-05-27 | End: 2024-05-30 | Stop reason: HOSPADM

## 2024-05-27 RX ADMIN — FUROSEMIDE 40 MG: 10 INJECTION, SOLUTION INTRAMUSCULAR; INTRAVENOUS at 18:26

## 2024-05-27 RX ADMIN — ATORVASTATIN CALCIUM 40 MG: 40 TABLET, FILM COATED ORAL at 23:05

## 2024-05-27 RX ADMIN — THEOPHYLLINE 300 MG: 300 TABLET, EXTENDED RELEASE ORAL at 18:25

## 2024-05-27 RX ADMIN — HYDRALAZINE HYDROCHLORIDE 10 MG: 20 INJECTION, SOLUTION INTRAMUSCULAR; INTRAVENOUS at 16:33

## 2024-05-27 RX ADMIN — SACUBITRIL AND VALSARTAN 1 TABLET: 97; 103 TABLET, FILM COATED ORAL at 18:27

## 2024-05-27 RX ADMIN — HYDRALAZINE HYDROCHLORIDE 50 MG: 50 TABLET ORAL at 16:05

## 2024-05-27 RX ADMIN — OXYBUTYNIN CHLORIDE 5 MG: 5 TABLET ORAL at 16:05

## 2024-05-27 RX ADMIN — OXYBUTYNIN CHLORIDE 5 MG: 5 TABLET ORAL at 23:05

## 2024-05-27 RX ADMIN — Medication 2 PUFF: at 21:23

## 2024-05-27 RX ADMIN — DOXYCYCLINE HYCLATE 100 MG: 100 CAPSULE ORAL at 14:29

## 2024-05-27 RX ADMIN — METOLAZONE 5 MG: 5 TABLET ORAL at 18:27

## 2024-05-27 RX ADMIN — SODIUM CHLORIDE, PRESERVATIVE FREE 10 ML: 5 INJECTION INTRAVENOUS at 23:05

## 2024-05-27 ASSESSMENT — ENCOUNTER SYMPTOMS
COUGH: 1
GASTROINTESTINAL NEGATIVE: 1
SHORTNESS OF BREATH: 1

## 2024-05-27 ASSESSMENT — PAIN DESCRIPTION - LOCATION: LOCATION: LEG

## 2024-05-27 ASSESSMENT — PAIN SCALES - GENERAL: PAINLEVEL_OUTOF10: 8

## 2024-05-27 NOTE — ED PROVIDER NOTES
EKG:.EKG interpreted by me. Shows Normal Sinus Rhythm with a HR of 77.  No STEMI. EKG interpreted by me.  Shows Normal Sinus Rhythm.  No ST elevations or depressions concerning for ischemia. Normal intervals. Normal axis. No contiguous TWIs.     Dr. Kj Jimenez    Radiologic Studies:  Non-plain film images such as CT, Ultrasound and MRI are read by the radiologist. Plain radiographic images are visualized and preliminarily interpreted by the ED Provider with the following findings: Xray Interpreted by me.  Shows pulmonary vascular congestio    Interpretation per the Radiologist below, if available at the time of this note:  XR CHEST PORTABLE   Final Result   No acute cardiopulmonary abnormalities.   Persistent enlargement of the cardiac silhouette.              ED COURSE and DIFFERENTIAL DIAGNOSIS/MDM   2:20 PM Differential and Considerations:     73-year-old female with history of multiple comorbidities who presents emergency room due to worsening bilateral extremity swelling and tender over the past week.  Patient with 3+ pitting edema of the lower extremities with associated blistering.  Was placed on Keflex by PCP for empiric cellulitis coverage.  States that she has been compliant with her metolazone however notes of decreased urinary output.  Concern for fluid overload versus peripheral edema for CHF versus ACS versus AMMON versus electrolyte abnormality.  Will provide IV diuresis.  Patient will likely require admission for CHF exacerbation and continued IV diuresis.    Records Reviewed (source and summary of external notes): Prior medical records and Nursing notes.    Vitals:    Vitals:    05/27/24 1308 05/27/24 1330 05/27/24 1345 05/27/24 1400   BP: (!) 182/78  (!) 171/95    Pulse: 72 71 70 74   Resp: 24 14 22 22   Temp: 98.2 °F (36.8 °C)      TempSrc: Oral      SpO2: 91% 97% 96% 97%   Weight:       Height:            ED COURSE       SEPSIS Reassessment: Sepsis reassessment not applicable    Clinical

## 2024-05-27 NOTE — ED TRIAGE NOTES
[facility] 15 pt stated that she has been having increasing BLE edema;; stated that she is having blisters to her legs and soreness noted; pt stated that she is taking her fluid meds as ordered; pt stated that she is also having a little SOB

## 2024-05-27 NOTE — H&P
ondansetron (ZOFRAN) injection 4 mg, 4 mg, IntraVENous, Q6H PRN, Palmer Bustamante MD    polyethylene glycol (GLYCOLAX) packet 17 g, 17 g, Oral, Daily PRN, Palmer Bustamante MD    furosemide (LASIX) injection 40 mg, 40 mg, IntraVENous, BID, Palmer Bustamante MD    Current Outpatient Medications:     furosemide (LASIX) 40 MG tablet, Take 1 tablet by mouth in the morning and 1 tablet in the evening., Disp: 60 tablet, Rfl: 3    spironolactone (ALDACTONE) 25 MG tablet, Take 1 tablet by mouth daily, Disp: , Rfl:     theophylline (JEREL-24) 200 MG extended release capsule, Take 300 mg by mouth daily, Disp: , Rfl:     cetirizine (ZYRTEC) 10 MG tablet, Take 1 tablet by mouth daily, Disp: 30 tablet, Rfl: 0    hydrALAZINE (APRESOLINE) 50 MG tablet, Take 1 tablet by mouth every 6 hours as needed (SBP >170), Disp: 90 tablet, Rfl: 3    metoprolol succinate (TOPROL XL) 50 MG extended release tablet, Take 1 tablet by mouth daily, Disp: 30 tablet, Rfl: 3    metOLazone (ZAROXOLYN) 5 MG tablet, Take 1 tablet by mouth daily, Disp: 30 tablet, Rfl: 11    budesonide-formoterol (SYMBICORT) 160-4.5 MCG/ACT AERO, Inhale 2 puffs into the lungs in the morning and 2 puffs in the evening., Disp: 10.2 g, Rfl: 3    oxybutynin (DITROPAN) 5 MG tablet, Take 1 tablet by mouth 3 times daily, Disp: , Rfl:     allopurinol (ZYLOPRIM) 100 MG tablet, Take 1 tablet by mouth daily, Disp: , Rfl:     amiodarone (CORDARONE) 200 MG tablet, Take 1 tablet by mouth daily, Disp: , Rfl:     apixaban (ELIQUIS) 5 MG TABS tablet, Take 1 tablet by mouth 2 times daily, Disp: , Rfl:     aspirin 81 MG EC tablet, Take 1 tablet by mouth daily, Disp: , Rfl:     atorvastatin (LIPITOR) 40 MG tablet, Take 1 tablet by mouth nightly, Disp: , Rfl:     empagliflozin (JARDIANCE) 10 MG tablet, Take 1 tablet by mouth daily, Disp: , Rfl:     sacubitril-valsartan (ENTRESTO)  MG per tablet, Take 1 tablet by mouth 2 times daily, Disp: , Rfl:     LAB DATA REVIEWED:    Recent Results

## 2024-05-27 NOTE — CONSULTS
PULMONARY CONSULT  [facility]    Name: Nubia Lion MRN: [id number]   : 1950 Hospital: [facility]   Date: 2024  Admission date: 2024 Hospital Day: 1       HPI:     Patient Active Problem List   Diagnosis    Atrial flutter with rapid ventricular response (HCC)    Heart failure (HCC)    COPD exacerbation (HCC)    COPD (chronic obstructive pulmonary disease) (HCC)    Atrial flutter (HCC)    CHF exacerbation (HCC)    UTI (urinary tract infection)    CHF (congestive heart failure) (HCC)    Shortness of breath    Acute exacerbation of CHF (congestive heart failure) (HCC)    CHF (congestive heart failure), NYHA class I, acute on chronic, combined (HCC)    Acute congestive heart failure with left ventricular diastolic dysfunction (HCC)             [x] High complexity decision making was performed  [x] See my orders for details      Subjective/Initial History:     I was asked by Palmer Bustamante MD to see Nubia Lion  a 73 y.o.   female in consultation     Excerpts from admission 2024 or consult notes as follows:   73-year-old lady came in because of shortness of breath and dyspnea significant past medical history of coronary disease status post CABG she was hypoxic and also having swelling of the lower extremities 3-4+ leg edema she was already on Lasix Aldactone and Zaroxolyn despite of that her condition got worse more short of breath dyspneic tired fatigue came to the hospital got admitted and pulm consult was called.      Allergies   Allergen Reactions    Oxycodone-Acetaminophen Hives    Penicillins Other (See Comments)        MAR reviewed and pertinent medications noted or modified as needed     Current Facility-Administered Medications   Medication Dose Route Frequency Provider Last Rate Last Admin    allopurinol (ZYLOPRIM) tablet 100 mg  100 mg Oral Daily Palmer Bustamante MD        amiodarone (CORDARONE) tablet 200 mg  200 mg Oral Daily Robby 
   Coronary artery disease     Diabetes (HCC)     HFrEF (heart failure with reduced ejection fraction) (HCC)     Hyperlipidemia     Hypertension     Myocardial infarct (HCC)     MICHELLE (obstructive sleep apnea)       Past Surgical History:   Procedure Laterality Date    BREAST BIOPSY      CHOLECYSTECTOMY      CORONARY ARTERY BYPASS GRAFT      HERNIA REPAIR      HYSTERECTOMY (CERVIX STATUS UNKNOWN)       Allergies   Allergen Reactions    Oxycodone-Acetaminophen Hives    Penicillins Other (See Comments)      Meds:  See below  Social History     Tobacco Use    Smoking status: Former    Smokeless tobacco: Never   Substance Use Topics    Alcohol use: Not Currently      Family History   Problem Relation Age of Onset    Cancer Maternal Grandmother     Heart Disease Mother     Kidney Disease Mother        REVIEW OF SYSTEMS:     []         Unable to obtain  ROS due to ---   [x]         Total of 12 systems reviewed as follows:    Constitutional: negative fever, negative chills, negative weight loss  Eyes:   negative visual changes  ENT:   negative sore throat, tongue or lip swelling  Respiratory:  negative cough, negative dyspnea  Cards:                  See HPI  GI:   negative for nausea, vomiting, diarrhea, and abdominal pain  Genitourinary: negative for frequency, dysuria  Integument:  negative for rash   Hematologic:  negative for easy bruising and gum/nose bleeding  Musculoskel: negative for myalgias,  back pain  Neurological:  negative for headaches, dizziness, vertigo, weakness  Behavl/Psych: negative for feelings of anxiety, depression     Pertinent Positives include :    Objective:      Physical Exam:    Last 24hrs VS reviewed since prior progress note. Most recent are:    BP (!) 155/80   Pulse 69   Temp 97.4 °F (36.3 °C)   Resp 24   Ht 1.6 m (5' 3\")   Wt 90.7 kg (200 lb)   SpO2 100%   BMI 35.43 kg/m²   No intake or output data in the 24 hours ending 05/27/24 1922     General Appearance: Well developed, alert, no

## 2024-05-27 NOTE — ED NOTES
CAMILA Ta notified of pts BP and meds given.  
Provider contacted in regards to BP, orders placed.     Pharmacy called to verify meds  
5/27/24 1557)   hydrALAZINE (APRESOLINE) tablet 50 mg (50 mg Oral Given 5/27/24 1605)   metOLazone (ZAROXOLYN) tablet 5 mg (has no administration in time range)   metoprolol succinate (TOPROL XL) extended release tablet 50 mg (0 mg Oral Held 5/27/24 1558)   oxyBUTYnin (DITROPAN) tablet 5 mg (5 mg Oral Given 5/27/24 1605)   sacubitril-valsartan (ENTRESTO)  MG per tablet 1 tablet (has no administration in time range)   spironolactone (ALDACTONE) tablet 25 mg (0 mg Oral Held 5/27/24 1559)   theophylline (JEREL-24) extended release capsule 300 mg (has no administration in time range)   sodium chloride flush 0.9 % injection 5-40 mL (has no administration in time range)   sodium chloride flush 0.9 % injection 5-40 mL (has no administration in time range)   0.9 % sodium chloride infusion (has no administration in time range)   potassium chloride (KLOR-CON M) extended release tablet 40 mEq (has no administration in time range)     Or   potassium bicarb-citric acid (EFFER-K) effervescent tablet 40 mEq (has no administration in time range)     Or   potassium chloride 10 mEq/100 mL IVPB (Peripheral Line) (has no administration in time range)   magnesium sulfate 2000 mg in 50 mL IVPB premix (has no administration in time range)   ondansetron (ZOFRAN-ODT) disintegrating tablet 4 mg (has no administration in time range)     Or   ondansetron (ZOFRAN) injection 4 mg (has no administration in time range)   polyethylene glycol (GLYCOLAX) packet 17 g (has no administration in time range)   furosemide (LASIX) injection 40 mg (has no administration in time range)   doxycycline hyclate (VIBRAMYCIN) capsule 100 mg (100 mg Oral Given 5/27/24 1429)     Last documented pain medication administration: see MAR  Pertinent or High Risk Medications/Drips: no   If Yes, please provide details:   Blood Product Administration: no  If Yes, please provide details:   Process Protocols/Bundles: N/A    Recommendation  Incomplete STAT orders:

## 2024-05-28 ENCOUNTER — APPOINTMENT (OUTPATIENT)
Facility: HOSPITAL | Age: 74
DRG: 291 | End: 2024-05-28
Payer: MEDICARE

## 2024-05-28 LAB
ALBUMIN SERPL-MCNC: 3.1 G/DL (ref 3.5–5)
ALBUMIN/GLOB SERPL: 0.9 (ref 1.1–2.2)
ALP SERPL-CCNC: 154 U/L (ref 45–117)
ALT SERPL-CCNC: 21 U/L (ref 12–78)
ANION GAP SERPL CALC-SCNC: 4 MMOL/L (ref 5–15)
APPEARANCE UR: CLEAR
ARTERIAL PATENCY WRIST A: YES
AST SERPL W P-5'-P-CCNC: 20 U/L (ref 15–37)
BACTERIA URNS QL MICRO: NEGATIVE /HPF
BASE EXCESS BLDA CALC-SCNC: 2.2 MMOL/L (ref 0–3)
BASOPHILS # BLD: 0.1 K/UL (ref 0–0.1)
BASOPHILS NFR BLD: 1 % (ref 0–1)
BDY SITE: ABNORMAL
BILIRUB SERPL-MCNC: 1.1 MG/DL (ref 0.2–1)
BILIRUB UR QL: NEGATIVE
BNP SERPL-MCNC: 6904 PG/ML
BODY TEMPERATURE: 98.7
BUN SERPL-MCNC: 23 MG/DL (ref 6–20)
BUN/CREAT SERPL: 17 (ref 12–20)
CA-I BLD-MCNC: 9.3 MG/DL (ref 8.5–10.1)
CHLORIDE SERPL-SCNC: 109 MMOL/L (ref 97–108)
CO2 SERPL-SCNC: 28 MMOL/L (ref 21–32)
COHGB MFR BLD: 0.4 % (ref 1–2)
COLOR UR: NORMAL
CREAT SERPL-MCNC: 1.37 MG/DL (ref 0.55–1.02)
DIFFERENTIAL METHOD BLD: ABNORMAL
EKG ATRIAL RATE: 59 BPM
EKG DIAGNOSIS: NORMAL
EKG P AXIS: 68 DEGREES
EKG P-R INTERVAL: 170 MS
EKG Q-T INTERVAL: 504 MS
EKG QRS DURATION: 112 MS
EKG QTC CALCULATION (BAZETT): 498 MS
EKG R AXIS: 70 DEGREES
EKG T AXIS: 130 DEGREES
EKG VENTRICULAR RATE: 59 BPM
EOSINOPHIL # BLD: 0.1 K/UL (ref 0–0.4)
EOSINOPHIL NFR BLD: 2 % (ref 0–7)
EPITH CASTS URNS QL MICRO: NORMAL /LPF
ERYTHROCYTE [DISTWIDTH] IN BLOOD BY AUTOMATED COUNT: 15.9 % (ref 11.5–14.5)
FIO2 ON VENT: 32 %
GAS FLOW.O2 O2 DELIVERY SYS: 3 L/MIN
GLOBULIN SER CALC-MCNC: 3.3 G/DL (ref 2–4)
GLUCOSE BLD STRIP.AUTO-MCNC: 108 MG/DL (ref 65–100)
GLUCOSE BLD STRIP.AUTO-MCNC: 130 MG/DL (ref 65–100)
GLUCOSE BLD STRIP.AUTO-MCNC: 144 MG/DL (ref 65–100)
GLUCOSE SERPL-MCNC: 114 MG/DL (ref 65–100)
GLUCOSE UR STRIP.AUTO-MCNC: NEGATIVE MG/DL
HCO3 BLDA-SCNC: 27 MMOL/L (ref 22–26)
HCT VFR BLD AUTO: 44.1 % (ref 35–47)
HGB BLD-MCNC: 13.6 G/DL (ref 11.5–16)
HGB UR QL STRIP: NEGATIVE
IMM GRANULOCYTES # BLD AUTO: 0 K/UL (ref 0–0.04)
IMM GRANULOCYTES NFR BLD AUTO: 0 % (ref 0–0.5)
KETONES UR QL STRIP.AUTO: NEGATIVE MG/DL
LEUKOCYTE ESTERASE UR QL STRIP.AUTO: NEGATIVE
LYMPHOCYTES # BLD: 1.3 K/UL (ref 0.8–3.5)
LYMPHOCYTES NFR BLD: 21 % (ref 12–49)
MCH RBC QN AUTO: 28.5 PG (ref 26–34)
MCHC RBC AUTO-ENTMCNC: 30.8 G/DL (ref 30–36.5)
MCV RBC AUTO: 92.3 FL (ref 80–99)
METHGB MFR BLD: 0.5 % (ref 0–1.4)
MONOCYTES # BLD: 0.6 K/UL (ref 0–1)
MONOCYTES NFR BLD: 10 % (ref 5–13)
NEUTS SEG # BLD: 4.1 K/UL (ref 1.8–8)
NEUTS SEG NFR BLD: 66 % (ref 32–75)
NITRITE UR QL STRIP.AUTO: NEGATIVE
NRBC # BLD: 0 K/UL (ref 0–0.01)
NRBC BLD-RTO: 0 PER 100 WBC
OXYHGB MFR BLD: 93 % (ref 95–99)
PCO2 BLDA: 45 MMHG (ref 35–45)
PERFORMED BY:: ABNORMAL
PH BLDA: 7.4 (ref 7.35–7.45)
PH UR STRIP: 7 (ref 5–8)
PLATELET # BLD AUTO: 145 K/UL (ref 150–400)
PMV BLD AUTO: 13 FL (ref 8.9–12.9)
PO2 BLDA: 73 MMHG (ref 80–100)
POTASSIUM SERPL-SCNC: 3.5 MMOL/L (ref 3.5–5.1)
PROT SERPL-MCNC: 6.4 G/DL (ref 6.4–8.2)
PROT UR STRIP-MCNC: NEGATIVE MG/DL
RBC # BLD AUTO: 4.78 M/UL (ref 3.8–5.2)
RBC #/AREA URNS HPF: NORMAL /HPF (ref 0–5)
SAO2 % BLD: 94 % (ref 95–99)
SAO2% DEVICE SAO2% SENSOR NAME: ABNORMAL
SODIUM SERPL-SCNC: 141 MMOL/L (ref 136–145)
SP GR UR REFRACTOMETRY: 1.01 (ref 1–1.03)
SPECIMEN SITE: ABNORMAL
TROPONIN I SERPL HS-MCNC: 78 NG/L (ref 0–51)
TSH SERPL DL<=0.05 MIU/L-ACNC: 0.76 UIU/ML (ref 0.36–3.74)
UROBILINOGEN UR QL STRIP.AUTO: 0.1 EU/DL (ref 0.1–1)
VENTILATION MODE VENT: ABNORMAL
WBC # BLD AUTO: 6.3 K/UL (ref 3.6–11)
WBC URNS QL MICRO: NORMAL /HPF (ref 0–4)

## 2024-05-28 PROCEDURE — 80053 COMPREHEN METABOLIC PANEL: CPT

## 2024-05-28 PROCEDURE — 36415 COLL VENOUS BLD VENIPUNCTURE: CPT

## 2024-05-28 PROCEDURE — 83880 ASSAY OF NATRIURETIC PEPTIDE: CPT

## 2024-05-28 PROCEDURE — 71045 X-RAY EXAM CHEST 1 VIEW: CPT

## 2024-05-28 PROCEDURE — 94640 AIRWAY INHALATION TREATMENT: CPT

## 2024-05-28 PROCEDURE — 81003 URINALYSIS AUTO W/O SCOPE: CPT

## 2024-05-28 PROCEDURE — 94761 N-INVAS EAR/PLS OXIMETRY MLT: CPT

## 2024-05-28 PROCEDURE — 6370000000 HC RX 637 (ALT 250 FOR IP): Performed by: FAMILY MEDICINE

## 2024-05-28 PROCEDURE — 36600 WITHDRAWAL OF ARTERIAL BLOOD: CPT

## 2024-05-28 PROCEDURE — 2060000000 HC ICU INTERMEDIATE R&B

## 2024-05-28 PROCEDURE — 93005 ELECTROCARDIOGRAM TRACING: CPT | Performed by: INTERNAL MEDICINE

## 2024-05-28 PROCEDURE — 6370000000 HC RX 637 (ALT 250 FOR IP): Performed by: INTERNAL MEDICINE

## 2024-05-28 PROCEDURE — 84484 ASSAY OF TROPONIN QUANT: CPT

## 2024-05-28 PROCEDURE — 6360000002 HC RX W HCPCS: Performed by: FAMILY MEDICINE

## 2024-05-28 PROCEDURE — 2580000003 HC RX 258: Performed by: FAMILY MEDICINE

## 2024-05-28 PROCEDURE — 82962 GLUCOSE BLOOD TEST: CPT

## 2024-05-28 PROCEDURE — 87086 URINE CULTURE/COLONY COUNT: CPT

## 2024-05-28 PROCEDURE — 84443 ASSAY THYROID STIM HORMONE: CPT

## 2024-05-28 PROCEDURE — 2700000000 HC OXYGEN THERAPY PER DAY

## 2024-05-28 PROCEDURE — 82803 BLOOD GASES ANY COMBINATION: CPT

## 2024-05-28 PROCEDURE — 92610 EVALUATE SWALLOWING FUNCTION: CPT

## 2024-05-28 PROCEDURE — 85025 COMPLETE CBC W/AUTO DIFF WBC: CPT

## 2024-05-28 RX ORDER — METOPROLOL SUCCINATE 25 MG/1
100 TABLET, EXTENDED RELEASE ORAL DAILY
Status: DISCONTINUED | OUTPATIENT
Start: 2024-05-29 | End: 2024-05-30 | Stop reason: HOSPADM

## 2024-05-28 RX ORDER — POTASSIUM CHLORIDE 20 MEQ/1
40 TABLET, EXTENDED RELEASE ORAL ONCE
Status: COMPLETED | OUTPATIENT
Start: 2024-05-28 | End: 2024-05-28

## 2024-05-28 RX ORDER — METOPROLOL SUCCINATE 25 MG/1
50 TABLET, EXTENDED RELEASE ORAL ONCE
Status: COMPLETED | OUTPATIENT
Start: 2024-05-28 | End: 2024-05-28

## 2024-05-28 RX ADMIN — FUROSEMIDE 40 MG: 10 INJECTION, SOLUTION INTRAMUSCULAR; INTRAVENOUS at 09:25

## 2024-05-28 RX ADMIN — SACUBITRIL AND VALSARTAN 1 TABLET: 97; 103 TABLET, FILM COATED ORAL at 20:22

## 2024-05-28 RX ADMIN — SPIRONOLACTONE 25 MG: 25 TABLET ORAL at 09:24

## 2024-05-28 RX ADMIN — SODIUM CHLORIDE, PRESERVATIVE FREE 10 ML: 5 INJECTION INTRAVENOUS at 09:30

## 2024-05-28 RX ADMIN — APIXABAN 5 MG: 5 TABLET, FILM COATED ORAL at 20:30

## 2024-05-28 RX ADMIN — APIXABAN 5 MG: 5 TABLET, FILM COATED ORAL at 09:25

## 2024-05-28 RX ADMIN — SACUBITRIL AND VALSARTAN 1 TABLET: 97; 103 TABLET, FILM COATED ORAL at 09:24

## 2024-05-28 RX ADMIN — CETIRIZINE HYDROCHLORIDE 10 MG: 10 TABLET, FILM COATED ORAL at 09:25

## 2024-05-28 RX ADMIN — Medication 2 PUFF: at 20:28

## 2024-05-28 RX ADMIN — ALLOPURINOL 100 MG: 100 TABLET ORAL at 09:24

## 2024-05-28 RX ADMIN — FUROSEMIDE 40 MG: 10 INJECTION, SOLUTION INTRAMUSCULAR; INTRAVENOUS at 17:41

## 2024-05-28 RX ADMIN — SODIUM CHLORIDE, PRESERVATIVE FREE 10 ML: 5 INJECTION INTRAVENOUS at 20:22

## 2024-05-28 RX ADMIN — OXYBUTYNIN CHLORIDE 5 MG: 5 TABLET ORAL at 20:22

## 2024-05-28 RX ADMIN — EMPAGLIFLOZIN 10 MG: 10 TABLET, FILM COATED ORAL at 09:24

## 2024-05-28 RX ADMIN — POTASSIUM CHLORIDE 40 MEQ: 1500 TABLET, EXTENDED RELEASE ORAL at 12:14

## 2024-05-28 RX ADMIN — ASPIRIN 81 MG: 81 TABLET, COATED ORAL at 09:24

## 2024-05-28 RX ADMIN — OXYBUTYNIN CHLORIDE 5 MG: 5 TABLET ORAL at 09:24

## 2024-05-28 RX ADMIN — Medication 2 PUFF: at 08:21

## 2024-05-28 RX ADMIN — METOLAZONE 5 MG: 5 TABLET ORAL at 09:24

## 2024-05-28 RX ADMIN — THEOPHYLLINE 300 MG: 300 TABLET, EXTENDED RELEASE ORAL at 09:29

## 2024-05-28 RX ADMIN — OXYBUTYNIN CHLORIDE 5 MG: 5 TABLET ORAL at 12:56

## 2024-05-28 RX ADMIN — METOPROLOL SUCCINATE 50 MG: 25 TABLET, EXTENDED RELEASE ORAL at 09:24

## 2024-05-28 RX ADMIN — ATORVASTATIN CALCIUM 40 MG: 40 TABLET, FILM COATED ORAL at 20:22

## 2024-05-28 RX ADMIN — METOPROLOL SUCCINATE 50 MG: 25 TABLET, EXTENDED RELEASE ORAL at 12:15

## 2024-05-28 RX ADMIN — AMIODARONE HYDROCHLORIDE 200 MG: 200 TABLET ORAL at 09:24

## 2024-05-28 ASSESSMENT — PAIN DESCRIPTION - LOCATION
LOCATION: LEG
LOCATION: LEG

## 2024-05-28 ASSESSMENT — PAIN SCALES - GENERAL
PAINLEVEL_OUTOF10: 6
PAINLEVEL_OUTOF10: 8
PAINLEVEL_OUTOF10: 6
PAINLEVEL_OUTOF10: 0

## 2024-05-28 ASSESSMENT — PAIN DESCRIPTION - FREQUENCY
FREQUENCY: CONTINUOUS

## 2024-05-28 ASSESSMENT — ENCOUNTER SYMPTOMS
SHORTNESS OF BREATH: 1
GASTROINTESTINAL NEGATIVE: 1
COUGH: 1

## 2024-05-28 ASSESSMENT — PAIN DESCRIPTION - DESCRIPTORS
DESCRIPTORS: SHARP;STABBING
DESCRIPTORS: SHARP;STABBING
DESCRIPTORS: SHARP;THROBBING

## 2024-05-28 NOTE — THERAPY EVALUATION
Speech LAnguage Pathology Dysphagia EVALUATION/DISCHARGE    Patient: Nubia Lion (73 y.o. female)  Date: 5/28/2024  Primary Diagnosis: Leg edema [R60.0]  CHF (congestive heart failure), NYHA class I, acute on chronic, combined (HCC) [I50.43]  Acute on chronic congestive heart failure, unspecified heart failure type (HCC) [I50.9]       Precautions: aspiration     Time In: 1550  Time Out: 1605    DIET RECOMMENDATIONS: Easy to chew and thin liquids    SWALLOW SAFETY PRECAUTIONS: aspiration and GERD precautions, monitor pt closely for s/s aspiration, meds as tolerated      ASSESSMENT :  Based on the objective data described below, the patient presents with oropharyngeal sw functio WNL.  Pt seen for bedside sw evaluation, see details below. Pt admitted with leg edema, CHF, Acute on chronic CHF.  Pt currently on 3 L NC02, is typically on 4L NC at home.     Pt denies dysphagia. Nsg reports pt tolerates meds and breakfast well but was coughing following PO intake later in the day.   Pt reports having a persistent ongoing cough due to seasonal allergies, that has been addressed by MD.     Oropharyngeal sw function WNL for trials provided. Pt with many missing teeth and has to soften solids before mastication.  No overt s/s aspiration.     CXR results noted: \"1. No change cardiomegaly 2. Lungs are clear\"    Patient will be discharged from skilled speech-language pathology services at this time.       PLAN :  Recommendations and Planned Interventions:  DIET RECOMMENDATIONS: Easy to chew and thin liquids    SWALLOW SAFETY PRECAUTIONS: aspiration and GERD precautions, monitor pt closely for s/s aspiration, meds as tolerated    Acute SLP Services: No, patient will be discharged from acute skilled speech-language pathology at this time.    Discharge Recommendations: None, if concerns for aspiration are ongoing, please order MBS if/as medically appropriate to further assess as pt's oropharyngeal sw function is WNL with bedside

## 2024-05-29 LAB
ALBUMIN SERPL-MCNC: 1.9 G/DL (ref 3.5–5)
ALBUMIN/GLOB SERPL: ABNORMAL (ref 1.1–2.2)
ALP SERPL-CCNC: 140 U/L (ref 45–117)
ALT SERPL-CCNC: ABNORMAL U/L (ref 12–78)
ANION GAP SERPL CALC-SCNC: 3 MMOL/L (ref 5–15)
AST SERPL W P-5'-P-CCNC: 64 U/L (ref 15–37)
BACTERIA SPEC CULT: NORMAL
BILIRUB SERPL-MCNC: ABNORMAL MG/DL (ref 0.2–1)
BNP SERPL-MCNC: 2581 PG/ML
BUN SERPL-MCNC: 24 MG/DL (ref 6–20)
BUN/CREAT SERPL: 13 (ref 12–20)
CA-I BLD-MCNC: ABNORMAL MG/DL (ref 8.5–10.1)
CHLORIDE SERPL-SCNC: 105 MMOL/L (ref 97–108)
CO2 SERPL-SCNC: 23 MMOL/L (ref 21–32)
CREAT SERPL-MCNC: 1.85 MG/DL (ref 0.55–1.02)
ERYTHROCYTE [DISTWIDTH] IN BLOOD BY AUTOMATED COUNT: 15.2 % (ref 11.5–14.5)
GLOBULIN SER CALC-MCNC: ABNORMAL G/DL (ref 2–4)
GLUCOSE BLD STRIP.AUTO-MCNC: 104 MG/DL (ref 65–100)
GLUCOSE BLD STRIP.AUTO-MCNC: 136 MG/DL (ref 65–100)
GLUCOSE BLD STRIP.AUTO-MCNC: 161 MG/DL (ref 65–100)
GLUCOSE SERPL-MCNC: ABNORMAL MG/DL (ref 65–100)
HCT VFR BLD AUTO: 50.8 % (ref 35–47)
HGB BLD-MCNC: 15.8 G/DL (ref 11.5–16)
Lab: NORMAL
MCH RBC QN AUTO: 28.8 PG (ref 26–34)
MCHC RBC AUTO-ENTMCNC: 31.1 G/DL (ref 30–36.5)
MCV RBC AUTO: 92.7 FL (ref 80–99)
NRBC # BLD: 0 K/UL (ref 0–0.01)
NRBC BLD-RTO: 0 PER 100 WBC
PERFORMED BY:: ABNORMAL
PLATELET # BLD AUTO: 181 K/UL (ref 150–400)
PMV BLD AUTO: 12.4 FL (ref 8.9–12.9)
POTASSIUM SERPL-SCNC: ABNORMAL MMOL/L (ref 3.5–5.1)
PROT SERPL-MCNC: ABNORMAL G/DL (ref 6.4–8.2)
RBC # BLD AUTO: 5.48 M/UL (ref 3.8–5.2)
SODIUM SERPL-SCNC: 131 MMOL/L (ref 136–145)
WBC # BLD AUTO: 7.1 K/UL (ref 3.6–11)

## 2024-05-29 PROCEDURE — 94761 N-INVAS EAR/PLS OXIMETRY MLT: CPT

## 2024-05-29 PROCEDURE — 6370000000 HC RX 637 (ALT 250 FOR IP): Performed by: FAMILY MEDICINE

## 2024-05-29 PROCEDURE — 80053 COMPREHEN METABOLIC PANEL: CPT

## 2024-05-29 PROCEDURE — 36415 COLL VENOUS BLD VENIPUNCTURE: CPT

## 2024-05-29 PROCEDURE — 85027 COMPLETE CBC AUTOMATED: CPT

## 2024-05-29 PROCEDURE — 82962 GLUCOSE BLOOD TEST: CPT

## 2024-05-29 PROCEDURE — 2580000003 HC RX 258: Performed by: FAMILY MEDICINE

## 2024-05-29 PROCEDURE — 94640 AIRWAY INHALATION TREATMENT: CPT

## 2024-05-29 PROCEDURE — 6360000002 HC RX W HCPCS: Performed by: FAMILY MEDICINE

## 2024-05-29 PROCEDURE — 83880 ASSAY OF NATRIURETIC PEPTIDE: CPT

## 2024-05-29 PROCEDURE — 6370000000 HC RX 637 (ALT 250 FOR IP): Performed by: INTERNAL MEDICINE

## 2024-05-29 PROCEDURE — 2060000000 HC ICU INTERMEDIATE R&B

## 2024-05-29 PROCEDURE — 2700000000 HC OXYGEN THERAPY PER DAY

## 2024-05-29 RX ORDER — ACETAMINOPHEN 325 MG/1
650 TABLET ORAL EVERY 6 HOURS PRN
Status: DISCONTINUED | OUTPATIENT
Start: 2024-05-29 | End: 2024-05-30 | Stop reason: HOSPADM

## 2024-05-29 RX ADMIN — EMPAGLIFLOZIN 10 MG: 10 TABLET, FILM COATED ORAL at 09:38

## 2024-05-29 RX ADMIN — Medication 2 PUFF: at 21:13

## 2024-05-29 RX ADMIN — SACUBITRIL AND VALSARTAN 1 TABLET: 97; 103 TABLET, FILM COATED ORAL at 09:37

## 2024-05-29 RX ADMIN — ATORVASTATIN CALCIUM 40 MG: 40 TABLET, FILM COATED ORAL at 21:35

## 2024-05-29 RX ADMIN — CETIRIZINE HYDROCHLORIDE 10 MG: 10 TABLET, FILM COATED ORAL at 09:38

## 2024-05-29 RX ADMIN — ACETAMINOPHEN 650 MG: 325 TABLET ORAL at 21:52

## 2024-05-29 RX ADMIN — SODIUM CHLORIDE, PRESERVATIVE FREE 10 ML: 5 INJECTION INTRAVENOUS at 09:39

## 2024-05-29 RX ADMIN — SACUBITRIL AND VALSARTAN 1 TABLET: 97; 103 TABLET, FILM COATED ORAL at 21:34

## 2024-05-29 RX ADMIN — APIXABAN 5 MG: 5 TABLET, FILM COATED ORAL at 09:30

## 2024-05-29 RX ADMIN — APIXABAN 5 MG: 5 TABLET, FILM COATED ORAL at 21:35

## 2024-05-29 RX ADMIN — Medication 2 PUFF: at 08:31

## 2024-05-29 RX ADMIN — METOLAZONE 5 MG: 5 TABLET ORAL at 09:38

## 2024-05-29 RX ADMIN — FUROSEMIDE 40 MG: 10 INJECTION, SOLUTION INTRAMUSCULAR; INTRAVENOUS at 17:49

## 2024-05-29 RX ADMIN — OXYBUTYNIN CHLORIDE 5 MG: 5 TABLET ORAL at 13:57

## 2024-05-29 RX ADMIN — FUROSEMIDE 40 MG: 10 INJECTION, SOLUTION INTRAMUSCULAR; INTRAVENOUS at 09:38

## 2024-05-29 RX ADMIN — OXYBUTYNIN CHLORIDE 5 MG: 5 TABLET ORAL at 09:37

## 2024-05-29 RX ADMIN — ALLOPURINOL 100 MG: 100 TABLET ORAL at 09:37

## 2024-05-29 RX ADMIN — OXYBUTYNIN CHLORIDE 5 MG: 5 TABLET ORAL at 21:35

## 2024-05-29 RX ADMIN — SODIUM CHLORIDE, PRESERVATIVE FREE 10 ML: 5 INJECTION INTRAVENOUS at 21:35

## 2024-05-29 RX ADMIN — THEOPHYLLINE 300 MG: 300 TABLET, EXTENDED RELEASE ORAL at 09:39

## 2024-05-29 RX ADMIN — SPIRONOLACTONE 25 MG: 25 TABLET ORAL at 09:38

## 2024-05-29 RX ADMIN — AMIODARONE HYDROCHLORIDE 200 MG: 200 TABLET ORAL at 09:38

## 2024-05-29 RX ADMIN — METOPROLOL SUCCINATE 100 MG: 25 TABLET, EXTENDED RELEASE ORAL at 09:37

## 2024-05-29 RX ADMIN — ASPIRIN 81 MG: 81 TABLET, COATED ORAL at 09:37

## 2024-05-29 ASSESSMENT — ENCOUNTER SYMPTOMS
COUGH: 1
GASTROINTESTINAL NEGATIVE: 1
SHORTNESS OF BREATH: 1

## 2024-05-29 ASSESSMENT — PAIN DESCRIPTION - ORIENTATION: ORIENTATION: RIGHT;LEFT

## 2024-05-29 ASSESSMENT — PAIN DESCRIPTION - LOCATION: LOCATION: LEG

## 2024-05-29 ASSESSMENT — PAIN SCALES - GENERAL: PAINLEVEL_OUTOF10: 6

## 2024-05-30 VITALS
HEIGHT: 63 IN | WEIGHT: 185.63 LBS | OXYGEN SATURATION: 97 % | SYSTOLIC BLOOD PRESSURE: 99 MMHG | DIASTOLIC BLOOD PRESSURE: 72 MMHG | HEART RATE: 58 BPM | RESPIRATION RATE: 18 BRPM | BODY MASS INDEX: 32.89 KG/M2 | TEMPERATURE: 97.3 F

## 2024-05-30 LAB
ALBUMIN SERPL-MCNC: 2.8 G/DL (ref 3.5–5)
ALBUMIN/GLOB SERPL: 0.8 (ref 1.1–2.2)
ALP SERPL-CCNC: 159 U/L (ref 45–117)
ALT SERPL-CCNC: 17 U/L (ref 12–78)
ANION GAP SERPL CALC-SCNC: 7 MMOL/L (ref 5–15)
AST SERPL W P-5'-P-CCNC: 13 U/L (ref 15–37)
BILIRUB SERPL-MCNC: 0.9 MG/DL (ref 0.2–1)
BNP SERPL-MCNC: 1492 PG/ML
BUN SERPL-MCNC: 35 MG/DL (ref 6–20)
BUN/CREAT SERPL: 14 (ref 12–20)
CA-I BLD-MCNC: 8.5 MG/DL (ref 8.5–10.1)
CHLORIDE SERPL-SCNC: 99 MMOL/L (ref 97–108)
CO2 SERPL-SCNC: 34 MMOL/L (ref 21–32)
CREAT SERPL-MCNC: 2.51 MG/DL (ref 0.55–1.02)
ERYTHROCYTE [DISTWIDTH] IN BLOOD BY AUTOMATED COUNT: 15.4 % (ref 11.5–14.5)
GLOBULIN SER CALC-MCNC: 3.6 G/DL (ref 2–4)
GLUCOSE SERPL-MCNC: 134 MG/DL (ref 65–100)
HCT VFR BLD AUTO: 50.4 % (ref 35–47)
HGB BLD-MCNC: 15.6 G/DL (ref 11.5–16)
MCH RBC QN AUTO: 28.8 PG (ref 26–34)
MCHC RBC AUTO-ENTMCNC: 31 G/DL (ref 30–36.5)
MCV RBC AUTO: 93 FL (ref 80–99)
NRBC # BLD: 0 K/UL (ref 0–0.01)
NRBC BLD-RTO: 0 PER 100 WBC
PLATELET # BLD AUTO: 190 K/UL (ref 150–400)
PMV BLD AUTO: 13 FL (ref 8.9–12.9)
POTASSIUM SERPL-SCNC: 3.7 MMOL/L (ref 3.5–5.1)
PROT SERPL-MCNC: 6.4 G/DL (ref 6.4–8.2)
RBC # BLD AUTO: 5.42 M/UL (ref 3.8–5.2)
SODIUM SERPL-SCNC: 140 MMOL/L (ref 136–145)
WBC # BLD AUTO: 8.1 K/UL (ref 3.6–11)

## 2024-05-30 PROCEDURE — 36415 COLL VENOUS BLD VENIPUNCTURE: CPT

## 2024-05-30 PROCEDURE — 85027 COMPLETE CBC AUTOMATED: CPT

## 2024-05-30 PROCEDURE — 6370000000 HC RX 637 (ALT 250 FOR IP): Performed by: FAMILY MEDICINE

## 2024-05-30 PROCEDURE — 80053 COMPREHEN METABOLIC PANEL: CPT

## 2024-05-30 PROCEDURE — 2700000000 HC OXYGEN THERAPY PER DAY

## 2024-05-30 PROCEDURE — 83880 ASSAY OF NATRIURETIC PEPTIDE: CPT

## 2024-05-30 PROCEDURE — 6370000000 HC RX 637 (ALT 250 FOR IP): Performed by: INTERNAL MEDICINE

## 2024-05-30 PROCEDURE — 2580000003 HC RX 258: Performed by: FAMILY MEDICINE

## 2024-05-30 PROCEDURE — 97161 PT EVAL LOW COMPLEX 20 MIN: CPT

## 2024-05-30 PROCEDURE — 97116 GAIT TRAINING THERAPY: CPT

## 2024-05-30 PROCEDURE — 94640 AIRWAY INHALATION TREATMENT: CPT

## 2024-05-30 PROCEDURE — 94761 N-INVAS EAR/PLS OXIMETRY MLT: CPT

## 2024-05-30 RX ORDER — BUMETANIDE 1 MG/1
2 TABLET ORAL DAILY
Status: DISCONTINUED | OUTPATIENT
Start: 2024-05-30 | End: 2024-05-30 | Stop reason: HOSPADM

## 2024-05-30 RX ORDER — METOPROLOL SUCCINATE 100 MG/1
100 TABLET, EXTENDED RELEASE ORAL DAILY
Qty: 30 TABLET | Refills: 3 | Status: SHIPPED | OUTPATIENT
Start: 2024-05-30

## 2024-05-30 RX ORDER — BUMETANIDE 2 MG/1
2 TABLET ORAL DAILY
Qty: 30 TABLET | Refills: 3 | Status: SHIPPED | OUTPATIENT
Start: 2024-05-30

## 2024-05-30 RX ADMIN — Medication 2 PUFF: at 07:55

## 2024-05-30 RX ADMIN — SODIUM CHLORIDE, PRESERVATIVE FREE 10 ML: 5 INJECTION INTRAVENOUS at 12:12

## 2024-05-30 RX ADMIN — ASPIRIN 81 MG: 81 TABLET, COATED ORAL at 12:11

## 2024-05-30 RX ADMIN — OXYBUTYNIN CHLORIDE 5 MG: 5 TABLET ORAL at 12:11

## 2024-05-30 RX ADMIN — APIXABAN 5 MG: 5 TABLET, FILM COATED ORAL at 12:12

## 2024-05-30 RX ADMIN — METOLAZONE 5 MG: 5 TABLET ORAL at 12:11

## 2024-05-30 RX ADMIN — CETIRIZINE HYDROCHLORIDE 10 MG: 10 TABLET, FILM COATED ORAL at 12:12

## 2024-05-30 RX ADMIN — BUMETANIDE 2 MG: 1 TABLET ORAL at 12:11

## 2024-05-30 RX ADMIN — EMPAGLIFLOZIN 10 MG: 10 TABLET, FILM COATED ORAL at 12:11

## 2024-05-30 RX ADMIN — ALLOPURINOL 100 MG: 100 TABLET ORAL at 12:12

## 2024-05-30 RX ADMIN — SPIRONOLACTONE 25 MG: 25 TABLET ORAL at 12:12

## 2024-05-30 RX ADMIN — AMIODARONE HYDROCHLORIDE 200 MG: 200 TABLET ORAL at 12:11

## 2024-05-30 RX ADMIN — THEOPHYLLINE 300 MG: 300 TABLET, EXTENDED RELEASE ORAL at 12:12

## 2024-05-30 ASSESSMENT — ENCOUNTER SYMPTOMS
SHORTNESS OF BREATH: 1
GASTROINTESTINAL NEGATIVE: 1
COUGH: 1

## 2024-05-30 NOTE — PROGRESS NOTES
Progress Note      5/29/2024 11:05 AM  NAME: Nubia Lion   MRN:  [id number]   Admit Diagnosis: Leg edema [R60.0]  CHF (congestive heart failure), NYHA class I, acute on chronic, combined (HCC) [I50.43]  Acute on chronic congestive heart failure, unspecified heart failure type (HCC) [I50.9]      Problem List:   Acute on chronic CHF  History of A-fib/flutter  CAD  Remote CABG  CKD     Assessment/Plan:   Continue with diuretics  Follow renal function electrolytes  Check I's and O's, heart failure teaching         []       High complexity decision making was performed in this patient at high risk for decompensation with multiple organ involvement.    Subjective:     Nubia Lion denies chest pain, dyspnea.  Discussed with RN events overnight.     Review of Systems:   Negative except for as noted above.    Objective:      Physical Exam:    Last 24hrs VS reviewed since prior progress note. Most recent are:    BP (!) 148/70   Pulse 62   Temp 97.7 °F (36.5 °C) (Oral)   Resp 18   Ht 1.6 m (5' 3\")   Wt 91.2 kg (201 lb 1 oz)   SpO2 97%   BMI 35.62 kg/m²     Intake/Output Summary (Last 24 hours) at 5/29/2024 1105  Last data filed at 5/28/2024 1942  Gross per 24 hour   Intake 600 ml   Output 2750 ml   Net -2150 ml        General Appearance: Alert; no acute distress.  Ears/Nose/Mouth/Throat: moist mucous membranes  Neck: Supple.  Chest: Lungs clear to auscultation bilaterally.  Cardiovascular: Regular rate and rhythm, S1S2 normal  Abdomen: Soft, non-tender, bowel sounds are active.  Extremities: 1+edema bilaterally.  Skin: Warm and dry.      PMH/SH reviewed - no change compared to H&P    Telemetry: NSR    EKG:     No valid procedures specified.    No results found for this or any previous visit.    []  No new EKG for review    Lab Data Personally Reviewed:    Recent Labs     05/28/24  0056 05/29/24  0745   WBC 6.3 7.1   HGB 13.6 15.8   HCT 44.1 50.8*   * 181     No results for input(s): \"INR\", \"APTT\" in 
        Progress Note      5/30/2024 10:25 AM  NAME: Nubia Lion   MRN:  [id number]   Admit Diagnosis: Leg edema [R60.0]  CHF (congestive heart failure), NYHA class I, acute on chronic, combined (HCC) [I50.43]  Acute on chronic congestive heart failure, unspecified heart failure type (HCC) [I50.9]      Problem List:   Acute on chronic CHF  History of A-fib/flutter  CAD  Remote CABG  CKD     Assessment/Plan:   Can switch to oral diuretics  No further cardiac studies planned  Advance activity  Heart failure teaching         []       High complexity decision making was performed in this patient at high risk for decompensation with multiple organ involvement.    Subjective:     Nubia Lion denies chest pain, dyspnea.  Discussed with RN events overnight.     Review of Systems:   Negative except for as noted above.    Objective:      Physical Exam:    Last 24hrs VS reviewed since prior progress note. Most recent are:    /63   Pulse 54   Temp 97.3 °F (36.3 °C) (Oral)   Resp 20   Ht 1.6 m (5' 3\")   Wt 84.2 kg (185 lb 10 oz)   SpO2 98%   BMI 32.88 kg/m²     Intake/Output Summary (Last 24 hours) at 5/30/2024 1025  Last data filed at 5/29/2024 2113  Gross per 24 hour   Intake 240 ml   Output 1800 ml   Net -1560 ml          General Appearance: Alert; no acute distress.  Ears/Nose/Mouth/Throat: moist mucous membranes  Neck: Supple.  Chest: Lungs clear to auscultation bilaterally.  Cardiovascular: Regular rate and rhythm, S1S2 normal  Abdomen: Soft, non-tender, bowel sounds are active.  Extremities: 1+edema bilaterally.  Skin: Warm and dry.      PMH/SH reviewed - no change compared to H&P    Telemetry: NSR    EKG:     No valid procedures specified.    No results found for this or any previous visit.    []  No new EKG for review    Lab Data Personally Reviewed:    Recent Labs     05/29/24  0745 05/30/24  0558   WBC 7.1 8.1   HGB 15.8 15.6   HCT 50.8* 50.4*    190       No results for input(s): \"INR\", 
  5/28/2024 11:21 AM  NAME: Nubia Lion   MRN:  [id number]   Admit Diagnosis: Leg edema [R60.0]  CHF (congestive heart failure), NYHA class I, acute on chronic, combined (HCC) [I50.43]  Acute on chronic congestive heart failure, unspecified heart failure type (HCC) [I50.9]          Assessment/Plan:     Shortness of breath and leg edema, feeling better than yesterday.  Continue diuresis.  Supplement potassium of 3.5.  Improving creatinine.    Heart failure with improved ejection fraction, 1/7/2024 was with ejection fraction of 65 to 70%, 4/23 was with ejection fraction of 30 to 35%.  Will repeat echo.    Coronary artery disease, 2/21 cardiac cath without significant progression, patent saphenous vein graft to RCA, patent saphenous vein graft to circumflex marginal branch, patent stent in mid LAD.  Mild to flat troponin leak on this admission.  Will repeat EKG and troponin.      Chronic kidney disease, improving creatinine    Hypertension, blood pressure continues to be high, will uptitrate medications.  Will increase metoprolol to 100 mg daily, given extra dose today.    History of atrial flutter status post cardioversion, sinus rhythm on amiodarone, anticoagulated with Eliquis.  Unremarkable chest x-ray.  Normal ALT and AST.  Will recheck TSH.             []       High complexity decision making was performed in this patient at high risk for decompensation with multiple organ involvement.    Subjective:     Nubia Lion denies chest pain, dyspnea.  Discussed with RN events overnight.     Review of Systems:    Symptom Y/N Comments  Symptom Y/N Comments   Fever/Chills N   Chest Pain N    Poor Appetite N   Edema N    Cough N   Abdominal Pain N    Sputum N   Joint Pain N    SOB/ELISE N   Pruritis/Rash N    Nausea/vomit N   Tolerating PT/OT Y    Diarrhea N   Tolerating Diet Y    Constipation N   Other       Could NOT obtain due to:      Objective:      Physical Exam:    Last 24hrs VS reviewed since prior progress note. 
  Physician Progress Note      PATIENT:               SUZANNA SCHMITT  CSN #:                  [id number]  :                       1950  ADMIT DATE:       2024 1:13 PM  DISCH DATE:  RESPONDING  PROVIDER #:        Palmer Bustamante MD          QUERY TEXT:    Patient admitted with CHF exacerbation, noted to have atrial fibrillation and   is maintained on Eliquis.  If possible, please document in progress notes and   discharge summary if you are evaluating and/or treating any of the following:    The medical record reflects the following:  Risk Factors: 72 yo male with COPD, CHF, CAD  Clinical Indicators:  Documentation states 'Chronic A-fib on Eliquis'  Treatment: Eliquis    Thank you,  Diana Pollack RN, CCDS  Options provided:  -- Secondary hypercoagulable state in a patient with atrial fibrillation  -- Other - I will add my own diagnosis  -- Disagree - Not applicable / Not valid  -- Disagree - Clinically unable to determine / Unknown  -- Refer to Clinical Documentation Reviewer    PROVIDER RESPONSE TEXT:    This patient has secondary hypercoagulable state in a patient with atrial   fibrillation.    Query created by: Diana Pollack on 2024 2:35 PM      Electronically signed by:  Palmer Bustamante MD 2024 12:26 PM          
4 Eyes Skin Assessment     NAME:  Nubia Lion  YOB: 1950  MEDICAL RECORD NUMBER:  [id number]    The patient is being assessed for  Admission    I agree that at least one RN has performed a thorough Head to Toe Skin Assessment on the patient. ALL assessment sites listed below have been assessed.      Areas assessed by both nurses:    Head, Face, Ears, Shoulders, Back, Chest, Arms, Elbows, Hands, Sacrum. Buttock, Coccyx, Ischium, Legs. Feet and Heels, and Under Medical Devices         Does the Patient have a Wound? No noted wound(s)     Blister and bumps on both lower legs with severe edema and redness. Old scar midline chest and and midline lower abdomen, no pressure sore noted.    Adan Prevention initiated by RN: Yes  Wound Care Orders initiated by RN: yes    Pressure Injury (Stage 3,4, Unstageable, DTI, NWPT, and Complex wounds) if present, place Wound referral order by RN under : No    New Ostomies, if present place, Ostomy referral order under : No     Nurse 1 eSignature: Electronically signed by Cely Bassett RN on 5/27/24 at 7:26 PM EDT    **SHARE this note so that the co-signing nurse can place an eSignature**    Nurse 2 eSignature: Electronically signed by Carl Hamm RN on 5/27/24 at 7:31 PM EDT    
4 Eyes Skin Assessment     NAME:  Nubia Lion  YOB: 1950  MEDICAL RECORD NUMBER:  [id number]    The patient is being assessed for  Other - weekly skin assessment    I agree that at least one RN has performed a thorough Head to Toe Skin Assessment on the patient. ALL assessment sites listed below have been assessed.      Areas assessed by both nurses:    Head, Face, Ears, Shoulders, Back, Chest, Arms, Elbows, Hands, Sacrum. Buttock, Coccyx, Ischium, Legs. Feet and Heels, and Under Medical Devices         Does the Patient have a Wound? No noted wound(s)  Intact blisters on BLE       Adan Prevention initiated by RN: Yes  Wound Care Orders initiated by RN: wound care consult complete    Pressure Injury (Stage 3,4, Unstageable, DTI, NWPT, and Complex wounds) if present, place Wound referral order by RN under : No    New Ostomies, if present place, Ostomy referral order under : No     Nurse 1 eSignature: Electronically signed by Monserrat Mas RN on 5/29/24 at 6:47 AM EDT    **SHARE this note so that the co-signing nurse can place an eSignature**    Nurse 2 eSignature: Electronically signed by ZULEMA HARTMAN RN on 5/29/24 at 6:51 AM EDT   
Discharge plan of care/case management plan validated with provider discharge order. The After Visit Summary has been printed and given to the patient. The discharge instructions, medications, and pharmacy has been reviewed with the patient.The patient verbalized understanding.   IV and Tele Box removed. VSS.    Patient being discharged Home Self. Patient is being transported via family.    
History and Physical    NAME:   Nubia Lion   :  1950   MRN:  [id number]     Date/Time: 2024 10:29 AM    Patient PCP: Ricardo Calle MD  ______________________________________________________________________       Subjective:     CHIEF COMPLAINT:     Shortness of breath      HISTORY OF PRESENT ILLNESS:     General Daily Progress Note  Patient is a 73 y.o. year old female past medical history of COPD CAD status post CABG 10 years ago hypertension hyperlipidemia diabetes diet control came to emergency room complaining of short of breath and leg edema which is getting more worst  Also complaining of shortness of breath on little exertion patient already on Lasix Aldactone and Zaroxolyn at home which not doing well came to emergency room seen by the ER physician workup done shows 3-4+ edema in the legs BNP was elevated        Patient is in no acute distress.     /88  BNP 6904    CXR  IMPRESSION:  No acute cardiopulmonary abnormalities.  Persistent enlargement of the cardiac silhouette.    Past Medical History:   Diagnosis Date    Chronic obstructive pulmonary disease (HCC)     Congestive heart disease (HCC)     with preserved ejection fraction    Coronary artery disease     Diabetes (HCC)     HFrEF (heart failure with reduced ejection fraction) (HCC)     Hyperlipidemia     Hypertension     Myocardial infarct (HCC)     MICHELLE (obstructive sleep apnea)         Past Surgical History:   Procedure Laterality Date    BREAST BIOPSY      CHOLECYSTECTOMY      CORONARY ARTERY BYPASS GRAFT      HERNIA REPAIR      HYSTERECTOMY (CERVIX STATUS UNKNOWN)         Social History     Tobacco Use    Smoking status: Former    Smokeless tobacco: Never   Substance Use Topics    Alcohol use: Not Currently        Family History   Problem Relation Age of Onset    Cancer Maternal Grandmother     Heart Disease Mother     Kidney Disease Mother        Allergies   Allergen Reactions    Oxycodone-Acetaminophen Hives    
History and Physical    NAME:   Nubia Lion   :  1950   MRN:  [id number]     Date/Time: 2024 10:50 AM    Patient PCP: Ricardo Calle MD  ______________________________________________________________________       Subjective:     CHIEF COMPLAINT:     Shortness of breath      HISTORY OF PRESENT ILLNESS:     General Daily Progress Note  Patient is a 73 y.o. year old female past medical history of COPD CAD status post CABG 10 years ago hypertension hyperlipidemia diabetes diet control came to emergency room complaining of short of breath and leg edema which is getting more worst  Also complaining of shortness of breath on little exertion patient already on Lasix Aldactone and Zaroxolyn at home which not doing well came to emergency room seen by the ER physician workup done shows 3-4+ edema in the legs BNP was elevated        Patient is in no acute distress.     /88  BNP 6904    CXR  IMPRESSION:  No acute cardiopulmonary abnormalities.  Persistent enlargement of the cardiac silhouette.          /70  Sodium 131  CR 1.85  Pro BNP 2581    CXR  IMPRESSION:  1. No change cardiomegaly  2. Lungs are clear        Patient in no acute distress    /63    Pro BNP 1492    Cardiology saw patient this morning.   Past Medical History:   Diagnosis Date    Chronic obstructive pulmonary disease (HCC)     Congestive heart disease (HCC)     with preserved ejection fraction    Coronary artery disease     Diabetes (HCC)     HFrEF (heart failure with reduced ejection fraction) (HCC)     Hyperlipidemia     Hypertension     Myocardial infarct (HCC)     MICHELLE (obstructive sleep apnea)         Past Surgical History:   Procedure Laterality Date    BREAST BIOPSY      CHOLECYSTECTOMY      CORONARY ARTERY BYPASS GRAFT      HERNIA REPAIR      HYSTERECTOMY (CERVIX STATUS UNKNOWN)         Social History     Tobacco Use    Smoking status: Former    Smokeless tobacco: Never   Substance Use Topics    Alcohol 
History and Physical    NAME:   Nubia Lion   :  1950   MRN:  [id number]     Date/Time: 2024 8:35 AM    Patient PCP: Ricardo Calle MD  ______________________________________________________________________       Subjective:     CHIEF COMPLAINT:     Shortness of breath      HISTORY OF PRESENT ILLNESS:     General Daily Progress Note  Patient is a 73 y.o. year old female past medical history of COPD CAD status post CABG 10 years ago hypertension hyperlipidemia diabetes diet control came to emergency room complaining of short of breath and leg edema which is getting more worst  Also complaining of shortness of breath on little exertion patient already on Lasix Aldactone and Zaroxolyn at home which not doing well came to emergency room seen by the ER physician workup done shows 3-4+ edema in the legs BNP was elevated        Patient is in no acute distress.     /88  BNP 6904    CXR  IMPRESSION:  No acute cardiopulmonary abnormalities.  Persistent enlargement of the cardiac silhouette.          /70  Sodium 131  CR 1.85  Pro BNP 2581    CXR  IMPRESSION:  1. No change cardiomegaly  2. Lungs are clear      Past Medical History:   Diagnosis Date    Chronic obstructive pulmonary disease (HCC)     Congestive heart disease (HCC)     with preserved ejection fraction    Coronary artery disease     Diabetes (HCC)     HFrEF (heart failure with reduced ejection fraction) (HCC)     Hyperlipidemia     Hypertension     Myocardial infarct (HCC)     MICHELLE (obstructive sleep apnea)         Past Surgical History:   Procedure Laterality Date    BREAST BIOPSY      CHOLECYSTECTOMY      CORONARY ARTERY BYPASS GRAFT      HERNIA REPAIR      HYSTERECTOMY (CERVIX STATUS UNKNOWN)         Social History     Tobacco Use    Smoking status: Former    Smokeless tobacco: Never   Substance Use Topics    Alcohol use: Not Currently        Family History   Problem Relation Age of Onset    Cancer Maternal Grandmother     
PULMONARY NOTE  [facility]    Name: Nubia Lion MRN: [id number]   : 1950 Hospital: [facility]   Date: 2024  Admission date: 2024 Hospital Day: 3       HPI:     Patient Active Problem List   Diagnosis    Atrial flutter with rapid ventricular response (HCC)    Heart failure (HCC)    COPD exacerbation (HCC)    COPD (chronic obstructive pulmonary disease) (HCC)    Atrial flutter (HCC)    CHF exacerbation (HCC)    UTI (urinary tract infection)    CHF (congestive heart failure) (HCC)    Shortness of breath    Acute exacerbation of CHF (congestive heart failure) (HCC)    CHF (congestive heart failure), NYHA class I, acute on chronic, combined (HCC)    Acute congestive heart failure with left ventricular diastolic dysfunction (HCC)             [x] High complexity decision making was performed  [x] See my orders for details      Subjective/Initial History:     I was asked by Palmer Bustamante MD to see Nubia Lion  a 73 y.o.   female in consultation     Excerpts from admission 2024 or consult notes as follows:   73-year-old lady came in because of shortness of breath and dyspnea significant past medical history of coronary disease status post CABG she was hypoxic and also having swelling of the lower extremities 3-4+ leg edema she was already on Lasix Aldactone and Zaroxolyn despite of that her condition got worse more short of breath dyspneic tired fatigue came to the hospital got admitted and pulm consult was called.      Allergies   Allergen Reactions    Oxycodone-Acetaminophen Hives    Penicillins Other (See Comments)        MAR reviewed and pertinent medications noted or modified as needed     Current Facility-Administered Medications   Medication Dose Route Frequency Provider Last Rate Last Admin    metoprolol succinate (TOPROL XL) extended release tablet 100 mg  100 mg Oral Daily Real Calle MD        allopurinol (ZYLOPRIM) tablet 100 mg  100 mg 
RRT Clinical Rounding Nurse Progress Report      SUBJECTIVE: Patient assessed secondary to elevated Deterioration Index Score.      Vitals:    05/27/24 1645 05/27/24 1700 05/27/24 1715 05/27/24 1827   BP: (!) 158/71 (!) 140/79  (!) 155/80   Pulse: 70   69   Resp: 23   28   Temp:       TempSrc:       SpO2:   99% 100%   Weight:       Height:            DETERIORATION INDEX SCORE: 51        PLAN:  Spoke with Andrez JENSEN regarding elevated DI score. States will notify primary RN and consult physician as necessary.    JESSICA CANO, RN  [phone]     
SPO2 100% on 5Lpm RR-28cpm; able to informed via phone call RT on duty for SOB she said she will come. V/S checked and recorded; given per order by CLEVE lasix IV and theophylline tablet.    1855H RR-24cpm SPO2 -99%,  patient verbalized she felt better. Still waiting for RT on duty to come and assess patient. Patient sitting on bed fully alert and oriented.  
Wound Care Note:    Wound Care into see patient because of \"popping blisters at right anterior leg\"  Skin Care & Pressure Relief Recommendations:  Minimize layers of linen  Pads under patient to optimize support surface and microclimate  Turn/reposition approximately every 2 hours.  Pillow Wedges  Manage incontinence  Promote continence; Skin Protective lotion to buttocks and sacrum daily and as needed with incontinence care    Offload heels with pillows or offloading boots.    Support Surface: Gel    Intact fluid-filled vesicles noted to BLEs, more on the right.  No open draining areas at this time.  Apply moisturizing lotion to BLEs and apply a tubi-sock single-layer daily and prn for soiling.  Patient was sleeping when I returned with tubi-socks.  Left in patient's room and informed Kayla Rn.  Recommend elevating BLEs with 2-3 pillows each to help reduce edema.    Prevention:  Apply Optifoam Border Sacral foam dressing to sacrum and Border Heel foams for both heels every three days to redistribute pressure from bony prominence and prevent pressure and friction injury to skin.  Rn is to gently peel back foam dressing for shift integumentary assessment and re-secure.  Maintain the external  incontinence management system to manage  incontinence.  Use foam wedge to turn q2h at approximately 30 degree angle to offload sacrum and buttocks to prevent pressure related skin injury.  Float heels with 1-2 pillows lengthwise while in bed for offloading of the heels.  Maintain HOB at 30 degrees or less, if not contraindicated, to reduce pressure to buttocks and sacrum.  Raise foot of bed to help prevent friction and shear injury from sliding down in the bed.  Discussed plan of care with Kayla Diaz.  Re-consult WCN for other skin / wound care concerns.    Education: patient informed WCN that she is unable to don compression stockings and that they sometimes shear her skin when trying to pull them up.  Advised patient to 
141 131* 140   K 3.5 Hemolyzed, Recollection Recommended 3.7   * 105 99   CO2 28 23 34*   GLUCOSE 114* Quantity not sufficient, suggest recollection 134*   BUN 23* 24* 35*   CREATININE 1.37* 1.85* 2.51*   CALCIUM 9.3 Quantity not sufficient, suggest recollection 8.5   BILITOT 1.1* Quantity not sufficient, suggest recollection 0.9   AST 20 64* 13*   ALT 21 Quantity not sufficient, suggest recollection 17       No results for input(s): \"PH\", \"PCO2\", \"PO2\", \"HCO3\", \"FIO2\" in the last 72 hours.  Recent Labs     05/28/24  0056 05/29/24  0437 05/30/24  0558   NTPROBNP 6,904* 2,581* 1,492*       Lab Results   Component Value Date/Time    BNP 6,815 04/11/2023 07:57 PM    NTPROBNP 1,492 05/30/2024 05:58 AM      Lab Results   Component Value Date/Time    TSH 2.12 04/12/2023 02:06 AM       Results       Procedure Component Value Units Date/Time    Culture, Urine [[id number]] Collected: 05/28/24 0051    Order Status: Completed Specimen: Urine Updated: 05/29/24 0853     Special Requests No Special Requests        Culture       No significant growth, <10,000 CFU/mL          Culture, Blood 1 [[id number]] Collected: 05/27/24 1344    Order Status: Completed Specimen: Blood Updated: 05/29/24 0635     Special Requests --        Right  Antecubital       Culture No growth 2 days       Culture, Blood 2 [[id number]]     Order Status: Canceled Specimen: Blood              Imaging:  XR CHEST PORTABLE    Result Date: 5/27/2024  EXAM: XR CHEST PORTABLE ACC#: [id number] INDICATION: Dyspnea COMPARISON: 1/5/2024 TECHNIQUE: AP portable upright view of the chest. FINDINGS: Lungs are clear. There is persistent enlargement of the cardiac silhouette. No acute bony abnormalities. Median sternotomy wires are noted.     No acute cardiopulmonary abnormalities. Persistent enlargement of the cardiac silhouette.        ARTERIAL BLOOD GAS  Recent Labs     05/28/24  0408   PHART 7.40   EFC3SSQ 45   PO2ART 73*   XZA0EUA 27*   BEART 2.2   FIO2A 32 
ill and at high risk of sudden decline and decompensation with severe consequenses and continued end organ dysfunction and failure  Prognosis guarded       RECOMMENDATIONS/PLAN:     73 lady came in because of shortness of breath and dyspnea she was short of breath dyspneic and also having swelling of the lower extremities she was put on 2 L nasal Cannula oxygen as salvage oxygen delivery device to provide high concentration of oxygen to overcome refractory hypoxia;  Patient has CPAP machine at home she has a history of sleep apnea but she is not using it she is on oxygen 2 L nasal cannula  Arterial blood gas shows pCO2 45 pO2 73 on 2 L nasal cannula  Chest x-ray negative for any infiltrate  Agree with Lasix he was taking Zaroxolyn and Aldactone at home  Supplemental O2 to keep sats > 93%  Aspiration precautions  Labs to follow electrolytes, renal function and and blood counts  Glucose monitoring and SSI  Bronchial hygiene with respiratory therapy techniques, bronchodilators  DVT, SUP prophylaxis       This care involved high complexity medical decision making: I personally:  Reviewed the flowsheet and previous days notes  Reviewed and summarized records or history from previous days note or discussions with staff, family  High Risk Drug therapy requiring intensive monitoring for toxicity: eg steroids, pressors, antibiotics  Reviewed and/or ordered Clinical lab tests  Reviewed images and/or ordered Radiology tests  Reviewed the patients ECG / Telemetry  Reviewed and/or adjusted NiPPV settings  Called and arranged for Radiologic procedures or interventions  performed or ordered Diagnostic endoscopies with identified risk factors.  discussed my assessment/management with : Nursing, Hospitalist and Family for coordination of care          Delroy Sheikh MD      
Eosinophils % 2 0 - 7 %    Basophils % 1 0 - 1 %    Immature Granulocytes % 0 0 - 0.5 %    Neutrophils Absolute 4.1 1.8 - 8.0 K/UL    Lymphocytes Absolute 1.3 0.8 - 3.5 K/UL    Monocytes Absolute 0.6 0.0 - 1.0 K/UL    Eosinophils Absolute 0.1 0.0 - 0.4 K/UL    Basophils Absolute 0.1 0.0 - 0.1 K/UL    Immature Granulocytes Absolute 0.0 0.00 - 0.04 K/UL    Differential Type AUTOMATED     Brain Natriuretic Peptide    Collection Time: 05/28/24 12:56 AM   Result Value Ref Range    NT Pro-BNP 6,904 (H) <125 pg/mL   Blood Gas, Arterial    Collection Time: 05/28/24  4:08 AM   Result Value Ref Range    pH, Arterial 7.40 7.35 - 7.45      pCO2, Arterial 45 35 - 45 mmHg    pO2, Arterial 73 (L) 80 - 100 mmHg    O2 Sat, Arterial 94 (L) 95 - 99 %    HCO3, Arterial 27 (H) 22 - 26 mmol/L    Base Excess, Arterial 2.2 0 - 3 mmol/L    O2 Method Nasal Cannula      O2 Flow Rate 3.00 L/min    FIO2 Arterial 32 %    Mode OTHER      Source Arterial      Site Right Radial      Lv Test YES      Carboxyhgb, Arterial 0.4 (L) 1 - 2 %    Methemoglobin, Arterial 0.5 0 - 1.4 %    Oxyhemoglobin 93.0 (L) 95 - 99 %    Performed by: Montez Barbosa     Temperature 98.7     POCT Glucose    Collection Time: 05/28/24  7:32 AM   Result Value Ref Range    POC Glucose 144 (H) 65 - 100 mg/dL    Performed by: BENNY COPE Bhavana CON    POCT Glucose    Collection Time: 05/28/24 11:19 AM   Result Value Ref Range    POC Glucose 130 (H) 65 - 100 mg/dL    Performed by: BENNY PAPAGIACUMOS Bhavana CON            Xray Result (most recent):  XR CHEST PORTABLE    Result Date: 5/27/2024  No acute cardiopulmonary abnormalities. Persistent enlargement of the cardiac silhouette.        XR CHEST PORTABLE   Final Result   No acute cardiopulmonary abnormalities.   Persistent enlargement of the cardiac silhouette.         XR CHEST 1 VIEW    (Results Pending)        Review of Systems:  Constitutional: Negative for chills and fever.   HENT: Negative.    Eyes: Negative.  
MD Robby

## 2024-05-30 NOTE — DISCHARGE SUMMARY
Discharge Summary       PATIENT ID: Nubia Lion  MRN: [id number]   YOB: 1950    DATE OF ADMISSION: 5/27/2024   DATE OF DISCHARGE:   PRIMARY CARE PROVIDER: [unfilled]      ATTENDING PHYSICIAN: Palmer Bustamante  DISCHARGING PROVIDER: Palmer Bustamante      CONSULTATIONS: IP CONSULT TO CARDIOLOGY  IP CONSULT TO PULMONOLOGY  IP WOUND CARE NURSE CONSULT TO EVAL    PROCEDURES/SURGERIES: * No surgery found *    ADMITTING DIAGNOSES:    Patient Active Problem List    Diagnosis Date Noted    Acute congestive heart failure with left ventricular diastolic dysfunction ([facility]) 10/15/2023    CHF (congestive heart failure), NYHA class I, acute on chronic, combined ([facility]) 05/29/2023    Shortness of breath 04/12/2023    Acute exacerbation of CHF (congestive heart failure) ([facility]) 04/12/2023    CHF (congestive heart failure) ([facility]) 03/24/2022    Heart failure ([facility]) 11/19/2021    UTI (urinary tract infection) 06/30/2021    CHF exacerbation ([facility]) 06/21/2021    Atrial flutter with rapid ventricular response ([facility]) 03/09/2021    Atrial flutter ([facility]) 02/22/2021    COPD exacerbation ([facility]) 11/02/2020    COPD (chronic obstructive pulmonary disease) ([facility]) 11/02/2020       DISCHARGE DIAGNOSES / PLAN:      Acute diastolic heart failure  CAD status post CABG  COPD  Hypertension  Chronic hypoxic respiratory failure on 4 L at home  Chronic A-fib on Eliquis  Chronic back pain  Chronic kidney disease stage IIIb    ADDITIONAL CARE RECOMMENDATIONS:         DISCHARGE MEDICATIONS:     Medication List        START taking these medications      bumetanide 2 MG tablet  Commonly known as: BUMEX  Take 1 tablet by mouth daily            CHANGE how you take these medications      metoprolol succinate 100 MG extended release tablet  Commonly known as: TOPROL XL  Take 1 tablet by mouth daily  What changed:   medication strength  how much to take            CONTINUE taking these medications      allopurinol 100 MG tablet  Commonly known as: ZYLOPRIM   
(H) 45 - 117 U/L    Total Protein 6.4 6.4 - 8.2 g/dL    Albumin 2.8 (L) 3.5 - 5.0 g/dL    Globulin 3.6 2.0 - 4.0 g/dL    Albumin/Globulin Ratio 0.8 (L) 1.1 - 2.2     Brain Natriuretic Peptide    Collection Time: 05/30/24  5:58 AM   Result Value Ref Range    NT Pro-BNP 1,492 (H) <125 pg/mL      XR CHEST 1 VIEW    Result Date: 5/28/2024  1. No change cardiomegaly 2. Lungs are clear         HOSPITAL COURSE:    General Daily Progress Note  Patient is a 73 y.o. year old female past medical history of COPD CAD status post CABG 10 years ago hypertension hyperlipidemia diabetes diet control came to emergency room complaining of short of breath and leg edema which is getting more worst  Also complaining of shortness of breath on little exertion patient already on Lasix Aldactone and Zaroxolyn at home which not doing well came to emergency room seen by the ER physician workup done shows 3-4+ edema in the legs BNP was elevated     5/28     Patient is in no acute distress.      /88  BNP 6904     CXR  IMPRESSION:  No acute cardiopulmonary abnormalities.  Persistent enlargement of the cardiac silhouette.       5/29     /70  Sodium 131  CR 1.85  Pro BNP 2581     CXR  IMPRESSION:  1. No change cardiomegaly  2. Lungs are clear     5/30     Patient in no acute distress     /63     Pro BNP 1492     Cardiology saw patient this morning.    Signed:   Chris Fragoso  5/30/2024  11:38 AM

## 2024-05-30 NOTE — DISCHARGE INSTRUCTIONS
Discharge Instructions       PATIENT ID: Nubia Lion  MRN: [id number]   YOB: 1950    DATE OF ADMISSION: 5/27/2024  DATE OF DISCHARGE: 5/30/2024    PRIMARY CARE PROVIDER: [unfilled]     ATTENDING PHYSICIAN: Palmer Bustamante MD   DISCHARGING PROVIDER: Palmer Bustamante MD    To contact this individual call [phone] and ask the  to page.   If unavailable, ask to be transferred the Adult Hospitalist Department.    DISCHARGE DIAGNOSES congestive heart failure    CONSULTATIONS: [unfilled]      PROCEDURES/SURGERIES: * No surgery found *    PENDING TEST RESULTS:   At the time of discharge the following test results are still pending: None    FOLLOW UP APPOINTMENTS:   Ricardo Calle MD  [address] [place] [address]  [phone]    Schedule an appointment as soon as possible for a visit in 1 week(s)         ADDITIONAL CARE RECOMMENDATIONS: Need to follow-up with the cardiology strict diet fluid intake/continue home oxygen    DIET: Cardiac diet      ACTIVITY: Activity as tolerated    Wound care: {Discharge Wound Care Instructions:22686}    EQUIPMENT needed: ***      DISCHARGE MEDICATIONS:   See Medication Reconciliation Form    It is important that you take the medication exactly as they are prescribed.   Keep your medication in the bottles provided by the pharmacist and keep a list of the medication names, dosages, and times to be taken in your wallet.   Do not take other medications without consulting your doctor.       NOTIFY YOUR PHYSICIAN FOR ANY OF THE FOLLOWING:   Fever over 101 degrees for 24 hours.   Chest pain, shortness of breath, fever, chills, nausea, vomiting, diarrhea, change in mentation, falling, weakness, bleeding. Severe pain or pain not relieved by medications.  Or, any other signs or symptoms that you may have questions about.      DISPOSITION:    Home With:   OT  PT  HH  RN       SNF/Inpatient Rehab/LTAC    Independent/assisted living    Hospice    Other:

## 2024-05-30 NOTE — PLAN OF CARE
PHYSICAL THERAPY EVALUATION  Patient: Nubia Lion (73 y.o. female)  Date: 5/30/2024  Primary Diagnosis: Leg edema [R60.0]  CHF (congestive heart failure), NYHA class I, acute on chronic, combined (HCC) [I50.43]  Acute on chronic congestive heart failure, unspecified heart failure type (HCC) [I50.9]       Precautions: Fall Risk                Recommendations for nursing mobility: Out of bed to chair for meals and Assist x1    In place during session: Nasal Cannula 4L    ASSESSMENT  Pt is a 73 y.o. female admitted on 5/27/2024 for CHF; currently presents to PT with decreased bed mob, transfers, LE strength, gt, balance, activity tolerance, and overall functional mobility . Pt semi supine in bed upon PT arrival, agreeable to evaluation. Pt A&O x 4.  Pt currently on 4L O2 via NC, states that is her baseline at home.    Based on the objective data described below, the patient currently presents with impaired functional mobility, impaired strength, decreased activity tolerance, and impaired balance. (See below for objective details and assist levels).     Overall pt tolerated session good today with overall CGA with bed mob and OOB transfers. Pt was able to amb approx 10ft to/from bathroom with RW and CGAx1.  No LOB during amb, pt noted to be slightly SOB post amb and O2 sats dropped to 89-90 during amb.  Pt will benefit from continued skilled PT to address above deficits and return to PLOF.Current PT DC recommendation Home with Home Health Therapy once medically appropriate, pt would likely benefit from obtaining RW as currently she only has cane at home.       Start of Session End of Session   SPO2 (%) 94 on 4L O2 90-92 on 4L O2   Heart Rate (BPM) 59      GOALS:  FUNCTIONAL STATUS PRIOR TO ADMISSION: pt lives with dtr who assists with ADLs, pt normally uses cane for ambulation, states she does not have walker currently      Physical Therapy Goals  Initiated 5/30/2024  Pt stated goal: to go home    I with LE HEP x7

## 2024-06-02 LAB
BACTERIA SPEC CULT: NORMAL
Lab: NORMAL

## 2024-07-13 ENCOUNTER — HOSPITAL ENCOUNTER (INPATIENT)
Facility: HOSPITAL | Age: 74
LOS: 3 days | Discharge: HOME HEALTH | DRG: 291 | End: 2024-07-17
Attending: EMERGENCY MEDICINE | Admitting: INTERNAL MEDICINE
Payer: MEDICARE

## 2024-07-13 ENCOUNTER — APPOINTMENT (OUTPATIENT)
Facility: HOSPITAL | Age: 74
DRG: 291 | End: 2024-07-13
Payer: MEDICARE

## 2024-07-13 DIAGNOSIS — M79.606 PAIN OF LOWER EXTREMITY, UNSPECIFIED LATERALITY: ICD-10-CM

## 2024-07-13 DIAGNOSIS — R06.00 DYSPNEA, UNSPECIFIED TYPE: Primary | ICD-10-CM

## 2024-07-13 LAB
ALBUMIN SERPL-MCNC: 2.8 G/DL (ref 3.5–5)
ALBUMIN/GLOB SERPL: 0.7 (ref 1.1–2.2)
ALP SERPL-CCNC: 160 U/L (ref 45–117)
ALT SERPL-CCNC: 27 U/L (ref 12–78)
ANION GAP SERPL CALC-SCNC: 5 MMOL/L (ref 5–15)
AST SERPL W P-5'-P-CCNC: ABNORMAL U/L (ref 15–37)
BASOPHILS # BLD: 0 K/UL (ref 0–0.1)
BASOPHILS NFR BLD: 0 % (ref 0–1)
BILIRUB SERPL-MCNC: 1.3 MG/DL (ref 0.2–1)
BNP SERPL-MCNC: 6830 PG/ML
BUN SERPL-MCNC: 25 MG/DL (ref 6–20)
BUN/CREAT SERPL: 17 (ref 12–20)
CA-I BLD-MCNC: 8.5 MG/DL (ref 8.5–10.1)
CHLORIDE SERPL-SCNC: 114 MMOL/L (ref 97–108)
CO2 SERPL-SCNC: 24 MMOL/L (ref 21–32)
CREAT SERPL-MCNC: 1.5 MG/DL (ref 0.55–1.02)
DIFFERENTIAL METHOD BLD: ABNORMAL
EOSINOPHIL # BLD: 0.1 K/UL (ref 0–0.4)
EOSINOPHIL NFR BLD: 1 % (ref 0–7)
ERYTHROCYTE [DISTWIDTH] IN BLOOD BY AUTOMATED COUNT: 15.7 % (ref 11.5–14.5)
GLOBULIN SER CALC-MCNC: 3.8 G/DL (ref 2–4)
GLUCOSE SERPL-MCNC: 112 MG/DL (ref 65–100)
HCT VFR BLD AUTO: 42.1 % (ref 35–47)
HGB BLD-MCNC: 13.1 G/DL (ref 11.5–16)
IMM GRANULOCYTES # BLD AUTO: 0 K/UL (ref 0–0.04)
IMM GRANULOCYTES NFR BLD AUTO: 0 % (ref 0–0.5)
LACTATE SERPL-SCNC: 2.1 MMOL/L (ref 0.4–2)
LYMPHOCYTES # BLD: 1.3 K/UL (ref 0.8–3.5)
LYMPHOCYTES NFR BLD: 15 % (ref 12–49)
MAGNESIUM SERPL-MCNC: NORMAL MG/DL (ref 1.6–2.4)
MCH RBC QN AUTO: 29 PG (ref 26–34)
MCHC RBC AUTO-ENTMCNC: 31.1 G/DL (ref 30–36.5)
MCV RBC AUTO: 93.1 FL (ref 80–99)
MONOCYTES # BLD: 0.7 K/UL (ref 0–1)
MONOCYTES NFR BLD: 8 % (ref 5–13)
NEUTS SEG # BLD: 6.4 K/UL (ref 1.8–8)
NEUTS SEG NFR BLD: 76 % (ref 32–75)
NRBC # BLD: 0 K/UL (ref 0–0.01)
NRBC BLD-RTO: 0 PER 100 WBC
PLATELET # BLD AUTO: 197 K/UL (ref 150–400)
PMV BLD AUTO: 13 FL (ref 8.9–12.9)
POTASSIUM SERPL-SCNC: ABNORMAL MMOL/L (ref 3.5–5.1)
PROCALCITONIN SERPL-MCNC: <0.05 NG/ML
PROT SERPL-MCNC: 6.6 G/DL (ref 6.4–8.2)
RBC # BLD AUTO: 4.52 M/UL (ref 3.8–5.2)
SODIUM SERPL-SCNC: 143 MMOL/L (ref 136–145)
TROPONIN I SERPL HS-MCNC: 106 NG/L (ref 0–51)
WBC # BLD AUTO: 8.5 K/UL (ref 3.6–11)

## 2024-07-13 PROCEDURE — 96375 TX/PRO/DX INJ NEW DRUG ADDON: CPT

## 2024-07-13 PROCEDURE — 84484 ASSAY OF TROPONIN QUANT: CPT

## 2024-07-13 PROCEDURE — 71045 X-RAY EXAM CHEST 1 VIEW: CPT

## 2024-07-13 PROCEDURE — 96374 THER/PROPH/DIAG INJ IV PUSH: CPT

## 2024-07-13 PROCEDURE — 85025 COMPLETE CBC W/AUTO DIFF WBC: CPT

## 2024-07-13 PROCEDURE — 99285 EMERGENCY DEPT VISIT HI MDM: CPT

## 2024-07-13 PROCEDURE — 36415 COLL VENOUS BLD VENIPUNCTURE: CPT

## 2024-07-13 PROCEDURE — 6370000000 HC RX 637 (ALT 250 FOR IP): Performed by: EMERGENCY MEDICINE

## 2024-07-13 PROCEDURE — 2580000003 HC RX 258: Performed by: EMERGENCY MEDICINE

## 2024-07-13 PROCEDURE — 87040 BLOOD CULTURE FOR BACTERIA: CPT

## 2024-07-13 PROCEDURE — 94761 N-INVAS EAR/PLS OXIMETRY MLT: CPT

## 2024-07-13 PROCEDURE — 83880 ASSAY OF NATRIURETIC PEPTIDE: CPT

## 2024-07-13 PROCEDURE — 84145 PROCALCITONIN (PCT): CPT

## 2024-07-13 PROCEDURE — 80053 COMPREHEN METABOLIC PANEL: CPT

## 2024-07-13 PROCEDURE — 93005 ELECTROCARDIOGRAM TRACING: CPT | Performed by: EMERGENCY MEDICINE

## 2024-07-13 PROCEDURE — 83605 ASSAY OF LACTIC ACID: CPT

## 2024-07-13 PROCEDURE — 83735 ASSAY OF MAGNESIUM: CPT

## 2024-07-13 PROCEDURE — 6360000002 HC RX W HCPCS: Performed by: EMERGENCY MEDICINE

## 2024-07-13 RX ORDER — IBUPROFEN 600 MG/1
600 TABLET ORAL
Status: COMPLETED | OUTPATIENT
Start: 2024-07-13 | End: 2024-07-13

## 2024-07-13 RX ORDER — FUROSEMIDE 10 MG/ML
40 INJECTION INTRAMUSCULAR; INTRAVENOUS
Status: COMPLETED | OUTPATIENT
Start: 2024-07-13 | End: 2024-07-13

## 2024-07-13 RX ORDER — ASPIRIN 325 MG
325 TABLET ORAL
Status: COMPLETED | OUTPATIENT
Start: 2024-07-13 | End: 2024-07-14

## 2024-07-13 RX ADMIN — FUROSEMIDE 40 MG: 10 INJECTION, SOLUTION INTRAMUSCULAR; INTRAVENOUS at 22:32

## 2024-07-13 RX ADMIN — METHYLPREDNISOLONE SODIUM SUCCINATE 125 MG: 125 INJECTION INTRAMUSCULAR; INTRAVENOUS at 22:38

## 2024-07-13 RX ADMIN — IBUPROFEN 600 MG: 600 TABLET, FILM COATED ORAL at 22:28

## 2024-07-13 RX ADMIN — NITROGLYCERIN 0.5 INCH: 20 OINTMENT TOPICAL at 22:29

## 2024-07-13 ASSESSMENT — PAIN SCALES - GENERAL
PAINLEVEL_OUTOF10: 8

## 2024-07-13 ASSESSMENT — PAIN DESCRIPTION - LOCATION
LOCATION: ABDOMEN
LOCATION: CHEST

## 2024-07-13 ASSESSMENT — PAIN DESCRIPTION - ORIENTATION
ORIENTATION: LEFT
ORIENTATION: LEFT

## 2024-07-13 ASSESSMENT — PAIN - FUNCTIONAL ASSESSMENT: PAIN_FUNCTIONAL_ASSESSMENT: 0-10

## 2024-07-13 ASSESSMENT — LIFESTYLE VARIABLES
HOW OFTEN DO YOU HAVE A DRINK CONTAINING ALCOHOL: NEVER
HOW MANY STANDARD DRINKS CONTAINING ALCOHOL DO YOU HAVE ON A TYPICAL DAY: PATIENT DOES NOT DRINK

## 2024-07-14 PROBLEM — I50.9 ACUTE HEART FAILURE, UNSPECIFIED HEART FAILURE TYPE (HCC): Status: ACTIVE | Noted: 2024-07-14

## 2024-07-14 LAB
ANION GAP SERPL CALC-SCNC: 7 MMOL/L (ref 5–15)
BUN SERPL-MCNC: 25 MG/DL (ref 6–20)
BUN/CREAT SERPL: 15 (ref 12–20)
CA-I BLD-MCNC: 8.3 MG/DL (ref 8.5–10.1)
CHLORIDE SERPL-SCNC: 111 MMOL/L (ref 97–108)
CO2 SERPL-SCNC: 24 MMOL/L (ref 21–32)
CREAT SERPL-MCNC: 1.64 MG/DL (ref 0.55–1.02)
EKG ATRIAL RATE: 83 BPM
EKG DIAGNOSIS: NORMAL
EKG P AXIS: 63 DEGREES
EKG P-R INTERVAL: 106 MS
EKG Q-T INTERVAL: 338 MS
EKG QRS DURATION: 94 MS
EKG QTC CALCULATION (BAZETT): 397 MS
EKG R AXIS: 71 DEGREES
EKG T AXIS: 223 DEGREES
EKG VENTRICULAR RATE: 83 BPM
GLUCOSE SERPL-MCNC: 250 MG/DL (ref 65–100)
LACTATE SERPL-SCNC: 2 MMOL/L (ref 0.4–2)
LACTATE SERPL-SCNC: 3.2 MMOL/L (ref 0.4–2)
MAGNESIUM SERPL-MCNC: 1.8 MG/DL (ref 1.6–2.4)
POTASSIUM SERPL-SCNC: 3.6 MMOL/L (ref 3.5–5.1)
POTASSIUM SERPL-SCNC: 3.9 MMOL/L (ref 3.5–5.1)
SODIUM SERPL-SCNC: 142 MMOL/L (ref 136–145)

## 2024-07-14 PROCEDURE — 6360000002 HC RX W HCPCS: Performed by: INTERNAL MEDICINE

## 2024-07-14 PROCEDURE — 2700000000 HC OXYGEN THERAPY PER DAY

## 2024-07-14 PROCEDURE — 6370000000 HC RX 637 (ALT 250 FOR IP): Performed by: EMERGENCY MEDICINE

## 2024-07-14 PROCEDURE — 94640 AIRWAY INHALATION TREATMENT: CPT

## 2024-07-14 PROCEDURE — 6370000000 HC RX 637 (ALT 250 FOR IP): Performed by: INTERNAL MEDICINE

## 2024-07-14 PROCEDURE — 2060000000 HC ICU INTERMEDIATE R&B

## 2024-07-14 PROCEDURE — 83735 ASSAY OF MAGNESIUM: CPT

## 2024-07-14 PROCEDURE — 94761 N-INVAS EAR/PLS OXIMETRY MLT: CPT

## 2024-07-14 PROCEDURE — 83605 ASSAY OF LACTIC ACID: CPT

## 2024-07-14 PROCEDURE — 80048 BASIC METABOLIC PNL TOTAL CA: CPT

## 2024-07-14 PROCEDURE — 84132 ASSAY OF SERUM POTASSIUM: CPT

## 2024-07-14 PROCEDURE — 2580000003 HC RX 258: Performed by: INTERNAL MEDICINE

## 2024-07-14 PROCEDURE — 36415 COLL VENOUS BLD VENIPUNCTURE: CPT

## 2024-07-14 RX ORDER — POLYETHYLENE GLYCOL 3350 17 G/17G
17 POWDER, FOR SOLUTION ORAL DAILY PRN
Status: DISCONTINUED | OUTPATIENT
Start: 2024-07-14 | End: 2024-07-17 | Stop reason: HOSPADM

## 2024-07-14 RX ORDER — ACETAMINOPHEN 325 MG/1
650 TABLET ORAL
Status: DISCONTINUED | OUTPATIENT
Start: 2024-07-14 | End: 2024-07-14

## 2024-07-14 RX ORDER — POTASSIUM CHLORIDE 20 MEQ/1
40 TABLET, EXTENDED RELEASE ORAL PRN
Status: DISCONTINUED | OUTPATIENT
Start: 2024-07-14 | End: 2024-07-17 | Stop reason: HOSPADM

## 2024-07-14 RX ORDER — SPIRONOLACTONE 25 MG/1
25 TABLET ORAL DAILY
Status: DISCONTINUED | OUTPATIENT
Start: 2024-07-14 | End: 2024-07-17 | Stop reason: HOSPADM

## 2024-07-14 RX ORDER — ASPIRIN 81 MG/1
81 TABLET ORAL DAILY
Status: DISCONTINUED | OUTPATIENT
Start: 2024-07-14 | End: 2024-07-17 | Stop reason: HOSPADM

## 2024-07-14 RX ORDER — METOLAZONE 5 MG/1
5 TABLET ORAL DAILY
Status: DISCONTINUED | OUTPATIENT
Start: 2024-07-14 | End: 2024-07-17 | Stop reason: HOSPADM

## 2024-07-14 RX ORDER — SODIUM CHLORIDE 0.9 % (FLUSH) 0.9 %
5-40 SYRINGE (ML) INJECTION PRN
Status: DISCONTINUED | OUTPATIENT
Start: 2024-07-14 | End: 2024-07-17 | Stop reason: HOSPADM

## 2024-07-14 RX ORDER — ONDANSETRON 4 MG/1
4 TABLET, ORALLY DISINTEGRATING ORAL EVERY 8 HOURS PRN
Status: DISCONTINUED | OUTPATIENT
Start: 2024-07-14 | End: 2024-07-17 | Stop reason: HOSPADM

## 2024-07-14 RX ORDER — AMIODARONE HYDROCHLORIDE 200 MG/1
200 TABLET ORAL DAILY
Status: DISCONTINUED | OUTPATIENT
Start: 2024-07-14 | End: 2024-07-17 | Stop reason: HOSPADM

## 2024-07-14 RX ORDER — ACETAMINOPHEN 325 MG/1
650 TABLET ORAL EVERY 6 HOURS PRN
Status: DISCONTINUED | OUTPATIENT
Start: 2024-07-14 | End: 2024-07-17 | Stop reason: HOSPADM

## 2024-07-14 RX ORDER — BUMETANIDE 0.25 MG/ML
2 INJECTION INTRAMUSCULAR; INTRAVENOUS 2 TIMES DAILY
Status: DISCONTINUED | OUTPATIENT
Start: 2024-07-14 | End: 2024-07-17 | Stop reason: HOSPADM

## 2024-07-14 RX ORDER — OXYBUTYNIN CHLORIDE 5 MG/1
5 TABLET ORAL 3 TIMES DAILY
Status: DISCONTINUED | OUTPATIENT
Start: 2024-07-14 | End: 2024-07-17 | Stop reason: HOSPADM

## 2024-07-14 RX ORDER — ATORVASTATIN CALCIUM 40 MG/1
40 TABLET, FILM COATED ORAL NIGHTLY
Status: DISCONTINUED | OUTPATIENT
Start: 2024-07-14 | End: 2024-07-17 | Stop reason: HOSPADM

## 2024-07-14 RX ORDER — ONDANSETRON 2 MG/ML
4 INJECTION INTRAMUSCULAR; INTRAVENOUS EVERY 6 HOURS PRN
Status: DISCONTINUED | OUTPATIENT
Start: 2024-07-14 | End: 2024-07-17 | Stop reason: HOSPADM

## 2024-07-14 RX ORDER — IPRATROPIUM BROMIDE AND ALBUTEROL SULFATE 2.5; .5 MG/3ML; MG/3ML
1 SOLUTION RESPIRATORY (INHALATION) EVERY 4 HOURS PRN
Status: DISCONTINUED | OUTPATIENT
Start: 2024-07-14 | End: 2024-07-17 | Stop reason: HOSPADM

## 2024-07-14 RX ORDER — PREDNISONE 20 MG/1
20 TABLET ORAL DAILY
Status: DISCONTINUED | OUTPATIENT
Start: 2024-07-14 | End: 2024-07-14

## 2024-07-14 RX ORDER — SODIUM CHLORIDE 0.9 % (FLUSH) 0.9 %
5-40 SYRINGE (ML) INJECTION EVERY 12 HOURS SCHEDULED
Status: DISCONTINUED | OUTPATIENT
Start: 2024-07-14 | End: 2024-07-17 | Stop reason: HOSPADM

## 2024-07-14 RX ORDER — TRAMADOL HYDROCHLORIDE 50 MG/1
100 TABLET ORAL EVERY 6 HOURS PRN
Status: DISCONTINUED | OUTPATIENT
Start: 2024-07-14 | End: 2024-07-17 | Stop reason: HOSPADM

## 2024-07-14 RX ORDER — POTASSIUM CHLORIDE 7.45 MG/ML
10 INJECTION INTRAVENOUS PRN
Status: DISCONTINUED | OUTPATIENT
Start: 2024-07-14 | End: 2024-07-17 | Stop reason: HOSPADM

## 2024-07-14 RX ORDER — HYDRALAZINE HYDROCHLORIDE 50 MG/1
50 TABLET, FILM COATED ORAL EVERY 6 HOURS PRN
Status: DISCONTINUED | OUTPATIENT
Start: 2024-07-14 | End: 2024-07-14

## 2024-07-14 RX ORDER — SODIUM CHLORIDE 9 MG/ML
INJECTION, SOLUTION INTRAVENOUS PRN
Status: DISCONTINUED | OUTPATIENT
Start: 2024-07-14 | End: 2024-07-17 | Stop reason: HOSPADM

## 2024-07-14 RX ORDER — TRAMADOL HYDROCHLORIDE 50 MG/1
50 TABLET ORAL
Status: COMPLETED | OUTPATIENT
Start: 2024-07-14 | End: 2024-07-14

## 2024-07-14 RX ORDER — MAGNESIUM SULFATE IN WATER 40 MG/ML
2000 INJECTION, SOLUTION INTRAVENOUS PRN
Status: DISCONTINUED | OUTPATIENT
Start: 2024-07-14 | End: 2024-07-17 | Stop reason: HOSPADM

## 2024-07-14 RX ORDER — BUDESONIDE AND FORMOTEROL FUMARATE DIHYDRATE 160; 4.5 UG/1; UG/1
2 AEROSOL RESPIRATORY (INHALATION)
Status: DISCONTINUED | OUTPATIENT
Start: 2024-07-14 | End: 2024-07-17 | Stop reason: HOSPADM

## 2024-07-14 RX ORDER — LABETALOL HYDROCHLORIDE 5 MG/ML
10 INJECTION, SOLUTION INTRAVENOUS EVERY 4 HOURS PRN
Status: DISCONTINUED | OUTPATIENT
Start: 2024-07-14 | End: 2024-07-17 | Stop reason: HOSPADM

## 2024-07-14 RX ORDER — ACETAMINOPHEN 650 MG/1
650 SUPPOSITORY RECTAL EVERY 6 HOURS PRN
Status: DISCONTINUED | OUTPATIENT
Start: 2024-07-14 | End: 2024-07-17 | Stop reason: HOSPADM

## 2024-07-14 RX ORDER — METOPROLOL SUCCINATE 25 MG/1
100 TABLET, EXTENDED RELEASE ORAL DAILY
Status: DISCONTINUED | OUTPATIENT
Start: 2024-07-14 | End: 2024-07-17 | Stop reason: HOSPADM

## 2024-07-14 RX ORDER — TRAMADOL HYDROCHLORIDE 50 MG/1
50 TABLET ORAL EVERY 6 HOURS PRN
Status: DISCONTINUED | OUTPATIENT
Start: 2024-07-14 | End: 2024-07-17 | Stop reason: HOSPADM

## 2024-07-14 RX ORDER — ALLOPURINOL 100 MG/1
100 TABLET ORAL DAILY
Status: DISCONTINUED | OUTPATIENT
Start: 2024-07-14 | End: 2024-07-17 | Stop reason: HOSPADM

## 2024-07-14 RX ADMIN — OXYBUTYNIN CHLORIDE 5 MG: 5 TABLET ORAL at 21:10

## 2024-07-14 RX ADMIN — IPRATROPIUM BROMIDE AND ALBUTEROL SULFATE 1 DOSE: 2.5; .5 SOLUTION RESPIRATORY (INHALATION) at 03:21

## 2024-07-14 RX ADMIN — ASPIRIN 81 MG: 81 TABLET, COATED ORAL at 09:50

## 2024-07-14 RX ADMIN — METOLAZONE 5 MG: 5 TABLET ORAL at 09:49

## 2024-07-14 RX ADMIN — EMPAGLIFLOZIN 10 MG: 10 TABLET, FILM COATED ORAL at 09:52

## 2024-07-14 RX ADMIN — AMIODARONE HYDROCHLORIDE 200 MG: 200 TABLET ORAL at 09:50

## 2024-07-14 RX ADMIN — OXYBUTYNIN CHLORIDE 5 MG: 5 TABLET ORAL at 09:50

## 2024-07-14 RX ADMIN — BUMETANIDE 2 MG: 0.25 INJECTION INTRAMUSCULAR; INTRAVENOUS at 09:49

## 2024-07-14 RX ADMIN — BUMETANIDE 2 MG: 0.25 INJECTION INTRAMUSCULAR; INTRAVENOUS at 18:32

## 2024-07-14 RX ADMIN — TRAMADOL HYDROCHLORIDE 100 MG: 50 TABLET ORAL at 18:33

## 2024-07-14 RX ADMIN — SACUBITRIL AND VALSARTAN 1 TABLET: 97; 103 TABLET, FILM COATED ORAL at 09:49

## 2024-07-14 RX ADMIN — Medication 2 PUFF: at 06:31

## 2024-07-14 RX ADMIN — SPIRONOLACTONE 25 MG: 25 TABLET ORAL at 09:50

## 2024-07-14 RX ADMIN — ALLOPURINOL 100 MG: 100 TABLET ORAL at 09:50

## 2024-07-14 RX ADMIN — ASPIRIN 325 MG: 325 TABLET ORAL at 01:43

## 2024-07-14 RX ADMIN — SODIUM CHLORIDE, PRESERVATIVE FREE 10 ML: 5 INJECTION INTRAVENOUS at 09:54

## 2024-07-14 RX ADMIN — TRAMADOL HYDROCHLORIDE 50 MG: 50 TABLET ORAL at 02:18

## 2024-07-14 RX ADMIN — APIXABAN 5 MG: 5 TABLET, FILM COATED ORAL at 21:10

## 2024-07-14 RX ADMIN — Medication 2 PUFF: at 19:45

## 2024-07-14 RX ADMIN — TRAMADOL HYDROCHLORIDE 100 MG: 50 TABLET ORAL at 09:49

## 2024-07-14 RX ADMIN — SODIUM CHLORIDE, PRESERVATIVE FREE 10 ML: 5 INJECTION INTRAVENOUS at 21:10

## 2024-07-14 RX ADMIN — APIXABAN 5 MG: 5 TABLET, FILM COATED ORAL at 09:50

## 2024-07-14 RX ADMIN — THEOPHYLLINE ANHYDROUS 300 MG: 300 CAPSULE, EXTENDED RELEASE ORAL at 10:29

## 2024-07-14 RX ADMIN — ATORVASTATIN CALCIUM 40 MG: 40 TABLET, FILM COATED ORAL at 21:10

## 2024-07-14 RX ADMIN — METOPROLOL SUCCINATE 100 MG: 25 TABLET, EXTENDED RELEASE ORAL at 09:49

## 2024-07-14 RX ADMIN — SACUBITRIL AND VALSARTAN 1 TABLET: 97; 103 TABLET, FILM COATED ORAL at 21:10

## 2024-07-14 RX ADMIN — OXYBUTYNIN CHLORIDE 5 MG: 5 TABLET ORAL at 15:31

## 2024-07-14 ASSESSMENT — PAIN DESCRIPTION - ORIENTATION
ORIENTATION: UPPER;LEFT
ORIENTATION: MID

## 2024-07-14 ASSESSMENT — PAIN DESCRIPTION - LOCATION
LOCATION: HEAD
LOCATION: ABDOMEN

## 2024-07-14 ASSESSMENT — PAIN SCALES - GENERAL
PAINLEVEL_OUTOF10: 7
PAINLEVEL_OUTOF10: 10
PAINLEVEL_OUTOF10: 0

## 2024-07-14 ASSESSMENT — PAIN SCALES - WONG BAKER: WONGBAKER_NUMERICALRESPONSE: NO HURT

## 2024-07-14 ASSESSMENT — PAIN DESCRIPTION - DESCRIPTORS
DESCRIPTORS: ACHING
DESCRIPTORS: ACHING

## 2024-07-14 NOTE — H&P
History & Physical    Primary Care Provider: Ricardo Calle MD    Chief complaint:   Chief Complaint   Patient presents with    Shortness of Breath        History of Presenting Illness:   Nubia Lion is a 73 y.o. female with PMH of COPD on home 4L NC, CHF, diabetes, hypertension, CAD and other medical problems as below. Presented to the ED with chief complaint of shortness of breath. She reports having dyspnea on exertion, orthopnea and worsening b/l LE swelling in the past few days. Also reports some wheezing and non-productive cough. No fever or chills. In addition, she also reports left breast pain and swelling in the past few days. No redness, wound or discharge.     In the ED, noted hypoxic requiring 5L NC. Other vital signs within normal limits. No leukocytosis. Creatinine 1.5, lower than baseline. LA 2.1. pro-BNP elevated. chest X-ray no acute finding.       Chart review: none          Past Medical History:   Diagnosis Date    Chronic obstructive pulmonary disease (HCC)     Congestive heart disease (HCC)     with preserved ejection fraction    Coronary artery disease     Diabetes (HCC)     HFrEF (heart failure with reduced ejection fraction) (HCC)     Hyperlipidemia     Hypertension     Myocardial infarct (HCC)     MICHELLE (obstructive sleep apnea)         Past Surgical History:   Procedure Laterality Date    BREAST BIOPSY      CHOLECYSTECTOMY      CORONARY ARTERY BYPASS GRAFT      HERNIA REPAIR      HYSTERECTOMY (CERVIX STATUS UNKNOWN)         Family History   Problem Relation Age of Onset    Cancer Maternal Grandmother     Heart Disease Mother     Kidney Disease Mother         Social History     Socioeconomic History    Marital status:    Tobacco Use    Smoking status: Former    Smokeless tobacco: Never   Substance and Sexual Activity    Alcohol use: Not Currently    Drug use: Never   Social History Narrative    Lives at home with children, need help with ADL's. ambulatory     Social

## 2024-07-14 NOTE — ED NOTES
tablet 5 mg (has no administration in time range)   sacubitril-valsartan (ENTRESTO)  MG per tablet 1 tablet (has no administration in time range)   spironolactone (ALDACTONE) tablet 25 mg (has no administration in time range)   theophylline (JEREL-24) extended release capsule 300 mg (has no administration in time range)   sodium chloride flush 0.9 % injection 5-40 mL (has no administration in time range)   sodium chloride flush 0.9 % injection 5-40 mL (has no administration in time range)   0.9 % sodium chloride infusion (has no administration in time range)   ondansetron (ZOFRAN-ODT) disintegrating tablet 4 mg (has no administration in time range)     Or   ondansetron (ZOFRAN) injection 4 mg (has no administration in time range)   polyethylene glycol (GLYCOLAX) packet 17 g (has no administration in time range)   acetaminophen (TYLENOL) tablet 650 mg (has no administration in time range)     Or   acetaminophen (TYLENOL) suppository 650 mg (has no administration in time range)   potassium chloride (KLOR-CON M) extended release tablet 40 mEq (has no administration in time range)     Or   potassium bicarb-citric acid (EFFER-K) effervescent tablet 40 mEq (has no administration in time range)     Or   potassium chloride 10 mEq/100 mL IVPB (Peripheral Line) (has no administration in time range)   magnesium sulfate 2000 mg in 50 mL IVPB premix (has no administration in time range)   bumetanide (BUMEX) injection 2 mg (has no administration in time range)   metOLazone (ZAROXOLYN) tablet 5 mg (has no administration in time range)   traMADol (ULTRAM) tablet 50 mg (has no administration in time range)   traMADol (ULTRAM) tablet 100 mg (has no administration in time range)   labetalol (NORMODYNE;TRANDATE) injection 10 mg (has no administration in time range)   ipratropium 0.5 mg-albuterol 2.5 mg (DUONEB) nebulizer solution 1 Dose (has no administration in time range)   nitroglycerin (NITRO-BID) 2 % ointment 0.5 inch (0.5

## 2024-07-15 ENCOUNTER — APPOINTMENT (OUTPATIENT)
Facility: HOSPITAL | Age: 74
DRG: 291 | End: 2024-07-15
Payer: MEDICARE

## 2024-07-15 LAB
ANION GAP SERPL CALC-SCNC: 5 MMOL/L (ref 5–15)
BASOPHILS # BLD: 0 K/UL (ref 0–0.1)
BASOPHILS NFR BLD: 0 % (ref 0–1)
BUN SERPL-MCNC: 27 MG/DL (ref 6–20)
BUN/CREAT SERPL: 16 (ref 12–20)
CA-I BLD-MCNC: 8.6 MG/DL (ref 8.5–10.1)
CHLORIDE SERPL-SCNC: 106 MMOL/L (ref 97–108)
CO2 SERPL-SCNC: 30 MMOL/L (ref 21–32)
CREAT SERPL-MCNC: 1.71 MG/DL (ref 0.55–1.02)
DIFFERENTIAL METHOD BLD: ABNORMAL
EOSINOPHIL # BLD: 0.1 K/UL (ref 0–0.4)
EOSINOPHIL NFR BLD: 1 % (ref 0–7)
ERYTHROCYTE [DISTWIDTH] IN BLOOD BY AUTOMATED COUNT: 15.7 % (ref 11.5–14.5)
GLUCOSE SERPL-MCNC: 129 MG/DL (ref 65–100)
HCT VFR BLD AUTO: 45.1 % (ref 35–47)
HGB BLD-MCNC: 14.1 G/DL (ref 11.5–16)
IMM GRANULOCYTES # BLD AUTO: 0.1 K/UL (ref 0–0.04)
IMM GRANULOCYTES NFR BLD AUTO: 1 % (ref 0–0.5)
LYMPHOCYTES # BLD: 1.5 K/UL (ref 0.8–3.5)
LYMPHOCYTES NFR BLD: 12 % (ref 12–49)
MCH RBC QN AUTO: 29.2 PG (ref 26–34)
MCHC RBC AUTO-ENTMCNC: 31.3 G/DL (ref 30–36.5)
MCV RBC AUTO: 93.4 FL (ref 80–99)
MONOCYTES # BLD: 1.2 K/UL (ref 0–1)
MONOCYTES NFR BLD: 9 % (ref 5–13)
NEUTS SEG # BLD: 9.7 K/UL (ref 1.8–8)
NEUTS SEG NFR BLD: 77 % (ref 32–75)
NRBC # BLD: 0 K/UL (ref 0–0.01)
NRBC BLD-RTO: 0 PER 100 WBC
PLATELET # BLD AUTO: 187 K/UL (ref 150–400)
PMV BLD AUTO: 12.4 FL (ref 8.9–12.9)
POTASSIUM SERPL-SCNC: 3.8 MMOL/L (ref 3.5–5.1)
RBC # BLD AUTO: 4.83 M/UL (ref 3.8–5.2)
SODIUM SERPL-SCNC: 141 MMOL/L (ref 136–145)
WBC # BLD AUTO: 12.6 K/UL (ref 3.6–11)

## 2024-07-15 PROCEDURE — 36415 COLL VENOUS BLD VENIPUNCTURE: CPT

## 2024-07-15 PROCEDURE — 94761 N-INVAS EAR/PLS OXIMETRY MLT: CPT

## 2024-07-15 PROCEDURE — 6360000002 HC RX W HCPCS: Performed by: INTERNAL MEDICINE

## 2024-07-15 PROCEDURE — 76604 US EXAM CHEST: CPT

## 2024-07-15 PROCEDURE — 2060000000 HC ICU INTERMEDIATE R&B

## 2024-07-15 PROCEDURE — 2580000003 HC RX 258: Performed by: INTERNAL MEDICINE

## 2024-07-15 PROCEDURE — 94640 AIRWAY INHALATION TREATMENT: CPT

## 2024-07-15 PROCEDURE — 80048 BASIC METABOLIC PNL TOTAL CA: CPT

## 2024-07-15 PROCEDURE — 85025 COMPLETE CBC W/AUTO DIFF WBC: CPT

## 2024-07-15 PROCEDURE — 6370000000 HC RX 637 (ALT 250 FOR IP): Performed by: INTERNAL MEDICINE

## 2024-07-15 PROCEDURE — 2700000000 HC OXYGEN THERAPY PER DAY

## 2024-07-15 RX ORDER — LANOLIN ALCOHOL/MO/W.PET/CERES
3 CREAM (GRAM) TOPICAL NIGHTLY PRN
Status: DISCONTINUED | OUTPATIENT
Start: 2024-07-15 | End: 2024-07-17 | Stop reason: HOSPADM

## 2024-07-15 RX ADMIN — SPIRONOLACTONE 25 MG: 25 TABLET ORAL at 08:41

## 2024-07-15 RX ADMIN — Medication 2 PUFF: at 20:08

## 2024-07-15 RX ADMIN — OXYBUTYNIN CHLORIDE 5 MG: 5 TABLET ORAL at 08:41

## 2024-07-15 RX ADMIN — SODIUM CHLORIDE, PRESERVATIVE FREE 10 ML: 5 INJECTION INTRAVENOUS at 08:42

## 2024-07-15 RX ADMIN — BUMETANIDE 2 MG: 0.25 INJECTION INTRAMUSCULAR; INTRAVENOUS at 08:41

## 2024-07-15 RX ADMIN — THEOPHYLLINE ANHYDROUS 300 MG: 300 CAPSULE, EXTENDED RELEASE ORAL at 09:19

## 2024-07-15 RX ADMIN — BUMETANIDE 2 MG: 0.25 INJECTION INTRAMUSCULAR; INTRAVENOUS at 17:01

## 2024-07-15 RX ADMIN — METOLAZONE 5 MG: 5 TABLET ORAL at 08:41

## 2024-07-15 RX ADMIN — SACUBITRIL AND VALSARTAN 1 TABLET: 97; 103 TABLET, FILM COATED ORAL at 08:41

## 2024-07-15 RX ADMIN — ATORVASTATIN CALCIUM 40 MG: 40 TABLET, FILM COATED ORAL at 20:46

## 2024-07-15 RX ADMIN — Medication 2 PUFF: at 08:09

## 2024-07-15 RX ADMIN — SACUBITRIL AND VALSARTAN 1 TABLET: 97; 103 TABLET, FILM COATED ORAL at 20:46

## 2024-07-15 RX ADMIN — SODIUM CHLORIDE, PRESERVATIVE FREE 10 ML: 5 INJECTION INTRAVENOUS at 20:48

## 2024-07-15 RX ADMIN — TRAMADOL HYDROCHLORIDE 100 MG: 50 TABLET ORAL at 06:29

## 2024-07-15 RX ADMIN — APIXABAN 5 MG: 5 TABLET, FILM COATED ORAL at 08:41

## 2024-07-15 RX ADMIN — ACETAMINOPHEN 650 MG: 325 TABLET ORAL at 08:50

## 2024-07-15 RX ADMIN — ASPIRIN 81 MG: 81 TABLET, COATED ORAL at 08:41

## 2024-07-15 RX ADMIN — ALLOPURINOL 100 MG: 100 TABLET ORAL at 08:41

## 2024-07-15 RX ADMIN — AMIODARONE HYDROCHLORIDE 200 MG: 200 TABLET ORAL at 08:41

## 2024-07-15 RX ADMIN — ONDANSETRON 4 MG: 2 INJECTION INTRAMUSCULAR; INTRAVENOUS at 08:50

## 2024-07-15 RX ADMIN — EMPAGLIFLOZIN 10 MG: 10 TABLET, FILM COATED ORAL at 08:41

## 2024-07-15 RX ADMIN — OXYBUTYNIN CHLORIDE 5 MG: 5 TABLET ORAL at 13:34

## 2024-07-15 RX ADMIN — APIXABAN 5 MG: 5 TABLET, FILM COATED ORAL at 20:46

## 2024-07-15 RX ADMIN — OXYBUTYNIN CHLORIDE 5 MG: 5 TABLET ORAL at 20:46

## 2024-07-15 ASSESSMENT — PAIN DESCRIPTION - LOCATION
LOCATION: CHEST
LOCATION: BREAST

## 2024-07-15 ASSESSMENT — PAIN SCALES - WONG BAKER
WONGBAKER_NUMERICALRESPONSE: NO HURT
WONGBAKER_NUMERICALRESPONSE: NO HURT
WONGBAKER_NUMERICALRESPONSE: HURTS A LITTLE BIT

## 2024-07-15 ASSESSMENT — PAIN SCALES - GENERAL
PAINLEVEL_OUTOF10: 0
PAINLEVEL_OUTOF10: 6
PAINLEVEL_OUTOF10: 9
PAINLEVEL_OUTOF10: 8
PAINLEVEL_OUTOF10: 0
PAINLEVEL_OUTOF10: 9

## 2024-07-15 ASSESSMENT — PAIN DESCRIPTION - ORIENTATION
ORIENTATION: LEFT
ORIENTATION: LEFT

## 2024-07-15 ASSESSMENT — PAIN DESCRIPTION - DESCRIPTORS: DESCRIPTORS: ACHING

## 2024-07-15 NOTE — CONSULTS
Nutrition Education    Educated on Heart Failure Nutrition Therapy    RD consulted w pt at chairside. Pt reports having the desire to eat but when she begins eating the desire goes away w 0% PO intake for today reported. Reports feeling nauseous but not vomiting. LBM 7/14. Med reviewed. Abnormal labs include BUN 27, Creatinine 1.71, GFR 31 Glucose 129. Pt was advised to reconsult w any questions.     Learners: Patient  Readiness: Acceptance  Method: Explanation and Handout  Response: Verbalizes Understanding  Contact name and number provided.    Tereza Hillman RD  Contact Number: [phone] or [address]

## 2024-07-16 LAB
ANION GAP SERPL CALC-SCNC: 3 MMOL/L (ref 5–15)
BASOPHILS # BLD: 0.1 K/UL (ref 0–0.1)
BASOPHILS NFR BLD: 1 % (ref 0–1)
BUN SERPL-MCNC: 36 MG/DL (ref 6–20)
BUN/CREAT SERPL: 17 (ref 12–20)
CA-I BLD-MCNC: 8.1 MG/DL (ref 8.5–10.1)
CHLORIDE SERPL-SCNC: 102 MMOL/L (ref 97–108)
CO2 SERPL-SCNC: 36 MMOL/L (ref 21–32)
CREAT SERPL-MCNC: 2.16 MG/DL (ref 0.55–1.02)
DIFFERENTIAL METHOD BLD: ABNORMAL
EOSINOPHIL # BLD: 0.2 K/UL (ref 0–0.4)
EOSINOPHIL NFR BLD: 3 % (ref 0–7)
ERYTHROCYTE [DISTWIDTH] IN BLOOD BY AUTOMATED COUNT: 15.7 % (ref 11.5–14.5)
GLUCOSE SERPL-MCNC: 134 MG/DL (ref 65–100)
HCT VFR BLD AUTO: 46.2 % (ref 35–47)
HGB BLD-MCNC: 14.1 G/DL (ref 11.5–16)
IMM GRANULOCYTES # BLD AUTO: 0 K/UL (ref 0–0.04)
IMM GRANULOCYTES NFR BLD AUTO: 0 % (ref 0–0.5)
LYMPHOCYTES # BLD: 1.9 K/UL (ref 0.8–3.5)
LYMPHOCYTES NFR BLD: 23 % (ref 12–49)
MCH RBC QN AUTO: 28.9 PG (ref 26–34)
MCHC RBC AUTO-ENTMCNC: 30.5 G/DL (ref 30–36.5)
MCV RBC AUTO: 94.7 FL (ref 80–99)
MONOCYTES # BLD: 0.9 K/UL (ref 0–1)
MONOCYTES NFR BLD: 11 % (ref 5–13)
NEUTS SEG # BLD: 5.2 K/UL (ref 1.8–8)
NEUTS SEG NFR BLD: 62 % (ref 32–75)
NRBC # BLD: 0 K/UL (ref 0–0.01)
NRBC BLD-RTO: 0 PER 100 WBC
PLATELET # BLD AUTO: 187 K/UL (ref 150–400)
PMV BLD AUTO: 12.6 FL (ref 8.9–12.9)
POTASSIUM SERPL-SCNC: 3.8 MMOL/L (ref 3.5–5.1)
PROCALCITONIN SERPL-MCNC: 0.07 NG/ML
RBC # BLD AUTO: 4.88 M/UL (ref 3.8–5.2)
SODIUM SERPL-SCNC: 141 MMOL/L (ref 136–145)
WBC # BLD AUTO: 8.3 K/UL (ref 3.6–11)

## 2024-07-16 PROCEDURE — 6360000002 HC RX W HCPCS: Performed by: INTERNAL MEDICINE

## 2024-07-16 PROCEDURE — 6370000000 HC RX 637 (ALT 250 FOR IP): Performed by: INTERNAL MEDICINE

## 2024-07-16 PROCEDURE — 2700000000 HC OXYGEN THERAPY PER DAY

## 2024-07-16 PROCEDURE — 80048 BASIC METABOLIC PNL TOTAL CA: CPT

## 2024-07-16 PROCEDURE — 84145 PROCALCITONIN (PCT): CPT

## 2024-07-16 PROCEDURE — 2580000003 HC RX 258: Performed by: INTERNAL MEDICINE

## 2024-07-16 PROCEDURE — 85025 COMPLETE CBC W/AUTO DIFF WBC: CPT

## 2024-07-16 PROCEDURE — 36415 COLL VENOUS BLD VENIPUNCTURE: CPT

## 2024-07-16 PROCEDURE — 94010 BREATHING CAPACITY TEST: CPT

## 2024-07-16 PROCEDURE — 94640 AIRWAY INHALATION TREATMENT: CPT

## 2024-07-16 PROCEDURE — 94761 N-INVAS EAR/PLS OXIMETRY MLT: CPT

## 2024-07-16 PROCEDURE — 2060000000 HC ICU INTERMEDIATE R&B

## 2024-07-16 RX ORDER — DOXYCYCLINE HYCLATE 100 MG/1
100 CAPSULE ORAL EVERY 12 HOURS SCHEDULED
Status: DISCONTINUED | OUTPATIENT
Start: 2024-07-16 | End: 2024-07-17 | Stop reason: HOSPADM

## 2024-07-16 RX ADMIN — SODIUM CHLORIDE, PRESERVATIVE FREE 10 ML: 5 INJECTION INTRAVENOUS at 10:20

## 2024-07-16 RX ADMIN — SACUBITRIL AND VALSARTAN 1 TABLET: 97; 103 TABLET, FILM COATED ORAL at 21:21

## 2024-07-16 RX ADMIN — Medication 2 PUFF: at 19:17

## 2024-07-16 RX ADMIN — AMIODARONE HYDROCHLORIDE 200 MG: 200 TABLET ORAL at 10:06

## 2024-07-16 RX ADMIN — EMPAGLIFLOZIN 10 MG: 10 TABLET, FILM COATED ORAL at 10:06

## 2024-07-16 RX ADMIN — OXYBUTYNIN CHLORIDE 5 MG: 5 TABLET ORAL at 10:06

## 2024-07-16 RX ADMIN — THEOPHYLLINE ANHYDROUS 300 MG: 300 CAPSULE, EXTENDED RELEASE ORAL at 10:53

## 2024-07-16 RX ADMIN — BUMETANIDE 2 MG: 0.25 INJECTION INTRAMUSCULAR; INTRAVENOUS at 10:08

## 2024-07-16 RX ADMIN — SODIUM CHLORIDE, PRESERVATIVE FREE 10 ML: 5 INJECTION INTRAVENOUS at 21:22

## 2024-07-16 RX ADMIN — OXYBUTYNIN CHLORIDE 5 MG: 5 TABLET ORAL at 21:21

## 2024-07-16 RX ADMIN — SPIRONOLACTONE 25 MG: 25 TABLET ORAL at 10:06

## 2024-07-16 RX ADMIN — ASPIRIN 81 MG: 81 TABLET, COATED ORAL at 10:05

## 2024-07-16 RX ADMIN — APIXABAN 5 MG: 5 TABLET, FILM COATED ORAL at 21:21

## 2024-07-16 RX ADMIN — DOXYCYCLINE HYCLATE 100 MG: 100 CAPSULE ORAL at 21:21

## 2024-07-16 RX ADMIN — METOPROLOL SUCCINATE 100 MG: 25 TABLET, EXTENDED RELEASE ORAL at 10:05

## 2024-07-16 RX ADMIN — BUMETANIDE 2 MG: 0.25 INJECTION INTRAMUSCULAR; INTRAVENOUS at 17:30

## 2024-07-16 RX ADMIN — ALLOPURINOL 100 MG: 100 TABLET ORAL at 10:05

## 2024-07-16 RX ADMIN — Medication 2 PUFF: at 08:05

## 2024-07-16 RX ADMIN — ATORVASTATIN CALCIUM 40 MG: 40 TABLET, FILM COATED ORAL at 21:21

## 2024-07-16 RX ADMIN — METOLAZONE 5 MG: 5 TABLET ORAL at 10:05

## 2024-07-16 RX ADMIN — OXYBUTYNIN CHLORIDE 5 MG: 5 TABLET ORAL at 14:21

## 2024-07-16 RX ADMIN — APIXABAN 5 MG: 5 TABLET, FILM COATED ORAL at 10:08

## 2024-07-16 RX ADMIN — SACUBITRIL AND VALSARTAN 1 TABLET: 97; 103 TABLET, FILM COATED ORAL at 10:06

## 2024-07-16 RX ADMIN — DOXYCYCLINE HYCLATE 100 MG: 100 CAPSULE ORAL at 10:17

## 2024-07-16 ASSESSMENT — COPD QUESTIONNAIRES
QUESTION2_CHESTPHLEGM: 1
TOTAL_EXACERBATIONS_PASTYEAR: 1
QUESTION5_HOMEACTIVITIES: 3
QUESTION8_ENERGYLEVEL: 3
QUESTION4_WALKINCLINE: 4
QUESTION6_LEAVINGHOUSE: 2
CAT_TOTALSCORE: 20
QUESTION3_CHESTTIGHTNESS: 3
QUESTION7_SLEEPQUALITY: 2
QUESTION1_COUGHFREQUENCY: 2

## 2024-07-16 NOTE — NURSE NAVIGATOR
Heart Failure Nurse Navigator: Heart Failure Education    RN-NN defers education to the nursing staff. RN-NN brings education packet to the patient and evaluated the patient for follow-up.    RN-OZZIENN Introduces herself to patient, after performing hand hygiene.      1.Patient Lives: Where (City) and with (whom) or (Facility)?    -[place] with Daughter and grandson    2.Patient has Scale that works? Do they weight daily?    - Yes and does not weigh daily      3.Patient able to stand, walk, sit with/ without assistance?   Assistive device?  If so, What kind of Device?  -Yes with cane at times    4.Exercise.What?How often?   -No    5.Transportation?     -Yes    6. Financial Strain? Unable to afford healthy food, medications, housing and/or care?   -Denies    7.Drinking/Drug/Smoking habits?   -Denies    8.Stress level 1-10?   -6/10  9. Violence in Home or feeling unsafe?   -Denies violence and feels safe  10. Past 12 months have difficulty paying utilities?   -Denies  11.Current Diet habits?  Added Salt? Fast Food? Frozen meals? Restaurants? Deli meats/solorio/sausage/Italian meats?    - Diet (daughter cooks for her) However, patient does admit to eating somethings the daughter has not cooked   -Patient does consume over her liquids due her salad and fruit intake.  12.Diabetic?   -Yes, but is only on Jardiance for Heart Failure and diabetes.    13.Kidney Patient ?  -Denies    14.Dialysis?  -N/A      15.Hobbies? . What?   -Watch TV      16.Support System? Who?   -Daughter and Grandson      17.Cardiologist Outpt?   -Dr. Purdy ([facility])    18.HF-NN Recommendations?   -Keep weight log, food log, and fluid restriction log.      19.Planning D/C Dispostion? (Home/self, Home/HH, Hospice, Palliative, LTC, SNF, Corrections, transfer)?   - Home with home health?      20. HF Appt made?  Y/N   Who can come to the appt with you?          **RN-NN ENDS THE SESSION with A Thank You and friendly reminder if the patient is

## 2024-07-17 VITALS
WEIGHT: 218.92 LBS | OXYGEN SATURATION: 99 % | DIASTOLIC BLOOD PRESSURE: 77 MMHG | SYSTOLIC BLOOD PRESSURE: 124 MMHG | HEART RATE: 64 BPM | RESPIRATION RATE: 18 BRPM | HEIGHT: 63 IN | BODY MASS INDEX: 38.79 KG/M2 | TEMPERATURE: 98.8 F

## 2024-07-17 LAB
ANION GAP SERPL CALC-SCNC: 6 MMOL/L (ref 5–15)
BASOPHILS # BLD: 0.1 K/UL (ref 0–0.1)
BASOPHILS NFR BLD: 1 % (ref 0–1)
BNP SERPL-MCNC: 2221 PG/ML
BUN SERPL-MCNC: 33 MG/DL (ref 6–20)
BUN/CREAT SERPL: 17 (ref 12–20)
CA-I BLD-MCNC: 9.1 MG/DL (ref 8.5–10.1)
CHLORIDE SERPL-SCNC: 93 MMOL/L (ref 97–108)
CO2 SERPL-SCNC: 39 MMOL/L (ref 21–32)
CREAT SERPL-MCNC: 1.89 MG/DL (ref 0.55–1.02)
DIFFERENTIAL METHOD BLD: ABNORMAL
EOSINOPHIL # BLD: 0.2 K/UL (ref 0–0.4)
EOSINOPHIL NFR BLD: 2 % (ref 0–7)
ERYTHROCYTE [DISTWIDTH] IN BLOOD BY AUTOMATED COUNT: 15.5 % (ref 11.5–14.5)
GLUCOSE SERPL-MCNC: 127 MG/DL (ref 65–100)
HCT VFR BLD AUTO: 50.8 % (ref 35–47)
HGB BLD-MCNC: 15.9 G/DL (ref 11.5–16)
IMM GRANULOCYTES # BLD AUTO: 0 K/UL (ref 0–0.04)
IMM GRANULOCYTES NFR BLD AUTO: 0 % (ref 0–0.5)
LYMPHOCYTES # BLD: 2.2 K/UL (ref 0.8–3.5)
LYMPHOCYTES NFR BLD: 23 % (ref 12–49)
MCH RBC QN AUTO: 28.8 PG (ref 26–34)
MCHC RBC AUTO-ENTMCNC: 31.3 G/DL (ref 30–36.5)
MCV RBC AUTO: 92 FL (ref 80–99)
MONOCYTES # BLD: 1 K/UL (ref 0–1)
MONOCYTES NFR BLD: 11 % (ref 5–13)
NEUTS SEG # BLD: 6 K/UL (ref 1.8–8)
NEUTS SEG NFR BLD: 63 % (ref 32–75)
NRBC # BLD: 0 K/UL (ref 0–0.01)
NRBC BLD-RTO: 0 PER 100 WBC
PLATELET # BLD AUTO: 200 K/UL (ref 150–400)
PMV BLD AUTO: 12.6 FL (ref 8.9–12.9)
POTASSIUM SERPL-SCNC: 3.9 MMOL/L (ref 3.5–5.1)
RBC # BLD AUTO: 5.52 M/UL (ref 3.8–5.2)
SODIUM SERPL-SCNC: 138 MMOL/L (ref 136–145)
WBC # BLD AUTO: 9.5 K/UL (ref 3.6–11)

## 2024-07-17 PROCEDURE — 6370000000 HC RX 637 (ALT 250 FOR IP): Performed by: INTERNAL MEDICINE

## 2024-07-17 PROCEDURE — 94640 AIRWAY INHALATION TREATMENT: CPT

## 2024-07-17 PROCEDURE — 83880 ASSAY OF NATRIURETIC PEPTIDE: CPT

## 2024-07-17 PROCEDURE — 85025 COMPLETE CBC W/AUTO DIFF WBC: CPT

## 2024-07-17 PROCEDURE — 36415 COLL VENOUS BLD VENIPUNCTURE: CPT

## 2024-07-17 PROCEDURE — 2700000000 HC OXYGEN THERAPY PER DAY

## 2024-07-17 PROCEDURE — 6360000002 HC RX W HCPCS: Performed by: INTERNAL MEDICINE

## 2024-07-17 PROCEDURE — 80048 BASIC METABOLIC PNL TOTAL CA: CPT

## 2024-07-17 PROCEDURE — 2580000003 HC RX 258: Performed by: INTERNAL MEDICINE

## 2024-07-17 PROCEDURE — 94761 N-INVAS EAR/PLS OXIMETRY MLT: CPT

## 2024-07-17 RX ORDER — BUMETANIDE 2 MG/1
2 TABLET ORAL DAILY
Qty: 30 TABLET | Refills: 0 | Status: SHIPPED | OUTPATIENT
Start: 2024-07-17

## 2024-07-17 RX ORDER — LANOLIN ALCOHOL/MO/W.PET/CERES
3 CREAM (GRAM) TOPICAL NIGHTLY PRN
Qty: 30 TABLET | Refills: 0 | Status: SHIPPED | OUTPATIENT
Start: 2024-07-17

## 2024-07-17 RX ORDER — DOXYCYCLINE HYCLATE 100 MG/1
100 CAPSULE ORAL EVERY 12 HOURS SCHEDULED
Qty: 7 CAPSULE | Refills: 0 | Status: SHIPPED | OUTPATIENT
Start: 2024-07-17 | End: 2024-07-21

## 2024-07-17 RX ORDER — TRAMADOL HYDROCHLORIDE 50 MG/1
50 TABLET ORAL EVERY 6 HOURS PRN
Qty: 18 TABLET | Refills: 0 | Status: SHIPPED | OUTPATIENT
Start: 2024-07-17 | End: 2024-07-22

## 2024-07-17 RX ORDER — METOLAZONE 5 MG/1
5 TABLET ORAL DAILY
Qty: 30 TABLET | Refills: 0 | Status: SHIPPED | OUTPATIENT
Start: 2024-07-17 | End: 2024-08-16

## 2024-07-17 RX ADMIN — THEOPHYLLINE ANHYDROUS 300 MG: 300 CAPSULE, EXTENDED RELEASE ORAL at 08:08

## 2024-07-17 RX ADMIN — METOLAZONE 5 MG: 5 TABLET ORAL at 08:07

## 2024-07-17 RX ADMIN — SACUBITRIL AND VALSARTAN 1 TABLET: 97; 103 TABLET, FILM COATED ORAL at 08:07

## 2024-07-17 RX ADMIN — OXYBUTYNIN CHLORIDE 5 MG: 5 TABLET ORAL at 13:50

## 2024-07-17 RX ADMIN — OXYBUTYNIN CHLORIDE 5 MG: 5 TABLET ORAL at 08:07

## 2024-07-17 RX ADMIN — AMIODARONE HYDROCHLORIDE 200 MG: 200 TABLET ORAL at 08:07

## 2024-07-17 RX ADMIN — METOPROLOL SUCCINATE 100 MG: 25 TABLET, EXTENDED RELEASE ORAL at 08:07

## 2024-07-17 RX ADMIN — SPIRONOLACTONE 25 MG: 25 TABLET ORAL at 08:07

## 2024-07-17 RX ADMIN — Medication 2 PUFF: at 08:17

## 2024-07-17 RX ADMIN — ASPIRIN 81 MG: 81 TABLET, COATED ORAL at 08:07

## 2024-07-17 RX ADMIN — ALLOPURINOL 100 MG: 100 TABLET ORAL at 08:07

## 2024-07-17 RX ADMIN — SODIUM CHLORIDE, PRESERVATIVE FREE 10 ML: 5 INJECTION INTRAVENOUS at 08:08

## 2024-07-17 RX ADMIN — APIXABAN 5 MG: 5 TABLET, FILM COATED ORAL at 08:07

## 2024-07-17 RX ADMIN — DOXYCYCLINE HYCLATE 100 MG: 100 CAPSULE ORAL at 08:07

## 2024-07-17 RX ADMIN — EMPAGLIFLOZIN 10 MG: 10 TABLET, FILM COATED ORAL at 08:07

## 2024-07-17 RX ADMIN — BUMETANIDE 2 MG: 0.25 INJECTION INTRAMUSCULAR; INTRAVENOUS at 08:08

## 2024-07-17 ASSESSMENT — PAIN SCALES - GENERAL
PAINLEVEL_OUTOF10: 0
PAINLEVEL_OUTOF10: 0

## 2024-07-17 ASSESSMENT — PAIN SCALES - WONG BAKER
WONGBAKER_NUMERICALRESPONSE: NO HURT

## 2024-07-17 NOTE — PLAN OF CARE
Problem: Discharge Planning  Goal: Discharge to home or other facility with appropriate resources  7/14/2024 1600 by Carl Hamm RN  Outcome: Progressing  7/14/2024 0613 by Daniel Rodgers RN  Outcome: Progressing     Problem: Chronic Conditions and Co-morbidities  Goal: Patient's chronic conditions and co-morbidity symptoms are monitored and maintained or improved  7/14/2024 1600 by Carl Hamm RN  Outcome: Progressing  7/14/2024 0613 by Daniel Rodgers RN  Outcome: Progressing     Problem: Pain  Goal: Verbalizes/displays adequate comfort level or baseline comfort level  7/14/2024 1600 by Carl Hamm RN  Outcome: Progressing  7/14/2024 0613 by Daniel Rodgers RN  Outcome: Progressing     Problem: ABCDS Injury Assessment  Goal: Absence of physical injury  Outcome: Progressing     Problem: Safety - Adult  Goal: Free from fall injury  Outcome: Progressing     
  Problem: Discharge Planning  Goal: Discharge to home or other facility with appropriate resources  Outcome: Progressing  Flowsheets (Taken 7/15/2024 1948)  Discharge to home or other facility with appropriate resources:   Identify barriers to discharge with patient and caregiver   Arrange for needed discharge resources and transportation as appropriate   Arrange for interpreters to assist at discharge as needed   Identify discharge learning needs (meds, wound care, etc)   Refer to discharge planning if patient needs post-hospital services based on physician order or complex needs related to functional status, cognitive ability or social support system     Problem: Chronic Conditions and Co-morbidities  Goal: Patient's chronic conditions and co-morbidity symptoms are monitored and maintained or improved  Outcome: Progressing  Flowsheets (Taken 7/15/2024 1948)  Care Plan - Patient's Chronic Conditions and Co-Morbidity Symptoms are Monitored and Maintained or Improved:   Monitor and assess patient's chronic conditions and comorbid symptoms for stability, deterioration, or improvement   Collaborate with multidisciplinary team to address chronic and comorbid conditions and prevent exacerbation or deterioration   Update acute care plan with appropriate goals if chronic or comorbid symptoms are exacerbated and prevent overall improvement and discharge     Problem: Pain  Goal: Verbalizes/displays adequate comfort level or baseline comfort level  Outcome: Progressing     Problem: ABCDS Injury Assessment  Goal: Absence of physical injury  Outcome: Progressing     Problem: Safety - Adult  Goal: Free from fall injury  Outcome: Progressing     Problem: Neurosensory - Adult  Goal: Achieves stable or improved neurological status  Outcome: Progressing  Flowsheets (Taken 7/15/2024 1948)  Achieves stable or improved neurological status: Assess for and report changes in neurological status  Goal: Absence of seizures  Outcome: 
maintained within normal limits:   Monitor labs and assess patient for signs and symptoms of electrolyte imbalances   Administer electrolyte replacement as ordered   Monitor response to electrolyte replacements, including repeat lab results as appropriate   Fluid restriction as ordered   Instruct patient on fluid and nutrition restrictions as appropriate  Goal: Hemodynamic stability and optimal renal function maintained  Outcome: Progressing  Flowsheets (Taken 7/16/2024 1950)  Hemodynamic stability and optimal renal function maintained:   Monitor labs and assess for signs and symptoms of volume excess or deficit   Monitor intake, output and patient weight   Monitor urine specific gravity, serum osmolarity and serum sodium as indicated or ordered   Monitor response to interventions for patient's volume status, including labs, urine output, blood pressure (other measures as available)   Encourage oral intake as appropriate   Instruct patient on fluid and nutrition restrictions as appropriate  Goal: Glucose maintained within prescribed range  Outcome: Progressing  Flowsheets (Taken 7/16/2024 1950)  Glucose maintained within prescribed range: Monitor blood glucose as ordered     Problem: Hematologic - Adult  Goal: Maintains hematologic stability  Outcome: Progressing  Flowsheets (Taken 7/16/2024 1950)  Maintains hematologic stability:   Assess for signs and symptoms of bleeding or hemorrhage   Monitor labs for bleeding or clotting disorders     
Progressing  Flowsheets (Taken 7/15/2024 1948)  Minimal or absence of nausea and vomiting: Administer IV fluids as ordered to ensure adequate hydration  Goal: Maintains or returns to baseline bowel function  7/16/2024 0857 by Patience Tineo RN  Outcome: Progressing  7/16/2024 0239 by Alix Aviles RN  Outcome: Progressing  Goal: Maintains adequate nutritional intake  7/16/2024 0857 by Patience Tineo RN  Outcome: Progressing  7/16/2024 0239 by Alix Aviles RN  Outcome: Progressing  Goal: Establish and maintain optimal ostomy function  7/16/2024 0239 by Alix Aviles RN  Outcome: Progressing     Problem: Genitourinary - Adult  Goal: Absence of urinary retention  7/16/2024 0857 by Patience Tineo RN  Outcome: Progressing  Flowsheets (Taken 7/16/2024 0832)  Absence of urinary retention:   Assess patient’s ability to void and empty bladder   Monitor intake/output and perform bladder scan as needed  7/16/2024 0239 by Alix Aviles RN  Outcome: Progressing  Flowsheets (Taken 7/15/2024 1948)  Absence of urinary retention:   Assess patient’s ability to void and empty bladder   Monitor intake/output and perform bladder scan as needed     Problem: Infection - Adult  Goal: Absence of infection at discharge  7/16/2024 0857 by Patience Tineo RN  Outcome: Progressing  Flowsheets (Taken 7/16/2024 0832)  Absence of infection at discharge:   Assess and monitor for signs and symptoms of infection   Monitor lab/diagnostic results   Monitor all insertion sites i.e., indwelling lines, tubes and drains  7/16/2024 0239 by Alix Aviles RN  Outcome: Progressing  Flowsheets (Taken 7/15/2024 1948)  Absence of infection at discharge:   Assess and monitor for signs and symptoms of infection   Monitor lab/diagnostic results   Monitor all insertion sites i.e., indwelling lines, tubes and drains   Administer medications as ordered   Instruct and encourage patient and family to use good hand hygiene technique

## 2024-07-17 NOTE — CARE COORDINATION
CM spoke with patient regarding DC, patient current with HH, [facility], would like to resume, they have accepted patient back SOC 7/19/24.    Patient clear to d/c from CM to home with daughter and HH, daughter to transport home, patient has home o2 in room for transport.    Transition of Care Plan:    RUR: 19%  Prior Level of Functioning: some assistance  Disposition: home with HH  If SNF or IPR: Date FOC offered:   Date FOC received:   Accepting facility: [facility]  Date authorization started with reference number:   Date authorization received and expires:   Follow up appointments:   DME needed: has needed dme  Transportation at discharge: daughter  IM/IMM Medicare/ letter given: yes  Is patient a [place] and [facility] with [facility]?    If yes, was  transfer form completed and [facility] notified?   Caregiver Contact:   Discharge Caregiver contacted prior to discharge? Patient and daughter aware  Care Conference needed?   Barriers to discharge: n/a    
Chart reviewed, DCP remains for patient to d/c from CM to home self once medically stable, patient has home o2.    CM continues to follow and monitor for needs.   
preferences  Reviewed DNR/DNI and patient elects Full Code (Attempt Resuscitation)        Length of Voluntary ACP Conversation in minutes:  <16 minutes (Non-Billable)    JENNY Hernandez

## 2024-07-17 NOTE — DISCHARGE SUMMARY
Hospitalist Discharge Summary     Patient ID:  Nubia Lion  [id number]  73 y.o.  1950  7/13/2024    PCP on record: Ricardo Calle MD    Admit date: 7/13/2024  Discharge date and time: 7/17/2024    DISCHARGE DIAGNOSIS:    Acute on chronic heart failure with preserved ejection fraction/acute on chronic respiratory failure with hypoxia/left breast pain/induration laterally/left breast cellulitis/COPD/paroxysmal atrial fibrillation/chronic kidney disease stage III    CONSULTATIONS:  IP CONSULT TO HEART FAILURE NURSE/COORDINATOR  IP CONSULT TO DIETITIAN  IP CONSULT TO SPIRITUAL SERVICES  IP CONSULT TO RESPIRATORY CARE    Excerpted HPI from H&P of Lasha Encinas Cha, MD:  Nubia Lion is a 73 y.o. female with PMH of COPD on home 4L NC, CHF, diabetes, hypertension, CAD and other medical problems as below. Presented to the ED with chief complaint of shortness of breath. She reports having dyspnea on exertion, orthopnea and worsening b/l LE swelling in the past few days. Also reports some wheezing and non-productive cough. No fever or chills. In addition, she also reports left breast pain and swelling in the past few days. No redness, wound or discharge.      In the ED, noted hypoxic requiring 5L NC. Other vital signs within normal limits. No leukocytosis. Creatinine 1.5, lower than baseline. LA 2.1. pro-BNP elevated. chest X-ray no acute finding.         Chart review: none        ______________________________________________________________________  DISCHARGE SUMMARY/HOSPITAL COURSE:  for full details see H&P, daily progress notes, labs, consult notes.   Patient was subsequently admitted to [facility] for further evaluation as well as management, patient was started on IV Bumex, patient was aggressively diuresed, evaluate by cardiology, overall patient's oxygen requirements decreased to baseline, patient was started on antibiotics for breast cellulitis, overall patient's clinical

## 2024-07-17 NOTE — PROGRESS NOTES
Hospitalist Progress Note               Daily Progress Note: 7/15/2024      Hospital Day: 3     Chief complaint:   Chief Complaint   Patient presents with    Shortness of Breath        Subjective:   Hospital course to date:  Patient has a history of CHF and COPD on 4L NC at home, diabetes, hypertension, and CAD who presented to the ED on 7/13 or shortness of breath and bilateral lower extremity edema. For the last few days.  She also noted wheezing and nonproductive cough.  She additionally noted some left breast pain and swelling for a few days.    In the ED, she was hypoxic and required 5 L nasal cannula.      Initial labs on 7/13 showed a BUN/BUNPL of 25, creatinine of 1.5, estimated GFR of 37, Lactic acid of 2.1, Albumin globulin ratio of 0.7, procalcitonin of <0.05, NT Pro-BNP of 6,830, troponin of 106, albumin of 2.8, alkaline phosphatase of 160, total bilirubin of 1.3.    Chest x-ray showed no acute process.    Blood cultures show no growth for 2 days.      labs on 7/14 show BUN, BUNPL of 25, creatinine 1.64, estimated GFR of 33, lactic acid 3.2, calcium 8.3, glucose 250.    --------  Patient is seen today for follow-up.  On evaluation today, the patient reports no shortness of breath.  She also endorses some nausea, mild dizziness, and a mild headache.  She also reports pain in her lower extremities most notably on the left.  She also states that she has been experiencing some pain, pruritus, and soreness in her left breast that she has been experiencing for the last couple of days.  She does not recall any bug bites in the region.  She also endorses some abdominal pain located predominantly in the right upper quadrant region. She does report some diarrhea couple days ago but denies any today.  She denies any chest pain.  She denies any vomiting.    Labs this morning show BUN/BUN PL 27, creatinine 1.71, estimated GFR of 31, glucose 129, white count of 12.6, absolute neutrophil 9.7, absolute monocytes of 
    Hospitalist Progress Note               Daily Progress Note: 7/16/2024      Hospital Day: 4     Chief complaint:   Chief Complaint   Patient presents with    Shortness of Breath        Subjective:   Hospital course to date:  Patient has a history of CHF and COPD on 4L NC at home, diabetes, hypertension, and CAD who presented to the ED on 7/13 or shortness of breath and bilateral lower extremity edema. For the last few days.  She also noted wheezing and nonproductive cough.  She additionally noted some left breast pain and swelling for a few days.    In the ED, she was hypoxic and required 5 L nasal cannula.       Initial labs on 7/13 showed a BUN/BUNPL of 25, creatinine of 1.5, estimated GFR of 37, Lactic acid of 2.1, Albumin globulin ratio of 0.7, procalcitonin of <0.05, NT Pro-BNP of 6,830, troponin of 106, albumin of 2.8, alkaline phosphatase of 160.     Patient was admitted, started on IV furosemide.      Chest x-ray showed no acute process.     Blood cultures show no growth for 2 days.       labs on 7/14 show BUN/BUN.  Of 25, creatinine 1.64, estimated GFR of 33, lactic acid 3.2, calcium 8.3, glucose 250.     Patient is seen today for follow-up.  Patient notes her breathing has improved although she does get short of breath with exertion still.     She is complaining of a lump in her lateral left breast which is also been painful the past couple days.     Labs 7/15 show BUN/BUN PL 27, creatinine 1.71, estimated GFR of 31, glucose 129, white count of 12.6, absolute neutrophil 9.7.      Patient notes her breathing has improved although she does get short of breath with exertion still.     She is complaining of a lump in her lateral left breast which is also been painful the past couple days.     Labs 7/15 show BUN/BUN PL 27, creatinine 1.71, estimated GFR of 31, glucose 129, white count of 12.6, absolute neutrophil 9.7, immature granulocytes absolute of 0.1    Ultrasound of chest including mediastinum showed 
4 Eyes Skin Assessment     NAME:  Nubia Lion  YOB: 1950  MEDICAL RECORD NUMBER:  [id number]    The patient is being assessed for  Admission    I agree that at least one RN has performed a thorough Head to Toe Skin Assessment on the patient. ALL assessment sites listed below have been assessed.      Areas assessed by both nurses:    Head, Face, Ears, Shoulders, Back, Chest, Arms, Elbows, Hands, Sacrum. Buttock, Coccyx, Ischium, Legs. Feet and Heels, and Under Medical Devices         Does the Patient have a Wound? No noted wound(s)       Adan Prevention initiated by RN: No  Wound Care Orders initiated by RN: No    Pressure Injury (Stage 3,4, Unstageable, DTI, NWPT, and Complex wounds) if present, place Wound referral order by RN under : No    New Ostomies, if present place, Ostomy referral order under : No     Nurse 1 eSignature: Electronically signed by Daniel Rodgers RN on 7/14/24 at 6:26 AM EDT    **SHARE this note so that the co-signing nurse can place an eSignature**    Nurse 2 eSignature: {Esignature:[id number]}   
Discharge paperwork complete, except for 1 follow up appointment. Print and send with patient when patient is ready to leave. Primary nurse aware.  
Pulmonary Disease Navigator Note  [facility]    Current GOLD classification for Nubia Lion    Patient's chart was reviewed by Pulmonary Disease Navigator for compliance with prescribed treatment with Global Initiative For Chronic Obstructive Lung Disease (GOLD).    Please, review beneath recommendations for pharmacological treatment for patient with obstructive lung disease.       Current Pharmacological Treatment:      Patient is currently receiving 160-4.5 mcg/ACT Symbicort mdi bid, and has an order for Duoneb nebulizer q4h prn SOB/wheezing  Patient uses symbicort at home.       Current eosinophil count: 0.2  Recorded domestic exacerbations past 12 months: 1          Combination  ICS-LABA Inhaler Acceptable   Therapy  Device For Use   Salmeterol/fluticasone Advair  Diskus    Vilanterol/fluticasone  Breo  Ellipta    Formoterol/mometasone  Dulera MDI Yes   Formoterol/budesonide  Symbicort MDI Yes   Triple Therapy Recommended (For Group E) [id number]     Fluticasone/umeclidinium/vilanterol  Trelegy  Ellipta    Budesonide/glycopyrrolate/formoterol fumarate  Breztri  MDI Yes   Recommended (For Group A & B) LAMA/LABA     Vilanterol/umeclidinium  Anoro  Ellipta    Olodaterol/tiotropium  Stiolto  Respimat Yes   Formoterol/glycopyrronium  Bevespi  MDI Yes   Aclidinium/formoterol Duaklir  Pressair      *Nebulizer Options    LAMA LABA ICS   Yupelri  Brovanna Budesonide    Performist      The CAT provides a reliable measure of the impact of COPD on a patient's health status. Range of CAT scores from 0-40. Higher scores denote a more severe impact of COPD on a patient’s life. Scores <10 have a low impact, 10-20 medium, 21-30 high and >30 very high impact, requiring gradually more interventions.     Current recorded COPD Assessment Tool (CAT) score of Cough Assessment: 2  Phlegm Assessment: 1  Chest tightness: 3  Walking on an incline: 4  Home Activities: 3  Confident Leaving The Home: 
RRT Clinical Rounding Nurse Progress Report      SUBJECTIVE: Patient assessed secondary to elevated Deterioration Index Score.      Vitals:    07/13/24 2122 07/13/24 2123 07/13/24 2229   BP:  (!) 132/108 (!) 164/97   Pulse:  88 80   Resp:  (!) 32    Temp:  99 °F (37.2 °C)    SpO2:  95%    Weight: 97.1 kg (214 lb)     Height: 1.6 m (5' 3\")          DETERIORATION INDEX SCORE: 51    ASSESSMENT:      PLAN:  Spoke with primary RN Daniel about DI score of 51 on arrival to unit. He is at the bedside with the patient now.     Lina Nassar RN    
Spiritual Care Assessment/Progress Note  [facility]    Name: Nubia Lion MRN: [id number]    Age: 73 y.o.     Sex: female   Language: English     Date: 7/14/2024            Total Time Calculated: 2 min              Spiritual Assessment begun in [facility] 4 [facility] [employer]LE[facility]        Encounter Overview/Reason: Attempted Encounter    Spiritual beliefs:      [] Involved in a dyan tradition/spiritual practice:      [] Supported by a dyan community:      [] Claims no spiritual orientation:      [] Seeking spiritual identity:           [] Adheres to an individual form of spirituality:      [x] Not able to assess:                Identified resources for coping and support system:           [] Prayer                  [] Devotional reading               [] Music                  [] Guided Imagery     [] Pet visits                                        [] Other: (COMMENT)     Specific area/focus of visit   Encounter:    Crisis:    Spiritual/Emotional needs:    Ritual, Rites and Sacraments:    Grief, Loss, and Adjustments:    Ethics/Mediation:    Behavioral Health:    Palliative Care:    Advance Care Planning:      Plan/Referrals: Other (Comment) ( follow up is available on referral)    Narrative:  responded to nurse's request for consult. Pt was asleep in bed.     attempted to complete consult but the pt was asleep.  follow-up is available on referral.    Eulalio Perkinslain Intern  Contact [facility] at [phone]       
Nightly    budesonide-formoterol (SYMBICORT) 160-4.5 MCG/ACT inhaler 2 puff  2 puff Inhalation BID RT    empagliflozin (JARDIANCE) tablet 10 mg  10 mg Oral Daily    metoprolol succinate (TOPROL XL) extended release tablet 100 mg  100 mg Oral Daily    oxyBUTYnin (DITROPAN) tablet 5 mg  5 mg Oral TID    sacubitril-valsartan (ENTRESTO)  MG per tablet 1 tablet  1 tablet Oral BID    spironolactone (ALDACTONE) tablet 25 mg  25 mg Oral Daily    theophylline (JEREL-24) extended release capsule 300 mg  300 mg Oral Daily    sodium chloride flush 0.9 % injection 5-40 mL  5-40 mL IntraVENous 2 times per day    sodium chloride flush 0.9 % injection 5-40 mL  5-40 mL IntraVENous PRN    0.9 % sodium chloride infusion   IntraVENous PRN    ondansetron (ZOFRAN-ODT) disintegrating tablet 4 mg  4 mg Oral Q8H PRN    Or    ondansetron (ZOFRAN) injection 4 mg  4 mg IntraVENous Q6H PRN    polyethylene glycol (GLYCOLAX) packet 17 g  17 g Oral Daily PRN    acetaminophen (TYLENOL) tablet 650 mg  650 mg Oral Q6H PRN    Or    acetaminophen (TYLENOL) suppository 650 mg  650 mg Rectal Q6H PRN    potassium chloride (KLOR-CON M) extended release tablet 40 mEq  40 mEq Oral PRN    Or    potassium bicarb-citric acid (EFFER-K) effervescent tablet 40 mEq  40 mEq Oral PRN    Or    potassium chloride 10 mEq/100 mL IVPB (Peripheral Line)  10 mEq IntraVENous PRN    magnesium sulfate 2000 mg in 50 mL IVPB premix  2,000 mg IntraVENous PRN    bumetanide (BUMEX) injection 2 mg  2 mg IntraVENous BID    metOLazone (ZAROXOLYN) tablet 5 mg  5 mg Oral Daily    traMADol (ULTRAM) tablet 50 mg  50 mg Oral Q6H PRN    traMADol (ULTRAM) tablet 100 mg  100 mg Oral Q6H PRN    labetalol (NORMODYNE;TRANDATE) injection 10 mg  10 mg IntraVENous Q4H PRN    ipratropium 0.5 mg-albuterol 2.5 mg (DUONEB) nebulizer solution 1 Dose  1 Dose Inhalation Q4H PRN       Review of Systems:   Pertinent items are noted in HPI.    Objective:   Physical Exam:     BP (!) 111/96   Pulse 55   
\"diffuse thickening and subcutaneous induration/edema, concerning for cellulitis.  No definite fluid collection or mass\"    --------  Patient is seen today for follow-up.  On evaluation today, she reports that she has been experiencing continued shortness of breath.  She also endorses a dry cough as well as some mild dizziness.  She also reports lower extremity soreness as well as abdominal pain located predominantly on the right side of the abdomen.  She endorses nausea as well as some diarrhea this morning.  She denies any chest pain.  She denies any vomiting.      Labs this morning show carbon dioxide of 36, BUN, BUNPL of 36, creatinine of 2.16, anion gap of 3, and estimated GFR of 24, glucose of 134, calcium of 8.1.      Medications reviewed  Current Facility-Administered Medications   Medication Dose Route Frequency    melatonin tablet 3 mg  3 mg Oral Nightly PRN    amiodarone (CORDARONE) tablet 200 mg  200 mg Oral Daily    allopurinol (ZYLOPRIM) tablet 100 mg  100 mg Oral Daily    apixaban (ELIQUIS) tablet 5 mg  5 mg Oral BID    aspirin EC tablet 81 mg  81 mg Oral Daily    atorvastatin (LIPITOR) tablet 40 mg  40 mg Oral Nightly    budesonide-formoterol (SYMBICORT) 160-4.5 MCG/ACT inhaler 2 puff  2 puff Inhalation BID RT    empagliflozin (JARDIANCE) tablet 10 mg  10 mg Oral Daily    metoprolol succinate (TOPROL XL) extended release tablet 100 mg  100 mg Oral Daily    oxyBUTYnin (DITROPAN) tablet 5 mg  5 mg Oral TID    sacubitril-valsartan (ENTRESTO)  MG per tablet 1 tablet  1 tablet Oral BID    spironolactone (ALDACTONE) tablet 25 mg  25 mg Oral Daily    theophylline (JEREL-24) extended release capsule 300 mg  300 mg Oral Daily    sodium chloride flush 0.9 % injection 5-40 mL  5-40 mL IntraVENous 2 times per day    sodium chloride flush 0.9 % injection 5-40 mL  5-40 mL IntraVENous PRN    0.9 % sodium chloride infusion   IntraVENous PRN    ondansetron (ZOFRAN-ODT) disintegrating tablet 4 mg  4 mg Oral Q8H 
prayed for healing, and peace and unity within the family.     follow-up is available on referral.    Ellen Lester,  Intern  Contact [facility] at [phone]

## 2024-07-19 LAB
BACTERIA SPEC CULT: NORMAL
BACTERIA SPEC CULT: NORMAL
Lab: NORMAL
Lab: NORMAL

## 2024-08-18 ENCOUNTER — APPOINTMENT (OUTPATIENT)
Facility: HOSPITAL | Age: 74
DRG: 280 | End: 2024-08-18
Payer: MEDICARE

## 2024-08-18 ENCOUNTER — APPOINTMENT (OUTPATIENT)
Facility: HOSPITAL | Age: 74
DRG: 280 | End: 2024-08-18
Attending: EMERGENCY MEDICINE
Payer: MEDICARE

## 2024-08-18 ENCOUNTER — HOSPITAL ENCOUNTER (INPATIENT)
Facility: HOSPITAL | Age: 74
LOS: 4 days | Discharge: HOME HEALTH | DRG: 280 | End: 2024-08-22
Attending: EMERGENCY MEDICINE | Admitting: INTERNAL MEDICINE
Payer: MEDICARE

## 2024-08-18 DIAGNOSIS — R09.02 HYPOXEMIA: ICD-10-CM

## 2024-08-18 DIAGNOSIS — I50.9 ACUTE HEART FAILURE, UNSPECIFIED HEART FAILURE TYPE (HCC): ICD-10-CM

## 2024-08-18 DIAGNOSIS — E87.79 VOLUME OVERLOAD STATE OF HEART: Primary | ICD-10-CM

## 2024-08-18 LAB
ALBUMIN SERPL-MCNC: 3.1 G/DL (ref 3.5–5)
ALBUMIN/GLOB SERPL: 1.1 (ref 1.1–2.2)
ALP SERPL-CCNC: 169 U/L (ref 45–117)
ALT SERPL-CCNC: 22 U/L (ref 12–78)
ANION GAP SERPL CALC-SCNC: 2 MMOL/L (ref 5–15)
AST SERPL W P-5'-P-CCNC: 22 U/L (ref 15–37)
BASOPHILS # BLD: 0 K/UL (ref 0–0.1)
BASOPHILS NFR BLD: 1 % (ref 0–1)
BILIRUB SERPL-MCNC: 0.9 MG/DL (ref 0.2–1)
BNP SERPL-MCNC: 6254 PG/ML
BUN SERPL-MCNC: 21 MG/DL (ref 6–20)
BUN/CREAT SERPL: 15 (ref 12–20)
CA-I BLD-MCNC: 8.4 MG/DL (ref 8.5–10.1)
CHLORIDE SERPL-SCNC: 114 MMOL/L (ref 97–108)
CO2 SERPL-SCNC: 29 MMOL/L (ref 21–32)
CREAT SERPL-MCNC: 1.4 MG/DL (ref 0.55–1.02)
DIFFERENTIAL METHOD BLD: ABNORMAL
EKG ATRIAL RATE: 69 BPM
EKG DIAGNOSIS: NORMAL
EKG P AXIS: -7 DEGREES
EKG P-R INTERVAL: 166 MS
EKG Q-T INTERVAL: 444 MS
EKG QRS DURATION: 100 MS
EKG QTC CALCULATION (BAZETT): 475 MS
EKG R AXIS: 24 DEGREES
EKG T AXIS: -83 DEGREES
EKG VENTRICULAR RATE: 69 BPM
EOSINOPHIL # BLD: 0.1 K/UL (ref 0–0.4)
EOSINOPHIL NFR BLD: 1 % (ref 0–7)
ERYTHROCYTE [DISTWIDTH] IN BLOOD BY AUTOMATED COUNT: 15.1 % (ref 11.5–14.5)
GLOBULIN SER CALC-MCNC: 2.7 G/DL (ref 2–4)
GLUCOSE BLD STRIP.AUTO-MCNC: 103 MG/DL (ref 65–100)
GLUCOSE SERPL-MCNC: 131 MG/DL (ref 65–100)
HCT VFR BLD AUTO: 41.3 % (ref 35–47)
HGB BLD-MCNC: 12.7 G/DL (ref 11.5–16)
IMM GRANULOCYTES # BLD AUTO: 0 K/UL (ref 0–0.04)
IMM GRANULOCYTES NFR BLD AUTO: 0 % (ref 0–0.5)
LYMPHOCYTES # BLD: 1.1 K/UL (ref 0.8–3.5)
LYMPHOCYTES NFR BLD: 19 % (ref 12–49)
MAGNESIUM SERPL-MCNC: 1.8 MG/DL (ref 1.6–2.4)
MCH RBC QN AUTO: 28.6 PG (ref 26–34)
MCHC RBC AUTO-ENTMCNC: 30.8 G/DL (ref 30–36.5)
MCV RBC AUTO: 93 FL (ref 80–99)
MONOCYTES # BLD: 0.5 K/UL (ref 0–1)
MONOCYTES NFR BLD: 9 % (ref 5–13)
NEUTS SEG # BLD: 4.1 K/UL (ref 1.8–8)
NEUTS SEG NFR BLD: 70 % (ref 32–75)
NRBC # BLD: 0 K/UL (ref 0–0.01)
NRBC BLD-RTO: 0 PER 100 WBC
PERFORMED BY:: ABNORMAL
PLATELET # BLD AUTO: 139 K/UL (ref 150–400)
PMV BLD AUTO: 12.8 FL (ref 8.9–12.9)
POTASSIUM SERPL-SCNC: 4 MMOL/L (ref 3.5–5.1)
PROT SERPL-MCNC: 5.8 G/DL (ref 6.4–8.2)
RBC # BLD AUTO: 4.44 M/UL (ref 3.8–5.2)
SODIUM SERPL-SCNC: 145 MMOL/L (ref 136–145)
TROPONIN I SERPL HS-MCNC: 100 NG/L (ref 0–51)
TROPONIN I SERPL HS-MCNC: 118 NG/L (ref 0–51)
TSH SERPL DL<=0.05 MIU/L-ACNC: 1.46 UIU/ML (ref 0.36–3.74)
WBC # BLD AUTO: 5.9 K/UL (ref 3.6–11)

## 2024-08-18 PROCEDURE — 2060000000 HC ICU INTERMEDIATE R&B

## 2024-08-18 PROCEDURE — 6370000000 HC RX 637 (ALT 250 FOR IP): Performed by: INTERNAL MEDICINE

## 2024-08-18 PROCEDURE — 36415 COLL VENOUS BLD VENIPUNCTURE: CPT

## 2024-08-18 PROCEDURE — 6360000002 HC RX W HCPCS: Performed by: EMERGENCY MEDICINE

## 2024-08-18 PROCEDURE — 80053 COMPREHEN METABOLIC PANEL: CPT

## 2024-08-18 PROCEDURE — 96374 THER/PROPH/DIAG INJ IV PUSH: CPT

## 2024-08-18 PROCEDURE — 6360000002 HC RX W HCPCS: Performed by: INTERNAL MEDICINE

## 2024-08-18 PROCEDURE — 83036 HEMOGLOBIN GLYCOSYLATED A1C: CPT

## 2024-08-18 PROCEDURE — 2580000003 HC RX 258: Performed by: INTERNAL MEDICINE

## 2024-08-18 PROCEDURE — 84443 ASSAY THYROID STIM HORMONE: CPT

## 2024-08-18 PROCEDURE — 84439 ASSAY OF FREE THYROXINE: CPT

## 2024-08-18 PROCEDURE — 99285 EMERGENCY DEPT VISIT HI MDM: CPT

## 2024-08-18 PROCEDURE — 84484 ASSAY OF TROPONIN QUANT: CPT

## 2024-08-18 PROCEDURE — 94640 AIRWAY INHALATION TREATMENT: CPT

## 2024-08-18 PROCEDURE — 93005 ELECTROCARDIOGRAM TRACING: CPT | Performed by: EMERGENCY MEDICINE

## 2024-08-18 PROCEDURE — 82962 GLUCOSE BLOOD TEST: CPT

## 2024-08-18 PROCEDURE — 83880 ASSAY OF NATRIURETIC PEPTIDE: CPT

## 2024-08-18 PROCEDURE — 93971 EXTREMITY STUDY: CPT

## 2024-08-18 PROCEDURE — 71045 X-RAY EXAM CHEST 1 VIEW: CPT

## 2024-08-18 PROCEDURE — 85025 COMPLETE CBC W/AUTO DIFF WBC: CPT

## 2024-08-18 PROCEDURE — 83735 ASSAY OF MAGNESIUM: CPT

## 2024-08-18 RX ORDER — INSULIN LISPRO 100 [IU]/ML
0-4 INJECTION, SOLUTION INTRAVENOUS; SUBCUTANEOUS NIGHTLY
Status: DISCONTINUED | OUTPATIENT
Start: 2024-08-18 | End: 2024-08-22 | Stop reason: HOSPADM

## 2024-08-18 RX ORDER — BUDESONIDE AND FORMOTEROL FUMARATE DIHYDRATE 160; 4.5 UG/1; UG/1
2 AEROSOL RESPIRATORY (INHALATION)
Status: DISCONTINUED | OUTPATIENT
Start: 2024-08-18 | End: 2024-08-22 | Stop reason: HOSPADM

## 2024-08-18 RX ORDER — BUMETANIDE 0.25 MG/ML
2 INJECTION INTRAMUSCULAR; INTRAVENOUS
Status: COMPLETED | OUTPATIENT
Start: 2024-08-18 | End: 2024-08-18

## 2024-08-18 RX ORDER — FUROSEMIDE 10 MG/ML
40 INJECTION INTRAMUSCULAR; INTRAVENOUS 2 TIMES DAILY
Status: DISCONTINUED | OUTPATIENT
Start: 2024-08-18 | End: 2024-08-22

## 2024-08-18 RX ORDER — SODIUM CHLORIDE 9 MG/ML
INJECTION, SOLUTION INTRAVENOUS PRN
Status: DISCONTINUED | OUTPATIENT
Start: 2024-08-18 | End: 2024-08-22 | Stop reason: HOSPADM

## 2024-08-18 RX ORDER — SODIUM CHLORIDE 0.9 % (FLUSH) 0.9 %
5-40 SYRINGE (ML) INJECTION EVERY 12 HOURS SCHEDULED
Status: DISCONTINUED | OUTPATIENT
Start: 2024-08-18 | End: 2024-08-22 | Stop reason: HOSPADM

## 2024-08-18 RX ORDER — OXYBUTYNIN CHLORIDE 5 MG/1
5 TABLET ORAL 3 TIMES DAILY
Status: DISCONTINUED | OUTPATIENT
Start: 2024-08-18 | End: 2024-08-22 | Stop reason: HOSPADM

## 2024-08-18 RX ORDER — METOLAZONE 5 MG/1
5 TABLET ORAL DAILY
Status: DISCONTINUED | OUTPATIENT
Start: 2024-08-18 | End: 2024-08-18

## 2024-08-18 RX ORDER — ATORVASTATIN CALCIUM 40 MG/1
40 TABLET, FILM COATED ORAL NIGHTLY
Status: DISCONTINUED | OUTPATIENT
Start: 2024-08-18 | End: 2024-08-22 | Stop reason: HOSPADM

## 2024-08-18 RX ORDER — ONDANSETRON 2 MG/ML
4 INJECTION INTRAMUSCULAR; INTRAVENOUS EVERY 6 HOURS PRN
Status: DISCONTINUED | OUTPATIENT
Start: 2024-08-18 | End: 2024-08-22 | Stop reason: HOSPADM

## 2024-08-18 RX ORDER — POLYETHYLENE GLYCOL 3350 17 G/17G
17 POWDER, FOR SOLUTION ORAL DAILY PRN
Status: DISCONTINUED | OUTPATIENT
Start: 2024-08-18 | End: 2024-08-22 | Stop reason: HOSPADM

## 2024-08-18 RX ORDER — ASPIRIN 81 MG/1
81 TABLET ORAL DAILY
Status: DISCONTINUED | OUTPATIENT
Start: 2024-08-18 | End: 2024-08-22 | Stop reason: HOSPADM

## 2024-08-18 RX ORDER — HYDRALAZINE HYDROCHLORIDE 50 MG/1
50 TABLET, FILM COATED ORAL EVERY 6 HOURS SCHEDULED
Status: DISCONTINUED | OUTPATIENT
Start: 2024-08-18 | End: 2024-08-22 | Stop reason: HOSPADM

## 2024-08-18 RX ORDER — POTASSIUM CHLORIDE 20 MEQ/1
40 TABLET, EXTENDED RELEASE ORAL PRN
Status: DISCONTINUED | OUTPATIENT
Start: 2024-08-18 | End: 2024-08-22 | Stop reason: HOSPADM

## 2024-08-18 RX ORDER — HYDRALAZINE HYDROCHLORIDE 50 MG/1
50 TABLET, FILM COATED ORAL EVERY 6 HOURS SCHEDULED
Status: DISCONTINUED | OUTPATIENT
Start: 2024-08-18 | End: 2024-08-18

## 2024-08-18 RX ORDER — ALLOPURINOL 100 MG/1
100 TABLET ORAL DAILY
Status: DISCONTINUED | OUTPATIENT
Start: 2024-08-18 | End: 2024-08-22 | Stop reason: HOSPADM

## 2024-08-18 RX ORDER — METOLAZONE 5 MG/1
5 TABLET ORAL DAILY
Status: DISCONTINUED | OUTPATIENT
Start: 2024-08-18 | End: 2024-08-22 | Stop reason: HOSPADM

## 2024-08-18 RX ORDER — LANOLIN ALCOHOL/MO/W.PET/CERES
3 CREAM (GRAM) TOPICAL NIGHTLY PRN
Status: DISCONTINUED | OUTPATIENT
Start: 2024-08-18 | End: 2024-08-22 | Stop reason: HOSPADM

## 2024-08-18 RX ORDER — POTASSIUM CHLORIDE 7.45 MG/ML
10 INJECTION INTRAVENOUS PRN
Status: DISCONTINUED | OUTPATIENT
Start: 2024-08-18 | End: 2024-08-22 | Stop reason: HOSPADM

## 2024-08-18 RX ORDER — SPIRONOLACTONE 25 MG/1
25 TABLET ORAL DAILY
Status: DISCONTINUED | OUTPATIENT
Start: 2024-08-18 | End: 2024-08-22 | Stop reason: HOSPADM

## 2024-08-18 RX ORDER — METOPROLOL SUCCINATE 25 MG/1
100 TABLET, EXTENDED RELEASE ORAL DAILY
Status: DISCONTINUED | OUTPATIENT
Start: 2024-08-18 | End: 2024-08-22 | Stop reason: HOSPADM

## 2024-08-18 RX ORDER — SODIUM CHLORIDE 0.9 % (FLUSH) 0.9 %
5-40 SYRINGE (ML) INJECTION PRN
Status: DISCONTINUED | OUTPATIENT
Start: 2024-08-18 | End: 2024-08-22 | Stop reason: HOSPADM

## 2024-08-18 RX ORDER — GLUCAGON 1 MG/ML
1 KIT INJECTION PRN
Status: DISCONTINUED | OUTPATIENT
Start: 2024-08-18 | End: 2024-08-22 | Stop reason: HOSPADM

## 2024-08-18 RX ORDER — DEXTROSE MONOHYDRATE 100 MG/ML
INJECTION, SOLUTION INTRAVENOUS CONTINUOUS PRN
Status: DISCONTINUED | OUTPATIENT
Start: 2024-08-18 | End: 2024-08-22 | Stop reason: HOSPADM

## 2024-08-18 RX ORDER — INSULIN LISPRO 100 [IU]/ML
0-4 INJECTION, SOLUTION INTRAVENOUS; SUBCUTANEOUS
Status: DISCONTINUED | OUTPATIENT
Start: 2024-08-18 | End: 2024-08-22 | Stop reason: HOSPADM

## 2024-08-18 RX ORDER — MAGNESIUM SULFATE IN WATER 40 MG/ML
2000 INJECTION, SOLUTION INTRAVENOUS PRN
Status: DISCONTINUED | OUTPATIENT
Start: 2024-08-18 | End: 2024-08-22 | Stop reason: HOSPADM

## 2024-08-18 RX ORDER — ONDANSETRON 4 MG/1
4 TABLET, ORALLY DISINTEGRATING ORAL EVERY 8 HOURS PRN
Status: DISCONTINUED | OUTPATIENT
Start: 2024-08-18 | End: 2024-08-22 | Stop reason: HOSPADM

## 2024-08-18 RX ORDER — CETIRIZINE HYDROCHLORIDE 10 MG/1
10 TABLET ORAL DAILY
Status: DISCONTINUED | OUTPATIENT
Start: 2024-08-18 | End: 2024-08-22 | Stop reason: HOSPADM

## 2024-08-18 RX ORDER — AMIODARONE HYDROCHLORIDE 200 MG/1
200 TABLET ORAL DAILY
Status: DISCONTINUED | OUTPATIENT
Start: 2024-08-18 | End: 2024-08-22 | Stop reason: HOSPADM

## 2024-08-18 RX ADMIN — METOPROLOL SUCCINATE 100 MG: 50 TABLET, EXTENDED RELEASE ORAL at 16:58

## 2024-08-18 RX ADMIN — APIXABAN 5 MG: 5 TABLET, FILM COATED ORAL at 20:49

## 2024-08-18 RX ADMIN — SACUBITRIL AND VALSARTAN 1 TABLET: 97; 103 TABLET, FILM COATED ORAL at 20:48

## 2024-08-18 RX ADMIN — OXYBUTYNIN CHLORIDE 5 MG: 5 TABLET ORAL at 20:48

## 2024-08-18 RX ADMIN — OXYBUTYNIN CHLORIDE 5 MG: 5 TABLET ORAL at 16:58

## 2024-08-18 RX ADMIN — Medication 2 PUFF: at 20:02

## 2024-08-18 RX ADMIN — ATORVASTATIN CALCIUM 40 MG: 40 TABLET, FILM COATED ORAL at 20:49

## 2024-08-18 RX ADMIN — SPIRONOLACTONE 25 MG: 25 TABLET, FILM COATED ORAL at 17:02

## 2024-08-18 RX ADMIN — METOLAZONE 5 MG: 5 TABLET ORAL at 18:45

## 2024-08-18 RX ADMIN — AMIODARONE HYDROCHLORIDE 200 MG: 200 TABLET ORAL at 16:58

## 2024-08-18 RX ADMIN — FUROSEMIDE 40 MG: 10 INJECTION, SOLUTION INTRAMUSCULAR; INTRAVENOUS at 18:44

## 2024-08-18 RX ADMIN — CETIRIZINE HYDROCHLORIDE 10 MG: 10 TABLET, FILM COATED ORAL at 16:57

## 2024-08-18 RX ADMIN — HYDRALAZINE HYDROCHLORIDE 50 MG: 50 TABLET ORAL at 16:58

## 2024-08-18 RX ADMIN — ASPIRIN 81 MG: 81 TABLET, COATED ORAL at 16:58

## 2024-08-18 RX ADMIN — BUMETANIDE 2 MG: 0.25 INJECTION INTRAMUSCULAR; INTRAVENOUS at 13:16

## 2024-08-18 RX ADMIN — HYDRALAZINE HYDROCHLORIDE 50 MG: 50 TABLET ORAL at 23:27

## 2024-08-18 RX ADMIN — ALLOPURINOL 100 MG: 100 TABLET ORAL at 17:02

## 2024-08-18 RX ADMIN — SODIUM CHLORIDE, PRESERVATIVE FREE 10 ML: 5 INJECTION INTRAVENOUS at 20:48

## 2024-08-18 ASSESSMENT — PAIN SCALES - GENERAL: PAINLEVEL_OUTOF10: 8

## 2024-08-18 NOTE — H&P
History & Physical    Primary Care Provider: Ricardo Calle MD  Source of Information: Patient/family     Chief complaint:   Chief Complaint   Patient presents with    Swelling    Shortness of Breath        History of Presenting Illness:   Nubia Lion is a 73 y.o. female with multiple medical problems including paroxysmal atrial fibrillation on Eliquis, chronic diastolic CHF, coronary artery disease status post stents, diabetes presenting to the ED with complaints of worsening shortness of breath over the last 3 days.  Notes nonproductive cough without fevers or chills.  Reports bilateral lower extremity leg swelling.  Notes compliance with home medications but continues to add salt to diet.  Chest x-ray shows increased vascular congestion and BNP noted to be over 6000.  Received Bumex 2 mg IV in the ED and will be admitted for further diuresis.  Initial O2 saturation on 4 L nasal cannula 85% with improvement to 92% on 6 L.       Review of Systems:  A comprehensive review of systems was negative except for that written in the History of Present Illness.     Past Medical History:   Diagnosis Date    Chronic obstructive pulmonary disease (HCC)     Congestive heart disease (HCC)     with preserved ejection fraction    Coronary artery disease     Diabetes (HCC)     HFrEF (heart failure with reduced ejection fraction) (HCC)     Hyperlipidemia     Hypertension     Myocardial infarct (HCC)     MICHELLE (obstructive sleep apnea)         Past Surgical History:   Procedure Laterality Date    BREAST BIOPSY      CHOLECYSTECTOMY      CORONARY ARTERY BYPASS GRAFT      HERNIA REPAIR      HYSTERECTOMY (CERVIX STATUS UNKNOWN)         Prior to Admission medications    Medication Sig Start Date End Date Taking? Authorizing Provider   melatonin 3 MG TABS tablet Take 1 tablet by mouth nightly as needed (insomnia) 7/17/24   Jose Roberto Candelaria MD   metOLazone (ZAROXOLYN) 5 MG tablet Take 1 tablet by mouth daily 7/17/24

## 2024-08-18 NOTE — CARE COORDINATION
08/18/24 1651   Service Assessment   Patient Orientation Alert and Oriented   Cognition Alert   History Provided By Patient   Primary Caregiver Self   Support Systems Children;Family Members   Patient's Healthcare Decision Maker is: Legal Next of Kin   PCP Verified by CM Yes   Last Visit to PCP Within last 3 months   Prior Functional Level Independent in ADLs/IADLs   Current Functional Level Independent in ADLs/IADLs   Can patient return to prior living arrangement Unknown at present   Ability to make needs known: Fair   Family able to assist with home care needs: Yes   Would you like for me to discuss the discharge plan with any other family members/significant others, and if so, who? Yes  (Daughter)     Advance Care Planning     General Advance Care Planning (ACP) Conversation    Date of Conversation: 8/18/2024  Conducted with: Patient with Decision Making Capacity  Other persons present: None    Healthcare Decision Maker:   Primary Decision Maker: Wen Jose - Child - [phone]    Primary Decision Maker: Genevieve Christian - Child - [phone]    Primary Decision Maker: Zelalem Jose - Child - [phone]     Today we documented Decision Maker(s) consistent with Legal Next of Kin hierarchy.    Length of Voluntary ACP Conversation in minutes:  <16 minutes (Non-Billable)    JENNY Grove      CM met w/ pt at bedside to discuss dc planning. Pt indep in ADLs, no dme/hh hx. Has hx of CVA, went to [facility] in the past. Charted PCP is correct, uses [facility] in [place]. CM team to follow for dispo.

## 2024-08-18 NOTE — ED NOTES
ED TO INPATIENT SBAR HANDOFF    Patient Name: Nubia Lion   Preferred Name: Nubia  : 1950  73 y.o.   Family/Caregiver Present: no   Code Status Order: Full Code  PO Status: NPO:No  Telemetry Order:   C-SSRS: Risk of Suicide: No Risk  Sitter no   Restraints:     Sepsis Risk Score      Situation  Chief Complaint   Patient presents with    Swelling    Shortness of Breath     Brief Description of Patient's Condition: Patient here for SOB, bilateral swollen legs with small blisters, some have opened. Has COPD, wears O2 4l per NC at home , Gave Bumex IV, returned 1100 ml of urine. Patient states she is \"breathing better\".   Mental Status: oriented, alert, coherent, logical, thought processes intact, and able to concentrate and follow conversation  Arrived from:Home  Imaging:   Vascular duplex lower extremity venous right         XR CHEST 1 VIEW   Final Result   No findings of an acute cardiopulmonary process.      Electronically signed by Naman Griggs        Abnormal labs:   Abnormal Labs Reviewed   CBC WITH AUTO DIFFERENTIAL - Abnormal; Notable for the following components:       Result Value    RDW 15.1 (*)     Platelets 139 (*)     All other components within normal limits   COMPREHENSIVE METABOLIC PANEL - Abnormal; Notable for the following components:    Chloride 114 (*)     Anion Gap 2 (*)     Glucose 131 (*)     BUN 21 (*)     Creatinine 1.40 (*)     Est, Glom Filt Rate 40 (*)     Calcium 8.4 (*)     Alk Phosphatase 169 (*)     Total Protein 5.8 (*)     Albumin 3.1 (*)     All other components within normal limits   TROPONIN - Abnormal; Notable for the following components:    Troponin, High Sensitivity 118 (*)     All other components within normal limits   BRAIN NATRIURETIC PEPTIDE - Abnormal; Notable for the following components:    NT Pro-BNP 6,254 (*)     All other components within normal limits   TROPONIN - Abnormal; Notable for the following components:    Troponin, High Sensitivity 100 (*)

## 2024-08-18 NOTE — ED PROVIDER NOTES
family. They convey agreement and understanding for the need to be admitted and for the admission diagnosis.         I am the Primary Clinician of Record. Kym Seo MD (electronically signed)    (Please note that parts of this dictation were completed with voice recognition software. Quite often unanticipated grammatical, syntax, homophones, and other interpretive errors are inadvertently transcribed by the computer software. Please disregards these errors. Please excuse any errors that have escaped final proofreading.)        Kym Seo MD  08/18/24 5783

## 2024-08-18 NOTE — ED TRIAGE NOTES
Patient having SOB and lower swelling hx of CHF. Pt on 4L of oxygen of home. Sx started early this morning. Pt at 85% on 4L.

## 2024-08-19 ENCOUNTER — APPOINTMENT (OUTPATIENT)
Facility: HOSPITAL | Age: 74
DRG: 280 | End: 2024-08-19
Attending: INTERNAL MEDICINE
Payer: MEDICARE

## 2024-08-19 LAB
ANION GAP SERPL CALC-SCNC: 5 MMOL/L (ref 5–15)
BUN SERPL-MCNC: 19 MG/DL (ref 6–20)
BUN/CREAT SERPL: 14 (ref 12–20)
CA-I BLD-MCNC: 8.8 MG/DL (ref 8.5–10.1)
CHLORIDE SERPL-SCNC: 112 MMOL/L (ref 97–108)
CHOLEST SERPL-MCNC: 145 MG/DL
CO2 SERPL-SCNC: 26 MMOL/L (ref 21–32)
CREAT SERPL-MCNC: 1.37 MG/DL (ref 0.55–1.02)
ECHO AO ASC DIAM: 3.2 CM
ECHO AO ASCENDING AORTA INDEX: 1.58 CM/M2
ECHO AO ROOT DIAM: 2.5 CM
ECHO AO ROOT INDEX: 1.23 CM/M2
ECHO AV AREA PEAK VELOCITY: 1.5 CM2
ECHO AV AREA/BSA PEAK VELOCITY: 0.7 CM2/M2
ECHO AV PEAK GRADIENT: 12 MMHG
ECHO AV PEAK VELOCITY: 1.7 M/S
ECHO AV VELOCITY RATIO: 0.65
ECHO BSA: 2.07 M2
ECHO BSA: 2.07 M2
ECHO EST RA PRESSURE: 8 MMHG
ECHO IVC EXP: 2.2 CM
ECHO LA AREA 2C: 23.1 CM2
ECHO LA AREA 4C: 15.4 CM2
ECHO LA DIAMETER INDEX: 2.12 CM/M2
ECHO LA DIAMETER: 4.3 CM
ECHO LA MAJOR AXIS: 6.8 CM
ECHO LA MINOR AXIS: 5.8 CM
ECHO LA TO AORTIC ROOT RATIO: 1.72
ECHO LA VOL BP: 50 ML (ref 22–52)
ECHO LA VOL MOD A2C: 76 ML (ref 22–52)
ECHO LA VOL MOD A4C: 28 ML (ref 22–52)
ECHO LA VOL/BSA BIPLANE: 25 ML/M2 (ref 16–34)
ECHO LA VOLUME INDEX MOD A2C: 37 ML/M2 (ref 16–34)
ECHO LA VOLUME INDEX MOD A4C: 14 ML/M2 (ref 16–34)
ECHO LV E' LATERAL VELOCITY: 8 CM/S
ECHO LV E' SEPTAL VELOCITY: 6 CM/S
ECHO LV FRACTIONAL SHORTENING: 17 % (ref 28–44)
ECHO LV INTERNAL DIMENSION DIASTOLE INDEX: 2.02 CM/M2
ECHO LV INTERNAL DIMENSION DIASTOLIC: 4.1 CM (ref 3.9–5.3)
ECHO LV INTERNAL DIMENSION SYSTOLIC INDEX: 1.67 CM/M2
ECHO LV INTERNAL DIMENSION SYSTOLIC: 3.4 CM
ECHO LV IVSD: 1.3 CM (ref 0.6–0.9)
ECHO LV MASS 2D: 228.6 G (ref 67–162)
ECHO LV MASS INDEX 2D: 112.6 G/M2 (ref 43–95)
ECHO LV POSTERIOR WALL DIASTOLIC: 1.6 CM (ref 0.6–0.9)
ECHO LV RELATIVE WALL THICKNESS RATIO: 0.78
ECHO LVOT AREA: 2.3 CM2
ECHO LVOT DIAM: 1.7 CM
ECHO LVOT PEAK GRADIENT: 5 MMHG
ECHO LVOT PEAK VELOCITY: 1.1 M/S
ECHO MV A VELOCITY: 0.91 M/S
ECHO MV E DECELERATION TIME (DT): 212 MS
ECHO MV E VELOCITY: 0.83 M/S
ECHO MV E/A RATIO: 0.91
ECHO MV E/E' LATERAL: 10.38
ECHO MV E/E' RATIO (AVERAGED): 12.1
ECHO MV E/E' SEPTAL: 13.83
ECHO PULMONARY ARTERY END DIASTOLIC PRESSURE: 7 MMHG
ECHO PV ACCELERATION TIME (AT): 87 MS
ECHO PV MAX VELOCITY: 0.9 M/S
ECHO PV PEAK GRADIENT: 3 MMHG
ECHO PV REGURGITANT MAX VELOCITY: 1.4 M/S
ECHO RA AREA 4C: 22.3 CM2
ECHO RA END SYSTOLIC VOLUME APICAL 4 CHAMBER INDEX BSA: 33 ML/M2
ECHO RA VOLUME: 68 ML
ECHO RIGHT VENTRICULAR SYSTOLIC PRESSURE (RVSP): 43 MMHG
ECHO RV BASAL DIMENSION: 3.8 CM
ECHO RV FREE WALL PEAK S': 7 CM/S
ECHO RV INTERNAL DIMENSION: 3.4 CM
ECHO RV TAPSE: 1.2 CM (ref 1.7–?)
ECHO TV REGURGITANT MAX VELOCITY: 2.97 M/S
ECHO TV REGURGITANT PEAK GRADIENT: 35 MMHG
EST. AVERAGE GLUCOSE BLD GHB EST-MCNC: 134 MG/DL
GLUCOSE BLD STRIP.AUTO-MCNC: 110 MG/DL (ref 65–100)
GLUCOSE BLD STRIP.AUTO-MCNC: 112 MG/DL (ref 65–100)
GLUCOSE BLD STRIP.AUTO-MCNC: 140 MG/DL (ref 65–100)
GLUCOSE BLD STRIP.AUTO-MCNC: 276 MG/DL (ref 65–100)
GLUCOSE SERPL-MCNC: 98 MG/DL (ref 65–100)
HBA1C MFR BLD: 6.3 % (ref 4–5.6)
HDLC SERPL-MCNC: 52 MG/DL
HDLC SERPL: 2.8 (ref 0–5)
LDLC SERPL CALC-MCNC: 76.4 MG/DL (ref 0–100)
LIPID PANEL: NORMAL
MAGNESIUM SERPL-MCNC: 1.8 MG/DL (ref 1.6–2.4)
PERFORMED BY:: ABNORMAL
POTASSIUM SERPL-SCNC: 4 MMOL/L (ref 3.5–5.1)
SODIUM SERPL-SCNC: 143 MMOL/L (ref 136–145)
T4 FREE SERPL-MCNC: 1.3 NG/DL (ref 0.8–1.5)
TRIGL SERPL-MCNC: 83 MG/DL
VLDLC SERPL CALC-MCNC: 16.6 MG/DL

## 2024-08-19 PROCEDURE — 93306 TTE W/DOPPLER COMPLETE: CPT

## 2024-08-19 PROCEDURE — 93971 EXTREMITY STUDY: CPT | Performed by: SURGERY

## 2024-08-19 PROCEDURE — 80048 BASIC METABOLIC PNL TOTAL CA: CPT

## 2024-08-19 PROCEDURE — 80061 LIPID PANEL: CPT

## 2024-08-19 PROCEDURE — 6370000000 HC RX 637 (ALT 250 FOR IP): Performed by: INTERNAL MEDICINE

## 2024-08-19 PROCEDURE — 94761 N-INVAS EAR/PLS OXIMETRY MLT: CPT

## 2024-08-19 PROCEDURE — 82962 GLUCOSE BLOOD TEST: CPT

## 2024-08-19 PROCEDURE — 2060000000 HC ICU INTERMEDIATE R&B

## 2024-08-19 PROCEDURE — 2580000003 HC RX 258: Performed by: INTERNAL MEDICINE

## 2024-08-19 PROCEDURE — 36415 COLL VENOUS BLD VENIPUNCTURE: CPT

## 2024-08-19 PROCEDURE — 83735 ASSAY OF MAGNESIUM: CPT

## 2024-08-19 PROCEDURE — 2700000000 HC OXYGEN THERAPY PER DAY

## 2024-08-19 PROCEDURE — 6360000002 HC RX W HCPCS: Performed by: INTERNAL MEDICINE

## 2024-08-19 PROCEDURE — 94640 AIRWAY INHALATION TREATMENT: CPT

## 2024-08-19 RX ADMIN — OXYBUTYNIN CHLORIDE 5 MG: 5 TABLET ORAL at 14:46

## 2024-08-19 RX ADMIN — Medication 2 PUFF: at 21:17

## 2024-08-19 RX ADMIN — APIXABAN 5 MG: 5 TABLET, FILM COATED ORAL at 09:20

## 2024-08-19 RX ADMIN — OXYBUTYNIN CHLORIDE 5 MG: 5 TABLET ORAL at 22:09

## 2024-08-19 RX ADMIN — SACUBITRIL AND VALSARTAN 1 TABLET: 97; 103 TABLET, FILM COATED ORAL at 22:09

## 2024-08-19 RX ADMIN — APIXABAN 5 MG: 5 TABLET, FILM COATED ORAL at 22:09

## 2024-08-19 RX ADMIN — FUROSEMIDE 40 MG: 10 INJECTION, SOLUTION INTRAMUSCULAR; INTRAVENOUS at 18:50

## 2024-08-19 RX ADMIN — INSULIN LISPRO 2 UNITS: 100 INJECTION, SOLUTION INTRAVENOUS; SUBCUTANEOUS at 12:08

## 2024-08-19 RX ADMIN — METOPROLOL SUCCINATE 100 MG: 50 TABLET, EXTENDED RELEASE ORAL at 09:22

## 2024-08-19 RX ADMIN — FUROSEMIDE 40 MG: 10 INJECTION, SOLUTION INTRAMUSCULAR; INTRAVENOUS at 09:21

## 2024-08-19 RX ADMIN — THEOPHYLLINE ANHYDROUS 300 MG: 300 CAPSULE, EXTENDED RELEASE ORAL at 09:23

## 2024-08-19 RX ADMIN — SODIUM CHLORIDE, PRESERVATIVE FREE 10 ML: 5 INJECTION INTRAVENOUS at 09:24

## 2024-08-19 RX ADMIN — HYDRALAZINE HYDROCHLORIDE 50 MG: 50 TABLET ORAL at 18:51

## 2024-08-19 RX ADMIN — SPIRONOLACTONE 25 MG: 25 TABLET, FILM COATED ORAL at 09:23

## 2024-08-19 RX ADMIN — METOLAZONE 5 MG: 5 TABLET ORAL at 09:21

## 2024-08-19 RX ADMIN — ATORVASTATIN CALCIUM 40 MG: 40 TABLET, FILM COATED ORAL at 22:09

## 2024-08-19 RX ADMIN — HYDRALAZINE HYDROCHLORIDE 50 MG: 50 TABLET ORAL at 11:58

## 2024-08-19 RX ADMIN — AMIODARONE HYDROCHLORIDE 200 MG: 200 TABLET ORAL at 09:20

## 2024-08-19 RX ADMIN — ALLOPURINOL 100 MG: 100 TABLET ORAL at 09:20

## 2024-08-19 RX ADMIN — SACUBITRIL AND VALSARTAN 1 TABLET: 97; 103 TABLET, FILM COATED ORAL at 09:23

## 2024-08-19 RX ADMIN — HYDRALAZINE HYDROCHLORIDE 50 MG: 50 TABLET ORAL at 06:22

## 2024-08-19 RX ADMIN — Medication 2 PUFF: at 07:12

## 2024-08-19 RX ADMIN — CETIRIZINE HYDROCHLORIDE 10 MG: 10 TABLET, FILM COATED ORAL at 09:20

## 2024-08-19 RX ADMIN — ASPIRIN 81 MG: 81 TABLET, COATED ORAL at 09:20

## 2024-08-19 RX ADMIN — OXYBUTYNIN CHLORIDE 5 MG: 5 TABLET ORAL at 09:23

## 2024-08-19 RX ADMIN — SODIUM CHLORIDE, PRESERVATIVE FREE 10 ML: 5 INJECTION INTRAVENOUS at 22:13

## 2024-08-19 NOTE — CARE COORDINATION
Chart reviewed, DCP remains for patient to return home with family, however CM awaiting PT/OT recc for further DCP needs.

## 2024-08-19 NOTE — NURSE NAVIGATOR
Home/HH, Hospice, Palliative, LTC, SNF, Corrections, transfer)?              - Unknown at this time. Previous hospitalization she discharged with Home Health. (NEEDS PT/OT Eval)        20. HF Appt made?  Y/N   Who can come to the appt with you?    -Depending on D/C Disposition

## 2024-08-19 NOTE — DISCHARGE INSTR - DIET
No Salt added, 2gm sodium restriction Heart Healthy diet   Low Carb Diabetic Diet  2 liter fluid restriction.

## 2024-08-19 NOTE — CONSULTS
Nutrition Education    Educated on Heart Failure Nutrition Therapy and Heart Healthy Label Reading Tips.     RD consulted for HF education. RD familiar w pt from previous HF education given 7/15. Pt reports remembering RD and education previously provided. Briefly reviewed education. Pt reports having an alright appetite w ~25% PO intake from breakfast. No N/V, denies dysphagia. Reports being constipated w LBM being PTA. Edema RLE +3 non-pitting, LLE +3 non-pitting, weeping. Meds include furosemide. Abnormal labs include Ch 112, Cr 1.37, GFR 41, POC Glu 103-110, Calcium 8.4 Albumin 3.1.     Learners: Patient  Readiness: Acceptance  Method: Explanation and Handout  Response: Verbalizes Understanding  Contact name and number provided.    Tereza Hillman RD  Contact Number: [phone] or [employer]    
6,815 04/11/2023 07:57 PM      Lab Results   Component Value Date/Time    TSH 1.46 08/18/2024 12:27 PM          Imaging & Acillary Studies     Last EKG    Encounter Date: 08/18/24   EKG 12 Lead   Result Value    Ventricular Rate 69    Atrial Rate 69    P-R Interval 166    QRS Duration 100    Q-T Interval 444    QTc Calculation (Bazett) 475    P Axis -7    R Axis 24    T Axis -83    Diagnosis      Sinus rhythm with marked sinus arrhythmia with occasional Premature   ventricular complexes  Possible Inferior infarct , age undetermined  Abnormal ECG  When compared with ECG of 13-JUL-2024 21:24,  Premature ventricular complexes are now Present  Nonspecific T wave abnormality, improved in Anterolateral leads  QT has lengthened  Confirmed by JENNIFER RODRIGUEZ MD ([id number]) on 8/18/2024 12:23:05 PM          Vascular duplex lower extremity venous right   Final Result      XR CHEST 1 VIEW   Final Result   No findings of an acute cardiopulmonary process.      Electronically signed by Naman Griggs          Cardiac cath:    No results found for this or any previous visit.       Echo:     01/04/24    ECHO (TTE) LIMITED (PRN CONTRAST/BUBBLE/STRAIN/3D) 01/07/2024  4:49 PM (Final)    Interpretation Summary    Left Ventricle: Normal left ventricular systolic function with a visually estimated EF of 65 - 70%. EF by 2D Simpsons Biplane is 70%. Left ventricle size is normal. LVIDd index is 2.33 cm/m2. Mild septal thickening. Mild posterior thickening. Mass index 2D is 92.8 g/m2. Normal wall motion. Grade II diastolic dysfunction with increased LAP.    Right Ventricle: Right ventricle is underfilled. Normal systolic function. TAPSE is abnormal. TAPSE is 1.4 cm. Regional wall motion abnormalities present.    Aortic Valve: Trileaflet valve. Mild sclerosis of the aortic valve cusp.    Tricuspid Valve: Mild regurgitation with jet direction toward the septum. The estimated RVSP is 27 mmHg. Mildly elevated RVSP. Est RA pressure is 5 mmHg. The

## 2024-08-20 LAB
ANION GAP SERPL CALC-SCNC: 5 MMOL/L (ref 5–15)
BUN SERPL-MCNC: 23 MG/DL (ref 6–20)
BUN/CREAT SERPL: 12 (ref 12–20)
CA-I BLD-MCNC: 8.7 MG/DL (ref 8.5–10.1)
CHLORIDE SERPL-SCNC: 106 MMOL/L (ref 97–108)
CO2 SERPL-SCNC: 36 MMOL/L (ref 21–32)
CREAT SERPL-MCNC: 1.99 MG/DL (ref 0.55–1.02)
GLUCOSE BLD STRIP.AUTO-MCNC: 114 MG/DL (ref 65–100)
GLUCOSE BLD STRIP.AUTO-MCNC: 117 MG/DL (ref 65–100)
GLUCOSE BLD STRIP.AUTO-MCNC: 141 MG/DL (ref 65–100)
GLUCOSE SERPL-MCNC: 108 MG/DL (ref 65–100)
MAGNESIUM SERPL-MCNC: 1.9 MG/DL (ref 1.6–2.4)
PERFORMED BY:: ABNORMAL
POTASSIUM SERPL-SCNC: 4.1 MMOL/L (ref 3.5–5.1)
SODIUM SERPL-SCNC: 147 MMOL/L (ref 136–145)

## 2024-08-20 PROCEDURE — 2580000003 HC RX 258: Performed by: INTERNAL MEDICINE

## 2024-08-20 PROCEDURE — 82962 GLUCOSE BLOOD TEST: CPT

## 2024-08-20 PROCEDURE — 97165 OT EVAL LOW COMPLEX 30 MIN: CPT

## 2024-08-20 PROCEDURE — 6370000000 HC RX 637 (ALT 250 FOR IP): Performed by: INTERNAL MEDICINE

## 2024-08-20 PROCEDURE — 97530 THERAPEUTIC ACTIVITIES: CPT

## 2024-08-20 PROCEDURE — 36415 COLL VENOUS BLD VENIPUNCTURE: CPT

## 2024-08-20 PROCEDURE — 97116 GAIT TRAINING THERAPY: CPT

## 2024-08-20 PROCEDURE — 6370000000 HC RX 637 (ALT 250 FOR IP): Performed by: HOSPITALIST

## 2024-08-20 PROCEDURE — 97161 PT EVAL LOW COMPLEX 20 MIN: CPT

## 2024-08-20 PROCEDURE — 6360000002 HC RX W HCPCS: Performed by: INTERNAL MEDICINE

## 2024-08-20 PROCEDURE — 94761 N-INVAS EAR/PLS OXIMETRY MLT: CPT

## 2024-08-20 PROCEDURE — 2700000000 HC OXYGEN THERAPY PER DAY

## 2024-08-20 PROCEDURE — 94640 AIRWAY INHALATION TREATMENT: CPT

## 2024-08-20 PROCEDURE — 83735 ASSAY OF MAGNESIUM: CPT

## 2024-08-20 PROCEDURE — 80048 BASIC METABOLIC PNL TOTAL CA: CPT

## 2024-08-20 PROCEDURE — 2060000000 HC ICU INTERMEDIATE R&B

## 2024-08-20 RX ORDER — ACETAMINOPHEN 325 MG/1
650 TABLET ORAL EVERY 4 HOURS PRN
Status: DISCONTINUED | OUTPATIENT
Start: 2024-08-20 | End: 2024-08-22 | Stop reason: HOSPADM

## 2024-08-20 RX ADMIN — APIXABAN 5 MG: 5 TABLET, FILM COATED ORAL at 09:31

## 2024-08-20 RX ADMIN — OXYBUTYNIN CHLORIDE 5 MG: 5 TABLET ORAL at 20:41

## 2024-08-20 RX ADMIN — AMIODARONE HYDROCHLORIDE 200 MG: 200 TABLET ORAL at 09:30

## 2024-08-20 RX ADMIN — HYDRALAZINE HYDROCHLORIDE 50 MG: 50 TABLET ORAL at 07:35

## 2024-08-20 RX ADMIN — Medication 2 PUFF: at 20:54

## 2024-08-20 RX ADMIN — OXYBUTYNIN CHLORIDE 5 MG: 5 TABLET ORAL at 09:41

## 2024-08-20 RX ADMIN — SODIUM CHLORIDE, PRESERVATIVE FREE 10 ML: 5 INJECTION INTRAVENOUS at 20:51

## 2024-08-20 RX ADMIN — FUROSEMIDE 40 MG: 10 INJECTION, SOLUTION INTRAMUSCULAR; INTRAVENOUS at 17:00

## 2024-08-20 RX ADMIN — THEOPHYLLINE ANHYDROUS 300 MG: 300 CAPSULE, EXTENDED RELEASE ORAL at 09:30

## 2024-08-20 RX ADMIN — CETIRIZINE HYDROCHLORIDE 10 MG: 10 TABLET, FILM COATED ORAL at 09:33

## 2024-08-20 RX ADMIN — OXYBUTYNIN CHLORIDE 5 MG: 5 TABLET ORAL at 13:06

## 2024-08-20 RX ADMIN — METOLAZONE 5 MG: 5 TABLET ORAL at 09:32

## 2024-08-20 RX ADMIN — HYDRALAZINE HYDROCHLORIDE 50 MG: 50 TABLET ORAL at 12:46

## 2024-08-20 RX ADMIN — ATORVASTATIN CALCIUM 40 MG: 40 TABLET, FILM COATED ORAL at 20:41

## 2024-08-20 RX ADMIN — METOPROLOL SUCCINATE 100 MG: 50 TABLET, EXTENDED RELEASE ORAL at 09:30

## 2024-08-20 RX ADMIN — APIXABAN 5 MG: 5 TABLET, FILM COATED ORAL at 20:42

## 2024-08-20 RX ADMIN — SACUBITRIL AND VALSARTAN 1 TABLET: 97; 103 TABLET, FILM COATED ORAL at 09:40

## 2024-08-20 RX ADMIN — SODIUM CHLORIDE, PRESERVATIVE FREE 10 ML: 5 INJECTION INTRAVENOUS at 09:34

## 2024-08-20 RX ADMIN — HYDRALAZINE HYDROCHLORIDE 50 MG: 50 TABLET ORAL at 17:00

## 2024-08-20 RX ADMIN — HYDRALAZINE HYDROCHLORIDE 50 MG: 50 TABLET ORAL at 23:58

## 2024-08-20 RX ADMIN — Medication 2 PUFF: at 08:06

## 2024-08-20 RX ADMIN — SPIRONOLACTONE 25 MG: 25 TABLET, FILM COATED ORAL at 09:34

## 2024-08-20 RX ADMIN — ACETAMINOPHEN 650 MG: 325 TABLET ORAL at 23:58

## 2024-08-20 RX ADMIN — ALLOPURINOL 100 MG: 100 TABLET ORAL at 09:31

## 2024-08-20 RX ADMIN — ASPIRIN 81 MG: 81 TABLET, COATED ORAL at 09:30

## 2024-08-20 RX ADMIN — SACUBITRIL AND VALSARTAN 1 TABLET: 97; 103 TABLET, FILM COATED ORAL at 20:50

## 2024-08-20 RX ADMIN — FUROSEMIDE 40 MG: 10 INJECTION, SOLUTION INTRAMUSCULAR; INTRAVENOUS at 09:32

## 2024-08-20 ASSESSMENT — PAIN SCALES - GENERAL
PAINLEVEL_OUTOF10: 0
PAINLEVEL_OUTOF10: 0
PAINLEVEL_OUTOF10: 7
PAINLEVEL_OUTOF10: 0

## 2024-08-20 ASSESSMENT — PAIN DESCRIPTION - DESCRIPTORS: DESCRIPTORS: BURNING;THROBBING

## 2024-08-20 ASSESSMENT — PAIN DESCRIPTION - LOCATION: LOCATION: HAND;HEAD

## 2024-08-20 ASSESSMENT — PAIN - FUNCTIONAL ASSESSMENT: PAIN_FUNCTIONAL_ASSESSMENT: ACTIVITIES ARE NOT PREVENTED

## 2024-08-20 ASSESSMENT — PAIN DESCRIPTION - ORIENTATION: ORIENTATION: LEFT

## 2024-08-20 NOTE — PLAN OF CARE
Problem: Discharge Planning  Goal: Discharge to home or other facility with appropriate resources  Outcome: Progressing     Problem: Skin/Tissue Integrity  Goal: Absence of new skin breakdown  Description: 1.  Monitor for areas of redness and/or skin breakdown  2.  Assess vascular access sites hourly  3.  Every 4-6 hours minimum:  Change oxygen saturation probe site  4.  Every 4-6 hours:  If on nasal continuous positive airway pressure, respiratory therapy assess nares and determine need for appliance change or resting period.  Outcome: Progressing     Problem: Chronic Conditions and Co-morbidities  Goal: Patient's chronic conditions and co-morbidity symptoms are monitored and maintained or improved  Outcome: Progressing     Problem: Safety - Adult  Goal: Free from fall injury  Outcome: Progressing

## 2024-08-20 NOTE — PLAN OF CARE
PHYSICAL THERAPY EVALUATION  Patient: Nubia Lion (73 y.o. female)  Date: 8/20/2024  Primary Diagnosis: Heart failure (HCC) [I50.9]       Precautions: Fall Risk, Bed Alarm                      Recommendations for nursing mobility: Out of bed to chair for meals, Encourage HEP in prep for ADLs/mobility; see handout for details, Use of bed/chair alarm for safety, LE elevation for management of edema, AD and gt belt for bed to chair , Amb to bathroom with AD and gait belt, and Assist x1    In place during session: Nasal Cannula 3L, External Catheter, and EKG/telemetry     ASSESSMENT  Pt is a 73 y.o. female admitted on 8/18/2024 for worsening SOB; PMHx afib, CHF, CAD s/p stents, DM; pt currently being treated for heart failure . Pt semi supine upon PT arrival, agreeable to evaluation. Pt A&O x 4.      Based on the objective data described below, the patient currently presents with impaired functional mobility, decreased independence in ADLs, impaired strength, impaired sensation, decreased activity tolerance, impaired balance, and increased pain levels. (See below for objective details and assist levels).     Overall pt tolerated session well today with c/o 8/10 BLE pain at rest, tender and warm to palpation. Pt currently presents with BLE edema R >L, which pt reports is limiting her mobility d/t heaviness. Pt required CGA-minaA for bed mobility and CGA for all transfers. Pt amb 20 feet with RWW, gt belt and CGA; demonstrates slow baylee, decreased step clearance and decreased stance time on RLE d/t pain, no overt LOB or buckling noted. Pt completed toilet transfer with CGA, requires cues for use of grab bar and proximity to toilet. Once returned to bed spO2 desat to 87%, recovered to 90% with seated rest break and cues for PLB ~30sec. Pt reports that her daughter that she lives with works and will be unable to assist her at home; pt reports she requires assist with most ADLs at baseline. Due to current level of

## 2024-08-20 NOTE — CARE COORDINATION
Chart reviewed, patient recc for SNF, CM met with patient to discuss, patient declined at this time, states she has 24/7 care between her daughter and granddaughter and she would like to return home with HH, no preference for agency, referral sent, awaiting acceptance.    CM continues to follow and monitor for needs.

## 2024-08-20 NOTE — PLAN OF CARE
Problem: Discharge Planning  Goal: Discharge to home or other facility with appropriate resources  8/20/2024 0209 by Natalia Beasley RN  Outcome: Progressing  Flowsheets (Taken 8/19/2024 2210)  Discharge to home or other facility with appropriate resources: Identify barriers to discharge with patient and caregiver  8/19/2024 2100 by Sangita Rubalcava RN  Outcome: Progressing     Problem: Skin/Tissue Integrity  Goal: Absence of new skin breakdown  Description: 1.  Monitor for areas of redness and/or skin breakdown  2.  Assess vascular access sites hourly  3.  Every 4-6 hours minimum:  Change oxygen saturation probe site  4.  Every 4-6 hours:  If on nasal continuous positive airway pressure, respiratory therapy assess nares and determine need for appliance change or resting period.  8/20/2024 0209 by Natalia Beasley RN  Outcome: Progressing  8/19/2024 2100 by Sangita Rubalcava RN  Outcome: Progressing     Problem: Chronic Conditions and Co-morbidities  Goal: Patient's chronic conditions and co-morbidity symptoms are monitored and maintained or improved  8/20/2024 0209 by Natalia Beasley RN  Outcome: Progressing  Flowsheets (Taken 8/19/2024 2210)  Care Plan - Patient's Chronic Conditions and Co-Morbidity Symptoms are Monitored and Maintained or Improved: Monitor and assess patient's chronic conditions and comorbid symptoms for stability, deterioration, or improvement  8/19/2024 2100 by Sangita Rubalcava RN  Outcome: Progressing     Problem: Safety - Adult  Goal: Free from fall injury  8/20/2024 0209 by Natalia Beasley RN  Outcome: Progressing  Flowsheets (Taken 8/20/2024 0207)  Free From Fall Injury: Instruct family/caregiver on patient safety  8/19/2024 2100 by Sangita Rubalcava RN  Outcome: Progressing

## 2024-08-21 LAB
ANION GAP SERPL CALC-SCNC: 7 MMOL/L (ref 5–15)
BNP SERPL-MCNC: 4125 PG/ML
BUN SERPL-MCNC: 25 MG/DL (ref 6–20)
BUN/CREAT SERPL: 13 (ref 12–20)
CA-I BLD-MCNC: 9.1 MG/DL (ref 8.5–10.1)
CHLORIDE SERPL-SCNC: 100 MMOL/L (ref 97–108)
CO2 SERPL-SCNC: 35 MMOL/L (ref 21–32)
CREAT SERPL-MCNC: 1.89 MG/DL (ref 0.55–1.02)
GLUCOSE BLD STRIP.AUTO-MCNC: 114 MG/DL (ref 65–100)
GLUCOSE BLD STRIP.AUTO-MCNC: 131 MG/DL (ref 65–100)
GLUCOSE BLD STRIP.AUTO-MCNC: 143 MG/DL (ref 65–100)
GLUCOSE BLD STRIP.AUTO-MCNC: 145 MG/DL (ref 65–100)
GLUCOSE SERPL-MCNC: 137 MG/DL (ref 65–100)
MAGNESIUM SERPL-MCNC: 1.8 MG/DL (ref 1.6–2.4)
PERFORMED BY:: ABNORMAL
POTASSIUM SERPL-SCNC: 3.8 MMOL/L (ref 3.5–5.1)
SODIUM SERPL-SCNC: 142 MMOL/L (ref 136–145)

## 2024-08-21 PROCEDURE — 36415 COLL VENOUS BLD VENIPUNCTURE: CPT

## 2024-08-21 PROCEDURE — 80048 BASIC METABOLIC PNL TOTAL CA: CPT

## 2024-08-21 PROCEDURE — 94761 N-INVAS EAR/PLS OXIMETRY MLT: CPT

## 2024-08-21 PROCEDURE — 83735 ASSAY OF MAGNESIUM: CPT

## 2024-08-21 PROCEDURE — 94640 AIRWAY INHALATION TREATMENT: CPT

## 2024-08-21 PROCEDURE — 83880 ASSAY OF NATRIURETIC PEPTIDE: CPT

## 2024-08-21 PROCEDURE — 6370000000 HC RX 637 (ALT 250 FOR IP): Performed by: INTERNAL MEDICINE

## 2024-08-21 PROCEDURE — 6370000000 HC RX 637 (ALT 250 FOR IP): Performed by: HOSPITALIST

## 2024-08-21 PROCEDURE — 2580000003 HC RX 258: Performed by: INTERNAL MEDICINE

## 2024-08-21 PROCEDURE — 2060000000 HC ICU INTERMEDIATE R&B

## 2024-08-21 PROCEDURE — 6360000002 HC RX W HCPCS: Performed by: INTERNAL MEDICINE

## 2024-08-21 PROCEDURE — 82962 GLUCOSE BLOOD TEST: CPT

## 2024-08-21 PROCEDURE — 2700000000 HC OXYGEN THERAPY PER DAY

## 2024-08-21 RX ADMIN — AMIODARONE HYDROCHLORIDE 200 MG: 200 TABLET ORAL at 09:15

## 2024-08-21 RX ADMIN — FUROSEMIDE 40 MG: 10 INJECTION, SOLUTION INTRAMUSCULAR; INTRAVENOUS at 09:12

## 2024-08-21 RX ADMIN — OXYBUTYNIN CHLORIDE 5 MG: 5 TABLET ORAL at 14:34

## 2024-08-21 RX ADMIN — SODIUM CHLORIDE, PRESERVATIVE FREE 10 ML: 5 INJECTION INTRAVENOUS at 09:13

## 2024-08-21 RX ADMIN — SACUBITRIL AND VALSARTAN 1 TABLET: 97; 103 TABLET, FILM COATED ORAL at 09:10

## 2024-08-21 RX ADMIN — ASPIRIN 81 MG: 81 TABLET, COATED ORAL at 09:11

## 2024-08-21 RX ADMIN — APIXABAN 5 MG: 5 TABLET, FILM COATED ORAL at 21:06

## 2024-08-21 RX ADMIN — ATORVASTATIN CALCIUM 40 MG: 40 TABLET, FILM COATED ORAL at 21:06

## 2024-08-21 RX ADMIN — CETIRIZINE HYDROCHLORIDE 10 MG: 10 TABLET, FILM COATED ORAL at 09:15

## 2024-08-21 RX ADMIN — FUROSEMIDE 40 MG: 10 INJECTION, SOLUTION INTRAMUSCULAR; INTRAVENOUS at 17:35

## 2024-08-21 RX ADMIN — Medication 2 PUFF: at 09:45

## 2024-08-21 RX ADMIN — METOPROLOL SUCCINATE 100 MG: 50 TABLET, EXTENDED RELEASE ORAL at 09:09

## 2024-08-21 RX ADMIN — SPIRONOLACTONE 25 MG: 25 TABLET, FILM COATED ORAL at 09:10

## 2024-08-21 RX ADMIN — HYDRALAZINE HYDROCHLORIDE 50 MG: 50 TABLET ORAL at 06:32

## 2024-08-21 RX ADMIN — SODIUM CHLORIDE, PRESERVATIVE FREE 10 ML: 5 INJECTION INTRAVENOUS at 21:06

## 2024-08-21 RX ADMIN — OXYBUTYNIN CHLORIDE 5 MG: 5 TABLET ORAL at 09:11

## 2024-08-21 RX ADMIN — APIXABAN 5 MG: 5 TABLET, FILM COATED ORAL at 09:11

## 2024-08-21 RX ADMIN — HYDRALAZINE HYDROCHLORIDE 50 MG: 50 TABLET ORAL at 17:35

## 2024-08-21 RX ADMIN — HYDRALAZINE HYDROCHLORIDE 50 MG: 50 TABLET ORAL at 12:12

## 2024-08-21 RX ADMIN — Medication 2 PUFF: at 20:47

## 2024-08-21 RX ADMIN — OXYBUTYNIN CHLORIDE 5 MG: 5 TABLET ORAL at 21:06

## 2024-08-21 RX ADMIN — THEOPHYLLINE ANHYDROUS 300 MG: 300 CAPSULE, EXTENDED RELEASE ORAL at 09:10

## 2024-08-21 RX ADMIN — ACETAMINOPHEN 650 MG: 325 TABLET ORAL at 18:35

## 2024-08-21 RX ADMIN — METOLAZONE 5 MG: 5 TABLET ORAL at 09:10

## 2024-08-21 RX ADMIN — ALLOPURINOL 100 MG: 100 TABLET ORAL at 09:11

## 2024-08-21 ASSESSMENT — PAIN SCALES - GENERAL
PAINLEVEL_OUTOF10: 0
PAINLEVEL_OUTOF10: 0
PAINLEVEL_OUTOF10: 4
PAINLEVEL_OUTOF10: 0
PAINLEVEL_OUTOF10: 0

## 2024-08-21 ASSESSMENT — PAIN DESCRIPTION - ORIENTATION: ORIENTATION: LOWER

## 2024-08-21 ASSESSMENT — PAIN DESCRIPTION - LOCATION: LOCATION: BACK

## 2024-08-21 ASSESSMENT — PAIN DESCRIPTION - DESCRIPTORS: DESCRIPTORS: ACHING

## 2024-08-21 NOTE — PLAN OF CARE
Problem: Discharge Planning  Goal: Discharge to home or other facility with appropriate resources  Outcome: Progressing     Problem: Skin/Tissue Integrity  Goal: Absence of new skin breakdown  Description: 1.  Monitor for areas of redness and/or skin breakdown  2.  Assess vascular access sites hourly  3.  Every 4-6 hours minimum:  Change oxygen saturation probe site  4.  Every 4-6 hours:  If on nasal continuous positive airway pressure, respiratory therapy assess nares and determine need for appliance change or resting period.  Outcome: Progressing     Problem: Chronic Conditions and Co-morbidities  Goal: Patient's chronic conditions and co-morbidity symptoms are monitored and maintained or improved  Outcome: Progressing     Problem: Safety - Adult  Goal: Free from fall injury  Outcome: Progressing     Problem: Occupational Therapy - Adult  Goal: By Discharge: Performs self-care activities at highest level of function for planned discharge setting.  See evaluation for individualized goals.  Description: FUNCTIONAL STATUS PRIOR TO ADMISSION:  Pt reports requiring increased need for assistance w/ all self-care tasks immediately prior to admission s/t BLE edema.     HOME SUPPORT: The patient lived with daughter who provided assistance w/ self-care and IADL tasks.    Occupational Therapy Goals:  Initiated 8/20/2024  Patient/Family stated goal: improve strength and mobility   1.  Patient will perform grooming with [place] within 7 day(s).  2.  Patient will perform upper body dressing with [place] within 7 day(s).  3.  Patient will perform lower body dressing with Modified [place] within 7 day(s).  4.  Patient will perform toilet transfers with Modified [place]  within 7 day(s).  5.  Patient will perform all aspects of toileting with Modified [place] within 7 day(s).  6.  Patient will participate in upper extremity therapeutic exercise/activities with [place] within 7 day(s).      8/20/2024

## 2024-08-21 NOTE — CARE COORDINATION
[facility] with [facility], declined SNF, has family care.    CM continues to follow and monitor for needs.

## 2024-08-21 NOTE — PLAN OF CARE
Patient alert and oriented able to make needs known, up today to restroom to bath, sheets and gown changed patient is a 1 assist to stand with walker, Patient ambulated 15 and back tolerated well. Patient c/o pain at 1824, Tylenol 650 mg po given. Call light within reach, bedside table with in reach will continue to monitor.      Problem: Discharge Planning  Goal: Discharge to home or other facility with appropriate resources  8/21/2024 1822 by Alexia Dupont RN  Outcome: Progressing  8/21/2024 1822 by Alexia Dupont RN  Outcome: Progressing     Problem: Skin/Tissue Integrity  Goal: Absence of new skin breakdown  Description: 1.  Monitor for areas of redness and/or skin breakdown  2.  Assess vascular access sites hourly  3.  Every 4-6 hours minimum:  Change oxygen saturation probe site  4.  Every 4-6 hours:  If on nasal continuous positive airway pressure, respiratory therapy assess nares and determine need for appliance change or resting period.  8/21/2024 1822 by Alexia Dupont RN  Outcome: Progressing  8/21/2024 1822 by Alexia Dupont RN  Outcome: Progressing     Problem: Chronic Conditions and Co-morbidities  Goal: Patient's chronic conditions and co-morbidity symptoms are monitored and maintained or improved  8/21/2024 1822 by Alexia Dupont RN  Outcome: Progressing  8/21/2024 1822 by Alexia Dupont RN  Outcome: Progressing     Problem: Safety - Adult  Goal: Free from fall injury  8/21/2024 1822 by Alexia Dupont RN  Outcome: Progressing  8/21/2024 1822 by Alexia Dupont RN  Outcome: Progressing     Problem: Pain  Goal: Verbalizes/displays adequate comfort level or baseline comfort level  8/21/2024 1822 by Alexia Dupont RN  Outcome: Progressing  8/21/2024 1822 by Alexia Dupont RN  Outcome: Progressing

## 2024-08-22 VITALS
HEIGHT: 63 IN | OXYGEN SATURATION: 96 % | SYSTOLIC BLOOD PRESSURE: 110 MMHG | HEART RATE: 62 BPM | BODY MASS INDEX: 38.01 KG/M2 | DIASTOLIC BLOOD PRESSURE: 57 MMHG | TEMPERATURE: 98.1 F | RESPIRATION RATE: 17 BRPM | WEIGHT: 214.51 LBS

## 2024-08-22 LAB
ANION GAP SERPL CALC-SCNC: 3 MMOL/L (ref 5–15)
BUN SERPL-MCNC: 29 MG/DL (ref 6–20)
BUN/CREAT SERPL: 15 (ref 12–20)
CA-I BLD-MCNC: 8.3 MG/DL (ref 8.5–10.1)
CHLORIDE SERPL-SCNC: 101 MMOL/L (ref 97–108)
CO2 SERPL-SCNC: 36 MMOL/L (ref 21–32)
CREAT SERPL-MCNC: 1.93 MG/DL (ref 0.55–1.02)
GLUCOSE BLD STRIP.AUTO-MCNC: 133 MG/DL (ref 65–100)
GLUCOSE BLD STRIP.AUTO-MCNC: 136 MG/DL (ref 65–100)
GLUCOSE SERPL-MCNC: 119 MG/DL (ref 65–100)
MAGNESIUM SERPL-MCNC: 1.6 MG/DL (ref 1.6–2.4)
PERFORMED BY:: ABNORMAL
PERFORMED BY:: ABNORMAL
POTASSIUM SERPL-SCNC: 3.7 MMOL/L (ref 3.5–5.1)
SODIUM SERPL-SCNC: 140 MMOL/L (ref 136–145)

## 2024-08-22 PROCEDURE — 2700000000 HC OXYGEN THERAPY PER DAY

## 2024-08-22 PROCEDURE — 6360000002 HC RX W HCPCS: Performed by: INTERNAL MEDICINE

## 2024-08-22 PROCEDURE — 82962 GLUCOSE BLOOD TEST: CPT

## 2024-08-22 PROCEDURE — 80048 BASIC METABOLIC PNL TOTAL CA: CPT

## 2024-08-22 PROCEDURE — 94761 N-INVAS EAR/PLS OXIMETRY MLT: CPT

## 2024-08-22 PROCEDURE — 36415 COLL VENOUS BLD VENIPUNCTURE: CPT

## 2024-08-22 PROCEDURE — 83735 ASSAY OF MAGNESIUM: CPT

## 2024-08-22 PROCEDURE — 2580000003 HC RX 258: Performed by: INTERNAL MEDICINE

## 2024-08-22 PROCEDURE — 6370000000 HC RX 637 (ALT 250 FOR IP): Performed by: INTERNAL MEDICINE

## 2024-08-22 PROCEDURE — 94640 AIRWAY INHALATION TREATMENT: CPT

## 2024-08-22 PROCEDURE — 6370000000 HC RX 637 (ALT 250 FOR IP): Performed by: HOSPITALIST

## 2024-08-22 RX ORDER — HYDRALAZINE HYDROCHLORIDE 50 MG/1
50 TABLET, FILM COATED ORAL EVERY 6 HOURS PRN
Qty: 90 TABLET | Refills: 1 | Status: SHIPPED | OUTPATIENT
Start: 2024-08-22 | End: 2024-09-21

## 2024-08-22 RX ORDER — BUMETANIDE 2 MG/1
2 TABLET ORAL DAILY
Qty: 30 TABLET | Refills: 1 | Status: SHIPPED | OUTPATIENT
Start: 2024-08-22 | End: 2024-09-21

## 2024-08-22 RX ORDER — METOPROLOL SUCCINATE 100 MG/1
100 TABLET, EXTENDED RELEASE ORAL DAILY
Qty: 30 TABLET | Refills: 1 | Status: SHIPPED | OUTPATIENT
Start: 2024-08-22 | End: 2024-09-21

## 2024-08-22 RX ORDER — FUROSEMIDE 40 MG/1
80 TABLET ORAL DAILY
Status: DISCONTINUED | OUTPATIENT
Start: 2024-08-22 | End: 2024-08-22 | Stop reason: HOSPADM

## 2024-08-22 RX ORDER — AMIODARONE HYDROCHLORIDE 200 MG/1
200 TABLET ORAL DAILY
Qty: 30 TABLET | Refills: 0 | Status: SHIPPED | OUTPATIENT
Start: 2024-08-22 | End: 2024-09-21

## 2024-08-22 RX ORDER — METOLAZONE 5 MG/1
5 TABLET ORAL DAILY
Qty: 30 TABLET | Refills: 0 | Status: SHIPPED | OUTPATIENT
Start: 2024-08-22 | End: 2024-09-21

## 2024-08-22 RX ORDER — SPIRONOLACTONE 25 MG/1
25 TABLET ORAL DAILY
Qty: 30 TABLET | Refills: 1 | Status: SHIPPED | OUTPATIENT
Start: 2024-08-22 | End: 2024-09-21

## 2024-08-22 RX ADMIN — METOLAZONE 5 MG: 5 TABLET ORAL at 09:23

## 2024-08-22 RX ADMIN — CETIRIZINE HYDROCHLORIDE 10 MG: 10 TABLET, FILM COATED ORAL at 09:23

## 2024-08-22 RX ADMIN — METOPROLOL SUCCINATE 100 MG: 50 TABLET, EXTENDED RELEASE ORAL at 09:23

## 2024-08-22 RX ADMIN — HYDRALAZINE HYDROCHLORIDE 50 MG: 50 TABLET ORAL at 12:40

## 2024-08-22 RX ADMIN — ACETAMINOPHEN 650 MG: 325 TABLET ORAL at 09:28

## 2024-08-22 RX ADMIN — OXYBUTYNIN CHLORIDE 5 MG: 5 TABLET ORAL at 09:23

## 2024-08-22 RX ADMIN — SODIUM CHLORIDE, PRESERVATIVE FREE 10 ML: 5 INJECTION INTRAVENOUS at 09:23

## 2024-08-22 RX ADMIN — FUROSEMIDE 40 MG: 10 INJECTION, SOLUTION INTRAMUSCULAR; INTRAVENOUS at 09:24

## 2024-08-22 RX ADMIN — SACUBITRIL AND VALSARTAN 1 TABLET: 97; 103 TABLET, FILM COATED ORAL at 09:23

## 2024-08-22 RX ADMIN — SPIRONOLACTONE 25 MG: 25 TABLET, FILM COATED ORAL at 09:23

## 2024-08-22 RX ADMIN — HYDRALAZINE HYDROCHLORIDE 50 MG: 50 TABLET ORAL at 06:30

## 2024-08-22 RX ADMIN — APIXABAN 5 MG: 5 TABLET, FILM COATED ORAL at 09:23

## 2024-08-22 RX ADMIN — AMIODARONE HYDROCHLORIDE 200 MG: 200 TABLET ORAL at 09:23

## 2024-08-22 RX ADMIN — ASPIRIN 81 MG: 81 TABLET, COATED ORAL at 09:24

## 2024-08-22 RX ADMIN — ALLOPURINOL 100 MG: 100 TABLET ORAL at 09:23

## 2024-08-22 RX ADMIN — Medication 2 PUFF: at 09:20

## 2024-08-22 RX ADMIN — THEOPHYLLINE ANHYDROUS 300 MG: 300 CAPSULE, EXTENDED RELEASE ORAL at 09:23

## 2024-08-22 ASSESSMENT — PAIN DESCRIPTION - LOCATION: LOCATION: KNEE

## 2024-08-22 ASSESSMENT — PAIN DESCRIPTION - ORIENTATION: ORIENTATION: LEFT

## 2024-08-22 ASSESSMENT — PAIN SCALES - GENERAL
PAINLEVEL_OUTOF10: 6
PAINLEVEL_OUTOF10: 8

## 2024-08-22 ASSESSMENT — PAIN DESCRIPTION - DESCRIPTORS: DESCRIPTORS: ACHING

## 2024-08-22 NOTE — CARE COORDINATION
Transition of Care Plan:    RUR: 23%  Prior Level of Functioning: assistance  Disposition: home with HH  If SNF or IPR: Date FOC offered:   Date FOC received:   Accepting facility: [facility]   Date authorization started with reference number:   Date authorization received and expires:   Follow up appointments:   DME needed:   Transportation at discharge: granddaughter  IM/IMM Medicare/ letter given: yes  Is patient a  and connected with [facility]?    If yes, was [facility] transfer form completed and [facility] notified?   Caregiver Contact:   Discharge Caregiver contacted prior to discharge? Patient aware  Care Conference needed?   Barriers to discharge: n/a    Patient is clear to d/c from  to home with JACK, [facility], nurse aware.

## 2024-08-22 NOTE — DISCHARGE SUMMARY
Physician Discharge Summary     Patient ID:    Nubia Lion  [id number]  73 y.o.  1950    Admit date: 8/18/2024    Discharge date : 8/22/2024      Final Diagnoses:   Heart failure (HCC) [I50.9]  Acute on chronic diastolic heart failure  Acute on chronic hypoxic respiratory failure.  Resolved  Paroxysmal atrial fibrillation  Type 2 diabetes with A1c of 6.3  Noncompliance with home medications  Acute on chronic kidney disease    Reason for Hospitalization:    Worsening shortness of breath      Hospital Course:   73 y.o. female with multiple medical problems including paroxysmal atrial fibrillation on Eliquis, chronic diastolic CHF, coronary artery disease status post stents, diabetes presenting to the ED with complaints of worsening shortness of breath over the last 3 days.  Notes nonproductive cough without fevers or chills.  Reports bilateral lower extremity leg swelling.  Notes compliance with home medications but continues to add salt to diet.  Chest x-ray shows increased vascular congestion and BNP noted to be over 6000.  Received Bumex 2 mg IV in the ED and will be admitted for further diuresis.  Initial O2 saturation on 4 L nasal cannula 85% with improvement to 92% on 6 L.     She was admitted to medical telemetry floor and treated with IV diuretics with significant improvement in clinical course.  Medications adjusted with resumption of home O2.  Recommended inpatient skilled care facility but patient declined.  She would prefer to go home with home health PT OT.  She is felt stable for discharge to home with outpatient follow-up.        Discharge Medications:      Medication List        CONTINUE taking these medications      allopurinol 100 MG tablet  Commonly known as: ZYLOPRIM     amiodarone 200 MG tablet  Commonly known as: CORDARONE  Take 1 tablet by mouth daily     apixaban 5 MG Tabs tablet  Commonly known as: ELIQUIS  Take 1 tablet by mouth 2 times daily     aspirin 81 MG EC

## 2024-08-22 NOTE — PLAN OF CARE
Problem: Discharge Planning  Goal: Discharge to home or other facility with appropriate resources  Outcome: Adequate for Discharge  Flowsheets (Taken 8/22/2024 0920)  Discharge to home or other facility with appropriate resources:   Identify barriers to discharge with patient and caregiver   Identify discharge learning needs (meds, wound care, etc)     Problem: Skin/Tissue Integrity  Goal: Absence of new skin breakdown  Description: 1.  Monitor for areas of redness and/or skin breakdown  2.  Assess vascular access sites hourly  3.  Every 4-6 hours minimum:  Change oxygen saturation probe site  4.  Every 4-6 hours:  If on nasal continuous positive airway pressure, respiratory therapy assess nares and determine need for appliance change or resting period.  Outcome: Adequate for Discharge     Problem: Chronic Conditions and Co-morbidities  Goal: Patient's chronic conditions and co-morbidity symptoms are monitored and maintained or improved  Outcome: Adequate for Discharge  Flowsheets (Taken 8/22/2024 0920)  Care Plan - Patient's Chronic Conditions and Co-Morbidity Symptoms are Monitored and Maintained or Improved: Monitor and assess patient's chronic conditions and comorbid symptoms for stability, deterioration, or improvement     Problem: Safety - Adult  Goal: Free from fall injury  Outcome: Adequate for Discharge     Problem: Pain  Goal: Verbalizes/displays adequate comfort level or baseline comfort level  Outcome: Adequate for Discharge       Pt has been Dc'd to home. Portable home O2 with pt at time of DC.  All personal belongings accounted for.  Discussed all DC paperwork with pt and granddaughter, discussed follow up appointments and medications needed for DC.  No questions asked, but DC paperwork has ample information.  Family aware who to call in the event of questions and what to do in emergencies.    IV removed, pt taken via wheelchair to awaiting car.  Shelley Garrett RN 1:40 PM

## 2024-08-22 NOTE — PROGRESS NOTES
Progress Note      8/20/2024 10:59 AM  NAME: Nubia Lion   MRN:  [id number]   Admit Diagnosis: Heart failure (HCC) [I50.9]      Problem List:   Acute on chronic CHF  CHF HFrEF with EF 30-35%  Atrial flutter, back in sinus rhythm status post cardioversion June 2021  Paroxysmal atrial fibrillation on Eliquis  S/p TIA 3/1/2023  Chronic lower extremity edema  Type 2 diabetes  COPD on home oxygen, 3 L/minute  CAD status post CABG  Sleep apnea on CPAP  CKD, stage IIIb  Hypertension     Assessment/Plan:   Continue diuretics  Follow renal panel  Cards team will follow with you         []       High complexity decision making was performed in this patient at high risk for decompensation with multiple organ involvement.    Subjective:     Nubia Lion c/o LE pains, mild dyspnea.  Discussed with RN events overnight.     Review of Systems:   Negative except for as noted above.    Objective:      Physical Exam:    Last 24hrs VS reviewed since prior progress note. Most recent are:    /77   Pulse 57   Temp 98.1 °F (36.7 °C) (Oral)   Resp 18   Ht 1.6 m (5' 3\")   Wt 102.2 kg (225 lb 5 oz)   SpO2 96%   BMI 39.91 kg/m²     Intake/Output Summary (Last 24 hours) at 8/20/2024 1059  Last data filed at 8/20/2024 0638  Gross per 24 hour   Intake --   Output 3450 ml   Net -3450 ml        General Appearance: Alert; no acute distress.  Ears/Nose/Mouth/Throat: moist mucous membranes  Neck: Supple.  Chest: Lungs clear to auscultation bilaterally.  Cardiovascular: Regular rate and rhythm, S1S2 normal  Abdomen: Soft, non-tender, bowel sounds are active.  Extremities: 2+ edema bilaterally.  Skin: Warm and dry.      PMH/SH reviewed - no change compared to H&P    Telemetry: NSR    EKG:     No valid procedures specified.    No results found for this or any previous visit.    []  No new EKG for review    Lab Data Personally Reviewed:    Recent Labs     08/18/24  1228   WBC 5.9   HGB 12.7   HCT 41.3   *     No results for 
        Progress Note      8/21/2024 11:56 AM  NAME: Nubia Lion   MRN:  [id number]   Admit Diagnosis: Heart failure (HCC) [I50.9]      Problem List:   Acute on chronic CHF  CHF HFrEF with EF 30-35%  Atrial flutter, back in sinus rhythm status post cardioversion June 2021  Paroxysmal atrial fibrillation on Eliquis  S/p TIA 3/1/2023  Chronic lower extremity edema  Type 2 diabetes  COPD on home oxygen, 3 L/minute  CAD status post CABG  Sleep apnea on CPAP  CKD, stage IIIb  Hypertension     Assessment/Plan:   Continue diuretics  Follow renal panel and weights  Continue to advance activity with PT OT assist         []       High complexity decision making was performed in this patient at high risk for decompensation with multiple organ involvement.    Subjective:     Nubia Lion c/o soreness in legs.  Discussed with RN events overnight.     Review of Systems:   Negative except for as noted above.    Objective:      Physical Exam:    Last 24hrs VS reviewed since prior progress note. Most recent are:    /84   Pulse 61   Temp 97.7 °F (36.5 °C) (Oral)   Resp 18   Ht 1.6 m (5' 3\")   Wt 96.9 kg (213 lb 10 oz)   SpO2 100%   BMI 37.84 kg/m²     Intake/Output Summary (Last 24 hours) at 8/21/2024 1156  Last data filed at 8/21/2024 1152  Gross per 24 hour   Intake 1165 ml   Output 4250 ml   Net -3085 ml        General Appearance: Alert; no acute distress.  Ears/Nose/Mouth/Throat: moist mucous membranes  Neck: Supple.  Chest: Lungs clear to auscultation bilaterally.  Cardiovascular: Regular rate and rhythm, S1S2 normal  Abdomen: Soft, non-tender, bowel sounds are active.  Extremities: 1+ edema bilaterally.  Skin: Warm and dry.      PMH/SH reviewed - no change compared to H&P    Telemetry: NSR    EKG:     No valid procedures specified.    No results found for this or any previous visit.    []  No new EKG for review    Lab Data Personally Reviewed:    Recent Labs     08/18/24  1228   WBC 5.9   HGB 12.7   HCT 41.3 
        Progress Note      8/22/2024 11:21 AM  NAME: Nubia Lion   MRN:  [id number]   Admit Diagnosis: Heart failure (HCC) [I50.9]      Problem List:   Acute on chronic CHF  CHF HFrEF with EF 30-35%  Atrial flutter, back in sinus rhythm status post cardioversion June 2021  Paroxysmal atrial fibrillation on Eliquis  S/p TIA 3/1/2023  Chronic lower extremity edema  Type 2 diabetes  COPD on home oxygen, 3 L/minute  CAD status post CABG  Sleep apnea on CPAP  CKD, stage IIIb  Hypertension     Assessment/Plan:   Switch to p.o. diuretic  Follow renal panel and weights  Outpatient follow-up with me in 2-3 wks  Should be okay for discharge         []       High complexity decision making was performed in this patient at high risk for decompensation with multiple organ involvement.    Subjective:     Nubia Lion c/o soreness in legs.  Discussed with RN events overnight.     Review of Systems:   Negative except for as noted above.    Objective:      Physical Exam:    Last 24hrs VS reviewed since prior progress note. Most recent are:    BP (!) 124/59   Pulse 59   Temp 98.2 °F (36.8 °C) (Oral)   Resp 17   Ht 1.6 m (5' 3\")   Wt 97.3 kg (214 lb 8.1 oz)   SpO2 97%   BMI 38.00 kg/m²     Intake/Output Summary (Last 24 hours) at 8/22/2024 1121  Last data filed at 8/22/2024 0634  Gross per 24 hour   Intake 920 ml   Output 2575 ml   Net -1655 ml        General Appearance: Alert; no acute distress.  Ears/Nose/Mouth/Throat: moist mucous membranes  Neck: Supple.  Chest: Lungs clear to auscultation bilaterally.  Cardiovascular: Regular rate and rhythm, S1S2 normal  Abdomen: Soft, non-tender, bowel sounds are active.  Extremities: 1+ edema bilaterally.  Skin: Warm and dry.      PMH/SH reviewed - no change compared to H&P    Telemetry: NSR    EKG:     No valid procedures specified.    No results found for this or any previous visit.    []  No new EKG for review    Lab Data Personally Reviewed:    No results for input(s): \"WBC\", 
      Hospitalist Progress Note    NAME:   Nubia Lion   : 1950   MRN: [id number]     Date/Time: 2024 1:19 PM  Patient PCP: Ricardo Calle MD    Estimated discharge date:  Barriers:       Assessment / Plan:    Acute on chronic hypoxic respiratory failure -improving  Secondary to fluid overload  Maintain nasal O2 to keep saturation above 92%  Patient at home on 4 L NC O2.     Acute on chronic diastolic heart failure  At home patient states she is on Lasix 40 Mg p.o. twice daily and missed her medications for about 5 days.  Likely secondary to dietary noncompliance  Strict input and output  Fluid restriction to 1500 mL daily  Low-salt diet advised  Continue Lasix 40 mg IV twice daily, and metolazone 5 mg daily.  Cardiology consulted and following.  Monitor intake and output.  Daily weight.     Paroxysmal atrial fibrillation  Resume oral Eliquis for stroke prevention  Continue metoprolol 100 mg daily.    Type 2 diabetes  Fairly stable  Blood sugar ACHS  A1c of 6.3 noted.     Acute on chronic kidney disease  Likely cardiorenal  Baseline creatinine about 1.5-1.8  Monitor daily renal function and continue IV diuresis    Full CODE STATUS    Medical Decision Making:   I personally reviewed labs: BMP, troponin, A1c  I personally reviewed imaging:  I personally reviewed EKG:  Toxic drug monitoring:   Discussed case with: Patient, nursing    Subjective:     Chief Complaint / Reason for Physician Visit  \" Shortness of breath\".  Discussed with RN events overnight.      - Admission H&P  73 y.o. female with multiple medical problems including paroxysmal atrial fibrillation on Eliquis, chronic diastolic CHF, coronary artery disease status post stents, diabetes presenting to the ED with complaints of worsening shortness of breath over the last 3 days.  Notes nonproductive cough without fevers or chills.  Reports bilateral lower extremity leg swelling.  Notes compliance with home medications but continues to add 
      Hospitalist Progress Note    NAME:   Nubia Lion   : 1950   MRN: [id number]     Date/Time: 2024 5:48 PM  Patient PCP: Ricardo Calle MD    Estimated discharge date:    Barriers: Cardiology clearance      Assessment / Plan:    Acute on chronic hypoxic respiratory failure -improving  Secondary to fluid overload  Maintain nasal O2 to keep saturation above 92%  Patient at home on 4 L NC O2.     Acute on chronic diastolic heart failure  At home patient states she is on Lasix 40 Mg p.o. twice daily and missed her medications for about 5 days.  Likely secondary to dietary noncompliance  Strict input and output  Fluid restriction to 1500 mL daily  Low-salt diet advised  Continue Lasix 40 mg IV twice daily, and metolazone 5 mg daily.  Cardiology consulted and following.  Monitor intake and output.  Daily weight.       Paroxysmal atrial fibrillation  Resume oral Eliquis for stroke prevention  Continue metoprolol 100 mg daily.    Type 2 diabetes  Fairly stable  Blood sugar ACHS  A1c of 6.3 noted.     Acute on chronic kidney disease  Likely cardiorenal  Baseline creatinine about 1.5-1.8  Monitor daily renal function and continue IV diuresis    Full CODE STATUS    Medical Decision Making:   I personally reviewed labs: BMP, troponin, A1c  I personally reviewed imaging:  I personally reviewed EKG:  Toxic drug monitoring:   Discussed case with: Patient, nursing    Subjective:     Chief Complaint / Reason for Physician Visit  \" Shortness of breath\".  Discussed with RN events overnight.      - Admission H&P  73 y.o. female with multiple medical problems including paroxysmal atrial fibrillation on Eliquis, chronic diastolic CHF, coronary artery disease status post stents, diabetes presenting to the ED with complaints of worsening shortness of breath over the last 3 days.  Notes nonproductive cough without fevers or chills.  Reports bilateral lower extremity leg swelling.  Notes compliance with home 
  Physician Progress Note      PATIENT:               SUZANNA SCHMITT  CSN #:                  [id number]  :                       1950  ADMIT DATE:       2024 12:10 PM  DISCH DATE:        2024 1:35 PM  RESPONDING  PROVIDER #:        Jurgen Madsen MD          QUERY TEXT:    Patient admitted with acute on chronic CHF.   Noted documentation of acute on   chronic HFpEF on 2024 in consult note per cardiology consultant and acute   on chronic HFrEF in PN on 2024 per cardiology consultant.  If possible, please document in progress notes and discharge summary if you   are evaluating and /or treating any of the following:    The medical record reflects the following:  Risk Factors: female age >65 years old, HTN, CKD, CAD.  Clinical Indicators:  2024, consult, Dr. Kuldeep Taylor MD:  \"Acute on chronic HFpEF with   peripheral edema.\"  2024, PN, Dr. Katarina Barker MD:  \"Acute on chronic CHF.  CHF HFrEF   with EF 30-35%.\"  proBNP:  6,254  2024, echo (TTE):  Interpretation Summary  Left Ventricle: Moderately reduced left ventricular systolic function with a   visually estimated EF of 50 - 55%. Left ventricle size is normal. Moderately   increased wall thickness. Severe posterior thickening. Findings consistent   with moderate asymmetric hypertrophy. See diagram for wall motion findings.   Dyskinesis of the following segments: apical septal. Grade I diastolic   dysfunction with normal LAP.  Right Ventricle: Reduced systolic function. TAPSE is abnormal. TAPSE is 1.2   cm. RV Peak S' is 7 cm/s.  Mitral Valve: Mild regurgitation with a centrally directed jet.  Tricuspid Valve: Moderate regurgitation with jet direction toward the septum.   The estimated RVSP is 43 mmHg.  Left Atrium: Left atrium is mildly dilated.  Right Atrium: Right atrium is dilated.  Image quality is fair.  Treatment: echo, furosemide IV    Thank you,  CHARU Desai RN, CDS  [email or web address]  Options provided:  -- 
4 [facility] Skin Assessment     NAME:  Nubia Lion  YOB: 1950  MEDICAL RECORD NUMBER:  [id number]    The patient is being assessed for  Other Routine skin assessment    I agree that at least one RN has performed a thorough Head to Toe Skin Assessment on the patient. ALL assessment sites listed below have been assessed.      Areas assessed by both nurses:    Head, Face, Ears, Shoulders, Back, Chest, Arms, Elbows, Hands, Sacrum. Buttock, Coccyx, Ischium, and Legs. Feet and Heels        Does the Patient have a Wound? No noted wound(s)       Adan Prevention initiated by RN: Yes  Wound Care Orders initiated by RN: No    Pressure Injury (Stage 3,4, Unstageable, DTI, NWPT, and Complex wounds) if present, place Wound referral order by RN under : No    New Ostomies, if present place, Ostomy referral order under : No     Nurse 1 eSignature: Electronically signed by Melony tom RN on 8/21/24 at 4:59 AM EDT    **SHARE this note so that the co-signing nurse can place an eSignature**    Nurse 2 eSignature: Electronically signed by ZULEMA HARTMAN RN on 8/21/24 at 5:15 AM EDT   
Patient was scheduled for entresto. BP low. Provider on call Dr Haile notified. Order received to hold the dose  
Pt is at her home O2 baseline with 4L/min. SpO2 98%  
Pt. On 4L at home  
Spiritual Health Assessment/Progress Note  [facility]    Initial Encounter,  ,  ,      Name: Nubia Lion MRN: [id number]    Age: 73 y.o.     Sex: female   Language: English   Catholic: Taoism   Heart failure (HCC)     Date: 8/19/2024            Total Time Calculated: 54 min              Spiritual Assessment began in [facility]        Referral/Consult From: Rounding   Encounter Overview/Reason: Initial Encounter  Service Provided For: Patient    Tsering, Belief, Meaning:   Patient is connected with a tsering tradition or spiritual practice  Family/Friends No family/friends present      Importance and Influence:  Patient has no beliefs influential to healthcare decision-making identified during this visit  Family/Friends no family/friends present    Community:  Patient expresses feelings of isolation: feeling no one understands  Family/Friends Other: no family/friends present    Assessment and Plan of Care:     Patient Interventions include: Facilitated expression of thoughts and feelings, Explored spiritual coping/struggle/distress and theological reflection, and Affirmed coping skills/support systems  Family/Friends Interventions include: Other: no family/friends present    Patient Plan of Care: Spiritual Care available upon further referral  Family/Friends Plan of Care: Spiritual Care available upon further referral     visited pt Nubia Lion while rounding on [facility] for the purpose of making a spiritual assessment. Pt is resting in bed, awakens to 's entrance, welcomes visit. Pt shares of positive elements of her day, including getting to walk to the bathroom and she gives thanks for her nurse today. Yet pt shares further about her health challenges, noting her sense of powerlessness and uncertainty about her condition. Pt reflects on a sense of giving up, as she has made diet changes and still is having health complications. Pt is discouraged by this and eagerly 
Venous duplex RLE completed at bedside.   
admitted for further diuresis.  Initial O2 saturation on 4 L nasal cannula 85% with improvement to 92% on 6 L.     8/19  Patient is alert and oriented x 3.  Continues to have some moderate shortness of breath however back to her baseline 4 L NC O2 which is at home.  No overnight chest pain, fever/chills, nausea/vomiting noted.    States that she had run out of her medications including Lasix for 5 days.  Counseled on continuing IV Lasix for now with further evaluation by cardiology pending.    Objective:     VITALS:   Last 24hrs VS reviewed since prior progress note. Most recent are:  Patient Vitals for the past 24 hrs:   BP Temp Temp src Pulse Resp SpO2 Height Weight   08/19/24 0922 125/68 -- -- -- -- -- -- --   08/19/24 0915 -- -- -- 65 -- -- -- --   08/19/24 0819 125/68 98.4 °F (36.9 °C) Oral 55 18 98 % -- --   08/19/24 0713 -- -- -- 72 18 96 % -- --   08/19/24 0430 (!) 142/75 98.3 °F (36.8 °C) Oral 68 20 95 % -- 102.2 kg (225 lb 5 oz)   08/18/24 2350 124/64 98.1 °F (36.7 °C) Oral 65 20 92 % -- --   08/18/24 2004 -- -- -- -- -- 99 % -- --   08/18/24 1943 (!) 162/72 98.2 °F (36.8 °C) Oral 67 18 100 % -- --   08/18/24 1658 (!) 168/92 98.3 °F (36.8 °C) Oral 78 -- 98 % -- --   08/18/24 1528 (!) 181/120 -- -- 80 17 95 % -- --   08/18/24 1443 -- -- -- 74 27 94 % -- --   08/18/24 1442 -- -- -- 74 18 96 % -- --   08/18/24 1417 (!) 196/107 -- -- 67 15 99 % -- --   08/18/24 1327 (!) 168/92 -- -- 66 16 99 % -- --   08/18/24 1317 (!) 157/97 -- -- 64 17 100 % -- --   08/18/24 1316 (!) 161/74 -- -- 62 17 100 % -- --   08/18/24 1246 (!) 145/71 -- -- 67 20 99 % -- --   08/18/24 1226 (!) 142/125 -- -- 76 16 100 % -- --   08/18/24 1216 (!) 146/75 -- -- 72 -- 97 % -- --   08/18/24 1210 -- -- -- -- -- (!) 85 % -- --   08/18/24 1209 -- -- -- -- -- -- 1.6 m (5' 3\") 96.2 kg (212 lb)   08/18/24 1209 (!) 151/105 98.6 °F (37 °C) -- 68 22 -- -- --         Intake/Output Summary (Last 24 hours) at 8/19/2024 0941  Last data filed at 8/19/2024 
Impaired (Pt reports numbness in bilateral hands)      Functional Mobility and Transfers for ADLs:  Bed Mobility:  Bed Mobility Training  Bed Mobility Training: Yes  Supine to Sit: Contact-guard assistance  Sit to Supine: Minimum assistance;Assist X1;Additional time  Scooting: Stand-by assistance    Transfers:  Transfer Training  Transfer Training: Yes  Sit to Stand: Contact-guard assistance  Stand to Sit: Contact-guard assistance  Toilet Transfer:  (cues for hand placement on grab bar)      Balance:  Balance  Sitting: Intact  Standing: Impaired  Standing - Static: Good;Constant support  Standing - Dynamic: Fair;Constant support      ADL Assessment:                                                  LE Dressing: Dependent/Total  LE Dressing Skilled Clinical Factors: Pt was dependent to don socks seated EOB, pt attempted to adjust socks once on, however, had difficulty s/t numbness in hands                   Therapeutic Intervention provided:   energy conservation, LE dressing, UE therapeutic exercises, functional mobility/transfers in preparation for self-care tasks, and education on adaptive equipment     Functional Measure:    [facility] AM-PACTM \"6 Clicks\"                                                       Daily Activity Inpatient Short Form  How much help from another person does the patient currently need... Total; A Lot A Little None   1.  Putting on and taking off regular lower body clothing? [x]  1 []  2 []  3 []  4   2.  Bathing (including washing, rinsing, drying)? []  1 [x]  2 []  3 []  4   3.  Toileting, which includes using toilet, bedpan or urinal? [] 1 [x]  2 []  3 []  4   4.  Putting on and taking off regular upper body clothing? []  1 []  2 [x]  3 []  4   5.  Taking care of personal grooming such as brushing teeth? []  1 []  2 []  3 [x]  4   6.  Eating meals? []  1 []  2 []  3 [x]  4   © 2007, Trustees of [facility], under license to [employer]. All rights reserved     Score: 16/24

## 2024-08-22 NOTE — PLAN OF CARE
Problem: Skin/Tissue Integrity  Goal: Absence of new skin breakdown  Description: 1.  Monitor for areas of redness and/or skin breakdown  2.  Assess vascular access sites hourly  3.  Every 4-6 hours minimum:  Change oxygen saturation probe site  4.  Every 4-6 hours:  If on nasal continuous positive airway pressure, respiratory therapy assess nares and determine need for appliance change or resting period.  8/21/2024 2048 by Melony Walden RN  Outcome: Progressing  8/21/2024 1822 by Alexia Dupont RN  Outcome: Progressing  8/21/2024 1822 by Alexia Dupont RN  Outcome: Progressing     Problem: Chronic Conditions and Co-morbidities  Goal: Patient's chronic conditions and co-morbidity symptoms are monitored and maintained or improved  8/21/2024 2048 by Melony Walden RN  Outcome: Progressing  8/21/2024 1822 by Alexia Dupont RN  Outcome: Progressing  8/21/2024 1822 by Alexia Dupont RN  Outcome: Progressing     Problem: Safety - Adult  Goal: Free from fall injury  8/21/2024 2048 by Melony Walden RN  Outcome: Progressing  8/21/2024 1822 by Alexia Dupont RN  Outcome: Progressing  8/21/2024 1822 by Alexia Dupont RN  Outcome: Progressing     Problem: Pain  Goal: Verbalizes/displays adequate comfort level or baseline comfort level  8/21/2024 2048 by Melony Walden RN  Outcome: Progressing  8/21/2024 1822 by Alexia Dupont RN  Outcome: Progressing  8/21/2024 1822 by Alexia Dupont RN  Outcome: Progressing

## 2024-08-27 ENCOUNTER — FOLLOWUP TELEPHONE ENCOUNTER (OUTPATIENT)
Facility: HOSPITAL | Age: 74
End: 2024-08-27

## 2024-08-27 NOTE — PROGRESS NOTES
IN-OFFICE APPT:    Pt arrived early for her appt at 1pm she arrived at 1240pm with her daughter(Wen).      RN-NN discusses Medications. RN updates home medications. Per the pt and daughter the pt is no longer taking the allopurinol nor the zrytec nor the melatonin. However, the pt does take allergy medicine in the spring time and is having trouble sleeping. RN-NN will leave the melatonin and zyrtec on her profile at this time.    Pt brought her medication list and weights    Date  wt (lbs)  08/23 201.8  08/26 200.0  08/27 203.8    Pt denies any new or worsening symptoms      Pt should be on a 1500mL fluid restriction and 1500mg sodium restriction. RNSydneeNN reminds the pt. RN-NN reviewed with the daughter and the pt. Pt states she wants to know what she should or should not eat.  RN-FELIPE reviewed with the pt. Pts daughter wants the pts portable Oxygen concentrator to be lighter the one she has she(the pt) cannot lift.    RN-NN spent an hour with both the pt and the daughter.    RNSEUN reports the wt to [facility]. RN-NN does advise the pt will be leaving and to call the pts daughter for any questions or updates. RN-NN also asks them to send an order for an update on the oxygen concentrator to be changed on a more mobile/lighter version for the pt to [facility].  [facility] took RN-NN information and verbalizes understanding.    RN-NN informs the pt and the daughter that she notified [facility] about the WT gain and the need for the change of oxygen concentrator.    Pts daughterWen verbalizes understanding.

## 2024-08-28 PROBLEM — E87.79 VOLUME OVERLOAD STATE OF HEART: Status: ACTIVE | Noted: 2024-08-28

## 2024-08-28 PROBLEM — R09.02 HYPOXEMIA: Status: ACTIVE | Noted: 2024-08-28

## 2024-10-31 ENCOUNTER — APPOINTMENT (OUTPATIENT)
Facility: HOSPITAL | Age: 74
DRG: 280 | End: 2024-10-31
Payer: MEDICARE

## 2024-10-31 ENCOUNTER — HOSPITAL ENCOUNTER (INPATIENT)
Facility: HOSPITAL | Age: 74
LOS: 3 days | Discharge: HOME HEALTH | DRG: 280 | End: 2024-11-03
Attending: EMERGENCY MEDICINE
Payer: MEDICARE

## 2024-10-31 DIAGNOSIS — I50.23 ACUTE ON CHRONIC SYSTOLIC CONGESTIVE HEART FAILURE (HCC): ICD-10-CM

## 2024-10-31 DIAGNOSIS — M79.89 SWELLING OF LOWER LEG: ICD-10-CM

## 2024-10-31 DIAGNOSIS — R06.89 DYSPNEA AND RESPIRATORY ABNORMALITIES: ICD-10-CM

## 2024-10-31 DIAGNOSIS — E87.79 VOLUME OVERLOAD STATE OF HEART: Primary | ICD-10-CM

## 2024-10-31 DIAGNOSIS — R06.00 DYSPNEA AND RESPIRATORY ABNORMALITIES: ICD-10-CM

## 2024-10-31 LAB
ALBUMIN SERPL-MCNC: 3.2 G/DL (ref 3.5–5)
ALBUMIN/GLOB SERPL: 1 (ref 1.1–2.2)
ALP SERPL-CCNC: 205 U/L (ref 45–117)
ALT SERPL-CCNC: 41 U/L (ref 12–78)
ANION GAP SERPL CALC-SCNC: 4 MMOL/L (ref 2–12)
AST SERPL W P-5'-P-CCNC: 41 U/L (ref 15–37)
BASOPHILS # BLD: 0.1 K/UL (ref 0–0.1)
BASOPHILS NFR BLD: 1 % (ref 0–1)
BILIRUB SERPL-MCNC: 1.1 MG/DL (ref 0.2–1)
BNP SERPL-MCNC: 5195 PG/ML
BUN SERPL-MCNC: 26 MG/DL (ref 6–20)
BUN/CREAT SERPL: 17 (ref 12–20)
CA-I BLD-MCNC: 8.5 MG/DL (ref 8.5–10.1)
CHLORIDE SERPL-SCNC: 111 MMOL/L (ref 97–108)
CO2 SERPL-SCNC: 31 MMOL/L (ref 21–32)
CREAT SERPL-MCNC: 1.51 MG/DL (ref 0.55–1.02)
DIFFERENTIAL METHOD BLD: ABNORMAL
EOSINOPHIL # BLD: 0.1 K/UL (ref 0–0.4)
EOSINOPHIL NFR BLD: 2 % (ref 0–7)
ERYTHROCYTE [DISTWIDTH] IN BLOOD BY AUTOMATED COUNT: 15.3 % (ref 11.5–14.5)
GLOBULIN SER CALC-MCNC: 3.1 G/DL (ref 2–4)
GLUCOSE SERPL-MCNC: 102 MG/DL (ref 65–100)
HCT VFR BLD AUTO: 43.7 % (ref 35–47)
HGB BLD-MCNC: 13.8 G/DL (ref 11.5–16)
IMM GRANULOCYTES # BLD AUTO: 0 K/UL (ref 0–0.04)
IMM GRANULOCYTES NFR BLD AUTO: 0 % (ref 0–0.5)
LYMPHOCYTES # BLD: 1.5 K/UL (ref 0.8–3.5)
LYMPHOCYTES NFR BLD: 26 % (ref 12–49)
MAGNESIUM SERPL-MCNC: 1.9 MG/DL (ref 1.6–2.4)
MCH RBC QN AUTO: 29.4 PG (ref 26–34)
MCHC RBC AUTO-ENTMCNC: 31.6 G/DL (ref 30–36.5)
MCV RBC AUTO: 93 FL (ref 80–99)
MONOCYTES # BLD: 0.5 K/UL (ref 0–1)
MONOCYTES NFR BLD: 9 % (ref 5–13)
NEUTS SEG # BLD: 3.6 K/UL (ref 1.8–8)
NEUTS SEG NFR BLD: 62 % (ref 32–75)
NRBC # BLD: 0 K/UL (ref 0–0.01)
NRBC BLD-RTO: 0 PER 100 WBC
PLATELET # BLD AUTO: 126 K/UL (ref 150–400)
PMV BLD AUTO: 12.5 FL (ref 8.9–12.9)
POTASSIUM SERPL-SCNC: 4.8 MMOL/L (ref 3.5–5.1)
PROT SERPL-MCNC: 6.3 G/DL (ref 6.4–8.2)
RBC # BLD AUTO: 4.7 M/UL (ref 3.8–5.2)
SODIUM SERPL-SCNC: 146 MMOL/L (ref 136–145)
TROPONIN I SERPL HS-MCNC: 101 NG/L (ref 0–51)
TROPONIN I SERPL HS-MCNC: 108 NG/L (ref 0–51)
WBC # BLD AUTO: 5.8 K/UL (ref 3.6–11)

## 2024-10-31 PROCEDURE — 36415 COLL VENOUS BLD VENIPUNCTURE: CPT

## 2024-10-31 PROCEDURE — 6360000002 HC RX W HCPCS: Performed by: EMERGENCY MEDICINE

## 2024-10-31 PROCEDURE — 93005 ELECTROCARDIOGRAM TRACING: CPT | Performed by: EMERGENCY MEDICINE

## 2024-10-31 PROCEDURE — 71045 X-RAY EXAM CHEST 1 VIEW: CPT

## 2024-10-31 PROCEDURE — 85025 COMPLETE CBC W/AUTO DIFF WBC: CPT

## 2024-10-31 PROCEDURE — 84484 ASSAY OF TROPONIN QUANT: CPT

## 2024-10-31 PROCEDURE — 2060000000 HC ICU INTERMEDIATE R&B

## 2024-10-31 PROCEDURE — 83880 ASSAY OF NATRIURETIC PEPTIDE: CPT

## 2024-10-31 PROCEDURE — 83735 ASSAY OF MAGNESIUM: CPT

## 2024-10-31 PROCEDURE — 80053 COMPREHEN METABOLIC PANEL: CPT

## 2024-10-31 PROCEDURE — 94760 N-INVAS EAR/PLS OXIMETRY 1: CPT

## 2024-10-31 PROCEDURE — 99285 EMERGENCY DEPT VISIT HI MDM: CPT

## 2024-10-31 PROCEDURE — 96374 THER/PROPH/DIAG INJ IV PUSH: CPT

## 2024-10-31 RX ORDER — ONDANSETRON 2 MG/ML
4 INJECTION INTRAMUSCULAR; INTRAVENOUS EVERY 6 HOURS PRN
Status: DISCONTINUED | OUTPATIENT
Start: 2024-10-31 | End: 2024-11-03 | Stop reason: HOSPADM

## 2024-10-31 RX ORDER — ACETAMINOPHEN 325 MG/1
650 TABLET ORAL EVERY 6 HOURS PRN
Status: DISCONTINUED | OUTPATIENT
Start: 2024-10-31 | End: 2024-11-03 | Stop reason: HOSPADM

## 2024-10-31 RX ORDER — MAGNESIUM SULFATE IN WATER 40 MG/ML
2000 INJECTION, SOLUTION INTRAVENOUS PRN
Status: DISCONTINUED | OUTPATIENT
Start: 2024-10-31 | End: 2024-11-03 | Stop reason: HOSPADM

## 2024-10-31 RX ORDER — SODIUM CHLORIDE 9 MG/ML
INJECTION, SOLUTION INTRAVENOUS PRN
Status: DISCONTINUED | OUTPATIENT
Start: 2024-10-31 | End: 2024-11-03 | Stop reason: HOSPADM

## 2024-10-31 RX ORDER — POLYETHYLENE GLYCOL 3350 17 G/17G
17 POWDER, FOR SOLUTION ORAL DAILY PRN
Status: DISCONTINUED | OUTPATIENT
Start: 2024-10-31 | End: 2024-11-03 | Stop reason: HOSPADM

## 2024-10-31 RX ORDER — BUMETANIDE 0.25 MG/ML
1 INJECTION, SOLUTION INTRAMUSCULAR; INTRAVENOUS
Status: COMPLETED | OUTPATIENT
Start: 2024-10-31 | End: 2024-10-31

## 2024-10-31 RX ORDER — POTASSIUM CHLORIDE 7.45 MG/ML
10 INJECTION INTRAVENOUS PRN
Status: DISCONTINUED | OUTPATIENT
Start: 2024-10-31 | End: 2024-11-03 | Stop reason: HOSPADM

## 2024-10-31 RX ORDER — ONDANSETRON 4 MG/1
4 TABLET, ORALLY DISINTEGRATING ORAL EVERY 8 HOURS PRN
Status: DISCONTINUED | OUTPATIENT
Start: 2024-10-31 | End: 2024-11-03 | Stop reason: HOSPADM

## 2024-10-31 RX ORDER — POTASSIUM CHLORIDE 1500 MG/1
40 TABLET, EXTENDED RELEASE ORAL PRN
Status: DISCONTINUED | OUTPATIENT
Start: 2024-10-31 | End: 2024-11-03 | Stop reason: HOSPADM

## 2024-10-31 RX ORDER — SODIUM CHLORIDE 0.9 % (FLUSH) 0.9 %
5-40 SYRINGE (ML) INJECTION PRN
Status: DISCONTINUED | OUTPATIENT
Start: 2024-10-31 | End: 2024-11-03 | Stop reason: HOSPADM

## 2024-10-31 RX ORDER — SODIUM CHLORIDE 0.9 % (FLUSH) 0.9 %
5-40 SYRINGE (ML) INJECTION EVERY 12 HOURS SCHEDULED
Status: DISCONTINUED | OUTPATIENT
Start: 2024-10-31 | End: 2024-11-03 | Stop reason: HOSPADM

## 2024-10-31 RX ORDER — FUROSEMIDE 10 MG/ML
80 INJECTION INTRAMUSCULAR; INTRAVENOUS ONCE
Status: DISCONTINUED | OUTPATIENT
Start: 2024-10-31 | End: 2024-11-01

## 2024-10-31 RX ORDER — ACETAMINOPHEN 650 MG/1
650 SUPPOSITORY RECTAL EVERY 6 HOURS PRN
Status: DISCONTINUED | OUTPATIENT
Start: 2024-10-31 | End: 2024-11-03 | Stop reason: HOSPADM

## 2024-10-31 RX ADMIN — BUMETANIDE 1 MG: 0.25 INJECTION INTRAMUSCULAR; INTRAVENOUS at 20:52

## 2024-10-31 ASSESSMENT — PAIN - FUNCTIONAL ASSESSMENT: PAIN_FUNCTIONAL_ASSESSMENT: 0-10

## 2024-10-31 ASSESSMENT — PAIN DESCRIPTION - DESCRIPTORS: DESCRIPTORS: SORE

## 2024-10-31 ASSESSMENT — PAIN DESCRIPTION - LOCATION: LOCATION: LEG

## 2024-10-31 ASSESSMENT — PAIN SCALES - GENERAL: PAINLEVEL_OUTOF10: 3

## 2024-10-31 ASSESSMENT — PAIN DESCRIPTION - ORIENTATION: ORIENTATION: RIGHT;LEFT

## 2024-10-31 NOTE — ED PROVIDER NOTES
inhaler 2 puff  2 puff Inhalation Q4H PRN Tasha Dai MD        sodium chloride flush 0.9 % injection 5-40 mL  5-40 mL IntraVENous 2 times per day Tasha Dai MD   10 mL at 11/01/24 0849    sodium chloride flush 0.9 % injection 5-40 mL  5-40 mL IntraVENous PRN Tasha Dai MD        0.9 % sodium chloride infusion   IntraVENous PRN Tasha Dai MD        potassium chloride (KLOR-CON M) extended release tablet 40 mEq  40 mEq Oral PRN Tasha Dai MD        Or    potassium bicarb-citric acid (EFFER-K) effervescent tablet 40 mEq  40 mEq Oral PRTasha Ventura MD        Or    potassium chloride 10 mEq/100 mL IVPB (Peripheral Line)  10 mEq IntraVENous PRN Tasha Dai MD        magnesium sulfate 2000 mg in 50 mL IVPB premix  2,000 mg IntraVENous PRN Tasha Dai MD        ondansetron (ZOFRAN-ODT) disintegrating tablet 4 mg  4 mg Oral Q8H PRN Tasha Dai MD        Or    ondansetron (ZOFRAN) injection 4 mg  4 mg IntraVENous Q6H PRTasha Ventura MD        polyethylene glycol (GLYCOLAX) packet 17 g  17 g Oral Daily PRTasha Ventura MD        acetaminophen (TYLENOL) tablet 650 mg  650 mg Oral Q6H PRN Tasha aDi MD   650 mg at 11/01/24 0113    Or    acetaminophen (TYLENOL) suppository 650 mg  650 mg Rectal Q6H PRTasha Ventura MD           Social Determinants of Health:   Social Determinants of Health     Tobacco Use: Medium Risk (9/10/2024)    Received from [facility]    Patient History     Smoking Tobacco Use: Former     Smokeless Tobacco Use: Never     Passive Exposure: Not on file   Alcohol Use: Not At Risk (10/31/2024)    AUDIT-C     Frequency of Alcohol Consumption: Never     Average Number of Drinks: Patient does not drink     Frequency of Binge Drinking: Never   Financial Resource Strain: Low Risk  (3/10/2021)    Received from [facility]  (prior to 6/17/2023), [facility]  (prior to 6/17/2023)    Overall Financial Resource Strain

## 2024-10-31 NOTE — ED TRIAGE NOTES
GCS 15 pt stated that she has been having SOB and swelling to her BLE; pt stated that she has been taking her medications as prescribed however hasn't been urinating as much

## 2024-11-01 ENCOUNTER — APPOINTMENT (OUTPATIENT)
Facility: HOSPITAL | Age: 74
DRG: 280 | End: 2024-11-01
Payer: MEDICARE

## 2024-11-01 LAB
ANION GAP SERPL CALC-SCNC: 5 MMOL/L (ref 2–12)
BASOPHILS # BLD: 0 K/UL (ref 0–0.1)
BASOPHILS NFR BLD: 1 % (ref 0–1)
BUN SERPL-MCNC: 22 MG/DL (ref 6–20)
BUN/CREAT SERPL: 16 (ref 12–20)
CA-I BLD-MCNC: 8.5 MG/DL (ref 8.5–10.1)
CHLORIDE SERPL-SCNC: 109 MMOL/L (ref 97–108)
CO2 SERPL-SCNC: 31 MMOL/L (ref 21–32)
CREAT SERPL-MCNC: 1.34 MG/DL (ref 0.55–1.02)
DIFFERENTIAL METHOD BLD: ABNORMAL
ECHO BSA: 2.15 M2
ECHO BSA: 2.15 M2
EKG ATRIAL RATE: 62 BPM
EKG ATRIAL RATE: 84 BPM
EKG DIAGNOSIS: NORMAL
EKG DIAGNOSIS: NORMAL
EKG P AXIS: 63 DEGREES
EKG P AXIS: 72 DEGREES
EKG P-R INTERVAL: 172 MS
EKG P-R INTERVAL: 182 MS
EKG Q-T INTERVAL: 416 MS
EKG Q-T INTERVAL: 486 MS
EKG QRS DURATION: 96 MS
EKG QRS DURATION: 98 MS
EKG QTC CALCULATION (BAZETT): 491 MS
EKG QTC CALCULATION (BAZETT): 493 MS
EKG R AXIS: 64 DEGREES
EKG R AXIS: 79 DEGREES
EKG T AXIS: 109 DEGREES
EKG T AXIS: 112 DEGREES
EKG VENTRICULAR RATE: 62 BPM
EKG VENTRICULAR RATE: 84 BPM
EOSINOPHIL # BLD: 0.1 K/UL (ref 0–0.4)
EOSINOPHIL NFR BLD: 2 % (ref 0–7)
ERYTHROCYTE [DISTWIDTH] IN BLOOD BY AUTOMATED COUNT: 15.1 % (ref 11.5–14.5)
GLUCOSE BLD STRIP.AUTO-MCNC: 109 MG/DL (ref 65–100)
GLUCOSE BLD STRIP.AUTO-MCNC: 111 MG/DL (ref 65–100)
GLUCOSE BLD STRIP.AUTO-MCNC: 113 MG/DL (ref 65–100)
GLUCOSE BLD STRIP.AUTO-MCNC: 121 MG/DL (ref 65–100)
GLUCOSE BLD STRIP.AUTO-MCNC: 135 MG/DL (ref 65–100)
GLUCOSE SERPL-MCNC: 111 MG/DL (ref 65–100)
HCT VFR BLD AUTO: 42.2 % (ref 35–47)
HGB BLD-MCNC: 13.3 G/DL (ref 11.5–16)
IMM GRANULOCYTES # BLD AUTO: 0 K/UL (ref 0–0.04)
IMM GRANULOCYTES NFR BLD AUTO: 0 % (ref 0–0.5)
LYMPHOCYTES # BLD: 1.4 K/UL (ref 0.8–3.5)
LYMPHOCYTES NFR BLD: 24 % (ref 12–49)
MAGNESIUM SERPL-MCNC: 1.8 MG/DL (ref 1.6–2.4)
MCH RBC QN AUTO: 29 PG (ref 26–34)
MCHC RBC AUTO-ENTMCNC: 31.5 G/DL (ref 30–36.5)
MCV RBC AUTO: 92.1 FL (ref 80–99)
MONOCYTES # BLD: 0.6 K/UL (ref 0–1)
MONOCYTES NFR BLD: 10 % (ref 5–13)
NEUTS SEG # BLD: 3.6 K/UL (ref 1.8–8)
NEUTS SEG NFR BLD: 63 % (ref 32–75)
NRBC # BLD: 0 K/UL (ref 0–0.01)
NRBC BLD-RTO: 0 PER 100 WBC
PERFORMED BY:: ABNORMAL
PHOSPHATE SERPL-MCNC: 3.6 MG/DL (ref 2.6–4.7)
PLATELET # BLD AUTO: 122 K/UL (ref 150–400)
PMV BLD AUTO: 12.2 FL (ref 8.9–12.9)
POTASSIUM SERPL-SCNC: 3.9 MMOL/L (ref 3.5–5.1)
RBC # BLD AUTO: 4.58 M/UL (ref 3.8–5.2)
SODIUM SERPL-SCNC: 145 MMOL/L (ref 136–145)
WBC # BLD AUTO: 5.7 K/UL (ref 3.6–11)

## 2024-11-01 PROCEDURE — 6370000000 HC RX 637 (ALT 250 FOR IP)

## 2024-11-01 PROCEDURE — 2580000003 HC RX 258

## 2024-11-01 PROCEDURE — 80048 BASIC METABOLIC PNL TOTAL CA: CPT

## 2024-11-01 PROCEDURE — 93005 ELECTROCARDIOGRAM TRACING: CPT | Performed by: INTERNAL MEDICINE

## 2024-11-01 PROCEDURE — 94640 AIRWAY INHALATION TREATMENT: CPT

## 2024-11-01 PROCEDURE — 85025 COMPLETE CBC W/AUTO DIFF WBC: CPT

## 2024-11-01 PROCEDURE — 6360000002 HC RX W HCPCS

## 2024-11-01 PROCEDURE — 84100 ASSAY OF PHOSPHORUS: CPT

## 2024-11-01 PROCEDURE — 83735 ASSAY OF MAGNESIUM: CPT

## 2024-11-01 PROCEDURE — 93971 EXTREMITY STUDY: CPT

## 2024-11-01 PROCEDURE — 2060000000 HC ICU INTERMEDIATE R&B

## 2024-11-01 PROCEDURE — 36415 COLL VENOUS BLD VENIPUNCTURE: CPT

## 2024-11-01 PROCEDURE — 82962 GLUCOSE BLOOD TEST: CPT

## 2024-11-01 RX ORDER — ASPIRIN 81 MG/1
81 TABLET ORAL DAILY
Status: DISCONTINUED | OUTPATIENT
Start: 2024-11-02 | End: 2024-11-01

## 2024-11-01 RX ORDER — BUDESONIDE AND FORMOTEROL FUMARATE DIHYDRATE 160; 4.5 UG/1; UG/1
2 AEROSOL RESPIRATORY (INHALATION)
Status: DISCONTINUED | OUTPATIENT
Start: 2024-11-01 | End: 2024-11-03 | Stop reason: HOSPADM

## 2024-11-01 RX ORDER — BUMETANIDE 0.25 MG/ML
1 INJECTION, SOLUTION INTRAMUSCULAR; INTRAVENOUS ONCE
Status: DISCONTINUED | OUTPATIENT
Start: 2024-11-01 | End: 2024-11-01

## 2024-11-01 RX ORDER — METOPROLOL SUCCINATE 50 MG/1
100 TABLET, EXTENDED RELEASE ORAL DAILY
Status: DISCONTINUED | OUTPATIENT
Start: 2024-11-01 | End: 2024-11-03 | Stop reason: HOSPADM

## 2024-11-01 RX ORDER — HYDRALAZINE HYDROCHLORIDE 50 MG/1
50 TABLET, FILM COATED ORAL EVERY 6 HOURS PRN
Status: DISCONTINUED | OUTPATIENT
Start: 2024-11-01 | End: 2024-11-03 | Stop reason: HOSPADM

## 2024-11-01 RX ORDER — GLUCAGON 1 MG/ML
1 KIT INJECTION PRN
Status: DISCONTINUED | OUTPATIENT
Start: 2024-11-01 | End: 2024-11-03 | Stop reason: HOSPADM

## 2024-11-01 RX ORDER — ASPIRIN 81 MG/1
81 TABLET ORAL DAILY
Status: DISCONTINUED | OUTPATIENT
Start: 2024-11-01 | End: 2024-11-03 | Stop reason: HOSPADM

## 2024-11-01 RX ORDER — BUMETANIDE 0.25 MG/ML
2 INJECTION, SOLUTION INTRAMUSCULAR; INTRAVENOUS 2 TIMES DAILY
Status: DISCONTINUED | OUTPATIENT
Start: 2024-11-01 | End: 2024-11-01

## 2024-11-01 RX ORDER — BUMETANIDE 0.25 MG/ML
2 INJECTION, SOLUTION INTRAMUSCULAR; INTRAVENOUS 2 TIMES DAILY
Status: DISCONTINUED | OUTPATIENT
Start: 2024-11-01 | End: 2024-11-03 | Stop reason: HOSPADM

## 2024-11-01 RX ORDER — INSULIN LISPRO 100 [IU]/ML
0-4 INJECTION, SOLUTION INTRAVENOUS; SUBCUTANEOUS
Status: DISCONTINUED | OUTPATIENT
Start: 2024-11-01 | End: 2024-11-03 | Stop reason: HOSPADM

## 2024-11-01 RX ORDER — OXYBUTYNIN CHLORIDE 5 MG/1
5 TABLET ORAL 3 TIMES DAILY
Status: DISCONTINUED | OUTPATIENT
Start: 2024-11-01 | End: 2024-11-03 | Stop reason: HOSPADM

## 2024-11-01 RX ORDER — SPIRONOLACTONE 25 MG/1
25 TABLET ORAL DAILY
Status: DISCONTINUED | OUTPATIENT
Start: 2024-11-01 | End: 2024-11-03 | Stop reason: HOSPADM

## 2024-11-01 RX ORDER — AMIODARONE HYDROCHLORIDE 200 MG/1
200 TABLET ORAL DAILY
Status: DISCONTINUED | OUTPATIENT
Start: 2024-11-01 | End: 2024-11-03 | Stop reason: HOSPADM

## 2024-11-01 RX ORDER — ALBUTEROL SULFATE 90 UG/1
2 INHALANT RESPIRATORY (INHALATION) EVERY 4 HOURS PRN
Status: DISCONTINUED | OUTPATIENT
Start: 2024-11-01 | End: 2024-11-03 | Stop reason: HOSPADM

## 2024-11-01 RX ORDER — ATORVASTATIN CALCIUM 40 MG/1
40 TABLET, FILM COATED ORAL NIGHTLY
Status: DISCONTINUED | OUTPATIENT
Start: 2024-11-01 | End: 2024-11-03 | Stop reason: HOSPADM

## 2024-11-01 RX ORDER — CETIRIZINE HYDROCHLORIDE 10 MG/1
10 TABLET ORAL DAILY
Status: DISCONTINUED | OUTPATIENT
Start: 2024-11-01 | End: 2024-11-03 | Stop reason: HOSPADM

## 2024-11-01 RX ORDER — DEXTROSE MONOHYDRATE 100 MG/ML
INJECTION, SOLUTION INTRAVENOUS CONTINUOUS PRN
Status: DISCONTINUED | OUTPATIENT
Start: 2024-11-01 | End: 2024-11-03 | Stop reason: HOSPADM

## 2024-11-01 RX ORDER — LANOLIN ALCOHOL/MO/W.PET/CERES
3 CREAM (GRAM) TOPICAL NIGHTLY PRN
Status: DISCONTINUED | OUTPATIENT
Start: 2024-11-01 | End: 2024-11-03 | Stop reason: HOSPADM

## 2024-11-01 RX ORDER — BUMETANIDE 0.25 MG/ML
2 INJECTION, SOLUTION INTRAMUSCULAR; INTRAVENOUS DAILY
Status: DISCONTINUED | OUTPATIENT
Start: 2024-11-01 | End: 2024-11-01

## 2024-11-01 RX ADMIN — OXYBUTYNIN CHLORIDE 5 MG: 5 TABLET ORAL at 19:51

## 2024-11-01 RX ADMIN — METOPROLOL SUCCINATE 100 MG: 50 TABLET, EXTENDED RELEASE ORAL at 08:41

## 2024-11-01 RX ADMIN — ASPIRIN 81 MG: 81 TABLET, COATED ORAL at 08:41

## 2024-11-01 RX ADMIN — EMPAGLIFLOZIN 10 MG: 10 TABLET, FILM COATED ORAL at 11:44

## 2024-11-01 RX ADMIN — OXYBUTYNIN CHLORIDE 5 MG: 5 TABLET ORAL at 08:41

## 2024-11-01 RX ADMIN — APIXABAN 5 MG: 5 TABLET, FILM COATED ORAL at 19:51

## 2024-11-01 RX ADMIN — SODIUM CHLORIDE, PRESERVATIVE FREE 10 ML: 5 INJECTION INTRAVENOUS at 08:49

## 2024-11-01 RX ADMIN — SPIRONOLACTONE 25 MG: 25 TABLET ORAL at 11:44

## 2024-11-01 RX ADMIN — SODIUM CHLORIDE, PRESERVATIVE FREE 10 ML: 5 INJECTION INTRAVENOUS at 19:52

## 2024-11-01 RX ADMIN — Medication 3 MG: at 19:56

## 2024-11-01 RX ADMIN — Medication 2 PUFF: at 19:44

## 2024-11-01 RX ADMIN — CETIRIZINE HYDROCHLORIDE 10 MG: 10 TABLET, FILM COATED ORAL at 08:41

## 2024-11-01 RX ADMIN — ATORVASTATIN CALCIUM 40 MG: 40 TABLET, FILM COATED ORAL at 01:14

## 2024-11-01 RX ADMIN — CEFTRIAXONE SODIUM 1000 MG: 1 INJECTION, POWDER, FOR SOLUTION INTRAMUSCULAR; INTRAVENOUS at 12:41

## 2024-11-01 RX ADMIN — AMIODARONE HYDROCHLORIDE 200 MG: 200 TABLET ORAL at 08:41

## 2024-11-01 RX ADMIN — APIXABAN 5 MG: 5 TABLET, FILM COATED ORAL at 08:41

## 2024-11-01 RX ADMIN — Medication 2 PUFF: at 07:23

## 2024-11-01 RX ADMIN — OXYBUTYNIN CHLORIDE 5 MG: 5 TABLET ORAL at 14:16

## 2024-11-01 RX ADMIN — BUMETANIDE 2 MG: 0.25 INJECTION INTRAMUSCULAR; INTRAVENOUS at 18:12

## 2024-11-01 RX ADMIN — BUMETANIDE 2 MG: 0.25 INJECTION INTRAMUSCULAR; INTRAVENOUS at 08:41

## 2024-11-01 RX ADMIN — ATORVASTATIN CALCIUM 40 MG: 40 TABLET, FILM COATED ORAL at 19:51

## 2024-11-01 RX ADMIN — ACETAMINOPHEN 650 MG: 325 TABLET ORAL at 01:13

## 2024-11-01 RX ADMIN — SODIUM CHLORIDE, PRESERVATIVE FREE 10 ML: 5 INJECTION INTRAVENOUS at 01:16

## 2024-11-01 ASSESSMENT — PAIN DESCRIPTION - LOCATION
LOCATION: LEG

## 2024-11-01 ASSESSMENT — PAIN SCALES - GENERAL
PAINLEVEL_OUTOF10: 6
PAINLEVEL_OUTOF10: 8
PAINLEVEL_OUTOF10: 5
PAINLEVEL_OUTOF10: 1
PAINLEVEL_OUTOF10: 8
PAINLEVEL_OUTOF10: 8

## 2024-11-01 ASSESSMENT — PAIN DESCRIPTION - ORIENTATION
ORIENTATION: LEFT
ORIENTATION: POSTERIOR;ANTERIOR;LEFT;RIGHT

## 2024-11-01 ASSESSMENT — PAIN SCALES - WONG BAKER: WONGBAKER_NUMERICALRESPONSE: HURTS A LITTLE BIT

## 2024-11-01 ASSESSMENT — PAIN DESCRIPTION - PAIN TYPE: TYPE: CHRONIC PAIN

## 2024-11-01 ASSESSMENT — PAIN DESCRIPTION - DESCRIPTORS: DESCRIPTORS: BURNING;CRAMPING

## 2024-11-01 NOTE — H&P
BUN/Creatinine Ratio 17 12 - 20      Est, Glom Filt Rate 36 (L) >60 ml/min/1.73m2    Calcium 8.5 8.5 - 10.1 mg/dL    Total Bilirubin 1.1 (H) 0.2 - 1.0 mg/dL    AST 41 (H) 15 - 37 U/L    ALT 41 12 - 78 U/L    Alk Phosphatase 205 (H) 45 - 117 U/L    Total Protein 6.3 (L) 6.4 - 8.2 g/dL    Albumin 3.2 (L) 3.5 - 5.0 g/dL    Globulin 3.1 2.0 - 4.0 g/dL    Albumin/Globulin Ratio 1.0 (L) 1.1 - 2.2     Troponin    Collection Time: 10/31/24  9:10 PM   Result Value Ref Range    Troponin, High Sensitivity 101 (H) 0 - 51 ng/L         CT Result (most recent):  CT CERVICAL SPINE WO CONTRAST 01/07/2024    Narrative  INDICATION: bilat hand weakness, pain and numbness    Exam: Noncontrast CT of the cervical spine is performed with 2.5 mm collimation.  Sagittal and coronal reformatted images were also performed.    CT dose reduction was achieved through use of a standardized protocol tailored  for this examination and automatic exposure control for dose modulation.    FINDINGS: There is no acute fracture or subluxation. The prevertebral soft  tissues are within normal limits. Bones are diffusely demineralized. Remaining  visualized soft tissues are normal.    Right mastoidectomy postoperative changes are noted.    C2-C3: There is no evidence of canal stenosis or neural foraminal narrowing.    C3-C4: There is moderate narrowing of the intervertebral disc. Anterior  osteophyte is noted. There is no evidence of canal stenosis or neural foraminal  narrowing.    C4-C5: There is moderate narrowing of the intervertebral disc. There is a small,  broad-based posterior disc osteophyte complex. Anterior osteophyte is noted.  There is mild bilateral facet arthropathy. There is a mild right neural  foraminal narrowing. Left neural foramen is patent.    C5-C6: There is narrowing of the intervertebral disc. Anterior osteophyte is  noted. There is a broad-based posterior disc osteophyte complex and  uncovertebral joint hypertrophy. There is mild

## 2024-11-01 NOTE — PROGRESS NOTES
Received Order for Telemetry     Nubia SNYDER Ayad   1950   [id number]   CHF (congestive heart failure), NYHA class I, acute on chronic, combined (HCC) [I50.43]   Tasha Dai MD     Tele Box # [id number] placed on patient at  0118 am  ER Room # 14  Admitting to Room 463  Verified with Primary Nurse VAN at  0550 am

## 2024-11-01 NOTE — CONSULTS
Cardiology Consult    NAME: Nubia Lion   :  1950   MRN:  [id number]     Date/Time:  2024 9:44 AM    Patient PCP: Ricardo Calle MD  ________________________________________________________________________     Assessment/Plan:   Decompensated heart failure, acute on chronic, recovered left ventricular systolic dysfunction.  Presenting with 2-day history of increasing leg edema and shortness of breath.  Patient says she has been compliant with her diet.  Says she has been avoiding salt.  Says compliant with medications.  Has been drinking cranberry juice and avoiding sodas.  Continue vigorous diuresis.  Monitor electrolytes and renal function closely.  Currently stable creatinine and potassium.  Elevated NT proBNP.    Hypertension, fairly well-controlled.  Blood pressure was high on admission.    Atrial flutter, history of cardioversion, anticoagulated with Eliquis, will discontinue aspirin.  Continues amiodarone to maintain sinus rhythm.  TSH  was 1.46 unremarkable LFTs, low albumin at 3.2(Normal 3.5).    Coronary artery disease, status post CABG.  Denies chest pain.  Mild troponin leak.  Likely type II MI    Diabetes mellitus, home on Jardiance.    Dyslipidemia, continue atorvastatin           []        High complexity decision making was performed        Subjective:   CHIEF COMPLAINT:     Leg edema, shortness of breath  REASON FOR CONSULT:  CHF    HISTORY OF PRESENT ILLNESS:     Nubia Lion is a 74 y.o. Black /  female who presents with increasing leg edema and shortness of breath for the past 2 days.  Patient is with known coronary artery disease and reduced ejection fraction and heart failure.  Patient denies noncompliance with medications or diet.        Past Medical History:   Diagnosis Date    Chronic obstructive pulmonary disease (HCC)     Congestive heart disease (HCC)     with preserved ejection fraction    Coronary artery disease     Diabetes (HCC)     HFrEF (heart

## 2024-11-01 NOTE — PROGRESS NOTES
Hospitalist Progress Note               Daily Progress Note: 11/1/2024      Hospital Day: 2     Chief complaint:   Chief Complaint   Patient presents with    Shortness of Breath    Leg Swelling        Subjective:   Patient is seen and examined today with nurse during bedside rounds.  Patient states that she still feels short of breath and her loft lower extremities are very swollen.  They are also red and tender.  She denies any fevers or chills  States that redness in her left leg is not usually what she has.    Assessment and plan    Acute hypoxemic respiratory failure secondary to CHF  Acute on chronic heart failure with recovered EF 50 to 55%, exacerbation  Type II NSTEMI  -proBNP elevated, patient is wearing home oxygen 4 L  Follows with Dr. Barker outpatient  -had good UOP in ED ~3-4L, improvement on SOB   -Continue IV diuresis with IV Bumex 2 mg increased to twice daily  -Monitor I's and O's, Daily weights  -Monitor potassium and magnesium  -Keep potassium over 4 and magnesium above 2  -last Echo done 8/2024  -On GDMT metoprolol,, spironolactone and Jardiance  Troponin is downtrending    Bilateral legs swelling and redness, rule out cellulitis  Patient legs are very tender, swollen, redness on the bilateral extremities below the knees  No fevers or chills  No leukocytosis  Patient states that she does not have any allergies to antibiotics, dysuria and ceftriaxone in the chart but not specified which type of reaction  Will start her on 5 days of IV ceftriaxone. For possible cellulitis  Obtain venous duplex for right extremity, left extremity duplex was negativ     CKD IIIB     Hypertension  Continue GDMT     Paroxysmal A-fib  -Monitor telemetry  -Continue Eliquis, amiodarone, metoprolol     CAD status post CABG  HX of TIA/CVA  -Continue aspirin and atorvastatin     COPD on home O2  -Baseline 4 L, no wheezing on exam  -Continue Symbicort, albuterol as needed     DM  -ACHS  -avoid hypoglycemia   -correct if

## 2024-11-01 NOTE — NURSE NAVIGATOR
issues:  Financial:   Denied  Transportation:  Denied  Education:   Denied  Food:    Denied  Housing:   Denied  Safety:    Denied  Drug/ETOH/Smoking:  Denied (Hx of Smoking )        Discharge Disposition: To be determined    Time Spent with patient:    RECOMMENDATIONS:  Patient Follow-up with Cardiology within 1 week, if appointment available, if not schedule soonest appointment available.  Patient should follow-up with PCP within 1 week if Cardiology is not available and within 1-2 weeks if cardiology is available.  GDMTS continued.  Advanced Care Planning should be discussed.

## 2024-11-01 NOTE — CARE COORDINATION
11/01/24 1540   Service Assessment   Patient Orientation Alert and Oriented   Cognition Alert   History Provided By Patient   Primary Caregiver Self   Accompanied By/Relationship alone in room   Support Systems Children   Patient's Healthcare Decision Maker is: Legal Next of Kin   PCP Verified by CM Yes  (2 weeks ago, Dr Calle)   Last Visit to PCP Within last 3 months   Prior Functional Level Independent in ADLs/IADLs   Current Functional Level Independent in ADLs/IADLs   Can patient return to prior living arrangement Yes   Ability to make needs known: Good   Family able to assist with home care needs: Yes   Would you like for me to discuss the discharge plan with any other family members/significant others, and if so, who? Yes  (daughter)   Financial Resources Medicare   Social/Functional History   Lives With Daughter   Type of Home House   Home Layout One level   Home Access Stairs to enter with rails   Entrance Stairs - Number of Steps 2 steps   Home Equipment Oxygen;Cane   Receives Help From Family   ADL Assistance Independent   Homemaking Assistance Independent   Ambulation Assistance Independent   Transfer Assistance Independent   Active  No   Patient's  Info daughter drives   Mode of Transportation Car   Discharge Planning   Type of Residence House   Living Arrangements Children   Potential Assistance Needed N/A   DME Ordered? No   Potential Assistance Purchasing Medications No   Type of Home Care Services None   Patient expects to be discharged to: House   History of falls? 0   Services At/After Discharge   Transition of Care Consult (CM Consult) N/A   Services At/After Discharge None     Patient lives with her daughter in a one story home with 2 steps to entrance. Patient has oxygen and cane. Patient does not drive, daughter drives her to appointments. No HH, SNF or IRF. She uses the [facility] in [place] for her scripts.     Advance Care Planning     General Advance Care Planning (ACP)

## 2024-11-01 NOTE — ED NOTES
ED TO INPATIENT SBAR HANDOFF    Patient Name: Nubia Lion   Preferred Name: Nubia  : 1950  74 y.o.   Family/Caregiver Present: no   Code Status Order: Full Code  PO Status: NPO:No  Telemetry Order:   C-SSRS: Risk of Suicide: No Risk  Sitter no     Restraints:     Sepsis Risk Score      Situation  Chief Complaint   Patient presents with    Shortness of Breath    Leg Swelling     Brief Description of Patient's Condition: Pt came in for shortness of breath from fluid overload,. Taking medications as prescribed but was not urinating as much. Pt since arrival to ED after given bumex has put out a total of 3400mL of urine.   Mental Status: oriented, alert, and logical  Arrived from:Home  Imaging:   XR CHEST PORTABLE   Final Result      Mild edema pattern. Streaky right infrahilar atelectasis/airspace disease.         Electronically signed by KOBE COYNE      Vascular duplex lower extremity venous left    (Results Pending)     Abnormal labs:   Abnormal Labs Reviewed   BRAIN NATRIURETIC PEPTIDE - Abnormal; Notable for the following components:       Result Value    NT Pro-BNP 5,195 (*)     All other components within normal limits   TROPONIN - Abnormal; Notable for the following components:    Troponin, High Sensitivity 108 (*)     All other components within normal limits   CBC WITH AUTO DIFFERENTIAL - Abnormal; Notable for the following components:    RDW 15.3 (*)     Platelets 126 (*)     All other components within normal limits   COMPREHENSIVE METABOLIC PANEL - Abnormal; Notable for the following components:    Sodium 146 (*)     Chloride 111 (*)     Glucose 102 (*)     BUN 26 (*)     Creatinine 1.51 (*)     Est, Glom Filt Rate 36 (*)     Total Bilirubin 1.1 (*)     AST 41 (*)     Alk Phosphatase 205 (*)     Total Protein 6.3 (*)     Albumin 3.2 (*)     Albumin/Globulin Ratio 1.0 (*)     All other components within normal limits   TROPONIN - Abnormal; Notable for the following components:    Troponin, High

## 2024-11-02 LAB
ANION GAP SERPL CALC-SCNC: 3 MMOL/L (ref 2–12)
BASOPHILS # BLD: 0.1 K/UL (ref 0–0.1)
BASOPHILS NFR BLD: 1 % (ref 0–1)
BUN SERPL-MCNC: 22 MG/DL (ref 6–20)
BUN/CREAT SERPL: 13 (ref 12–20)
CA-I BLD-MCNC: 8.8 MG/DL (ref 8.5–10.1)
CHLORIDE SERPL-SCNC: 106 MMOL/L (ref 97–108)
CO2 SERPL-SCNC: 34 MMOL/L (ref 21–32)
CREAT SERPL-MCNC: 1.7 MG/DL (ref 0.55–1.02)
DIFFERENTIAL METHOD BLD: ABNORMAL
EOSINOPHIL # BLD: 0.1 K/UL (ref 0–0.4)
EOSINOPHIL NFR BLD: 2 % (ref 0–7)
ERYTHROCYTE [DISTWIDTH] IN BLOOD BY AUTOMATED COUNT: 15.4 % (ref 11.5–14.5)
GLUCOSE BLD STRIP.AUTO-MCNC: 113 MG/DL (ref 65–100)
GLUCOSE BLD STRIP.AUTO-MCNC: 115 MG/DL (ref 65–100)
GLUCOSE BLD STRIP.AUTO-MCNC: 116 MG/DL (ref 65–100)
GLUCOSE BLD STRIP.AUTO-MCNC: 124 MG/DL (ref 65–100)
GLUCOSE BLD STRIP.AUTO-MCNC: 94 MG/DL (ref 65–100)
GLUCOSE SERPL-MCNC: 136 MG/DL (ref 65–100)
HCT VFR BLD AUTO: 45.7 % (ref 35–47)
HGB BLD-MCNC: 14.1 G/DL (ref 11.5–16)
IMM GRANULOCYTES # BLD AUTO: 0 K/UL (ref 0–0.04)
IMM GRANULOCYTES NFR BLD AUTO: 0 % (ref 0–0.5)
LYMPHOCYTES # BLD: 1.2 K/UL (ref 0.8–3.5)
LYMPHOCYTES NFR BLD: 21 % (ref 12–49)
MCH RBC QN AUTO: 29.1 PG (ref 26–34)
MCHC RBC AUTO-ENTMCNC: 30.9 G/DL (ref 30–36.5)
MCV RBC AUTO: 94.2 FL (ref 80–99)
MONOCYTES # BLD: 0.5 K/UL (ref 0–1)
MONOCYTES NFR BLD: 9 % (ref 5–13)
NEUTS SEG # BLD: 3.7 K/UL (ref 1.8–8)
NEUTS SEG NFR BLD: 67 % (ref 32–75)
NRBC # BLD: 0 K/UL (ref 0–0.01)
NRBC BLD-RTO: 0 PER 100 WBC
PERFORMED BY:: ABNORMAL
PERFORMED BY:: NORMAL
PLATELET # BLD AUTO: 137 K/UL (ref 150–400)
PMV BLD AUTO: 13 FL (ref 8.9–12.9)
POTASSIUM SERPL-SCNC: 4 MMOL/L (ref 3.5–5.1)
RBC # BLD AUTO: 4.85 M/UL (ref 3.8–5.2)
RBC MORPH BLD: ABNORMAL
SODIUM SERPL-SCNC: 143 MMOL/L (ref 136–145)
WBC # BLD AUTO: 5.6 K/UL (ref 3.6–11)

## 2024-11-02 PROCEDURE — 94761 N-INVAS EAR/PLS OXIMETRY MLT: CPT

## 2024-11-02 PROCEDURE — 2580000003 HC RX 258

## 2024-11-02 PROCEDURE — 2060000000 HC ICU INTERMEDIATE R&B

## 2024-11-02 PROCEDURE — 80048 BASIC METABOLIC PNL TOTAL CA: CPT

## 2024-11-02 PROCEDURE — 6360000002 HC RX W HCPCS

## 2024-11-02 PROCEDURE — 6370000000 HC RX 637 (ALT 250 FOR IP)

## 2024-11-02 PROCEDURE — 82962 GLUCOSE BLOOD TEST: CPT

## 2024-11-02 PROCEDURE — 36415 COLL VENOUS BLD VENIPUNCTURE: CPT

## 2024-11-02 PROCEDURE — 94640 AIRWAY INHALATION TREATMENT: CPT

## 2024-11-02 PROCEDURE — 2700000000 HC OXYGEN THERAPY PER DAY

## 2024-11-02 PROCEDURE — 85025 COMPLETE CBC W/AUTO DIFF WBC: CPT

## 2024-11-02 RX ADMIN — SPIRONOLACTONE 25 MG: 25 TABLET ORAL at 09:54

## 2024-11-02 RX ADMIN — EMPAGLIFLOZIN 10 MG: 10 TABLET, FILM COATED ORAL at 09:55

## 2024-11-02 RX ADMIN — APIXABAN 5 MG: 5 TABLET, FILM COATED ORAL at 09:54

## 2024-11-02 RX ADMIN — OXYBUTYNIN CHLORIDE 5 MG: 5 TABLET ORAL at 09:55

## 2024-11-02 RX ADMIN — CETIRIZINE HYDROCHLORIDE 10 MG: 10 TABLET, FILM COATED ORAL at 09:54

## 2024-11-02 RX ADMIN — CEFTRIAXONE SODIUM 1000 MG: 1 INJECTION, POWDER, FOR SOLUTION INTRAMUSCULAR; INTRAVENOUS at 13:27

## 2024-11-02 RX ADMIN — ATORVASTATIN CALCIUM 40 MG: 40 TABLET, FILM COATED ORAL at 21:25

## 2024-11-02 RX ADMIN — OXYBUTYNIN CHLORIDE 5 MG: 5 TABLET ORAL at 13:27

## 2024-11-02 RX ADMIN — Medication 2 PUFF: at 06:18

## 2024-11-02 RX ADMIN — ASPIRIN 81 MG: 81 TABLET, COATED ORAL at 09:54

## 2024-11-02 RX ADMIN — BUMETANIDE 2 MG: 0.25 INJECTION INTRAMUSCULAR; INTRAVENOUS at 09:55

## 2024-11-02 RX ADMIN — Medication 2 PUFF: at 20:54

## 2024-11-02 RX ADMIN — SODIUM CHLORIDE, PRESERVATIVE FREE 10 ML: 5 INJECTION INTRAVENOUS at 21:25

## 2024-11-02 RX ADMIN — SODIUM CHLORIDE, PRESERVATIVE FREE 10 ML: 5 INJECTION INTRAVENOUS at 09:55

## 2024-11-02 RX ADMIN — AMIODARONE HYDROCHLORIDE 200 MG: 200 TABLET ORAL at 09:55

## 2024-11-02 RX ADMIN — BUMETANIDE 2 MG: 0.25 INJECTION INTRAMUSCULAR; INTRAVENOUS at 18:24

## 2024-11-02 RX ADMIN — OXYBUTYNIN CHLORIDE 5 MG: 5 TABLET ORAL at 21:25

## 2024-11-02 RX ADMIN — APIXABAN 5 MG: 5 TABLET, FILM COATED ORAL at 21:25

## 2024-11-02 RX ADMIN — METOPROLOL SUCCINATE 100 MG: 50 TABLET, EXTENDED RELEASE ORAL at 09:54

## 2024-11-02 NOTE — PROGRESS NOTES
Nutrition Education    Educated on HF Diet Guidelines, Fluid Restriction   Learners: Patient  Readiness: Acceptance  Method: Explanation, Demonstration, and Handout  Response: Verbalizes Understanding and Demonstrated Understanding  Discussed w/ pt rationale of low solidum diet for HF. Pt verbalized understanding, states she had already been avoiding sodium in her diet by not using salt in cooking and thoroughly rinsing canned ingredients. D/w pt some other common sources in our diet. Dicussed lower sodium alternatives to those that the pt reported consuming. Pt verbalized understanding. Reviewed nutrition facts label w/ pt. Discussed pt's fluid limit. Pt verbalized understanding. Handouts left for pt reference.   Contact name and number provided.    Kati Corbett RD  Contact Number: [phone]

## 2024-11-02 NOTE — PLAN OF CARE
Problem: Chronic Conditions and Co-morbidities  Goal: Patient's chronic conditions and co-morbidity symptoms are monitored and maintained or improved  Outcome: Progressing  Flowsheets (Taken 11/2/2024 0950)  Care Plan - Patient's Chronic Conditions and Co-Morbidity Symptoms are Monitored and Maintained or Improved: Monitor and assess patient's chronic conditions and comorbid symptoms for stability, deterioration, or improvement     Problem: Discharge Planning  Goal: Discharge to home or other facility with appropriate resources  Outcome: Progressing  Flowsheets (Taken 11/2/2024 0950)  Discharge to home or other facility with appropriate resources: Identify barriers to discharge with patient and caregiver     Problem: Pain  Goal: Verbalizes/displays adequate comfort level or baseline comfort level  Outcome: Progressing     Problem: Skin/Tissue Integrity  Goal: Absence of new skin breakdown  Description: 1.  Monitor for areas of redness and/or skin breakdown  2.  Assess vascular access sites hourly  3.  Every 4-6 hours minimum:  Change oxygen saturation probe site  4.  Every 4-6 hours:  If on nasal continuous positive airway pressure, respiratory therapy assess nares and determine need for appliance change or resting period.  Outcome: Progressing     Problem: Safety - Adult  Goal: Free from fall injury  Outcome: Progressing

## 2024-11-02 NOTE — PROGRESS NOTES
Hospitalist Progress Note               Daily Progress Note: 11/2/2024      Hospital Day: 3     Chief complaint:   Chief Complaint   Patient presents with    Shortness of Breath    Leg Swelling        Subjective:   Patient is seen and examined today with nurse during bedside rounds.  Patient states that her swelling in the bilateral extremities are getting better.  She can now walk.  She is on her regular 4 L of oxygen.  breathing well    Assessment and plan    Acute hypoxemic respiratory failure secondary to CHF  Acute on chronic heart failure with recovered EF 50 to 55%, exacerbation  Type II NSTEMI  -proBNP elevated, patient is wearing home oxygen 4 L  Follows with Dr. Barker outpatient  -Continue IV diuresis with IV Bumex 2 mg increased to twice daily  -Monitor I's and O's, Daily weights  -Monitor potassium and magnesium  -Keep potassium over 4 and magnesium above 2  -last Echo done 8/2024  -On GDMT metoprolol,, spironolactone and Jardiance  Troponin is downtrending    Bilateral legs swelling and redness, rule out cellulitis  Patient legs are very tender, swollen, redness on the bilateral extremities below the knees  No fevers or chills  No leukocytosis  Negative venous duplex bilaterally  Patient states that she does not have any allergies to antibiotics, dysuria and ceftriaxone in the chart but not specified which type of reaction  Will start her on 5 days of IV ceftriaxone. For possible cellulitis       CKD IIIB     Hypertension  Continue GDMT     Paroxysmal A-fib  -Monitor telemetry  -Continue Eliquis, amiodarone, metoprolol     CAD status post CABG  HX of TIA/CVA  -Continue aspirin and atorvastatin     COPD on home O2  -Baseline 4 L, no wheezing on exam  -Continue Symbicort, albuterol as needed     DM  -ACHS  -avoid hypoglycemia   -correct if BS > 180      Diet: Low salt  Code Status: Full   DVT Prophylaxis: Eliquis  GI Prophylaxis:PPI     Medications reviewed  Current Facility-Administered Medications  Continuous Infusions:    sodium chloride      lactated ringers      sodium chloride       PRN Meds: sodium chloride flush, sodium chloride flush  Home Meds:   Prior to Admission medications    Medication Sig Start Date End Date Taking? Authorizing Provider   Vitamin D, Cholecalciferol, 25 MCG (1000 UT) CAPS Take 1,000 Units by mouth daily 10/19/20   Kevin Gore MD   zoster recombinant adjuvanted vaccine [facility]) 50 MCG/0.5ML SUSR injection Inject 0.5 mLs into the muscle See Admin Instructions 1 dose now and repeat in 2-6 months 10/19/20 4/17/21  Kevin Gore MD   metoprolol tartrate (LOPRESSOR) 25 MG tablet Take 0.5 tablets by mouth 2 times daily  Patient not taking: Reported on 10/22/2020 9/30/20   Abelardo Blue MD   warfarin (COUMADIN) 5 MG tablet TAKE 1 TABLET DAILY OR AS DIRECTED BY PHYSICIAN 4/7/20   Abelardo Blue MD   flecainide [facility] [place]) 150 MG tablet Take 1 tablet by mouth 2 times daily 4/7/20   MAILE Schuster CNP   warfarin (COUMADIN) 10 MG tablet TAKE 1 TABLET DAILY. TAKE 12.5 MG ON MONDAY, WEDNESDAY AND FRIDAY, AND 10 MG ALL OTHER DAYS. Patient taking differently: Take 7.5mg daily 4/2/20   Abelardo Blue MD   ezetimibe (ZETIA) 10 MG tablet TAKE 1 TABLET BY MOUTH ONE TIME A DAY 3/17/20   MAILE Park CNP   furosemide (LASIX) 20 MG tablet TAKE 1 TABLET DAILY 1/2/20   Abelardo Blue MD   amLODIPine (NORVASC) 10 MG tablet TAKE 1 TABLET DAILY 1/2/20   Abelardo Blue MD   digoxin (LANOXIN) 250 MCG tablet TAKE 1 TABLET DAILY 1/2/20   Abelardo Blue MD   acetaminophen (TYLENOL) 325 MG tablet Take 325 mg by mouth every 6 hours as needed. As need for headaches.     Historical Provider, MD     Coumadin Use Last 7 Days:  yes -   Antiplatelet drug therapy use last 7 days: no  Other anticoagulant use last 7 days: no  Additional Medication Information:  Flecanide      Pre-Sedation Documentation and Exam:   I have personally completed a history, physical exam & review of systems for this patient (see notes). Vital signs have been reviewed (see flow sheet for vitals).     Mallampati Airway Assessment:  Mallampati Class I - (soft palate, fauces, uvula & anterior/posterior tonsillar pillars are visible)    Prior History of Anesthesia Complications:   none    ASA Classification:  Class 2 - A normal healthy patient with mild systemic disease    Sedation/ Anesthesia Plan:   general    Medications Planned:   Per anesthesia team    Patient is an appropriate candidate for plan of sedation: yes    Electronically signed by Nata Mace MD on 11/23/2020 at 6:22 AM

## 2024-11-02 NOTE — PROGRESS NOTES
Progress Note      11/2/2024 10:44 AM  NAME: Nubia Lion   MRN:  [id number]   Admit Diagnosis: CHF (congestive heart failure), NYHA class I, acute on chronic, combined (HCC) [I50.43]  Volume overload state of heart [E87.79]          Assessment/Plan:   Decompensated heart failure, acute on chronic, recovered left ventricular systolic function by echo.  Improving.  Patient says she is looking forward to going home.    Chronic kidney disease, creatinine usually does fluctuate, now increasing with diuresis.    Leg edema is better, extremely tender, started on antibiotics.  Negative for DVT.    Hypertension, now with adequate control.    Atrial flutter, history of cardioversion, anticoagulated with Eliquis.  Continues amiodarone with normal TSH and LFTs.  Continues aspirin with also history of coronary artery disease.    Coronary artery disease, status post CABG, mild troponin leak/type II MI.  2/24/2021 cardiac cath without progression or lesions causing ischemia.    Diabetes, on Jardiance, hide acute control, hemoglobin A1c (8/24)  6.3    Dyslipidemia, on atorvastatin, adequate control, 8/24 cholesterol 145         []       High complexity decision making was performed in this patient at high risk for decompensation with multiple organ involvement.    Subjective:     Nubia Lion denies chest pain, dyspnea.  Discussed with RN events overnight.     Review of Systems:    Symptom Y/N Comments  Symptom Y/N Comments   Fever/Chills N   Chest Pain N    Poor Appetite N   Edema N    Cough N   Abdominal Pain N    Sputum N   Joint Pain N    SOB/ELISE N   Pruritis/Rash N    Nausea/vomit N   Tolerating PT/OT Y    Diarrhea N   Tolerating Diet Y    Constipation N   Other       Could NOT obtain due to:      Objective:      Physical Exam:    Last 24hrs VS reviewed since prior progress note. Most recent are:    /76   Pulse 56   Temp 98.1 °F (36.7 °C) (Oral)   Resp 20   Ht 1.6 m (5' 3\")   Wt 98.8 kg (217 lb 13 oz)   SpO2

## 2024-11-03 VITALS
DIASTOLIC BLOOD PRESSURE: 87 MMHG | HEART RATE: 53 BPM | BODY MASS INDEX: 37.58 KG/M2 | RESPIRATION RATE: 22 BRPM | SYSTOLIC BLOOD PRESSURE: 132 MMHG | WEIGHT: 212.08 LBS | OXYGEN SATURATION: 97 % | HEIGHT: 63 IN | TEMPERATURE: 97.7 F

## 2024-11-03 LAB
ANION GAP SERPL CALC-SCNC: 6 MMOL/L (ref 2–12)
BASOPHILS # BLD: 0 K/UL (ref 0–0.1)
BASOPHILS NFR BLD: 1 % (ref 0–1)
BUN SERPL-MCNC: 25 MG/DL (ref 6–20)
BUN/CREAT SERPL: 14 (ref 12–20)
CA-I BLD-MCNC: 8.5 MG/DL (ref 8.5–10.1)
CHLORIDE SERPL-SCNC: 102 MMOL/L (ref 97–108)
CO2 SERPL-SCNC: 35 MMOL/L (ref 21–32)
CREAT SERPL-MCNC: 1.76 MG/DL (ref 0.55–1.02)
DIFFERENTIAL METHOD BLD: ABNORMAL
EOSINOPHIL # BLD: 0.1 K/UL (ref 0–0.4)
EOSINOPHIL NFR BLD: 2 % (ref 0–7)
ERYTHROCYTE [DISTWIDTH] IN BLOOD BY AUTOMATED COUNT: 15.3 % (ref 11.5–14.5)
GLUCOSE BLD STRIP.AUTO-MCNC: 107 MG/DL (ref 65–100)
GLUCOSE BLD STRIP.AUTO-MCNC: 113 MG/DL (ref 65–100)
GLUCOSE BLD STRIP.AUTO-MCNC: 94 MG/DL (ref 65–100)
GLUCOSE SERPL-MCNC: 152 MG/DL (ref 65–100)
HCT VFR BLD AUTO: 46.5 % (ref 35–47)
HGB BLD-MCNC: 14.5 G/DL (ref 11.5–16)
IMM GRANULOCYTES # BLD AUTO: 0 K/UL (ref 0–0.04)
IMM GRANULOCYTES NFR BLD AUTO: 0 % (ref 0–0.5)
LYMPHOCYTES # BLD: 1.2 K/UL (ref 0.8–3.5)
LYMPHOCYTES NFR BLD: 19 % (ref 12–49)
MCH RBC QN AUTO: 29.1 PG (ref 26–34)
MCHC RBC AUTO-ENTMCNC: 31.2 G/DL (ref 30–36.5)
MCV RBC AUTO: 93.4 FL (ref 80–99)
MONOCYTES # BLD: 0.5 K/UL (ref 0–1)
MONOCYTES NFR BLD: 8 % (ref 5–13)
NEUTS SEG # BLD: 4.3 K/UL (ref 1.8–8)
NEUTS SEG NFR BLD: 70 % (ref 32–75)
NRBC # BLD: 0 K/UL (ref 0–0.01)
NRBC BLD-RTO: 0 PER 100 WBC
PERFORMED BY:: ABNORMAL
PERFORMED BY:: ABNORMAL
PERFORMED BY:: NORMAL
PLATELET # BLD AUTO: 157 K/UL (ref 150–400)
PMV BLD AUTO: 12.9 FL (ref 8.9–12.9)
POTASSIUM SERPL-SCNC: 4.1 MMOL/L (ref 3.5–5.1)
RBC # BLD AUTO: 4.98 M/UL (ref 3.8–5.2)
SODIUM SERPL-SCNC: 143 MMOL/L (ref 136–145)
WBC # BLD AUTO: 6.2 K/UL (ref 3.6–11)

## 2024-11-03 PROCEDURE — 80048 BASIC METABOLIC PNL TOTAL CA: CPT

## 2024-11-03 PROCEDURE — 82962 GLUCOSE BLOOD TEST: CPT

## 2024-11-03 PROCEDURE — 6370000000 HC RX 637 (ALT 250 FOR IP)

## 2024-11-03 PROCEDURE — 94761 N-INVAS EAR/PLS OXIMETRY MLT: CPT

## 2024-11-03 PROCEDURE — 2700000000 HC OXYGEN THERAPY PER DAY

## 2024-11-03 PROCEDURE — 2580000003 HC RX 258

## 2024-11-03 PROCEDURE — 94640 AIRWAY INHALATION TREATMENT: CPT

## 2024-11-03 PROCEDURE — 6360000002 HC RX W HCPCS

## 2024-11-03 PROCEDURE — 85025 COMPLETE CBC W/AUTO DIFF WBC: CPT

## 2024-11-03 PROCEDURE — 36415 COLL VENOUS BLD VENIPUNCTURE: CPT

## 2024-11-03 RX ORDER — BUMETANIDE 2 MG/1
2 TABLET ORAL 2 TIMES DAILY
Qty: 90 TABLET | Refills: 1 | Status: SHIPPED | OUTPATIENT
Start: 2024-11-03 | End: 2025-02-01

## 2024-11-03 RX ADMIN — OXYBUTYNIN CHLORIDE 5 MG: 5 TABLET ORAL at 14:20

## 2024-11-03 RX ADMIN — APIXABAN 5 MG: 5 TABLET, FILM COATED ORAL at 10:12

## 2024-11-03 RX ADMIN — BUMETANIDE 2 MG: 0.25 INJECTION INTRAMUSCULAR; INTRAVENOUS at 14:20

## 2024-11-03 RX ADMIN — ASPIRIN 81 MG: 81 TABLET, COATED ORAL at 10:13

## 2024-11-03 RX ADMIN — EMPAGLIFLOZIN 10 MG: 10 TABLET, FILM COATED ORAL at 10:12

## 2024-11-03 RX ADMIN — CETIRIZINE HYDROCHLORIDE 10 MG: 10 TABLET, FILM COATED ORAL at 10:12

## 2024-11-03 RX ADMIN — SPIRONOLACTONE 25 MG: 25 TABLET ORAL at 10:12

## 2024-11-03 RX ADMIN — AMIODARONE HYDROCHLORIDE 200 MG: 200 TABLET ORAL at 10:12

## 2024-11-03 RX ADMIN — Medication 2 PUFF: at 08:08

## 2024-11-03 RX ADMIN — METOPROLOL SUCCINATE 100 MG: 50 TABLET, EXTENDED RELEASE ORAL at 10:13

## 2024-11-03 RX ADMIN — BUMETANIDE 2 MG: 0.25 INJECTION INTRAMUSCULAR; INTRAVENOUS at 10:13

## 2024-11-03 RX ADMIN — OXYBUTYNIN CHLORIDE 5 MG: 5 TABLET ORAL at 10:12

## 2024-11-03 RX ADMIN — CEFTRIAXONE SODIUM 1000 MG: 1 INJECTION, POWDER, FOR SOLUTION INTRAMUSCULAR; INTRAVENOUS at 14:20

## 2024-11-03 RX ADMIN — SODIUM CHLORIDE, PRESERVATIVE FREE 10 ML: 5 INJECTION INTRAVENOUS at 10:13

## 2024-11-03 ASSESSMENT — PAIN SCALES - GENERAL: PAINLEVEL_OUTOF10: 0

## 2024-11-03 NOTE — CARE COORDINATION
CM has observed dc order for after bumex given around 1400. No further needs from CM. Pt cleared for dc from CM standpoint.     Transition of Care Plan:    RUR: 22%  Prior Level of Functioning: independent  Disposition: home self care  KATHLEEN: 11/3/2024  If SNF or IPR: Date FOC offered: n/a  Date FOC received: n/a  Accepting facility: n/a  Date authorization started with reference number: n/a  Date authorization received and expires: n/a  Follow up appointments: unit secretary or primary nurse to setup as needed  DME needed: none  Transportation at discharge: arranged by pt  IM/IMM Medicare/ letter given: 1st IMM given 11/1/2024  Is patient a  and connected with [facility]? N/a   If yes, was [place] transfer form completed and [facility] notified? N/a  Caregiver Contact: n/a  Discharge Caregiver contacted prior to discharge? N/a  Care Conference needed? no  Barriers to discharge: none

## 2024-11-03 NOTE — PLAN OF CARE
Problem: Chronic Conditions and Co-morbidities  Goal: Patient's chronic conditions and co-morbidity symptoms are monitored and maintained or improved  Outcome: Progressing  Flowsheets (Taken 11/3/2024 1000)  Care Plan - Patient's Chronic Conditions and Co-Morbidity Symptoms are Monitored and Maintained or Improved: Monitor and assess patient's chronic conditions and comorbid symptoms for stability, deterioration, or improvement     Problem: Discharge Planning  Goal: Discharge to home or other facility with appropriate resources  Outcome: Progressing  Flowsheets (Taken 11/3/2024 1000)  Discharge to home or other facility with appropriate resources: Identify barriers to discharge with patient and caregiver     Problem: Pain  Goal: Verbalizes/displays adequate comfort level or baseline comfort level  Outcome: Progressing     Problem: Skin/Tissue Integrity  Goal: Absence of new skin breakdown  Description: 1.  Monitor for areas of redness and/or skin breakdown  2.  Assess vascular access sites hourly  3.  Every 4-6 hours minimum:  Change oxygen saturation probe site  4.  Every 4-6 hours:  If on nasal continuous positive airway pressure, respiratory therapy assess nares and determine need for appliance change or resting period.  Outcome: Progressing     Problem: Safety - Adult  Goal: Free from fall injury  Outcome: Progressing

## 2024-11-03 NOTE — DISCHARGE SUMMARY
Diet:  cardiac diet    Disposition:  Home.  Already has home health established    Advanced Directive:   FULL X   DNR      Discharge Exam:                     Vitals:    11/03/24 0810   BP: 134/89   Pulse: 54   Resp: 20   Temp: 98.2 °F (36.8 °C)   SpO2: 96%      General:  Alert, cooperative, no distress, appears stated age.   Lungs:   Clear to auscultation bilaterally.   Chest wall:  No tenderness or deformity.   Heart:  Regular rate and rhythm, S1, S2 normal, no murmur, click, rub or gallop.   Abdomen:   Soft, non-tender. Bowel sounds normal. No masses,  No organomegaly.   Extremities: Still 1+ lower extremity edema, better than admission.  Discoloration of the legs bilaterally consistent with possible cellulitis   Pulses: 2+ and symmetric all extremities.   Skin: Skin color, texture, turgor normal. No rashes or lesions   Neurologic: CNII-XII intact. No gross sensory or motor deficits             Significant Diagnostic Studies:   10/31/2024: BUN 26 mg/dL (H; Ref range: 6 - 20 mg/dL); Calcium 8.5 mg/dL (Ref range: 8.5 - 10.1 mg/dL); Chloride 111 mmol/L (H; Ref range: 97 - 108 mmol/L); CO2 31 mmol/L (Ref range: 21 - 32 mmol/L); Creatinine 1.51 mg/dL (H; Ref range: 0.55 - 1.02 mg/dL); Glucose 102 mg/dL (H; Ref range: 65 - 100 mg/dL); Hematocrit 43.7 % (Ref range: 35.0 - 47.0 %); Hemoglobin 13.8 g/dL (Ref range: 11.5 - 16.0 g/dL); Potassium 4.8 mmol/L (Ref range: 3.5 - 5.1 mmol/L); Sodium 146 mmol/L (H; Ref range: 136 - 145 mmol/L)  11/1/2024: BUN 22 mg/dL (H; Ref range: 6 - 20 mg/dL); Calcium 8.5 mg/dL (Ref range: 8.5 - 10.1 mg/dL); Chloride 109 mmol/L (H; Ref range: 97 - 108 mmol/L); CO2 31 mmol/L (Ref range: 21 - 32 mmol/L); Creatinine 1.34 mg/dL (H; Ref range: 0.55 - 1.02 mg/dL); Glucose 111 mg/dL (H; Ref range: 65 - 100 mg/dL); Hematocrit 42.2 % (Ref range: 35.0 - 47.0 %); Hemoglobin 13.3 g/dL (Ref range: 11.5 - 16.0 g/dL); Potassium 3.9 mmol/L (Ref range: 3.5 - 5.1 mmol/L); Sodium 145 mmol/L (Ref

## 2024-12-10 ENCOUNTER — OFFICE VISIT (OUTPATIENT)
Age: 74
End: 2024-12-10
Payer: MEDICARE

## 2024-12-10 VITALS
HEART RATE: 69 BPM | BODY MASS INDEX: 37.57 KG/M2 | HEIGHT: 63 IN | DIASTOLIC BLOOD PRESSURE: 75 MMHG | SYSTOLIC BLOOD PRESSURE: 160 MMHG

## 2024-12-10 DIAGNOSIS — N28.89 RIGHT RENAL MASS: Primary | ICD-10-CM

## 2024-12-10 LAB
BILIRUBIN, URINE, POC: NEGATIVE
BLOOD URINE, POC: ABNORMAL
GLUCOSE URINE, POC: NEGATIVE
KETONES, URINE, POC: NEGATIVE
LEUKOCYTE ESTERASE, URINE, POC: NEGATIVE
NITRITE, URINE, POC: NEGATIVE
PH, URINE, POC: 6 (ref 4.6–8)
PROTEIN,URINE, POC: 300
SPECIFIC GRAVITY, URINE, POC: 1.02 (ref 1–1.03)
URINALYSIS CLARITY, POC: CLEAR
URINALYSIS COLOR, POC: YELLOW
UROBILINOGEN, POC: ABNORMAL

## 2024-12-10 PROCEDURE — 81003 URINALYSIS AUTO W/O SCOPE: CPT | Performed by: STUDENT IN AN ORGANIZED HEALTH CARE EDUCATION/TRAINING PROGRAM

## 2024-12-10 PROCEDURE — 1123F ACP DISCUSS/DSCN MKR DOCD: CPT | Performed by: STUDENT IN AN ORGANIZED HEALTH CARE EDUCATION/TRAINING PROGRAM

## 2024-12-10 PROCEDURE — 99203 OFFICE O/P NEW LOW 30 MIN: CPT | Performed by: STUDENT IN AN ORGANIZED HEALTH CARE EDUCATION/TRAINING PROGRAM

## 2024-12-10 PROCEDURE — 1159F MED LIST DOCD IN RCRD: CPT | Performed by: STUDENT IN AN ORGANIZED HEALTH CARE EDUCATION/TRAINING PROGRAM

## 2024-12-10 RX ORDER — CEPHALEXIN 500 MG/1
500 CAPSULE ORAL 2 TIMES DAILY
COMMUNITY
Start: 2024-11-25

## 2024-12-10 RX ORDER — ALBUTEROL SULFATE 90 UG/1
2 INHALANT RESPIRATORY (INHALATION) EVERY 4 HOURS PRN
COMMUNITY
Start: 2024-11-17

## 2024-12-10 NOTE — PROGRESS NOTES
HISTORY OF PRESENT ILLNESS  Nubia Lion is a 74 y.o. female.  Chief Complaint   Patient presents with    New Patient       74-year-old female with history of congestive heart failure, COPD, diabetes, hypertension, previous heart attack and history of a cardiac bypass who was referred for a renal mass.  The patient is asymptomatic and has not seen urologist previously.  She was told by another provider that she had a cystic mass on one of the kidneys.  There is no imaging available to me for review and the patient does not have any medical documentation outlining the size or location of the mass.        PAST MEDICAL HISTORY:  PMHx (including negatives):  has a past medical history of Chronic obstructive pulmonary disease (HCC), Congestive heart disease (HCC), Coronary artery disease, Diabetes (HCC), HFrEF (heart failure with reduced ejection fraction) (HCC), Hyperlipidemia, Hypertension, Myocardial infarct (HCC), and MICHELLE (obstructive sleep apnea).   PSurgHx:  has a past surgical history that includes Coronary artery bypass graft; hernia repair; Hysterectomy; Cholecystectomy; and Breast biopsy.  PSocHx:  reports that she has quit smoking. She has never used smokeless tobacco. She reports that she does not currently use alcohol. She reports that she does not use drugs.     Review of Systems      Physical Exam  Constitutional:       General: She is not in acute distress.     Appearance: She is not ill-appearing.   HENT:      Head: Normocephalic and atraumatic.   Eyes:      Extraocular Movements: Extraocular movements intact.      Pupils: Pupils are equal, round, and reactive to light.   Pulmonary:      Effort: Pulmonary effort is normal.   Musculoskeletal:         General: Normal range of motion.   Neurological:      General: No focal deficit present.      Mental Status: She is alert and oriented to person, place, and time.   Psychiatric:         Mood and Affect: Mood normal.         ASSESSMENT AND PLAN      1. Right

## 2024-12-10 NOTE — PROGRESS NOTES
Chief Complaint   Patient presents with    New Patient    Have a Mass on left renal  1. Have you been to the ER, urgent care clinic since your last visit?  Hospitalized since your last visit?no    2. Have you seen or consulted any other health care providers outside of the [facility] since your last visit?  Include any pap smears or colon screening. no

## 2024-12-11 LAB
APPEARANCE UR: CLEAR
BACTERIA #/AREA URNS HPF: NORMAL /[HPF]
BILIRUB UR QL STRIP: NEGATIVE
CASTS URNS QL MICRO: NORMAL /LPF
COLOR UR: YELLOW
EPI CELLS #/AREA URNS HPF: NORMAL /HPF (ref 0–10)
GLUCOSE UR QL STRIP: NEGATIVE
HGB UR QL STRIP: ABNORMAL
KETONES UR QL STRIP: NEGATIVE
LEUKOCYTE ESTERASE UR QL STRIP: NEGATIVE
MICRO URNS: ABNORMAL
NITRITE UR QL STRIP: NEGATIVE
PH UR STRIP: 6 [PH] (ref 5–7.5)
PROT UR QL STRIP: ABNORMAL
RBC #/AREA URNS HPF: NORMAL /HPF (ref 0–2)
SP GR UR STRIP: 1.02 (ref 1–1.03)
UROBILINOGEN UR STRIP-MCNC: 0.2 MG/DL (ref 0.2–1)
WBC #/AREA URNS HPF: NORMAL /HPF (ref 0–5)

## 2024-12-30 ENCOUNTER — TELEPHONE (OUTPATIENT)
Age: 74
End: 2024-12-30

## 2024-12-30 NOTE — TELEPHONE ENCOUNTER
Dr. Grant ordered renal US for evaluation of renal mass.  Pt scheduled for follow up on 1/7/24.  Can you call her to help her get scheduled for the ultrasound?

## 2024-12-30 NOTE — TELEPHONE ENCOUNTER
Attempted to reach pt to provide scheduling information for imaging. Number on file is out of service.

## 2025-04-11 ENCOUNTER — APPOINTMENT (OUTPATIENT)
Facility: HOSPITAL | Age: 75
DRG: 291 | End: 2025-04-11
Payer: MEDICARE

## 2025-04-11 ENCOUNTER — HOSPITAL ENCOUNTER (INPATIENT)
Facility: HOSPITAL | Age: 75
LOS: 6 days | Discharge: HOME | DRG: 291 | End: 2025-04-17
Attending: EMERGENCY MEDICINE | Admitting: FAMILY MEDICINE
Payer: MEDICARE

## 2025-04-11 DIAGNOSIS — I50.9 ACUTE ON CHRONIC CONGESTIVE HEART FAILURE, UNSPECIFIED HEART FAILURE TYPE (HCC): Primary | ICD-10-CM

## 2025-04-11 DIAGNOSIS — R60.0 BILATERAL LOWER EXTREMITY EDEMA: ICD-10-CM

## 2025-04-11 DIAGNOSIS — R07.9 CHEST PAIN, UNSPECIFIED TYPE: ICD-10-CM

## 2025-04-11 DIAGNOSIS — J96.01 ACUTE RESPIRATORY FAILURE WITH HYPOXIA: ICD-10-CM

## 2025-04-11 LAB
ALBUMIN SERPL-MCNC: 3.1 G/DL (ref 3.5–5)
ALBUMIN/GLOB SERPL: 0.9 (ref 1.1–2.2)
ALP SERPL-CCNC: 186 U/L (ref 45–117)
ALT SERPL-CCNC: 22 U/L (ref 12–78)
ANION GAP SERPL CALC-SCNC: 1 MMOL/L (ref 2–12)
AST SERPL W P-5'-P-CCNC: 29 U/L (ref 15–37)
BASOPHILS # BLD: 0.04 K/UL (ref 0–0.1)
BASOPHILS NFR BLD: 0.6 % (ref 0–1)
BILIRUB SERPL-MCNC: 1.4 MG/DL (ref 0.2–1)
BNP SERPL-MCNC: 9774 PG/ML
BUN SERPL-MCNC: 24 MG/DL (ref 6–20)
BUN/CREAT SERPL: 17 (ref 12–20)
CA-I BLD-MCNC: 9 MG/DL (ref 8.5–10.1)
CHLORIDE SERPL-SCNC: 113 MMOL/L (ref 97–108)
CO2 SERPL-SCNC: 30 MMOL/L (ref 21–32)
CREAT SERPL-MCNC: 1.39 MG/DL (ref 0.55–1.02)
DIFFERENTIAL METHOD BLD: ABNORMAL
EOSINOPHIL # BLD: 0.04 K/UL (ref 0–0.4)
EOSINOPHIL NFR BLD: 0.6 % (ref 0–7)
ERYTHROCYTE [DISTWIDTH] IN BLOOD BY AUTOMATED COUNT: 15.9 % (ref 11.5–14.5)
GLOBULIN SER CALC-MCNC: 3.4 G/DL (ref 2–4)
GLUCOSE BLD STRIP.AUTO-MCNC: 96 MG/DL (ref 65–100)
GLUCOSE SERPL-MCNC: 101 MG/DL (ref 65–100)
HCT VFR BLD AUTO: 43.5 % (ref 35–47)
HGB BLD-MCNC: 13.6 G/DL (ref 11.5–16)
IMM GRANULOCYTES # BLD AUTO: 0.01 K/UL (ref 0–0.04)
IMM GRANULOCYTES NFR BLD AUTO: 0.1 % (ref 0–0.5)
LYMPHOCYTES # BLD: 1.41 K/UL (ref 0.8–3.5)
LYMPHOCYTES NFR BLD: 20.9 % (ref 12–49)
MAGNESIUM SERPL-MCNC: 1.5 MG/DL (ref 1.6–2.4)
MCH RBC QN AUTO: 28.5 PG (ref 26–34)
MCHC RBC AUTO-ENTMCNC: 31.3 G/DL (ref 30–36.5)
MCV RBC AUTO: 91.2 FL (ref 80–99)
MONOCYTES # BLD: 0.53 K/UL (ref 0–1)
MONOCYTES NFR BLD: 7.8 % (ref 5–13)
NEUTS SEG # BLD: 4.73 K/UL (ref 1.8–8)
NEUTS SEG NFR BLD: 70 % (ref 32–75)
NRBC # BLD: 0 K/UL (ref 0–0.01)
NRBC BLD-RTO: 0 PER 100 WBC
PERFORMED BY:: NORMAL
PLATELET # BLD AUTO: 115 K/UL (ref 150–400)
PMV BLD AUTO: 12.7 FL (ref 8.9–12.9)
POTASSIUM SERPL-SCNC: 4.4 MMOL/L (ref 3.5–5.1)
PROT SERPL-MCNC: 6.5 G/DL (ref 6.4–8.2)
RBC # BLD AUTO: 4.77 M/UL (ref 3.8–5.2)
SODIUM SERPL-SCNC: 144 MMOL/L (ref 136–145)
TROPONIN I SERPL HS-MCNC: 112 NG/L (ref 0–51)
TROPONIN I SERPL HS-MCNC: 112 NG/L (ref 0–51)
TSH SERPL DL<=0.05 MIU/L-ACNC: 1.09 UIU/ML (ref 0.36–3.74)
WBC # BLD AUTO: 6.8 K/UL (ref 3.6–11)

## 2025-04-11 PROCEDURE — 83735 ASSAY OF MAGNESIUM: CPT

## 2025-04-11 PROCEDURE — 84439 ASSAY OF FREE THYROXINE: CPT

## 2025-04-11 PROCEDURE — 83036 HEMOGLOBIN GLYCOSYLATED A1C: CPT

## 2025-04-11 PROCEDURE — 94761 N-INVAS EAR/PLS OXIMETRY MLT: CPT

## 2025-04-11 PROCEDURE — 83540 ASSAY OF IRON: CPT

## 2025-04-11 PROCEDURE — 84443 ASSAY THYROID STIM HORMONE: CPT

## 2025-04-11 PROCEDURE — 84484 ASSAY OF TROPONIN QUANT: CPT

## 2025-04-11 PROCEDURE — 71045 X-RAY EXAM CHEST 1 VIEW: CPT

## 2025-04-11 PROCEDURE — 6370000000 HC RX 637 (ALT 250 FOR IP): Performed by: FAMILY MEDICINE

## 2025-04-11 PROCEDURE — 85025 COMPLETE CBC W/AUTO DIFF WBC: CPT

## 2025-04-11 PROCEDURE — 36415 COLL VENOUS BLD VENIPUNCTURE: CPT

## 2025-04-11 PROCEDURE — 2700000000 HC OXYGEN THERAPY PER DAY

## 2025-04-11 PROCEDURE — 6360000002 HC RX W HCPCS: Performed by: FAMILY MEDICINE

## 2025-04-11 PROCEDURE — 82962 GLUCOSE BLOOD TEST: CPT

## 2025-04-11 PROCEDURE — 80053 COMPREHEN METABOLIC PANEL: CPT

## 2025-04-11 PROCEDURE — 99285 EMERGENCY DEPT VISIT HI MDM: CPT

## 2025-04-11 PROCEDURE — 2500000003 HC RX 250 WO HCPCS: Performed by: FAMILY MEDICINE

## 2025-04-11 PROCEDURE — 93005 ELECTROCARDIOGRAM TRACING: CPT | Performed by: EMERGENCY MEDICINE

## 2025-04-11 PROCEDURE — 83880 ASSAY OF NATRIURETIC PEPTIDE: CPT

## 2025-04-11 PROCEDURE — 2060000000 HC ICU INTERMEDIATE R&B

## 2025-04-11 PROCEDURE — 82728 ASSAY OF FERRITIN: CPT

## 2025-04-11 RX ORDER — TRAMADOL HYDROCHLORIDE 50 MG/1
50 TABLET ORAL ONCE
Status: COMPLETED | OUTPATIENT
Start: 2025-04-11 | End: 2025-04-12

## 2025-04-11 RX ORDER — ONDANSETRON 2 MG/ML
4 INJECTION INTRAMUSCULAR; INTRAVENOUS EVERY 6 HOURS PRN
Status: DISCONTINUED | OUTPATIENT
Start: 2025-04-11 | End: 2025-04-17 | Stop reason: HOSPADM

## 2025-04-11 RX ORDER — AMIODARONE HYDROCHLORIDE 200 MG/1
200 TABLET ORAL DAILY
Status: DISCONTINUED | OUTPATIENT
Start: 2025-04-12 | End: 2025-04-17 | Stop reason: HOSPADM

## 2025-04-11 RX ORDER — SODIUM CHLORIDE 0.9 % (FLUSH) 0.9 %
5-40 SYRINGE (ML) INJECTION PRN
Status: DISCONTINUED | OUTPATIENT
Start: 2025-04-11 | End: 2025-04-17 | Stop reason: HOSPADM

## 2025-04-11 RX ORDER — METOPROLOL SUCCINATE 50 MG/1
100 TABLET, EXTENDED RELEASE ORAL DAILY
Status: DISCONTINUED | OUTPATIENT
Start: 2025-04-11 | End: 2025-04-11

## 2025-04-11 RX ORDER — ATORVASTATIN CALCIUM 40 MG/1
40 TABLET, FILM COATED ORAL NIGHTLY
Status: DISCONTINUED | OUTPATIENT
Start: 2025-04-11 | End: 2025-04-17 | Stop reason: HOSPADM

## 2025-04-11 RX ORDER — HYDRALAZINE HYDROCHLORIDE 50 MG/1
50 TABLET, FILM COATED ORAL EVERY 6 HOURS PRN
Status: DISCONTINUED | OUTPATIENT
Start: 2025-04-11 | End: 2025-04-17 | Stop reason: HOSPADM

## 2025-04-11 RX ORDER — SODIUM CHLORIDE 0.9 % (FLUSH) 0.9 %
5-40 SYRINGE (ML) INJECTION EVERY 12 HOURS SCHEDULED
Status: DISCONTINUED | OUTPATIENT
Start: 2025-04-11 | End: 2025-04-17 | Stop reason: HOSPADM

## 2025-04-11 RX ORDER — SPIRONOLACTONE 25 MG/1
25 TABLET ORAL DAILY
Status: DISCONTINUED | OUTPATIENT
Start: 2025-04-12 | End: 2025-04-16

## 2025-04-11 RX ORDER — BUDESONIDE AND FORMOTEROL FUMARATE DIHYDRATE 160; 4.5 UG/1; UG/1
2 AEROSOL RESPIRATORY (INHALATION)
Status: DISCONTINUED | OUTPATIENT
Start: 2025-04-11 | End: 2025-04-17 | Stop reason: HOSPADM

## 2025-04-11 RX ORDER — POTASSIUM CHLORIDE 1500 MG/1
40 TABLET, EXTENDED RELEASE ORAL PRN
Status: DISCONTINUED | OUTPATIENT
Start: 2025-04-11 | End: 2025-04-17 | Stop reason: HOSPADM

## 2025-04-11 RX ORDER — METHYLPREDNISOLONE SODIUM SUCCINATE 40 MG/ML
40 INJECTION INTRAMUSCULAR; INTRAVENOUS EVERY 8 HOURS
Status: COMPLETED | OUTPATIENT
Start: 2025-04-11 | End: 2025-04-13

## 2025-04-11 RX ORDER — IPRATROPIUM BROMIDE AND ALBUTEROL SULFATE 2.5; .5 MG/3ML; MG/3ML
1 SOLUTION RESPIRATORY (INHALATION)
Status: DISCONTINUED | OUTPATIENT
Start: 2025-04-12 | End: 2025-04-13

## 2025-04-11 RX ORDER — FUROSEMIDE 10 MG/ML
40 INJECTION INTRAMUSCULAR; INTRAVENOUS
Status: COMPLETED | OUTPATIENT
Start: 2025-04-11 | End: 2025-04-11

## 2025-04-11 RX ORDER — FUROSEMIDE 10 MG/ML
40 INJECTION INTRAMUSCULAR; INTRAVENOUS 2 TIMES DAILY
Status: DISCONTINUED | OUTPATIENT
Start: 2025-04-11 | End: 2025-04-13

## 2025-04-11 RX ORDER — ASPIRIN 81 MG/1
81 TABLET ORAL DAILY
Status: DISCONTINUED | OUTPATIENT
Start: 2025-04-12 | End: 2025-04-17 | Stop reason: HOSPADM

## 2025-04-11 RX ORDER — POTASSIUM CHLORIDE 7.45 MG/ML
10 INJECTION INTRAVENOUS PRN
Status: DISCONTINUED | OUTPATIENT
Start: 2025-04-11 | End: 2025-04-17 | Stop reason: HOSPADM

## 2025-04-11 RX ORDER — MAGNESIUM SULFATE IN WATER 40 MG/ML
2000 INJECTION, SOLUTION INTRAVENOUS PRN
Status: DISCONTINUED | OUTPATIENT
Start: 2025-04-11 | End: 2025-04-17 | Stop reason: HOSPADM

## 2025-04-11 RX ORDER — POLYETHYLENE GLYCOL 3350 17 G/17G
17 POWDER, FOR SOLUTION ORAL DAILY PRN
Status: DISCONTINUED | OUTPATIENT
Start: 2025-04-11 | End: 2025-04-17 | Stop reason: HOSPADM

## 2025-04-11 RX ORDER — METOPROLOL SUCCINATE 25 MG/1
25 TABLET, EXTENDED RELEASE ORAL DAILY
Status: DISCONTINUED | OUTPATIENT
Start: 2025-04-11 | End: 2025-04-17 | Stop reason: HOSPADM

## 2025-04-11 RX ORDER — ONDANSETRON 4 MG/1
4 TABLET, ORALLY DISINTEGRATING ORAL EVERY 8 HOURS PRN
Status: DISCONTINUED | OUTPATIENT
Start: 2025-04-11 | End: 2025-04-17 | Stop reason: HOSPADM

## 2025-04-11 RX ORDER — INSULIN LISPRO 100 [IU]/ML
0-16 INJECTION, SOLUTION INTRAVENOUS; SUBCUTANEOUS
Status: DISCONTINUED | OUTPATIENT
Start: 2025-04-11 | End: 2025-04-17 | Stop reason: HOSPADM

## 2025-04-11 RX ORDER — DEXTROSE MONOHYDRATE 100 MG/ML
INJECTION, SOLUTION INTRAVENOUS CONTINUOUS PRN
Status: DISCONTINUED | OUTPATIENT
Start: 2025-04-11 | End: 2025-04-17 | Stop reason: HOSPADM

## 2025-04-11 RX ORDER — GLUCAGON 1 MG/ML
1 KIT INJECTION PRN
Status: DISCONTINUED | OUTPATIENT
Start: 2025-04-11 | End: 2025-04-17 | Stop reason: HOSPADM

## 2025-04-11 RX ORDER — CETIRIZINE HYDROCHLORIDE 10 MG/1
10 TABLET ORAL DAILY
Status: DISCONTINUED | OUTPATIENT
Start: 2025-04-12 | End: 2025-04-17 | Stop reason: HOSPADM

## 2025-04-11 RX ORDER — SODIUM CHLORIDE 9 MG/ML
INJECTION, SOLUTION INTRAVENOUS PRN
Status: DISCONTINUED | OUTPATIENT
Start: 2025-04-11 | End: 2025-04-17 | Stop reason: HOSPADM

## 2025-04-11 RX ORDER — OXYBUTYNIN CHLORIDE 5 MG/1
5 TABLET ORAL 3 TIMES DAILY
Status: DISCONTINUED | OUTPATIENT
Start: 2025-04-11 | End: 2025-04-12

## 2025-04-11 RX ADMIN — FUROSEMIDE 40 MG: 10 INJECTION, SOLUTION INTRAMUSCULAR; INTRAVENOUS at 20:57

## 2025-04-11 RX ADMIN — Medication 2 PUFF: at 20:56

## 2025-04-11 RX ADMIN — SODIUM CHLORIDE, PRESERVATIVE FREE 10 ML: 5 INJECTION INTRAVENOUS at 20:57

## 2025-04-11 RX ADMIN — ATORVASTATIN CALCIUM 40 MG: 40 TABLET, FILM COATED ORAL at 20:56

## 2025-04-11 RX ADMIN — METOPROLOL SUCCINATE 25 MG: 25 TABLET, EXTENDED RELEASE ORAL at 22:53

## 2025-04-11 RX ADMIN — METHYLPREDNISOLONE SODIUM SUCCINATE 40 MG: 40 INJECTION INTRAMUSCULAR; INTRAVENOUS at 20:57

## 2025-04-11 RX ADMIN — APIXABAN 5 MG: 5 TABLET, FILM COATED ORAL at 20:56

## 2025-04-11 ASSESSMENT — PAIN - FUNCTIONAL ASSESSMENT: PAIN_FUNCTIONAL_ASSESSMENT: 0-10

## 2025-04-11 ASSESSMENT — PAIN DESCRIPTION - ORIENTATION: ORIENTATION: RIGHT;LEFT;LOWER

## 2025-04-11 ASSESSMENT — PAIN DESCRIPTION - DESCRIPTORS: DESCRIPTORS: STABBING

## 2025-04-11 ASSESSMENT — PAIN DESCRIPTION - LOCATION
LOCATION: LEG
LOCATION: HAND

## 2025-04-11 ASSESSMENT — PAIN SCALES - GENERAL
PAINLEVEL_OUTOF10: 8
PAINLEVEL_OUTOF10: 8

## 2025-04-11 ASSESSMENT — PAIN DESCRIPTION - PAIN TYPE: TYPE: CHRONIC PAIN

## 2025-04-11 NOTE — ED PROVIDER NOTES
[facility] EMERGENCY DEPT  EMERGENCY DEPARTMENT HISTORY AND PHYSICAL EXAM      Date of evaluation: 4/11/2025  Patient Name: Nubia Lion  Birthdate 1950  MRN: [id number]  ED Provider: Andrea Najera DO   Note Started: 6:41 PM EDT 4/11/25    HISTORY OF PRESENT ILLNESS     Chief Complaint   Patient presents with    Shortness of Breath    Leg Swelling     History Provided By: Patient, Family    HPI: Nubia Lion is a 74-year-old female with a past medical history of COPD, HFrEF, CAD, and diabetes who presents with progressive shortness of breath and bilateral lower extremity swelling. She reports recent admission for similar symptoms and states she has been compliant with her home medications and oxygen (4-5L). Despite this, she notes worsening leg edema and dyspnea. On arrival, she was hypoxic on her home oxygen settings.    PAST MEDICAL HISTORY   Past Medical History:  Past Medical History:   Diagnosis Date    Chronic obstructive pulmonary disease (HCC)     Congestive heart disease (HCC)     with preserved ejection fraction    Coronary artery disease     Diabetes (HCC)     HFrEF (heart failure with reduced ejection fraction) (HCC)     Hyperlipidemia     Hypertension     Myocardial infarct (HCC)     MICHELLE (obstructive sleep apnea)        Past Surgical History:  Past Surgical History:   Procedure Laterality Date    BREAST BIOPSY      CHOLECYSTECTOMY      CORONARY ARTERY BYPASS GRAFT      HERNIA REPAIR      HYSTERECTOMY (CERVIX STATUS UNKNOWN)         Family History:  Family History   Problem Relation Age of Onset    Cancer Maternal Grandmother     Heart Disease Mother     Kidney Disease Mother        Social History:  Social History     Tobacco Use    Smoking status: Former    Smokeless tobacco: Never   Substance Use Topics    Alcohol use: Not Currently    Drug use: Never       Allergies:  Allergies   Allergen Reactions    Oxycodone-Acetaminophen Hives    Penicillins Other (See Comments)       PCP: Ricardo Calle,

## 2025-04-11 NOTE — ED TRIAGE NOTES
Pt arrived with SoB and bilat lower leg swelling, Home O2 of 4-5lpm. Hx of CHF and COPD. Reports recent admission for same 3 weeks ago and compliant with Home Rx. Hypoxic on Home O2.

## 2025-04-12 ENCOUNTER — APPOINTMENT (OUTPATIENT)
Facility: HOSPITAL | Age: 75
DRG: 291 | End: 2025-04-12
Payer: MEDICARE

## 2025-04-12 ENCOUNTER — APPOINTMENT (OUTPATIENT)
Facility: HOSPITAL | Age: 75
DRG: 291 | End: 2025-04-12
Attending: FAMILY MEDICINE
Payer: MEDICARE

## 2025-04-12 LAB
ALBUMIN SERPL-MCNC: 3.2 G/DL (ref 3.5–5)
ALBUMIN/GLOB SERPL: 1 (ref 1.1–2.2)
ALP SERPL-CCNC: 176 U/L (ref 45–117)
ALT SERPL-CCNC: 22 U/L (ref 12–78)
ANION GAP SERPL CALC-SCNC: 6 MMOL/L (ref 2–12)
AST SERPL W P-5'-P-CCNC: 17 U/L (ref 15–37)
BILIRUB DIRECT SERPL-MCNC: 0.3 MG/DL (ref 0–0.2)
BILIRUB SERPL-MCNC: 1.4 MG/DL (ref 0.2–1)
BNP SERPL-MCNC: ABNORMAL PG/ML
BUN SERPL-MCNC: 19 MG/DL (ref 6–20)
BUN/CREAT SERPL: 15 (ref 12–20)
CA-I BLD-MCNC: 9 MG/DL (ref 8.5–10.1)
CHLORIDE SERPL-SCNC: 108 MMOL/L (ref 97–108)
CHOLEST SERPL-MCNC: 152 MG/DL
CO2 SERPL-SCNC: 31 MMOL/L (ref 21–32)
CREAT SERPL-MCNC: 1.23 MG/DL (ref 0.55–1.02)
ECHO AO ASC DIAM: 3.2 CM
ECHO AO ASCENDING AORTA INDEX: 1.61 CM/M2
ECHO AO ROOT DIAM: 2.4 CM
ECHO AO ROOT INDEX: 1.21 CM/M2
ECHO AV AREA PEAK VELOCITY: 1.8 CM2
ECHO AV AREA VTI: 1.8 CM2
ECHO AV AREA/BSA PEAK VELOCITY: 0.9 CM2/M2
ECHO AV AREA/BSA VTI: 0.9 CM2/M2
ECHO AV MEAN GRADIENT: 4 MMHG
ECHO AV MEAN VELOCITY: 1 M/S
ECHO AV PEAK GRADIENT: 8 MMHG
ECHO AV PEAK VELOCITY: 1.4 M/S
ECHO AV VELOCITY RATIO: 0.64
ECHO AV VTI: 31 CM
ECHO BSA: 2.07 M2
ECHO EST RA PRESSURE: 15 MMHG
ECHO LA AREA 4C: 22.1 CM2
ECHO LA DIAMETER INDEX: 2.21 CM/M2
ECHO LA DIAMETER: 4.4 CM
ECHO LA MAJOR AXIS: 7.1 CM
ECHO LA TO AORTIC ROOT RATIO: 1.83
ECHO LA VOL MOD A4C: 56 ML (ref 22–52)
ECHO LA VOLUME INDEX MOD A4C: 28 ML/M2 (ref 16–34)
ECHO LV E' LATERAL VELOCITY: 6.04 CM/S
ECHO LV E' SEPTAL VELOCITY: 5.01 CM/S
ECHO LV EDV A4C: 164 ML
ECHO LV EDV INDEX A4C: 82 ML/M2
ECHO LV EF PHYSICIAN: 40 %
ECHO LV EJECTION FRACTION A4C: 41 %
ECHO LV ESV A4C: 97 ML
ECHO LV ESV INDEX A4C: 49 ML/M2
ECHO LV FRACTIONAL SHORTENING: 15 % (ref 28–44)
ECHO LV INTERNAL DIMENSION DIASTOLE INDEX: 2.31 CM/M2
ECHO LV INTERNAL DIMENSION DIASTOLIC: 4.6 CM (ref 3.9–5.3)
ECHO LV INTERNAL DIMENSION SYSTOLIC INDEX: 1.96 CM/M2
ECHO LV INTERNAL DIMENSION SYSTOLIC: 3.9 CM
ECHO LV IVSD: 1.3 CM (ref 0.6–0.9)
ECHO LV MASS 2D: 243.3 G (ref 67–162)
ECHO LV MASS INDEX 2D: 122.3 G/M2 (ref 43–95)
ECHO LV POSTERIOR WALL DIASTOLIC: 1.4 CM (ref 0.6–0.9)
ECHO LV RELATIVE WALL THICKNESS RATIO: 0.61
ECHO LVOT AREA: 2.8 CM2
ECHO LVOT AV VTI INDEX: 0.65
ECHO LVOT DIAM: 1.9 CM
ECHO LVOT MEAN GRADIENT: 2 MMHG
ECHO LVOT PEAK GRADIENT: 3 MMHG
ECHO LVOT PEAK VELOCITY: 0.9 M/S
ECHO LVOT STROKE VOLUME INDEX: 28.6 ML/M2
ECHO LVOT SV: 57 ML
ECHO LVOT VTI: 20.1 CM
ECHO MV A VELOCITY: 1.04 M/S
ECHO MV AREA VTI: 1.8 CM2
ECHO MV E DECELERATION TIME (DT): 352 MS
ECHO MV E VELOCITY: 0.8 M/S
ECHO MV E/A RATIO: 0.77
ECHO MV E/E' LATERAL: 13.25
ECHO MV E/E' RATIO (AVERAGED): 14.61
ECHO MV E/E' SEPTAL: 15.97
ECHO MV LVOT VTI INDEX: 1.59
ECHO MV MAX VELOCITY: 1.2 M/S
ECHO MV MEAN GRADIENT: 3 MMHG
ECHO MV MEAN VELOCITY: 0.8 M/S
ECHO MV PEAK GRADIENT: 6 MMHG
ECHO MV REGURGITANT PEAK GRADIENT: 34 MMHG
ECHO MV REGURGITANT PEAK VELOCITY: 2.9 M/S
ECHO MV VTI: 32 CM
ECHO PULMONARY ARTERY END DIASTOLIC PRESSURE: 12 MMHG
ECHO PV MAX VELOCITY: 0.5 M/S
ECHO PV MEAN GRADIENT: 1 MMHG
ECHO PV MEAN VELOCITY: 0.4 M/S
ECHO PV PEAK GRADIENT: 1 MMHG
ECHO PV REGURGITANT MAX VELOCITY: 1.7 M/S
ECHO PV VTI: 12.9 CM
ECHO RA AREA 4C: 22.7 CM2
ECHO RA END SYSTOLIC VOLUME APICAL 4 CHAMBER INDEX BSA: 35 ML/M2
ECHO RA VOLUME: 69 ML
ECHO RIGHT VENTRICULAR SYSTOLIC PRESSURE (RVSP): 39 MMHG
ECHO RV BASAL DIMENSION: 4.3 CM
ECHO RV FREE WALL PEAK S': 8.1 CM/S
ECHO RV TAPSE: 1 CM (ref 1.7–?)
ECHO TV REGURGITANT MAX VELOCITY: 2.47 M/S
ECHO TV REGURGITANT PEAK GRADIENT: 24 MMHG
EKG ATRIAL RATE: 72 BPM
EKG DIAGNOSIS: NORMAL
EKG P AXIS: 80 DEGREES
EKG P-R INTERVAL: 168 MS
EKG Q-T INTERVAL: 420 MS
EKG QRS DURATION: 98 MS
EKG QTC CALCULATION (BAZETT): 459 MS
EKG R AXIS: 74 DEGREES
EKG T AXIS: 95 DEGREES
EKG VENTRICULAR RATE: 72 BPM
EST. AVERAGE GLUCOSE BLD GHB EST-MCNC: 140 MG/DL
FERRITIN SERPL-MCNC: 45 NG/ML (ref 8–252)
GLOBULIN SER CALC-MCNC: 3.3 G/DL (ref 2–4)
GLUCOSE BLD STRIP.AUTO-MCNC: 133 MG/DL (ref 65–100)
GLUCOSE BLD STRIP.AUTO-MCNC: 138 MG/DL (ref 65–100)
GLUCOSE BLD STRIP.AUTO-MCNC: 191 MG/DL (ref 65–100)
GLUCOSE BLD STRIP.AUTO-MCNC: 192 MG/DL (ref 65–100)
GLUCOSE SERPL-MCNC: 152 MG/DL (ref 65–100)
HBA1C MFR BLD: 6.5 % (ref 4–5.6)
HDLC SERPL-MCNC: 67 MG/DL
HDLC SERPL: 2.3 (ref 0–5)
IRON SATN MFR SERPL: 13 % (ref 20–50)
IRON SERPL-MCNC: 45 UG/DL (ref 35–150)
LDLC SERPL CALC-MCNC: 72.6 MG/DL (ref 0–100)
LIPID PANEL: NORMAL
MAGNESIUM SERPL-MCNC: 1.4 MG/DL (ref 1.6–2.4)
PERFORMED BY:: ABNORMAL
POTASSIUM SERPL-SCNC: 3.7 MMOL/L (ref 3.5–5.1)
PROT SERPL-MCNC: 6.5 G/DL (ref 6.4–8.2)
SODIUM SERPL-SCNC: 145 MMOL/L (ref 136–145)
T4 FREE SERPL-MCNC: 1.5 NG/DL (ref 0.8–1.5)
TIBC SERPL-MCNC: 344 UG/DL (ref 250–450)
TRIGL SERPL-MCNC: 62 MG/DL
VLDLC SERPL CALC-MCNC: 12.4 MG/DL

## 2025-04-12 PROCEDURE — 6370000000 HC RX 637 (ALT 250 FOR IP): Performed by: FAMILY MEDICINE

## 2025-04-12 PROCEDURE — 83880 ASSAY OF NATRIURETIC PEPTIDE: CPT

## 2025-04-12 PROCEDURE — 80198 ASSAY OF THEOPHYLLINE: CPT

## 2025-04-12 PROCEDURE — C8929 TTE W OR WO FOL WCON,DOPPLER: HCPCS

## 2025-04-12 PROCEDURE — 83735 ASSAY OF MAGNESIUM: CPT

## 2025-04-12 PROCEDURE — 94761 N-INVAS EAR/PLS OXIMETRY MLT: CPT

## 2025-04-12 PROCEDURE — 36600 WITHDRAWAL OF ARTERIAL BLOOD: CPT

## 2025-04-12 PROCEDURE — 36415 COLL VENOUS BLD VENIPUNCTURE: CPT

## 2025-04-12 PROCEDURE — 6360000004 HC RX CONTRAST MEDICATION

## 2025-04-12 PROCEDURE — 82962 GLUCOSE BLOOD TEST: CPT

## 2025-04-12 PROCEDURE — 93010 ELECTROCARDIOGRAM REPORT: CPT | Performed by: SPECIALIST

## 2025-04-12 PROCEDURE — 80061 LIPID PANEL: CPT

## 2025-04-12 PROCEDURE — 6360000002 HC RX W HCPCS: Performed by: FAMILY MEDICINE

## 2025-04-12 PROCEDURE — 94640 AIRWAY INHALATION TREATMENT: CPT

## 2025-04-12 PROCEDURE — 70360 X-RAY EXAM OF NECK: CPT

## 2025-04-12 PROCEDURE — 2700000000 HC OXYGEN THERAPY PER DAY

## 2025-04-12 PROCEDURE — 80076 HEPATIC FUNCTION PANEL: CPT

## 2025-04-12 PROCEDURE — 2500000003 HC RX 250 WO HCPCS: Performed by: FAMILY MEDICINE

## 2025-04-12 PROCEDURE — 80048 BASIC METABOLIC PNL TOTAL CA: CPT

## 2025-04-12 PROCEDURE — 2060000000 HC ICU INTERMEDIATE R&B

## 2025-04-12 RX ORDER — OXYBUTYNIN CHLORIDE 5 MG/1
5 TABLET ORAL DAILY
Status: DISCONTINUED | OUTPATIENT
Start: 2025-04-12 | End: 2025-04-17 | Stop reason: HOSPADM

## 2025-04-12 RX ORDER — HYDROCODONE BITARTRATE AND ACETAMINOPHEN 5; 325 MG/1; MG/1
1 TABLET ORAL
Refills: 0 | Status: COMPLETED | OUTPATIENT
Start: 2025-04-12 | End: 2025-04-12

## 2025-04-12 RX ORDER — PANTOPRAZOLE SODIUM 40 MG/1
40 TABLET, DELAYED RELEASE ORAL DAILY
COMMUNITY

## 2025-04-12 RX ORDER — NITROGLYCERIN 0.4 MG/1
0.4 TABLET SUBLINGUAL EVERY 5 MIN PRN
COMMUNITY

## 2025-04-12 RX ORDER — LANOLIN ALCOHOL/MO/W.PET/CERES
400 CREAM (GRAM) TOPICAL 2 TIMES DAILY
COMMUNITY

## 2025-04-12 RX ORDER — SUCRALFATE ORAL 1 G/10ML
1 SUSPENSION ORAL 4 TIMES DAILY
COMMUNITY

## 2025-04-12 RX ORDER — POTASSIUM CHLORIDE 7.45 MG/ML
10 INJECTION INTRAVENOUS ONCE
Status: ON HOLD | COMMUNITY
End: 2025-04-17 | Stop reason: HOSPADM

## 2025-04-12 RX ORDER — METOLAZONE 5 MG/1
5 TABLET ORAL DAILY
COMMUNITY

## 2025-04-12 RX ORDER — ACETAMINOPHEN 160 MG
2000 TABLET,DISINTEGRATING ORAL DAILY
COMMUNITY

## 2025-04-12 RX ORDER — BUMETANIDE 1 MG/1
2 TABLET ORAL 2 TIMES DAILY
Status: ON HOLD | COMMUNITY
End: 2025-04-17 | Stop reason: HOSPADM

## 2025-04-12 RX ORDER — TAMSULOSIN HYDROCHLORIDE 0.4 MG/1
0.4 CAPSULE ORAL DAILY
COMMUNITY

## 2025-04-12 RX ORDER — QUETIAPINE FUMARATE 25 MG/1
25 TABLET, FILM COATED ORAL 2 TIMES DAILY
Status: ON HOLD | COMMUNITY
End: 2025-04-17 | Stop reason: HOSPADM

## 2025-04-12 RX ORDER — METOPROLOL SUCCINATE 25 MG/1
25 TABLET, EXTENDED RELEASE ORAL DAILY
Status: ON HOLD | COMMUNITY
End: 2025-04-17 | Stop reason: HOSPADM

## 2025-04-12 RX ORDER — SACUBITRIL AND VALSARTAN 97; 103 MG/1; MG/1
1 TABLET, FILM COATED ORAL 2 TIMES DAILY
COMMUNITY

## 2025-04-12 RX ADMIN — ATORVASTATIN CALCIUM 40 MG: 40 TABLET, FILM COATED ORAL at 20:44

## 2025-04-12 RX ADMIN — METOPROLOL SUCCINATE 25 MG: 25 TABLET, EXTENDED RELEASE ORAL at 09:42

## 2025-04-12 RX ADMIN — HYDROCODONE BITARTRATE AND ACETAMINOPHEN 1 TABLET: 5; 325 TABLET ORAL at 06:25

## 2025-04-12 RX ADMIN — IPRATROPIUM BROMIDE AND ALBUTEROL SULFATE 1 DOSE: 2.5; .5 SOLUTION RESPIRATORY (INHALATION) at 15:30

## 2025-04-12 RX ADMIN — SODIUM CHLORIDE, PRESERVATIVE FREE 10 ML: 5 INJECTION INTRAVENOUS at 09:42

## 2025-04-12 RX ADMIN — METHYLPREDNISOLONE SODIUM SUCCINATE 40 MG: 40 INJECTION INTRAMUSCULAR; INTRAVENOUS at 20:44

## 2025-04-12 RX ADMIN — EMPAGLIFLOZIN 10 MG: 10 TABLET, FILM COATED ORAL at 11:53

## 2025-04-12 RX ADMIN — TRAMADOL HYDROCHLORIDE 50 MG: 50 TABLET, COATED ORAL at 00:41

## 2025-04-12 RX ADMIN — IPRATROPIUM BROMIDE AND ALBUTEROL SULFATE 1 DOSE: 2.5; .5 SOLUTION RESPIRATORY (INHALATION) at 11:30

## 2025-04-12 RX ADMIN — HYDRALAZINE HYDROCHLORIDE 50 MG: 50 TABLET ORAL at 00:41

## 2025-04-12 RX ADMIN — APIXABAN 5 MG: 5 TABLET, FILM COATED ORAL at 20:43

## 2025-04-12 RX ADMIN — OXYBUTYNIN CHLORIDE 5 MG: 5 TABLET ORAL at 16:06

## 2025-04-12 RX ADMIN — METHYLPREDNISOLONE SODIUM SUCCINATE 40 MG: 40 INJECTION INTRAMUSCULAR; INTRAVENOUS at 05:36

## 2025-04-12 RX ADMIN — INSULIN LISPRO 4 UNITS: 100 INJECTION, SOLUTION INTRAVENOUS; SUBCUTANEOUS at 20:54

## 2025-04-12 RX ADMIN — SODIUM CHLORIDE, PRESERVATIVE FREE 10 ML: 5 INJECTION INTRAVENOUS at 09:43

## 2025-04-12 RX ADMIN — THEOPHYLLINE ANHYDROUS 300 MG: 300 CAPSULE, EXTENDED RELEASE ORAL at 16:06

## 2025-04-12 RX ADMIN — SPIRONOLACTONE 25 MG: 25 TABLET ORAL at 09:42

## 2025-04-12 RX ADMIN — INSULIN LISPRO 4 UNITS: 100 INJECTION, SOLUTION INTRAVENOUS; SUBCUTANEOUS at 12:52

## 2025-04-12 RX ADMIN — ASPIRIN 81 MG: 81 TABLET, COATED ORAL at 09:41

## 2025-04-12 RX ADMIN — SULFUR HEXAFLUORIDE 5 ML: 60.7; .19; .19 INJECTION, POWDER, LYOPHILIZED, FOR SUSPENSION INTRAVENOUS; INTRAVESICAL at 09:39

## 2025-04-12 RX ADMIN — Medication 2 PUFF: at 20:10

## 2025-04-12 RX ADMIN — CETIRIZINE HYDROCHLORIDE 10 MG: 10 TABLET, FILM COATED ORAL at 09:41

## 2025-04-12 RX ADMIN — SODIUM CHLORIDE, PRESERVATIVE FREE 10 ML: 5 INJECTION INTRAVENOUS at 20:44

## 2025-04-12 RX ADMIN — APIXABAN 5 MG: 5 TABLET, FILM COATED ORAL at 09:41

## 2025-04-12 RX ADMIN — IPRATROPIUM BROMIDE AND ALBUTEROL SULFATE 1 DOSE: 2.5; .5 SOLUTION RESPIRATORY (INHALATION) at 20:10

## 2025-04-12 RX ADMIN — FUROSEMIDE 40 MG: 10 INJECTION, SOLUTION INTRAMUSCULAR; INTRAVENOUS at 09:42

## 2025-04-12 RX ADMIN — METHYLPREDNISOLONE SODIUM SUCCINATE 40 MG: 40 INJECTION INTRAMUSCULAR; INTRAVENOUS at 13:29

## 2025-04-12 RX ADMIN — Medication 2 PUFF: at 07:30

## 2025-04-12 RX ADMIN — AMIODARONE HYDROCHLORIDE 200 MG: 200 TABLET ORAL at 09:41

## 2025-04-12 RX ADMIN — IPRATROPIUM BROMIDE AND ALBUTEROL SULFATE 1 DOSE: 2.5; .5 SOLUTION RESPIRATORY (INHALATION) at 07:30

## 2025-04-12 RX ADMIN — FUROSEMIDE 40 MG: 10 INJECTION, SOLUTION INTRAMUSCULAR; INTRAVENOUS at 18:25

## 2025-04-12 ASSESSMENT — PAIN DESCRIPTION - LOCATION
LOCATION: NECK
LOCATION: NECK;LEG

## 2025-04-12 ASSESSMENT — PAIN DESCRIPTION - DESCRIPTORS
DESCRIPTORS: DISCOMFORT;ACHING
DESCRIPTORS: ACHING;DISCOMFORT

## 2025-04-12 ASSESSMENT — PAIN SCALES - GENERAL
PAINLEVEL_OUTOF10: 8
PAINLEVEL_OUTOF10: 7
PAINLEVEL_OUTOF10: 8
PAINLEVEL_OUTOF10: 8

## 2025-04-12 ASSESSMENT — PAIN DESCRIPTION - ORIENTATION
ORIENTATION: RIGHT;LEFT
ORIENTATION: RIGHT;LEFT

## 2025-04-12 NOTE — ED NOTES
ED TO INPATIENT SBAR HANDOFF    Patient Name: Nubia Lion   Preferred Name: Nubia  : 1950  74 y.o.   Family/Caregiver Present: no   Code Status Order: Full Code  PO Status: NPO:No  Telemetry Order:   C-SSRS: Risk of Suicide: No Risk  Sitter no   Restraints:     Sepsis Risk Score      Situation  Chief Complaint   Patient presents with    Shortness of Breath    Leg Swelling     Brief Description of Patient's Condition: Patient with CHF exacerbation. All extremities swollen 3+. Wears 4L NC at home but is requiring 5L here in ED. Patient was seen here for same about 3 weeks ago.   Mental Status: oriented and alert  Arrived from:Home  Imaging:   XR CHEST 1 VIEW   Final Result   Unchanged cardiomegaly and mild edema         Electronically signed by HARDY KNUTSON        Abnormal labs:   Abnormal Labs Reviewed   CBC WITH AUTO DIFFERENTIAL - Abnormal; Notable for the following components:       Result Value    RDW 15.9 (*)     Platelets 115 (*)     All other components within normal limits   TROPONIN - Abnormal; Notable for the following components:    Troponin, High Sensitivity 112 (*)     All other components within normal limits   COMPREHENSIVE METABOLIC PANEL - Abnormal; Notable for the following components:    Chloride 113 (*)     Anion Gap 1 (*)     Glucose 101 (*)     BUN 24 (*)     Creatinine 1.39 (*)     Est, Glom Filt Rate 40 (*)     Total Bilirubin 1.4 (*)     Alk Phosphatase 186 (*)     Albumin 3.1 (*)     Albumin/Globulin Ratio 0.9 (*)     All other components within normal limits   BRAIN NATRIURETIC PEPTIDE - Abnormal; Notable for the following components:    NT Pro-BNP 9,774 (*)     All other components within normal limits   MAGNESIUM - Abnormal; Notable for the following components:    Magnesium 1.5 (*)     All other components within normal limits       Background  Allergies:   Allergies   Allergen Reactions    Oxycodone-Acetaminophen Hives    Penicillins Other (See Comments)     History:   Past

## 2025-04-12 NOTE — H&P
as needed (SBP >170) 8/22/24 12/10/24  Jurgen Madsen MD   metoprolol succinate (TOPROL XL) 100 MG extended release tablet Take 1 tablet by mouth daily 8/22/24 12/10/24  Jurgen Madsen MD   spironolactone (ALDACTONE) 25 MG tablet Take 1 tablet by mouth daily 8/22/24 12/10/24  Jurgen Madsen MD   apixaban (ELIQUIS) 5 MG TABS tablet Take 1 tablet by mouth 2 times daily 8/22/24 12/10/24  Jurgen Madsen MD   melatonin 3 MG TABS tablet Take 1 tablet by mouth nightly as needed (insomnia) 24   Jose Roberto Candelaria MD   theophylline (JEREL-24) 200 MG extended release capsule Take 300 mg by mouth daily    ProviderValerie MD   cetirizine (ZYRTEC) 10 MG tablet Take 1 tablet by mouth daily 23   Palmer Bustamante MD   budesonide-formoterol (SYMBICORT) 160-4.5 MCG/ACT AERO Inhale 2 puffs into the lungs in the morning and 2 puffs in the evening. 23   Angie Potts MD   oxybutynin (DITROPAN) 5 MG tablet Take 1 tablet by mouth 3 times daily    ProviderValerie MD   aspirin 81 MG EC tablet Take 1 tablet by mouth daily 23   Automatic Reconciliation, Ar   atorvastatin (LIPITOR) 40 MG tablet Take 1 tablet by mouth nightly 21   Automatic Reconciliation, Ar   empagliflozin (JARDIANCE) 10 MG tablet Take 1 tablet by mouth daily 23   Automatic Reconciliation, Ar         Objective:   VITALS:    Patient Vitals for the past 24 hrs:   BP Temp Temp src Pulse Resp SpO2 Height Weight   25 1835 -- -- -- 79 17 (!) 89 % -- --   25 1827 (!) 145/93 98.2 °F (36.8 °C) Oral 73 24 (!) 81 % 1.6 m (5' 3\") 96.2 kg (212 lb)       Temp (24hrs), Av.2 °F (36.8 °C), Min:98.2 °F (36.8 °C), Max:98.2 °F (36.8 °C)           Wt Readings from Last 12 Encounters:   25 96.2 kg (212 lb)   24 96.2 kg (212 lb 1.3 oz)   24 97.3 kg (214 lb 8.1 oz)   24 99.3 kg (218 lb 14.7 oz)   24 84.2 kg (185 lb 10 oz)   24 103.1 kg (227 lb 3.2 oz)   24 105 kg (231 lb 7.7 oz) 
polydipsia and polyuria.   Genitourinary: Negative.    Musculoskeletal: Negative.    Skin:  Positive for rash.   Neurological: Negative.         PHYSICAL EXAM:  Physical Exam  Constitutional:       General: She is not in acute distress.     Appearance: Normal appearance. She is obese.   HENT:      Head: Normocephalic and atraumatic.      Nose: Nose normal.      Mouth/Throat:      Mouth: Mucous membranes are moist.      Pharynx: Oropharynx is clear.   Eyes:      Extraocular Movements: Extraocular movements intact.      Pupils: Pupils are equal, round, and reactive to light.   Cardiovascular:      Rate and Rhythm: Normal rate and regular rhythm.      Pulses: Normal pulses.      Heart sounds: Normal heart sounds.   Pulmonary:      Effort: Pulmonary effort is normal.      Breath sounds: Wheezing present.   Abdominal:      General: Abdomen is flat. There is no distension.      Palpations: Abdomen is soft.      Tenderness: There is no abdominal tenderness.   Musculoskeletal:      Cervical back: Normal range of motion and neck supple.      Right lower leg: Edema present.      Left lower leg: Edema present.   Skin:     General: Skin is warm and dry.      Findings: Erythema and rash present. Rash is vesicular.      Comments: Distal aspect of bilateral LEs   Neurological:      General: No focal deficit present.      Mental Status: She is alert and oriented to person, place, and time.               LAB DATA REVIEWED:    Recent Results (from the past 12 hours)   CBC with Auto Differential    Collection Time: 04/11/25  6:50 PM   Result Value Ref Range    WBC 6.8 3.6 - 11.0 K/uL    RBC 4.77 3.80 - 5.20 M/uL    Hemoglobin 13.6 11.5 - 16.0 g/dL    Hematocrit 43.5 35.0 - 47.0 %    MCV 91.2 80.0 - 99.0 FL    MCH 28.5 26.0 - 34.0 PG    MCHC 31.3 30.0 - 36.5 g/dL    RDW 15.9 (H) 11.5 - 14.5 %    Platelets 115 (L) 150 - 400 K/uL    MPV 12.7 8.9 - 12.9 FL    Nucleated RBCs 0.0 0.0  WBC    nRBC 0.00 0.00 - 0.01 K/uL    Neutrophils

## 2025-04-12 NOTE — CONSULTS
IMPRESSION:   Acute on chronic hypoxic respiratory failure  Congestive heart failure with fluid overload  Obstructive sleep apnea syndrome noncompliant  Chronic Obstructive Pulmonary Disease   Coronary artery disease status post CABG  Chronic A-fib  Chronic kidney disease stage IIIb  Pt is at high risk of sudden decline and decompensation with life threatening consequenses and continued end organ dysfunction and failure  Pt is critically ill. Time spent with pt and staff actively rendering care, managing pt and coordinating care as stated below; 55 minutes, exclusive of any procedures      RECOMMENDATIONS/PLAN:   74-year-old lady came in because of shortness of breath and dyspnea significant past medical history of coronary disease status post CABG she was hypoxic and also having swelling of the lower extremities 3-4+ leg edema she was already on Lasix and Aldactone  despite of that her condition got worse more short of breath dyspneic tired fatigue came to the hospital got admitted and pulm consult was called.  Chronically on home oxygen 2 L nasal cannula and also on theophylline  IV Solu-Medrol Symbicort inhaler and nebulizer treatment  On oxygen 5 L nasal Cannula oxygen as salvage oxygen delivery device to provide high concentration of oxygen to overcome refractory hypoxia;   Patient had a VBG done which shows pCO2 23 pO2 63 pH 7.44  Chest x-ray shows cardiomegaly with mild pulmonary edema  Elevated BNP 9774 continue with the Lasix and Aldactone  Will get theophylline level  Chronic A-fib continue with cardiac meds  Transfuse prn to maintain Hgb > 7  Labs to follow electrolytes, renal function and and blood counts  Bronchial hygiene with respiratory therapy techniques, bronchodilators  Pt needs IV fluids with additives and Drug therapy requiring intensive monitoring for toxicity  Prescription drug management with home med reconciliation reviewed  DVT, SUP prophylaxis     [x] High complexity decision making was

## 2025-04-12 NOTE — CARE COORDINATION
04/12/25 1021   Service Assessment   Patient Orientation Alert and Oriented   Cognition Alert   History Provided By Patient   Primary Caregiver Self   Support Systems Children   Patient's Healthcare Decision Maker is: Legal Next of Kin   PCP Verified by CM Yes   Last Visit to PCP Within last 3 months   Prior Functional Level Independent in ADLs/IADLs   Current Functional Level Independent in ADLs/IADLs   Can patient return to prior living arrangement Yes   Ability to make needs known: Good   Family able to assist with home care needs: Yes   Would you like for me to discuss the discharge plan with any other family members/significant others, and if so, who? No   Financial Resources Medicare   Social/Functional History   Lives With Daughter   Home Equipment Walker - Rolling;Oxygen   Prior Level of Assist for ADLs Independent   Ambulation Assistance Independent   Discharge Planning   Patient expects to be discharged to: House   Services At/After Discharge   Mode of Transport at Discharge Other (see comment)  (Family)     Patient lives at home with her daughter.  Patient uses a walker at home.  Patient has home oxygen at 4-5L NC through [facility]  Patient is independent in care.  Patient has had HH therapy in the past.  Patient uses [facility] in [place], [place].  Patient's daughter will be her ride at discharge.  Current Dispo: Home/self    Advance Care Planning     General Advance Care Planning (ACP) Conversation    Date of Conversation: 4/12/2025  Conducted with: Patient with Decision Making Capacity  Other persons present: None    Healthcare Decision Maker:   Primary Decision Maker: Wen Jose - Child - [phone]    Primary Decision Maker: Genevieve Christian - Child - [phone]    Primary Decision Maker: Zelalem Jose - Child - [phone]       Content/Action Overview:  Has NO ACP documents-Information provided  Reviewed DNR/DNI and patient elects Full Code (Attempt

## 2025-04-13 ENCOUNTER — APPOINTMENT (OUTPATIENT)
Facility: HOSPITAL | Age: 75
DRG: 291 | End: 2025-04-13
Payer: MEDICARE

## 2025-04-13 LAB
ANION GAP SERPL CALC-SCNC: 2 MMOL/L (ref 2–12)
APPEARANCE UR: CLEAR
BACTERIA URNS QL MICRO: NEGATIVE /HPF
BILIRUB UR QL: NEGATIVE
BNP SERPL-MCNC: ABNORMAL PG/ML
BUN SERPL-MCNC: 29 MG/DL (ref 6–20)
BUN/CREAT SERPL: 18 (ref 12–20)
CA-I BLD-MCNC: 8.5 MG/DL (ref 8.5–10.1)
CHLORIDE SERPL-SCNC: 105 MMOL/L (ref 97–108)
CO2 SERPL-SCNC: 34 MMOL/L (ref 21–32)
COLOR UR: ABNORMAL
CREAT SERPL-MCNC: 1.57 MG/DL (ref 0.55–1.02)
DATE LAST DOSE: ABNORMAL
DOSE AMOUNT: ABNORMAL UNITS
ECHO BSA: 2.07 M2
ERYTHROCYTE [DISTWIDTH] IN BLOOD BY AUTOMATED COUNT: 15.8 % (ref 11.5–14.5)
GLUCOSE BLD STRIP.AUTO-MCNC: 137 MG/DL (ref 65–100)
GLUCOSE BLD STRIP.AUTO-MCNC: 138 MG/DL (ref 65–100)
GLUCOSE BLD STRIP.AUTO-MCNC: 145 MG/DL (ref 65–100)
GLUCOSE BLD STRIP.AUTO-MCNC: 182 MG/DL (ref 65–100)
GLUCOSE SERPL-MCNC: 155 MG/DL (ref 65–100)
GLUCOSE UR STRIP.AUTO-MCNC: >500 MG/DL
HCT VFR BLD AUTO: 43.6 % (ref 35–47)
HGB BLD-MCNC: 13.6 G/DL (ref 11.5–16)
HGB UR QL STRIP: NEGATIVE
KETONES UR QL STRIP.AUTO: NEGATIVE MG/DL
LEUKOCYTE ESTERASE UR QL STRIP.AUTO: NEGATIVE
MAGNESIUM SERPL-MCNC: 1.5 MG/DL (ref 1.6–2.4)
MCH RBC QN AUTO: 28.5 PG (ref 26–34)
MCHC RBC AUTO-ENTMCNC: 31.2 G/DL (ref 30–36.5)
MCV RBC AUTO: 91.2 FL (ref 80–99)
MUCOUS THREADS URNS QL MICRO: NEGATIVE /LPF
NITRITE UR QL STRIP.AUTO: NEGATIVE
NRBC # BLD: 0 K/UL (ref 0–0.01)
NRBC BLD-RTO: 0 PER 100 WBC
PERFORMED BY:: ABNORMAL
PH UR STRIP: 5 (ref 5–8)
PLATELET # BLD AUTO: 129 K/UL (ref 150–400)
PMV BLD AUTO: 12.1 FL (ref 8.9–12.9)
POTASSIUM SERPL-SCNC: 4.2 MMOL/L (ref 3.5–5.1)
PROT UR STRIP-MCNC: NEGATIVE MG/DL
RBC # BLD AUTO: 4.78 M/UL (ref 3.8–5.2)
RBC #/AREA URNS HPF: ABNORMAL /HPF (ref 0–5)
SODIUM SERPL-SCNC: 141 MMOL/L (ref 136–145)
SP GR UR REFRACTOMETRY: 1.01 (ref 1–1.03)
THEOPHYLLINE SERPL-MCNC: 2.6 UG/ML (ref 10–20)
URINE CULTURE IF INDICATED: ABNORMAL
UROBILINOGEN UR QL STRIP.AUTO: 0.1 EU/DL (ref 0.1–1)
WBC # BLD AUTO: 9.7 K/UL (ref 3.6–11)
WBC URNS QL MICRO: ABNORMAL /HPF (ref 0–4)

## 2025-04-13 PROCEDURE — 6360000002 HC RX W HCPCS: Performed by: FAMILY MEDICINE

## 2025-04-13 PROCEDURE — 2700000000 HC OXYGEN THERAPY PER DAY

## 2025-04-13 PROCEDURE — 80048 BASIC METABOLIC PNL TOTAL CA: CPT

## 2025-04-13 PROCEDURE — 85027 COMPLETE CBC AUTOMATED: CPT

## 2025-04-13 PROCEDURE — 6370000000 HC RX 637 (ALT 250 FOR IP): Performed by: FAMILY MEDICINE

## 2025-04-13 PROCEDURE — 83880 ASSAY OF NATRIURETIC PEPTIDE: CPT

## 2025-04-13 PROCEDURE — 93970 EXTREMITY STUDY: CPT

## 2025-04-13 PROCEDURE — 6370000000 HC RX 637 (ALT 250 FOR IP): Performed by: INTERNAL MEDICINE

## 2025-04-13 PROCEDURE — 81001 URINALYSIS AUTO W/SCOPE: CPT

## 2025-04-13 PROCEDURE — 6370000000 HC RX 637 (ALT 250 FOR IP)

## 2025-04-13 PROCEDURE — 6360000002 HC RX W HCPCS: Performed by: INTERNAL MEDICINE

## 2025-04-13 PROCEDURE — 83735 ASSAY OF MAGNESIUM: CPT

## 2025-04-13 PROCEDURE — 2500000003 HC RX 250 WO HCPCS: Performed by: FAMILY MEDICINE

## 2025-04-13 PROCEDURE — 94640 AIRWAY INHALATION TREATMENT: CPT

## 2025-04-13 PROCEDURE — 2060000000 HC ICU INTERMEDIATE R&B

## 2025-04-13 PROCEDURE — 82962 GLUCOSE BLOOD TEST: CPT

## 2025-04-13 PROCEDURE — 94761 N-INVAS EAR/PLS OXIMETRY MLT: CPT

## 2025-04-13 RX ORDER — CYCLOBENZAPRINE HCL 10 MG
10 TABLET ORAL 3 TIMES DAILY PRN
Status: DISCONTINUED | OUTPATIENT
Start: 2025-04-13 | End: 2025-04-17 | Stop reason: HOSPADM

## 2025-04-13 RX ORDER — IPRATROPIUM BROMIDE AND ALBUTEROL SULFATE 2.5; .5 MG/3ML; MG/3ML
1 SOLUTION RESPIRATORY (INHALATION)
Status: DISCONTINUED | OUTPATIENT
Start: 2025-04-13 | End: 2025-04-15

## 2025-04-13 RX ORDER — TRAMADOL HYDROCHLORIDE 50 MG/1
25 TABLET ORAL EVERY 6 HOURS PRN
Status: DISCONTINUED | OUTPATIENT
Start: 2025-04-13 | End: 2025-04-17 | Stop reason: HOSPADM

## 2025-04-13 RX ORDER — FUROSEMIDE 10 MG/ML
80 INJECTION INTRAMUSCULAR; INTRAVENOUS 2 TIMES DAILY
Status: DISCONTINUED | OUTPATIENT
Start: 2025-04-13 | End: 2025-04-14

## 2025-04-13 RX ADMIN — METHYLPREDNISOLONE SODIUM SUCCINATE 40 MG: 40 INJECTION INTRAMUSCULAR; INTRAVENOUS at 12:23

## 2025-04-13 RX ADMIN — SPIRONOLACTONE 25 MG: 25 TABLET ORAL at 09:24

## 2025-04-13 RX ADMIN — ONDANSETRON 4 MG: 2 INJECTION, SOLUTION INTRAMUSCULAR; INTRAVENOUS at 20:05

## 2025-04-13 RX ADMIN — EMPAGLIFLOZIN 10 MG: 10 TABLET, FILM COATED ORAL at 09:36

## 2025-04-13 RX ADMIN — ATORVASTATIN CALCIUM 40 MG: 40 TABLET, FILM COATED ORAL at 21:30

## 2025-04-13 RX ADMIN — INSULIN LISPRO 4 UNITS: 100 INJECTION, SOLUTION INTRAVENOUS; SUBCUTANEOUS at 12:23

## 2025-04-13 RX ADMIN — APIXABAN 5 MG: 5 TABLET, FILM COATED ORAL at 09:24

## 2025-04-13 RX ADMIN — METHYLPREDNISOLONE SODIUM SUCCINATE 40 MG: 40 INJECTION INTRAMUSCULAR; INTRAVENOUS at 05:30

## 2025-04-13 RX ADMIN — Medication 2 PUFF: at 19:54

## 2025-04-13 RX ADMIN — APIXABAN 5 MG: 5 TABLET, FILM COATED ORAL at 21:30

## 2025-04-13 RX ADMIN — SODIUM CHLORIDE, PRESERVATIVE FREE 10 ML: 5 INJECTION INTRAVENOUS at 21:30

## 2025-04-13 RX ADMIN — IPRATROPIUM BROMIDE AND ALBUTEROL SULFATE 1 DOSE: 2.5; .5 SOLUTION RESPIRATORY (INHALATION) at 06:33

## 2025-04-13 RX ADMIN — IPRATROPIUM BROMIDE AND ALBUTEROL SULFATE 1 DOSE: 2.5; .5 SOLUTION RESPIRATORY (INHALATION) at 11:40

## 2025-04-13 RX ADMIN — CYCLOBENZAPRINE 10 MG: 10 TABLET, FILM COATED ORAL at 13:41

## 2025-04-13 RX ADMIN — HYDRALAZINE HYDROCHLORIDE 50 MG: 50 TABLET ORAL at 23:48

## 2025-04-13 RX ADMIN — THEOPHYLLINE ANHYDROUS 300 MG: 300 CAPSULE, EXTENDED RELEASE ORAL at 09:36

## 2025-04-13 RX ADMIN — OXYBUTYNIN CHLORIDE 5 MG: 5 TABLET ORAL at 09:24

## 2025-04-13 RX ADMIN — SODIUM CHLORIDE, PRESERVATIVE FREE 10 ML: 5 INJECTION INTRAVENOUS at 09:36

## 2025-04-13 RX ADMIN — Medication 2 PUFF: at 06:33

## 2025-04-13 RX ADMIN — METOPROLOL SUCCINATE 25 MG: 25 TABLET, EXTENDED RELEASE ORAL at 09:24

## 2025-04-13 RX ADMIN — IPRATROPIUM BROMIDE AND ALBUTEROL SULFATE 1 DOSE: 2.5; .5 SOLUTION RESPIRATORY (INHALATION) at 19:54

## 2025-04-13 RX ADMIN — CETIRIZINE HYDROCHLORIDE 10 MG: 10 TABLET, FILM COATED ORAL at 09:36

## 2025-04-13 RX ADMIN — AMIODARONE HYDROCHLORIDE 200 MG: 200 TABLET ORAL at 09:24

## 2025-04-13 RX ADMIN — FUROSEMIDE 80 MG: 10 INJECTION, SOLUTION INTRAMUSCULAR; INTRAVENOUS at 18:24

## 2025-04-13 RX ADMIN — ASPIRIN 81 MG: 81 TABLET, COATED ORAL at 09:24

## 2025-04-13 RX ADMIN — FUROSEMIDE 40 MG: 10 INJECTION, SOLUTION INTRAMUSCULAR; INTRAVENOUS at 09:32

## 2025-04-13 ASSESSMENT — PAIN DESCRIPTION - DESCRIPTORS: DESCRIPTORS: DISCOMFORT;SPASM

## 2025-04-13 ASSESSMENT — PAIN SCALES - GENERAL
PAINLEVEL_OUTOF10: 6
PAINLEVEL_OUTOF10: 0
PAINLEVEL_OUTOF10: 2

## 2025-04-13 ASSESSMENT — PAIN - FUNCTIONAL ASSESSMENT: PAIN_FUNCTIONAL_ASSESSMENT: ACTIVITIES ARE NOT PREVENTED

## 2025-04-13 ASSESSMENT — PAIN DESCRIPTION - FREQUENCY: FREQUENCY: INTERMITTENT

## 2025-04-13 ASSESSMENT — PAIN DESCRIPTION - LOCATION: LOCATION: NECK

## 2025-04-13 ASSESSMENT — PAIN DESCRIPTION - ONSET: ONSET: PROGRESSIVE

## 2025-04-13 ASSESSMENT — PAIN DESCRIPTION - PAIN TYPE: TYPE: CHRONIC PAIN;ACUTE PAIN

## 2025-04-13 ASSESSMENT — PAIN SCALES - WONG BAKER: WONGBAKER_NUMERICALRESPONSE: NO HURT

## 2025-04-13 ASSESSMENT — PAIN DESCRIPTION - ORIENTATION: ORIENTATION: MID

## 2025-04-13 NOTE — PLAN OF CARE

## 2025-04-14 LAB
ANION GAP SERPL CALC-SCNC: 3 MMOL/L (ref 2–12)
BUN SERPL-MCNC: 32 MG/DL (ref 6–20)
BUN/CREAT SERPL: 19 (ref 12–20)
CA-I BLD-MCNC: 8.9 MG/DL (ref 8.5–10.1)
CHLORIDE SERPL-SCNC: 100 MMOL/L (ref 97–108)
CO2 SERPL-SCNC: 38 MMOL/L (ref 21–32)
CREAT SERPL-MCNC: 1.71 MG/DL (ref 0.55–1.02)
ERYTHROCYTE [DISTWIDTH] IN BLOOD BY AUTOMATED COUNT: 15.7 % (ref 11.5–14.5)
FLUAV RNA SPEC QL NAA+PROBE: NOT DETECTED
FLUBV RNA SPEC QL NAA+PROBE: NOT DETECTED
GLUCOSE BLD STRIP.AUTO-MCNC: 106 MG/DL (ref 65–100)
GLUCOSE BLD STRIP.AUTO-MCNC: 120 MG/DL (ref 65–100)
GLUCOSE BLD STRIP.AUTO-MCNC: 164 MG/DL (ref 65–100)
GLUCOSE BLD STRIP.AUTO-MCNC: 307 MG/DL (ref 65–100)
GLUCOSE SERPL-MCNC: 128 MG/DL (ref 65–100)
HCT VFR BLD AUTO: 45.1 % (ref 35–47)
HGB BLD-MCNC: 14.1 G/DL (ref 11.5–16)
MAGNESIUM SERPL-MCNC: 1.6 MG/DL (ref 1.6–2.4)
MCH RBC QN AUTO: 28.6 PG (ref 26–34)
MCHC RBC AUTO-ENTMCNC: 31.3 G/DL (ref 30–36.5)
MCV RBC AUTO: 91.5 FL (ref 80–99)
NRBC # BLD: 0 K/UL (ref 0–0.01)
NRBC BLD-RTO: 0 PER 100 WBC
PERFORMED BY:: ABNORMAL
PLATELET # BLD AUTO: 146 K/UL (ref 150–400)
PMV BLD AUTO: 13.6 FL (ref 8.9–12.9)
POTASSIUM SERPL-SCNC: 3.8 MMOL/L (ref 3.5–5.1)
RBC # BLD AUTO: 4.93 M/UL (ref 3.8–5.2)
SARS-COV-2 RNA RESP QL NAA+PROBE: NOT DETECTED
SODIUM SERPL-SCNC: 141 MMOL/L (ref 136–145)
WBC # BLD AUTO: 12.3 K/UL (ref 3.6–11)

## 2025-04-14 PROCEDURE — 6370000000 HC RX 637 (ALT 250 FOR IP): Performed by: FAMILY MEDICINE

## 2025-04-14 PROCEDURE — 2500000003 HC RX 250 WO HCPCS: Performed by: FAMILY MEDICINE

## 2025-04-14 PROCEDURE — 36415 COLL VENOUS BLD VENIPUNCTURE: CPT

## 2025-04-14 PROCEDURE — 80048 BASIC METABOLIC PNL TOTAL CA: CPT

## 2025-04-14 PROCEDURE — 2700000000 HC OXYGEN THERAPY PER DAY

## 2025-04-14 PROCEDURE — 87636 SARSCOV2 & INF A&B AMP PRB: CPT

## 2025-04-14 PROCEDURE — 6360000002 HC RX W HCPCS: Performed by: INTERNAL MEDICINE

## 2025-04-14 PROCEDURE — 6370000000 HC RX 637 (ALT 250 FOR IP)

## 2025-04-14 PROCEDURE — 85027 COMPLETE CBC AUTOMATED: CPT

## 2025-04-14 PROCEDURE — 83735 ASSAY OF MAGNESIUM: CPT

## 2025-04-14 PROCEDURE — 94761 N-INVAS EAR/PLS OXIMETRY MLT: CPT

## 2025-04-14 PROCEDURE — 82962 GLUCOSE BLOOD TEST: CPT

## 2025-04-14 PROCEDURE — 6370000000 HC RX 637 (ALT 250 FOR IP): Performed by: INTERNAL MEDICINE

## 2025-04-14 PROCEDURE — 2060000000 HC ICU INTERMEDIATE R&B

## 2025-04-14 PROCEDURE — 94640 AIRWAY INHALATION TREATMENT: CPT

## 2025-04-14 RX ORDER — HYDRALAZINE HYDROCHLORIDE 25 MG/1
25 TABLET, FILM COATED ORAL EVERY 8 HOURS SCHEDULED
Status: DISCONTINUED | OUTPATIENT
Start: 2025-04-14 | End: 2025-04-17 | Stop reason: HOSPADM

## 2025-04-14 RX ORDER — FUROSEMIDE 10 MG/ML
80 INJECTION INTRAMUSCULAR; INTRAVENOUS DAILY
Status: DISCONTINUED | OUTPATIENT
Start: 2025-04-15 | End: 2025-04-15

## 2025-04-14 RX ADMIN — Medication 2 PUFF: at 09:13

## 2025-04-14 RX ADMIN — CETIRIZINE HYDROCHLORIDE 10 MG: 10 TABLET, FILM COATED ORAL at 08:36

## 2025-04-14 RX ADMIN — SODIUM CHLORIDE, PRESERVATIVE FREE 10 ML: 5 INJECTION INTRAVENOUS at 21:13

## 2025-04-14 RX ADMIN — INSULIN LISPRO 12 UNITS: 100 INJECTION, SOLUTION INTRAVENOUS; SUBCUTANEOUS at 21:07

## 2025-04-14 RX ADMIN — APIXABAN 5 MG: 5 TABLET, FILM COATED ORAL at 21:11

## 2025-04-14 RX ADMIN — IPRATROPIUM BROMIDE AND ALBUTEROL SULFATE 1 DOSE: 2.5; .5 SOLUTION RESPIRATORY (INHALATION) at 09:10

## 2025-04-14 RX ADMIN — THEOPHYLLINE ANHYDROUS 300 MG: 300 CAPSULE, EXTENDED RELEASE ORAL at 08:47

## 2025-04-14 RX ADMIN — METOPROLOL SUCCINATE 25 MG: 25 TABLET, EXTENDED RELEASE ORAL at 08:36

## 2025-04-14 RX ADMIN — IPRATROPIUM BROMIDE AND ALBUTEROL SULFATE 1 DOSE: 2.5; .5 SOLUTION RESPIRATORY (INHALATION) at 19:32

## 2025-04-14 RX ADMIN — CYCLOBENZAPRINE 10 MG: 10 TABLET, FILM COATED ORAL at 00:00

## 2025-04-14 RX ADMIN — EMPAGLIFLOZIN 10 MG: 10 TABLET, FILM COATED ORAL at 08:43

## 2025-04-14 RX ADMIN — ATORVASTATIN CALCIUM 40 MG: 40 TABLET, FILM COATED ORAL at 21:13

## 2025-04-14 RX ADMIN — OXYBUTYNIN CHLORIDE 5 MG: 5 TABLET ORAL at 08:36

## 2025-04-14 RX ADMIN — Medication 2 PUFF: at 19:32

## 2025-04-14 RX ADMIN — HYDRALAZINE HYDROCHLORIDE 25 MG: 25 TABLET ORAL at 21:12

## 2025-04-14 RX ADMIN — HYDRALAZINE HYDROCHLORIDE 25 MG: 25 TABLET ORAL at 08:44

## 2025-04-14 RX ADMIN — SODIUM CHLORIDE, PRESERVATIVE FREE 10 ML: 5 INJECTION INTRAVENOUS at 08:37

## 2025-04-14 RX ADMIN — ASPIRIN 81 MG: 81 TABLET, COATED ORAL at 08:36

## 2025-04-14 RX ADMIN — HYDRALAZINE HYDROCHLORIDE 25 MG: 25 TABLET ORAL at 13:40

## 2025-04-14 RX ADMIN — FUROSEMIDE 80 MG: 10 INJECTION, SOLUTION INTRAMUSCULAR; INTRAVENOUS at 08:37

## 2025-04-14 RX ADMIN — APIXABAN 5 MG: 5 TABLET, FILM COATED ORAL at 08:36

## 2025-04-14 RX ADMIN — Medication 3 MG: at 00:00

## 2025-04-14 RX ADMIN — AMIODARONE HYDROCHLORIDE 200 MG: 200 TABLET ORAL at 08:36

## 2025-04-14 RX ADMIN — TRAMADOL HYDROCHLORIDE 25 MG: 50 TABLET, COATED ORAL at 21:20

## 2025-04-14 RX ADMIN — CYCLOBENZAPRINE 10 MG: 10 TABLET, FILM COATED ORAL at 08:36

## 2025-04-14 RX ADMIN — IPRATROPIUM BROMIDE AND ALBUTEROL SULFATE 1 DOSE: 2.5; .5 SOLUTION RESPIRATORY (INHALATION) at 13:18

## 2025-04-14 ASSESSMENT — PAIN DESCRIPTION - ORIENTATION: ORIENTATION: RIGHT

## 2025-04-14 ASSESSMENT — PAIN DESCRIPTION - LOCATION: LOCATION: HIP

## 2025-04-14 ASSESSMENT — ENCOUNTER SYMPTOMS
COUGH: 0
BACK PAIN: 1
CHEST TIGHTNESS: 0
WHEEZING: 0
SHORTNESS OF BREATH: 1
GASTROINTESTINAL NEGATIVE: 1

## 2025-04-14 ASSESSMENT — PAIN DESCRIPTION - DESCRIPTORS: DESCRIPTORS: ACHING

## 2025-04-14 ASSESSMENT — PAIN SCALES - GENERAL
PAINLEVEL_OUTOF10: 0
PAINLEVEL_OUTOF10: 8

## 2025-04-14 NOTE — DISCHARGE INSTRUCTIONS
You have been given a copy of the American Heart Association's Heart Failure Educational Booklet.  Please read over this booklet and take it with you to your next physician appointment with any questions you may have.     For additional resources and to access the American Heart Association's Interactive Workbook \"Healthier Living with Heart Failure - Managing Symptoms and Reducing Risk,\" please scan the QR code below.               Download the Heart Failure Perry Luisito: Search in your Google Play Store ([employer]) or [employer] Luisito Store ([employer]): Search for- HF Perry Luisito.    https://www.heart.org/en/health-topics/heart-failure/heart-failure-tools-resources/fj-helvke-gda    HF Perry is a brand-new phone luisito that helps you track daily symptoms, vitals, mood, energy level and more. You can even add your heart failure care team members to view your data and monitor your condition at home.    HF Perry Lets You:  Track symptoms, medications and more  Share health information with your health care team  Connect with others living with heart failure     -------------------------------------------------------------------------------------------------------------------------------------------------------------------------------

## 2025-04-14 NOTE — CARE COORDINATION
CM reviewed chart.     DCP is home with daughter.     Per IDR, patient likely has another 48 hours before they are medically clear for dc.   Barriers: cards and pulm clearance , IV diuresis, and IV steroids.     CM will continue to follow.

## 2025-04-14 NOTE — NURSE NAVIGATOR
Heart Failure Education/Evaluation/Recommendations      Code Status: Full Code        CXRAY:04/11/2025  FINDINGS: Single view(s) of the chest. The patient is status post median  sternotomy. The lungs are well inflated. No focal consolidation, pleural  effusion, or pneumothorax. The heart is moderately enlarged, but unchanged.  There is unchanged mild edema. The visualized osseous structures are  unremarkable.     IMPRESSION:  Unchanged cardiomegaly and mild edema        Electronically signed by HARDY KNUTSON        Exam Ended: 04/11/25 18:58 EDT         BNP:  DATE   RESULTS  04/11/2025  9,774  04/12/2025  10,035  04/13/2025  14,175          ECHO:04/12/2025   Interpretation Summary    Left Ventricle: Reduced left ventricular systolic function with a visually estimated EF of 40 - 45%. Left ventricle size is normal. Moderately increased wall thickness. Findings consistent with concentric hypertrophy. There are regional wall motion abnormalities.    Right Ventricle: Reduced systolic function. TAPSE is abnormal. TAPSE is 1.0 cm. RV Peak S' is 8.1 cm/s.    Mitral Valve: Mild regurgitation.    Tricuspid Valve: Mild regurgitation with jet direction toward the septum. The estimated RVSP is 39 mmHg.    Left Atrium: Left atrium is mildly dilated.    Right Atrium: Right atrium is dilated.    Image quality is fair. Procedure performed with the patient in a supine position.    **Echo in 2021 30-35%, in 2024 50-55%**    -------------------------------------------------  Cardiology Consulted: YES- [facility]    Pulmonology Consulted: YES    Nephrology Consulted: NO?      HX: COPD, CKD III/IV, HFrEF in 2021, HFpEF in 2024, UTIs, Kindey Mass, Afib        Home GDMTS:           Medications             Dose               Frequency      Route   - Diuretics       Bumex   2mg  BID  PO            - Beta Blockers       Metoprolol S.  25mg  Daily  PO   - ACE/ARB/ARNI      Entresto  97/103mg BID  PO   - SGLT2

## 2025-04-14 NOTE — PLAN OF CARE
Problem: Pain  Goal: Verbalizes/displays adequate comfort level or baseline comfort level  4/14/2025 0714 by Roni Keys RN  Outcome: Progressing  4/14/2025 0543 by Kati Godwin RN  Outcome: Progressing     Problem: Chronic Conditions and Co-morbidities  Goal: Patient's chronic conditions and co-morbidity symptoms are monitored and maintained or improved  4/14/2025 0714 by Roni Keys RN  Outcome: Progressing  4/14/2025 0543 by Kati Godwin RN  Outcome: Progressing     Problem: Discharge Planning  Goal: Discharge to home or other facility with appropriate resources  4/14/2025 0714 by Roni Keys RN  Outcome: Progressing  4/14/2025 0543 by Kati Godwin RN  Outcome: Progressing     Problem: Skin/Tissue Integrity  Goal: Skin integrity remains intact  Description: 1.  Monitor for areas of redness and/or skin breakdown  2.  Assess vascular access sites hourly  3.  Every 4-6 hours minimum:  Change oxygen saturation probe site  4.  Every 4-6 hours:  If on nasal continuous positive airway pressure, respiratory therapy assess nares and determine need for appliance change or resting period  4/14/2025 0714 by Roni Keys RN  Outcome: Progressing  4/14/2025 0543 by Kati Godwin RN  Outcome: Progressing     Problem: Safety - Adult  Goal: Free from fall injury  4/14/2025 0714 by Roni Keys RN  Outcome: Progressing  4/14/2025 0543 by Kati Godwin RN  Outcome: Progressing     Problem: ABCDS Injury Assessment  Goal: Absence of physical injury  4/14/2025 0714 by Roni Keys RN  Outcome: Progressing  4/14/2025 0543 by Kati Godwin RN  Outcome: Progressing

## 2025-04-15 LAB
ANION GAP SERPL CALC-SCNC: 1 MMOL/L (ref 2–12)
BASOPHILS # BLD: 0.03 K/UL (ref 0–0.1)
BASOPHILS NFR BLD: 0.3 % (ref 0–1)
BNP SERPL-MCNC: 6615 PG/ML
BUN SERPL-MCNC: 35 MG/DL (ref 6–20)
BUN/CREAT SERPL: 18 (ref 12–20)
CA-I BLD-MCNC: 8.4 MG/DL (ref 8.5–10.1)
CHLORIDE SERPL-SCNC: 101 MMOL/L (ref 97–108)
CO2 SERPL-SCNC: 39 MMOL/L (ref 21–32)
CREAT SERPL-MCNC: 1.91 MG/DL (ref 0.55–1.02)
DIFFERENTIAL METHOD BLD: ABNORMAL
EOSINOPHIL # BLD: 0.16 K/UL (ref 0–0.4)
EOSINOPHIL NFR BLD: 1.6 % (ref 0–7)
ERYTHROCYTE [DISTWIDTH] IN BLOOD BY AUTOMATED COUNT: 15.8 % (ref 11.5–14.5)
GLUCOSE BLD STRIP.AUTO-MCNC: 121 MG/DL (ref 65–100)
GLUCOSE BLD STRIP.AUTO-MCNC: 130 MG/DL (ref 65–100)
GLUCOSE BLD STRIP.AUTO-MCNC: 163 MG/DL (ref 65–100)
GLUCOSE BLD STRIP.AUTO-MCNC: 94 MG/DL (ref 65–100)
GLUCOSE SERPL-MCNC: 93 MG/DL (ref 65–100)
HCT VFR BLD AUTO: 46.5 % (ref 35–47)
HGB BLD-MCNC: 14.6 G/DL (ref 11.5–16)
IMM GRANULOCYTES # BLD AUTO: 0.03 K/UL (ref 0–0.04)
IMM GRANULOCYTES NFR BLD AUTO: 0.3 % (ref 0–0.5)
LYMPHOCYTES # BLD: 2.14 K/UL (ref 0.8–3.5)
LYMPHOCYTES NFR BLD: 21.4 % (ref 12–49)
MAGNESIUM SERPL-MCNC: 1.6 MG/DL (ref 1.6–2.4)
MCH RBC QN AUTO: 28.7 PG (ref 26–34)
MCHC RBC AUTO-ENTMCNC: 31.4 G/DL (ref 30–36.5)
MCV RBC AUTO: 91.5 FL (ref 80–99)
MONOCYTES # BLD: 1.36 K/UL (ref 0–1)
MONOCYTES NFR BLD: 13.6 % (ref 5–13)
NEUTS SEG # BLD: 6.3 K/UL (ref 1.8–8)
NEUTS SEG NFR BLD: 62.8 % (ref 32–75)
NRBC # BLD: 0 K/UL (ref 0–0.01)
NRBC BLD-RTO: 0 PER 100 WBC
PERFORMED BY:: ABNORMAL
PERFORMED BY:: NORMAL
PLATELET # BLD AUTO: 148 K/UL (ref 150–400)
PMV BLD AUTO: 12.7 FL (ref 8.9–12.9)
POTASSIUM SERPL-SCNC: 3.6 MMOL/L (ref 3.5–5.1)
PROCALCITONIN SERPL-MCNC: 0.05 NG/ML
RBC # BLD AUTO: 5.08 M/UL (ref 3.8–5.2)
SODIUM SERPL-SCNC: 141 MMOL/L (ref 136–145)
WBC # BLD AUTO: 10 K/UL (ref 3.6–11)

## 2025-04-15 PROCEDURE — 6360000002 HC RX W HCPCS: Performed by: INTERNAL MEDICINE

## 2025-04-15 PROCEDURE — 6370000000 HC RX 637 (ALT 250 FOR IP)

## 2025-04-15 PROCEDURE — 94640 AIRWAY INHALATION TREATMENT: CPT

## 2025-04-15 PROCEDURE — 94761 N-INVAS EAR/PLS OXIMETRY MLT: CPT

## 2025-04-15 PROCEDURE — 85025 COMPLETE CBC W/AUTO DIFF WBC: CPT

## 2025-04-15 PROCEDURE — 36415 COLL VENOUS BLD VENIPUNCTURE: CPT

## 2025-04-15 PROCEDURE — 6370000000 HC RX 637 (ALT 250 FOR IP): Performed by: FAMILY MEDICINE

## 2025-04-15 PROCEDURE — 2060000000 HC ICU INTERMEDIATE R&B

## 2025-04-15 PROCEDURE — 82962 GLUCOSE BLOOD TEST: CPT

## 2025-04-15 PROCEDURE — 2500000003 HC RX 250 WO HCPCS: Performed by: FAMILY MEDICINE

## 2025-04-15 PROCEDURE — 83735 ASSAY OF MAGNESIUM: CPT

## 2025-04-15 PROCEDURE — 84145 PROCALCITONIN (PCT): CPT

## 2025-04-15 PROCEDURE — 6370000000 HC RX 637 (ALT 250 FOR IP): Performed by: INTERNAL MEDICINE

## 2025-04-15 PROCEDURE — 83880 ASSAY OF NATRIURETIC PEPTIDE: CPT

## 2025-04-15 PROCEDURE — 80048 BASIC METABOLIC PNL TOTAL CA: CPT

## 2025-04-15 PROCEDURE — 2700000000 HC OXYGEN THERAPY PER DAY

## 2025-04-15 RX ORDER — PREDNISONE 20 MG/1
20 TABLET ORAL 2 TIMES DAILY
Status: CANCELLED | OUTPATIENT
Start: 2025-04-15

## 2025-04-15 RX ORDER — IPRATROPIUM BROMIDE AND ALBUTEROL SULFATE 2.5; .5 MG/3ML; MG/3ML
1 SOLUTION RESPIRATORY (INHALATION) EVERY 4 HOURS PRN
Status: DISCONTINUED | OUTPATIENT
Start: 2025-04-15 | End: 2025-04-17 | Stop reason: HOSPADM

## 2025-04-15 RX ORDER — IPRATROPIUM BROMIDE AND ALBUTEROL SULFATE 2.5; .5 MG/3ML; MG/3ML
1 SOLUTION RESPIRATORY (INHALATION)
Status: DISCONTINUED | OUTPATIENT
Start: 2025-04-15 | End: 2025-04-17 | Stop reason: HOSPADM

## 2025-04-15 RX ADMIN — OXYBUTYNIN CHLORIDE 5 MG: 5 TABLET ORAL at 09:36

## 2025-04-15 RX ADMIN — EMPAGLIFLOZIN 10 MG: 10 TABLET, FILM COATED ORAL at 10:45

## 2025-04-15 RX ADMIN — HYDRALAZINE HYDROCHLORIDE 25 MG: 25 TABLET ORAL at 06:16

## 2025-04-15 RX ADMIN — APIXABAN 5 MG: 5 TABLET, FILM COATED ORAL at 09:03

## 2025-04-15 RX ADMIN — SODIUM CHLORIDE, PRESERVATIVE FREE 10 ML: 5 INJECTION INTRAVENOUS at 20:33

## 2025-04-15 RX ADMIN — THEOPHYLLINE ANHYDROUS 300 MG: 300 CAPSULE, EXTENDED RELEASE ORAL at 10:45

## 2025-04-15 RX ADMIN — TRAMADOL HYDROCHLORIDE 25 MG: 50 TABLET, COATED ORAL at 18:37

## 2025-04-15 RX ADMIN — Medication 2 PUFF: at 21:16

## 2025-04-15 RX ADMIN — TRAMADOL HYDROCHLORIDE 25 MG: 50 TABLET, COATED ORAL at 09:03

## 2025-04-15 RX ADMIN — APIXABAN 5 MG: 5 TABLET, FILM COATED ORAL at 20:32

## 2025-04-15 RX ADMIN — CETIRIZINE HYDROCHLORIDE 10 MG: 10 TABLET, FILM COATED ORAL at 09:03

## 2025-04-15 RX ADMIN — FUROSEMIDE 80 MG: 10 INJECTION, SOLUTION INTRAMUSCULAR; INTRAVENOUS at 09:03

## 2025-04-15 RX ADMIN — ASPIRIN 81 MG: 81 TABLET, COATED ORAL at 09:03

## 2025-04-15 RX ADMIN — HYDRALAZINE HYDROCHLORIDE 25 MG: 25 TABLET ORAL at 15:11

## 2025-04-15 RX ADMIN — HYDRALAZINE HYDROCHLORIDE 25 MG: 25 TABLET ORAL at 20:32

## 2025-04-15 RX ADMIN — Medication 2 PUFF: at 08:31

## 2025-04-15 RX ADMIN — IPRATROPIUM BROMIDE AND ALBUTEROL SULFATE 1 DOSE: 2.5; .5 SOLUTION RESPIRATORY (INHALATION) at 21:04

## 2025-04-15 RX ADMIN — METOPROLOL SUCCINATE 25 MG: 25 TABLET, EXTENDED RELEASE ORAL at 09:03

## 2025-04-15 RX ADMIN — AMIODARONE HYDROCHLORIDE 200 MG: 200 TABLET ORAL at 09:03

## 2025-04-15 RX ADMIN — IPRATROPIUM BROMIDE AND ALBUTEROL SULFATE 1 DOSE: 2.5; .5 SOLUTION RESPIRATORY (INHALATION) at 08:31

## 2025-04-15 RX ADMIN — ATORVASTATIN CALCIUM 40 MG: 40 TABLET, FILM COATED ORAL at 20:32

## 2025-04-15 RX ADMIN — SODIUM CHLORIDE, PRESERVATIVE FREE 10 ML: 5 INJECTION INTRAVENOUS at 09:28

## 2025-04-15 ASSESSMENT — PAIN - FUNCTIONAL ASSESSMENT
PAIN_FUNCTIONAL_ASSESSMENT: PREVENTS OR INTERFERES SOME ACTIVE ACTIVITIES AND ADLS
PAIN_FUNCTIONAL_ASSESSMENT: PREVENTS OR INTERFERES SOME ACTIVE ACTIVITIES AND ADLS

## 2025-04-15 ASSESSMENT — PAIN DESCRIPTION - LOCATION
LOCATION: GENERALIZED

## 2025-04-15 ASSESSMENT — ENCOUNTER SYMPTOMS
COUGH: 0
CHEST TIGHTNESS: 0
GASTROINTESTINAL NEGATIVE: 1
SHORTNESS OF BREATH: 1
BACK PAIN: 1
WHEEZING: 0

## 2025-04-15 ASSESSMENT — PAIN SCALES - GENERAL
PAINLEVEL_OUTOF10: 0
PAINLEVEL_OUTOF10: 0
PAINLEVEL_OUTOF10: 7
PAINLEVEL_OUTOF10: 0
PAINLEVEL_OUTOF10: 9
PAINLEVEL_OUTOF10: 8
PAINLEVEL_OUTOF10: 0

## 2025-04-15 ASSESSMENT — PAIN DESCRIPTION - DESCRIPTORS
DESCRIPTORS: ACHING;DISCOMFORT;THROBBING
DESCRIPTORS: ACHING;DISCOMFORT;THROBBING

## 2025-04-15 ASSESSMENT — PAIN DESCRIPTION - ORIENTATION
ORIENTATION: OUTER;UPPER
ORIENTATION: INNER;OUTER

## 2025-04-15 ASSESSMENT — PAIN SCALES - WONG BAKER: WONGBAKER_NUMERICALRESPONSE: NO HURT

## 2025-04-15 NOTE — PLAN OF CARE
Problem: Pain  Goal: Verbalizes/displays adequate comfort level or baseline comfort level  4/15/2025 1322 by Karyna Barrett RN  Outcome: Progressing  4/15/2025 0112 by Patience Tineo RN  Outcome: Progressing     Problem: Chronic Conditions and Co-morbidities  Goal: Patient's chronic conditions and co-morbidity symptoms are monitored and maintained or improved  4/15/2025 1322 by Karyna Barrett RN  Outcome: Progressing  4/15/2025 0112 by Patience Tineo RN  Outcome: Progressing  Flowsheets (Taken 4/14/2025 2121)  Care Plan - Patient's Chronic Conditions and Co-Morbidity Symptoms are Monitored and Maintained or Improved: Monitor and assess patient's chronic conditions and comorbid symptoms for stability, deterioration, or improvement     Problem: Discharge Planning  Goal: Discharge to home or other facility with appropriate resources  4/15/2025 1322 by Karyna Barrett RN  Outcome: Progressing  4/15/2025 0112 by Patience Tineo RN  Outcome: Progressing  Flowsheets (Taken 4/14/2025 2121)  Discharge to home or other facility with appropriate resources: Identify barriers to discharge with patient and caregiver     Problem: Skin/Tissue Integrity  Goal: Skin integrity remains intact  Description: 1.  Monitor for areas of redness and/or skin breakdown  2.  Assess vascular access sites hourly  3.  Every 4-6 hours minimum:  Change oxygen saturation probe site  4.  Every 4-6 hours:  If on nasal continuous positive airway pressure, respiratory therapy assess nares and determine need for appliance change or resting period  4/15/2025 1322 by Karyna Barrett RN  Outcome: Progressing  4/15/2025 0112 by Patience Tineo RN  Outcome: Progressing     Problem: Safety - Adult  Goal: Free from fall injury  4/15/2025 1322 by Karyna Barrett RN  Outcome: Progressing  4/15/2025 0112 by Patience Tineo RN  Outcome: Progressing     Problem: ABCDS Injury Assessment  Goal: Absence of physical injury  4/15/2025 1322 by Nena

## 2025-04-15 NOTE — PLAN OF CARE
Problem: Pain  Goal: Verbalizes/displays adequate comfort level or baseline comfort level  Outcome: Progressing     Problem: Chronic Conditions and Co-morbidities  Goal: Patient's chronic conditions and co-morbidity symptoms are monitored and maintained or improved  Outcome: Progressing  Flowsheets (Taken 4/14/2025 2121)  Care Plan - Patient's Chronic Conditions and Co-Morbidity Symptoms are Monitored and Maintained or Improved: Monitor and assess patient's chronic conditions and comorbid symptoms for stability, deterioration, or improvement     Problem: Discharge Planning  Goal: Discharge to home or other facility with appropriate resources  Outcome: Progressing  Flowsheets (Taken 4/14/2025 2121)  Discharge to home or other facility with appropriate resources: Identify barriers to discharge with patient and caregiver     Problem: Skin/Tissue Integrity  Goal: Skin integrity remains intact  Description: 1.  Monitor for areas of redness and/or skin breakdown  2.  Assess vascular access sites hourly  3.  Every 4-6 hours minimum:  Change oxygen saturation probe site  4.  Every 4-6 hours:  If on nasal continuous positive airway pressure, respiratory therapy assess nares and determine need for appliance change or resting period  Outcome: Progressing     Problem: Skin/Tissue Integrity  Goal: Skin integrity remains intact  Description: 1.  Monitor for areas of redness and/or skin breakdown  2.  Assess vascular access sites hourly  3.  Every 4-6 hours minimum:  Change oxygen saturation probe site  4.  Every 4-6 hours:  If on nasal continuous positive airway pressure, respiratory therapy assess nares and determine need for appliance change or resting period  Outcome: Progressing     Problem: Safety - Adult  Goal: Free from fall injury  Outcome: Progressing     Problem: ABCDS Injury Assessment  Goal: Absence of physical injury  Outcome: Progressing     Problem: Respiratory - Adult  Goal: Achieves optimal ventilation and

## 2025-04-15 NOTE — CARE COORDINATION
Chart reviewied, DCP remains for patient to d/c from  to home with daughter, no needs, current with home o2.     continues to follow and monitor for needs.

## 2025-04-16 LAB
ANION GAP SERPL CALC-SCNC: 5 MMOL/L (ref 2–12)
BASOPHILS # BLD: 0.03 K/UL (ref 0–0.1)
BASOPHILS NFR BLD: 0.3 % (ref 0–1)
BUN SERPL-MCNC: 39 MG/DL (ref 6–20)
BUN/CREAT SERPL: 19 (ref 12–20)
CA-I BLD-MCNC: 7.7 MG/DL (ref 8.5–10.1)
CHLORIDE SERPL-SCNC: 100 MMOL/L (ref 97–108)
CO2 SERPL-SCNC: 36 MMOL/L (ref 21–32)
CREAT SERPL-MCNC: 2.01 MG/DL (ref 0.55–1.02)
DIFFERENTIAL METHOD BLD: ABNORMAL
EOSINOPHIL # BLD: 0.29 K/UL (ref 0–0.4)
EOSINOPHIL NFR BLD: 2.8 % (ref 0–7)
ERYTHROCYTE [DISTWIDTH] IN BLOOD BY AUTOMATED COUNT: 15.6 % (ref 11.5–14.5)
GLUCOSE BLD STRIP.AUTO-MCNC: 104 MG/DL (ref 65–100)
GLUCOSE BLD STRIP.AUTO-MCNC: 134 MG/DL (ref 65–100)
GLUCOSE BLD STRIP.AUTO-MCNC: 136 MG/DL (ref 65–100)
GLUCOSE BLD STRIP.AUTO-MCNC: 144 MG/DL (ref 65–100)
GLUCOSE BLD STRIP.AUTO-MCNC: 160 MG/DL (ref 65–100)
GLUCOSE SERPL-MCNC: 103 MG/DL (ref 65–100)
HCT VFR BLD AUTO: 48.1 % (ref 35–47)
HGB BLD-MCNC: 15.1 G/DL (ref 11.5–16)
IMM GRANULOCYTES # BLD AUTO: 0.04 K/UL (ref 0–0.04)
IMM GRANULOCYTES NFR BLD AUTO: 0.4 % (ref 0–0.5)
LYMPHOCYTES # BLD: 2.04 K/UL (ref 0.8–3.5)
LYMPHOCYTES NFR BLD: 19.6 % (ref 12–49)
MAGNESIUM SERPL-MCNC: 1.6 MG/DL (ref 1.6–2.4)
MCH RBC QN AUTO: 28.7 PG (ref 26–34)
MCHC RBC AUTO-ENTMCNC: 31.4 G/DL (ref 30–36.5)
MCV RBC AUTO: 91.4 FL (ref 80–99)
MONOCYTES # BLD: 1.41 K/UL (ref 0–1)
MONOCYTES NFR BLD: 13.5 % (ref 5–13)
NEUTS SEG # BLD: 6.61 K/UL (ref 1.8–8)
NEUTS SEG NFR BLD: 63.4 % (ref 32–75)
NRBC # BLD: 0 K/UL (ref 0–0.01)
NRBC BLD-RTO: 0 PER 100 WBC
PERFORMED BY:: ABNORMAL
PLATELET # BLD AUTO: 152 K/UL (ref 150–400)
PMV BLD AUTO: 13.4 FL (ref 8.9–12.9)
POTASSIUM SERPL-SCNC: 3.7 MMOL/L (ref 3.5–5.1)
RBC # BLD AUTO: 5.26 M/UL (ref 3.8–5.2)
SODIUM SERPL-SCNC: 141 MMOL/L (ref 136–145)
WBC # BLD AUTO: 10.4 K/UL (ref 3.6–11)

## 2025-04-16 PROCEDURE — 36415 COLL VENOUS BLD VENIPUNCTURE: CPT

## 2025-04-16 PROCEDURE — 85025 COMPLETE CBC W/AUTO DIFF WBC: CPT

## 2025-04-16 PROCEDURE — 94761 N-INVAS EAR/PLS OXIMETRY MLT: CPT

## 2025-04-16 PROCEDURE — 2700000000 HC OXYGEN THERAPY PER DAY

## 2025-04-16 PROCEDURE — 6370000000 HC RX 637 (ALT 250 FOR IP)

## 2025-04-16 PROCEDURE — 6370000000 HC RX 637 (ALT 250 FOR IP): Performed by: FAMILY MEDICINE

## 2025-04-16 PROCEDURE — 82962 GLUCOSE BLOOD TEST: CPT

## 2025-04-16 PROCEDURE — 83735 ASSAY OF MAGNESIUM: CPT

## 2025-04-16 PROCEDURE — 94640 AIRWAY INHALATION TREATMENT: CPT

## 2025-04-16 PROCEDURE — 6370000000 HC RX 637 (ALT 250 FOR IP): Performed by: INTERNAL MEDICINE

## 2025-04-16 PROCEDURE — 2060000000 HC ICU INTERMEDIATE R&B

## 2025-04-16 PROCEDURE — 80048 BASIC METABOLIC PNL TOTAL CA: CPT

## 2025-04-16 PROCEDURE — 2500000003 HC RX 250 WO HCPCS: Performed by: FAMILY MEDICINE

## 2025-04-16 RX ORDER — PREDNISONE 20 MG/1
20 TABLET ORAL DAILY
Status: DISCONTINUED | OUTPATIENT
Start: 2025-04-16 | End: 2025-04-17 | Stop reason: HOSPADM

## 2025-04-16 RX ADMIN — METOPROLOL SUCCINATE 25 MG: 25 TABLET, EXTENDED RELEASE ORAL at 08:13

## 2025-04-16 RX ADMIN — TRAMADOL HYDROCHLORIDE 25 MG: 50 TABLET, COATED ORAL at 04:42

## 2025-04-16 RX ADMIN — OXYBUTYNIN CHLORIDE 5 MG: 5 TABLET ORAL at 08:21

## 2025-04-16 RX ADMIN — EMPAGLIFLOZIN 10 MG: 10 TABLET, FILM COATED ORAL at 08:13

## 2025-04-16 RX ADMIN — ATORVASTATIN CALCIUM 40 MG: 40 TABLET, FILM COATED ORAL at 20:48

## 2025-04-16 RX ADMIN — Medication 3 MG: at 20:48

## 2025-04-16 RX ADMIN — CETIRIZINE HYDROCHLORIDE 10 MG: 10 TABLET, FILM COATED ORAL at 08:14

## 2025-04-16 RX ADMIN — AMIODARONE HYDROCHLORIDE 200 MG: 200 TABLET ORAL at 08:13

## 2025-04-16 RX ADMIN — APIXABAN 5 MG: 5 TABLET, FILM COATED ORAL at 20:48

## 2025-04-16 RX ADMIN — HYDRALAZINE HYDROCHLORIDE 25 MG: 25 TABLET ORAL at 04:41

## 2025-04-16 RX ADMIN — PREDNISONE 20 MG: 20 TABLET ORAL at 09:34

## 2025-04-16 RX ADMIN — APIXABAN 5 MG: 5 TABLET, FILM COATED ORAL at 08:14

## 2025-04-16 RX ADMIN — HYDRALAZINE HYDROCHLORIDE 25 MG: 25 TABLET ORAL at 14:08

## 2025-04-16 RX ADMIN — TRAMADOL HYDROCHLORIDE 25 MG: 50 TABLET, COATED ORAL at 20:47

## 2025-04-16 RX ADMIN — IPRATROPIUM BROMIDE AND ALBUTEROL SULFATE 1 DOSE: 2.5; .5 SOLUTION RESPIRATORY (INHALATION) at 20:52

## 2025-04-16 RX ADMIN — Medication 2 PUFF: at 20:52

## 2025-04-16 RX ADMIN — THEOPHYLLINE ANHYDROUS 300 MG: 300 CAPSULE, EXTENDED RELEASE ORAL at 08:12

## 2025-04-16 RX ADMIN — SODIUM CHLORIDE, PRESERVATIVE FREE 10 ML: 5 INJECTION INTRAVENOUS at 08:21

## 2025-04-16 RX ADMIN — ASPIRIN 81 MG: 81 TABLET, COATED ORAL at 08:13

## 2025-04-16 ASSESSMENT — ENCOUNTER SYMPTOMS
GASTROINTESTINAL NEGATIVE: 1
WHEEZING: 0
BACK PAIN: 1
SHORTNESS OF BREATH: 1
COUGH: 0
CHEST TIGHTNESS: 0

## 2025-04-16 ASSESSMENT — PAIN SCALES - GENERAL
PAINLEVEL_OUTOF10: 5
PAINLEVEL_OUTOF10: 7
PAINLEVEL_OUTOF10: 8
PAINLEVEL_OUTOF10: 0
PAINLEVEL_OUTOF10: 8
PAINLEVEL_OUTOF10: 7

## 2025-04-16 ASSESSMENT — PAIN DESCRIPTION - DESCRIPTORS: DESCRIPTORS: ACHING

## 2025-04-16 ASSESSMENT — PAIN DESCRIPTION - ORIENTATION: ORIENTATION: RIGHT

## 2025-04-16 ASSESSMENT — PAIN SCALES - WONG BAKER: WONGBAKER_NUMERICALRESPONSE: NO HURT

## 2025-04-16 ASSESSMENT — PAIN DESCRIPTION - LOCATION: LOCATION: NECK

## 2025-04-16 NOTE — CONSULTS
NAME:  Nubia Lion   :   1950   MRN:   [id number]     ATTENDING: Palmer Bustamante MD  PCP:  Ricardo Calle MD    Date/Time:  2025       Subjective:   REQUESTING PHYSICIAN: Mackenzie Valencia PA    REASON FOR CONSULT:    AMMON on CKD    History of presenting illness:  A 74-year-old female with PMHx of CAD s/p CABG, chronic A-fib on AC, COPD, hyperlipidemia, hypertension, diabetes, CHF, MICHELLE, history of MI, and chronic respiratory failure on 4-5 L O2 who presented to ED on  complaining of worsening SOB with bilateral lower extremity swelling and erythema x 5 days.  Has multiple admissions to hospital over past year for fluid overload.  In ED, mildly hypertensive and hypoxic on home dose 5 L O2. Work up significant BNP 9774.  CXR showed mild pulmonary edema.      She was admitted for the management of fluid overload and possible COPD exacerbation.    On admission serum creatinine was 1.39 with an estimated GFR of 40.  Previous creatinine 1.76 on 2024.  She received multiple doses of IV diuretics also started on IV steroids for COPD exacerbation.    Creatinine has been slowly trending up today 2.01 wich prompted nephrology consultation.    I saw Ms. Lion in her room earlier today she was resting in bed reported feeling much better in terms of her breathing.  She was on 4 L supplemental oxygen which is home requirement.  She denied chest pain, abdominal pain, nausea or vomiting.  She has been diuresing well with good urine output.  She was unaware of underlying CKD and never followed with a nephrologist.  Reported her mother had some \"kidney issues\" .  She denied the use of NSAIDs.  Only takes Tylenol for pain.        Past Medical History:   Diagnosis Date    Chronic obstructive pulmonary disease (HCC)     Congestive heart disease (HCC)     with preserved ejection fraction    Coronary artery disease     Diabetes (HCC)     HFrEF (heart failure with reduced ejection fraction) (HCC)     Hyperlipidemia

## 2025-04-16 NOTE — PLAN OF CARE
Problem: Pain  Goal: Verbalizes/displays adequate comfort level or baseline comfort level  4/16/2025 0710 by Roni Keys, RN  Outcome: Progressing  4/15/2025 2013 by Patience Tineo RN  Outcome: Progressing     Problem: Chronic Conditions and Co-morbidities  Goal: Patient's chronic conditions and co-morbidity symptoms are monitored and maintained or improved  4/16/2025 0710 by Roni Keys, RN  Outcome: Progressing  Flowsheets (Taken 4/15/2025 2030 by Patience Tineo RN)  Care Plan - Patient's Chronic Conditions and Co-Morbidity Symptoms are Monitored and Maintained or Improved: Monitor and assess patient's chronic conditions and comorbid symptoms for stability, deterioration, or improvement  4/15/2025 2013 by Patience Tineo RN  Outcome: Progressing     Problem: Discharge Planning  Goal: Discharge to home or other facility with appropriate resources  4/16/2025 0710 by Roni Keys RN  Outcome: Progressing  Flowsheets (Taken 4/15/2025 2030 by Patience Tineo RN)  Discharge to home or other facility with appropriate resources: Identify barriers to discharge with patient and caregiver  4/15/2025 2013 by Patience Tineo RN  Outcome: Progressing     Problem: Skin/Tissue Integrity  Goal: Skin integrity remains intact  Description: 1.  Monitor for areas of redness and/or skin breakdown  2.  Assess vascular access sites hourly  3.  Every 4-6 hours minimum:  Change oxygen saturation probe site  4.  Every 4-6 hours:  If on nasal continuous positive airway pressure, respiratory therapy assess nares and determine need for appliance change or resting period  4/16/2025 0710 by Roni Keys, RN  Outcome: Progressing  4/15/2025 2013 by Patience Tineo RN  Outcome: Progressing     Problem: Safety - Adult  Goal: Free from fall injury  4/16/2025 0710 by Roni Keys, RN  Outcome: Progressing  4/15/2025 2013 by Patience Tineo RN  Outcome: Progressing     Problem:

## 2025-04-16 NOTE — PLAN OF CARE
Problem: Pain  Goal: Verbalizes/displays adequate comfort level or baseline comfort level  4/15/2025 2013 by Patience Tineo RN  Outcome: Progressing  4/15/2025 1322 by Karyna Barrett RN  Outcome: Progressing     Problem: Chronic Conditions and Co-morbidities  Goal: Patient's chronic conditions and co-morbidity symptoms are monitored and maintained or improved  4/15/2025 2013 by Patience Tineo RN  Outcome: Progressing  4/15/2025 1322 by Karyna Barrett RN  Outcome: Progressing     Problem: Discharge Planning  Goal: Discharge to home or other facility with appropriate resources  4/15/2025 2013 by Patience Tineo RN  Outcome: Progressing  4/15/2025 1322 by Karyna Barrett RN  Outcome: Progressing     Problem: Skin/Tissue Integrity  Goal: Skin integrity remains intact  Description: 1.  Monitor for areas of redness and/or skin breakdown  2.  Assess vascular access sites hourly  3.  Every 4-6 hours minimum:  Change oxygen saturation probe site  4.  Every 4-6 hours:  If on nasal continuous positive airway pressure, respiratory therapy assess nares and determine need for appliance change or resting period  4/15/2025 2013 by Patience Tineo RN  Outcome: Progressing  4/15/2025 1322 by Karyna Barrett RN  Outcome: Progressing     Problem: Safety - Adult  Goal: Free from fall injury  4/15/2025 2013 by Patience Tineo RN  Outcome: Progressing  4/15/2025 1322 by Karyna Barrett RN  Outcome: Progressing     Problem: ABCDS Injury Assessment  Goal: Absence of physical injury  4/15/2025 2013 by Patience Tineo RN  Outcome: Progressing  4/15/2025 1322 by Karyna Barrett RN  Outcome: Progressing     Problem: Respiratory - Adult  Goal: Achieves optimal ventilation and oxygenation  4/15/2025 2013 by Patience Tineo RN  Outcome: Progressing  4/15/2025 1322 by Karyna Barrett RN  Outcome: Progressing     Problem: Cardiovascular - Adult  Goal: Maintains optimal cardiac output and hemodynamic stability  4/15/2025

## 2025-04-17 VITALS
HEIGHT: 63 IN | TEMPERATURE: 97.3 F | OXYGEN SATURATION: 97 % | WEIGHT: 201.28 LBS | DIASTOLIC BLOOD PRESSURE: 55 MMHG | HEART RATE: 65 BPM | SYSTOLIC BLOOD PRESSURE: 119 MMHG | BODY MASS INDEX: 35.66 KG/M2 | RESPIRATION RATE: 17 BRPM

## 2025-04-17 LAB
ALBUMIN SERPL-MCNC: 2.5 G/DL (ref 3.5–5)
ALBUMIN/GLOB SERPL: 0.8 (ref 1.1–2.2)
ALP SERPL-CCNC: 153 U/L (ref 45–117)
ALT SERPL-CCNC: 11 U/L (ref 12–78)
ANION GAP SERPL CALC-SCNC: 5 MMOL/L (ref 2–12)
AST SERPL W P-5'-P-CCNC: 14 U/L (ref 15–37)
BASOPHILS # BLD: 0.02 K/UL (ref 0–0.1)
BASOPHILS NFR BLD: 0.2 % (ref 0–1)
BILIRUB SERPL-MCNC: 0.9 MG/DL (ref 0.2–1)
BNP SERPL-MCNC: 4049 PG/ML
BUN SERPL-MCNC: 33 MG/DL (ref 6–20)
BUN/CREAT SERPL: 22 (ref 12–20)
CA-I BLD-MCNC: 8 MG/DL (ref 8.5–10.1)
CHLORIDE SERPL-SCNC: 101 MMOL/L (ref 97–108)
CO2 SERPL-SCNC: 33 MMOL/L (ref 21–32)
CREAT SERPL-MCNC: 1.5 MG/DL (ref 0.55–1.02)
DIFFERENTIAL METHOD BLD: ABNORMAL
EOSINOPHIL # BLD: 0.01 K/UL (ref 0–0.4)
EOSINOPHIL NFR BLD: 0.1 % (ref 0–7)
ERYTHROCYTE [DISTWIDTH] IN BLOOD BY AUTOMATED COUNT: 13.1 % (ref 11.5–14.5)
GLOBULIN SER CALC-MCNC: 3 G/DL (ref 2–4)
GLUCOSE BLD STRIP.AUTO-MCNC: 111 MG/DL (ref 65–100)
GLUCOSE BLD STRIP.AUTO-MCNC: 156 MG/DL (ref 65–100)
GLUCOSE SERPL-MCNC: 104 MG/DL (ref 65–100)
HCT VFR BLD AUTO: 42 % (ref 35–47)
HGB BLD-MCNC: 13.4 G/DL (ref 11.5–16)
IMM GRANULOCYTES # BLD AUTO: 0.07 K/UL (ref 0–0.04)
IMM GRANULOCYTES NFR BLD AUTO: 0.6 % (ref 0–0.5)
LYMPHOCYTES # BLD: 1.41 K/UL (ref 0.8–3.5)
LYMPHOCYTES NFR BLD: 12 % (ref 12–49)
MCH RBC QN AUTO: 28.7 PG (ref 26–34)
MCHC RBC AUTO-ENTMCNC: 31.9 G/DL (ref 30–36.5)
MCV RBC AUTO: 89.9 FL (ref 80–99)
MONOCYTES # BLD: 0.56 K/UL (ref 0–1)
MONOCYTES NFR BLD: 4.8 % (ref 5–13)
NEUTS SEG # BLD: 9.66 K/UL (ref 1.8–8)
NEUTS SEG NFR BLD: 82.3 % (ref 32–75)
NRBC # BLD: 0 K/UL (ref 0–0.01)
NRBC BLD-RTO: 0 PER 100 WBC
PERFORMED BY:: ABNORMAL
PERFORMED BY:: ABNORMAL
PLATELET # BLD AUTO: 239 K/UL (ref 150–400)
PMV BLD AUTO: 9.8 FL (ref 8.9–12.9)
POTASSIUM SERPL-SCNC: 3.7 MMOL/L (ref 3.5–5.1)
PROT SERPL-MCNC: 5.5 G/DL (ref 6.4–8.2)
RBC # BLD AUTO: 4.67 M/UL (ref 3.8–5.2)
SODIUM SERPL-SCNC: 139 MMOL/L (ref 136–145)
WBC # BLD AUTO: 11.7 K/UL (ref 3.6–11)

## 2025-04-17 PROCEDURE — 80053 COMPREHEN METABOLIC PANEL: CPT

## 2025-04-17 PROCEDURE — 6370000000 HC RX 637 (ALT 250 FOR IP)

## 2025-04-17 PROCEDURE — 94761 N-INVAS EAR/PLS OXIMETRY MLT: CPT

## 2025-04-17 PROCEDURE — 6370000000 HC RX 637 (ALT 250 FOR IP): Performed by: INTERNAL MEDICINE

## 2025-04-17 PROCEDURE — 6370000000 HC RX 637 (ALT 250 FOR IP): Performed by: FAMILY MEDICINE

## 2025-04-17 PROCEDURE — 94640 AIRWAY INHALATION TREATMENT: CPT

## 2025-04-17 PROCEDURE — 2700000000 HC OXYGEN THERAPY PER DAY

## 2025-04-17 PROCEDURE — 82962 GLUCOSE BLOOD TEST: CPT

## 2025-04-17 PROCEDURE — 83880 ASSAY OF NATRIURETIC PEPTIDE: CPT

## 2025-04-17 PROCEDURE — 85025 COMPLETE CBC W/AUTO DIFF WBC: CPT

## 2025-04-17 PROCEDURE — 2500000003 HC RX 250 WO HCPCS: Performed by: FAMILY MEDICINE

## 2025-04-17 PROCEDURE — 36415 COLL VENOUS BLD VENIPUNCTURE: CPT

## 2025-04-17 RX ORDER — PREDNISONE 20 MG/1
20 TABLET ORAL DAILY
Qty: 5 TABLET | Refills: 0 | Status: SHIPPED | OUTPATIENT
Start: 2025-04-18 | End: 2025-04-23

## 2025-04-17 RX ORDER — OXYBUTYNIN CHLORIDE 5 MG/1
5 TABLET ORAL DAILY
Qty: 90 TABLET | Refills: 3 | Status: SHIPPED | OUTPATIENT
Start: 2025-04-18

## 2025-04-17 RX ADMIN — METOPROLOL SUCCINATE 25 MG: 25 TABLET, EXTENDED RELEASE ORAL at 08:52

## 2025-04-17 RX ADMIN — THEOPHYLLINE ANHYDROUS 300 MG: 300 CAPSULE, EXTENDED RELEASE ORAL at 08:55

## 2025-04-17 RX ADMIN — IPRATROPIUM BROMIDE AND ALBUTEROL SULFATE 1 DOSE: 2.5; .5 SOLUTION RESPIRATORY (INHALATION) at 07:27

## 2025-04-17 RX ADMIN — SODIUM CHLORIDE, PRESERVATIVE FREE 10 ML: 5 INJECTION INTRAVENOUS at 08:52

## 2025-04-17 RX ADMIN — OXYBUTYNIN CHLORIDE 5 MG: 5 TABLET ORAL at 08:52

## 2025-04-17 RX ADMIN — HYDRALAZINE HYDROCHLORIDE 25 MG: 25 TABLET ORAL at 06:10

## 2025-04-17 RX ADMIN — CETIRIZINE HYDROCHLORIDE 10 MG: 10 TABLET, FILM COATED ORAL at 08:52

## 2025-04-17 RX ADMIN — Medication 2 PUFF: at 07:27

## 2025-04-17 RX ADMIN — APIXABAN 5 MG: 5 TABLET, FILM COATED ORAL at 08:52

## 2025-04-17 RX ADMIN — AMIODARONE HYDROCHLORIDE 200 MG: 200 TABLET ORAL at 08:52

## 2025-04-17 RX ADMIN — ASPIRIN 81 MG: 81 TABLET, COATED ORAL at 08:52

## 2025-04-17 RX ADMIN — PREDNISONE 20 MG: 20 TABLET ORAL at 08:52

## 2025-04-17 NOTE — PROGRESS NOTES
Progress Note      4/13/2025 5:33 PM  NAME: Nubia Lion   MRN:  [id number]   Admit Diagnosis: Shortness of breath [R06.02]  Acute respiratory failure with hypoxia [J96.01]  Chest pain, unspecified type [R07.9]  Acute on chronic congestive heart failure, unspecified heart failure type (HCC) [I50.9]  Acute exacerbation of chronic heart failure (HCC) [I50.9]      Problem List:   Acute on chronic CHF  Bipedal edema  HFrEF with EF 30-35%  Atrial flutter, back in sinus rhythm status post cardioversion June 2021  Paroxysmal atrial fibrillation on Eliquis  S/p TIA 3/1/2023  Chronic lower extremity edema  Type 2 diabetes  COPD on home oxygen, 3 L/minute  CAD status post CABG  Sleep apnea on CPAP  CKD, stage IIIb  Hypertension     Assessment/Plan:   Increase Lasix  Continue IV aggressive diuresis  Follow renal function         []       High complexity decision making was performed in this patient at high risk for decompensation with multiple organ involvement.    Subjective:     Nubia Lion denies chest pain, dyspnea.  Discussed with RN events overnight.     Review of Systems:   Negative except for as noted above.    Objective:      Physical Exam:    Last 24hrs VS reviewed since prior progress note. Most recent are:    BP (!) 175/85   Pulse 70   Temp 99.1 °F (37.3 °C) (Oral)   Resp 18   Ht 1.6 m (5' 3\")   Wt 99.1 kg (218 lb 7.6 oz)   SpO2 93%   BMI 38.70 kg/m²     Intake/Output Summary (Last 24 hours) at 4/13/2025 1733  Last data filed at 4/13/2025 1330  Gross per 24 hour   Intake 120 ml   Output 3150 ml   Net -3030 ml        General Appearance: Alert; no acute distress.  Ears/Nose/Mouth/Throat: moist mucous membranes  Neck: Supple.  Chest: Lungs clear to auscultation bilaterally.  Cardiovascular: Regular rate and rhythm, S1S2 normal  Abdomen: Soft, non-tender, bowel sounds are active.  Extremities: ++ edema bilaterally.  Skin: Warm and dry.      PMH/SH reviewed - no change compared to H&P    Telemetry: 
        Progress Note      4/14/2025 1:08 PM  NAME: Nubia Lion   MRN:  [id number]   Admit Diagnosis: Shortness of breath [R06.02]  Acute respiratory failure with hypoxia [J96.01]  Chest pain, unspecified type [R07.9]  Acute on chronic congestive heart failure, unspecified heart failure type (HCC) [I50.9]  Acute exacerbation of chronic heart failure (HCC) [I50.9]      Problem List:   Acute on chronic CHF  Bipedal edema  HFrEF with EF 30-35%  Atrial flutter, back in sinus rhythm status post cardioversion June 2021  Paroxysmal atrial fibrillation on Eliquis  S/p TIA 3/1/2023  Chronic lower extremity edema  Type 2 diabetes  COPD on home oxygen, 3 L/minute  CAD status post CABG  Sleep apnea on CPAP  CKD, stage IIIb  Hypertension     Assessment/Plan:   Edema less today  Cut back IV  diuresis  Follow renal function; Cre up a bit-->cut lasix in 1/2         []       High complexity decision making was performed in this patient at high risk for decompensation with multiple organ involvement.    Subjective:     Nubia Lion denies chest pain, dyspnea.  Discussed with RN events overnight.     Review of Systems:   Negative except for as noted above.    Objective:      Physical Exam:    Last 24hrs VS reviewed since prior progress note. Most recent are:    BP (!) 161/84   Pulse 65   Temp 98.1 °F (36.7 °C) (Oral)   Resp 18   Ht 1.6 m (5' 3\")   Wt 96.3 kg (212 lb 4.9 oz)   SpO2 96%   BMI 37.61 kg/m²     Intake/Output Summary (Last 24 hours) at 4/14/2025 1308  Last data filed at 4/14/2025 1132  Gross per 24 hour   Intake 10 ml   Output 4650 ml   Net -4640 ml        General Appearance: Alert; no acute distress.  Ears/Nose/Mouth/Throat: moist mucous membranes  Neck: Supple.  Chest: Lungs clear to auscultation bilaterally.  Cardiovascular: Regular rate and rhythm, S1S2 normal  Abdomen: Soft, non-tender, bowel sounds are active.  Extremities: ++ edema bilaterally.  Skin: Warm and dry.      PMH/SH reviewed - no change compared 
        Progress Note      4/15/2025 1:07 PM  NAME: Nubia Lion   MRN:  [id number]   Admit Diagnosis: Shortness of breath [R06.02]  Acute respiratory failure with hypoxia [J96.01]  Chest pain, unspecified type [R07.9]  Acute on chronic congestive heart failure, unspecified heart failure type (HCC) [I50.9]  Acute exacerbation of chronic heart failure (HCC) [I50.9]      Problem List:   Acute on chronic CHF  Bipedal edema  HFrEF with EF 30-35%  Atrial flutter, back in sinus rhythm status post cardioversion June 2021  Paroxysmal atrial fibrillation on Eliquis  S/p TIA 3/1/2023  Chronic lower extremity edema  Type 2 diabetes  COPD on home oxygen, 3 L/minute  CAD status post CABG  Sleep apnea on CPAP  CKD, stage IIIb  Hypertension     Assessment/Plan:   Edema resolving  Hold diuretic with Cre rising to 1.91 (1.71<1.57)  Follow renal function; Cre up a bit-->cut lasix in 1/2         []       High complexity decision making was performed in this patient at high risk for decompensation with multiple organ involvement.    Subjective:     Nubia Lion denies chest pain, dyspnea.  Discussed with RN events overnight.     Review of Systems:   Negative except for as noted above.    Objective:      Physical Exam:    Last 24hrs VS reviewed since prior progress note. Most recent are:    BP (!) 127/58   Pulse 69   Temp 97.3 °F (36.3 °C) (Oral)   Resp 18   Ht 1.6 m (5' 3\")   Wt 91.4 kg (201 lb 8 oz)   SpO2 95%   BMI 35.69 kg/m²     Intake/Output Summary (Last 24 hours) at 4/15/2025 1307  Last data filed at 4/15/2025 0140  Gross per 24 hour   Intake 50 ml   Output 1600 ml   Net -1550 ml        General Appearance: Alert; no acute distress.  Ears/Nose/Mouth/Throat: moist mucous membranes  Neck: Supple.  Chest: Lungs clear to auscultation bilaterally.  Cardiovascular: Regular rate and rhythm, S1S2 normal  Abdomen: Soft, non-tender, bowel sounds are active.  Extremities: ++ edema bilaterally.  Skin: Warm and dry.      PMH/SH 
      Hospitalist Progress Note    NAME:   Nubia Lion   : 1950   MRN: [id number]     Date/Time: 2025 2:00 PM  Patient PCP: Ricardo Calle MD    Estimated discharge date: 48h  Barriers: IV diuresis, IV steroids, cardiology and pulmonology clearance, clinical improvement    Hospital Course:  Nubia Lion is a 74-year-old female with PMHx of CAD s/p CABG, chronic A-fib on Eliquis, COPD, hyperlipidemia, hypertension, diabetes, CHF, MICHELLE, history of MI, and chronic respiratory failure on 5 L O2 who presented to ED for worsening dyspnea with bilateral lower extremity swelling and erythema x 5 days.  Reports multiple admissions to hospital over past year for fluid overload.  In ED, mildly hypertensive and hypoxic on home dose 5 L O2, which resolved.  Admission lab work significant for creatinine 1.39 and BNP 9774.  CXR shows mild pulmonary edema.  Admitted to medical floor and started on IV Lasix for fluid overload, IV steroids and DuoNebs for COPD exacerbation.  Cardiology consulted, recommend continuing diuresis.  Pulmonology consulted, continue present care and check theophylline level. Echo shows EF 40 to 45%, findings consistent with concentric hypertrophy, regional wall, mild MR and TR. Bilateral duplex ultrasound of lower extremities negative for DVT.  Hospital course complicated by AMMON secondary to aggressive IV diuresis.      Assessment / Plan:  Acute on chronic heart failure  CAD s/p CABG  Chronic A-fib on Eliquis  Pulmonary edema  Hypertension  -CXR with mild pulmonary edema  -BNP 10k > 14k  -Echo shows EF 40 to 45%, findings consistent with concentric hypertrophy, regional wall, mild MR and TR  -Continue IV Lasix, increased to 80 mg daily per cardio  -Continue GDMT: Jardiance, metoprolol.  Entresto and spironolactone held given elevated creatinine  -Continue A-fib medications: Amiodarone and Eliquis   -IV hydralazine as needed for SBP >160  -Cardiology following, appreciate recs    COPD, 
      Hospitalist Progress Note    NAME:   Nubia Lion   : 1950   MRN: [id number]     Date/Time: 2025 7:03 PM  Patient PCP: Ricardo Calle MD    Estimated discharge date: 24-48h  Barriers: Cardiology and nephrology clearance, clinical improvement    Hospital Course:  Nubia Lion is a 74-year-old female with PMHx of CAD s/p CABG, chronic A-fib on Eliquis, COPD, hyperlipidemia, hypertension, diabetes, CHF, MICHELLE, history of MI, and chronic respiratory failure on 4-5 L O2 who presented to ED for worsening dyspnea with bilateral lower extremity swelling and erythema x 5 days.  Reports multiple admissions to hospital over past year for fluid overload.  In ED, mildly hypertensive and hypoxic on home dose 5 L O2, which resolved.  Admission lab work significant for creatinine 1.39 and BNP 9774.  CXR shows mild pulmonary edema.  Admitted to medical floor and started on IV Lasix for fluid overload, IV steroids and DuoNebs for COPD exacerbation.  Cardiology consulted, recommend continuing diuresis.  Pulmonology consulted, continue present care and check theophylline level. Echo shows EF 40 to 45%, findings consistent with concentric hypertrophy, regional wall, mild MR and TR. Bilateral duplex ultrasound of lower extremities negative for DVT.  Hospital course complicated by AMMON secondary to aggressive IV diuresis.  Nephrology consulted, diuretics will continue to be held.      Assessment / Plan:  Acute on chronic heart failure  CAD s/p CABG  Chronic A-fib on Eliquis  Pulmonary edema  Hypertension  -CXR with mild pulmonary edema  -BNP 10k > 14k > 6k  -Echo shows EF 40 to 45%, findings consistent with concentric hypertrophy, regional wall, mild MR and TR  - S/p IV Lasix, currently held for renal function  -Continue GDMT: Jardiance, metoprolol.  Entresto and spironolactone held given elevated creatinine  -Continue A-fib medications: Amiodarone and Eliquis   -IV hydralazine as needed for SBP >160  -Cardiology 
      Hospitalist Progress Note    NAME:   Nubia Lion   : 1950   MRN: [id number]     Date/Time: 2025 7:39 PM  Patient PCP: Ricardo Calle MD    Estimated discharge date: 48h  Barriers: IV diuresis, IV steroids, cardiology and pulmonology clearance, clinical improvement    Hospital Course:  Nubia Lion is a 74-year-old female with PMHx of CAD s/p CABG, chronic A-fib on Eliquis, COPD, hyperlipidemia, hypertension, diabetes, CHF, MICHELLE, history of MI, and chronic respiratory failure on 5 L O2 who presented to ED for worsening dyspnea with bilateral lower extremity swelling and erythema x 5 days.  Reports multiple admissions to hospital over past year for fluid overload.  In ED, mildly hypertensive and hypoxic on home dose 5 L O2, which resolved.  Admission lab work significant for creatinine 1.39 and BNP 9774.  CXR shows mild pulmonary edema.  Admitted to medical floor and started on IV Lasix for fluid overload, IV steroids and DuoNebs for COPD exacerbation.  Cardiology consulted, recommend continuing diuresis.  Pulmonology consulted, continue present care and check theophylline level. Echo shows EF 40 to 45%, findings consistent with concentric hypertrophy, regional wall, mild MR and TR.     Assessment / Plan:  Acute on chronic heart failure  CAD s/p CABG  Chronic A-fib on Eliquis  Pulmonary edema  Hypertension  -CXR with mild pulmonary edema  -Echo shows EF 40 to 45%, findings consistent with concentric hypertrophy, regional wall, mild MR and TR  -Continue IV Lasix 40mg BID  -Continue GDMT: aldactone, Jardiance, metoprolol.  Entresto held given AMMON  -Continue A-fib medications: Amiodarone and Eliquis   -Cardiology following, appreciate recs    COPD, possible exacerbation  Chronic respiratory failure on 5 L O2  -Currently on home dose 5 L O2  -Continue IV Solu-Medrol, DuoNebs, Symbicort, Theophylline  -Pulmonology following, appreciate recs    Bilateral lower extremity edema  -Likely secondary to 
  IMPRESSION:   Acute on chronic hypoxic respiratory failure  Congestive heart failure with fluid overload  Obstructive sleep apnea syndrome noncompliant  Chronic Obstructive Pulmonary Disease   Coronary artery disease status post CABG  Chronic A-fib  Chronic kidney disease stage IIIb      RECOMMENDATIONS/PLAN:   74-year-old lady came in because of shortness of breath and dyspnea significant past medical history of coronary disease status post CABG she was hypoxic and also having swelling of the lower extremities 3-4+ leg edema she was already on Lasix and Aldactone  despite of that her condition got worse more short of breath dyspneic tired fatigue came to the hospital got admitted and pulm consult was called.  Chronically on home oxygen 2 L nasal cannula and also on theophylline  IV Solu-Medrol Symbicort inhaler and nebulizer treatment  On oxygen 5 L nasal Cannula oxygen as salvage oxygen delivery device to provide high concentration of oxygen to overcome refractory hypoxia;   Patient had a VBG done which shows pCO2 23 pO2 63 pH 7.44  Chest x-ray shows cardiomegaly with mild pulmonary edema  Elevated BNP 9774 continue with the Lasix and Aldactone  Chronic A-fib continue with cardiac meds   4/13  Alert awake on oxygen nasal cannula discussed with her to bring her trilogy which is at the facility she does not want to wear the hospital BiPAP on oxygen 5 L nasal cannula continue with the diuretics Lasix 40 every 12 hours, BNP 59720 Theophylline level pending  4/14  On oxygen with nasal cannula Sleepy this a.m. Theophylline level 2.6 today on IV Solu-Medrol nebulizer treatment will change to prednisone BNP is elevated continue with Lasix and spironolactone patient has fluid overload with exacerbation of respiratory failure  4/15  Alert awake less short of breath received Lasix HFrEF chronically on home oxygen 4 L nasal cannula sometimes increased to 5 L as needed continue with the DuoNebs Symbicort and theophylline now 
  IMPRESSION:   Acute on chronic hypoxic respiratory failure  Congestive heart failure with fluid overload  Obstructive sleep apnea syndrome noncompliant  Chronic Obstructive Pulmonary Disease   Coronary artery disease status post CABG  Chronic A-fib  Chronic kidney disease stage IIIb      RECOMMENDATIONS/PLAN:   74-year-old lady came in because of shortness of breath and dyspnea significant past medical history of coronary disease status post CABG she was hypoxic and also having swelling of the lower extremities 3-4+ leg edema she was already on Lasix and Aldactone  despite of that her condition got worse more short of breath dyspneic tired fatigue came to the hospital got admitted and pulm consult was called.  Chronically on home oxygen 2 L nasal cannula and also on theophylline  IV Solu-Medrol Symbicort inhaler and nebulizer treatment  On oxygen 5 L nasal Cannula oxygen as salvage oxygen delivery device to provide high concentration of oxygen to overcome refractory hypoxia;   Patient had a VBG done which shows pCO2 23 pO2 63 pH 7.44  Chest x-ray shows cardiomegaly with mild pulmonary edema  Elevated BNP 9774 continue with the Lasix and Aldactone  Chronic A-fib continue with cardiac meds   4/13  Alert awake on oxygen nasal cannula discussed with her to bring her trilogy which is at the facility she does not want to wear the hospital BiPAP on oxygen 5 L nasal cannula continue with the diuretics Lasix 40 every 12 hours, BNP 79867 Theophylline level pending  4/14  On oxygen with nasal cannula Sleepy this a.m. Theophylline level 2.6 today on IV Solu-Medrol nebulizer treatment will change to prednisone BNP is elevated continue with Lasix and spironolactone patient has fluid overload with exacerbation of respiratory failure  4/15  Alert awake less short of breath received Lasix HFrEF chronically on home oxygen 4 L nasal cannula sometimes increased to 5 L as needed continue with the DuoNebs Symbicort and theophylline now 
  IMPRESSION:   Acute on chronic hypoxic respiratory failure  Congestive heart failure with fluid overload  Obstructive sleep apnea syndrome noncompliant  Chronic Obstructive Pulmonary Disease   Coronary artery disease status post CABG  Chronic A-fib  Chronic kidney disease stage IIIb  Pt is at high risk of sudden decline and decompensation with life threatening consequenses and continued end organ dysfunction and failure  Pt is critically ill. Time spent with pt and staff actively rendering care, managing pt and coordinating care as stated below;35 minutes, exclusive of any procedures      RECOMMENDATIONS/PLAN:   74-year-old lady came in because of shortness of breath and dyspnea significant past medical history of coronary disease status post CABG she was hypoxic and also having swelling of the lower extremities 3-4+ leg edema she was already on Lasix and Aldactone  despite of that her condition got worse more short of breath dyspneic tired fatigue came to the hospital got admitted and pulm consult was called.  Chronically on home oxygen 2 L nasal cannula and also on theophylline  IV Solu-Medrol Symbicort inhaler and nebulizer treatment  On oxygen 5 L nasal Cannula oxygen as salvage oxygen delivery device to provide high concentration of oxygen to overcome refractory hypoxia;   Patient had a VBG done which shows pCO2 23 pO2 63 pH 7.44  Chest x-ray shows cardiomegaly with mild pulmonary edema  Elevated BNP 9774 continue with the Lasix and Aldactone  Chronic A-fib continue with cardiac meds  Transfuse prn to maintain Hgb > 7  Labs to follow electrolytes, renal function and and blood counts  Bronchial hygiene with respiratory therapy techniques, bronchodilators  Pt needs IV fluids with additives and Drug therapy requiring intensive monitoring for toxicity  Prescription drug management with home med reconciliation reviewed  DVT, SUP prophylaxis   4/13  Alert awake on oxygen nasal cannula discussed with her to bring 
4 Eyes Skin Assessment     NAME:  Nubia Lion  YOB: 1950  MEDICAL RECORD NUMBER:  [id number]    The patient is being assessed for  Other Routine    I agree that at least one RN has performed a thorough Head to Toe Skin Assessment on the patient. ALL assessment sites listed below have been assessed.      Areas assessed by both nurses:    Head, Face, Ears, Shoulders, Back, Chest, Arms, Elbows, Hands, Sacrum. Buttock, Coccyx, Ischium, Legs. Feet and Heels, and Under Medical Devices         Does the Patient have a Wound? Yes wound(s) were present on assessment. LDA wound assessment was Initiated and completed by RN    Weeping legs have improved at this time.        Adan Prevention initiated by RN: previously intiiated  Wound Care Orders initiated by RN: Previosuly intiiated    Pressure Injury (Stage 3,4, Unstageable, DTI, NWPT, and Complex wounds) if present, place Wound referral order by RN under : No    New Ostomies, if present place, Ostomy referral order under : No     Nurse 1 eSignature: Electronically signed by Roni Keys RN on 4/16/25 at 2:36 PM EDT    **SHARE this note so that the co-signing nurse can place an eSignature**    Nurse 2 eSignature: Electronically signed by Rosa Sebastian RN on 4/16/25 at 2:47 PM EDT    
4 Eyes Skin Assessment     NAME:  Nubia Lion  YOB: 1950  MEDICAL RECORD NUMBER:  [id number]    The patient is being assessed for  Admission    I agree that at least one RN has performed a thorough Head to Toe Skin Assessment on the patient. ALL assessment sites listed below have been assessed.      Areas assessed by both nurses:    Head, Face, Ears, Shoulders, Back, Chest, Arms, Elbows, Hands, Sacrum. Buttock, Coccyx, Ischium, Legs. Feet and Heels, and Under Medical Devices         Does the Patient have a Wound? Yes wound(s) were present on assessment. LDA wound assessment was Initiated and completed by RN       Adan Prevention initiated by RN: Yes  Wound Care Orders initiated by RN: Yes    Pressure Injury (Stage 3,4, Unstageable, DTI, NWPT, and Complex wounds) if present, place Wound referral order by RN under : No    New Ostomies, if present place, Ostomy referral order under : No     Nurse 1 eSignature: Electronically signed by Helga Herrera RN on 4/12/25 at 7:14 AM EDT    **SHARE this note so that the co-signing nurse can place an eSignature**    Nurse 2 eSignature: Electronically signed by Kell Tariq RN on 4/12/25 at 7:32 AM EDT  
Patient lives at home with her daughter and will return at discharge. No anticipated needs. She has oxygen at 2 LPM at home through [facility].     Barriers to discharge- Pulmonary and cardio clearance, diuresis  
Patient received discharge instructions and daughter also in room. IV and tele removed. Patient will discharge home with home oxygen.   
Patient refused morning labs.  
Patient requesting not to have 4 day IV changed per protocol due to possibly being discharged today.  
Pt helped out of bed and assisted to bathroom.    Pt ambulatory with walker.    Pt linens changed, purewick replaced.    Assisted to chair.      
Pt resting.    Sitting upright in bed.    Pt tray removed per her request.     No complaints at this time.   
Pt sitting up right in bed.    Sleeping at this time.    Pt has no complaints.    Covid/Flu swab collected and walked to lab.   
RT Nebulizer Bronchodilator Protocol Note    There is a bronchodilator order in the chart from a provider indicating to follow the RT Bronchodilator Protocol and there is an “Initiate RT Bronchodilator Protocol” order as well (see protocol at bottom of note).    CXR Findings:  No results found.    The findings from the last RT Protocol Assessment were as follows:  Smoking: Chronic pulmonary disease  Respiratory Pattern: Dyspnea on exertion or RR 21-25 bpm  Breath Sounds: Slightly diminished and/or crackles  Cough: Strong, spontaneous, non-productive  Indication for Bronchodilator Therapy: Decreased or absent breath sounds  Bronchodilator Assessment Score: 6    Aerosolized bronchodilator medication orders have been revised according to the RT Nebulizer Bronchodilator Protocol below.    Respiratory Therapist to perform RT Therapy Protocol Assessment initially then follow the protocol.  Repeat RT Therapy Protocol Assessment PRN for score 0-3 or on second treatment, BID, and PRN for scores above 3.    No Indications - adjust the frequency to every 6 hours PRN wheezing or bronchospasm, if no treatments needed after 48 hours then discontinue using Per Protocol order mode.     If indication present, adjust the RT bronchodilator orders based on the Bronchodilator Assessment Score as indicated below.  If a patient is on this medication at home then do not decrease Frequency below that used at home.    0-3 - enter or revise RT bronchodilator order(s) to equivalent RT Bronchodilator order with Frequency of every 4 hours PRN for wheezing or increased work of breathing using Per Protocol order mode.       4-6 - enter or revise RT Bronchodilator order(s) to two equivalent RT bronchodilator orders with one order with BID Frequency and one order with Frequency of every 4 hours PRN wheezing or increased work of breathing using Per Protocol order mode.         7-10 - enter or revise RT Bronchodilator order(s) to two equivalent RT 
Renal Progress Note    Patient: Nubia Lion MRN: [id number]  SSN: [id number]    YOB: 1950  Age: 74 y.o.  Sex: female      Admit Date: 4/11/2025    LOS: 6 days     Subjective:   Patient seen at bedside. Alert and awake, no acute distress.     Patient is feeling better, not giving any new complaints today.   Reported LE edema is better   No complaints of abdominal or flank pains.  No complaints of voiding difficulties, or hematuria.   No complaints of shortness of breath. On 4L NC which is her baseline     Daughter at bedside.        Current Facility-Administered Medications   Medication Dose Route Frequency    predniSONE (DELTASONE) tablet 20 mg  20 mg Oral Daily    ipratropium 0.5 mg-albuterol 2.5 mg (DUONEB) nebulizer solution 1 Dose  1 Dose Inhalation BID RT    ipratropium 0.5 mg-albuterol 2.5 mg (DUONEB) nebulizer solution 1 Dose  1 Dose Inhalation Q4H PRN    hydrALAZINE (APRESOLINE) tablet 25 mg  25 mg Oral 3 times per day    cyclobenzaprine (FLEXERIL) tablet 10 mg  10 mg Oral TID PRN    traMADol (ULTRAM) tablet 25 mg  25 mg Oral Q6H PRN    theophylline (JEREL-24) extended release capsule 300 mg  300 mg Oral Daily    oxyBUTYnin (DITROPAN) tablet 5 mg  5 mg Oral Daily    amiodarone (CORDARONE) tablet 200 mg  200 mg Oral Daily    apixaban (ELIQUIS) tablet 5 mg  5 mg Oral BID    aspirin EC tablet 81 mg  81 mg Oral Daily    atorvastatin (LIPITOR) tablet 40 mg  40 mg Oral Nightly    budesonide-formoterol (SYMBICORT) 160-4.5 MCG/ACT inhaler 2 puff  2 puff Inhalation BID RT    cetirizine (ZYRTEC) tablet 10 mg  10 mg Oral Daily    hydrALAZINE (APRESOLINE) tablet 50 mg  50 mg Oral Q6H PRN    melatonin tablet 3 mg  3 mg Oral Nightly PRN    sodium chloride flush 0.9 % injection 5-40 mL  5-40 mL IntraVENous 2 times per day    sodium chloride flush 0.9 % injection 5-40 mL  5-40 mL IntraVENous PRN    0.9 % sodium chloride infusion   IntraVENous PRN    ondansetron (ZOFRAN-ODT) disintegrating tablet 4 mg  4 mg 
Report given to Arelis at bedside.   
Spiritual Health History and Assessment/Progress Note  [facility]    Spiritual/Emotional Needs,  ,  ,      Name: Nubia Lion MRN: [id number]    Age: 74 y.o.     Sex: female   Language: English   Gnosticism: Congregation   Acute exacerbation of chronic heart failure (HCC)     Date: 4/12/2025            Total Time Calculated: 45 min              Spiritual Assessment began in [facility]        Referral/Consult From: Nurse   Encounter Overview/Reason: Spiritual/Emotional Needs  Service Provided For: Patient    Tsering, Belief, Meaning:   Patient identifies as spiritual, is connected with a tsering tradition or spiritual practice, and has beliefs or practices that help with coping during difficult times  Family/Friends No family/friends present      Importance and Influence:  Patient has spiritual/personal beliefs that influence decisions regarding their health  Family/Friends No family/friends present    Community:  Patient feels well-supported. Support system includes: Children and Extended family  Family/Friends No family/friends present    Assessment and Plan of Care:     Patient Interventions include: Facilitated expression of thoughts and feelings, Explored spiritual coping/struggle/distress, Engaged in theological reflection, Affirmed coping skills/support systems, and Facilitated life review and/ or legacy  Family/Friends Interventions include: No family/friends present    Patient Plan of Care: Spiritual Care available upon further referral  Family/Friends Plan of Care: Spiritual Care available upon further referral     is visiting for a spiritual consult with the patient in 8. Nubia shared about her medical concerns and processed feeling down due to her ongoing health concerns. She processed some complicated family dynamics and the ways she has sought to relate positively to her family. She is Amish and finds strength in her tsering. She reads her Bible every night and this 
Venous duplex BLE completed in [facility].   
10 mg at 04/14/25 0836    traMADol (ULTRAM) tablet 25 mg  25 mg Oral Q6H PRN Dalia Eng PA-C        furosemide (LASIX) injection 80 mg  80 mg IntraVENous BID Katarina Barker MD   80 mg at 04/14/25 0837    theophylline (JEREL-24) extended release capsule 300 mg  300 mg Oral Daily Palmer Bustamante MD   300 mg at 04/14/25 0847    oxyBUTYnin (DITROPAN) tablet 5 mg  5 mg Oral Daily Palmer Bustamante MD   5 mg at 04/14/25 0836    amiodarone (CORDARONE) tablet 200 mg  200 mg Oral Daily Palmer Bustamante MD   200 mg at 04/14/25 0836    apixaban (ELIQUIS) tablet 5 mg  5 mg Oral BID Palmer Bustamante MD   5 mg at 04/14/25 0836    aspirin EC tablet 81 mg  81 mg Oral Daily Palmer Bustamante MD   81 mg at 04/14/25 0836    atorvastatin (LIPITOR) tablet 40 mg  40 mg Oral Nightly Palmer Bustamante MD   40 mg at 04/13/25 2130    budesonide-formoterol (SYMBICORT) 160-4.5 MCG/ACT inhaler 2 puff  2 puff Inhalation BID RT Palmer Bustamante MD   2 puff at 04/14/25 0913    cetirizine (ZYRTEC) tablet 10 mg  10 mg Oral Daily Palmer Bustamante MD   10 mg at 04/14/25 0836    empagliflozin (JARDIANCE) tablet 10 mg  10 mg Oral Daily Palmer Bustamante MD   10 mg at 04/14/25 0843    hydrALAZINE (APRESOLINE) tablet 50 mg  50 mg Oral Q6H PRN Palmer Bustamante MD   50 mg at 04/13/25 2348    melatonin tablet 3 mg  3 mg Oral Nightly PRN Palmer Bustamante MD   3 mg at 04/14/25 0000    [Held by provider] spironolactone (ALDACTONE) tablet 25 mg  25 mg Oral Daily Palmer Bustamante MD   25 mg at 04/13/25 0924    sodium chloride flush 0.9 % injection 5-40 mL  5-40 mL IntraVENous 2 times per day Palmer Bustamante MD   10 mL at 04/14/25 0837    sodium chloride flush 0.9 % injection 5-40 mL  5-40 mL IntraVENous PRN Palmer Bustamante MD   10 mL at 04/12/25 0942    0.9 % sodium chloride infusion   IntraVENous PRN Palmer Bustamante MD        ondansetron (ZOFRAN-ODT) disintegrating tablet 4 mg  4 mg Oral Q8H PRN Palmer Bustamante MD        Or    
mg  40 mg Oral Nightly    budesonide-formoterol (SYMBICORT) 160-4.5 MCG/ACT inhaler 2 puff  2 puff Inhalation BID RT    cetirizine (ZYRTEC) tablet 10 mg  10 mg Oral Daily    hydrALAZINE (APRESOLINE) tablet 50 mg  50 mg Oral Q6H PRN    melatonin tablet 3 mg  3 mg Oral Nightly PRN    sodium chloride flush 0.9 % injection 5-40 mL  5-40 mL IntraVENous 2 times per day    sodium chloride flush 0.9 % injection 5-40 mL  5-40 mL IntraVENous PRN    0.9 % sodium chloride infusion   IntraVENous PRN    ondansetron (ZOFRAN-ODT) disintegrating tablet 4 mg  4 mg Oral Q8H PRN    Or    ondansetron (ZOFRAN) injection 4 mg  4 mg IntraVENous Q6H PRN    polyethylene glycol (GLYCOLAX) packet 17 g  17 g Oral Daily PRN    potassium chloride (KLOR-CON M) extended release tablet 40 mEq  40 mEq Oral PRN    Or    potassium bicarb-citric acid (EFFER-K) effervescent tablet 40 mEq  40 mEq Oral PRN    Or    potassium chloride 10 mEq/100 mL IVPB (Peripheral Line)  10 mEq IntraVENous PRN    magnesium sulfate 2000 mg in 50 mL IVPB premix  2,000 mg IntraVENous PRN    insulin lispro (HUMALOG,ADMELOG) injection vial 0-16 Units  0-16 Units SubCUTAneous 4x Daily AC & HS    glucose chewable tablet 16 g  4 tablet Oral PRN    dextrose bolus 10% 125 mL  125 mL IntraVENous PRN    Or    dextrose bolus 10% 250 mL  250 mL IntraVENous PRN    glucagon injection 1 mg  1 mg SubCUTAneous PRN    dextrose 10 % infusion   IntraVENous Continuous PRN    metoprolol succinate (TOPROL XL) extended release tablet 25 mg  25 mg Oral Daily         Katarina Barker MD     
Minutes non procedure based      Comments   >50% of visit spent in counseling and coordination of care     ________________________________________________________________________  Dalia Eng PA-C     Procedures: see electronic medical records for all procedures/Xrays and details which were not copied into this note but were reviewed prior to creation of Plan.      LABS:  I reviewed today's most current labs and imaging studies.  Pertinent labs include:  Recent Labs     04/11/25  1850 04/13/25  0808   WBC 6.8 9.7   HGB 13.6 13.6   HCT 43.5 43.6   * 129*     Recent Labs     04/11/25  1850 04/12/25  0241 04/13/25  0808    145 141   K 4.4 3.7 4.2   * 108 105   CO2 30 31 34*   BUN 24* 19 29*   MG 1.5* 1.4* 1.5*   ALT 22 22  --        Signed: Dalia Eng PA-C   
discussed with:    Comments   Patient x    Family      RN x    Care Manager     Consultant                        Multidiciplinary team rounds were held today with , nursing, pharmacist and clinical coordinator.  Patient's plan of care was discussed; medications were reviewed and discharge planning was addressed.     ________________________________________________________________________  Total NON critical care TIME:  35  Minutes    Total CRITICAL CARE TIME Spent:   Minutes non procedure based      Comments   >50% of visit spent in counseling and coordination of care     ________________________________________________________________________  Mackenzie Valencia PA-C     Procedures: see electronic medical records for all procedures/Xrays and details which were not copied into this note but were reviewed prior to creation of Plan.      LABS:  I reviewed today's most current labs and imaging studies.  Pertinent labs include:  Recent Labs     04/13/25  0808 04/14/25  0319 04/15/25  0138   WBC 9.7 12.3* 10.0   HGB 13.6 14.1 14.6   HCT 43.6 45.1 46.5   * 146* 148*     Recent Labs     04/13/25  0808 04/14/25  0319 04/15/25  0138    141 141   K 4.2 3.8 3.6    100 101   CO2 34* 38* 39*   BUN 29* 32* 35*   MG 1.5* 1.6 1.6       Signed: Mackenzie Valencia PA-C   
  Medication Dose Route Frequency Provider Last Rate Last Admin    theophylline (JEREL-24) extended release capsule 300 mg  300 mg Oral Daily Palmer Bustamante MD   300 mg at 04/13/25 0936    oxyBUTYnin (DITROPAN) tablet 5 mg  5 mg Oral Daily Palmer Bustamante MD   5 mg at 04/13/25 0924    amiodarone (CORDARONE) tablet 200 mg  200 mg Oral Daily Palmer Bustamante MD   200 mg at 04/13/25 0924    apixaban (ELIQUIS) tablet 5 mg  5 mg Oral BID Palmer Bustamante MD   5 mg at 04/13/25 0924    aspirin EC tablet 81 mg  81 mg Oral Daily Palmer Bustamante MD   81 mg at 04/13/25 0924    atorvastatin (LIPITOR) tablet 40 mg  40 mg Oral Nightly Palmer Bustamante MD   40 mg at 04/12/25 2044    budesonide-formoterol (SYMBICORT) 160-4.5 MCG/ACT inhaler 2 puff  2 puff Inhalation BID RT Palmer Bustamante MD   2 puff at 04/13/25 0633    cetirizine (ZYRTEC) tablet 10 mg  10 mg Oral Daily aPlmer Bustamante MD   10 mg at 04/13/25 0936    empagliflozin (JARDIANCE) tablet 10 mg  10 mg Oral Daily Palmer Bustamante MD   10 mg at 04/13/25 0936    hydrALAZINE (APRESOLINE) tablet 50 mg  50 mg Oral Q6H PRN Palmer Bustamante MD   50 mg at 04/12/25 0041    melatonin tablet 3 mg  3 mg Oral Nightly PRN Palmer Bustamante MD        spironolactone (ALDACTONE) tablet 25 mg  25 mg Oral Daily Palmer Bustamante MD   25 mg at 04/13/25 0924    sodium chloride flush 0.9 % injection 5-40 mL  5-40 mL IntraVENous 2 times per day Palmer Bustamante MD   10 mL at 04/13/25 0936    sodium chloride flush 0.9 % injection 5-40 mL  5-40 mL IntraVENous PRN Palmer Bustamante MD   10 mL at 04/12/25 0942    0.9 % sodium chloride infusion   IntraVENous PRN Palmer Bustamante MD        ondansetron (ZOFRAN-ODT) disintegrating tablet 4 mg  4 mg Oral Q8H PRN Palmer Bustamante MD        Or    ondansetron (ZOFRAN) injection 4 mg  4 mg IntraVENous Q6H PRN Palmer Bustamante MD        polyethylene glycol (GLYCOLAX) packet 17 g  17 g Oral Daily PRN Palmer Bustamante MD        potassium

## 2025-04-17 NOTE — PLAN OF CARE
Problem: Pain  Goal: Verbalizes/displays adequate comfort level or baseline comfort level  4/17/2025 0857 by Manisha Nelson, RN  Outcome: Progressing  4/16/2025 2131 by Rosa Clark, RN  Outcome: Progressing     Problem: Chronic Conditions and Co-morbidities  Goal: Patient's chronic conditions and co-morbidity symptoms are monitored and maintained or improved  Outcome: Progressing  Flowsheets (Taken 4/17/2025 0842)  Care Plan - Patient's Chronic Conditions and Co-Morbidity Symptoms are Monitored and Maintained or Improved: Monitor and assess patient's chronic conditions and comorbid symptoms for stability, deterioration, or improvement     Problem: Discharge Planning  Goal: Discharge to home or other facility with appropriate resources  Outcome: Progressing     Problem: Skin/Tissue Integrity  Goal: Skin integrity remains intact  Description: 1.  Monitor for areas of redness and/or skin breakdown  2.  Assess vascular access sites hourly  3.  Every 4-6 hours minimum:  Change oxygen saturation probe site  4.  Every 4-6 hours:  If on nasal continuous positive airway pressure, respiratory therapy assess nares and determine need for appliance change or resting period  Outcome: Progressing  Flowsheets (Taken 4/17/2025 0842)  Skin Integrity Remains Intact: Monitor for areas of redness and/or skin breakdown     Problem: Safety - Adult  Goal: Free from fall injury  Outcome: Progressing     Problem: ABCDS Injury Assessment  Goal: Absence of physical injury  Outcome: Progressing     Problem: Respiratory - Adult  Goal: Achieves optimal ventilation and oxygenation  Outcome: Progressing     Problem: Cardiovascular - Adult  Goal: Maintains optimal cardiac output and hemodynamic stability  Outcome: Progressing  Goal: Absence of cardiac dysrhythmias or at baseline  Outcome: Progressing     Problem: Skin/Tissue Integrity - Adult  Goal: Skin integrity remains intact  Description: 1.  Monitor for areas of redness and/or skin

## 2025-04-17 NOTE — DISCHARGE SUMMARY
Discharge Summary    Name: Nubia Lion  [id number]  YOB: 1950 (Age: 74 y.o.)   Date of Admission: 4/11/2025  Date of Discharge: 4/17/2025  Attending Physician: Jose Roberto Candelaria*    Discharge Diagnosis:   Acute exacerbation of chronic heart failure  Acute on chronic respiratory failure  metabolic alkalosis  COPD  CAD status post CABG  Chronic A-fib on Eliquis  Pulmonary edema  Hypertension  AMMON on CKD stage IIIb  Leukocytosis  Myalgias  Bilateral lower extremity edema  Type 2 diabetes mellitus  Hyperlipidemia  MICHELLE on CPAP    Consultations:  IP CONSULT TO HEART FAILURE NURSE/COORDINATOR  IP CONSULT TO CARDIOLOGY  IP CONSULT TO PULMONOLOGY  IP CONSULT TO SPIRITUAL SERVICES  IP CONSULT TO NEPHROLOGY    Brief Admission History/Reason for Admission Per Palmer Bustamante MD: Acute on chronic respiratory failure, acute on chronic heart failure    Brief Hospital Course by Main Problems:   Nubia Lion is a 74-year-old female with PMHx of CAD s/p CABG, chronic A-fib on Eliquis, COPD, hyperlipidemia, hypertension, diabetes, CHF, MICHELLE, history of MI, and chronic respiratory failure on 4-5 L O2 who presented to ED for worsening dyspnea with bilateral lower extremity swelling and erythema x 5 days.  Reports multiple admissions to hospital over past year for fluid overload.  In ED, mildly hypertensive and hypoxic on home dose 5 L O2, which resolved.  Admission lab work significant for creatinine 1.39 and BNP 9774.  CXR shows mild pulmonary edema.  Admitted to medical floor and started on IV Lasix for fluid overload, IV steroids and DuoNebs for COPD exacerbation.  Cardiology consulted, patient continued with IV diuresis.  Pulmonology consulted, patient may continue present care.  Theophylline level 2.6, she may continue Theophylline. Echo shows EF 40 to 45%, findings consistent with concentric hypertrophy, regional wall, mild MR and TR. Bilateral duplex ultrasound of

## 2025-04-23 ENCOUNTER — FOLLOWUP TELEPHONE ENCOUNTER (OUTPATIENT)
Facility: HOSPITAL | Age: 75
End: 2025-04-23
